# Patient Record
Sex: MALE | Race: BLACK OR AFRICAN AMERICAN | NOT HISPANIC OR LATINO | Employment: OTHER | URBAN - NONMETROPOLITAN AREA
[De-identification: names, ages, dates, MRNs, and addresses within clinical notes are randomized per-mention and may not be internally consistent; named-entity substitution may affect disease eponyms.]

---

## 2017-01-03 ENCOUNTER — OFFICE VISIT (OUTPATIENT)
Dept: NEUROLOGY | Facility: CLINIC | Age: 62
End: 2017-01-03

## 2017-01-03 VITALS
BODY MASS INDEX: 28.45 KG/M2 | SYSTOLIC BLOOD PRESSURE: 128 MMHG | HEART RATE: 72 BPM | HEIGHT: 66 IN | WEIGHT: 177 LBS | DIASTOLIC BLOOD PRESSURE: 74 MMHG

## 2017-01-03 DIAGNOSIS — G89.29 CHRONIC MIDLINE THORACIC BACK PAIN: ICD-10-CM

## 2017-01-03 DIAGNOSIS — E78.5 DYSLIPIDEMIA: ICD-10-CM

## 2017-01-03 DIAGNOSIS — M54.6 CHRONIC MIDLINE THORACIC BACK PAIN: ICD-10-CM

## 2017-01-03 DIAGNOSIS — I10 ESSENTIAL HYPERTENSION: ICD-10-CM

## 2017-01-03 DIAGNOSIS — I69.30 CHRONIC LEFT ARTERIAL ISCHEMIC STROKE, MCA (MIDDLE CEREBRAL ARTERY): Primary | ICD-10-CM

## 2017-01-03 PROBLEM — Z86.73 CHRONIC LEFT ARTERIAL ISCHEMIC STROKE, MCA (MIDDLE CEREBRAL ARTERY): Status: ACTIVE | Noted: 2017-01-03

## 2017-01-03 PROCEDURE — 99213 OFFICE O/P EST LOW 20 MIN: CPT | Performed by: CLINICAL NURSE SPECIALIST

## 2017-01-03 RX ORDER — CLOPIDOGREL BISULFATE 75 MG/1
75 TABLET ORAL DAILY
Qty: 30 TABLET | Refills: 11 | Status: SHIPPED | OUTPATIENT
Start: 2017-01-03 | End: 2018-01-07 | Stop reason: SDUPTHER

## 2017-01-03 NOTE — MR AVS SNAPSHOT
Dao Jhaveri   1/3/2017 1:40 PM   Office Visit    Dept Phone:  [phone]   Encounter #:  [id number]    Provider:  OMAIRA Rodriguez   Department:  [facility] NEUROLOGY                Your Full Care Plan              Today's Medication Changes          These changes are accurate as of: 1/3/17  2:28 PM.  If you have any questions, ask your nurse or doctor.               Stop taking medication(s)listed here:     CloNIDine 0.1 MG tablet   Commonly known as:  CATAPRES   Stopped by:  OMAIRA Rodriguez                Where to Get Your Medications      These medications were sent to [facility] [address] [place] [address] - [address] - [phone]  - [phone]   [address] [facility] [address]     Phone:  [phone]     clopidogrel 75 MG tablet                  Your Updated Medication List          This list is accurate as of: 1/3/17  2:28 PM.  Always use your most recent med list.                atorvastatin 10 MG tablet   Commonly known as:  LIPITOR       clopidogrel 75 MG tablet   Commonly known as:  PLAVIX   Take 1 tablet by mouth Daily.       lisinopril 20 MG tablet   Commonly known as:  PRINIVIL,ZESTRIL       MAXZIDE 75-50 MG per tablet   Generic drug:  triamterene-hydrochlorothiazide       metoprolol succinate XL 50 MG 24 hr tablet   Commonly known as:  TOPROL-XL               Instructions    Stroke Prevention  Some medical conditions and behaviors are associated with an increased chance of having a stroke. You may prevent a stroke by making healthy choices and managing medical conditions.  HOW CAN I REDUCE MY RISK OF HAVING A STROKE?   · Stay physically active. Get at least 30 minutes of activity on most or all days.  · Do not smoke. It may also be helpful to avoid exposure to secondhand smoke.  · Limit alcohol use. Moderate alcohol use is considered to be:  ¨ No more than 2 drinks per day for men.  ¨ No more than 1 drink per day for  nonpregnant women.  · Eat healthy foods. This involves:  ¨ Eating 5 or more servings of fruits and vegetables a day.  ¨ Making dietary changes that address high blood pressure (hypertension), high cholesterol, diabetes, or obesity.  · Manage your cholesterol levels.  ¨ Making food choices that are high in fiber and low in saturated fat, trans fat, and cholesterol may control cholesterol levels.  ¨ Take any prescribed medicines to control cholesterol as directed by your health care provider.  · Manage your diabetes.  ¨ Controlling your carbohydrate and sugar intake is recommended to manage diabetes.  ¨ Take any prescribed medicines to control diabetes as directed by your health care provider.  · Control your hypertension.  ¨ Making food choices that are low in salt (sodium), saturated fat, trans fat, and cholesterol is recommended to manage hypertension.  ¨ Ask your health care provider if you need treatment to lower your blood pressure. Take any prescribed medicines to control hypertension as directed by your health care provider.  ¨ If you are 18-39 years of age, have your blood pressure checked every 3-5 years. If you are 40 years of age or older, have your blood pressure checked every year.  · Maintain a healthy weight.  ¨ Reducing calorie intake and making food choices that are low in sodium, saturated fat, trans fat, and cholesterol are recommended to manage weight.  · Stop drug abuse.  · Avoid taking birth control pills.  ¨ Talk to your health care provider about the risks of taking birth control pills if you are over 35 years old, smoke, get migraines, or have ever had a blood clot.  · Get evaluated for sleep disorders (sleep apnea).  ¨ Talk to your health care provider about getting a sleep evaluation if you snore a lot or have excessive sleepiness.  · Take medicines only as directed by your health care provider.  ¨ For some people, aspirin or blood thinners (anticoagulants) are helpful in reducing the risk  of forming abnormal blood clots that can lead to stroke. If you have the irregular heart rhythm of atrial fibrillation, you should be on a blood thinner unless there is a good reason you cannot take them.  ¨ Understand all your medicine instructions.  · Make sure that other conditions (such as anemia or atherosclerosis) are addressed.  SEEK IMMEDIATE MEDICAL CARE IF:   · You have sudden weakness or numbness of the face, arm, or leg, especially on one side of the body.  · Your face or eyelid droops to one side.  · You have sudden confusion.  · You have trouble speaking (aphasia) or understanding.  · You have sudden trouble seeing in one or both eyes.  · You have sudden trouble walking.  · You have dizziness.  · You have a loss of balance or coordination.  · You have a sudden, severe headache with no known cause.  · You have new chest pain or an irregular heartbeat.  Any of these symptoms may represent a serious problem that is an emergency. Do not wait to see if the symptoms will go away. Get medical help at once. Call your local emergency services (911 in [place] Do not drive yourself to the hospital.     This information is not intended to replace advice given to you by your health care provider. Make sure you discuss any questions you have with your health care provider.     Document Released: 01/25/2006 Document Revised: 01/08/2016 Document Reviewed: 06/20/2014  [email or web address] Interactive Patient Education ©2016 [employer]  BMI for Adults  Body mass index (BMI) is a number that is calculated from a person's weight and height. In most adults, the number is used to find how much of an adult's weight is made up of fat. BMI is not as accurate as a direct measure of body fat.  HOW IS BMI CALCULATED?  BMI is calculated by dividing weight in kilograms by height in meters squared. It can also be calculated by dividing weight in pounds by height in inches squared, then multiplying the resulting number by 703. Charts are  available to help you find your BMI quickly and easily without doing this calculation.   HOW IS BMI INTERPRETED?  Health care professionals use BMI charts to identify whether an adult is underweight, at a normal weight, or overweight based on the following guidelines:  · Underweight: BMI less than 18.5.  · Normal weight: BMI between 18.5 and 24.9.  · Overweight: BMI between 25 and 29.9.  · Obese: BMI of 30 and above.  BMI is usually interpreted the same for males and females.  Weight includes both fat and muscle, so someone with a muscular build, such as an athlete, may have a BMI that is higher than 24.9. In cases like these, BMI may not accurately depict body fat. To determine if excess body fat is the cause of a BMI of 25 or higher, further assessments may need to be done by a health care provider.  WHY IS BMI A USEFUL TOOL?  BMI is used to identify a possible weight problem that may be related to a medical problem or may increase the risk for medical problems. BMI can also be used to promote changes to reach a healthy weight.     This information is not intended to replace advice given to you by your health care provider. Make sure you discuss any questions you have with your health care provider.     Document Released: 08/29/2005 Document Revised: 01/08/2016 Document Reviewed: 05/15/2015  [employer] Interactive Patient Education ©2016 [employer]       Patient Instructions History      Upcoming Appointments     Visit Type Date Time Department    FOLLOW UP 1/3/2017  1:40 PM [facility] SPE PAD    FOLLOW UP 12/29/2017  1:00 PM [facility] SPE PAD      [employer] Signup     [facility] [employer] allows you to send messages to your doctor, view your test results, renew your prescriptions, schedule appointments, and more. To sign up, go to [employer] and click on the Sign Up Now link in the New User? box. Enter your [employer] Activation Code exactly as it appears below along with the last four digits  "of your Social Security Number and your Date of Birth () to complete the sign-up process. If you do not sign up before the expiration date, you must request a new code.    [employer] Activation Code: [email or web address]  Expires: 2017  2:25 PM    If you have questions, you can email [email or web address] or call [phone] to talk to our [employer] staff. Remember, [employer] is NOT to be used for urgent needs. For medical emergencies, dial 911.               Other Info from Your Visit           Your Appointments     Dec 29, 2017  1:00 PM CST   Follow Up with OMAIRA Rodriguez   [facility] (--)    [facility] [address] [address] [phone]           Arrive 15 minutes prior to appointment.              Allergies     Codeine        Reason for Visit     Stroke No new stroke symptoms. Complaints of his right arm/hand pain or tense, and sometimes late in the evening his speech will become slurred.       Vital Signs     Blood Pressure Pulse Height Weight Body Mass Index Smoking Status    128/74 72 66\" (167.6 cm) 177 lb (80.3 kg) 28.57 kg/m2 Former Smoker        "

## 2017-01-03 NOTE — PATIENT INSTRUCTIONS
Stroke Prevention  Some medical conditions and behaviors are associated with an increased chance of having a stroke. You may prevent a stroke by making healthy choices and managing medical conditions.  HOW CAN I REDUCE MY RISK OF HAVING A STROKE?   · Stay physically active. Get at least 30 minutes of activity on most or all days.  · Do not smoke. It may also be helpful to avoid exposure to secondhand smoke.  · Limit alcohol use. Moderate alcohol use is considered to be:  ¨ No more than 2 drinks per day for men.  ¨ No more than 1 drink per day for nonpregnant women.  · Eat healthy foods. This involves:  ¨ Eating 5 or more servings of fruits and vegetables a day.  ¨ Making dietary changes that address high blood pressure (hypertension), high cholesterol, diabetes, or obesity.  · Manage your cholesterol levels.  ¨ Making food choices that are high in fiber and low in saturated fat, trans fat, and cholesterol may control cholesterol levels.  ¨ Take any prescribed medicines to control cholesterol as directed by your health care provider.  · Manage your diabetes.  ¨ Controlling your carbohydrate and sugar intake is recommended to manage diabetes.  ¨ Take any prescribed medicines to control diabetes as directed by your health care provider.  · Control your hypertension.  ¨ Making food choices that are low in salt (sodium), saturated fat, trans fat, and cholesterol is recommended to manage hypertension.  ¨ Ask your health care provider if you need treatment to lower your blood pressure. Take any prescribed medicines to control hypertension as directed by your health care provider.  ¨ If you are 18-39 years of age, have your blood pressure checked every 3-5 years. If you are 40 years of age or older, have your blood pressure checked every year.  · Maintain a healthy weight.  ¨ Reducing calorie intake and making food choices that are low in sodium, saturated fat, trans fat, and cholesterol are recommended to manage  weight.  · Stop drug abuse.  · Avoid taking birth control pills.  ¨ Talk to your health care provider about the risks of taking birth control pills if you are over 35 years old, smoke, get migraines, or have ever had a blood clot.  · Get evaluated for sleep disorders (sleep apnea).  ¨ Talk to your health care provider about getting a sleep evaluation if you snore a lot or have excessive sleepiness.  · Take medicines only as directed by your health care provider.  ¨ For some people, aspirin or blood thinners (anticoagulants) are helpful in reducing the risk of forming abnormal blood clots that can lead to stroke. If you have the irregular heart rhythm of atrial fibrillation, you should be on a blood thinner unless there is a good reason you cannot take them.  ¨ Understand all your medicine instructions.  · Make sure that other conditions (such as anemia or atherosclerosis) are addressed.  SEEK IMMEDIATE MEDICAL CARE IF:   · You have sudden weakness or numbness of the face, arm, or leg, especially on one side of the body.  · Your face or eyelid droops to one side.  · You have sudden confusion.  · You have trouble speaking (aphasia) or understanding.  · You have sudden trouble seeing in one or both eyes.  · You have sudden trouble walking.  · You have dizziness.  · You have a loss of balance or coordination.  · You have a sudden, severe headache with no known cause.  · You have new chest pain or an irregular heartbeat.  Any of these symptoms may represent a serious problem that is an emergency. Do not wait to see if the symptoms will go away. Get medical help at once. Call your local emergency services (911 in [place] Do not drive yourself to the hospital.     This information is not intended to replace advice given to you by your health care provider. Make sure you discuss any questions you have with your health care provider.     Document Released: 01/25/2006 Document Revised: 01/08/2016 Document Reviewed:  06/20/2014  [employer] Interactive Patient Education ©2016 [employer]  BMI for Adults  Body mass index (BMI) is a number that is calculated from a person's weight and height. In most adults, the number is used to find how much of an adult's weight is made up of fat. BMI is not as accurate as a direct measure of body fat.  HOW IS BMI CALCULATED?  BMI is calculated by dividing weight in kilograms by height in meters squared. It can also be calculated by dividing weight in pounds by height in inches squared, then multiplying the resulting number by 703. Charts are available to help you find your BMI quickly and easily without doing this calculation.   HOW IS BMI INTERPRETED?  Health care professionals use BMI charts to identify whether an adult is underweight, at a normal weight, or overweight based on the following guidelines:  · Underweight: BMI less than 18.5.  · Normal weight: BMI between 18.5 and 24.9.  · Overweight: BMI between 25 and 29.9.  · Obese: BMI of 30 and above.  BMI is usually interpreted the same for males and females.  Weight includes both fat and muscle, so someone with a muscular build, such as an athlete, may have a BMI that is higher than 24.9. In cases like these, BMI may not accurately depict body fat. To determine if excess body fat is the cause of a BMI of 25 or higher, further assessments may need to be done by a health care provider.  WHY IS BMI A USEFUL TOOL?  BMI is used to identify a possible weight problem that may be related to a medical problem or may increase the risk for medical problems. BMI can also be used to promote changes to reach a healthy weight.     This information is not intended to replace advice given to you by your health care provider. Make sure you discuss any questions you have with your health care provider.     Document Released: 08/29/2005 Document Revised: 01/08/2016 Document Reviewed: 05/15/2015  [employer] Patient Education ©2016 [employer]
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
## 2017-03-02 ENCOUNTER — LAB (OUTPATIENT)
Dept: LAB | Facility: HOSPITAL | Age: 62
End: 2017-03-02

## 2017-03-02 ENCOUNTER — TRANSCRIBE ORDERS (OUTPATIENT)
Dept: LAB | Facility: HOSPITAL | Age: 62
End: 2017-03-02

## 2017-03-02 DIAGNOSIS — E55.9 UNSPECIFIED VITAMIN D DEFICIENCY: ICD-10-CM

## 2017-03-02 DIAGNOSIS — E78.2 MIXED HYPERLIPIDEMIA: ICD-10-CM

## 2017-03-02 DIAGNOSIS — R73.09 OTHER ABNORMAL GLUCOSE: ICD-10-CM

## 2017-03-02 DIAGNOSIS — I10 ESSENTIAL HYPERTENSION, MALIGNANT: ICD-10-CM

## 2017-03-02 DIAGNOSIS — I10 ESSENTIAL HYPERTENSION, MALIGNANT: Primary | ICD-10-CM

## 2017-03-02 LAB
25(OH)D3 SERPL-MCNC: 38.5 NG/ML (ref 30–100)
ALBUMIN SERPL-MCNC: 4.8 G/DL (ref 3.5–5)
ALBUMIN/GLOB SERPL: 1.2 G/DL (ref 1.1–2.5)
ALP SERPL-CCNC: 63 U/L (ref 24–120)
ALT SERPL W P-5'-P-CCNC: 36 U/L (ref 0–54)
ANION GAP SERPL CALCULATED.3IONS-SCNC: 13 MMOL/L (ref 4–13)
AST SERPL-CCNC: 30 U/L (ref 7–45)
AUTO MIXED CELLS #: 0.6 10*3/UL (ref 0.1–2.6)
AUTO MIXED CELLS %: 7.3 % (ref 0.1–24)
BILIRUB SERPL-MCNC: 0.9 MG/DL (ref 0.1–1)
BILIRUB UR QL STRIP: NEGATIVE
BUN BLD-MCNC: 15 MG/DL (ref 5–21)
BUN/CREAT SERPL: 12.8
CALCIUM SPEC-SCNC: 10.1 MG/DL (ref 8.4–10.4)
CHLORIDE SERPL-SCNC: 101 MMOL/L (ref 98–110)
CHOLEST SERPL-MCNC: 141 MG/DL (ref 130–200)
CLARITY UR: CLEAR
CO2 SERPL-SCNC: 31 MMOL/L (ref 24–31)
COLOR UR: YELLOW
CREAT BLD-MCNC: 1.17 MG/DL (ref 0.5–1.4)
ERYTHROCYTE [DISTWIDTH] IN BLOOD BY AUTOMATED COUNT: 15.6 % (ref 12–15)
GFR SERPL CREATININE-BSD FRML MDRD: 77 ML/MIN/1.73
GLOBULIN UR ELPH-MCNC: 4.1 GM/DL
GLUCOSE BLD-MCNC: 90 MG/DL (ref 70–100)
GLUCOSE UR STRIP-MCNC: NEGATIVE MG/DL
HBA1C MFR BLD: 5.8 %
HCT VFR BLD AUTO: 45 % (ref 40–52)
HDLC SERPL-MCNC: 44 MG/DL
HGB BLD-MCNC: 15 G/DL (ref 14–18)
HGB UR QL STRIP.AUTO: NEGATIVE
KETONES UR QL STRIP: NEGATIVE
LDLC SERPL CALC-MCNC: 80 MG/DL (ref 0–99)
LDLC/HDLC SERPL: 1.82 {RATIO}
LEUKOCYTE ESTERASE UR QL STRIP.AUTO: NEGATIVE
LYMPHOCYTES # BLD AUTO: 1.9 10*3/MM3 (ref 0.8–7)
LYMPHOCYTES NFR BLD AUTO: 23.4 % (ref 15–45)
MCH RBC QN AUTO: 26.6 PG (ref 28–32)
MCHC RBC AUTO-ENTMCNC: 33.3 G/DL (ref 33–36)
MCV RBC AUTO: 79.9 FL (ref 82–95)
NEUTROPHILS # BLD AUTO: 5.8 10*3/MM3 (ref 1.5–8.3)
NEUTROPHILS NFR BLD AUTO: 69.3 % (ref 39–78)
NITRITE UR QL STRIP: NEGATIVE
PH UR STRIP.AUTO: 6.5 [PH] (ref 5–8)
PLATELET # BLD AUTO: 226 10*3/MM3 (ref 130–400)
PMV BLD AUTO: 10 FL (ref 6–12)
POTASSIUM BLD-SCNC: 4.4 MMOL/L (ref 3.5–5.3)
PROT SERPL-MCNC: 8.9 G/DL (ref 6.3–8.7)
PROT UR QL STRIP: NEGATIVE
RBC # BLD AUTO: 5.63 10*6/MM3 (ref 4.2–5.4)
SODIUM BLD-SCNC: 145 MMOL/L (ref 135–145)
SP GR UR STRIP: 1.02 (ref 1–1.03)
TRIGL SERPL-MCNC: 85 MG/DL (ref 0–149)
TSH SERPL DL<=0.05 MIU/L-ACNC: 2.41 MIU/ML (ref 0.47–4.68)
UROBILINOGEN UR QL STRIP: NORMAL
VLDLC SERPL-MCNC: 17 MG/DL
WBC NRBC COR # BLD: 8.3 10*3/MM3 (ref 4.8–10.8)

## 2017-03-02 PROCEDURE — 36415 COLL VENOUS BLD VENIPUNCTURE: CPT

## 2017-03-02 PROCEDURE — 83036 HEMOGLOBIN GLYCOSYLATED A1C: CPT

## 2017-03-02 PROCEDURE — 80053 COMPREHEN METABOLIC PANEL: CPT

## 2017-03-02 PROCEDURE — 84443 ASSAY THYROID STIM HORMONE: CPT | Performed by: NURSE PRACTITIONER

## 2017-03-02 PROCEDURE — 85025 COMPLETE CBC W/AUTO DIFF WBC: CPT

## 2017-03-02 PROCEDURE — 82306 VITAMIN D 25 HYDROXY: CPT | Performed by: NURSE PRACTITIONER

## 2017-03-02 PROCEDURE — 80061 LIPID PANEL: CPT

## 2017-03-02 PROCEDURE — 81003 URINALYSIS AUTO W/O SCOPE: CPT

## 2017-03-28 ENCOUNTER — TRANSCRIBE ORDERS (OUTPATIENT)
Dept: LAB | Facility: HOSPITAL | Age: 62
End: 2017-03-28

## 2017-03-28 ENCOUNTER — HOSPITAL ENCOUNTER (OUTPATIENT)
Dept: GENERAL RADIOLOGY | Facility: HOSPITAL | Age: 62
Discharge: HOME | End: 2017-03-28
Admitting: NURSE PRACTITIONER

## 2017-03-28 DIAGNOSIS — M54.2 CERVICALGIA: ICD-10-CM

## 2017-03-28 DIAGNOSIS — M54.2 CERVICALGIA: Primary | ICD-10-CM

## 2017-03-28 PROCEDURE — 72050 X-RAY EXAM NECK SPINE 4/5VWS: CPT

## 2017-04-07 RX ORDER — ATORVASTATIN CALCIUM 10 MG/1
TABLET, FILM COATED ORAL
Qty: 90 TABLET | Refills: 2 | Status: SHIPPED | OUTPATIENT
Start: 2017-04-07 | End: 2018-01-07 | Stop reason: SDUPTHER

## 2018-01-08 RX ORDER — ATORVASTATIN CALCIUM 10 MG/1
TABLET, FILM COATED ORAL
Qty: 90 TABLET | Refills: 2 | Status: SHIPPED | OUTPATIENT
Start: 2018-01-08 | End: 2018-09-19 | Stop reason: SDUPTHER

## 2018-01-08 RX ORDER — CLOPIDOGREL BISULFATE 75 MG/1
TABLET ORAL
Qty: 30 TABLET | Refills: 11 | Status: SHIPPED | OUTPATIENT
Start: 2018-01-08 | End: 2019-10-30 | Stop reason: SDUPTHER

## 2018-01-26 ENCOUNTER — OFFICE VISIT (OUTPATIENT)
Dept: NEUROLOGY | Facility: CLINIC | Age: 63
End: 2018-01-26

## 2018-01-26 VITALS
HEIGHT: 66 IN | WEIGHT: 179 LBS | OXYGEN SATURATION: 98 % | HEART RATE: 77 BPM | BODY MASS INDEX: 28.77 KG/M2 | SYSTOLIC BLOOD PRESSURE: 160 MMHG | RESPIRATION RATE: 16 BRPM | DIASTOLIC BLOOD PRESSURE: 110 MMHG

## 2018-01-26 DIAGNOSIS — I69.30 CHRONIC LEFT ARTERIAL ISCHEMIC STROKE, MCA (MIDDLE CEREBRAL ARTERY): Primary | ICD-10-CM

## 2018-01-26 DIAGNOSIS — G89.29 CHRONIC MIDLINE THORACIC BACK PAIN: ICD-10-CM

## 2018-01-26 DIAGNOSIS — I10 ESSENTIAL HYPERTENSION: ICD-10-CM

## 2018-01-26 DIAGNOSIS — E78.5 DYSLIPIDEMIA: ICD-10-CM

## 2018-01-26 DIAGNOSIS — G89.0 THALAMIC PAIN SYNDROME: ICD-10-CM

## 2018-01-26 DIAGNOSIS — M54.6 CHRONIC MIDLINE THORACIC BACK PAIN: ICD-10-CM

## 2018-01-26 DIAGNOSIS — R25.1 TREMOR OF RIGHT HAND: ICD-10-CM

## 2018-01-26 PROCEDURE — 99214 OFFICE O/P EST MOD 30 MIN: CPT | Performed by: CLINICAL NURSE SPECIALIST

## 2018-01-26 RX ORDER — AMITRIPTYLINE HYDROCHLORIDE 25 MG/1
25 TABLET, FILM COATED ORAL NIGHTLY
Qty: 30 TABLET | Refills: 2 | Status: SHIPPED | OUTPATIENT
Start: 2018-01-26 | End: 2018-03-22 | Stop reason: SDUPTHER

## 2018-01-26 NOTE — PATIENT INSTRUCTIONS
Stroke Prevention  Some medical conditions and behaviors are associated with an increased chance of having a stroke. You may prevent a stroke by making healthy choices and managing medical conditions.  How can I reduce my risk of having a stroke?  · Stay physically active. Get at least 30 minutes of activity on most or all days.  · Do not smoke. It may also be helpful to avoid exposure to secondhand smoke.  · Limit alcohol use. Moderate alcohol use is considered to be:  ¨ No more than 2 drinks per day for men.  ¨ No more than 1 drink per day for nonpregnant women.  · Eat healthy foods. This involves:  ¨ Eating 5 or more servings of fruits and vegetables a day.  ¨ Making dietary changes that address high blood pressure (hypertension), high cholesterol, diabetes, or obesity.  · Manage your cholesterol levels.  ¨ Making food choices that are high in fiber and low in saturated fat, trans fat, and cholesterol may control cholesterol levels.  ¨ Take any prescribed medicines to control cholesterol as directed by your health care provider.  · Manage your diabetes.  ¨ Controlling your carbohydrate and sugar intake is recommended to manage diabetes.  ¨ Take any prescribed medicines to control diabetes as directed by your health care provider.  · Control your hypertension.  ¨ Making food choices that are low in salt (sodium), saturated fat, trans fat, and cholesterol is recommended to manage hypertension.  ¨ Ask your health care provider if you need treatment to lower your blood pressure. Take any prescribed medicines to control hypertension as directed by your health care provider.  ¨ If you are 18-39 years of age, have your blood pressure checked every 3-5 years. If you are 40 years of age or older, have your blood pressure checked every year.  · Maintain a healthy weight.  ¨ Reducing calorie intake and making food choices that are low in sodium, saturated fat, trans fat, and cholesterol are recommended to manage  weight.  · Stop drug abuse.  · Avoid taking birth control pills.  ¨ Talk to your health care provider about the risks of taking birth control pills if you are over 35 years old, smoke, get migraines, or have ever had a blood clot.  · Get evaluated for sleep disorders (sleep apnea).  ¨ Talk to your health care provider about getting a sleep evaluation if you snore a lot or have excessive sleepiness.  · Take medicines only as directed by your health care provider.  ¨ For some people, aspirin or blood thinners (anticoagulants) are helpful in reducing the risk of forming abnormal blood clots that can lead to stroke. If you have the irregular heart rhythm of atrial fibrillation, you should be on a blood thinner unless there is a good reason you cannot take them.  ¨ Understand all your medicine instructions.  · Make sure that other conditions (such as anemia or atherosclerosis) are addressed.  Get help right away if:  · You have sudden weakness or numbness of the face, arm, or leg, especially on one side of the body.  · Your face or eyelid droops to one side.  · You have sudden confusion.  · You have trouble speaking (aphasia) or understanding.  · You have sudden trouble seeing in one or both eyes.  · You have sudden trouble walking.  · You have dizziness.  · You have a loss of balance or coordination.  · You have a sudden, severe headache with no known cause.  · You have new chest pain or an irregular heartbeat.  Any of these symptoms may represent a serious problem that is an emergency. Do not wait to see if the symptoms will go away. Get medical help at once. Call your local emergency services (911 in [place] Do not drive yourself to the hospital.   This information is not intended to replace advice given to you by your health care provider. Make sure you discuss any questions you have with your health care provider.  Document Released: 01/25/2006 Document Revised: 05/25/2017 Document Reviewed: 06/20/2014  [employer]  Interactive Patient Education © 2017 [employer]

## 2018-02-01 ENCOUNTER — LAB (OUTPATIENT)
Dept: LAB | Facility: HOSPITAL | Age: 63
End: 2018-02-01

## 2018-02-01 DIAGNOSIS — R25.1 TREMOR OF RIGHT HAND: ICD-10-CM

## 2018-02-01 LAB
ALBUMIN SERPL-MCNC: 4.2 G/DL (ref 3.5–5)
ALBUMIN/GLOB SERPL: 1.4 G/DL (ref 1.1–2.5)
ALP SERPL-CCNC: 54 U/L (ref 24–120)
ALT SERPL W P-5'-P-CCNC: 26 U/L (ref 0–54)
ANION GAP SERPL CALCULATED.3IONS-SCNC: 12 MMOL/L (ref 4–13)
AST SERPL-CCNC: 23 U/L (ref 7–45)
BASOPHILS # BLD AUTO: 0.04 10*3/MM3 (ref 0–0.2)
BASOPHILS NFR BLD AUTO: 0.6 % (ref 0–2)
BILIRUB SERPL-MCNC: 0.4 MG/DL (ref 0.1–1)
BUN BLD-MCNC: 16 MG/DL (ref 5–21)
BUN/CREAT SERPL: 13.9 (ref 7–25)
CALCIUM SPEC-SCNC: 9.5 MG/DL (ref 8.4–10.4)
CHLORIDE SERPL-SCNC: 104 MMOL/L (ref 98–110)
CO2 SERPL-SCNC: 29 MMOL/L (ref 24–31)
CREAT BLD-MCNC: 1.15 MG/DL (ref 0.5–1.4)
DEPRECATED RDW RBC AUTO: 44.9 FL (ref 40–54)
EOSINOPHIL # BLD AUTO: 0.1 10*3/MM3 (ref 0–0.7)
EOSINOPHIL NFR BLD AUTO: 1.4 % (ref 0–4)
ERYTHROCYTE [DISTWIDTH] IN BLOOD BY AUTOMATED COUNT: 15.8 % (ref 12–15)
FOLATE SERPL-MCNC: 6.31 NG/ML
GFR SERPL CREATININE-BSD FRML MDRD: 78 ML/MIN/1.73
GLOBULIN UR ELPH-MCNC: 2.9 GM/DL
GLUCOSE BLD-MCNC: 93 MG/DL (ref 70–100)
HCT VFR BLD AUTO: 41.4 % (ref 40–52)
HGB BLD-MCNC: 13.7 G/DL (ref 14–18)
IMM GRANULOCYTES # BLD: 0.02 10*3/MM3 (ref 0–0.03)
IMM GRANULOCYTES NFR BLD: 0.3 % (ref 0–5)
LYMPHOCYTES # BLD AUTO: 2.11 10*3/MM3 (ref 0.72–4.86)
LYMPHOCYTES NFR BLD AUTO: 30.3 % (ref 15–45)
MCH RBC QN AUTO: 26.1 PG (ref 28–32)
MCHC RBC AUTO-ENTMCNC: 33.1 G/DL (ref 33–36)
MCV RBC AUTO: 79 FL (ref 82–95)
MONOCYTES # BLD AUTO: 0.44 10*3/MM3 (ref 0.19–1.3)
MONOCYTES NFR BLD AUTO: 6.3 % (ref 4–12)
NEUTROPHILS # BLD AUTO: 4.25 10*3/MM3 (ref 1.87–8.4)
NEUTROPHILS NFR BLD AUTO: 61.1 % (ref 39–78)
NRBC BLD MANUAL-RTO: 0 /100 WBC (ref 0–0)
PLATELET # BLD AUTO: 209 10*3/MM3 (ref 130–400)
PMV BLD AUTO: 11.2 FL (ref 6–12)
POTASSIUM BLD-SCNC: 4.2 MMOL/L (ref 3.5–5.3)
PROT SERPL-MCNC: 7.1 G/DL (ref 6.3–8.7)
RBC # BLD AUTO: 5.24 10*6/MM3 (ref 4.8–5.9)
SODIUM BLD-SCNC: 145 MMOL/L (ref 135–145)
VIT B12 BLD-MCNC: 381 PG/ML (ref 239–931)
WBC NRBC COR # BLD: 6.96 10*3/MM3 (ref 4.8–10.8)

## 2018-02-01 PROCEDURE — 82607 VITAMIN B-12: CPT | Performed by: CLINICAL NURSE SPECIALIST

## 2018-02-01 PROCEDURE — 80053 COMPREHEN METABOLIC PANEL: CPT | Performed by: CLINICAL NURSE SPECIALIST

## 2018-02-01 PROCEDURE — 85025 COMPLETE CBC W/AUTO DIFF WBC: CPT | Performed by: CLINICAL NURSE SPECIALIST

## 2018-02-01 PROCEDURE — 82746 ASSAY OF FOLIC ACID SERUM: CPT | Performed by: CLINICAL NURSE SPECIALIST

## 2018-02-01 PROCEDURE — 36415 COLL VENOUS BLD VENIPUNCTURE: CPT

## 2018-02-05 ENCOUNTER — APPOINTMENT (OUTPATIENT)
Dept: NEUROLOGY | Facility: HOSPITAL | Age: 63
End: 2018-02-05

## 2018-03-22 ENCOUNTER — OFFICE VISIT (OUTPATIENT)
Dept: NEUROLOGY | Facility: CLINIC | Age: 63
End: 2018-03-22

## 2018-03-22 VITALS
HEART RATE: 72 BPM | DIASTOLIC BLOOD PRESSURE: 84 MMHG | HEIGHT: 66 IN | WEIGHT: 181 LBS | BODY MASS INDEX: 29.09 KG/M2 | SYSTOLIC BLOOD PRESSURE: 124 MMHG

## 2018-03-22 DIAGNOSIS — R25.1 TREMOR OF RIGHT HAND: ICD-10-CM

## 2018-03-22 DIAGNOSIS — G89.0 THALAMIC PAIN SYNDROME: ICD-10-CM

## 2018-03-22 DIAGNOSIS — E78.5 DYSLIPIDEMIA: ICD-10-CM

## 2018-03-22 DIAGNOSIS — I69.30 CHRONIC LEFT ARTERIAL ISCHEMIC STROKE, MCA (MIDDLE CEREBRAL ARTERY): Primary | ICD-10-CM

## 2018-03-22 DIAGNOSIS — I10 ESSENTIAL HYPERTENSION: ICD-10-CM

## 2018-03-22 PROCEDURE — 99213 OFFICE O/P EST LOW 20 MIN: CPT | Performed by: CLINICAL NURSE SPECIALIST

## 2018-03-22 RX ORDER — AMITRIPTYLINE HYDROCHLORIDE 25 MG/1
25 TABLET, FILM COATED ORAL NIGHTLY
Qty: 30 TABLET | Refills: 5 | Status: SHIPPED | OUTPATIENT
Start: 2018-03-22 | End: 2018-09-19 | Stop reason: SDUPTHER

## 2018-03-22 RX ORDER — AMLODIPINE BESYLATE 5 MG/1
TABLET ORAL
COMMUNITY
End: 2018-10-08 | Stop reason: SDUPTHER

## 2018-03-22 NOTE — PATIENT INSTRUCTIONS
Stroke Prevention  Some medical conditions and behaviors are associated with an increased chance of having a stroke. You may prevent a stroke by making healthy choices and managing medical conditions.  How can I reduce my risk of having a stroke?  · Stay physically active. Get at least 30 minutes of activity on most or all days.  · Do not smoke. It may also be helpful to avoid exposure to secondhand smoke.  · Limit alcohol use. Moderate alcohol use is considered to be:  ¨ No more than 2 drinks per day for men.  ¨ No more than 1 drink per day for nonpregnant women.  · Eat healthy foods. This involves:  ¨ Eating 5 or more servings of fruits and vegetables a day.  ¨ Making dietary changes that address high blood pressure (hypertension), high cholesterol, diabetes, or obesity.  · Manage your cholesterol levels.  ¨ Making food choices that are high in fiber and low in saturated fat, trans fat, and cholesterol may control cholesterol levels.  ¨ Take any prescribed medicines to control cholesterol as directed by your health care provider.  · Manage your diabetes.  ¨ Controlling your carbohydrate and sugar intake is recommended to manage diabetes.  ¨ Take any prescribed medicines to control diabetes as directed by your health care provider.  · Control your hypertension.  ¨ Making food choices that are low in salt (sodium), saturated fat, trans fat, and cholesterol is recommended to manage hypertension.  ¨ Ask your health care provider if you need treatment to lower your blood pressure. Take any prescribed medicines to control hypertension as directed by your health care provider.  ¨ If you are 18-39 years of age, have your blood pressure checked every 3-5 years. If you are 40 years of age or older, have your blood pressure checked every year.  · Maintain a healthy weight.  ¨ Reducing calorie intake and making food choices that are low in sodium, saturated fat, trans fat, and cholesterol are recommended to manage  weight.  · Stop drug abuse.  · Avoid taking birth control pills.  ¨ Talk to your health care provider about the risks of taking birth control pills if you are over 35 years old, smoke, get migraines, or have ever had a blood clot.  · Get evaluated for sleep disorders (sleep apnea).  ¨ Talk to your health care provider about getting a sleep evaluation if you snore a lot or have excessive sleepiness.  · Take medicines only as directed by your health care provider.  ¨ For some people, aspirin or blood thinners (anticoagulants) are helpful in reducing the risk of forming abnormal blood clots that can lead to stroke. If you have the irregular heart rhythm of atrial fibrillation, you should be on a blood thinner unless there is a good reason you cannot take them.  ¨ Understand all your medicine instructions.  · Make sure that other conditions (such as anemia or atherosclerosis) are addressed.  Get help right away if:  · You have sudden weakness or numbness of the face, arm, or leg, especially on one side of the body.  · Your face or eyelid droops to one side.  · You have sudden confusion.  · You have trouble speaking (aphasia) or understanding.  · You have sudden trouble seeing in one or both eyes.  · You have sudden trouble walking.  · You have dizziness.  · You have a loss of balance or coordination.  · You have a sudden, severe headache with no known cause.  · You have new chest pain or an irregular heartbeat.  Any of these symptoms may represent a serious problem that is an emergency. Do not wait to see if the symptoms will go away. Get medical help at once. Call your local emergency services (911 in [place] Do not drive yourself to the hospital.  This information is not intended to replace advice given to you by your health care provider. Make sure you discuss any questions you have with your health care provider.  Document Released: 01/25/2006 Document Revised: 05/25/2017 Document Reviewed: 06/20/2014  [employer]  Interactive Patient Education © 2017 [employer]  Calorie Counting for Weight Loss  Calories are units of energy. Your body needs a certain amount of calories from food to keep you going throughout the day. When you eat more calories than your body needs, your body stores the extra calories as fat. When you eat fewer calories than your body needs, your body burns fat to get the energy it needs.  Calorie counting means keeping track of how many calories you eat and drink each day. Calorie counting can be helpful if you need to lose weight. If you make sure to eat fewer calories than your body needs, you should lose weight. Ask your health care provider what a healthy weight is for you.  For calorie counting to work, you will need to eat the right number of calories in a day in order to lose a healthy amount of weight per week. A dietitian can help you determine how many calories you need in a day and will give you suggestions on how to reach your calorie goal.  · A healthy amount of weight to lose per week is usually 1-2 lb (0.5-0.9 kg). This usually means that your daily calorie intake should be reduced by 500-750 calories.  · Eating 1,200 - 1,500 calories per day can help most women lose weight.  · Eating 1,500 - 1,800 calories per day can help most men lose weight.  What is my plan?  My goal is to have __________ calories per day.  If I have this many calories per day, I should lose around __________ pounds per week.  What do I need to know about calorie counting?  In order to meet your daily calorie goal, you will need to:  · Find out how many calories are in each food you would like to eat. Try to do this before you eat.  · Decide how much of the food you plan to eat.  · Write down what you ate and how many calories it had. Doing this is called keeping a food log.  To successfully lose weight, it is important to balance calorie counting with a healthy lifestyle that includes regular activity. Aim for 150  minutes of moderate exercise (such as walking) or 75 minutes of vigorous exercise (such as running) each week.  Where do I find calorie information?     The number of calories in a food can be found on a Nutrition Facts label. If a food does not have a Nutrition Facts label, try to look up the calories online or ask your dietitian for help.  Remember that calories are listed per serving. If you choose to have more than one serving of a food, you will have to multiply the calories per serving by the amount of servings you plan to eat. For example, the label on a package of bread might say that a serving size is 1 slice and that there are 90 calories in a serving. If you eat 1 slice, you will have eaten 90 calories. If you eat 2 slices, you will have eaten 180 calories.  How do I keep a food log?  Immediately after each meal, record the following information in your food log:  · What you ate. Don't forget to include toppings, sauces, and other extras on the food.  · How much you ate. This can be measured in cups, ounces, or number of items.  · How many calories each food and drink had.  · The total number of calories in the meal.  Keep your food log near you, such as in a small notebook in your pocket, or use a mobile ludwig or website. Some programs will calculate calories for you and show you how many calories you have left for the day to meet your goal.  What are some calorie counting tips?  · Use your calories on foods and drinks that will fill you up and not leave you hungry:  ¨ Some examples of foods that fill you up are nuts and nut butters, vegetables, lean proteins, and high-fiber foods like whole grains. High-fiber foods are foods with more than 5 g fiber per serving.  ¨ Drinks such as sodas, specialty coffee drinks, alcohol, and juices have a lot of calories, yet do not fill you up.  · Eat nutritious foods and avoid empty calories. Empty calories are calories you get from foods or beverages that do not have  "many vitamins or protein, such as candy, sweets, and soda. It is better to have a nutritious high-calorie food (such as an avocado) than a food with few nutrients (such as a bag of chips).  · Know how many calories are in the foods you eat most often. This will help you calculate calorie counts faster.  · Pay attention to calories in drinks. Low-calorie drinks include water and unsweetened drinks.  · Pay attention to nutrition labels for \"low fat\" or \"fat free\" foods. These foods sometimes have the same amount of calories or more calories than the full fat versions. They also often have added sugar, starch, or salt, to make up for flavor that was removed with the fat.  · Find a way of tracking calories that works for you. Get creative. Try different apps or programs if writing down calories does not work for you.  What are some portion control tips?  · Know how many calories are in a serving. This will help you know how many servings of a certain food you can have.  · Use a measuring cup to measure serving sizes. You could also try weighing out portions on a kitchen scale. With time, you will be able to estimate serving sizes for some foods.  · Take some time to put servings of different foods on your favorite plates, bowls, and cups so you know what a serving looks like.  · Try not to eat straight from a bag or box. Doing this can lead to overeating. Put the amount you would like to eat in a cup or on a plate to make sure you are eating the right portion.  · Use smaller plates, glasses, and bowls to prevent overeating.  · Try not to multitask (for example, watch TV or use your computer) while eating. If it is time to eat, sit down at a table and enjoy your food. This will help you to know when you are full. It will also help you to be aware of what you are eating and how much you are eating.  What are tips for following this plan?  Reading food labels   · Check the calorie count compared to the serving size. The " serving size may be smaller than what you are used to eating.  · Check the source of the calories. Make sure the food you are eating is high in vitamins and protein and low in saturated and trans fats.  Shopping   · Read nutrition labels while you shop. This will help you make healthy decisions before you decide to purchase your food.  · Make a grocery list and stick to it.  Cooking   · Try to cook your favorite foods in a healthier way. For example, try baking instead of frying.  · Use low-fat dairy products.  Meal planning   · Use more fruits and vegetables. Half of your plate should be fruits and vegetables.  · Include lean proteins like poultry and fish.  How do I count calories when eating out?  · Ask for smaller portion sizes.  · Consider sharing an entree and sides instead of getting your own entree.  · If you get your own entree, eat only half. Ask for a box at the beginning of your meal and put the rest of your entree in it so you are not tempted to eat it.  · If calories are listed on the menu, choose the lower calorie options.  · Choose dishes that include vegetables, fruits, whole grains, low-fat dairy products, and lean protein.  · Choose items that are boiled, broiled, grilled, or steamed. Stay away from items that are buttered, battered, fried, or served with cream sauce. Items labeled “crispy” are usually fried, unless stated otherwise.  · Choose water, low-fat milk, unsweetened iced tea, or other drinks without added sugar. If you want an alcoholic beverage, choose a lower calorie option such as a glass of wine or light beer.  · Ask for dressings, sauces, and syrups on the side. These are usually high in calories, so you should limit the amount you eat.  · If you want a salad, choose a garden salad and ask for grilled meats. Avoid extra toppings like pepe, cheese, or fried items. Ask for the dressing on the side, or ask for olive oil and vinegar or lemon to use as dressing.  · Estimate how many  servings of a food you are given. For example, a serving of cooked rice is ½ cup or about the size of half a baseball. Knowing serving sizes will help you be aware of how much food you are eating at restaurants. The list below tells you how big or small some common portion sizes are based on everyday objects:  ¨ 1 oz--4 stacked dice.  ¨ 3 oz--1 deck of cards.  ¨ 1 tsp--1 die.  ¨ 1 Tbsp--½ a ping-pong ball.  ¨ 2 Tbsp--1 ping-pong ball.  ¨ ½ cup--½ baseball.  ¨ 1 cup--1 baseball.  Summary  · Calorie counting means keeping track of how many calories you eat and drink each day. If you eat fewer calories than your body needs, you should lose weight.  · A healthy amount of weight to lose per week is usually 1-2 lb (0.5-0.9 kg). This usually means reducing your daily calorie intake by 500-750 calories.  · The number of calories in a food can be found on a Nutrition Facts label. If a food does not have a Nutrition Facts label, try to look up the calories online or ask your dietitian for help.  · Use your calories on foods and drinks that will fill you up, and not on foods and drinks that will leave you hungry.  · Use smaller plates, glasses, and bowls to prevent overeating.  This information is not intended to replace advice given to you by your health care provider. Make sure you discuss any questions you have with your health care provider.  Document Released: 12/18/2006 Document Revised: 11/17/2017 Document Reviewed: 11/17/2017  [employer] Interactive Patient Education © 2017 [employer]
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
## 2018-09-19 ENCOUNTER — OFFICE VISIT (OUTPATIENT)
Dept: NEUROLOGY | Facility: CLINIC | Age: 63
End: 2018-09-19

## 2018-09-19 VITALS
SYSTOLIC BLOOD PRESSURE: 152 MMHG | DIASTOLIC BLOOD PRESSURE: 96 MMHG | HEART RATE: 94 BPM | WEIGHT: 171 LBS | BODY MASS INDEX: 27.48 KG/M2 | HEIGHT: 66 IN

## 2018-09-19 DIAGNOSIS — I69.30 CHRONIC LEFT ARTERIAL ISCHEMIC STROKE, MCA (MIDDLE CEREBRAL ARTERY): ICD-10-CM

## 2018-09-19 DIAGNOSIS — Z51.81 THERAPEUTIC DRUG MONITORING: Primary | ICD-10-CM

## 2018-09-19 DIAGNOSIS — G89.0 THALAMIC PAIN SYNDROME: ICD-10-CM

## 2018-09-19 DIAGNOSIS — E78.5 DYSLIPIDEMIA: ICD-10-CM

## 2018-09-19 DIAGNOSIS — M25.511 CHRONIC RIGHT SHOULDER PAIN: ICD-10-CM

## 2018-09-19 DIAGNOSIS — G89.29 CHRONIC RIGHT SHOULDER PAIN: ICD-10-CM

## 2018-09-19 DIAGNOSIS — R25.1 TREMOR OF RIGHT HAND: ICD-10-CM

## 2018-09-19 DIAGNOSIS — I63.312 CEREBRAL INFARCTION DUE TO THROMBOSIS OF LEFT MIDDLE CEREBRAL ARTERY (HCC): ICD-10-CM

## 2018-09-19 DIAGNOSIS — I10 ESSENTIAL HYPERTENSION: ICD-10-CM

## 2018-09-19 PROCEDURE — 99214 OFFICE O/P EST MOD 30 MIN: CPT | Performed by: CLINICAL NURSE SPECIALIST

## 2018-09-19 RX ORDER — AMITRIPTYLINE HYDROCHLORIDE 25 MG/1
TABLET, FILM COATED ORAL
Qty: 15 TABLET | Refills: 5 | Status: SHIPPED | OUTPATIENT
Start: 2018-09-19 | End: 2019-07-23

## 2018-09-19 RX ORDER — ATORVASTATIN CALCIUM 10 MG/1
10 TABLET, FILM COATED ORAL DAILY
Qty: 90 TABLET | Refills: 3 | Status: SHIPPED | OUTPATIENT
Start: 2018-09-19 | End: 2019-10-30 | Stop reason: SDUPTHER
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
## 2018-09-19 NOTE — PATIENT INSTRUCTIONS
"DASH Eating Plan  DASH stands for \"Dietary Approaches to Stop Hypertension.\" The DASH eating plan is a healthy eating plan that has been shown to reduce high blood pressure (hypertension). It may also reduce your risk for type 2 diabetes, heart disease, and stroke. The DASH eating plan may also help with weight loss.  What are tips for following this plan?  General guidelines  · Avoid eating more than 2,300 mg (milligrams) of salt (sodium) a day. If you have hypertension, you may need to reduce your sodium intake to 1,500 mg a day.  · Limit alcohol intake to no more than 1 drink a day for nonpregnant women and 2 drinks a day for men. One drink equals 12 oz of beer, 5 oz of wine, or 1½ oz of hard liquor.  · Work with your health care provider to maintain a healthy body weight or to lose weight. Ask what an ideal weight is for you.  · Get at least 30 minutes of exercise that causes your heart to beat faster (aerobic exercise) most days of the week. Activities may include walking, swimming, or biking.  · Work with your health care provider or diet and nutrition specialist (dietitian) to adjust your eating plan to your individual calorie needs.  Reading food labels  · Check food labels for the amount of sodium per serving. Choose foods with less than 5 percent of the Daily Value of sodium. Generally, foods with less than 300 mg of sodium per serving fit into this eating plan.  · To find whole grains, look for the word \"whole\" as the first word in the ingredient list.  Shopping  · Buy products labeled as \"low-sodium\" or \"no salt added.\"  · Buy fresh foods. Avoid canned foods and premade or frozen meals.  Cooking  · Avoid adding salt when cooking. Use salt-free seasonings or herbs instead of table salt or sea salt. Check with your health care provider or pharmacist before using salt substitutes.  · Do not joe foods. Cook foods using healthy methods such as baking, boiling, grilling, and broiling instead.  · Cook with " heart-healthy oils, such as olive, canola, soybean, or sunflower oil.  Meal planning    · Eat a balanced diet that includes:  ? 5 or more servings of fruits and vegetables each day. At each meal, try to fill half of your plate with fruits and vegetables.  ? Up to 6-8 servings of whole grains each day.  ? Less than 6 oz of lean meat, poultry, or fish each day. A 3-oz serving of meat is about the same size as a deck of cards. One egg equals 1 oz.  ? 2 servings of low-fat dairy each day.  ? A serving of nuts, seeds, or beans 5 times each week.  ? Heart-healthy fats. Healthy fats called Omega-3 fatty acids are found in foods such as flaxseeds and coldwater fish, like sardines, salmon, and mackerel.  · Limit how much you eat of the following:  ? Canned or prepackaged foods.  ? Food that is high in trans fat, such as fried foods.  ? Food that is high in saturated fat, such as fatty meat.  ? Sweets, desserts, sugary drinks, and other foods with added sugar.  ? Full-fat dairy products.  · Do not salt foods before eating.  · Try to eat at least 2 vegetarian meals each week.  · Eat more home-cooked food and less restaurant, buffet, and fast food.  · When eating at a restaurant, ask that your food be prepared with less salt or no salt, if possible.  What foods are recommended?  The items listed may not be a complete list. Talk with your dietitian about what dietary choices are best for you.  Grains  Whole-grain or whole-wheat bread. Whole-grain or whole-wheat pasta. Brown rice. Oatmeal. Quinoa. Bulgur. Whole-grain and low-sodium cereals. [place] bread. Low-fat, low-sodium crackers. Whole-wheat flour tortillas.  Vegetables  Fresh or frozen vegetables (raw, steamed, roasted, or grilled). Low-sodium or reduced-sodium tomato and vegetable juice. Low-sodium or reduced-sodium tomato sauce and tomato paste. Low-sodium or reduced-sodium canned vegetables.  Fruits  All fresh, dried, or frozen fruit. Canned fruit in natural juice (without  added sugar).  Meat and other protein foods  Skinless chicken or turkey. Ground chicken or turkey. Pork with fat trimmed off. Fish and seafood. Egg whites. Dried beans, peas, or lentils. Unsalted nuts, nut butters, and seeds. Unsalted canned beans. Lean cuts of beef with fat trimmed off. Low-sodium, lean deli meat.  Dairy  Low-fat (1%) or fat-free (skim) milk. Fat-free, low-fat, or reduced-fat cheeses. Nonfat, low-sodium ricotta or cottage cheese. Low-fat or nonfat yogurt. Low-fat, low-sodium cheese.  Fats and oils  Soft margarine without trans fats. Vegetable oil. Low-fat, reduced-fat, or light mayonnaise and salad dressings (reduced-sodium). Canola, safflower, olive, soybean, and sunflower oils. Avocado.  Seasoning and other foods  Herbs. Spices. Seasoning mixes without salt. Unsalted popcorn and pretzels. Fat-free sweets.  What foods are not recommended?  The items listed may not be a complete list. Talk with your dietitian about what dietary choices are best for you.  Grains  Baked goods made with fat, such as croissants, muffins, or some breads. Dry pasta or rice meal packs.  Vegetables  Creamed or fried vegetables. Vegetables in a cheese sauce. Regular canned vegetables (not low-sodium or reduced-sodium). Regular canned tomato sauce and paste (not low-sodium or reduced-sodium). Regular tomato and vegetable juice (not low-sodium or reduced-sodium). Pickles. Olives.  Fruits  Canned fruit in a light or heavy syrup. Fried fruit. Fruit in cream or butter sauce.  Meat and other protein foods  Fatty cuts of meat. Ribs. Fried meat. [place]. Sausage. Bologna and other processed lunch meats. Salami. Fatback. Hotdogs. Bratwurst. Salted nuts and seeds. Canned beans with added salt. Canned or smoked fish. Whole eggs or egg yolks. Chicken or turkey with skin.  Dairy  Whole or 2% milk, cream, and half-and-half. Whole or full-fat cream cheese. Whole-fat or sweetened yogurt. Full-fat cheese. Nondairy creamers. Whipped toppings.  Processed cheese and cheese spreads.  Fats and oils  Butter. Stick margarine. Lard. Shortening. Ghee. Huerta fat. Tropical oils, such as coconut, palm kernel, or palm oil.  Seasoning and other foods  Salted popcorn and pretzels. Onion salt, garlic salt, seasoned salt, table salt, and sea salt. Worcestershire sauce. Tartar sauce. Barbecue sauce. Teriyaki sauce. Soy sauce, including reduced-sodium. Steak sauce. Canned and packaged gravies. Fish sauce. Oyster sauce. Cocktail sauce. Horseradish that you find on the shelf. Ketchup. Mustard. Meat flavorings and tenderizers. Bouillon cubes. Hot sauce and Tabasco sauce. Premade or packaged marinades. Premade or packaged taco seasonings. Relishes. Regular salad dressings.  Where to find more information:  · National Heart, Lung, and [employer] [place]: www.nhlbi.nih.gov  · American Heart Association: www.heart.org  Summary  · The DASH eating plan is a healthy eating plan that has been shown to reduce high blood pressure (hypertension). It may also reduce your risk for type 2 diabetes, heart disease, and stroke.  · With the DASH eating plan, you should limit salt (sodium) intake to 2,300 mg a day. If you have hypertension, you may need to reduce your sodium intake to 1,500 mg a day.  · When on the DASH eating plan, aim to eat more fresh fruits and vegetables, whole grains, lean proteins, low-fat dairy, and heart-healthy fats.  · Work with your health care provider or diet and nutrition specialist (dietitian) to adjust your eating plan to your individual calorie needs.  This information is not intended to replace advice given to you by your health care provider. Make sure you discuss any questions you have with your health care provider.  Document Released: 12/06/2012 Document Revised: 12/11/2017 Document Reviewed: 12/11/2017  [employer] Patient Education © 2018 [employer]

## 2018-10-08 RX ORDER — AMLODIPINE BESYLATE 5 MG/1
5 TABLET ORAL
Qty: 30 TABLET | Refills: 0 | Status: SHIPPED | OUTPATIENT
Start: 2018-10-08 | End: 2018-11-09 | Stop reason: SDUPTHER

## 2018-10-09 ENCOUNTER — APPOINTMENT (OUTPATIENT)
Dept: MRI IMAGING | Facility: HOSPITAL | Age: 63
End: 2018-10-09

## 2018-11-09 RX ORDER — AMLODIPINE BESYLATE 5 MG/1
5 TABLET ORAL
Qty: 30 TABLET | Refills: 0 | Status: SHIPPED | OUTPATIENT
Start: 2018-11-09 | End: 2019-10-30 | Stop reason: SDUPTHER

## 2018-12-19 ENCOUNTER — APPOINTMENT (OUTPATIENT)
Dept: MRI IMAGING | Facility: HOSPITAL | Age: 63
End: 2018-12-19

## 2019-02-19 ENCOUNTER — LAB (OUTPATIENT)
Dept: LAB | Facility: HOSPITAL | Age: 64
End: 2019-02-19

## 2019-02-19 DIAGNOSIS — I63.312 CEREBRAL INFARCTION DUE TO THROMBOSIS OF LEFT MIDDLE CEREBRAL ARTERY (HCC): ICD-10-CM

## 2019-02-19 DIAGNOSIS — Z51.81 THERAPEUTIC DRUG MONITORING: ICD-10-CM

## 2019-02-19 LAB
ALBUMIN SERPL-MCNC: 4.8 G/DL (ref 3.5–5)
ALBUMIN/GLOB SERPL: 1.4 G/DL (ref 1.1–2.5)
ALP SERPL-CCNC: 71 U/L (ref 24–120)
ALT SERPL W P-5'-P-CCNC: 21 U/L (ref 0–54)
ANION GAP SERPL CALCULATED.3IONS-SCNC: 7 MMOL/L (ref 4–13)
ARTICHOKE IGE QN: 72 MG/DL (ref 0–99)
AST SERPL-CCNC: 31 U/L (ref 7–45)
BASOPHILS # BLD AUTO: 0.04 10*3/MM3 (ref 0–0.2)
BASOPHILS NFR BLD AUTO: 0.5 % (ref 0–2)
BILIRUB SERPL-MCNC: 0.4 MG/DL (ref 0.1–1)
BUN BLD-MCNC: 20 MG/DL (ref 5–21)
BUN/CREAT SERPL: 15.9 (ref 7–25)
CALCIUM SPEC-SCNC: 9.9 MG/DL (ref 8.4–10.4)
CHLORIDE SERPL-SCNC: 101 MMOL/L (ref 98–110)
CHOLEST SERPL-MCNC: 127 MG/DL (ref 130–200)
CO2 SERPL-SCNC: 31 MMOL/L (ref 24–31)
CREAT BLD-MCNC: 1.26 MG/DL (ref 0.5–1.4)
DEPRECATED RDW RBC AUTO: 45.8 FL (ref 40–54)
EOSINOPHIL # BLD AUTO: 0.16 10*3/MM3 (ref 0–0.7)
EOSINOPHIL NFR BLD AUTO: 1.9 % (ref 0–4)
ERYTHROCYTE [DISTWIDTH] IN BLOOD BY AUTOMATED COUNT: 15.5 % (ref 12–15)
FOLATE SERPL-MCNC: 5.69 NG/ML
GFR SERPL CREATININE-BSD FRML MDRD: 70 ML/MIN/1.73
GLOBULIN UR ELPH-MCNC: 3.4 GM/DL
GLUCOSE BLD-MCNC: 95 MG/DL (ref 70–100)
HCT VFR BLD AUTO: 47.1 % (ref 40–52)
HDLC SERPL-MCNC: 41 MG/DL
HGB BLD-MCNC: 15.2 G/DL (ref 14–18)
IMM GRANULOCYTES # BLD AUTO: 0.02 10*3/MM3 (ref 0–0.05)
IMM GRANULOCYTES NFR BLD AUTO: 0.2 % (ref 0–5)
LDLC/HDLC SERPL: 1.56 {RATIO}
LYMPHOCYTES # BLD AUTO: 2.47 10*3/MM3 (ref 0.72–4.86)
LYMPHOCYTES NFR BLD AUTO: 29.8 % (ref 15–45)
MCH RBC QN AUTO: 26.3 PG (ref 28–32)
MCHC RBC AUTO-ENTMCNC: 32.3 G/DL (ref 33–36)
MCV RBC AUTO: 81.5 FL (ref 82–95)
MONOCYTES # BLD AUTO: 0.61 10*3/MM3 (ref 0.19–1.3)
MONOCYTES NFR BLD AUTO: 7.4 % (ref 4–12)
NEUTROPHILS # BLD AUTO: 4.98 10*3/MM3 (ref 1.87–8.4)
NEUTROPHILS NFR BLD AUTO: 60.2 % (ref 39–78)
NRBC BLD AUTO-RTO: 0 /100 WBC (ref 0–0)
PLATELET # BLD AUTO: 243 10*3/MM3 (ref 130–400)
PMV BLD AUTO: 11.7 FL (ref 6–12)
POTASSIUM BLD-SCNC: 3.7 MMOL/L (ref 3.5–5.3)
PROT SERPL-MCNC: 8.2 G/DL (ref 6.3–8.7)
RBC # BLD AUTO: 5.78 10*6/MM3 (ref 4.8–5.9)
SODIUM BLD-SCNC: 139 MMOL/L (ref 135–145)
TRIGL SERPL-MCNC: 110 MG/DL (ref 0–149)
VIT B12 BLD-MCNC: 358 PG/ML (ref 239–931)
WBC NRBC COR # BLD: 8.28 10*3/MM3 (ref 4.8–10.8)

## 2019-02-19 PROCEDURE — 85025 COMPLETE CBC W/AUTO DIFF WBC: CPT | Performed by: CLINICAL NURSE SPECIALIST

## 2019-02-19 PROCEDURE — 82746 ASSAY OF FOLIC ACID SERUM: CPT | Performed by: CLINICAL NURSE SPECIALIST

## 2019-02-19 PROCEDURE — 36415 COLL VENOUS BLD VENIPUNCTURE: CPT

## 2019-02-19 PROCEDURE — 82607 VITAMIN B-12: CPT | Performed by: CLINICAL NURSE SPECIALIST

## 2019-02-19 PROCEDURE — 80053 COMPREHEN METABOLIC PANEL: CPT | Performed by: CLINICAL NURSE SPECIALIST

## 2019-02-19 PROCEDURE — 80061 LIPID PANEL: CPT | Performed by: CLINICAL NURSE SPECIALIST

## 2019-02-19 RX ORDER — CHOLECALCIFEROL (VITAMIN D3) 125 MCG
500 CAPSULE ORAL DAILY
Qty: 30 TABLET | Refills: 5 | Status: ON HOLD | OUTPATIENT
Start: 2019-02-19 | End: 2021-02-02

## 2019-02-20 ENCOUNTER — OFFICE VISIT (OUTPATIENT)
Dept: NEUROLOGY | Facility: CLINIC | Age: 64
End: 2019-02-20

## 2019-02-20 ENCOUNTER — HOSPITAL ENCOUNTER (OUTPATIENT)
Dept: GENERAL RADIOLOGY | Facility: HOSPITAL | Age: 64
Discharge: HOME | End: 2019-02-20
Admitting: CLINICAL NURSE SPECIALIST

## 2019-02-20 VITALS
WEIGHT: 172 LBS | HEIGHT: 66 IN | BODY MASS INDEX: 27.64 KG/M2 | SYSTOLIC BLOOD PRESSURE: 122 MMHG | HEART RATE: 80 BPM | DIASTOLIC BLOOD PRESSURE: 80 MMHG

## 2019-02-20 DIAGNOSIS — G89.0 THALAMIC PAIN SYNDROME: ICD-10-CM

## 2019-02-20 DIAGNOSIS — M25.511 CHRONIC RIGHT SHOULDER PAIN: ICD-10-CM

## 2019-02-20 DIAGNOSIS — G89.29 CHRONIC RIGHT SHOULDER PAIN: ICD-10-CM

## 2019-02-20 DIAGNOSIS — E53.8 B12 DEFICIENCY: ICD-10-CM

## 2019-02-20 DIAGNOSIS — E78.5 DYSLIPIDEMIA: ICD-10-CM

## 2019-02-20 DIAGNOSIS — I63.032 CEREBRAL INFARCTION DUE TO THROMBOSIS OF LEFT CAROTID ARTERY (HCC): ICD-10-CM

## 2019-02-20 DIAGNOSIS — I10 ESSENTIAL HYPERTENSION: ICD-10-CM

## 2019-02-20 DIAGNOSIS — I69.30 CHRONIC LEFT ARTERIAL ISCHEMIC STROKE, MCA (MIDDLE CEREBRAL ARTERY): Primary | ICD-10-CM

## 2019-02-20 PROBLEM — R25.1 TREMOR OF RIGHT HAND: Status: RESOLVED | Noted: 2018-01-26 | Resolved: 2019-02-20

## 2019-02-20 PROCEDURE — 73030 X-RAY EXAM OF SHOULDER: CPT

## 2019-02-20 PROCEDURE — 99214 OFFICE O/P EST MOD 30 MIN: CPT | Performed by: CLINICAL NURSE SPECIALIST
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
## 2019-03-11 ENCOUNTER — HOSPITAL ENCOUNTER (OUTPATIENT)
Dept: MRI IMAGING | Facility: HOSPITAL | Age: 64
Discharge: HOME | End: 2019-03-11

## 2019-03-11 ENCOUNTER — HOSPITAL ENCOUNTER (OUTPATIENT)
Dept: ULTRASOUND IMAGING | Facility: HOSPITAL | Age: 64
Discharge: HOME | End: 2019-03-11
Admitting: CLINICAL NURSE SPECIALIST

## 2019-03-11 DIAGNOSIS — I69.30 CHRONIC LEFT ARTERIAL ISCHEMIC STROKE, MCA (MIDDLE CEREBRAL ARTERY): ICD-10-CM

## 2019-03-11 DIAGNOSIS — M25.511 CHRONIC RIGHT SHOULDER PAIN: ICD-10-CM

## 2019-03-11 DIAGNOSIS — G89.29 CHRONIC SHOULDER PAIN, UNSPECIFIED LATERALITY: Primary | ICD-10-CM

## 2019-03-11 DIAGNOSIS — I63.032 CEREBRAL INFARCTION DUE TO THROMBOSIS OF LEFT CAROTID ARTERY (HCC): ICD-10-CM

## 2019-03-11 DIAGNOSIS — M25.519 CHRONIC SHOULDER PAIN, UNSPECIFIED LATERALITY: Primary | ICD-10-CM

## 2019-03-11 DIAGNOSIS — G89.29 CHRONIC RIGHT SHOULDER PAIN: ICD-10-CM

## 2019-03-11 PROCEDURE — 93880 EXTRACRANIAL BILAT STUDY: CPT | Performed by: SURGERY

## 2019-03-11 PROCEDURE — 73221 MRI JOINT UPR EXTREM W/O DYE: CPT

## 2019-03-11 PROCEDURE — 93880 EXTRACRANIAL BILAT STUDY: CPT

## 2019-07-23 ENCOUNTER — OFFICE VISIT (OUTPATIENT)
Dept: NEUROLOGY | Facility: CLINIC | Age: 64
End: 2019-07-23

## 2019-07-23 VITALS
WEIGHT: 170 LBS | DIASTOLIC BLOOD PRESSURE: 96 MMHG | SYSTOLIC BLOOD PRESSURE: 152 MMHG | OXYGEN SATURATION: 97 % | BODY MASS INDEX: 27.32 KG/M2 | HEIGHT: 66 IN | HEART RATE: 67 BPM

## 2019-07-23 DIAGNOSIS — I10 ESSENTIAL HYPERTENSION: ICD-10-CM

## 2019-07-23 DIAGNOSIS — R20.2 BILATERAL ARM NUMBNESS AND TINGLING WHILE SLEEPING: ICD-10-CM

## 2019-07-23 DIAGNOSIS — M79.601 BILATERAL ARM PAIN: ICD-10-CM

## 2019-07-23 DIAGNOSIS — E78.5 DYSLIPIDEMIA: ICD-10-CM

## 2019-07-23 DIAGNOSIS — R20.0 BILATERAL ARM NUMBNESS AND TINGLING WHILE SLEEPING: ICD-10-CM

## 2019-07-23 DIAGNOSIS — G89.0 THALAMIC PAIN SYNDROME: ICD-10-CM

## 2019-07-23 DIAGNOSIS — I69.30 CHRONIC LEFT ARTERIAL ISCHEMIC STROKE, MCA (MIDDLE CEREBRAL ARTERY): Primary | ICD-10-CM

## 2019-07-23 DIAGNOSIS — M54.2 CERVICALGIA: ICD-10-CM

## 2019-07-23 DIAGNOSIS — M79.602 BILATERAL ARM PAIN: ICD-10-CM

## 2019-07-23 PROCEDURE — 99214 OFFICE O/P EST MOD 30 MIN: CPT | Performed by: CLINICAL NURSE SPECIALIST
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
## 2019-08-02 ENCOUNTER — HOSPITAL ENCOUNTER (OUTPATIENT)
Dept: MRI IMAGING | Facility: HOSPITAL | Age: 64
Discharge: HOME | End: 2019-08-02
Admitting: CLINICAL NURSE SPECIALIST

## 2019-08-02 DIAGNOSIS — R20.0 BILATERAL ARM NUMBNESS AND TINGLING WHILE SLEEPING: ICD-10-CM

## 2019-08-02 DIAGNOSIS — M79.601 BILATERAL ARM PAIN: ICD-10-CM

## 2019-08-02 DIAGNOSIS — R20.2 BILATERAL ARM NUMBNESS AND TINGLING WHILE SLEEPING: ICD-10-CM

## 2019-08-02 DIAGNOSIS — M79.602 BILATERAL ARM PAIN: ICD-10-CM

## 2019-08-02 PROCEDURE — 72141 MRI NECK SPINE W/O DYE: CPT

## 2019-08-05 RX ORDER — TIZANIDINE 4 MG/1
4 TABLET ORAL 2 TIMES DAILY PRN
Qty: 60 TABLET | Refills: 0 | Status: SHIPPED | OUTPATIENT
Start: 2019-08-05 | End: 2020-08-04

## 2019-08-20 ENCOUNTER — HOSPITAL ENCOUNTER (OUTPATIENT)
Dept: PREADMISSION TESTING | Age: 64
Discharge: HOME | End: 2019-08-24
Payer: MEDICARE

## 2019-08-20 VITALS — HEIGHT: 66 IN | WEIGHT: 179 LBS | BODY MASS INDEX: 28.77 KG/M2

## 2019-08-20 LAB
ANION GAP SERPL CALCULATED.3IONS-SCNC: 12 MMOL/L (ref 7–19)
BASOPHILS ABSOLUTE: 0 K/UL (ref 0–0.2)
BASOPHILS RELATIVE PERCENT: 0.5 % (ref 0–1)
BUN BLDV-MCNC: 15 MG/DL (ref 8–23)
CALCIUM SERPL-MCNC: 9.6 MG/DL (ref 8.8–10.2)
CHLORIDE BLD-SCNC: 105 MMOL/L (ref 98–111)
CO2: 25 MMOL/L (ref 22–29)
CREAT SERPL-MCNC: 1 MG/DL (ref 0.5–1.2)
EKG P AXIS: 50 DEGREES
EKG P-R INTERVAL: 166 MS
EKG Q-T INTERVAL: 412 MS
EKG QRS DURATION: 84 MS
EKG QTC CALCULATION (BAZETT): 424 MS
EKG T AXIS: 0 DEGREES
EOSINOPHILS ABSOLUTE: 0.1 K/UL (ref 0–0.6)
EOSINOPHILS RELATIVE PERCENT: 0.8 % (ref 0–5)
GFR NON-AFRICAN AMERICAN: >60
GLUCOSE BLD-MCNC: 88 MG/DL (ref 74–109)
HCT VFR BLD CALC: 40.4 % (ref 42–52)
HEMOGLOBIN: 13.1 G/DL (ref 14–18)
IMMATURE GRANULOCYTES #: 0 K/UL
LYMPHOCYTES ABSOLUTE: 1.6 K/UL (ref 1.1–4.5)
LYMPHOCYTES RELATIVE PERCENT: 26.2 % (ref 20–40)
MCH RBC QN AUTO: 26.6 PG (ref 27–31)
MCHC RBC AUTO-ENTMCNC: 32.4 G/DL (ref 33–37)
MCV RBC AUTO: 81.9 FL (ref 80–94)
MONOCYTES ABSOLUTE: 0.4 K/UL (ref 0–0.9)
MONOCYTES RELATIVE PERCENT: 6.5 % (ref 0–10)
NEUTROPHILS ABSOLUTE: 4.1 K/UL (ref 1.5–7.5)
NEUTROPHILS RELATIVE PERCENT: 65.7 % (ref 50–65)
PDW BLD-RTO: 15.5 % (ref 11.5–14.5)
PLATELET # BLD: 223 K/UL (ref 130–400)
PMV BLD AUTO: 11.4 FL (ref 9.4–12.4)
POTASSIUM SERPL-SCNC: 4 MMOL/L (ref 3.5–5)
RBC # BLD: 4.93 M/UL (ref 4.7–6.1)
SODIUM BLD-SCNC: 142 MMOL/L (ref 136–145)
WBC # BLD: 6.2 K/UL (ref 4.8–10.8)

## 2019-08-20 PROCEDURE — 85025 COMPLETE CBC W/AUTO DIFF WBC: CPT

## 2019-08-20 PROCEDURE — 93005 ELECTROCARDIOGRAM TRACING: CPT

## 2019-08-20 PROCEDURE — 80048 BASIC METABOLIC PNL TOTAL CA: CPT

## 2019-08-20 RX ORDER — CYCLOBENZAPRINE HCL 10 MG
10 TABLET ORAL 3 TIMES DAILY PRN
Status: ON HOLD | COMMUNITY
End: 2021-02-08

## 2019-08-20 RX ORDER — AMLODIPINE BESYLATE 5 MG/1
10 TABLET ORAL NIGHTLY
Status: ON HOLD | COMMUNITY
End: 2021-02-23 | Stop reason: SDUPTHER

## 2019-08-20 RX ORDER — HYDROCHLOROTHIAZIDE 25 MG/1
25 TABLET ORAL DAILY
Status: ON HOLD | COMMUNITY
End: 2021-02-08

## 2019-08-20 RX ORDER — METOPROLOL TARTRATE 75 MG/1
75 TABLET, FILM COATED ORAL DAILY
Status: ON HOLD | COMMUNITY
End: 2021-02-08

## 2019-08-20 RX ORDER — LANOLIN ALCOHOL/MO/W.PET/CERES
1000 CREAM (GRAM) TOPICAL DAILY
Status: ON HOLD | COMMUNITY
End: 2021-02-08

## 2019-08-20 RX ORDER — ACETAMINOPHEN 160 MG
1 TABLET,DISINTEGRATING ORAL DAILY
Status: ON HOLD | COMMUNITY
End: 2021-02-08

## 2019-08-20 RX ORDER — ATORVASTATIN CALCIUM 20 MG/1
10 TABLET, FILM COATED ORAL DAILY
Status: ON HOLD | COMMUNITY
End: 2021-02-23 | Stop reason: SDUPTHER

## 2019-08-20 SDOH — HEALTH STABILITY: MENTAL HEALTH: HOW OFTEN DO YOU HAVE A DRINK CONTAINING ALCOHOL?: NEVER

## 2019-08-28 ENCOUNTER — ANESTHESIA (OUTPATIENT)
Dept: OPERATING ROOM | Age: 64
End: 2019-08-28
Payer: MEDICARE

## 2019-08-28 ENCOUNTER — ANESTHESIA EVENT (OUTPATIENT)
Dept: OPERATING ROOM | Age: 64
End: 2019-08-28
Payer: MEDICARE

## 2019-08-28 ENCOUNTER — HOSPITAL ENCOUNTER (OUTPATIENT)
Age: 64
Setting detail: SURGERY ADMIT
Discharge: HOME | End: 2019-08-28
Attending: ORTHOPAEDIC SURGERY | Admitting: ORTHOPAEDIC SURGERY
Payer: MEDICARE

## 2019-08-28 VITALS
RESPIRATION RATE: 18 BRPM | SYSTOLIC BLOOD PRESSURE: 168 MMHG | HEIGHT: 66 IN | DIASTOLIC BLOOD PRESSURE: 98 MMHG | OXYGEN SATURATION: 95 % | TEMPERATURE: 97.6 F | HEART RATE: 58 BPM | BODY MASS INDEX: 28.61 KG/M2 | WEIGHT: 178 LBS

## 2019-08-28 VITALS
DIASTOLIC BLOOD PRESSURE: 73 MMHG | SYSTOLIC BLOOD PRESSURE: 129 MMHG | RESPIRATION RATE: 5 BRPM | OXYGEN SATURATION: 86 %

## 2019-08-28 DIAGNOSIS — S43.431A TEAR OF RIGHT GLENOID LABRUM, INITIAL ENCOUNTER: Primary | ICD-10-CM

## 2019-08-28 PROCEDURE — 6360000002 HC RX W HCPCS: Performed by: NURSE ANESTHETIST, CERTIFIED REGISTERED

## 2019-08-28 PROCEDURE — 7100000001 HC PACU RECOVERY - ADDTL 15 MIN: Performed by: ORTHOPAEDIC SURGERY

## 2019-08-28 PROCEDURE — C1713 ANCHOR/SCREW BN/BN,TIS/BN: HCPCS | Performed by: ORTHOPAEDIC SURGERY

## 2019-08-28 PROCEDURE — 2580000003 HC RX 258: Performed by: ORTHOPAEDIC SURGERY

## 2019-08-28 PROCEDURE — 2500000003 HC RX 250 WO HCPCS: Performed by: NURSE ANESTHETIST, CERTIFIED REGISTERED

## 2019-08-28 PROCEDURE — 2500000003 HC RX 250 WO HCPCS: Performed by: ANESTHESIOLOGY

## 2019-08-28 PROCEDURE — 6360000002 HC RX W HCPCS: Performed by: ORTHOPAEDIC SURGERY

## 2019-08-28 PROCEDURE — 2580000003 HC RX 258: Performed by: ANESTHESIOLOGY

## 2019-08-28 PROCEDURE — 6360000002 HC RX W HCPCS: Performed by: ANESTHESIOLOGY

## 2019-08-28 PROCEDURE — 3700000001 HC ADD 15 MINUTES (ANESTHESIA): Performed by: ORTHOPAEDIC SURGERY

## 2019-08-28 PROCEDURE — 64415 NJX AA&/STRD BRCH PLXS IMG: CPT | Performed by: NURSE ANESTHETIST, CERTIFIED REGISTERED

## 2019-08-28 PROCEDURE — 7100000010 HC PHASE II RECOVERY - FIRST 15 MIN: Performed by: ORTHOPAEDIC SURGERY

## 2019-08-28 PROCEDURE — L3650 SO 8 ABD RESTRAINT PRE OTS: HCPCS | Performed by: ORTHOPAEDIC SURGERY

## 2019-08-28 PROCEDURE — 3600000004 HC SURGERY LEVEL 4 BASE: Performed by: ORTHOPAEDIC SURGERY

## 2019-08-28 PROCEDURE — 3700000000 HC ANESTHESIA ATTENDED CARE: Performed by: ORTHOPAEDIC SURGERY

## 2019-08-28 PROCEDURE — 7100000000 HC PACU RECOVERY - FIRST 15 MIN: Performed by: ORTHOPAEDIC SURGERY

## 2019-08-28 PROCEDURE — 3600000014 HC SURGERY LEVEL 4 ADDTL 15MIN: Performed by: ORTHOPAEDIC SURGERY

## 2019-08-28 PROCEDURE — 6370000000 HC RX 637 (ALT 250 FOR IP): Performed by: ORTHOPAEDIC SURGERY

## 2019-08-28 PROCEDURE — 2709999900 HC NON-CHARGEABLE SUPPLY: Performed by: ORTHOPAEDIC SURGERY

## 2019-08-28 DEVICE — ANCHOR SUT DIA2.1MM W/ S STL INSRT FOR LABRAL REP: Type: IMPLANTABLE DEVICE | Site: SHOULDER | Status: FUNCTIONAL

## 2019-08-28 RX ORDER — HYDROCODONE BITARTRATE AND ACETAMINOPHEN 5; 325 MG/1; MG/1
1 TABLET ORAL PRN
Status: DISCONTINUED | OUTPATIENT
Start: 2019-08-28 | End: 2019-08-28 | Stop reason: HOSPADM

## 2019-08-28 RX ORDER — SCOLOPAMINE TRANSDERMAL SYSTEM 1 MG/1
1 PATCH, EXTENDED RELEASE TRANSDERMAL ONCE
Status: DISCONTINUED | OUTPATIENT
Start: 2019-08-28 | End: 2019-08-28 | Stop reason: HOSPADM

## 2019-08-28 RX ORDER — LABETALOL 20 MG/4 ML (5 MG/ML) INTRAVENOUS SYRINGE
5 EVERY 10 MIN PRN
Status: DISCONTINUED | OUTPATIENT
Start: 2019-08-28 | End: 2019-08-28 | Stop reason: HOSPADM

## 2019-08-28 RX ORDER — FENTANYL CITRATE 50 UG/ML
INJECTION, SOLUTION INTRAMUSCULAR; INTRAVENOUS PRN
Status: DISCONTINUED | OUTPATIENT
Start: 2019-08-28 | End: 2019-08-28 | Stop reason: SDUPTHER

## 2019-08-28 RX ORDER — MIDAZOLAM HYDROCHLORIDE 1 MG/ML
2 INJECTION INTRAMUSCULAR; INTRAVENOUS ONCE
Status: COMPLETED | OUTPATIENT
Start: 2019-08-28 | End: 2019-08-28

## 2019-08-28 RX ORDER — FENTANYL CITRATE 50 UG/ML
50 INJECTION, SOLUTION INTRAMUSCULAR; INTRAVENOUS
Status: DISCONTINUED | OUTPATIENT
Start: 2019-08-28 | End: 2019-08-28 | Stop reason: HOSPADM

## 2019-08-28 RX ORDER — METOCLOPRAMIDE HYDROCHLORIDE 5 MG/ML
10 INJECTION INTRAMUSCULAR; INTRAVENOUS
Status: DISCONTINUED | OUTPATIENT
Start: 2019-08-28 | End: 2019-08-28 | Stop reason: HOSPADM

## 2019-08-28 RX ORDER — ONDANSETRON 2 MG/ML
INJECTION INTRAMUSCULAR; INTRAVENOUS PRN
Status: DISCONTINUED | OUTPATIENT
Start: 2019-08-28 | End: 2019-08-28 | Stop reason: SDUPTHER

## 2019-08-28 RX ORDER — LIDOCAINE HYDROCHLORIDE 10 MG/ML
INJECTION, SOLUTION EPIDURAL; INFILTRATION; INTRACAUDAL; PERINEURAL PRN
Status: DISCONTINUED | OUTPATIENT
Start: 2019-08-28 | End: 2019-08-28 | Stop reason: SDUPTHER

## 2019-08-28 RX ORDER — PROMETHAZINE HYDROCHLORIDE 25 MG/ML
6.25 INJECTION, SOLUTION INTRAMUSCULAR; INTRAVENOUS
Status: DISCONTINUED | OUTPATIENT
Start: 2019-08-28 | End: 2019-08-28 | Stop reason: HOSPADM

## 2019-08-28 RX ORDER — ROPIVACAINE HYDROCHLORIDE 5 MG/ML
INJECTION, SOLUTION EPIDURAL; INFILTRATION; PERINEURAL
Status: COMPLETED | OUTPATIENT
Start: 2019-08-28 | End: 2019-08-28

## 2019-08-28 RX ORDER — MORPHINE SULFATE 15 MG/1
15 TABLET, FILM COATED, EXTENDED RELEASE ORAL 2 TIMES DAILY
Qty: 30 TABLET | Refills: 0 | Status: SHIPPED | OUTPATIENT
Start: 2019-08-28 | End: 2019-09-27

## 2019-08-28 RX ORDER — MIDAZOLAM HYDROCHLORIDE 1 MG/ML
2 INJECTION INTRAMUSCULAR; INTRAVENOUS
Status: DISCONTINUED | OUTPATIENT
Start: 2019-08-28 | End: 2019-08-28 | Stop reason: HOSPADM

## 2019-08-28 RX ORDER — DIPHENHYDRAMINE HYDROCHLORIDE 50 MG/ML
12.5 INJECTION INTRAMUSCULAR; INTRAVENOUS
Status: DISCONTINUED | OUTPATIENT
Start: 2019-08-28 | End: 2019-08-28 | Stop reason: HOSPADM

## 2019-08-28 RX ORDER — SODIUM CHLORIDE 0.9 % (FLUSH) 0.9 %
10 SYRINGE (ML) INJECTION PRN
Status: DISCONTINUED | OUTPATIENT
Start: 2019-08-28 | End: 2019-08-28 | Stop reason: HOSPADM

## 2019-08-28 RX ORDER — SODIUM CHLORIDE 0.9 % (FLUSH) 0.9 %
10 SYRINGE (ML) INJECTION EVERY 12 HOURS SCHEDULED
Status: DISCONTINUED | OUTPATIENT
Start: 2019-08-28 | End: 2019-08-28 | Stop reason: HOSPADM

## 2019-08-28 RX ORDER — MORPHINE SULFATE 2 MG/ML
2 INJECTION, SOLUTION INTRAMUSCULAR; INTRAVENOUS EVERY 5 MIN PRN
Status: DISCONTINUED | OUTPATIENT
Start: 2019-08-28 | End: 2019-08-28 | Stop reason: HOSPADM

## 2019-08-28 RX ORDER — ROCURONIUM BROMIDE 10 MG/ML
INJECTION, SOLUTION INTRAVENOUS PRN
Status: DISCONTINUED | OUTPATIENT
Start: 2019-08-28 | End: 2019-08-28 | Stop reason: SDUPTHER

## 2019-08-28 RX ORDER — MORPHINE SULFATE 4 MG/ML
4 INJECTION, SOLUTION INTRAMUSCULAR; INTRAVENOUS
Status: DISCONTINUED | OUTPATIENT
Start: 2019-08-28 | End: 2019-08-28 | Stop reason: HOSPADM

## 2019-08-28 RX ORDER — HYDRALAZINE HYDROCHLORIDE 20 MG/ML
5 INJECTION INTRAMUSCULAR; INTRAVENOUS EVERY 10 MIN PRN
Status: DISCONTINUED | OUTPATIENT
Start: 2019-08-28 | End: 2019-08-28 | Stop reason: HOSPADM

## 2019-08-28 RX ORDER — SODIUM CHLORIDE, SODIUM LACTATE, POTASSIUM CHLORIDE, CALCIUM CHLORIDE 600; 310; 30; 20 MG/100ML; MG/100ML; MG/100ML; MG/100ML
INJECTION, SOLUTION INTRAVENOUS CONTINUOUS
Status: DISCONTINUED | OUTPATIENT
Start: 2019-08-28 | End: 2019-08-28 | Stop reason: HOSPADM

## 2019-08-28 RX ORDER — MORPHINE SULFATE 2 MG/ML
4 INJECTION, SOLUTION INTRAMUSCULAR; INTRAVENOUS EVERY 5 MIN PRN
Status: DISCONTINUED | OUTPATIENT
Start: 2019-08-28 | End: 2019-08-28 | Stop reason: HOSPADM

## 2019-08-28 RX ORDER — HYDROCODONE BITARTRATE AND ACETAMINOPHEN 5; 325 MG/1; MG/1
2 TABLET ORAL PRN
Status: DISCONTINUED | OUTPATIENT
Start: 2019-08-28 | End: 2019-08-28 | Stop reason: HOSPADM

## 2019-08-28 RX ORDER — LIDOCAINE HYDROCHLORIDE 10 MG/ML
1 INJECTION, SOLUTION EPIDURAL; INFILTRATION; INTRACAUDAL; PERINEURAL
Status: DISCONTINUED | OUTPATIENT
Start: 2019-08-28 | End: 2019-08-28 | Stop reason: HOSPADM

## 2019-08-28 RX ORDER — HYDROCODONE BITARTRATE AND ACETAMINOPHEN 10; 325 MG/1; MG/1
1 TABLET ORAL EVERY 4 HOURS PRN
Qty: 90 TABLET | Refills: 0 | Status: SHIPPED | OUTPATIENT
Start: 2019-08-28 | End: 2019-09-04

## 2019-08-28 RX ORDER — DEXAMETHASONE SODIUM PHOSPHATE 10 MG/ML
INJECTION INTRAMUSCULAR; INTRAVENOUS PRN
Status: DISCONTINUED | OUTPATIENT
Start: 2019-08-28 | End: 2019-08-28 | Stop reason: SDUPTHER

## 2019-08-28 RX ORDER — MEPERIDINE HYDROCHLORIDE 50 MG/ML
12.5 INJECTION INTRAMUSCULAR; INTRAVENOUS; SUBCUTANEOUS EVERY 5 MIN PRN
Status: DISCONTINUED | OUTPATIENT
Start: 2019-08-28 | End: 2019-08-28 | Stop reason: HOSPADM

## 2019-08-28 RX ORDER — EPHEDRINE SULFATE 50 MG/ML
INJECTION, SOLUTION INTRAVENOUS PRN
Status: DISCONTINUED | OUTPATIENT
Start: 2019-08-28 | End: 2019-08-28 | Stop reason: SDUPTHER

## 2019-08-28 RX ORDER — PROPOFOL 10 MG/ML
INJECTION, EMULSION INTRAVENOUS PRN
Status: DISCONTINUED | OUTPATIENT
Start: 2019-08-28 | End: 2019-08-28 | Stop reason: SDUPTHER

## 2019-08-28 RX ADMIN — SODIUM CHLORIDE, SODIUM LACTATE, POTASSIUM CHLORIDE, AND CALCIUM CHLORIDE: 600; 310; 30; 20 INJECTION, SOLUTION INTRAVENOUS at 06:03

## 2019-08-28 RX ADMIN — LABETALOL 20 MG/4 ML (5 MG/ML) INTRAVENOUS SYRINGE 5 MG: at 09:14

## 2019-08-28 RX ADMIN — EPHEDRINE SULFATE 10 MG: 50 INJECTION INTRAMUSCULAR; INTRAVENOUS; SUBCUTANEOUS at 07:57

## 2019-08-28 RX ADMIN — HYDRALAZINE HYDROCHLORIDE 5 MG: 20 INJECTION INTRAMUSCULAR; INTRAVENOUS at 09:56

## 2019-08-28 RX ADMIN — ROCURONIUM BROMIDE 20 MG: 10 INJECTION INTRAVENOUS at 07:39

## 2019-08-28 RX ADMIN — ROCURONIUM BROMIDE 30 MG: 10 INJECTION INTRAVENOUS at 07:16

## 2019-08-28 RX ADMIN — DEXAMETHASONE SODIUM PHOSPHATE 10 MG: 10 INJECTION INTRAMUSCULAR; INTRAVENOUS at 07:31

## 2019-08-28 RX ADMIN — FENTANYL CITRATE 50 MCG: 50 INJECTION INTRAMUSCULAR; INTRAVENOUS at 07:16

## 2019-08-28 RX ADMIN — ROPIVACAINE HYDROCHLORIDE 20 ML: 5 INJECTION, SOLUTION EPIDURAL; INFILTRATION; PERINEURAL at 07:02

## 2019-08-28 RX ADMIN — MIDAZOLAM 2 MG: 1 INJECTION INTRAMUSCULAR; INTRAVENOUS at 06:58

## 2019-08-28 RX ADMIN — MEPERIDINE HYDROCHLORIDE 12.5 MG: 50 INJECTION INTRAMUSCULAR; INTRAVENOUS; SUBCUTANEOUS at 09:06

## 2019-08-28 RX ADMIN — LIDOCAINE HYDROCHLORIDE 50 MG: 10 INJECTION, SOLUTION EPIDURAL; INFILTRATION; INTRACAUDAL; PERINEURAL at 07:16

## 2019-08-28 RX ADMIN — PROPOFOL 200 MG: 10 INJECTION, EMULSION INTRAVENOUS at 07:16

## 2019-08-28 RX ADMIN — ONDANSETRON HYDROCHLORIDE 4 MG: 2 INJECTION, SOLUTION INTRAMUSCULAR; INTRAVENOUS at 08:39

## 2019-08-28 RX ADMIN — EPHEDRINE SULFATE 20 MG: 50 INJECTION INTRAMUSCULAR; INTRAVENOUS; SUBCUTANEOUS at 07:27

## 2019-08-28 RX ADMIN — Medication 2 G: at 07:11

## 2019-08-28 RX ADMIN — SUGAMMADEX 250 MG: 100 INJECTION, SOLUTION INTRAVENOUS at 08:40

## 2019-08-28 RX ADMIN — SODIUM CHLORIDE, SODIUM LACTATE, POTASSIUM CHLORIDE, AND CALCIUM CHLORIDE: 600; 310; 30; 20 INJECTION, SOLUTION INTRAVENOUS at 08:10

## 2019-08-28 RX ADMIN — EPHEDRINE SULFATE 10 MG: 50 INJECTION INTRAMUSCULAR; INTRAVENOUS; SUBCUTANEOUS at 08:19

## 2019-08-28 ASSESSMENT — LIFESTYLE VARIABLES: SMOKING_STATUS: 0

## 2019-08-28 ASSESSMENT — PAIN SCALES - GENERAL
PAINLEVEL_OUTOF10: 0

## 2019-08-28 ASSESSMENT — ENCOUNTER SYMPTOMS: SHORTNESS OF BREATH: 0

## 2019-08-28 NOTE — H&P
for: PROTIME, INR  U/A: No results found for: NITRITE, WBCUA, RBCUA, BACTERIA  HgBA1c:  No components found for: HGBA1C    Radiology: MRI scan of the right shoulder reveals rotator cuff arthropathy acromioclavicular joint osteoarthritis and glenoid labral tear of the right shoulder    IMPRESSION: Rotator cuff arthropathy acromioclavicular joint osteoarthritis and glenoid labral tear of the right shoulder    PLAN: A lengthy discussion was carried out with the patient and the patient has agreed to proceed with the purposed operative procedure of arthroscopic subacromial decompression distal clavicle resection clinical labral reconstruction right shoulder    Permit and pre-operative orders where completed in the office. Patient will be re-evaluated in the pre-operative holding area.         Provider: Taty Nunez  Date: 8/28/2019

## 2019-08-28 NOTE — ANESTHESIA PRE PROCEDURE
Date    ABDOMEN SURGERY      noncancerous tumor in abdomen    APPENDECTOMY      BACK SURGERY      lumbar surgery x 4    CERVICAL SPINE SURGERY      fusion       Social History:    Social History     Tobacco Use    Smoking status: Former Smoker     Packs/day: 1.00     Years: 40.00     Pack years: 40.00     Types: Cigarettes     Last attempt to quit: 2013     Years since quittin.0    Smokeless tobacco: Never Used   Substance Use Topics    Alcohol use: Not Currently     Frequency: Never                                Counseling given: Not Answered      Vital Signs (Current):   Vitals:    19 0553   BP: (!) 154/102   Pulse: 66   Resp: 18   Temp: 98.8 °F (37.1 °C)   TempSrc: Temporal   SpO2: 94%   Weight: 178 lb (80.7 kg)   Height: 5' 6\" (1.676 m)                                              BP Readings from Last 3 Encounters:   19 (!) 154/102       NPO Status: Time of last liquid consumption: 0000                        Time of last solid consumption: 0000                        Date of last liquid consumption: 19                        Date of last solid food consumption: 19    BMI:   Wt Readings from Last 3 Encounters:   19 178 lb (80.7 kg)   19 179 lb (81.2 kg)     Body mass index is 28.73 kg/m². CBC:   Lab Results   Component Value Date    WBC 6.2 2019    RBC 4.93 2019    HGB 13.1 2019    HCT 40.4 2019    MCV 81.9 2019    RDW 15.5 2019     2019       CMP:   Lab Results   Component Value Date     2019    K 4.0 2019     2019    CO2 25 2019    BUN 15 2019    CREATININE 1.0 2019    LABGLOM >60 2019    GLUCOSE 88 2019    CALCIUM 9.6 2019       POC Tests: No results for input(s): POCGLU, POCNA, POCK, POCCL, POCBUN, POCHEMO, POCHCT in the last 72 hours.     Coags: No results found for: PROTIME, INR, APTT    HCG (If Applicable): No results found for:

## 2019-08-28 NOTE — OP NOTE
Operative Report  Shoulder Arthroscopy, SAD, DCR and  Labral Reconstruction      Patient Name: Katty Perkins    : 1955    MRN: [id number]    DATE of SURGERY: 2019    SURGEON: Yu Harris DO    ASSISTANT: NONE      PREOPERATIVE DIAGNOSIS: Rotator cuff arthropathy, acromioclavicular osteoarthritis, full thickness labral tear right shoulder    POSTOPERATIVE DIAGNOSIS: Same    PROCEDURE PERFORMED: Arthroscopic subacromial decompression, distal clavicle resection and arthroscopic labral reconstruction right shoulder    IMPLANTS: * No implants in log *    ANESTHESIA USED: General endotrachial anesthesia, interscalene block    ESTIMATED BLOOD LOSS: minimal    DRAINS: none      OPERATIVE INDICATIONS: This patient is a 59 y.o. male presented to my clinic with complaints of progressive shoulder pain. An MRI was obtained which showed the above named diagnoses. Pain was not relieved with conservative treatment consisting of anti-inflammatories, corticosteroid injections, physical therapy. Due to progressive pain and loss of function, surgical evaluation was discussed and the patient wished to proceed understanding risks, benefits, and alternatives. The surgical indication were to relieve pain, improve function, and prevent future disability in regards to the shoulder pathology dictated in the aboved diagnoses. Risks included, but were not limited to, that of anesthesia, bleeding, infection, pain, damage to local structures, need for further surgery, failure of repair, stiffness, failure of implants, and loss of function.       OPERATIVE FINDINGS:  1) Exam under anesthesia:  Full passive ROM, joint stable without laxity, skin intact  2) Glenohumeral Joint:       A) Chondral surfaces: Intact   Grade 3 change       B) Labrum: Intact without tear       C) Biceps:  Tendon Tear with Hypertrophy and synovitis       D) Rotator Cuff:  Full thickness rotator cuff tear    3) Subacromial space:         A) Large amount of dense

## 2019-10-30 ENCOUNTER — OFFICE VISIT (OUTPATIENT)
Dept: INTERNAL MEDICINE | Facility: CLINIC | Age: 64
End: 2019-10-30

## 2019-10-30 VITALS
WEIGHT: 164.5 LBS | SYSTOLIC BLOOD PRESSURE: 160 MMHG | BODY MASS INDEX: 26.44 KG/M2 | RESPIRATION RATE: 16 BRPM | OXYGEN SATURATION: 97 % | DIASTOLIC BLOOD PRESSURE: 112 MMHG | HEIGHT: 66 IN | HEART RATE: 68 BPM

## 2019-10-30 DIAGNOSIS — G89.29 CHRONIC NECK PAIN: ICD-10-CM

## 2019-10-30 DIAGNOSIS — R73.02 IMPAIRED GLUCOSE TOLERANCE: ICD-10-CM

## 2019-10-30 DIAGNOSIS — Z00.00 ENCOUNTER FOR PREVENTIVE HEALTH EXAMINATION: ICD-10-CM

## 2019-10-30 DIAGNOSIS — Z86.73 HISTORY OF STROKE: ICD-10-CM

## 2019-10-30 DIAGNOSIS — I10 ESSENTIAL HYPERTENSION: Primary | ICD-10-CM

## 2019-10-30 DIAGNOSIS — E66.3 OVERWEIGHT (BMI 25.0-29.9): ICD-10-CM

## 2019-10-30 DIAGNOSIS — M54.2 CHRONIC NECK PAIN: ICD-10-CM

## 2019-10-30 DIAGNOSIS — Z23 FLU VACCINE NEED: ICD-10-CM

## 2019-10-30 DIAGNOSIS — E78.2 MIXED HYPERLIPIDEMIA: ICD-10-CM

## 2019-10-30 PROBLEM — S43.431A TEAR OF RIGHT GLENOID LABRUM: Status: ACTIVE | Noted: 2019-08-28

## 2019-10-30 PROBLEM — M12.811 ROTATOR CUFF TEAR ARTHROPATHY OF RIGHT SHOULDER: Status: ACTIVE | Noted: 2019-10-30

## 2019-10-30 PROBLEM — M12.819 ROTATOR CUFF TEAR ARTHROPATHY: Status: ACTIVE | Noted: 2019-10-30

## 2019-10-30 PROBLEM — M75.101 ROTATOR CUFF TEAR ARTHROPATHY OF RIGHT SHOULDER: Status: RESOLVED | Noted: 2019-10-30 | Resolved: 2019-10-30

## 2019-10-30 PROBLEM — M75.100 ROTATOR CUFF TEAR ARTHROPATHY: Status: ACTIVE | Noted: 2019-10-30

## 2019-10-30 PROBLEM — M12.811 ROTATOR CUFF TEAR ARTHROPATHY OF RIGHT SHOULDER: Status: RESOLVED | Noted: 2019-10-30 | Resolved: 2019-10-30

## 2019-10-30 PROBLEM — M75.101 ROTATOR CUFF TEAR ARTHROPATHY OF RIGHT SHOULDER: Status: ACTIVE | Noted: 2019-10-30

## 2019-10-30 PROCEDURE — 90674 CCIIV4 VAC NO PRSV 0.5 ML IM: CPT | Performed by: INTERNAL MEDICINE

## 2019-10-30 PROCEDURE — 99204 OFFICE O/P NEW MOD 45 MIN: CPT | Performed by: INTERNAL MEDICINE

## 2019-10-30 PROCEDURE — G0008 ADMIN INFLUENZA VIRUS VAC: HCPCS | Performed by: INTERNAL MEDICINE

## 2019-10-30 RX ORDER — TRIAMTERENE AND HYDROCHLOROTHIAZIDE 75; 50 MG/1; MG/1
1 TABLET ORAL DAILY
Qty: 90 TABLET | Refills: 1 | Status: SHIPPED | OUTPATIENT
Start: 2019-10-30 | End: 2020-07-10 | Stop reason: SDUPTHER

## 2019-10-30 RX ORDER — CLOPIDOGREL BISULFATE 75 MG/1
75 TABLET ORAL DAILY
Qty: 90 TABLET | Refills: 1 | Status: SHIPPED | OUTPATIENT
Start: 2019-10-30 | End: 2020-07-10 | Stop reason: SDUPTHER

## 2019-10-30 RX ORDER — LISINOPRIL 40 MG/1
40 TABLET ORAL DAILY
Qty: 90 TABLET | Refills: 1 | Status: SHIPPED | OUTPATIENT
Start: 2019-10-30 | End: 2020-07-10 | Stop reason: SDUPTHER

## 2019-10-30 RX ORDER — METOPROLOL SUCCINATE 50 MG/1
50 TABLET, EXTENDED RELEASE ORAL DAILY
Qty: 90 TABLET | Refills: 1 | Status: SHIPPED | OUTPATIENT
Start: 2019-10-30 | End: 2020-07-10 | Stop reason: SDUPTHER

## 2019-10-30 RX ORDER — ATORVASTATIN CALCIUM 10 MG/1
10 TABLET, FILM COATED ORAL DAILY
Qty: 90 TABLET | Refills: 3 | Status: SHIPPED | OUTPATIENT
Start: 2019-10-30 | End: 2021-02-08 | Stop reason: HOSPADM

## 2019-10-30 RX ORDER — AMLODIPINE BESYLATE 10 MG/1
10 TABLET ORAL
Qty: 90 TABLET | Refills: 1 | Status: SHIPPED | OUTPATIENT
Start: 2019-10-30 | End: 2020-07-10 | Stop reason: SDUPTHER
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
## 2019-11-20 ENCOUNTER — CLINICAL SUPPORT (OUTPATIENT)
Dept: INTERNAL MEDICINE | Facility: CLINIC | Age: 64
End: 2019-11-20

## 2019-11-20 VITALS
SYSTOLIC BLOOD PRESSURE: 134 MMHG | RESPIRATION RATE: 16 BRPM | WEIGHT: 169 LBS | BODY MASS INDEX: 27.16 KG/M2 | HEIGHT: 66 IN | HEART RATE: 70 BPM | DIASTOLIC BLOOD PRESSURE: 84 MMHG | OXYGEN SATURATION: 98 %

## 2019-11-20 DIAGNOSIS — I10 ESSENTIAL HYPERTENSION: Primary | ICD-10-CM

## 2019-11-20 PROCEDURE — 99211 OFF/OP EST MAY X REQ PHY/QHP: CPT | Performed by: INTERNAL MEDICINE

## 2019-11-20 NOTE — PROGRESS NOTES
"Chief complaint: F/u hypertension with BP Check    History:  Dao Jhaveri is a 64 y.o. male who presents today for BP check for hypertension.    OBJECTIVE:  /84 (BP Location: Left arm, Patient Position: Sitting, Cuff Size: Adult)   Pulse 70   Resp 16   Ht 167.6 cm (66\")   Wt 76.7 kg (169 lb)   SpO2 98%   BMI 27.28 kg/m²     Assessment/Plan    Diagnoses and all orders for this visit:    Essential hypertension      Patient was advised to remain on the same dosage of medication and to keep his f/u appointment.       Dallin Miller D.O. 11/20/2019   "

## 2020-01-21 ENCOUNTER — CLINICAL SUPPORT (OUTPATIENT)
Dept: INTERNAL MEDICINE | Facility: CLINIC | Age: 65
End: 2020-01-21

## 2020-01-21 VITALS
BODY MASS INDEX: 27.16 KG/M2 | HEIGHT: 66 IN | DIASTOLIC BLOOD PRESSURE: 100 MMHG | OXYGEN SATURATION: 97 % | SYSTOLIC BLOOD PRESSURE: 138 MMHG | HEART RATE: 61 BPM | WEIGHT: 169 LBS | RESPIRATION RATE: 16 BRPM

## 2020-01-21 DIAGNOSIS — I10 ESSENTIAL HYPERTENSION: Primary | ICD-10-CM

## 2020-01-21 PROCEDURE — 99211 OFF/OP EST MAY X REQ PHY/QHP: CPT | Performed by: NURSE PRACTITIONER

## 2020-01-21 NOTE — PROGRESS NOTES
"Chief complaint: F/u hypertension with BP Check    History:  Dao Jhaveri is a 64 y.o. male who presents today for BP check for hypertension.    OBJECTIVE:  /100 (BP Location: Left arm, Patient Position: Sitting, Cuff Size: Adult)   Pulse 61   Resp 16   Ht 167.6 cm (66\")   Wt 76.7 kg (169 lb)   SpO2 97%   BMI 27.28 kg/m²     Assessment/Plan    Diagnoses and all orders for this visit:    Essential hypertension    Patient was advised to take Lisinopril 40 mg & Triamterene-HCTZ 75-50 in the morning and to take all rest BP medication at bedtime. Recheck BP in 2 weeks.       Marsha Layton APRN 1/21/2020  "

## 2020-02-25 ENCOUNTER — TELEPHONE (OUTPATIENT)
Dept: NEUROLOGY | Facility: CLINIC | Age: 65
End: 2020-02-25

## 2020-02-25 NOTE — TELEPHONE ENCOUNTER
Called in and canceled appointment for today with dr villanueva. Would like to reschedule when possible .     Best call back number is [phone]  Which is his cell phone

## 2020-03-06 ENCOUNTER — OFFICE VISIT (OUTPATIENT)
Dept: INTERNAL MEDICINE | Facility: CLINIC | Age: 65
End: 2020-03-06

## 2020-03-06 ENCOUNTER — LAB (OUTPATIENT)
Dept: LAB | Facility: HOSPITAL | Age: 65
End: 2020-03-06

## 2020-03-06 VITALS
BODY MASS INDEX: 26.2 KG/M2 | WEIGHT: 163 LBS | TEMPERATURE: 98 F | HEART RATE: 59 BPM | OXYGEN SATURATION: 98 % | DIASTOLIC BLOOD PRESSURE: 110 MMHG | SYSTOLIC BLOOD PRESSURE: 190 MMHG | HEIGHT: 66 IN | RESPIRATION RATE: 16 BRPM

## 2020-03-06 DIAGNOSIS — I10 ESSENTIAL HYPERTENSION: ICD-10-CM

## 2020-03-06 DIAGNOSIS — I10 UNCONTROLLED HYPERTENSION: ICD-10-CM

## 2020-03-06 DIAGNOSIS — R06.83 SNORING: ICD-10-CM

## 2020-03-06 DIAGNOSIS — E66.3 OVERWEIGHT (BMI 25.0-29.9): ICD-10-CM

## 2020-03-06 DIAGNOSIS — Z00.00 MEDICARE ANNUAL WELLNESS VISIT, SUBSEQUENT: Primary | ICD-10-CM

## 2020-03-06 DIAGNOSIS — R73.02 IMPAIRED GLUCOSE TOLERANCE: ICD-10-CM

## 2020-03-06 DIAGNOSIS — Z00.00 ENCOUNTER FOR PREVENTIVE HEALTH EXAMINATION: ICD-10-CM

## 2020-03-06 DIAGNOSIS — E78.2 MIXED HYPERLIPIDEMIA: ICD-10-CM

## 2020-03-06 LAB
ALBUMIN SERPL-MCNC: 4.5 G/DL (ref 3.5–5.2)
ALBUMIN/GLOB SERPL: 1.6 G/DL
ALP SERPL-CCNC: 57 U/L (ref 39–117)
ALT SERPL W P-5'-P-CCNC: 11 U/L (ref 1–41)
ANION GAP SERPL CALCULATED.3IONS-SCNC: 15.6 MMOL/L (ref 5–15)
AST SERPL-CCNC: 19 U/L (ref 1–40)
BILIRUB SERPL-MCNC: 0.3 MG/DL (ref 0.2–1.2)
BUN BLD-MCNC: 13 MG/DL (ref 8–23)
BUN/CREAT SERPL: 12.4 (ref 7–25)
CALCIUM SPEC-SCNC: 9.8 MG/DL (ref 8.6–10.5)
CHLORIDE SERPL-SCNC: 100 MMOL/L (ref 98–107)
CHOLEST SERPL-MCNC: 111 MG/DL (ref 0–200)
CO2 SERPL-SCNC: 25.4 MMOL/L (ref 22–29)
CREAT BLD-MCNC: 1.05 MG/DL (ref 0.76–1.27)
DEPRECATED RDW RBC AUTO: 45.2 FL (ref 37–54)
ERYTHROCYTE [DISTWIDTH] IN BLOOD BY AUTOMATED COUNT: 15.8 % (ref 12.3–15.4)
GFR SERPL CREATININE-BSD FRML MDRD: 86 ML/MIN/1.73
GLOBULIN UR ELPH-MCNC: 2.9 GM/DL
GLUCOSE BLD-MCNC: 89 MG/DL (ref 65–99)
HBA1C MFR BLD: 5.7 % (ref 4.8–5.6)
HCT VFR BLD AUTO: 43.7 % (ref 37.5–51)
HCV AB SER DONR QL: NORMAL
HDLC SERPL-MCNC: 45 MG/DL (ref 40–60)
HGB BLD-MCNC: 14.1 G/DL (ref 13–17.7)
LDLC SERPL CALC-MCNC: 52 MG/DL (ref 0–100)
LDLC/HDLC SERPL: 1.16 {RATIO}
MCH RBC QN AUTO: 25.9 PG (ref 26.6–33)
MCHC RBC AUTO-ENTMCNC: 32.3 G/DL (ref 31.5–35.7)
MCV RBC AUTO: 80.2 FL (ref 79–97)
PLATELET # BLD AUTO: 232 10*3/MM3 (ref 140–450)
PMV BLD AUTO: 12 FL (ref 6–12)
POTASSIUM BLD-SCNC: 4.1 MMOL/L (ref 3.5–5.2)
PROT SERPL-MCNC: 7.4 G/DL (ref 6–8.5)
RBC # BLD AUTO: 5.45 10*6/MM3 (ref 4.14–5.8)
SODIUM BLD-SCNC: 141 MMOL/L (ref 136–145)
TRIGL SERPL-MCNC: 68 MG/DL (ref 0–150)
VLDLC SERPL-MCNC: 13.6 MG/DL (ref 5–40)
WBC NRBC COR # BLD: 6.95 10*3/MM3 (ref 3.4–10.8)

## 2020-03-06 PROCEDURE — 36415 COLL VENOUS BLD VENIPUNCTURE: CPT

## 2020-03-06 PROCEDURE — 80061 LIPID PANEL: CPT | Performed by: INTERNAL MEDICINE

## 2020-03-06 PROCEDURE — 83036 HEMOGLOBIN GLYCOSYLATED A1C: CPT | Performed by: INTERNAL MEDICINE

## 2020-03-06 PROCEDURE — 86803 HEPATITIS C AB TEST: CPT | Performed by: INTERNAL MEDICINE

## 2020-03-06 PROCEDURE — 99214 OFFICE O/P EST MOD 30 MIN: CPT | Performed by: NURSE PRACTITIONER

## 2020-03-06 PROCEDURE — 85027 COMPLETE CBC AUTOMATED: CPT | Performed by: INTERNAL MEDICINE

## 2020-03-06 PROCEDURE — G0439 PPPS, SUBSEQ VISIT: HCPCS | Performed by: NURSE PRACTITIONER

## 2020-03-06 PROCEDURE — 96160 PT-FOCUSED HLTH RISK ASSMT: CPT | Performed by: NURSE PRACTITIONER

## 2020-03-06 PROCEDURE — 80053 COMPREHEN METABOLIC PANEL: CPT | Performed by: INTERNAL MEDICINE

## 2020-03-06 RX ORDER — CLONIDINE HYDROCHLORIDE 0.1 MG/1
0.1 TABLET ORAL AS NEEDED
COMMUNITY
End: 2021-02-08 | Stop reason: HOSPADM

## 2020-03-06 NOTE — PROGRESS NOTES
CC: f/u hypertension. Medicare wellness    History:  Dao Jhaveri is a 64 y.o. male who presents today for follow-up for evaluation of the above:  Patient presents today for f/u of hypertension. He admits that he did not take his medication this morning due to over sleeping. He does monitor his b/p at home and seeing readings of 131/89-90 at home. He does admit to snoring but denies any apnea reports. He has never had a sleep study.  BMI today is 26.3  He denies being in pain at this time. Has zanaflex to use for chronic pain when needed.      ROS:  Review of Systems   Constitutional: Negative for activity change, appetite change, fatigue, fever and unexpected weight change.   HENT: Negative.    Respiratory: Negative for cough, chest tightness, shortness of breath and wheezing.    Cardiovascular: Negative for chest pain, palpitations and leg swelling.   Gastrointestinal: Negative.    Endocrine: Negative.    Genitourinary: Negative.    Musculoskeletal: Negative.    Skin: Negative.    Neurological: Negative for dizziness and headaches.   Psychiatric/Behavioral: Negative.        Mr. Jhaveri  reports that he quit smoking about 6 years ago. His smoking use included cigarettes. He has never used smokeless tobacco. He reports that he does not drink alcohol or use drugs.      Current Outpatient Medications:   •  amLODIPine (NORVASC) 10 MG tablet, Take 1 tablet by mouth every night at bedtime., Disp: 90 tablet, Rfl: 1  •  atorvastatin (LIPITOR) 10 MG tablet, Take 1 tablet by mouth Daily., Disp: 90 tablet, Rfl: 3  •  cloNIDine (CATAPRES) 0.1 MG tablet, Take 0.1 mg by mouth As Needed., Disp: , Rfl:   •  clopidogrel (PLAVIX) 75 MG tablet, Take 1 tablet by mouth Daily., Disp: 90 tablet, Rfl: 1  •  lisinopril (PRINIVIL,ZESTRIL) 40 MG tablet, Take 1 tablet by mouth Daily., Disp: 90 tablet, Rfl: 1  •  metoprolol succinate XL (TOPROL-XL) 50 MG 24 hr tablet, Take 1 tablet by mouth Daily., Disp: 90 tablet, Rfl: 1  •  Omega-3 Fatty  "Acids (FISH OIL) 1000 MG capsule capsule, Take  by mouth Daily With Breakfast., Disp: , Rfl:   •  tiZANidine (ZANAFLEX) 4 MG tablet, Take 1 tablet by mouth 2 (Two) Times a Day As Needed for Muscle Spasms., Disp: 60 tablet, Rfl: 0  •  triamterene-hydrochlorothiazide (MAXZIDE) 75-50 MG per tablet, Take 1 tablet by mouth Daily., Disp: 90 tablet, Rfl: 1  •  vitamin B-12 (CYANOCOBALAMIN) 500 MCG tablet, Take 1 tablet by mouth Daily., Disp: 30 tablet, Rfl: 5  •  Vitamin D, Cholecalciferol, (CHOLECALCIFEROL) 400 units tablet, Take 400 Units by mouth Daily., Disp: , Rfl:       OBJECTIVE:  BP (!) 190/110 (BP Location: Left arm, Patient Position: Sitting, Cuff Size: Adult)   Pulse 59   Temp 98 °F (36.7 °C) (Oral)   Resp 16   Ht 167.6 cm (66\")   Wt 73.9 kg (163 lb)   SpO2 98%   BMI 26.31 kg/m²    Physical Exam   Constitutional: He is oriented to person, place, and time. He appears well-developed and well-nourished.   HENT:   Head: Normocephalic and atraumatic.   Eyes: Pupils are equal, round, and reactive to light. Conjunctivae and EOM are normal.   Neck: Normal range of motion. Neck supple.   Cardiovascular: Normal rate, regular rhythm and normal heart sounds.   Pulmonary/Chest: Effort normal and breath sounds normal.   Abdominal: Soft. Bowel sounds are normal.   Neurological: He is alert and oriented to person, place, and time. He has normal reflexes.   Skin: Skin is warm and dry.   Psychiatric: He has a normal mood and affect.   Vitals reviewed.    Worden Sleepiness Scale (ESS)  Rate situations associated with sleepiness:      Sitting and reading      No chance of dozing (0 points)   X  Slight chance of dozing (1 point)     Moderate chance of dozing (2 points)     High chance of dozing (3 points)   Watching television    X  No chance of dozing (0 points)     Slight chance of dozing (1 point)     Moderate chance of dozing (2 points)     High chance of dozing (3 points)   Sitting inactive in a public place      No " chance of dozing (0 points)   X  Slight chance of dozing (1 point)     Moderate chance of dozing (2 points)     High chance of dozing (3 points)   Sitting for an hour as a passenger in a car    X  No chance of dozing (0 points)     Slight chance of dozing (1 point)     Moderate chance of dozing (2 points)     High chance of dozing (3 points)   Lying down in the afternoon to rest      No chance of dozing (0 points)     Slight chance of dozing (1 point)   X  Moderate chance of dozing (2 points)     High chance of dozing (3 points)   Sitting and talking to another person      No chance of dozing (0 points)   X  Slight chance of dozing (1 point)     Moderate chance of dozing (2 points)     High chance of dozing (3 points)   Sitting quietly after a lunch (no alcohol at lunch)      No chance of dozing (0 points)   X  Slight chance of dozing (1 point)     Moderate chance of dozing (2 points)     High chance of dozing (3 points)   Sitting in a car, stopped for a few minutes due to traffic    X  No chance of dozing (0 points)     Slight chance of dozing (1 point)     Moderate chance of dozing (2 points)     High chance of dozing (3 points)         Total Criteria Point Count: 6     Assessment/Plan    Dao was seen today for medicare wellness-subsequent.    Diagnoses and all orders for this visit:    Medicare annual wellness visit, subsequent  See associated note    Essential hypertension  Not Well controlled, BP goal for age is <140/90 per JNC 8 guidelines and continue current medications. He did not take his medication today and states he monitors his b/p at home and is seeing  131/89-90.    Overweight (BMI 25.0-29.9)  Recommended attention to portion control and being careful about the types and timing of meals for the purpose of weight management.    Snoring  Uncontrolled hypertension  Will proceed with sleep study.       An After Visit Summary was printed and given to the patient at discharge.  Return in about 3 months  (around 6/6/2020). Sooner if problems arise.         Marsha Layton APRN. 3/6/2020

## 2020-03-06 NOTE — PROGRESS NOTES
The ABCs of the Annual Wellness Visit  Subsequent Medicare Wellness Visit    Chief Complaint   Patient presents with   • Medicare Wellness-subsequent       Subjective   History of Present Illness:  Dao Jhaveri is a 64 y.o. male who presents for a Subsequent Medicare Wellness Visit.    HEALTH RISK ASSESSMENT    Recent Hospitalizations:  No hospitalization(s) within the last year.    Current Medical Providers:  Patient Care Team:  Dallin Miller DO as PCP - General (Internal Medicine)    Smoking Status:  Social History     Tobacco Use   Smoking Status Former Smoker   • Types: Cigarettes   • Last attempt to quit: 7/3/2013   • Years since quittin.6   Smokeless Tobacco Never Used       Alcohol Consumption:  Social History     Substance and Sexual Activity   Alcohol Use No       Depression Screen:   PHQ-2/PHQ-9 Depression Screening 3/6/2020   Little interest or pleasure in doing things 0   Feeling down, depressed, or hopeless 0   Total Score 0       Fall Risk Screen:  STEADI Fall Risk Assessment has not been completed.    Health Habits and Functional and Cognitive Screening:  Functional & Cognitive Status 3/6/2020   Do you have difficulty preparing food and eating? No   Do you have difficulty bathing yourself, getting dressed or grooming yourself? No   Do you have difficulty using the toilet? No   Do you have difficulty moving around from place to place? No   Do you have trouble with steps or getting out of a bed or a chair? No   Current Diet Well Balanced Diet   Dental Exam Not up to date   Eye Exam Not up to date   Exercise (times per week) 3 times per week   Current Exercise Activities Include Walking   Do you need help using the phone?  No   Are you deaf or do you have serious difficulty hearing?  Yes   Do you need help with transportation? No   Do you need help shopping? No   Do you need help preparing meals?  No   Do you need help with housework?  No   Do you need help with laundry? No   Do you need  help taking your medications? No   Do you need help managing money? No   Do you ever drive or ride in a car without wearing a seat belt? No   Have you felt unusual stress, anger or loneliness in the last month? No   Who do you live with? Spouse   If you need help, do you have trouble finding someone available to you? No   Have you been bothered in the last four weeks by sexual problems? No   Do you have difficulty concentrating, remembering or making decisions? No         Does the patient have evidence of cognitive impairment? No    Asprin use counseling:Does not need ASA (and currently is not on it)    Age-appropriate Screening Schedule:  Refer to the list below for future screening recommendations based on patient's age, sex and/or medical conditions. Orders for these recommended tests are listed in the plan section. The patient has been provided with a written plan.    Health Maintenance   Topic Date Due   • TDAP/TD VACCINES (1 - Tdap) 07/16/1966   • ZOSTER VACCINE (1 of 2) 07/16/2005   • LIPID PANEL  02/19/2020   • COLONOSCOPY  09/04/2024   • INFLUENZA VACCINE  Completed          The following portions of the patient's history were reviewed and updated as appropriate: allergies, current medications, past family history, past medical history, past social history, past surgical history and problem list.    Outpatient Medications Prior to Visit   Medication Sig Dispense Refill   • amLODIPine (NORVASC) 10 MG tablet Take 1 tablet by mouth every night at bedtime. 90 tablet 1   • atorvastatin (LIPITOR) 10 MG tablet Take 1 tablet by mouth Daily. 90 tablet 3   • cloNIDine (CATAPRES) 0.1 MG tablet Take 0.1 mg by mouth As Needed.     • clopidogrel (PLAVIX) 75 MG tablet Take 1 tablet by mouth Daily. 90 tablet 1   • lisinopril (PRINIVIL,ZESTRIL) 40 MG tablet Take 1 tablet by mouth Daily. 90 tablet 1   • metoprolol succinate XL (TOPROL-XL) 50 MG 24 hr tablet Take 1 tablet by mouth Daily. 90 tablet 1   • Omega-3 Fatty Acids  (FISH OIL) 1000 MG capsule capsule Take  by mouth Daily With Breakfast.     • tiZANidine (ZANAFLEX) 4 MG tablet Take 1 tablet by mouth 2 (Two) Times a Day As Needed for Muscle Spasms. 60 tablet 0   • triamterene-hydrochlorothiazide (MAXZIDE) 75-50 MG per tablet Take 1 tablet by mouth Daily. 90 tablet 1   • vitamin B-12 (CYANOCOBALAMIN) 500 MCG tablet Take 1 tablet by mouth Daily. 30 tablet 5   • Vitamin D, Cholecalciferol, (CHOLECALCIFEROL) 400 units tablet Take 400 Units by mouth Daily.       No facility-administered medications prior to visit.        Patient Active Problem List   Diagnosis   • Essential hypertension   • Mixed hyperlipidemia   • Chronic midline thoracic back pain   • Thalamic pain syndrome   • Chronic neck pain   • History of stroke   • Impaired glucose tolerance   • Overweight (BMI 25.0-29.9)       Advanced Care Planning:  Patient does not have an advance directive, declines further assistance.    Review of Systems   Constitutional: Negative for activity change, appetite change, fatigue, fever and unexpected weight change.   HENT: Negative.    Respiratory: Negative for cough, chest tightness, shortness of breath and wheezing.    Cardiovascular: Negative for chest pain, palpitations and leg swelling.   Gastrointestinal: Negative.    Endocrine: Negative.    Genitourinary: Negative.    Musculoskeletal: Negative.    Skin: Negative.    Neurological: Negative for dizziness and headaches.   Psychiatric/Behavioral: Negative.        Compared to one year ago, the patient feels his physical health is the same.  Compared to one year ago, the patient feels his mental health is the same.    Reviewed chart for potential of high risk medication in the elderly: yes  Reviewed chart for potential of harmful drug interactions in the elderly:yes    Objective         Vitals:    03/06/20 1008   BP: (!) 190/110   BP Location: Left arm   Patient Position: Sitting   Cuff Size: Adult   Pulse: 59   Resp: 16   Temp: 98 °F  "(36.7 °C)   TempSrc: Oral   SpO2: 98%   Weight: 73.9 kg (163 lb)   Height: 167.6 cm (66\")       Body mass index is 26.31 kg/m².  Discussed the patient's BMI with him. The BMI is above average; BMI management plan is completed.    Physical Exam   Constitutional: He is oriented to person, place, and time. He appears well-developed and well-nourished.   HENT:   Head: Normocephalic and atraumatic.   Eyes: Pupils are equal, round, and reactive to light. Conjunctivae and EOM are normal.   Neck: Normal range of motion. Neck supple.   Cardiovascular: Normal rate, regular rhythm and normal heart sounds.   Pulmonary/Chest: Effort normal and breath sounds normal.   Abdominal: Soft. Bowel sounds are normal.   Neurological: He is alert and oriented to person, place, and time. He has normal reflexes.   Skin: Skin is warm and dry.   Psychiatric: He has a normal mood and affect.   Vitals reviewed.            Assessment/Plan   Medicare Risks and Personalized Health Plan  CMS Preventative Services Quick Reference  Cardiovascular risk    The above risks/problems have been discussed with the patient.  Pertinent information has been shared with the patient in the After Visit Summary.  Follow up plans and orders are seen below in the Assessment/Plan Section.    Diagnoses and all orders for this visit:    1. Medicare annual wellness visit, subsequent (Primary)    2. Essential hypertension    3. Overweight (BMI 25.0-29.9)    4. Snoring    5. Uncontrolled hypertension    Other orders  -     Cancel: Polysomnography 4 or More Parameters; Future      Follow Up:  Return in about 3 months (around 6/6/2020).     An After Visit Summary and PPPS were given to the patient.             "

## 2020-03-06 NOTE — PATIENT INSTRUCTIONS
Medicare Wellness  Personal Prevention Plan of Service     Date of Office Visit:  2020  Encounter Provider:  OMAIRA Quintero  Place of Service:  [facility]  Patient Name: Dao Jhaveri  :  1955    As part of the Medicare Wellness portion of your visit today, we are providing you with this personalized preventive plan of services (PPPS). This plan is based upon recommendations of the [place] Preventive Services Task Force (USPSTF) and the Advisory Committee on Immunization Practices (ACIP).    This lists the preventive care services that should be considered, and provides dates of when you are due. Items listed as completed are up-to-date and do not require any further intervention.    Health Maintenance   Topic Date Due   • TDAP/TD VACCINES (1 - Tdap) 1966   • ZOSTER VACCINE (1 of 2) 2005   • HEPATITIS C SCREENING  2017   • MEDICARE ANNUAL WELLNESS  2017   • LIPID PANEL  2020   • COLONOSCOPY  2024   • INFLUENZA VACCINE  Completed       No orders of the defined types were placed in this encounter.      Return in about 3 months (around 2020).

## 2020-03-13 ENCOUNTER — TELEPHONE (OUTPATIENT)
Dept: INTERNAL MEDICINE | Facility: CLINIC | Age: 65
End: 2020-03-13

## 2020-03-13 NOTE — TELEPHONE ENCOUNTER
----- Message from OMAIRA Quintero sent at 3/11/2020 10:58 AM CDT -----  Please let the patient know that his labs are normal at this time. No changes to medications needed.

## 2020-07-10 ENCOUNTER — OFFICE VISIT (OUTPATIENT)
Dept: INTERNAL MEDICINE | Facility: CLINIC | Age: 65
End: 2020-07-10

## 2020-07-10 VITALS
OXYGEN SATURATION: 98 % | BODY MASS INDEX: 27.38 KG/M2 | DIASTOLIC BLOOD PRESSURE: 110 MMHG | RESPIRATION RATE: 16 BRPM | WEIGHT: 170.4 LBS | SYSTOLIC BLOOD PRESSURE: 170 MMHG | HEART RATE: 65 BPM | HEIGHT: 66 IN | TEMPERATURE: 98.2 F

## 2020-07-10 DIAGNOSIS — M54.6 CHRONIC MIDLINE THORACIC BACK PAIN: ICD-10-CM

## 2020-07-10 DIAGNOSIS — I10 ESSENTIAL HYPERTENSION: Primary | ICD-10-CM

## 2020-07-10 DIAGNOSIS — H54.62 DECREASED VISION OF LEFT EYE: ICD-10-CM

## 2020-07-10 DIAGNOSIS — Z86.73 HISTORY OF STROKE: ICD-10-CM

## 2020-07-10 DIAGNOSIS — G89.29 CHRONIC MIDLINE THORACIC BACK PAIN: ICD-10-CM

## 2020-07-10 DIAGNOSIS — E66.3 OVERWEIGHT: ICD-10-CM

## 2020-07-10 DIAGNOSIS — E78.2 MIXED HYPERLIPIDEMIA: ICD-10-CM

## 2020-07-10 PROCEDURE — 99214 OFFICE O/P EST MOD 30 MIN: CPT | Performed by: INTERNAL MEDICINE

## 2020-07-10 RX ORDER — LISINOPRIL 40 MG/1
40 TABLET ORAL
Qty: 90 TABLET | Refills: 1 | Status: SHIPPED | OUTPATIENT
Start: 2020-07-10 | End: 2021-02-08 | Stop reason: HOSPADM

## 2020-07-10 RX ORDER — AMLODIPINE BESYLATE 10 MG/1
10 TABLET ORAL
Qty: 90 TABLET | Refills: 1 | Status: SHIPPED | OUTPATIENT
Start: 2020-07-10 | End: 2021-04-22 | Stop reason: SDUPTHER

## 2020-07-10 RX ORDER — METOPROLOL SUCCINATE 50 MG/1
50 TABLET, EXTENDED RELEASE ORAL DAILY
Qty: 90 TABLET | Refills: 1 | Status: SHIPPED | OUTPATIENT
Start: 2020-07-10 | End: 2021-06-03 | Stop reason: SDUPTHER

## 2020-07-10 RX ORDER — TRIAMTERENE AND HYDROCHLOROTHIAZIDE 75; 50 MG/1; MG/1
1 TABLET ORAL DAILY
Qty: 90 TABLET | Refills: 1 | Status: SHIPPED | OUTPATIENT
Start: 2020-07-10 | End: 2021-02-08 | Stop reason: HOSPADM

## 2020-07-10 RX ORDER — CLOPIDOGREL BISULFATE 75 MG/1
75 TABLET ORAL DAILY
Qty: 90 TABLET | Refills: 1 | Status: SHIPPED | OUTPATIENT
Start: 2020-07-10 | End: 2021-06-03 | Stop reason: SDUPTHER

## 2020-07-10 RX ORDER — SPIRONOLACTONE 25 MG/1
25 TABLET ORAL DAILY
Qty: 90 TABLET | Refills: 1 | Status: SHIPPED | OUTPATIENT
Start: 2020-07-10 | End: 2021-02-08 | Stop reason: HOSPADM

## 2020-07-10 NOTE — PROGRESS NOTES
CC: f/u hypertension    History:  Dao Jhaveri is a 64 y.o. male   He notes he has been doing reasonably well and has seen control of his blood pressures at home, though it runs high today due to not taking his medication.  A common value he sees at home is 130/86.  He feels his back pain is markedly improved after previous interventional injection.  He has had a stroke, but continues on Plavix and statin for secondary prevention and his last LDL was less than 70, which is at goal.       ROS:  Review of Systems   Constitutional: Negative for chills and fever.   Respiratory: Negative for cough and shortness of breath.    Cardiovascular: Negative for chest pain and palpitations.   Neurological: Negative for dizziness and headaches.        reports that he quit smoking about 7 years ago. His smoking use included cigarettes. He has never used smokeless tobacco. He reports that he does not drink alcohol or use drugs.      Current Outpatient Medications:   •  amLODIPine (NORVASC) 10 MG tablet, Take 1 tablet by mouth every night at bedtime., Disp: 90 tablet, Rfl: 1  •  atorvastatin (LIPITOR) 10 MG tablet, Take 1 tablet by mouth Daily., Disp: 90 tablet, Rfl: 3  •  cloNIDine (CATAPRES) 0.1 MG tablet, Take 0.1 mg by mouth As Needed., Disp: , Rfl:   •  clopidogrel (PLAVIX) 75 MG tablet, Take 1 tablet by mouth Daily., Disp: 90 tablet, Rfl: 1  •  lisinopril (PRINIVIL,ZESTRIL) 40 MG tablet, Take 1 tablet by mouth Daily., Disp: 90 tablet, Rfl: 1  •  metoprolol succinate XL (TOPROL-XL) 50 MG 24 hr tablet, Take 1 tablet by mouth Daily., Disp: 90 tablet, Rfl: 1  •  tiZANidine (ZANAFLEX) 4 MG tablet, Take 1 tablet by mouth 2 (Two) Times a Day As Needed for Muscle Spasms., Disp: 60 tablet, Rfl: 0  •  triamterene-hydrochlorothiazide (MAXZIDE) 75-50 MG per tablet, Take 1 tablet by mouth Daily., Disp: 90 tablet, Rfl: 1  •  vitamin B-12 (CYANOCOBALAMIN) 500 MCG tablet, Take 1 tablet by mouth Daily., Disp: 30 tablet, Rfl: 5  •  Vitamin D,  "Cholecalciferol, (CHOLECALCIFEROL) 400 units tablet, Take 400 Units by mouth Daily., Disp: , Rfl:     OBJECTIVE:  BP (!) 170/110 (BP Location: Left arm, Patient Position: Sitting, Cuff Size: Adult)   Pulse 65   Temp 98.2 °F (36.8 °C)   Resp 16   Ht 167.6 cm (66\")   Wt 77.3 kg (170 lb 6.4 oz)   SpO2 98%   BMI 27.50 kg/m²    Physical Exam   Constitutional: He is oriented to person, place, and time. He appears well-nourished. No distress.   Cardiovascular: Normal rate, regular rhythm and normal heart sounds.   No murmur heard.  Pulmonary/Chest: Effort normal and breath sounds normal. He has no wheezes.   Neurological: He is alert and oriented to person, place, and time.   Psychiatric: He has a normal mood and affect.       Assessment/Plan     Diagnoses and all orders for this visit:    Essential hypertension  -     triamterene-hydrochlorothiazide (Maxzide) 75-50 MG per tablet; Take 1 tablet by mouth Daily.  -     metoprolol succinate XL (TOPROL-XL) 50 MG 24 hr tablet; Take 1 tablet by mouth Daily.  -     lisinopril (PRINIVIL,ZESTRIL) 40 MG tablet; Take 1 tablet by mouth every night at bedtime.  -     amLODIPine (NORVASC) 10 MG tablet; Take 1 tablet by mouth every night at bedtime.  -     spironolactone (Aldactone) 25 MG tablet; Take 1 tablet by mouth Daily.  Poorly controlled, BP goal for age is <140/90 per JNC 8 guidelines and add aldactone and encourage adherence. Dosing timing changes per orders.     History of stroke  -     clopidogrel (PLAVIX) 75 MG tablet; Take 1 tablet by mouth Daily.  Continue Plavix and statin for secondary prevention.    Mixed hyperlipidemia  Stable on moderate intensity statin therapy per ACC/AHA guidelines.  Last LDL was less than 70, which is at goal.    Decreased vision of left eye  -     Ambulatory Referral to Ophthalmology  Refer to ophthalmology for further evaluation.    Chronic midline thoracic back pain  Markedly improved.    Overweight  Recommended attention to portion " control and being careful about the types and timing of meals for the purpose of weight management.        An After Visit Summary was printed and given to the patient at discharge.  Return in about 4 months (around 11/10/2020) for Medicare Wellness.          Dallin Miller D.O. 7/10/2020   Electronically signed.

## 2020-11-12 ENCOUNTER — OFFICE VISIT (OUTPATIENT)
Dept: INTERNAL MEDICINE | Facility: CLINIC | Age: 65
End: 2020-11-12

## 2020-11-12 VITALS
BODY MASS INDEX: 26.44 KG/M2 | OXYGEN SATURATION: 97 % | TEMPERATURE: 97.8 F | HEIGHT: 66 IN | WEIGHT: 164.5 LBS | HEART RATE: 55 BPM | DIASTOLIC BLOOD PRESSURE: 90 MMHG | SYSTOLIC BLOOD PRESSURE: 148 MMHG

## 2020-11-12 DIAGNOSIS — E66.3 OVERWEIGHT: ICD-10-CM

## 2020-11-12 DIAGNOSIS — Z23 NEED FOR INFLUENZA VACCINATION: ICD-10-CM

## 2020-11-12 DIAGNOSIS — I10 ESSENTIAL HYPERTENSION: Primary | ICD-10-CM

## 2020-11-12 DIAGNOSIS — Z23 NEED FOR PNEUMOCOCCAL VACCINATION: ICD-10-CM

## 2020-11-12 DIAGNOSIS — E78.2 MIXED HYPERLIPIDEMIA: ICD-10-CM

## 2020-11-12 PROCEDURE — 90694 VACC AIIV4 NO PRSRV 0.5ML IM: CPT | Performed by: NURSE PRACTITIONER

## 2020-11-12 PROCEDURE — 99214 OFFICE O/P EST MOD 30 MIN: CPT | Performed by: NURSE PRACTITIONER

## 2020-11-12 PROCEDURE — G0008 ADMIN INFLUENZA VIRUS VAC: HCPCS | Performed by: NURSE PRACTITIONER

## 2020-11-12 RX ORDER — LATANOPROST 50 UG/ML
1 SOLUTION/ DROPS OPHTHALMIC NIGHTLY
COMMUNITY
Start: 2020-09-09 | End: 2021-11-18

## 2020-11-12 NOTE — PROGRESS NOTES
CC: f/u hypertension.     History:  Dao Jhaveri is a 65 y.o. male who presents today for follow-up for evaluation of the above:  Patient presents today for f/u hypertension.   Patient reports compliance with blood pressure medications without adverse side effects.   126/90 at home. Monitoring at home regularly. Gets anxious when coming to the office.   BMI is 26  Tolerating statin well at this time.         ROS:  Review of Systems   Constitutional: Negative for activity change, appetite change, fatigue, fever and unexpected weight change.   HENT: Negative.    Respiratory: Negative for cough, chest tightness, shortness of breath and wheezing.    Cardiovascular: Negative for chest pain, palpitations and leg swelling.   Gastrointestinal: Negative.    Endocrine: Negative.    Genitourinary: Negative.    Musculoskeletal: Negative.    Skin: Negative.    Neurological: Negative for dizziness and headaches.   Psychiatric/Behavioral: Negative.        Mr. Jhaveri  reports that he quit smoking about 7 years ago. His smoking use included cigarettes. He smoked 1.50 packs per day. He has never used smokeless tobacco. He reports that he does not drink alcohol or use drugs.      Current Outpatient Medications:   •  amLODIPine (NORVASC) 10 MG tablet, Take 1 tablet by mouth every night at bedtime., Disp: 90 tablet, Rfl: 1  •  atorvastatin (LIPITOR) 10 MG tablet, Take 1 tablet by mouth Daily., Disp: 90 tablet, Rfl: 3  •  cloNIDine (CATAPRES) 0.1 MG tablet, Take 0.1 mg by mouth As Needed., Disp: , Rfl:   •  clopidogrel (PLAVIX) 75 MG tablet, Take 1 tablet by mouth Daily., Disp: 90 tablet, Rfl: 1  •  latanoprost (XALATAN) 0.005 % ophthalmic solution, 1 drop Every Night., Disp: , Rfl:   •  lisinopril (PRINIVIL,ZESTRIL) 40 MG tablet, Take 1 tablet by mouth every night at bedtime., Disp: 90 tablet, Rfl: 1  •  metoprolol succinate XL (TOPROL-XL) 50 MG 24 hr tablet, Take 1 tablet by mouth Daily., Disp: 90 tablet, Rfl: 1  •  spironolactone  "(Aldactone) 25 MG tablet, Take 1 tablet by mouth Daily., Disp: 90 tablet, Rfl: 1  •  triamterene-hydrochlorothiazide (Maxzide) 75-50 MG per tablet, Take 1 tablet by mouth Daily., Disp: 90 tablet, Rfl: 1  •  vitamin B-12 (CYANOCOBALAMIN) 500 MCG tablet, Take 1 tablet by mouth Daily., Disp: 30 tablet, Rfl: 5  •  Vitamin D, Cholecalciferol, (CHOLECALCIFEROL) 400 units tablet, Take 400 Units by mouth Daily., Disp: , Rfl:     Current Facility-Administered Medications:   •  pneumococcal conj. 13-valent (PREVNAR-13) vaccine 0.5 mL, 0.5 mL, Intramuscular, Once, Marsha Layton APRN      OBJECTIVE:  /90 (BP Location: Left arm, Patient Position: Sitting, Cuff Size: Adult)   Pulse 55   Temp 97.8 °F (36.6 °C) (Temporal)   Ht 167.6 cm (66\")   Wt 74.6 kg (164 lb 8 oz)   SpO2 97%   BMI 26.55 kg/m²    Physical Exam  Vitals signs reviewed.   Constitutional:       Appearance: He is well-developed.   HENT:      Head: Normocephalic and atraumatic.   Eyes:      Conjunctiva/sclera: Conjunctivae normal.      Pupils: Pupils are equal, round, and reactive to light.   Neck:      Musculoskeletal: Normal range of motion and neck supple.   Cardiovascular:      Rate and Rhythm: Normal rate and regular rhythm.      Heart sounds: Normal heart sounds.   Pulmonary:      Effort: Pulmonary effort is normal.      Breath sounds: Normal breath sounds.   Abdominal:      General: Bowel sounds are normal.      Palpations: Abdomen is soft.   Skin:     General: Skin is warm and dry.   Neurological:      Mental Status: He is alert and oriented to person, place, and time.      Deep Tendon Reflexes: Reflexes are normal and symmetric.         Assessment/Plan    Diagnoses and all orders for this visit:    1. Essential hypertension (Primary)  Fairly controlled, BP goal for age is <140/90 per JNC 8 guidelines and continue current medications. Continue to monitor at home in the setting of white coat syndrome.     2. Mixed hyperlipidemia  Tolerating statin " well at this time.     3. Need for pneumococcal vaccination  -     pneumococcal conj. 13-valent (PREVNAR-13) vaccine 0.5 mL    4. Need for influenza vaccination  -     Fluad Quad 65+ yrs ([id number])    5. Overweight  Recommended attention to portion control and being careful about the types and timing of meals for the purpose of weight management.         An After Visit Summary was printed and given to the patient at discharge.  Return in about 4 months (around 3/12/2021) for Medicare Wellness. Sooner if problems arise.          Marsha CASTANO. 11/12/2020   Electronically Signed

## 2021-02-02 ENCOUNTER — APPOINTMENT (OUTPATIENT)
Dept: CT IMAGING | Facility: HOSPITAL | Age: 66
End: 2021-02-02

## 2021-02-02 ENCOUNTER — HOSPITAL ENCOUNTER (INPATIENT)
Facility: HOSPITAL | Age: 66
LOS: 6 days | Discharge: NURSING, REHAB OR LONG-TERM CARE | End: 2021-02-08
Attending: FAMILY MEDICINE | Admitting: INTERNAL MEDICINE

## 2021-02-02 ENCOUNTER — APPOINTMENT (OUTPATIENT)
Dept: MRI IMAGING | Facility: HOSPITAL | Age: 66
End: 2021-02-02

## 2021-02-02 ENCOUNTER — APPOINTMENT (OUTPATIENT)
Dept: CARDIOLOGY | Facility: HOSPITAL | Age: 66
End: 2021-02-02

## 2021-02-02 DIAGNOSIS — R29.90 STROKE-LIKE SYMPTOMS: ICD-10-CM

## 2021-02-02 DIAGNOSIS — I16.0 HYPERTENSIVE URGENCY: Primary | ICD-10-CM

## 2021-02-02 DIAGNOSIS — Z74.09 IMPAIRED MOBILITY AND ADLS: ICD-10-CM

## 2021-02-02 DIAGNOSIS — Z74.09 IMPAIRED MOBILITY: ICD-10-CM

## 2021-02-02 DIAGNOSIS — R47.89 MOTOR SPEECH DISORDER: ICD-10-CM

## 2021-02-02 DIAGNOSIS — R13.10 DYSPHAGIA, UNSPECIFIED TYPE: ICD-10-CM

## 2021-02-02 DIAGNOSIS — Z78.9 IMPAIRED MOBILITY AND ADLS: ICD-10-CM

## 2021-02-02 PROBLEM — I63.50 CEREBROVASCULAR ACCIDENT (CVA) OF RIGHT PONTINE STRUCTURE (HCC): Status: ACTIVE | Noted: 2021-02-02

## 2021-02-02 PROBLEM — I63.50 RIGHT PONTINE CVA: Status: ACTIVE | Noted: 2021-02-02

## 2021-02-02 LAB
ALBUMIN SERPL-MCNC: 4.5 G/DL (ref 3.5–5.2)
ALBUMIN/GLOB SERPL: 1.4 G/DL
ALP SERPL-CCNC: 72 U/L (ref 39–117)
ALT SERPL W P-5'-P-CCNC: 12 U/L (ref 1–41)
ANION GAP SERPL CALCULATED.3IONS-SCNC: 8 MMOL/L (ref 5–15)
APTT PPP: 37.2 SECONDS (ref 24.1–35)
AST SERPL-CCNC: 17 U/L (ref 1–40)
BASOPHILS # BLD AUTO: 0.04 10*3/MM3 (ref 0–0.2)
BASOPHILS NFR BLD AUTO: 0.5 % (ref 0–1.5)
BILIRUB SERPL-MCNC: 0.3 MG/DL (ref 0–1.2)
BILIRUB UR QL STRIP: NEGATIVE
BUN SERPL-MCNC: 17 MG/DL (ref 8–23)
BUN/CREAT SERPL: 14.5 (ref 7–25)
CALCIUM SPEC-SCNC: 9.7 MG/DL (ref 8.6–10.5)
CHLORIDE SERPL-SCNC: 103 MMOL/L (ref 98–107)
CHOLEST SERPL-MCNC: 137 MG/DL (ref 0–200)
CLARITY UR: CLEAR
CO2 SERPL-SCNC: 29 MMOL/L (ref 22–29)
COLOR UR: ABNORMAL
CREAT SERPL-MCNC: 1.17 MG/DL (ref 0.76–1.27)
DEPRECATED RDW RBC AUTO: 45.2 FL (ref 37–54)
EOSINOPHIL # BLD AUTO: 0.08 10*3/MM3 (ref 0–0.4)
EOSINOPHIL NFR BLD AUTO: 1.1 % (ref 0.3–6.2)
ERYTHROCYTE [DISTWIDTH] IN BLOOD BY AUTOMATED COUNT: 15.5 % (ref 12.3–15.4)
GFR SERPL CREATININE-BSD FRML MDRD: 76 ML/MIN/1.73
GLOBULIN UR ELPH-MCNC: 3.3 GM/DL
GLUCOSE BLDC GLUCOMTR-MCNC: 126 MG/DL (ref 70–130)
GLUCOSE BLDC GLUCOMTR-MCNC: 127 MG/DL (ref 70–130)
GLUCOSE BLDC GLUCOMTR-MCNC: 133 MG/DL (ref 70–130)
GLUCOSE SERPL-MCNC: 130 MG/DL (ref 65–99)
GLUCOSE UR STRIP-MCNC: NEGATIVE MG/DL
HBA1C MFR BLD: 5.6 % (ref 4.8–5.6)
HCT VFR BLD AUTO: 44.5 % (ref 37.5–51)
HCT VFR BLD AUTO: 47.3 % (ref 37.5–51)
HDLC SERPL-MCNC: 49 MG/DL (ref 40–60)
HGB BLD-MCNC: 14.5 G/DL (ref 13–17.7)
HGB UR QL STRIP.AUTO: NEGATIVE
IMM GRANULOCYTES # BLD AUTO: 0.03 10*3/MM3 (ref 0–0.05)
IMM GRANULOCYTES NFR BLD AUTO: 0.4 % (ref 0–0.5)
INR PPP: 0.96 (ref 0.91–1.09)
KETONES UR QL STRIP: NEGATIVE
LDLC SERPL CALC-MCNC: 75 MG/DL (ref 0–100)
LDLC/HDLC SERPL: 1.54 {RATIO}
LEUKOCYTE ESTERASE UR QL STRIP.AUTO: NEGATIVE
LYMPHOCYTES # BLD AUTO: 1.77 10*3/MM3 (ref 0.7–3.1)
LYMPHOCYTES NFR BLD AUTO: 23.6 % (ref 19.6–45.3)
MCH RBC QN AUTO: 26.1 PG (ref 26.6–33)
MCHC RBC AUTO-ENTMCNC: 32.6 G/DL (ref 31.5–35.7)
MCV RBC AUTO: 80.2 FL (ref 79–97)
MONOCYTES # BLD AUTO: 0.49 10*3/MM3 (ref 0.1–0.9)
MONOCYTES NFR BLD AUTO: 6.5 % (ref 5–12)
NEUTROPHILS NFR BLD AUTO: 5.08 10*3/MM3 (ref 1.7–7)
NEUTROPHILS NFR BLD AUTO: 67.9 % (ref 42.7–76)
NITRITE UR QL STRIP: NEGATIVE
NRBC BLD AUTO-RTO: 0 /100 WBC (ref 0–0.2)
PA ADP PRP-ACNC: 198 PRU (ref 194–418)
PH UR STRIP.AUTO: 7 [PH] (ref 5–8)
PLATELET # BLD AUTO: 221 10*3/MM3 (ref 140–450)
PLATELET # BLD AUTO: 241 10*3/MM3 (ref 140–450)
PMV BLD AUTO: 11 FL (ref 6–12)
POTASSIUM SERPL-SCNC: 4 MMOL/L (ref 3.5–5.2)
PROT SERPL-MCNC: 7.8 G/DL (ref 6–8.5)
PROT UR QL STRIP: NEGATIVE
PROTHROMBIN TIME: 12.4 SECONDS (ref 11.9–14.6)
RBC # BLD AUTO: 5.55 10*6/MM3 (ref 4.14–5.8)
SARS-COV-2 RDRP RESP QL NAA+PROBE: NORMAL
SODIUM SERPL-SCNC: 140 MMOL/L (ref 136–145)
SP GR UR STRIP: <=1.005 (ref 1–1.03)
TRIGL SERPL-MCNC: 63 MG/DL (ref 0–150)
TROPONIN T SERPL-MCNC: <0.01 NG/ML (ref 0–0.03)
UROBILINOGEN UR QL STRIP: ABNORMAL
VLDLC SERPL-MCNC: 13 MG/DL (ref 5–40)
WBC # BLD AUTO: 7.49 10*3/MM3 (ref 3.4–10.8)

## 2021-02-02 PROCEDURE — 93356 MYOCRD STRAIN IMG SPCKL TRCK: CPT

## 2021-02-02 PROCEDURE — 0 IOPAMIDOL PER 1 ML: Performed by: FAMILY MEDICINE

## 2021-02-02 PROCEDURE — 93306 TTE W/DOPPLER COMPLETE: CPT

## 2021-02-02 PROCEDURE — 25010000002 HYDRALAZINE PER 20 MG: Performed by: PHYSICIAN ASSISTANT

## 2021-02-02 PROCEDURE — 70496 CT ANGIOGRAPHY HEAD: CPT

## 2021-02-02 PROCEDURE — 87635 SARS-COV-2 COVID-19 AMP PRB: CPT | Performed by: PHYSICIAN ASSISTANT

## 2021-02-02 PROCEDURE — 85610 PROTHROMBIN TIME: CPT | Performed by: PHYSICIAN ASSISTANT

## 2021-02-02 PROCEDURE — 93356 MYOCRD STRAIN IMG SPCKL TRCK: CPT | Performed by: INTERNAL MEDICINE

## 2021-02-02 PROCEDURE — 25010000002 ONDANSETRON PER 1 MG: Performed by: PHYSICIAN ASSISTANT

## 2021-02-02 PROCEDURE — 93010 ELECTROCARDIOGRAM REPORT: CPT | Performed by: INTERNAL MEDICINE

## 2021-02-02 PROCEDURE — 70498 CT ANGIOGRAPHY NECK: CPT

## 2021-02-02 PROCEDURE — 36415 COLL VENOUS BLD VENIPUNCTURE: CPT | Performed by: PSYCHIATRY & NEUROLOGY

## 2021-02-02 PROCEDURE — 80061 LIPID PANEL: CPT | Performed by: FAMILY MEDICINE

## 2021-02-02 PROCEDURE — 93005 ELECTROCARDIOGRAM TRACING: CPT | Performed by: PHYSICIAN ASSISTANT

## 2021-02-02 PROCEDURE — 82962 GLUCOSE BLOOD TEST: CPT

## 2021-02-02 PROCEDURE — 99284 EMERGENCY DEPT VISIT MOD MDM: CPT

## 2021-02-02 PROCEDURE — 84484 ASSAY OF TROPONIN QUANT: CPT | Performed by: PHYSICIAN ASSISTANT

## 2021-02-02 PROCEDURE — 83036 HEMOGLOBIN GLYCOSYLATED A1C: CPT | Performed by: FAMILY MEDICINE

## 2021-02-02 PROCEDURE — 80053 COMPREHEN METABOLIC PANEL: CPT | Performed by: PHYSICIAN ASSISTANT

## 2021-02-02 PROCEDURE — 93306 TTE W/DOPPLER COMPLETE: CPT | Performed by: INTERNAL MEDICINE

## 2021-02-02 PROCEDURE — 85730 THROMBOPLASTIN TIME PARTIAL: CPT | Performed by: PHYSICIAN ASSISTANT

## 2021-02-02 PROCEDURE — 25010000002 ONDANSETRON PER 1 MG: Performed by: FAMILY MEDICINE

## 2021-02-02 PROCEDURE — 99223 1ST HOSP IP/OBS HIGH 75: CPT | Performed by: PSYCHIATRY & NEUROLOGY

## 2021-02-02 PROCEDURE — 97165 OT EVAL LOW COMPLEX 30 MIN: CPT | Performed by: OCCUPATIONAL THERAPIST

## 2021-02-02 PROCEDURE — 25010000002 PERFLUTREN 6.52 MG/ML SUSPENSION: Performed by: FAMILY MEDICINE

## 2021-02-02 PROCEDURE — 70450 CT HEAD/BRAIN W/O DYE: CPT

## 2021-02-02 PROCEDURE — 85576 BLOOD PLATELET AGGREGATION: CPT | Performed by: PSYCHIATRY & NEUROLOGY

## 2021-02-02 PROCEDURE — 70551 MRI BRAIN STEM W/O DYE: CPT

## 2021-02-02 PROCEDURE — 85025 COMPLETE CBC W/AUTO DIFF WBC: CPT | Performed by: PHYSICIAN ASSISTANT

## 2021-02-02 PROCEDURE — 81003 URINALYSIS AUTO W/O SCOPE: CPT | Performed by: PHYSICIAN ASSISTANT

## 2021-02-02 RX ORDER — CLOPIDOGREL BISULFATE 75 MG/1
75 TABLET ORAL DAILY
Status: DISCONTINUED | OUTPATIENT
Start: 2021-02-02 | End: 2021-02-02 | Stop reason: SDUPTHER

## 2021-02-02 RX ORDER — SODIUM CHLORIDE 0.9 % (FLUSH) 0.9 %
10 SYRINGE (ML) INJECTION EVERY 12 HOURS SCHEDULED
Status: DISCONTINUED | OUTPATIENT
Start: 2021-02-02 | End: 2021-02-08 | Stop reason: HOSPADM

## 2021-02-02 RX ORDER — ASPIRIN 81 MG/1
81 TABLET, CHEWABLE ORAL DAILY
Status: DISCONTINUED | OUTPATIENT
Start: 2021-02-03 | End: 2021-02-08 | Stop reason: HOSPADM

## 2021-02-02 RX ORDER — ASPIRIN 300 MG/1
300 SUPPOSITORY RECTAL DAILY
Status: DISCONTINUED | OUTPATIENT
Start: 2021-02-03 | End: 2021-02-08 | Stop reason: HOSPADM

## 2021-02-02 RX ORDER — SODIUM CHLORIDE 0.9 % (FLUSH) 0.9 %
10 SYRINGE (ML) INJECTION AS NEEDED
Status: DISCONTINUED | OUTPATIENT
Start: 2021-02-02 | End: 2021-02-08 | Stop reason: HOSPADM

## 2021-02-02 RX ORDER — LABETALOL HYDROCHLORIDE 5 MG/ML
20 INJECTION, SOLUTION INTRAVENOUS EVERY 6 HOURS PRN
Status: DISCONTINUED | OUTPATIENT
Start: 2021-02-02 | End: 2021-02-08 | Stop reason: HOSPADM

## 2021-02-02 RX ORDER — LISINOPRIL 10 MG/1
40 TABLET ORAL
Status: DISCONTINUED | OUTPATIENT
Start: 2021-02-02 | End: 2021-02-05

## 2021-02-02 RX ORDER — LABETALOL HYDROCHLORIDE 5 MG/ML
20 INJECTION, SOLUTION INTRAVENOUS ONCE
Status: COMPLETED | OUTPATIENT
Start: 2021-02-02 | End: 2021-02-02

## 2021-02-02 RX ORDER — AMLODIPINE BESYLATE 10 MG/1
10 TABLET ORAL
Status: DISCONTINUED | OUTPATIENT
Start: 2021-02-03 | End: 2021-02-05

## 2021-02-02 RX ORDER — TRIAMTERENE AND HYDROCHLOROTHIAZIDE 75; 50 MG/1; MG/1
1 TABLET ORAL DAILY
Status: DISCONTINUED | OUTPATIENT
Start: 2021-02-03 | End: 2021-02-05

## 2021-02-02 RX ORDER — ACETAMINOPHEN 325 MG/1
650 TABLET ORAL EVERY 4 HOURS PRN
Status: DISCONTINUED | OUTPATIENT
Start: 2021-02-02 | End: 2021-02-08 | Stop reason: HOSPADM

## 2021-02-02 RX ORDER — OMEGA-3S/DHA/EPA/FISH OIL/D3 300MG-1000
400 CAPSULE ORAL DAILY
Status: DISCONTINUED | OUTPATIENT
Start: 2021-02-03 | End: 2021-02-08 | Stop reason: HOSPADM

## 2021-02-02 RX ORDER — CHOLECALCIFEROL (VITAMIN D3) 125 MCG
500 CAPSULE ORAL DAILY
Status: DISCONTINUED | OUTPATIENT
Start: 2021-02-03 | End: 2021-02-08 | Stop reason: HOSPADM

## 2021-02-02 RX ORDER — METOPROLOL SUCCINATE 50 MG/1
50 TABLET, EXTENDED RELEASE ORAL DAILY
Status: DISCONTINUED | OUTPATIENT
Start: 2021-02-02 | End: 2021-02-08 | Stop reason: HOSPADM

## 2021-02-02 RX ORDER — SPIRONOLACTONE 25 MG/1
25 TABLET ORAL DAILY
Status: DISCONTINUED | OUTPATIENT
Start: 2021-02-02 | End: 2021-02-05

## 2021-02-02 RX ORDER — CLOPIDOGREL BISULFATE 75 MG/1
75 TABLET ORAL DAILY
Status: DISCONTINUED | OUTPATIENT
Start: 2021-02-02 | End: 2021-02-08 | Stop reason: HOSPADM

## 2021-02-02 RX ORDER — DOCUSATE SODIUM 100 MG/1
100 CAPSULE, LIQUID FILLED ORAL 2 TIMES DAILY PRN
Status: DISCONTINUED | OUTPATIENT
Start: 2021-02-02 | End: 2021-02-08 | Stop reason: HOSPADM

## 2021-02-02 RX ORDER — ONDANSETRON 2 MG/ML
4 INJECTION INTRAMUSCULAR; INTRAVENOUS ONCE
Status: COMPLETED | OUTPATIENT
Start: 2021-02-02 | End: 2021-02-02

## 2021-02-02 RX ORDER — HYDRALAZINE HYDROCHLORIDE 20 MG/ML
20 INJECTION INTRAMUSCULAR; INTRAVENOUS ONCE
Status: COMPLETED | OUTPATIENT
Start: 2021-02-02 | End: 2021-02-02

## 2021-02-02 RX ORDER — ATORVASTATIN CALCIUM 40 MG/1
80 TABLET, FILM COATED ORAL NIGHTLY
Status: DISCONTINUED | OUTPATIENT
Start: 2021-02-02 | End: 2021-02-08 | Stop reason: HOSPADM

## 2021-02-02 RX ORDER — ONDANSETRON 2 MG/ML
4 INJECTION INTRAMUSCULAR; INTRAVENOUS EVERY 6 HOURS PRN
Status: DISCONTINUED | OUTPATIENT
Start: 2021-02-02 | End: 2021-02-08 | Stop reason: HOSPADM

## 2021-02-02 RX ORDER — BISACODYL 10 MG
10 SUPPOSITORY, RECTAL RECTAL DAILY PRN
Status: DISCONTINUED | OUTPATIENT
Start: 2021-02-02 | End: 2021-02-08 | Stop reason: HOSPADM

## 2021-02-02 RX ORDER — ATORVASTATIN CALCIUM 10 MG/1
10 TABLET, FILM COATED ORAL NIGHTLY
Status: DISCONTINUED | OUTPATIENT
Start: 2021-02-02 | End: 2021-02-02 | Stop reason: SDUPTHER

## 2021-02-02 RX ORDER — ALUMINA, MAGNESIA, AND SIMETHICONE 2400; 2400; 240 MG/30ML; MG/30ML; MG/30ML
7.5 SUSPENSION ORAL EVERY 4 HOURS PRN
Status: DISCONTINUED | OUTPATIENT
Start: 2021-02-02 | End: 2021-02-08 | Stop reason: HOSPADM

## 2021-02-02 RX ORDER — HYDROCODONE BITARTRATE AND ACETAMINOPHEN 7.5; 325 MG/1; MG/1
1 TABLET ORAL EVERY 6 HOURS PRN
Status: DISCONTINUED | OUTPATIENT
Start: 2021-02-02 | End: 2021-02-08 | Stop reason: HOSPADM

## 2021-02-02 RX ORDER — LATANOPROST 50 UG/ML
1 SOLUTION/ DROPS OPHTHALMIC NIGHTLY
Status: DISCONTINUED | OUTPATIENT
Start: 2021-02-02 | End: 2021-02-08 | Stop reason: HOSPADM

## 2021-02-02 RX ADMIN — LISINOPRIL 40 MG: 10 TABLET ORAL at 16:42

## 2021-02-02 RX ADMIN — SODIUM CHLORIDE, PRESERVATIVE FREE 10 ML: 5 INJECTION INTRAVENOUS at 16:43

## 2021-02-02 RX ADMIN — ACETAMINOPHEN 650 MG: 325 TABLET, FILM COATED ORAL at 19:29

## 2021-02-02 RX ADMIN — ONDANSETRON HYDROCHLORIDE 4 MG: 2 SOLUTION INTRAMUSCULAR; INTRAVENOUS at 11:46

## 2021-02-02 RX ADMIN — HYDRALAZINE HYDROCHLORIDE 20 MG: 20 INJECTION INTRAMUSCULAR; INTRAVENOUS at 09:56

## 2021-02-02 RX ADMIN — LATANOPROST 1 DROP: 50 SOLUTION OPHTHALMIC at 21:13

## 2021-02-02 RX ADMIN — SPIRONOLACTONE 25 MG: 25 TABLET ORAL at 16:42

## 2021-02-02 RX ADMIN — SODIUM CHLORIDE, PRESERVATIVE FREE 10 ML: 5 INJECTION INTRAVENOUS at 21:13

## 2021-02-02 RX ADMIN — CLOPIDOGREL 75 MG: 75 TABLET, FILM COATED ORAL at 16:41

## 2021-02-02 RX ADMIN — PERFLUTREN 8.48 MG: 6.52 INJECTION, SUSPENSION INTRAVENOUS at 16:06

## 2021-02-02 RX ADMIN — HYDROCODONE BITARTRATE AND ACETAMINOPHEN 1 TABLET: 7.5; 325 TABLET ORAL at 12:55

## 2021-02-02 RX ADMIN — IOPAMIDOL 100 ML: 755 INJECTION, SOLUTION INTRAVENOUS at 11:39

## 2021-02-02 RX ADMIN — ATORVASTATIN CALCIUM 80 MG: 10 TABLET, FILM COATED ORAL at 21:13

## 2021-02-02 RX ADMIN — LABETALOL HYDROCHLORIDE 20 MG: 5 INJECTION, SOLUTION INTRAVENOUS at 08:39

## 2021-02-02 RX ADMIN — METOPROLOL SUCCINATE 50 MG: 50 TABLET, FILM COATED, EXTENDED RELEASE ORAL at 16:41

## 2021-02-02 RX ADMIN — ONDANSETRON HYDROCHLORIDE 4 MG: 2 SOLUTION INTRAMUSCULAR; INTRAVENOUS at 15:26

## 2021-02-02 RX ADMIN — LABETALOL HYDROCHLORIDE 20 MG: 5 INJECTION, SOLUTION INTRAVENOUS at 11:33
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
## 2021-02-02 NOTE — PAYOR COMM NOTE
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       Appearance: He is well-developed.   HENT:      Head: Normocephalic and atraumatic.      Right Ear: External ear normal.      Left Ear: External ear normal.      Nose: Nose normal.   Eyes:      Conjunctiva/sclera: Conjunctivae normal.      Pupils: Pupils are equal, round, and reactive to light.   Neck:      Musculoskeletal: Normal range of motion and neck supple.      Trachea: No tracheal deviation.   Cardiovascular:      Rate and Rhythm: Normal rate and regular rhythm.      Heart sounds: Normal heart sounds. No murmur. No friction rub. No gallop.    Pulmonary:      Effort: Pulmonary effort is normal. No respiratory distress.      Breath sounds: Normal breath sounds. No wheezing or rales.   Chest:      Chest wall: No tenderness.   Abdominal:      General: Bowel sounds are normal. There is no distension.      Palpations: Abdomen is soft. There is no mass.      Tenderness: There is no abdominal tenderness. There is no guarding or rebound.   Musculoskeletal: Normal range of motion.         General: No tenderness or deformity.   Skin:      "General: Skin is warm and dry.      Coloration: Skin is not pale.      Findings: No erythema or rash.   Neurological:      Mental Status: He is alert and oriented to person, place, and time.      Sensory: No sensory deficit.      Motor: Weakness present.      Coordination: Coordination normal. Finger-Nose-Finger Test and Heel to Shin Test normal.      Gait: Gait abnormal.      Deep Tendon Reflexes: Reflexes are normal and symmetric.      Comments: Cranial nerve evaluation:  Mild aphasia.  PERRLA. Normal visual fields. Normal smooth eye movement.   No facial assymetry.  Tongue is midline with normal gag reflex.   Symmetrical/normal sternocleidomastoid movement.   No pronator drift.  No sensory deficits.  No motor deficits.   No ataxia.    Psychiatric:         Behavior: Behavior normal.         Thought Content: Thought content normal.         Judgment: Judgment normal.         Procedures        CT Head Without Contrast   Final Result   1. Mild cerebral volume loss with chronic small vessel ischemia. 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 No acute intracranial   abnormality identified.   This report was finalized on 02/02/2021 09:46 by Dr. Mae Jarquin MD.      MRI Brain Without Contrast    (Results Pending)       ED Course  ED Course as of Feb 02 1017   Tue Feb 02, 2021   1010 Patient has been educated test results up to this point.  CT head without contrast reads as \"mild cerebral volume loss with chronic small vessel ischemia.  Old lacunar infarct adjacent to the left caudate nucleus.  No acute intracranial abnormality identified.\"  Read by Dr. Jarquin.Patient blood pressure initially was 200/120 upon arrival.  Labetalol 20 mg IV had been ordered followed by hydralazine 20 mg IV.  His blood pressure bounces up and down.  It went down to 180 up to 200 back down to 170/100.  Cardene has been ordered and held at this time.  I do have a call out to hospitalist for further admission.    [TK]   1016 I " did speak with Dr. Siu, hospitalist on-call.  He is gracious to admit the patient under his services.    [TK]      ED Course User Index  [TK] Terence Payton PA MDM    Final diagnoses:   Hypertensive urgency   Stroke-like symptoms            Terence Payton PA  02/02/21 1017      Electronically signed by Mechelle Borden MD at 02/02/21 1207       Vital Signs (last day)     Date/Time   Temp   Temp src   Pulse   Resp   BP   Patient Position   SpO2    02/02/21 1300   98.3 (36.8)   Oral   75   20   (!) 161/104   Sitting   99    02/02/21 1226   --   --   71   --   --   --   93    02/02/21 1200   --   --   76   --   160/98   --   94    02/02/21 1153   --   --   --   --   178/96   --   96    02/02/21 1130   --   --   62   --   (!) 189/107   --   97    02/02/21 1129   --   --   64   --   --   --   97    02/02/21 1118   --   --   --   --   (!) 175/109   --   98    02/02/21 1014   --   --   --   --   166/90   --   97    02/02/21 1013   --   --   --   --   --   --   96    02/02/21 1012   --   --   --   --   --   --   97    02/02/21 1011   --   --   --   --   --   --   97    02/02/21 1010   --   --   --   --   --   --   95    02/02/21 1009   --   --   --   --   150/83   --   99    02/02/21 1007   --   --   --   --   --   --   99    02/02/21 1001   --   --   61   --   --   --   95    02/02/21 1000   --   --   64   --   (!) 174/102   --   98    02/02/21 0958   --   --   54   --   (!) 191/101   --   95    02/02/21 0957   --   --   57   --   --   --   95    02/02/21 0956   --   --   57   --   (!) 185/100   --   94    02/02/21 0945   --   --   61   --   (!) 204/104   --   97    02/02/21 0940   --   --   57   --   --   --   98    02/02/21 0935   --   --   82   --   (!) 203/116   --   97    02/02/21 0839   --   --   60   --   (!) 187/123   --   --    02/02/21 0815   97.7 (36.5)   Oral   61   16   --   --   98                Facility-Administered Medications as of  2/2/2021   Medication Dose Route Frequency Provider Last Rate Last Admin   • acetaminophen (TYLENOL) tablet 650 mg  650 mg Oral Q4H PRN Virgil Siu DO       • aluminum-magnesium hydroxide-simethicone (MAALOX MAX) 400-400-40 MG/5ML suspension 7.5 mL  7.5 mL Oral Q4H PRN Virgil Siu DO       • [START ON 2/3/2021] amLODIPine (NORVASC) tablet 10 mg  10 mg Oral Q24H Virgil Siu DO       • [START ON 2/3/2021] aspirin chewable tablet 81 mg  81 mg Oral Daily Virgil Siu DO        Or   • [START ON 2/3/2021] aspirin suppository 300 mg  300 mg Rectal Daily Virgil Siu DO       • atorvastatin (LIPITOR) tablet 80 mg  80 mg Oral Nightly Virgil Siu DO       • bisacodyl (DULCOLAX) suppository 10 mg  10 mg Rectal Daily PRN Virgil Siu DO       • [START ON 2/3/2021] cholecalciferol (VITAMIN D3) tablet 400 Units  400 Units Oral Daily Virgil Siu DO       • clopidogrel (PLAVIX) tablet 75 mg  75 mg Oral Daily Virgil Siu DO       • docusate sodium (COLACE) capsule 100 mg  100 mg Oral BID PRN Virgil Siu DO       • [COMPLETED] hydrALAZINE (APRESOLINE) injection 20 mg  20 mg Intravenous Once Chelsey PaytonHONEY Tatum   20 mg at 02/02/21 0956   • HYDROcodone-acetaminophen (NORCO) 7.5-325 MG per tablet 1 tablet  1 tablet Oral Q6H PRN Virgil Siu DO   1 tablet at 02/02/21 1255   • [COMPLETED] iopamidol (ISOVUE-370) 76 % injection 100 mL  100 mL Intravenous Once in imaging Virgil Siu DO   100 mL at 02/02/21 1139   • [COMPLETED] labetalol (NORMODYNE,TRANDATE) injection 20 mg  20 mg Intravenous Once King biancaValarie Ace, PA   20 mg at 02/02/21 0839   • labetalol (NORMODYNE,TRANDATE) injection 20 mg  20 mg Intravenous Q6H PRN Virgil Siu DO   20 mg at 02/02/21 1133   • latanoprost (XALATAN) 0.005 % ophthalmic solution 1 drop  1 drop Both Eyes Nightly Virgil Siu DO       • lisinopril (PRINIVIL,ZESTRIL) tablet 40  mg  40 mg Oral Q24H Virgil Siu DO       • metoprolol succinate XL (TOPROL-XL) 24 hr tablet 50 mg  50 mg Oral Daily Virgil Siu DO       • ondansetron (ZOFRAN) injection 4 mg  4 mg Intravenous Q6H PRN Virgil Siu DO       • [COMPLETED] ondansetron (ZOFRAN) injection 4 mg  4 mg Intravenous Once Terence Payton PA   4 mg at 02/02/21 1146   • sodium chloride 0.9 % flush 10 mL  10 mL Intravenous PRN Virgil Siu DO       • sodium chloride 0.9 % flush 10 mL  10 mL Intravenous Q12H Virgil Siu DO       • sodium chloride 0.9 % flush 10 mL  10 mL Intravenous PRN Virgil Siu DO       • spironolactone (ALDACTONE) tablet 25 mg  25 mg Oral Daily Virgil Siu DO       • [START ON 2/3/2021] triamterene-hydrochlorothiazide (MAXZIDE) 75-50 MG per tablet 1 tablet  1 tablet Oral Daily Virgil Siu DO       • [START ON 2/3/2021] vitamin B-12 (CYANOCOBALAMIN) tablet 500 mcg  500 mcg Oral Daily Virgil Siu DO           Lab Results (last 24 hours)     Procedure Component Value Units Date/Time    P2Y12 Platelet Inhibition [[id number]]  (Normal) Collected: 02/02/21 1338    Specimen: Blood Updated: 02/02/21 1358     Hematocrit 47.3 %      Platelets 241 10*3/mm3      P2Y12 Reactivity Unit 198 PRU     Narrative:      PRU reference range 194-418 is for patients not receiving P2Y12 drug. Post Drug results: Lower PRU levels are associated with expected antiplatelet effect. Values may be below the stated reference range above. The post-drug PRU values reported are .          Lipid Panel [[id number]] Collected: 02/02/21 0859    Specimen: Blood from Arm, Right Updated: 02/02/21 1330     Total Cholesterol 137 mg/dL      Triglycerides 63 mg/dL      HDL Cholesterol 49 mg/dL      LDL Cholesterol  75 mg/dL      VLDL Cholesterol 13 mg/dL      LDL/HDL Ratio 1.54    Narrative:      Cholesterol Reference Ranges  (U.S. Department of Health and Human Services ATP III  Classifications)    Desirable          <200 mg/dL  Borderline High    200-239 mg/dL  High Risk          >240 mg/dL      Triglyceride Reference Ranges  (U.S. Department of Health and Human Services ATP III Classifications)    Normal           <150 mg/dL  Borderline High  150-199 mg/dL  High             200-499 mg/dL  Very High        >500 mg/dL    HDL Reference Ranges  (U.S. Department of Health and Human Services ATP III Classifcations)    Low     <40 mg/dl (major risk factor for CHD)  High    >60 mg/dl ('negative' risk factor for CHD)        LDL Reference Ranges  (U.S. Department of Health and Human Services ATP III Classifcations)    Optimal          <100 mg/dL  Near Optimal     100-129 mg/dL  Borderline High  130-159 mg/dL  High             160-189 mg/dL  Very High        >189 mg/dL    Hemoglobin A1c [[id number]]  (Normal) Collected: 02/02/21 0859    Specimen: Blood from Arm, Right Updated: 02/02/21 1330     Hemoglobin A1C 5.60 %     Narrative:      Hemoglobin A1C Ranges:    Increased Risk for Diabetes  5.7% to 6.4%  Diabetes                     >= 6.5%  Diabetic Goal                < 7.0%    Urinalysis With Culture If Indicated - Urine, Clean Catch [[id number]]  (Abnormal) Collected: 02/02/21 0911    Specimen: Urine, Clean Catch Updated: 02/02/21 1021     Color, UA [place]     Appearance, UA Clear     pH, UA 7.0     Specific Gravity, UA <=1.005     Glucose, UA Negative     Ketones, UA Negative     Bilirubin, UA Negative     Blood, UA Negative     Protein, UA Negative     Leuk Esterase, UA Negative     Nitrite, UA Negative     Urobilinogen, UA 0.2 E.U./dL    Narrative:      Dipstick results may be inaccurate due to color interference.    Protime-INR [[id number]]  (Normal) Collected: 02/02/21 0859    Specimen: Blood from Arm, Right Updated: 02/02/21 0941     Protime 12.4 Seconds      INR 0.96    aPTT [[id number]]  (Abnormal) Collected: 02/02/21 0859    Specimen: Blood from Arm, Right Updated: 02/02/21 0938     PTT 37.2  seconds     COVID-19, [facility] IN-HOUSE,NASAL Swab (NO TRANSPORT MEDIA) 2 HR TAT - Swab, Nasopharynx [[id number]]  (Normal) Collected: 02/02/21 0852    Specimen: Swab from Nasopharynx Updated: 02/02/21 0938     COVID19 Presumptive Negative    Narrative:      Fact sheet for providers: https://www.fda.gov/media/634226/download     Fact sheet for patients: https://www.fda.gov/media/234593/download    Test performed by PCR.  If inconsistent with clinical signs and symptoms patient should be tested with different authorized molecular test.    Comprehensive Metabolic Panel [[id number]]  (Abnormal) Collected: 02/02/21 0859    Specimen: Blood from Arm, Right Updated: 02/02/21 0934     Glucose 130 mg/dL      BUN 17 mg/dL      Creatinine 1.17 mg/dL      Sodium 140 mmol/L      Potassium 4.0 mmol/L      Chloride 103 mmol/L      CO2 29.0 mmol/L      Calcium 9.7 mg/dL      Total Protein 7.8 g/dL      Albumin 4.50 g/dL      ALT (SGPT) 12 U/L      AST (SGOT) 17 U/L      Alkaline Phosphatase 72 U/L      Total Bilirubin 0.3 mg/dL      eGFR  African Amer 76 mL/min/1.73      Globulin 3.3 gm/dL      A/G Ratio 1.4 g/dL      BUN/Creatinine Ratio 14.5     Anion Gap 8.0 mmol/L     Narrative:      GFR Normal >60  Chronic Kidney Disease <60  Kidney Failure <15      Troponin [[id number]]  (Normal) Collected: 02/02/21 0859    Specimen: Blood from Arm, Right Updated: 02/02/21 0930     Troponin T <0.010 ng/mL     Narrative:      Troponin T Reference Range:  <= 0.03 ng/mL-   Negative for AMI  >0.03 ng/mL-     Abnormal for myocardial necrosis.  Clinicians would have to utilize clinical acumen, EKG, Troponin and serial changes to determine if it is an Acute Myocardial Infarction or myocardial injury due to an underlying chronic condition.       Results may be falsely decreased if patient taking Biotin.      CBC & Differential [[id number]]  (Abnormal) Collected: 02/02/21 0859    Specimen: Blood from Arm, Right Updated: 02/02/21 0911    Narrative:      The  following orders were created for panel order CBC & Differential.  Procedure                               Abnormality         Status                     ---------                               -----------         ------                     CBC Auto Differential[[id number]]        Abnormal            Final result                 Please view results for these tests on the individual orders.    CBC Auto Differential [[id number]]  (Abnormal) Collected: 02/02/21 0859    Specimen: Blood from Arm, Right Updated: 02/02/21 0911     WBC 7.49 10*3/mm3      RBC 5.55 10*6/mm3      Hemoglobin 14.5 g/dL      Hematocrit 44.5 %      MCV 80.2 fL      MCH 26.1 pg      MCHC 32.6 g/dL      RDW 15.5 %      RDW-SD 45.2 fl      MPV 11.0 fL      Platelets 221 10*3/mm3      Neutrophil % 67.9 %      Lymphocyte % 23.6 %      Monocyte % 6.5 %      Eosinophil % 1.1 %      Basophil % 0.5 %      Immature Grans % 0.4 %      Neutrophils, Absolute 5.08 10*3/mm3      Lymphocytes, Absolute 1.77 10*3/mm3      Monocytes, Absolute 0.49 10*3/mm3      Eosinophils, Absolute 0.08 10*3/mm3      Basophils, Absolute 0.04 10*3/mm3      Immature Grans, Absolute 0.03 10*3/mm3      nRBC 0.0 /100 WBC     POC Glucose Once [[id number]]  (Abnormal) Collected: 02/02/21 0821    Specimen: Blood Updated: 02/02/21 0842     Glucose 133 mg/dL      Comment: : [id number] fotobabble) TracyMeter ID: [id number]       POC Glucose Once [[id number]]  (Normal) Collected: 02/02/21 0820    Specimen: Blood Updated: 02/02/21 0841     Glucose 126 mg/dL      Comment: : [id number] fotobabble) TracyMeter ID: [id number]           Imaging Results (Last 24 Hours)     Procedure Component Value Units Date/Time    CT Angiogram Head [[id number]] Collected: 02/02/21 1205     Updated: 02/02/21 1212    Narrative:      CT ANGIOGRAM HEAD- 2/2/2021 11:37 AM CST     HISTORY: Stroke, follow up; I16.0-Hypertensive urgency;  R29.90-Unspecified symptoms and signs involving the nervous system,  right  pontine infarct     COMPARISON: None     DOSE LENGTH PRODUCT: 202 mGy cm. Automated exposure control was also  utilized to decrease patient radiation dose.     TECHNIQUE: Axial images of the head are obtained following IV contrast.  2-D and maximal intensity projection images reconstructed and reviewed.     FINDINGS:  Mild dominance of the right vertebral artery. Distal  vertebral arteries are patent. Basilar artery is patent with no focal  high-grade stenosis or occlusion. Atherosclerotic changes are noted  within the slightly smaller caliber right posterior cerebral artery.  Superior and inferior cerebellar arteries appear normal in caliber.  The  distal internal carotid arteries are patent. Mild hypoplasia A1 segment  of the right anterior cerebral artery. Left anterior bilateral middle  cerebral arteries appear patent. No aneurysm or dissection. [place] appears intact.          Impression:      1. No large vessel occlusion, aneurysm, or dissection. Atherosclerotic  changes suspected within the right posterior cerebral artery.  2. No abnormal intracranial enhancement identified.  This report was finalized on 02/02/2021 12:09 by Dr. Mae Jarquin MD.    CT Angiogram Neck [[id number]] Collected: 02/02/21 1201     Updated: 02/02/21 1208    Narrative:      CT ANGIOGRAM NECK- 2/2/2021 11:37 AM CST     HISTORY: Stroke, follow up; I16.0-Hypertensive urgency;  R29.90-Unspecified symptoms and signs involving the nervous system,  right pontine infarct     COMPARISON: None     DOSE LENGTH PRODUCT: 202 mGy cm. Automated exposure control was also  utilized to decrease patient radiation dose.     TECHNIQUE: Axial images the neck are obtained following IV contrast. 2-D  and maximal intensity projection images reconstructed and reviewed.     FINDINGS:  Mild calcified plaque within the arch of the aorta. [place]  variant to the arch of aorta with a common origin right brachiocephalic  and left common carotid arteries  from the arch. No subclavian artery  stenosis identified. Right brachiocephalic artery appears patent. Mild  tortuosity of right common carotid artery. Bilateral common, internal,  and external carotid arteries appear widely patent. The vertebral  arteries originate from the subclavian arteries as expected. Mild  dominance of the right vertebral artery. Proximal basilar artery is  patent.     No aneurysm or dissection.     No pathologic lymphadenopathy or soft tissue mass seen within the neck.  Laryngeal structures are symmetrical. No thyroid nodule.     Nonunion of posterior arch of C1 is a normal variant. Moderate to  advanced degenerative change of the cervical spine with bony fusion  across the C4/C5 disc space.     Emphysematous changes within the visible lung apices.       Impression:      1. Exam interpreted with NASCET criteria   2. No extracranial carotid or vertebral artery stenosis. No aneurysm or  dissection.  3. Moderate to advanced degenerative change of the cervical spine as  described above.  4. Emphysema.  This report was finalized on 02/02/2021 12:05 by Dr. Mae Jarquin MD.    MRI Brain Without Contrast [[id number]] Collected: 02/02/21 1127     Updated: 02/02/21 1137    Narrative:      EXAMINATION:  MRI BRAIN WO CONTRAST-  2/2/2021 10:45 AM CST     HISTORY: gait instability; speech changes; I16.0-Hypertensive urgency;  R29.90-Unspecified symptoms and signs involving the nervous system      COMPARISON: 2/2/2021 head CT     TECHNIQUE: Multiplanar MR imaging the brain was performed.     FINDINGS:      There is restricted diffusion signal abnormality identified in the david,  to the right of midline. The defect measures approximately 14 mm in  greatest dimension, compatible with area of acute/subacute ischemic  change/infarction. Correlate with clinical findings.     There is corresponding FLAIR signal hyperintensities.     There are no additional areas of acute ischemic change/infarction.     There  are punctate and confluent periventricular and subcortical FLAIR  signal hyperintensities compatible with moderate chronic ischemic  changes.     There is old lacunar type left basal ganglia/thalamic infarct.     There is diffuse central and cortical atrophy.     There is no mass effect or midline shift.     There is no hydrocephalus.     There is no abnormal intra-axial or extra-axial blood products.     No definite flow void abnormalities observed.     The pituitary gland is not enlarged.     The globes and intraorbital structures are imaged symmetrically.             Impression:      1. Acute/subacute right sided pontine infarct.  2. Atrophy and moderate chronic ischemic changes.  This report was finalized on 02/02/2021 11:34 by Dr. Tyson Cabrera MD.    CT Head Without Contrast [[id number]] Collected: 02/02/21 0945     Updated: 02/02/21 0949    Narrative:      CT HEAD WO CONTRAST- 2/2/2021 9:25 AM CST     HISTORY: dizziness      COMPARISON: None     DOSE LENGTH PRODUCT: 786 mGy cm. Automated exposure control was also  utilized to decrease patient radiation dose.     TECHNIQUE: Axial images the brain obtained without contrast     FINDINGS:  There is mild cerebral and cerebellar volume loss. Old  lacunar infarct adjacent the left caudate nucleus. Periventricular  hypodensities favoring chronic small vessel ischemia. No intracranial  hemorrhage or mass effect. No convincing acute signs of ischemia. No  extra-axial hematoma or subarachnoid hemorrhage.     Visible paranasal sinuses and mastoid air cells are well-aerated. The  bony calvarium appears intact.       Impression:      1. Mild cerebral volume loss with chronic small vessel ischemia. Old  lacunar infarct adjacent the left caudate nucleus. No acute intracranial  abnormality identified.  This report was finalized on 02/02/2021 09:46 by Dr. Mae Jarquin MD.        ECG/EMG Results (last 24 hours)     Procedure Component Value Units Date/Time    ECG 12 Lead  [[id number]] Collected: 21 0842     Updated: 21 0844          Orders (last 24 hrs)      Start     Ordered    21 0900  amLODIPine (NORVASC) tablet 10 mg  Every 24 Hours Scheduled      21 13121 0900  triamterene-hydrochlorothiazide (MAXZIDE) 75-50 MG per tablet 1 tablet  Daily      21 1311    21 0900  vitamin B-12 (CYANOCOBALAMIN) tablet 500 mcg  Daily      21 1311    21 0900  cholecalciferol (VITAMIN D3) tablet 400 Units  Daily      21 13121 0900  aspirin chewable tablet 81 mg  Daily      21 1310    21 0900  aspirin suppository 300 mg  Daily      21 13121  latanoprost (XALATAN) 0.005 % ophthalmic solution 1 drop  Nightly      21 13121 2100  atorvastatin (LIPITOR) tablet 80 mg  Nightly      21 13121 2100  atorvastatin (LIPITOR) tablet 10 mg  Nightly,   Status:  Discontinued      21 1123    21 1800  POC Glucose Q6H  Every 6 Hours     Comments: May Discontinue After 2 Consecutive Readings Less Than 140Notify Provider if 2 Readings Greater Than 140      21 1311    21 1600  Vital Signs  Every 4 Hours      21 1310    21 1600  Intake and Output  Every 4 Hours      21 1311    21 1509  Inpatient Admission  Once      21 1508    21 1455  OT Plan of Care Cert / Re-Cert  Once     Comments: Occupational Therapy Plan of Care  Initial Certification  Certification Period: 2021 - 5/3/2021    Patient Name: Dao Jhaveri  : 1955    (I16.0) Hypertensive urgency  (primary encounter diagnosis)  (R29.90) Stroke-like symptoms  (Z74.09,  Z78.9) Impaired mobility and ADLs                Assessment           Rehab Goal Summary     Row Name 21 1351             Transfer Goal 1 (OT)    Activity/Assistive Device (Transfer Goal 1, OT)  toilet;tub  -JJ (r) EM   (t) LYN (c)      [place] Level/Cues Needed (Transfer Goal 1, OT)   modified   independence  -JJ (r) EM (t) JJ (c)      Time Frame (Transfer Goal 1, OT)  long term goal (LTG);by discharge  -JJ   (r) EM (t) JJ (c)      Progress/Outcome (Transfer Goal 1, OT)  goal ongoing  -JJ (r) EM (t) JJ   (c)         Bathing Goal 1 (OT)    Activity/Device (Bathing Goal 1, OT)  bathing skills, all;grab bar,   tub/shower  -JJ (r) EM (t) JJ (c)      [place] Level/Cues Needed (Bathing Goal 1, OT)  modified   independence  -JJ (r) EM (t) JJ (c)      Time Frame (Bathing Goal 1, OT)  long term goal (LTG);by discharge  -JJ   (r) EM (t) JJ (c)      Strategies/Barriers (Bathing Goal 1, OT)  While standing  -JJ (r) EM (t)   JJ (c)      Progress/Outcomes (Bathing Goal 1, OT)  goal ongoing  -JJ (r) EM (t) JJ   (c)         Grooming Goal 1 (OT)    Activity/Device (Grooming Goal 1, OT)  hair care;oral care;wash face,   hands;shave face  -JJ (r) EM (t) JJ (c)      [place] (Grooming Goal 1, OT)  modified independence  -JJ (r) EM (t)   JJ (c)      Time Frame (Grooming Goal 1, OT)  long term goal (LTG);by discharge  -JJ   (r) EM (t) JJ (c)      Strategies/Barriers (Grooming Goal 1, OT)  While standing sink side  -JJ   (r) EM (t) JJ (c)      Progress/Outcome (Grooming Goal 1, OT)  goal ongoing  -JJ (r) EM (t) JJ   (c)        User Key  (r) = Recorded By, (t) = Taken By, (c) = Cosigned By    Initials Name Provider Type Discipline    Mae Smith, OTR/L Occupational Therapist OT    Aaliyah Mcguire, OT Student OT Student THERAPIES        OT Goals     Row Name 02/02/21 1354          Time Calculation    OT Goal Re-Cert Due Date  02/12/21  -JJ (r) EM (t) JJ (c)       User Key  (r) = Recorded By, (t) = Taken By, (c) = Cosigned By    Initials Name Provider Type    Mae Smith, OTR/L Occupational Therapist    Aaliyah Mcguire, OT Student OT Student          Plan           BIJAL Day  2/2/2021        By cosigning this order, either electronically or physically, I certify that the  therapy services are furnished while this patient is under my care, the services outline above are required by this patient, and will be reviewed every 90 days.

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
## 2021-02-03 PROBLEM — Z91.199 HISTORY OF NONCOMPLIANCE WITH MEDICAL TREATMENT: Status: ACTIVE | Noted: 2021-02-03

## 2021-02-03 PROBLEM — Z86.73 HISTORY OF STROKE: Status: ACTIVE | Noted: 2021-02-03

## 2021-02-03 LAB
BH CV ECHO MEAS - AO MAX PG (FULL): 1.9 MMHG
BH CV ECHO MEAS - AO MAX PG: 8 MMHG
BH CV ECHO MEAS - AO MEAN PG (FULL): 1 MMHG
BH CV ECHO MEAS - AO MEAN PG: 5 MMHG
BH CV ECHO MEAS - AO ROOT AREA (BSA CORRECTED): 1.9
BH CV ECHO MEAS - AO ROOT AREA: 10.2 CM^2
BH CV ECHO MEAS - AO ROOT DIAM: 3.6 CM
BH CV ECHO MEAS - AO V2 MAX: 141 CM/SEC
BH CV ECHO MEAS - AO V2 MEAN: 103 CM/SEC
BH CV ECHO MEAS - AO V2 VTI: 30 CM
BH CV ECHO MEAS - AVA(I,A): 3 CM^2
BH CV ECHO MEAS - AVA(I,D): 3 CM^2
BH CV ECHO MEAS - AVA(V,A): 3 CM^2
BH CV ECHO MEAS - AVA(V,D): 3 CM^2
BH CV ECHO MEAS - BSA(HAYCOCK): 1.9 M^2
BH CV ECHO MEAS - BSA: 1.9 M^2
BH CV ECHO MEAS - BZI_BMI: 29 KILOGRAMS/M^2
BH CV ECHO MEAS - BZI_METRIC_HEIGHT: 165.1 CM
BH CV ECHO MEAS - BZI_METRIC_WEIGHT: 78.9 KG
BH CV ECHO MEAS - EDV(CUBED): 54.9 ML
BH CV ECHO MEAS - EDV(MOD-SP4): 129 ML
BH CV ECHO MEAS - EDV(TEICH): 62 ML
BH CV ECHO MEAS - EF(CUBED): 76.3 %
BH CV ECHO MEAS - EF(MOD-SP4): 72.9 %
BH CV ECHO MEAS - EF(TEICH): 69.1 %
BH CV ECHO MEAS - ESV(CUBED): 13 ML
BH CV ECHO MEAS - ESV(MOD-SP4): 34.9 ML
BH CV ECHO MEAS - ESV(TEICH): 19.1 ML
BH CV ECHO MEAS - FS: 38.2 %
BH CV ECHO MEAS - IVS/LVPW: 0.98
BH CV ECHO MEAS - IVSD: 1.9 CM
BH CV ECHO MEAS - LA DIMENSION: 3.5 CM
BH CV ECHO MEAS - LA/AO: 0.97
BH CV ECHO MEAS - LAT PEAK E' VEL: 6.9 CM/SEC
BH CV ECHO MEAS - LV DIASTOLIC VOL/BSA (35-75): 69.2 ML/M^2
BH CV ECHO MEAS - LV MASS(C)D: 337.7 GRAMS
BH CV ECHO MEAS - LV MASS(C)DI: 181.2 GRAMS/M^2
BH CV ECHO MEAS - LV MAX PG: 6.1 MMHG
BH CV ECHO MEAS - LV MEAN PG: 4 MMHG
BH CV ECHO MEAS - LV SYSTOLIC VOL/BSA (12-30): 18.7 ML/M^2
BH CV ECHO MEAS - LV V1 MAX: 123 CM/SEC
BH CV ECHO MEAS - LV V1 MEAN: 94.5 CM/SEC
BH CV ECHO MEAS - LV V1 VTI: 25.9 CM
BH CV ECHO MEAS - LVIDD: 3.8 CM
BH CV ECHO MEAS - LVIDS: 2.4 CM
BH CV ECHO MEAS - LVLD AP4: 8.4 CM
BH CV ECHO MEAS - LVLS AP4: 6.3 CM
BH CV ECHO MEAS - LVOT AREA (M): 3.5 CM^2
BH CV ECHO MEAS - LVOT AREA: 3.5 CM^2
BH CV ECHO MEAS - LVOT DIAM: 2.1 CM
BH CV ECHO MEAS - LVPWD: 2 CM
BH CV ECHO MEAS - MED PEAK E' VEL: 3.6 CM/SEC
BH CV ECHO MEAS - MV A MAX VEL: 83.2 CM/SEC
BH CV ECHO MEAS - MV DEC SLOPE: 96.5 CM/SEC^2
BH CV ECHO MEAS - MV DEC TIME: 0.43 SEC
BH CV ECHO MEAS - MV E MAX VEL: 41.4 CM/SEC
BH CV ECHO MEAS - MV E/A: 0.5
BH CV ECHO MEAS - RAP SYSTOLE: 10 MMHG
BH CV ECHO MEAS - RVSP: 35.4 MMHG
BH CV ECHO MEAS - SI(AO): 163.8 ML/M^2
BH CV ECHO MEAS - SI(CUBED): 22.5 ML/M^2
BH CV ECHO MEAS - SI(LVOT): 48.1 ML/M^2
BH CV ECHO MEAS - SI(MOD-SP4): 50.5 ML/M^2
BH CV ECHO MEAS - SI(TEICH): 23 ML/M^2
BH CV ECHO MEAS - SV(AO): 305.4 ML
BH CV ECHO MEAS - SV(CUBED): 41.9 ML
BH CV ECHO MEAS - SV(LVOT): 89.7 ML
BH CV ECHO MEAS - SV(MOD-SP4): 94.1 ML
BH CV ECHO MEAS - SV(TEICH): 42.8 ML
BH CV ECHO MEAS - TR MAX VEL: 252 CM/SEC
BH CV ECHO MEASUREMENTS AVERAGE E/E' RATIO: 7.89
GLUCOSE BLDC GLUCOMTR-MCNC: 101 MG/DL (ref 70–130)
GLUCOSE BLDC GLUCOMTR-MCNC: 110 MG/DL (ref 70–130)
GLUCOSE BLDC GLUCOMTR-MCNC: 121 MG/DL (ref 70–130)
LEFT ATRIUM VOLUME INDEX: 23 ML/M2
LEFT ATRIUM VOLUME: 42.7 CM3
MAXIMAL PREDICTED HEART RATE: 155 BPM
QT INTERVAL: 422 MS
QTC INTERVAL: 422 MS
STRESS TARGET HR: 132 BPM
VIT B12 BLD-MCNC: 452 PG/ML (ref 211–946)

## 2021-02-03 PROCEDURE — 25010000002 ONDANSETRON PER 1 MG: Performed by: FAMILY MEDICINE

## 2021-02-03 PROCEDURE — 97530 THERAPEUTIC ACTIVITIES: CPT

## 2021-02-03 PROCEDURE — 82962 GLUCOSE BLOOD TEST: CPT

## 2021-02-03 PROCEDURE — 97162 PT EVAL MOD COMPLEX 30 MIN: CPT | Performed by: PHYSICAL THERAPIST

## 2021-02-03 PROCEDURE — 92523 SPEECH SOUND LANG COMPREHEN: CPT

## 2021-02-03 PROCEDURE — 99232 SBSQ HOSP IP/OBS MODERATE 35: CPT | Performed by: CLINICAL NURSE SPECIALIST

## 2021-02-03 PROCEDURE — 82607 VITAMIN B-12: CPT | Performed by: CLINICAL NURSE SPECIALIST

## 2021-02-03 RX ORDER — HYDRALAZINE HYDROCHLORIDE 50 MG/1
50 TABLET, FILM COATED ORAL EVERY 8 HOURS SCHEDULED
Status: DISCONTINUED | OUTPATIENT
Start: 2021-02-03 | End: 2021-02-03

## 2021-02-03 RX ORDER — TERAZOSIN 5 MG/1
5 CAPSULE ORAL NIGHTLY
Status: DISCONTINUED | OUTPATIENT
Start: 2021-02-03 | End: 2021-02-04

## 2021-02-03 RX ADMIN — SODIUM CHLORIDE, PRESERVATIVE FREE 10 ML: 5 INJECTION INTRAVENOUS at 08:17

## 2021-02-03 RX ADMIN — ASPIRIN 81 MG: 81 TABLET, CHEWABLE ORAL at 08:11

## 2021-02-03 RX ADMIN — ACETAMINOPHEN 650 MG: 325 TABLET, FILM COATED ORAL at 08:16

## 2021-02-03 RX ADMIN — ACETAMINOPHEN 650 MG: 325 TABLET, FILM COATED ORAL at 00:50

## 2021-02-03 RX ADMIN — LATANOPROST 1 DROP: 50 SOLUTION OPHTHALMIC at 20:39

## 2021-02-03 RX ADMIN — TERAZOSIN HYDROCHLORIDE 5 MG: 5 CAPSULE ORAL at 20:39

## 2021-02-03 RX ADMIN — AMLODIPINE BESYLATE 10 MG: 10 TABLET ORAL at 08:11

## 2021-02-03 RX ADMIN — LISINOPRIL 40 MG: 10 TABLET ORAL at 08:10

## 2021-02-03 RX ADMIN — CLOPIDOGREL 75 MG: 75 TABLET, FILM COATED ORAL at 08:11

## 2021-02-03 RX ADMIN — SODIUM CHLORIDE, PRESERVATIVE FREE 10 ML: 5 INJECTION INTRAVENOUS at 20:41

## 2021-02-03 RX ADMIN — ONDANSETRON HYDROCHLORIDE 4 MG: 2 SOLUTION INTRAMUSCULAR; INTRAVENOUS at 20:48

## 2021-02-03 RX ADMIN — SPIRONOLACTONE 25 MG: 25 TABLET ORAL at 08:11

## 2021-02-03 RX ADMIN — TRIAMTERENE AND HYDROCHLOROTHIAZIDE 1 TABLET: 75; 50 TABLET ORAL at 08:11

## 2021-02-03 RX ADMIN — Medication 500 MCG: at 08:11

## 2021-02-03 RX ADMIN — CHOLECALCIFEROL TAB 10 MCG (400 UNIT) 400 UNITS: 10 TAB at 08:12

## 2021-02-03 RX ADMIN — HYDRALAZINE HYDROCHLORIDE 50 MG: 50 TABLET ORAL at 15:31

## 2021-02-03 RX ADMIN — ATORVASTATIN CALCIUM 80 MG: 10 TABLET, FILM COATED ORAL at 20:39

## 2021-02-03 RX ADMIN — METOPROLOL SUCCINATE 50 MG: 50 TABLET, FILM COATED, EXTENDED RELEASE ORAL at 08:11

## 2021-02-03 NOTE — PROGRESS NOTES
Discharge Planning Assessment  [facility]     Patient Name: Dao Jhaveri  MRN: [id number]  Today's Date: 2/3/2021    Admit Date: 2/2/2021    Discharge Needs Assessment     Row Name 02/03/21 1450       Living Environment    Lives With  spouse    Name(s) of Who Lives With Patient  Fern    Current Living Arrangements  home/apartment/condo    Primary Care Provided by  self    Provides Primary Care For  no one    Family Caregiver if Needed  spouse    Quality of Family Relationships  helpful;involved    Able to Return to Prior Arrangements  other (see comments)    Living Arrangement Comments  Making referral to [facility] rehab per pt request       Resource/Environmental Concerns    Resource/Environmental Concerns  none    Transportation Concerns  car, none       Transition Planning    Patient/Family Anticipates Transition to  inpatient rehabilitation facility    Patient/Family Anticipated Services at Transition  rehabilitation services    Transportation Anticipated  family or friend will provide       Discharge Needs Assessment    Readmission Within the Last 30 Days  no previous admission in last 30 days    Equipment Currently Used at Home  none    Concerns to be Addressed  denies needs/concerns at this time    Anticipated Changes Related to Illness  none    Equipment Needed After Discharge  other (see comments) Rehab will provide DME    Outpatient/Agency/Support Group Needs  inpatient rehabilitation facility    Discharge Facility/Level of Care Needs  rehabilitation facility    Provided Post Acute Provider List?  Yes    Post Acute Provider List  Inpatient Rehab    Delivered To  Patient    Method of Delivery  Telephone    Discharge Coordination/Progress  Making referral to [facility] rehab per pt request. Await answer.        Discharge Plan    No documentation.       Continued Care and Services - Admitted Since 2/2/2021     Destination     Service Provider Request Status Selected Services Address Phone Fax Patient  Preferred    [facility]  Pending - No Request Sent N/A [address], [place] [address] [phone] --                Demographic Summary    No documentation.       Functional Status    No documentation.       Psychosocial    No documentation.       Abuse/Neglect    No documentation.       Legal    No documentation.       Substance Abuse    No documentation.       Patient Forms    No documentation.           Suzy Mancera, MSW

## 2021-02-03 NOTE — PLAN OF CARE
Goal Outcome Evaluation:  Plan of Care Reviewed With: patient  Progress: no change  Outcome Summary: PT eval is complete. Pt O&Ax4 and cooperative with PT. BUE and BLE MMT WNL. Functional strength decreased in LLE as patient demonstrated right side weight shift in upright stance. Dynamic standing balance deficit noted as patient was unable to accept light perturbations in all directions without moderate sway. More significant sway and LOB with perturbations in L to R direction. Pt ambulated with FWW for 75 feet and min A from PT. LOB x 3 during ambulation but pt was able to self correct and avoid LOB with min A. Balance deficit is main concern at this time. Pt is also impulsive in his decision making and lacks awareness of his balance deficit that places him at high fall risk at this time. Pt to benefit from skilled PT services at this time to work on balance, gait, and usage of assistive device. PT recommends discharge to inpatient rehab at this time.
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
## 2021-02-03 NOTE — PLAN OF CARE
Goal Outcome Evaluation:  Plan of Care Reviewed With: patient, spouse     Outcome Summary: Patient tolerating therapies and up in chair and up to BR with assistancex1 and walker. New BP meds given- continue to monitor, Telemetry showing NSR, states HA is better and getting better even after administration of Tylenol. Bed alarm on as patient is unsteady at times. NIH completed as ordered. Able to feed self and cooperating with staff. A+Ox4, COLLINS with left lower leg weakness

## 2021-02-03 NOTE — PLAN OF CARE
Goal Outcome Evaluation:    Patient up from ER today with c/o dizziness and headache, elevated BP. Emesis x2 today, c/o headache pain. Has been able to keep some food down tonight. Left sided weakness noted. A+Ox4, no c/o N/T at this time. Telem NSR- will continue to monitor and notify MD of any changs

## 2021-02-03 NOTE — PROGRESS NOTES
Neurology Progress Note      Chief Complaint:  F/u stroke    Subjective     Subjective:  Patient is sitting up in chair. He continues to have mild left lower facial weakness, dysarthria and weakness and ataxia of left upper and lower extremites. He was evaluated by therapies and they are recommending acute rehab. Patient is not enthusiastic about this but is agreeable. BP has improved but this AM systolic . He tells me he had run out of his BP medications had only been back to taking 3 days prior to onset of dizziness. He states his dizziness/lightheadedness is nearly resolved.       P2Y12- 198  LDL-75  A1C- 5.6    Transthoracic echo pending.    CTA head and neck no significant stenosis, specifically no stenosis bilateral posterior circulation. There is dominance of right vertebral artery and basilar artery is patent.     MRI brain showed right pontine stroke.       Medications:  Current Facility-Administered Medications   Medication Dose Route Frequency Provider Last Rate Last Admin   • acetaminophen (TYLENOL) tablet 650 mg  650 mg Oral Q4H PRN Virgil Siu DO   650 mg at 02/03/21 0816   • aluminum-magnesium hydroxide-simethicone (MAALOX MAX) 400-400-40 MG/5ML suspension 7.5 mL  7.5 mL Oral Q4H PRN Virgil Siu DO       • amLODIPine (NORVASC) tablet 10 mg  10 mg Oral Q24H Virgil Siu DO   10 mg at 02/03/21 0811   • aspirin chewable tablet 81 mg  81 mg Oral Daily Virgil Siu DO   81 mg at 02/03/21 0811    Or   • aspirin suppository 300 mg  300 mg Rectal Daily Virgil Siu DO       • atorvastatin (LIPITOR) tablet 80 mg  80 mg Oral Nightly Virgil Siu DO   80 mg at 02/02/21 2113   • bisacodyl (DULCOLAX) suppository 10 mg  10 mg Rectal Daily PRN Virgil Siu DO       • cholecalciferol (VITAMIN D3) tablet 400 Units  400 Units Oral Daily Virgil Siu DO   400 Units at 02/03/21 0812   • clopidogrel (PLAVIX) tablet 75 mg  75 mg Oral Daily Salbador  Virgil REDMOND DO   75 mg at 02/03/21 0811   • docusate sodium (COLACE) capsule 100 mg  100 mg Oral BID PRN Virgil Siu DO       • HYDROcodone-acetaminophen (NORCO) 7.5-325 MG per tablet 1 tablet  1 tablet Oral Q6H PRN Virgil Siu DO   1 tablet at 02/02/21 1255   • labetalol (NORMODYNE,TRANDATE) injection 20 mg  20 mg Intravenous Q6H PRN Virgil Siu DO   20 mg at 02/02/21 1133   • latanoprost (XALATAN) 0.005 % ophthalmic solution 1 drop  1 drop Both Eyes Nightly Virgil Siu DO   1 drop at 02/02/21 2113   • lisinopril (PRINIVIL,ZESTRIL) tablet 40 mg  40 mg Oral Q24H Virgil Siu DO   40 mg at 02/03/21 0810   • metoprolol succinate XL (TOPROL-XL) 24 hr tablet 50 mg  50 mg Oral Daily Virgil Siu DO   50 mg at 02/03/21 0811   • ondansetron (ZOFRAN) injection 4 mg  4 mg Intravenous Q6H PRN Virgil Siu DO   4 mg at 02/02/21 1526   • sodium chloride 0.9 % flush 10 mL  10 mL Intravenous PRN Virgil Siu DO       • sodium chloride 0.9 % flush 10 mL  10 mL Intravenous Q12H Virgil Siu DO   10 mL at 02/03/21 0817   • sodium chloride 0.9 % flush 10 mL  10 mL Intravenous PRN Virgil Siu DO       • spironolactone (ALDACTONE) tablet 25 mg  25 mg Oral Daily Virgil Siu DO   25 mg at 02/03/21 0811   • triamterene-hydrochlorothiazide (MAXZIDE) 75-50 MG per tablet 1 tablet  1 tablet Oral Daily Virgil Siu DO   1 tablet at 02/03/21 0811   • vitamin B-12 (CYANOCOBALAMIN) tablet 500 mcg  500 mcg Oral Daily Virgil Siu DO   500 mcg at 02/03/21 0811       Review of Systems:   -A 14 point review of systems is completed and is negative except for dysarthria and left sided weakness.       Objective      Vital Signs  Temp:  [97.6 °F (36.4 °C)-98.9 °F (37.2 °C)] 98.7 °F (37.1 °C)  Heart Rate:  [56-76] 56  Resp:  [18-20] 20  BP: (153-189)/() 183/96    Telemetry: SB -S 55-74      Physical Exam:  General Exam:  Head:  Normal  cephalic, atraumatic  HEENT:  Neck supple  Fundoscopic Exam:  No signs of disc edema  CVS:  Regular rate and rhythm.  No murmurs  Carotid Examination:  No bruits  Lungs:  Clear to auscultation  Abdomen:  Non-tender, Non-distended  Extremities:  No signs of peripheral edema  Skin:  No rashes     Neurologic Exam:     Mental Status:    -Awake, Alert, Oriented X 3  -No word finding difficulties  -No aphasia  -mild dysarthria  -Follows simple and complex commands     CN II:  Visual fields full.  Pupils equally reactive to light  CN III, IV, VI:  Extraocular Muscles full with no signs of nystagmus  CN V:  Facial sensory is symmetric with no asymmetries.  CN VII:  Facial motor asymmetric with left lower facial weakness  CN VIII:  Gross hearing intact bilaterally  CN IX:  Palate elevates symmetrically  CN X:  Palate elevates symmetrically  CN XI:  Shoulder shrug asymmetric and slightly decreased on left compared to right.  CN XII:  Tongue is midline on protrusion     Motor: (strength out of 5:  1= minimal movement, 2 = movement in plane of gravity, 3 = movement against gravity, 4 = movement against some resistance, 5 = full strength)     -Right Upper Ext: Proximal: 5 Distal: 5  -Left Upper Ext: Proximal: 5-   Distal: 5- with drift     -Right Lower Ext: Proximal: 5 Distal: 5  -Left Lower Ext: Proximal: 5-   Distal: 5-     DTR:  -Right              Bicep: 2+         Tricep: 2+        Brachoradialis: 2+              Patella: 2+       Ankle: 2+         Neg Babinski  -Left              Bicep: 2+         Tricep: 2+        Brachoradialis: 2+              Patella: 2+       Ankle: 2+         Neg Babinski     Sensory:  -Intact to light touch, pinprick, temperature, pain, and proprioception     Coordination:  -Finger to nose intact on right with ataxia on left  -Heel to shin intact on right and ataxia on left       Gait  -No signs of ataxia  -ambulates with assistance.          Results Review:    I reviewed the patient's new clinical  results.    Results from last 7 days   Lab Units 02/02/21  1338 02/02/21  0859   WBC 10*3/mm3  --  7.49   HEMOGLOBIN g/dL  --  14.5   HEMATOCRIT % 47.3 44.5   PLATELETS 10*3/mm3 241 221        Results from last 7 days   Lab Units 02/02/21  0859   SODIUM mmol/L 140   POTASSIUM mmol/L 4.0   CHLORIDE mmol/L 103   CO2 mmol/L 29.0   BUN mg/dL 17   CREATININE mg/dL 1.17   CALCIUM mg/dL 9.7   BILIRUBIN mg/dL 0.3   ALK PHOS U/L 72   ALT (SGPT) U/L 12   AST (SGOT) U/L 17   GLUCOSE mg/dL 130*        Lab Results   Component Value Date    PROTIME 12.4 02/02/2021    INR 0.96 02/02/2021     No components found for: POCGLUC  No components found for: A1C  Lab Results   Component Value Date    HDL 49 02/02/2021    LDL 75 02/02/2021     No components found for: B12  Lab Results   Component Value Date    TSH 2.410 03/02/2017     Ct Head Without Contrast    Result Date: 2/2/2021  1. Mild cerebral volume loss with chronic small vessel ischemia. Old lacunar infarct adjacent the left caudate nucleus. No acute intracranial abnormality identified. This report was finalized on 02/02/2021 09:46 by Dr. aMe Jarquin MD.    Ct Angiogram Neck    Result Date: 2/2/2021  1. Exam interpreted with NASCET criteria 2. No extracranial carotid or vertebral artery stenosis. No aneurysm or dissection. 3. Moderate to advanced degenerative change of the cervical spine as described above. 4. Emphysema. This report was finalized on 02/02/2021 12:05 by Dr. Mae Jarquin MD.    Mri Brain Without Contrast    Result Date: 2/2/2021  1. Acute/subacute right sided pontine infarct. 2. Atrophy and moderate chronic ischemic changes. This report was finalized on 02/02/2021 11:34 by Dr. Tyson Cabrera MD.    Ct Angiogram Head    Result Date: 2/2/2021  1. No large vessel occlusion, aneurysm, or dissection. Atherosclerotic changes suspected within the right posterior cerebral artery. 2. No abnormal intracranial enhancement identified. This report was finalized on  02/02/2021 12:09 by Dr. Mae Jarquin MD.          Assessment/Plan     Hospital Problem List      Right pontine CVA (CMS/HCC)    Essential hypertension    Mixed hyperlipidemia    Impression:  · right paramedian pontine infarct  · History of previous left MCA stroke  · Compliance with Plavix therapy  · Compliance with statin therapy  · Previous B12 deficiency  · History of hypertension  · History of hyperlipidemia. LDL 75  · Medication non adherence. Patient had been out of his BP medications and resumed 3 days prior to onset of dizziness and symptoms.     Plan:  1. Add ASA 81 mg to Plavix 75 mg daily. P2Y12 is borderline effective.  2. Lipitor 80 mg for LDL goal less than 70.  3. Systolic BP goal less than 160 today.  4. TTE pending  5. Therapies are recommending acute rehab and although patient is not enthusiastic about this he is agreeable.  6. Check B12  7. Counseled on medication adherence.   8. Will need follow up with neurology in 3-4 weeks.   9. NIHSS = 6      Wilda Arce, APRN  02/03/21  11:22 CST

## 2021-02-03 NOTE — NURSING NOTE
NIH and neuro check completed at bedside with Barbara RANDALL and Merry RANDALL. NIH remains 2, no changes noted.

## 2021-02-03 NOTE — PROGRESS NOTES
[facility] Medicine Services  INPATIENT PROGRESS NOTE    Patient Name: Dao Jhaveri  Date of Admission: 2/2/2021  Today's Date: 02/03/21  Length of Stay: 1  Primary Care Physician: Dallin Miller DO    Subjective   Chief Complaint: Right pontine infarct  HPI   Patient presented on 2/2 with complaints of lightheadedness in addition to change in his gait and mild blurred vision.  Of note, patient states he had been out of his blood pressure medications for 2 days and resumed antihypertensives 3 days prior to onset of dizziness.  He was not a TPA candidate as NIH stroke scale was 0 and patient presented 3 days after initial onset of symptomology.  CTA head and neck showed no significant stenosis, specifically no stenosis bilateral posterior circulation. There is dominance of right vertebral artery and basilar artery is patent.     Patient seen and examined at bedside.  He was sitting up in a chair resting comfortably.  Patient states his lightheadedness/dizziness has resolved.  He does have some weakness to left upper and lower extremity in addition to dysarthria.  Blood pressure has improved but is still elevated with last reading 183/96.  He denies shortness of breath, chest pain or pressure.  Therapy recommends [facility] to [facility].    Review of Systems   All pertinent negatives and positives are as above. All other systems have been reviewed and are negative unless otherwise stated.     Objective    Temp:  [97.6 °F (36.4 °C)-98.9 °F (37.2 °C)] 98.7 °F (37.1 °C)  Heart Rate:  [56-76] 56  Resp:  [18-20] 20  BP: (153-183)/() 183/96  Physical Exam  Vitals signs reviewed.   Constitutional:       General: He is not in acute distress.     Appearance: He is not toxic-appearing.      Comments: Sitting up in chair.  No acute distress.  Tolerating room air.   HENT:      Head: Normocephalic and atraumatic.      Mouth/Throat:      Mouth: Mucous membranes are moist.       Pharynx: Oropharynx is clear.   Eyes:      Extraocular Movements: Extraocular movements intact.      Conjunctiva/sclera: Conjunctivae normal.      Pupils: Pupils are equal, round, and reactive to light.   Neck:      Musculoskeletal: Normal range of motion and neck supple. No muscular tenderness.   Cardiovascular:      Rate and Rhythm: Normal rate and regular rhythm.      Pulses: Normal pulses.      Heart sounds: No murmur.      Comments: SB-S 55-74  Pulmonary:      Effort: Pulmonary effort is normal. No respiratory distress.      Breath sounds: Normal breath sounds. No wheezing.   Abdominal:      General: Bowel sounds are normal. There is no distension.      Palpations: Abdomen is soft.      Tenderness: There is no abdominal tenderness.   Musculoskeletal: Normal range of motion.         General: No swelling or tenderness.   Skin:     General: Skin is warm and dry.      Findings: No erythema or rash.   Neurological:      General: No focal deficit present.      Mental Status: He is alert and oriented to person, place, and time.      Cranial Nerves: No cranial nerve deficit.      Motor: No weakness.      Comments: Mild dysarthria.   Psychiatric:         Mood and Affect: Mood normal.         Behavior: Behavior normal.       Results Review:  I have reviewed the labs, radiology results, and diagnostic studies.    Laboratory Data:   Results from last 7 days   Lab Units 02/02/21  1338 02/02/21  0859   WBC 10*3/mm3  --  7.49   HEMOGLOBIN g/dL  --  14.5   HEMATOCRIT % 47.3 44.5   PLATELETS 10*3/mm3 241 221        Results from last 7 days   Lab Units 02/02/21  0859   SODIUM mmol/L 140   POTASSIUM mmol/L 4.0   CHLORIDE mmol/L 103   CO2 mmol/L 29.0   BUN mg/dL 17   CREATININE mg/dL 1.17   CALCIUM mg/dL 9.7   BILIRUBIN mg/dL 0.3   ALK PHOS U/L 72   ALT (SGPT) U/L 12   AST (SGOT) U/L 17   GLUCOSE mg/dL 130*       Radiology Data:   Imaging Results (Last 24 Hours)     Procedure Component Value Units Date/Time    CT Angiogram Head  [[id number]] Collected: 02/02/21 1205     Updated: 02/02/21 1212    Narrative:      CT ANGIOGRAM HEAD- 2/2/2021 11:37 AM CST     HISTORY: Stroke, follow up; I16.0-Hypertensive urgency;  R29.90-Unspecified symptoms and signs involving the nervous system,  right pontine infarct     COMPARISON: None     DOSE LENGTH PRODUCT: 202 mGy cm. Automated exposure control was also  utilized to decrease patient radiation dose.     TECHNIQUE: Axial images of the head are obtained following IV contrast.  2-D and maximal intensity projection images reconstructed and reviewed.     FINDINGS:  Mild dominance of the right vertebral artery. Distal  vertebral arteries are patent. Basilar artery is patent with no focal  high-grade stenosis or occlusion. Atherosclerotic changes are noted  within the slightly smaller caliber right posterior cerebral artery.  Superior and inferior cerebellar arteries appear normal in caliber.  The  distal internal carotid arteries are patent. Mild hypoplasia A1 segment  of the right anterior cerebral artery. Left anterior bilateral middle  cerebral arteries appear patent. No aneurysm or dissection. [place] appears intact.          Impression:      1. No large vessel occlusion, aneurysm, or dissection. Atherosclerotic  changes suspected within the right posterior cerebral artery.  2. No abnormal intracranial enhancement identified.  This report was finalized on 02/02/2021 12:09 by Dr. Mae Jarquin MD.    CT Angiogram Neck [[id number]] Collected: 02/02/21 1201     Updated: 02/02/21 1208    Narrative:      CT ANGIOGRAM NECK- 2/2/2021 11:37 AM CST     HISTORY: Stroke, follow up; I16.0-Hypertensive urgency;  R29.90-Unspecified symptoms and signs involving the nervous system,  right pontine infarct     COMPARISON: None     DOSE LENGTH PRODUCT: 202 mGy cm. Automated exposure control was also  utilized to decrease patient radiation dose.     TECHNIQUE: Axial images the neck are obtained following IV  contrast. 2-D  and maximal intensity projection images reconstructed and reviewed.     FINDINGS:  Mild calcified plaque within the arch of the aorta. [place]  variant to the arch of aorta with a common origin right brachiocephalic  and left common carotid arteries from the arch. No subclavian artery  stenosis identified. Right brachiocephalic artery appears patent. Mild  tortuosity of right common carotid artery. Bilateral common, internal,  and external carotid arteries appear widely patent. The vertebral  arteries originate from the subclavian arteries as expected. Mild  dominance of the right vertebral artery. Proximal basilar artery is  patent.     No aneurysm or dissection.     No pathologic lymphadenopathy or soft tissue mass seen within the neck.  Laryngeal structures are symmetrical. No thyroid nodule.     Nonunion of posterior arch of C1 is a normal variant. Moderate to  advanced degenerative change of the cervical spine with bony fusion  across the C4/C5 disc space.     Emphysematous changes within the visible lung apices.       Impression:      1. Exam interpreted with NASCET criteria   2. No extracranial carotid or vertebral artery stenosis. No aneurysm or  dissection.  3. Moderate to advanced degenerative change of the cervical spine as  described above.  4. Emphysema.  This report was finalized on 02/02/2021 12:05 by Dr. Mae Jarquin MD.          I have reviewed the patient's current medications.     Assessment/Plan     Active Hospital Problems    Diagnosis   • **Right pontine CVA (CMS/HCC)   • History of noncompliance with medical treatment   • History of previous left MCA stroke   • Mixed hyperlipidemia   • Essential hypertension     Plan:  1.  Neurology evaluating patient.  MRI brain showed right pontine stroke.  Neurology added aspirin 81 mg daily in addition to Plavix and statin.  2.  Transthoracic echocardiogram -results pending at this time.  3.  Systolic blood pressure goal less than 160.   Continue amlodipine, lisinopril, Toprol-XL, Maxide.  Blood pressure trend reviewed.  Add hydralazine 50 mg three times daily.  4.  Physical, occupational, and speech therapy.  Therapy recommends discharge to inpatient rehab.  5.   to assist with disposition planning as patient will need placement.    Discharge Planning: I expect the patient to be discharged to acute rehab once arrangements are made.    Electronically signed by OMAIRA Horner, 02/03/21, 11:50 CST.

## 2021-02-03 NOTE — PLAN OF CARE
Goal Outcome Evaluation:  Plan of Care Reviewed With: patient  Progress: improving  Outcome Summary: Pt alert and oriented x4. NIH remains a 2. No neuro changes noted. Continues to c/o of HA. Prn tylenol given with relief noted. BP has been controlled. Room air. Tele. . VSS. Safety maintained. Will continue to monitor.

## 2021-02-03 NOTE — PLAN OF CARE
Goal Outcome Evaluation:  Plan of Care Reviewed With: (P) patient     Outcome Summary: (P) See Note.
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
## 2021-02-04 ENCOUNTER — APPOINTMENT (OUTPATIENT)
Dept: MRI IMAGING | Facility: HOSPITAL | Age: 66
End: 2021-02-04

## 2021-02-04 PROCEDURE — 99233 SBSQ HOSP IP/OBS HIGH 50: CPT | Performed by: CLINICAL NURSE SPECIALIST

## 2021-02-04 PROCEDURE — 97535 SELF CARE MNGMENT TRAINING: CPT | Performed by: OCCUPATIONAL THERAPIST

## 2021-02-04 PROCEDURE — 25010000002 ONDANSETRON PER 1 MG: Performed by: FAMILY MEDICINE

## 2021-02-04 PROCEDURE — 97116 GAIT TRAINING THERAPY: CPT

## 2021-02-04 PROCEDURE — 92507 TX SP LANG VOICE COMM INDIV: CPT

## 2021-02-04 PROCEDURE — 97110 THERAPEUTIC EXERCISES: CPT

## 2021-02-04 PROCEDURE — 92610 EVALUATE SWALLOWING FUNCTION: CPT

## 2021-02-04 PROCEDURE — 70551 MRI BRAIN STEM W/O DYE: CPT

## 2021-02-04 RX ORDER — POLYETHYLENE GLYCOL 3350 17 G/17G
17 POWDER, FOR SOLUTION ORAL DAILY
Status: DISCONTINUED | OUTPATIENT
Start: 2021-02-04 | End: 2021-02-08 | Stop reason: HOSPADM

## 2021-02-04 RX ORDER — AMOXICILLIN 250 MG
2 CAPSULE ORAL NIGHTLY
Status: DISCONTINUED | OUTPATIENT
Start: 2021-02-04 | End: 2021-02-08 | Stop reason: HOSPADM

## 2021-02-04 RX ADMIN — LATANOPROST 1 DROP: 50 SOLUTION OPHTHALMIC at 20:52

## 2021-02-04 RX ADMIN — DOCUSATE SODIUM 50 MG AND SENNOSIDES 8.6 MG 2 TABLET: 8.6; 5 TABLET, FILM COATED ORAL at 20:51

## 2021-02-04 RX ADMIN — ATORVASTATIN CALCIUM 80 MG: 10 TABLET, FILM COATED ORAL at 20:51

## 2021-02-04 RX ADMIN — LISINOPRIL 40 MG: 10 TABLET ORAL at 11:38

## 2021-02-04 RX ADMIN — CHOLECALCIFEROL TAB 10 MCG (400 UNIT) 400 UNITS: 10 TAB at 14:53

## 2021-02-04 RX ADMIN — POLYETHYLENE GLYCOL (3350) 17 G: 17 POWDER, FOR SOLUTION ORAL at 14:57

## 2021-02-04 RX ADMIN — METOPROLOL SUCCINATE 50 MG: 50 TABLET, FILM COATED, EXTENDED RELEASE ORAL at 14:53

## 2021-02-04 RX ADMIN — ONDANSETRON HYDROCHLORIDE 4 MG: 2 SOLUTION INTRAMUSCULAR; INTRAVENOUS at 03:19

## 2021-02-04 RX ADMIN — SPIRONOLACTONE 25 MG: 25 TABLET ORAL at 11:40

## 2021-02-04 RX ADMIN — Medication 500 MCG: at 11:40

## 2021-02-04 RX ADMIN — ASPIRIN 81 MG: 81 TABLET, CHEWABLE ORAL at 11:38

## 2021-02-04 RX ADMIN — TRIAMTERENE AND HYDROCHLOROTHIAZIDE 1 TABLET: 75; 50 TABLET ORAL at 14:53

## 2021-02-04 RX ADMIN — CLOPIDOGREL 75 MG: 75 TABLET, FILM COATED ORAL at 11:38

## 2021-02-04 RX ADMIN — SODIUM CHLORIDE, PRESERVATIVE FREE 10 ML: 5 INJECTION INTRAVENOUS at 11:41
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
## 2021-02-04 NOTE — PLAN OF CARE
Goal Outcome Evaluation:  Plan of Care Reviewed With: patient     Outcome Summary: Pt A & O x 4. No neuro changes noted throughout shift. N/V controlled with zofran. VSS. Safety maintained. D/C plan to [facility]. Will cont to monitor.

## 2021-02-04 NOTE — THERAPY TREATMENT NOTE
Acute Care - Physical Therapy Treatment Note  [facility]     Patient Name: Dao Jhaveri  : 1955  MRN: [id number]  Today's Date: 2021           PT Assessment (last 12 hours)      PT Evaluation and Treatment     [facility] Name 21 1320 21 0824       Physical Therapy Time and Intention    Subjective Information  complains of;weakness  -  --  -    Document Type  therapy note (daily note)  -  --    Mode of Treatment  physical therapy  -  --    Session Not Performed  --  other (see comments)  -    Comment, Session Not Performed  --  pt requested to check back after breakfast  -    Comment  (S) new R temporal stroke  -  --    [facility] Name 21 1320          General Information    Existing Precautions/Restrictions  fall left weakness  -[facility] Name 21 1320          Pain Scale: Word Pre/Post-Treatment    Pain: Word Scale, Pretreatment  4 - moderate pain  -     Posttreatment Pain Rating  0 - no pain  -     Pain Location - Side  Right  -     Pain Location  abdomen  -     Pre/Posttreatment Pain Comment  spasms at times  -[facility] Name 21 1320          Range of Motion Comprehensive    Comment, General Range of Motion  BLE ROM LLE weaker  -[facility] Name 21 1320          Bed Mobility    Supine-Sit [place] (Bed Mobility)  contact guard;verbal cues  -     Sit-Supine [place] (Bed Mobility)  contact guard;verbal cues  -[facility] Name 21 1320          Transfers    Transfers  sit-stand transfer;stand-sit transfer  -     Sit-Stand [place] (Transfers)  contact guard;minimum assist (75% patient effort);verbal cues  -     Stand-Sit [place] (Transfers)  contact guard;minimum assist (75% patient effort);verbal cues  -[facility] Name 21 1320          Gait/Stairs (Locomotion)    [place] Level (Gait)  moderate assist (50% patient effort);minimum assist (75% patient effort);verbal cues  [facility]     Assistive Device (Gait)  walker,  front-wheeled  -     Distance in Feet (Gait)  30  -     Deviations/Abnormal Patterns (Gait)  (S) base of support, narrow;scissoring  -     Left Sided Gait Deviations  (S) knee buckling, left side;foot drop/toe drag;heel strike decreased  -     Comment (Gait/Stairs)  (S) very unsteady 3-4 LOB mod assist to correct  -     Row Name 02/04/21 1320          Plan of Care Review    Plan of Care Reviewed With  patient;spouse  -     Progress  declining  -     Outcome Summary  pt with new R temporal stroke with increased L side weakness, pt trans to EOB cga, performed BLE ROM, LLE weaker, sit-stand cga-min assist, pt amb 30 feet with rwx mod-min assist, L knee buckling, narrow VINNY, several LOB mod assist to correct, trans back to bed cga-min assist, pt is not safe to go home, would benefit from acute inpt rehab  -     Row Name 02/04/21 1320          Positioning and Restraints    Pre-Treatment Position  in bed  -     Post Treatment Position  bed  -     In Bed  fowlers;call light within reach;encouraged to call for assist;exit alarm on;with family/caregiver  -       User Key  (r) = Recorded By, (t) = Taken By, (c) = Cosigned By    Initials Name Provider Type     Mary Steele, PTA Physical Therapy Assistant        Physical Therapy Education                 Title: PT OT SLP Therapies (In Progress)     Topic: Physical Therapy (Done)     Point: Mobility training (Done)     Learning Progress Summary           Patient Acceptance, E, VU by AB at 2/3/2021 1024    Comment: PT role in their care, plan of care, d/c plan                   Point: Home exercise program (Done)     Learning Progress Summary           Patient Acceptance, E, VU by AB at 2/3/2021 1024    Comment: PT role in their care, plan of care, d/c plan                   Point: Body mechanics (Done)     Learning Progress Summary           Patient Acceptance, E, VU by AB at 2/3/2021 1024    Comment: PT role in their care, plan of care, d/c plan                    Point: Precautions (Done)     Learning Progress Summary           Patient Acceptance, E,TB, VU by  at 2/4/2021 1353    Comment: awareness of L side weakness    Acceptance, E, VU by AB at 2/3/2021 1024    Comment: PT role in their care, plan of care, d/c plan                               User Key     Initials Effective Dates Name Provider Type [facility] 08/02/16 -  Mary Steele, PTA Physical Therapy Assistant PT    AB 12/02/20 -  Almas Mazariegos, PT Student PT Student PT              PT Recommendation and Plan     Plan of Care Reviewed With: patient, spouse  Progress: declining  Outcome Summary: pt with new R temporal stroke with increased L side weakness, pt trans to EOB cga, performed BLE ROM, LLE weaker, sit-stand cga-min assist, pt amb 30 feet with rwx mod-min assist, L knee buckling, narrow VINNY, several LOB mod assist to correct, trans back to bed cga-min assist, pt is not safe to go home, would benefit from acute inpt rehab  Outcome Measures     Row Name 02/03/21 1500             How much help from another is currently needed...    Putting on and taking off regular lower body clothing?  3  -TS      Bathing (including washing, rinsing, and drying)  3  -TS      Toileting (which includes using toilet bed pan or urinal)  3  -TS      Putting on and taking off regular upper body clothing  3  -TS      Taking care of personal grooming (such as brushing teeth)  4  -TS      Eating meals  4  -TS      AM-PAC 6 Clicks Score (OT)  20  -TS        User Key  (r) = Recorded By, (t) = Taken By, (c) = Cosigned By    Initials Name Provider Type     Fabiana Larios, BRYANT/L Occupational Therapy Assistant           Time Calculation:   PT Charges     Row Name 02/04/21 1353             Time Calculation    Start Time  1320  -      Stop Time  1343  -      Time Calculation (min)  23 min  -      PT Received On  02/04/21  -         Time Calculation- PT    Total Timed Code Minutes- PT  23 minute(s)  -          Timed Charges    95548 - PT Therapeutic Exercise Minutes  10  -      84447 - Gait Training Minutes   13  -        User Key  (r) = Recorded By, (t) = Taken By, (c) = Cosigned By    Initials Name Provider Type    Mary Zuluaga PTA Physical Therapy Assistant        Therapy Charges for Today     Code Description Service Date Service Provider Modifiers Qty    09447173106 HC PT THER PROC EA 15 MIN 2/4/2021 Mary Steele, JAVON GP 1    99543942810 HC GAIT TRAINING EA 15 MIN 2/4/2021 Mary Steele PTA GP 1          PT G-Codes  Outcome Measure Options: AM-PAC 6 Clicks Basic Mobility (PT), Modified [place]  AM-PAC 6 Clicks Score (PT): 17  AM-PAC 6 Clicks Score (OT): 20  Modified [place] Scale: 4 - Moderately severe disability.  Unable to walk without assistance, and unable to attend to own bodily needs without assistance.    Mary Steele PTA  2/4/2021
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
## 2021-02-04 NOTE — PLAN OF CARE
Goal Outcome Evaluation:  Plan of Care Reviewed With: patient     Outcome Summary: Patient tolerating therapies and up in chair and up to BR with assistancex1 and walker. New BP meds given- continue to monitor, Telemetry showing NSR, states HA is better and getting better even after administration of Tylenol. Bed alarm on as patient is unsteady at times. NIH completed as ordered. Able to feed self and cooperating with staff.    MRI shows new stroke today. Patient having noticeable left sided weakness and coordination issues. He continues to have good/positive outlook. Blood pressure low this morning and meds adjusted. Continued to monitor BP and NIH as ordered.

## 2021-02-04 NOTE — PROGRESS NOTES
Continued Stay Note   [place]     Patient Name: Dao Jhaveri  MRN: [id number]  Today's Date: 2/4/2021    Admit Date: 2/2/2021    Discharge Plan     Row Name 02/04/21 1253       Plan    Plan Comments  Per Rasheeda with [facility] pt is doing too well to qualify for acute rehab. Informed pt and he prefers home with home health. Provided pt with HH options for [facility]. Pt chose [facility]. Will need  orders to arrange. Pt also states he will need a rolling walker at [place] and prefers [facility].    Final Discharge Disposition Code  06 - home with home health care        Discharge Codes    No documentation.             CYNTHIA Salazar

## 2021-02-04 NOTE — PROGRESS NOTES
"    [facility] Medicine Services  INPATIENT PROGRESS NOTE    Patient Name: Dao Jhaveri  Date of Admission: 2/2/2021  Today's Date: 02/04/21  Length of Stay: 2  Primary Care Physician: Dallin Miller DO    Subjective   Chief Complaint: Dizziness  HPI   Patient seen and examined at bedside.  Patient did have return of dizziness with nausea and vomiting last night.  Noted that his blood pressure dropped to systolic 100s after dose of Hytrin given last night with repeat blood pressure of 106/73 this morning.  RN has held his blood pressure medicines today.      Patient states he feels weaker this morning on his left side as compared to yesterday.  STAT MRI of the brain obtained as per neurology.  Also reports he is not hungry this morning.  States last bowel movement was 3 to 4 days ago.  Will ensure he has a bowel regimen in place.    Of note, wife claims patient uses [facility] pharmacy and pharmacy was unable to verify feeling of any prescriptions for the last 12 months.  Patient states he has \" a bunch of medicine at home\" and that is why he has not filled prescriptions lately.  Although, also stated he was out of medication recently.  Concern with medication noncompliance.    Review of Systems   All pertinent negatives and positives are as above. All other systems have been reviewed and are negative unless otherwise stated.     Objective    Temp:  [98.1 °F (36.7 °C)-99.3 °F (37.4 °C)] 98.2 °F (36.8 °C)  Heart Rate:  [62-92] 92  Resp:  [16-20] 18  BP: (105-169)/(52-99) 122/70  Physical Exam  Vitals signs reviewed.   Constitutional:       General: He is not in acute distress.     Appearance: He is not toxic-appearing.      Comments: Sitting up in chair.  No acute distress.  Tolerating room air.   HENT:      Head: Normocephalic and atraumatic.      Mouth/Throat:      Mouth: Mucous membranes are moist.      Pharynx: Oropharynx is clear.   Eyes:      Extraocular Movements: " Extraocular movements intact.      Conjunctiva/sclera: Conjunctivae normal.      Pupils: Pupils are equal, round, and reactive to light.   Neck:      Musculoskeletal: Normal range of motion and neck supple. No muscular tenderness.   Cardiovascular:      Rate and Rhythm: Normal rate and regular rhythm.      Pulses: Normal pulses.      Heart sounds: No murmur.      Comments: S 63-96  Pulmonary:      Effort: Pulmonary effort is normal. No respiratory distress.      Breath sounds: Normal breath sounds. No wheezing.   Abdominal:      General: Bowel sounds are normal. There is no distension.      Palpations: Abdomen is soft.      Tenderness: There is no abdominal tenderness.   Musculoskeletal: Normal range of motion.         General: No swelling or tenderness.   Skin:     General: Skin is warm and dry.      Findings: No erythema or rash.   Neurological:      General: No focal deficit present.      Mental Status: He is alert and oriented to person, place, and time.      Cranial Nerves: No cranial nerve deficit.      Motor: No weakness.      Comments: Mild dysarthria.  More weak on left side as compared to yesterday.  Psychiatric:         Mood and Affect: Mood normal.         Behavior: Behavior normal.     Results Review:  I have reviewed the labs, radiology results, and diagnostic studies.    Laboratory Data:   Results from last 7 days   Lab Units 02/02/21  1338 02/02/21  0859   WBC 10*3/mm3  --  7.49   HEMOGLOBIN g/dL  --  14.5   HEMATOCRIT % 47.3 44.5   PLATELETS 10*3/mm3 241 221        Results from last 7 days   Lab Units 02/02/21  0859   SODIUM mmol/L 140   POTASSIUM mmol/L 4.0   CHLORIDE mmol/L 103   CO2 mmol/L 29.0   BUN mg/dL 17   CREATININE mg/dL 1.17   CALCIUM mg/dL 9.7   BILIRUBIN mg/dL 0.3   ALK PHOS U/L 72   ALT (SGPT) U/L 12   AST (SGOT) U/L 17   GLUCOSE mg/dL 130*     Radiology Data:   Imaging Results (Last 24 Hours)     Procedure Component Value Units Date/Time    MRI Brain Without Contrast [[id number]]  Collected: 02/04/21 1020     Updated: 02/04/21 1028    Narrative:      HISTORY: Worsening left-sided symptoms, pontine infarct     MRI BRAIN: Multiplanar imaging the brain performed without contrast.     COMPARISON: MRI brain 2/2/2021     FINDINGS: The diffusion restriction within the right colon is similar a  new punctate focus of diffusion restriction suspected within the right  temporal lobe adjacent the posterior horn right lateral ventricle. No  intracranial blood products identified. Stable cerebral volume loss with  diffuse chronic small vessel ischemic changes. Old lacunar infarcts of  the left basal ganglia. No abnormal extra-axial fluid collection.     Limited assessment of the orbits and base of skull is unremarkable.  There are normal flow-voids within the distal internal carotid and  basilar arteries.       Impression:      1. Stable appearing subacute right pontine infarct with a new 7 mm focus  of diffusion restriction suggesting acute ischemia of the right temporal  lobe in the right periventricular region adjacent the posterior horn of  the right lateral ventricle. No intracranial hemorrhage.  2. Stable atrophy with chronic small vessel ischemia. Old lacunar  infarcts left basal ganglia.  This report was finalized on 02/04/2021 10:25 by Dr. Mae Jarquin MD.          I have reviewed the patient's current medications.     Assessment/Plan     Active Hospital Problems    Diagnosis   • **Right pontine CVA (CMS/HCC)   • History of noncompliance with medical treatment   • History of previous left MCA stroke   • Mixed hyperlipidemia   • Essential hypertension     Plan:  1.  Neurology evaluating patient.  MRI brain showed right pontine stroke.  Continue aspirin 81 mg daily in addition to Plavix and statin.  Repeat MRI today showed stable right pontine stroke and new acute stroke right temporal lobe.  2.  Transthoracic echocardiogram  showed preserved ejection fraction with left ventricular diastolic  dysfunction grade 1.  Saline test results negative.  Thickening on anterior leaflet of mitral valve.  3.  Patient's blood pressure has been systolic 100s with addition of Hytrin yesterday.  Hytrin discontinued.  Blood pressure medications have been held today.  4.  Physical, occupational, and speech therapy.  May benefit from continued outpatient speech therapy.  Patient has been doing well with therapy.  [facility] stated that he did not qualify for acute rehab.  However, today session with therapy he did not do as well as yesterday.  He ambulated 30 feet with rolling walker moderate-min assist.  Therapy states he is not safe to return home and would benefit from acute inpatient rehab.  [facility] is going to reevaluate him.  5.  Counseled on medication compliance.     Discharge Planning: I expect the patient to be discharged to home with home health versus skilled nursing facility to be determined.    Electronically signed by OMAIRA Horner, 02/04/21, 14:17 CST.
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
## 2021-02-04 NOTE — PLAN OF CARE
Goal Outcome Evaluation:  Plan of Care Reviewed With: patient, spouse  Progress: declining  Outcome Summary: OT progress note complete post-R temporal stroke. Pt. found to be A&Ox4 and cooperative with therapy.  Pt.'s ROM, strength, vision, coordination, and sensation were re-assessed d/t new neurological injury (see flowsheet). Pt.'s increased ataxic movement, L-sided weakness/neglect, and coordination all presented increased ADL safety deficits. Pt. went EOB<>BR with mod A and r/w. Pt. toileted with CGA for skills, and min A for transfers. Pt. stood sink side with CGA for support and mod A for set-up of grooming task and occasional [place] on L-hand to hold toothbrush. During all functional ambulation, pt. required frequent vc to coordinate movement, slow speed, utilize r/w correctly, and widen stance. Pt.'s decrease in ADL independence, functional ambulation, and overall safety continue to qualify him for skilled services. OT recommends d/c to inpatient rehab d/t temporal stroke.

## 2021-02-04 NOTE — PLAN OF CARE
Goal Outcome Evaluation:  Plan of Care Reviewed With: patient, spouse  Progress: declining  Outcome Summary: pt with new R temporal stroke with increased L side weakness, pt trans to EOB cga, performed BLE ROM, LLE weaker, sit-stand cga-min assist, pt amb 30 feet with rwx mod-min assist, L knee buckling, narrow VINNY, several LOB mod assist to correct, trans back to bed cga-min assist, pt is not safe to go home, would benefit from acute inpt rehab

## 2021-02-04 NOTE — PROGRESS NOTES
Continued Stay Note  [facility]     Patient Name: Dao Jhaveri  MRN: [id number]  Today's Date: 2/4/2021    Admit Date: 2/2/2021    Discharge Plan     Row Name 02/04/21 1604       Plan    Plan Comments  Pt did have another stroke today, MRI has been completed. APRN and therapy both recommending acute rehab and pt agreeable. Notified Rasheeda with [facility] and she will re-eval. Rasheeda states she may not have a bed until Monday. Rasheeda will re-eval and notify .        Discharge Codes    No documentation.             CYNTHIA Salazar

## 2021-02-04 NOTE — PROGRESS NOTES
Neurology Progress Note      Chief Complaint:      Subjective     Subjective:  Patient sitting up in chair. Merry RANDALL reported that patient had worsening dizziness with n/v last PM. Hytrin 5 mg was started last PM and has significant drop in BP. He c/o some dizziness this AM and feels as though left arm and leg are more weak compared to yesterday. He has loss of appetite this AM.     B12- 452    Results for orders placed during the hospital encounter of 02/02/21   Adult Transthoracic Echo Complete W/ Cont if Necessary Per Protocol (With Agitated Saline)    Narrative · Left ventricular wall thickness is consistent with severe concentric   hypertrophy.  · Left ventricular diastolic function is consistent with (grade I)   impaired relaxation.  · Saline test results are negative.  · Left ventricular ejection fraction appears to be greater than 70%. Left   ventricular systolic function is normal.  · There is anterior mitral leaflet thickening present.     HYPERTENSIVE VS HYPERTROPHIC HEART DISEASE       Medications:  Current Facility-Administered Medications   Medication Dose Route Frequency Provider Last Rate Last Admin   • acetaminophen (TYLENOL) tablet 650 mg  650 mg Oral Q4H PRN Virgil Siu DO   650 mg at 02/03/21 0816   • aluminum-magnesium hydroxide-simethicone (MAALOX MAX) 400-400-40 MG/5ML suspension 7.5 mL  7.5 mL Oral Q4H PRN Virgil Siu, DO       • amLODIPine (NORVASC) tablet 10 mg  10 mg Oral Q24H Virgil Siu DO   10 mg at 02/03/21 0811   • aspirin chewable tablet 81 mg  81 mg Oral Daily Virgil Siu DO   81 mg at 02/03/21 0811    Or   • aspirin suppository 300 mg  300 mg Rectal Daily Virgil Siu, DO       • atorvastatin (LIPITOR) tablet 80 mg  80 mg Oral Nightly Virgil Siu DO   80 mg at 02/03/21 2039   • bisacodyl (DULCOLAX) suppository 10 mg  10 mg Rectal Daily PRN Virgil Siu DO       • cholecalciferol (VITAMIN D3) tablet 400 Units  400 Units  Oral Daily Virgil Siu DO   400 Units at 02/03/21 0812   • clopidogrel (PLAVIX) tablet 75 mg  75 mg Oral Daily Virgil Siu DO   75 mg at 02/03/21 0811   • docusate sodium (COLACE) capsule 100 mg  100 mg Oral BID PRN Virigl Siu DO       • HYDROcodone-acetaminophen (NORCO) 7.5-325 MG per tablet 1 tablet  1 tablet Oral Q6H PRN Virgil Siu DO   1 tablet at 02/02/21 1255   • labetalol (NORMODYNE,TRANDATE) injection 20 mg  20 mg Intravenous Q6H PRN Virgil Siu DO   20 mg at 02/02/21 1133   • latanoprost (XALATAN) 0.005 % ophthalmic solution 1 drop  1 drop Both Eyes Nightly Virgil Siu DO   1 drop at 02/03/21 2039   • lisinopril (PRINIVIL,ZESTRIL) tablet 40 mg  40 mg Oral Q24H Virgil Siu DO   40 mg at 02/03/21 0810   • metoprolol succinate XL (TOPROL-XL) 24 hr tablet 50 mg  50 mg Oral Daily Virgil Siu DO   50 mg at 02/03/21 0811   • ondansetron (ZOFRAN) injection 4 mg  4 mg Intravenous Q6H PRN Virgil Siu DO   4 mg at 02/04/21 0319   • sodium chloride 0.9 % flush 10 mL  10 mL Intravenous PRN Virgil Siu DO       • sodium chloride 0.9 % flush 10 mL  10 mL Intravenous Q12H Virgil Siu DO   10 mL at 02/03/21 2041   • sodium chloride 0.9 % flush 10 mL  10 mL Intravenous PRN Virgil Siu DO       • spironolactone (ALDACTONE) tablet 25 mg  25 mg Oral Daily Virgil Siu DO   25 mg at 02/03/21 0811   • triamterene-hydrochlorothiazide (MAXZIDE) 75-50 MG per tablet 1 tablet  1 tablet Oral Daily Virgil Siu DO   1 tablet at 02/03/21 0811   • vitamin B-12 (CYANOCOBALAMIN) tablet 500 mcg  500 mcg Oral Daily Virgil Siu DO   500 mcg at 02/03/21 0811       Review of Systems:   -A 14 point review of systems is completed and is negative except for dizziness and left sided weakness.       Objective      Vital Signs  Temp:  [98.1 °F (36.7 °C)-99.3 °F (37.4 °C)] 98.9 °F (37.2 °C)  Heart Rate:  [56-88]  79  Resp:  [16-20] 18  BP: (106-183)/(60-99) 106/73    Telemetry: S 63-95      Physical Exam:  General Exam:  Head:  Normal cephalic, atraumatic  HEENT:  Neck supple  Fundoscopic Exam:  No signs of disc edema  CVS:  Regular rate and rhythm.  No murmurs  Carotid Examination:  No bruits  Lungs:  Clear to auscultation  Abdomen:  Non-tender, Non-distended  Extremities:  No signs of peripheral edema  Skin:  No rashes     Neurologic Exam:     Mental Status:    -Awake, Alert, Oriented X 3  -No word finding difficulties  -No aphasia  -mild dysarthria  -Follows simple and complex commands     CN II:  Visual fields full.  Pupils equally reactive to light  CN III, IV, VI:  Extraocular Muscles full with no signs of nystagmus  CN V:  Facial sensory is symmetric with no asymmetries.  CN VII:  Facial motor asymmetric with left lower facial weakness  CN VIII:  Gross hearing intact bilaterally  CN IX:  Palate elevates symmetrically  CN X:  Palate elevates symmetrically  CN XI:  Shoulder shrug asymmetric and slightly decreased on left compared to right.  CN XII:  Tongue is midline on protrusion     Motor: (strength out of 5:  1= minimal movement, 2 = movement in plane of gravity, 3 = movement against gravity, 4 = movement against some resistance, 5 = full strength)     -Right Upper Ext: Proximal: 5 Distal: 5  -Left Upper Ext: Proximal: 4  Distal: 4     -Right Lower Ext: Proximal: 5 Distal: 5  -Left Lower Ext: Proximal: 2  Distal: 2     DTR:  -Right              Bicep: 2+         Tricep: 2+        Brachoradialis: 2+              Patella: 2+       Ankle: 2+         Neg Babinski  -Left              Bicep: 2+         Tricep: 2+        Brachoradialis: 2+              Patella: 2+       Ankle: 2+         Neg Babinski     Sensory:  -Intact to light touch, pinprick, temperature, pain, and proprioception     Coordination:  -Finger to nose intact on right with ataxia on left  -Heel to shin intact on right and not able to perform on left  secondary to weakness      Gait  -No signs of ataxia  -ambulates with assistance.       Results Review:    I reviewed the patient's new clinical results.    Results from last 7 days   Lab Units 02/02/21  1338 02/02/21  0859   WBC 10*3/mm3  --  7.49   HEMOGLOBIN g/dL  --  14.5   HEMATOCRIT % 47.3 44.5   PLATELETS 10*3/mm3 241 221        Results from last 7 days   Lab Units 02/02/21  0859   SODIUM mmol/L 140   POTASSIUM mmol/L 4.0   CHLORIDE mmol/L 103   CO2 mmol/L 29.0   BUN mg/dL 17   CREATININE mg/dL 1.17   CALCIUM mg/dL 9.7   BILIRUBIN mg/dL 0.3   ALK PHOS U/L 72   ALT (SGPT) U/L 12   AST (SGOT) U/L 17   GLUCOSE mg/dL 130*        Lab Results   Component Value Date    PROTIME 12.4 02/02/2021    INR 0.96 02/02/2021     No components found for: POCGLUC  No components found for: A1C  Lab Results   Component Value Date    HDL 49 02/02/2021    LDL 75 02/02/2021     No components found for: B12  Lab Results   Component Value Date    TSH 2.410 03/02/2017       Assessment/Plan     Hospital Problem List      Right pontine CVA (CMS/[facility])    Essential hypertension    Mixed hyperlipidemia    History of noncompliance with medical treatment    History of previous left MCA stroke    Impression:  · right paramedian pontine infarct  · History of previous left MCA stroke  · Compliance with Plavix therapy  · Compliance with statin therapy  · Previous B12 deficiency  · History of hypertension  · History of hyperlipidemia. LDL 75  · Medication non adherence. Patient had been out of his BP medications and resumed 3 days prior to onset of dizziness and symptoms.    Plan:  1. Patient with return of dizziness with n/v and worsening left sided weakness. BP was lowered and this may be exacerbating his previous stroke symptoms.  2. Stat MRI brain.  3. D/c Hytrin  4. Systolic BP goal 160  5. ASA 81 mg and Plavix 75 mg daily.   6. Lipitor 80 mg daily fir LDL goal less than 70.  7. Referral to [facility] acute rehab pending.   8. Counseled on  medication compliance.  9. If MRI brain stable hopefully to acute rehab when bed offered and will need f/u with neurology in 3-4 weeks.       ADDENDUM:    MRI brain today personally reviewed by me shows stable right pontine stroke and new acute stroke right temporal lobe    TTE shows thickening on anterior leaflet of mitral valve. Consider JOEL.       Wilda Arce, APRN  02/04/21  09:22 CST

## 2021-02-05 ENCOUNTER — APPOINTMENT (OUTPATIENT)
Dept: GENERAL RADIOLOGY | Facility: HOSPITAL | Age: 66
End: 2021-02-05

## 2021-02-05 ENCOUNTER — APPOINTMENT (OUTPATIENT)
Dept: CARDIOLOGY | Facility: HOSPITAL | Age: 66
End: 2021-02-05

## 2021-02-05 ENCOUNTER — ANESTHESIA (OUTPATIENT)
Dept: CARDIOLOGY | Facility: HOSPITAL | Age: 66
End: 2021-02-05

## 2021-02-05 ENCOUNTER — APPOINTMENT (OUTPATIENT)
Dept: ULTRASOUND IMAGING | Facility: HOSPITAL | Age: 66
End: 2021-02-05

## 2021-02-05 ENCOUNTER — ANESTHESIA EVENT (OUTPATIENT)
Dept: CARDIOLOGY | Facility: HOSPITAL | Age: 66
End: 2021-02-05

## 2021-02-05 PROBLEM — R07.9 CHEST PAIN: Status: ACTIVE | Noted: 2021-02-05

## 2021-02-05 PROBLEM — N17.9 ACUTE KIDNEY INJURY (HCC): Status: ACTIVE | Noted: 2021-02-05

## 2021-02-05 PROBLEM — I63.511 ACUTE ISCHEMIC RIGHT MCA STROKE: Status: ACTIVE | Noted: 2021-02-02

## 2021-02-05 LAB
ALBUMIN SERPL-MCNC: 4.8 G/DL (ref 3.5–5.2)
ALBUMIN/GLOB SERPL: 1.2 G/DL
ALP SERPL-CCNC: 74 U/L (ref 39–117)
ALT SERPL W P-5'-P-CCNC: 17 U/L (ref 1–41)
ANION GAP SERPL CALCULATED.3IONS-SCNC: 14 MMOL/L (ref 5–15)
ANION GAP SERPL CALCULATED.3IONS-SCNC: 15 MMOL/L (ref 5–15)
ANION GAP SERPL CALCULATED.3IONS-SCNC: 17 MMOL/L (ref 5–15)
AST SERPL-CCNC: 35 U/L (ref 1–40)
BASOPHILS # BLD AUTO: 0.05 10*3/MM3 (ref 0–0.2)
BASOPHILS NFR BLD AUTO: 0.4 % (ref 0–1.5)
BILIRUB SERPL-MCNC: 0.8 MG/DL (ref 0–1.2)
BUN SERPL-MCNC: 56 MG/DL (ref 8–23)
BUN SERPL-MCNC: 62 MG/DL (ref 8–23)
BUN SERPL-MCNC: 65 MG/DL (ref 8–23)
BUN/CREAT SERPL: 11.5 (ref 7–25)
BUN/CREAT SERPL: 12.1 (ref 7–25)
BUN/CREAT SERPL: 13.6 (ref 7–25)
CALCIUM SPEC-SCNC: 10.1 MG/DL (ref 8.6–10.5)
CALCIUM SPEC-SCNC: 9.3 MG/DL (ref 8.6–10.5)
CALCIUM SPEC-SCNC: 9.8 MG/DL (ref 8.6–10.5)
CHLORIDE SERPL-SCNC: 91 MMOL/L (ref 98–107)
CHLORIDE SERPL-SCNC: 92 MMOL/L (ref 98–107)
CHLORIDE SERPL-SCNC: 95 MMOL/L (ref 98–107)
CO2 SERPL-SCNC: 26 MMOL/L (ref 22–29)
CO2 SERPL-SCNC: 27 MMOL/L (ref 22–29)
CO2 SERPL-SCNC: 30 MMOL/L (ref 22–29)
CREAT SERPL-MCNC: 4.77 MG/DL (ref 0.76–1.27)
CREAT SERPL-MCNC: 4.87 MG/DL (ref 0.76–1.27)
CREAT SERPL-MCNC: 5.12 MG/DL (ref 0.76–1.27)
DEPRECATED RDW RBC AUTO: 42.4 FL (ref 37–54)
EOSINOPHIL # BLD AUTO: 0.01 10*3/MM3 (ref 0–0.4)
EOSINOPHIL NFR BLD AUTO: 0.1 % (ref 0.3–6.2)
ERYTHROCYTE [DISTWIDTH] IN BLOOD BY AUTOMATED COUNT: 15.3 % (ref 12.3–15.4)
GFR SERPL CREATININE-BSD FRML MDRD: 14 ML/MIN/1.73
GFR SERPL CREATININE-BSD FRML MDRD: 15 ML/MIN/1.73
GFR SERPL CREATININE-BSD FRML MDRD: 15 ML/MIN/1.73
GFR SERPL CREATININE-BSD FRML MDRD: ABNORMAL ML/MIN/{1.73_M2}
GLOBULIN UR ELPH-MCNC: 3.9 GM/DL
GLUCOSE BLDC GLUCOMTR-MCNC: 137 MG/DL (ref 70–130)
GLUCOSE SERPL-MCNC: 118 MG/DL (ref 65–99)
GLUCOSE SERPL-MCNC: 133 MG/DL (ref 65–99)
GLUCOSE SERPL-MCNC: 146 MG/DL (ref 65–99)
HCT VFR BLD AUTO: 50.7 % (ref 37.5–51)
HGB BLD-MCNC: 16.8 G/DL (ref 13–17.7)
IMM GRANULOCYTES # BLD AUTO: 0.05 10*3/MM3 (ref 0–0.05)
IMM GRANULOCYTES NFR BLD AUTO: 0.4 % (ref 0–0.5)
LYMPHOCYTES # BLD AUTO: 2.32 10*3/MM3 (ref 0.7–3.1)
LYMPHOCYTES NFR BLD AUTO: 17.2 % (ref 19.6–45.3)
MCH RBC QN AUTO: 26 PG (ref 26.6–33)
MCHC RBC AUTO-ENTMCNC: 33.1 G/DL (ref 31.5–35.7)
MCV RBC AUTO: 78.4 FL (ref 79–97)
MONOCYTES # BLD AUTO: 1.04 10*3/MM3 (ref 0.1–0.9)
MONOCYTES NFR BLD AUTO: 7.7 % (ref 5–12)
NEUTROPHILS NFR BLD AUTO: 10.05 10*3/MM3 (ref 1.7–7)
NEUTROPHILS NFR BLD AUTO: 74.2 % (ref 42.7–76)
NRBC BLD AUTO-RTO: 0 /100 WBC (ref 0–0.2)
NT-PROBNP SERPL-MCNC: 51.8 PG/ML (ref 0–900)
PLATELET # BLD AUTO: 300 10*3/MM3 (ref 140–450)
PMV BLD AUTO: 11 FL (ref 6–12)
POTASSIUM SERPL-SCNC: 4.1 MMOL/L (ref 3.5–5.2)
POTASSIUM SERPL-SCNC: 4.2 MMOL/L (ref 3.5–5.2)
POTASSIUM SERPL-SCNC: 4.9 MMOL/L (ref 3.5–5.2)
PROT SERPL-MCNC: 8.7 G/DL (ref 6–8.5)
PROT UR-MCNC: 28.9 MG/DL
RBC # BLD AUTO: 6.47 10*6/MM3 (ref 4.14–5.8)
SODIUM SERPL-SCNC: 134 MMOL/L (ref 136–145)
SODIUM SERPL-SCNC: 136 MMOL/L (ref 136–145)
SODIUM SERPL-SCNC: 137 MMOL/L (ref 136–145)
SODIUM UR-SCNC: 111 MMOL/L
TROPONIN T SERPL-MCNC: 0.01 NG/ML (ref 0–0.03)
TROPONIN T SERPL-MCNC: <0.01 NG/ML (ref 0–0.03)
TROPONIN T SERPL-MCNC: <0.01 NG/ML (ref 0–0.03)
WBC # BLD AUTO: 13.52 10*3/MM3 (ref 3.4–10.8)

## 2021-02-05 PROCEDURE — 93010 ELECTROCARDIOGRAM REPORT: CPT | Performed by: EMERGENCY MEDICINE

## 2021-02-05 PROCEDURE — 25010000002 PROPOFOL 10 MG/ML EMULSION: Performed by: NURSE ANESTHETIST, CERTIFIED REGISTERED

## 2021-02-05 PROCEDURE — 82962 GLUCOSE BLOOD TEST: CPT

## 2021-02-05 PROCEDURE — 99233 SBSQ HOSP IP/OBS HIGH 50: CPT | Performed by: PSYCHIATRY & NEUROLOGY

## 2021-02-05 PROCEDURE — 83880 ASSAY OF NATRIURETIC PEPTIDE: CPT | Performed by: INTERNAL MEDICINE

## 2021-02-05 PROCEDURE — 76775 US EXAM ABDO BACK WALL LIM: CPT

## 2021-02-05 PROCEDURE — 85025 COMPLETE CBC W/AUTO DIFF WBC: CPT | Performed by: INTERNAL MEDICINE

## 2021-02-05 PROCEDURE — 93325 DOPPLER ECHO COLOR FLOW MAPG: CPT

## 2021-02-05 PROCEDURE — 99222 1ST HOSP IP/OBS MODERATE 55: CPT | Performed by: EMERGENCY MEDICINE

## 2021-02-05 PROCEDURE — 93325 DOPPLER ECHO COLOR FLOW MAPG: CPT | Performed by: EMERGENCY MEDICINE

## 2021-02-05 PROCEDURE — 84540 ASSAY OF URINE/UREA-N: CPT | Performed by: NURSE PRACTITIONER

## 2021-02-05 PROCEDURE — 93312 ECHO TRANSESOPHAGEAL: CPT

## 2021-02-05 PROCEDURE — B24BZZ4 ULTRASONOGRAPHY OF HEART WITH AORTA, TRANSESOPHAGEAL: ICD-10-PCS | Performed by: EMERGENCY MEDICINE

## 2021-02-05 PROCEDURE — 93312 ECHO TRANSESOPHAGEAL: CPT | Performed by: EMERGENCY MEDICINE

## 2021-02-05 PROCEDURE — 93005 ELECTROCARDIOGRAM TRACING: CPT | Performed by: FAMILY MEDICINE

## 2021-02-05 PROCEDURE — 93320 DOPPLER ECHO COMPLETE: CPT

## 2021-02-05 PROCEDURE — 84300 ASSAY OF URINE SODIUM: CPT | Performed by: NURSE PRACTITIONER

## 2021-02-05 PROCEDURE — 93320 DOPPLER ECHO COMPLETE: CPT | Performed by: EMERGENCY MEDICINE

## 2021-02-05 PROCEDURE — 71045 X-RAY EXAM CHEST 1 VIEW: CPT

## 2021-02-05 PROCEDURE — 84484 ASSAY OF TROPONIN QUANT: CPT | Performed by: INTERNAL MEDICINE

## 2021-02-05 PROCEDURE — 93005 ELECTROCARDIOGRAM TRACING: CPT | Performed by: INTERNAL MEDICINE

## 2021-02-05 PROCEDURE — 84484 ASSAY OF TROPONIN QUANT: CPT | Performed by: NURSE PRACTITIONER

## 2021-02-05 PROCEDURE — 80053 COMPREHEN METABOLIC PANEL: CPT | Performed by: INTERNAL MEDICINE

## 2021-02-05 PROCEDURE — 82570 ASSAY OF URINE CREATININE: CPT | Performed by: NURSE PRACTITIONER

## 2021-02-05 PROCEDURE — 92507 TX SP LANG VOICE COMM INDIV: CPT | Performed by: SPEECH-LANGUAGE PATHOLOGIST

## 2021-02-05 PROCEDURE — 84156 ASSAY OF PROTEIN URINE: CPT | Performed by: NURSE PRACTITIONER

## 2021-02-05 RX ORDER — OXYCODONE AND ACETAMINOPHEN 10; 325 MG/1; MG/1
1 TABLET ORAL ONCE AS NEEDED
Status: CANCELLED | OUTPATIENT
Start: 2021-02-05

## 2021-02-05 RX ORDER — NITROGLYCERIN 0.4 MG/1
TABLET SUBLINGUAL
Status: COMPLETED | OUTPATIENT
Start: 2021-02-05 | End: 2021-02-05

## 2021-02-05 RX ORDER — LABETALOL HYDROCHLORIDE 5 MG/ML
5 INJECTION, SOLUTION INTRAVENOUS
Status: CANCELLED | OUTPATIENT
Start: 2021-02-05

## 2021-02-05 RX ORDER — ASPIRIN 325 MG
325 TABLET ORAL ONCE
Status: COMPLETED | OUTPATIENT
Start: 2021-02-05 | End: 2021-02-05

## 2021-02-05 RX ORDER — FLUMAZENIL 0.1 MG/ML
0.2 INJECTION INTRAVENOUS AS NEEDED
Status: CANCELLED | OUTPATIENT
Start: 2021-02-05

## 2021-02-05 RX ORDER — SODIUM CHLORIDE 9 MG/ML
INJECTION, SOLUTION INTRAVENOUS CONTINUOUS PRN
Status: DISCONTINUED | OUTPATIENT
Start: 2021-02-05 | End: 2021-02-05 | Stop reason: SURG

## 2021-02-05 RX ORDER — SODIUM CHLORIDE 9 MG/ML
100 INJECTION, SOLUTION INTRAVENOUS CONTINUOUS
Status: DISCONTINUED | OUTPATIENT
Start: 2021-02-05 | End: 2021-02-07

## 2021-02-05 RX ORDER — ONDANSETRON 2 MG/ML
4 INJECTION INTRAMUSCULAR; INTRAVENOUS AS NEEDED
Status: CANCELLED | OUTPATIENT
Start: 2021-02-05 | End: 2021-02-05

## 2021-02-05 RX ORDER — PROPOFOL 10 MG/ML
VIAL (ML) INTRAVENOUS AS NEEDED
Status: DISCONTINUED | OUTPATIENT
Start: 2021-02-05 | End: 2021-02-05 | Stop reason: SURG

## 2021-02-05 RX ORDER — NALOXONE HCL 0.4 MG/ML
0.04 VIAL (ML) INJECTION AS NEEDED
Status: CANCELLED | OUTPATIENT
Start: 2021-02-05

## 2021-02-05 RX ORDER — ATROPINE SULFATE 1 MG/ML
0.5 INJECTION, SOLUTION INTRAMUSCULAR; INTRAVENOUS; SUBCUTANEOUS ONCE AS NEEDED
Status: CANCELLED | OUTPATIENT
Start: 2021-02-05

## 2021-02-05 RX ORDER — FENTANYL CITRATE 50 UG/ML
25 INJECTION, SOLUTION INTRAMUSCULAR; INTRAVENOUS
Status: CANCELLED | OUTPATIENT
Start: 2021-02-05

## 2021-02-05 RX ORDER — HYDROMORPHONE HYDROCHLORIDE 1 MG/ML
0.5 INJECTION, SOLUTION INTRAMUSCULAR; INTRAVENOUS; SUBCUTANEOUS
Status: CANCELLED | OUTPATIENT
Start: 2021-02-05

## 2021-02-05 RX ORDER — LIDOCAINE HYDROCHLORIDE 20 MG/ML
INJECTION, SOLUTION EPIDURAL; INFILTRATION; INTRACAUDAL; PERINEURAL AS NEEDED
Status: DISCONTINUED | OUTPATIENT
Start: 2021-02-05 | End: 2021-02-05 | Stop reason: SURG

## 2021-02-05 RX ADMIN — SODIUM CHLORIDE: 9 INJECTION, SOLUTION INTRAVENOUS at 12:20

## 2021-02-05 RX ADMIN — ASPIRIN 81 MG: 81 TABLET, CHEWABLE ORAL at 09:18

## 2021-02-05 RX ADMIN — NITROGLYCERIN 0.4 MG: 0.4 TABLET SUBLINGUAL at 04:47

## 2021-02-05 RX ADMIN — PROPOFOL 100 MG: 10 INJECTION, EMULSION INTRAVENOUS at 12:49

## 2021-02-05 RX ADMIN — SODIUM CHLORIDE 1000 ML: 9 INJECTION, SOLUTION INTRAVENOUS at 09:14

## 2021-02-05 RX ADMIN — PROPOFOL 100 MG: 10 INJECTION, EMULSION INTRAVENOUS at 12:53

## 2021-02-05 RX ADMIN — SODIUM CHLORIDE, PRESERVATIVE FREE 10 ML: 5 INJECTION INTRAVENOUS at 09:15

## 2021-02-05 RX ADMIN — DOCUSATE SODIUM 50 MG AND SENNOSIDES 8.6 MG 2 TABLET: 8.6; 5 TABLET, FILM COATED ORAL at 20:25

## 2021-02-05 RX ADMIN — ATORVASTATIN CALCIUM 80 MG: 10 TABLET, FILM COATED ORAL at 20:25

## 2021-02-05 RX ADMIN — PROPOFOL 40 MG: 10 INJECTION, EMULSION INTRAVENOUS at 13:20

## 2021-02-05 RX ADMIN — CLOPIDOGREL 75 MG: 75 TABLET, FILM COATED ORAL at 09:19

## 2021-02-05 RX ADMIN — SODIUM CHLORIDE, PRESERVATIVE FREE 10 ML: 5 INJECTION INTRAVENOUS at 20:25

## 2021-02-05 RX ADMIN — SODIUM CHLORIDE 100 ML/HR: 9 INJECTION, SOLUTION INTRAVENOUS at 15:29

## 2021-02-05 RX ADMIN — LATANOPROST 1 DROP: 50 SOLUTION OPHTHALMIC at 20:26

## 2021-02-05 RX ADMIN — ASPIRIN 325 MG: 325 TABLET, COATED ORAL at 05:04

## 2021-02-05 RX ADMIN — METOPROLOL SUCCINATE 50 MG: 50 TABLET, FILM COATED, EXTENDED RELEASE ORAL at 09:18

## 2021-02-05 RX ADMIN — PROPOFOL 60 MG: 10 INJECTION, EMULSION INTRAVENOUS at 13:12

## 2021-02-05 RX ADMIN — LIDOCAINE HYDROCHLORIDE 100 MG: 20 INJECTION, SOLUTION EPIDURAL; INFILTRATION; INTRACAUDAL; PERINEURAL at 13:02

## 2021-02-05 RX ADMIN — LIDOCAINE HYDROCHLORIDE 100 MG: 20 INJECTION, SOLUTION EPIDURAL; INFILTRATION; INTRACAUDAL; PERINEURAL at 12:49

## 2021-02-05 RX ADMIN — PROPOFOL 100 MG: 10 INJECTION, EMULSION INTRAVENOUS at 13:02

## 2021-02-05 NOTE — PROGRESS NOTES
Neurology Progress Note      Date of admission: 2/2/2021  8:14 AM  Date of visit: 2/5/2021    Chief Complaint:  F/u stroke    Subjective     Subjective:    Patient with chest pain this morning and labs now showing acute renal failure.  Chest pain resolved with nitro. He states he did not sleep last night at all.   No new neuro complaints.  He is NPO since midnight waiting for JOEL.   Medications:  Current Facility-Administered Medications   Medication Dose Route Frequency Provider Last Rate Last Admin   • acetaminophen (TYLENOL) tablet 650 mg  650 mg Oral Q4H PRN Virgil Siu DO   650 mg at 02/03/21 0816   • aluminum-magnesium hydroxide-simethicone (MAALOX MAX) 400-400-40 MG/5ML suspension 7.5 mL  7.5 mL Oral Q4H PRN Virgil Siu DO       • aspirin chewable tablet 81 mg  81 mg Oral Daily Virgil Siu DO   81 mg at 02/05/21 0918    Or   • aspirin suppository 300 mg  300 mg Rectal Daily Virgil Siu DO       • atorvastatin (LIPITOR) tablet 80 mg  80 mg Oral Nightly Virgil Siu DO   80 mg at 02/04/21 2051   • bisacodyl (DULCOLAX) suppository 10 mg  10 mg Rectal Daily PRN Virgil Siu DO       • cholecalciferol (VITAMIN D3) tablet 400 Units  400 Units Oral Daily Virgil Siu DO   400 Units at 02/04/21 1453   • clopidogrel (PLAVIX) tablet 75 mg  75 mg Oral Daily Virgil Siu DO   75 mg at 02/05/21 0919   • docusate sodium (COLACE) capsule 100 mg  100 mg Oral BID PRN Virgil Siu DO       • HYDROcodone-acetaminophen (NORCO) 7.5-325 MG per tablet 1 tablet  1 tablet Oral Q6H PRN Virgil Siu DO   1 tablet at 02/02/21 1255   • labetalol (NORMODYNE,TRANDATE) injection 20 mg  20 mg Intravenous Q6H PRN Virgil Siu DO   20 mg at 02/02/21 1133   • latanoprost (XALATAN) 0.005 % ophthalmic solution 1 drop  1 drop Both Eyes Nightly Virgil Siu DO   1 drop at 02/04/21 2052   • metoprolol succinate XL (TOPROL-XL) 24 hr tablet 50 mg  50  mg Oral Daily Virgil Siu DO   50 mg at 02/05/21 0918   • ondansetron (ZOFRAN) injection 4 mg  4 mg Intravenous Q6H PRN Virgil Siu DO   4 mg at 02/04/21 0319   • polyethylene glycol (MIRALAX) packet 17 g  17 g Oral Daily Swathi Jauregui APRN   17 g at 02/04/21 1457   • sennosides-docusate (PERICOLACE) 8.6-50 MG per tablet 2 tablet  2 tablet Oral Nightly Swathi Jauregui APRN   2 tablet at 02/04/21 2051   • sodium chloride 0.9 % flush 10 mL  10 mL Intravenous PRN Virgil Siu DO       • sodium chloride 0.9 % flush 10 mL  10 mL Intravenous Q12H Virgil Siu DO   10 mL at 02/05/21 0915   • sodium chloride 0.9 % flush 10 mL  10 mL Intravenous PRN Virgil Siu DO       • sodium chloride 0.9 % infusion  100 mL/hr Intravenous Continuous Swathi Jauregui APRN       • vitamin B-12 (CYANOCOBALAMIN) tablet 500 mcg  500 mcg Oral Daily Virgil Siu DO   500 mcg at 02/04/21 1140       Review of Systems:   -A 14 point review of systems is completed and is negative except for continued left sided weakness    Objective     Objective      Vital Signs  Temp:  [97.5 °F (36.4 °C)-98.4 °F (36.9 °C)] 98.4 °F (36.9 °C)  Heart Rate:  [61-92] 61  Resp:  [16-18] 18  BP: ()/() 142/89    Physical Exam:    HEENT:  Neck supple  CVS:  Regular rate and rhythm.  No murmurs  Carotid Examination:  No bruits  Lungs:  Clear to auscultation  Abdomen:  Non-tender, Non-distended  Extremities:  No signs of peripheral edema    Neurologic Exam:    -Awake, Alert, Oriented X 3  -No word finding difficulties  -No aphasia  -Mild dysarthria  -Follows simple and complex commands    Cranial nerves II through XII intact except for left face and left shoulder shrug  Pupils react  EOMI  No ptosis or nystagmus  No facial sensory asymmetry  Smile lags on the left   Hearing intact to voice  Does not shrug left shoulder but does on right  Tongue protrudes midline.  Motor: (strength out of 5:  1= minimal movement, 2 =  movement in plane of gravity, 3 = movement against gravity, 4 = movement against some resistance, 5 = full strength)    -Right Upper Ext: Proximal: 5 Distal: 5  -Left Upper Ext: Proximal: 3 to  4 Distal: 4    -Right Lower Ext: Proximal: 5 Distal: 5  -Left Lower Ext: Proximal: 4 Distal: 0    DTR:  2+ throughout in all four extremities    Sensory:  -Intact to light touch, pinprick    Coordination/Gait:  -No ataxia noted     Results Review:    I reviewed the patient's new clinical results.    Lab Results (last 24 hours)     Procedure Component Value Units Date/Time    Basic Metabolic Panel [[id number]] Collected: 02/05/21 0959    Specimen: Blood Updated: 02/05/21 1042    Troponin [[id number]] Collected: 02/05/21 0959    Specimen: Blood Updated: 02/05/21 1042    Basic Metabolic Panel [[id number]]  (Abnormal) Collected: 02/05/21 0706    Specimen: Blood Updated: 02/05/21 0755     Glucose 133 mg/dL      BUN 62 mg/dL      Creatinine 5.12 mg/dL      Sodium 137 mmol/L      Potassium 4.1 mmol/L      Chloride 92 mmol/L      CO2 30.0 mmol/L      Calcium 9.8 mg/dL      eGFR  African Amer 14 mL/min/1.73      Comment: <15 Indicative of kidney failure.        eGFR Non  Amer --     Comment: <15 Indicative of kidney failure.        BUN/Creatinine Ratio 12.1     Anion Gap 15.0 mmol/L     Narrative:      GFR Normal >60  Chronic Kidney Disease <60  Kidney Failure <15      Troponin [[id number]]  (Normal) Collected: 02/05/21 0706    Specimen: Blood Updated: 02/05/21 0752     Troponin T <0.010 ng/mL     Narrative:      Troponin T Reference Range:  <= 0.03 ng/mL-   Negative for AMI  >0.03 ng/mL-     Abnormal for myocardial necrosis.  Clinicians would have to utilize clinical acumen, EKG, Troponin and serial changes to determine if it is an Acute Myocardial Infarction or myocardial injury due to an underlying chronic condition.       Results may be falsely decreased if patient taking Biotin.      BNP [[id number]]  (Normal) Collected:  02/05/21 0706    Specimen: Blood Updated: 02/05/21 0751     proBNP 51.8 pg/mL     Narrative:      Among patients with dyspnea, NT-proBNP is highly sensitive for the detection of acute congestive heart failure. In addition NT-proBNP of <300 pg/ml effectively rules out acute congestive heart failure with 99% negative predictive value.    Results may be falsely decreased if patient taking Biotin.      Comprehensive Metabolic Panel [[id number]]  (Abnormal) Collected: 02/05/21 0455    Specimen: Blood Updated: 02/05/21 0531     Glucose 146 mg/dL      BUN 56 mg/dL      Creatinine 4.87 mg/dL      Sodium 134 mmol/L      Potassium 4.2 mmol/L      Chloride 91 mmol/L      CO2 26.0 mmol/L      Calcium 10.1 mg/dL      Total Protein 8.7 g/dL      Albumin 4.80 g/dL      ALT (SGPT) 17 U/L      AST (SGOT) 35 U/L      Alkaline Phosphatase 74 U/L      Total Bilirubin 0.8 mg/dL      eGFR  African Amer 15 mL/min/1.73      Globulin 3.9 gm/dL      A/G Ratio 1.2 g/dL      BUN/Creatinine Ratio 11.5     Anion Gap 17.0 mmol/L     Narrative:      GFR Normal >60  Chronic Kidney Disease <60  Kidney Failure <15      Troponin [[id number]]  (Normal) Collected: 02/05/21 0455    Specimen: Blood Updated: 02/05/21 0515     Troponin T 0.011 ng/mL     Narrative:      Troponin T Reference Range:  <= 0.03 ng/mL-   Negative for AMI  >0.03 ng/mL-     Abnormal for myocardial necrosis.  Clinicians would have to utilize clinical acumen, EKG, Troponin and serial changes to determine if it is an Acute Myocardial Infarction or myocardial injury due to an underlying chronic condition.       Results may be falsely decreased if patient taking Biotin.      CBC & Differential [[id number]]  (Abnormal) Collected: 02/05/21 0455    Specimen: Blood Updated: 02/05/21 0501    Narrative:      The following orders were created for panel order CBC & Differential.  Procedure                               Abnormality         Status                     ---------                                -----------         ------                     CBC Auto Differential[[id number]]        Abnormal            Final result                 Please view results for these tests on the individual orders.    CBC Auto Differential [[id number]]  (Abnormal) Collected: 02/05/21 0455    Specimen: Blood Updated: 02/05/21 0501     WBC 13.52 10*3/mm3      RBC 6.47 10*6/mm3      Hemoglobin 16.8 g/dL      Hematocrit 50.7 %      MCV 78.4 fL      MCH 26.0 pg      MCHC 33.1 g/dL      RDW 15.3 %      RDW-SD 42.4 fl      MPV 11.0 fL      Platelets 300 10*3/mm3      Neutrophil % 74.2 %      Lymphocyte % 17.2 %      Monocyte % 7.7 %      Eosinophil % 0.1 %      Basophil % 0.4 %      Immature Grans % 0.4 %      Neutrophils, Absolute 10.05 10*3/mm3      Lymphocytes, Absolute 2.32 10*3/mm3      Monocytes, Absolute 1.04 10*3/mm3      Eosinophils, Absolute 0.01 10*3/mm3      Basophils, Absolute 0.05 10*3/mm3      Immature Grans, Absolute 0.05 10*3/mm3      nRBC 0.0 /100 WBC     POC Glucose Once [[id number]]  (Abnormal) Collected: 02/05/21 0444    Specimen: Blood Updated: 02/05/21 0458     Glucose 137 mg/dL      Comment: : [id number] Angus LisaMeter ID: [id number]           Imaging Results (Last 24 Hours)     Procedure Component Value Units Date/Time    XR Chest 1 View [[id number]] Collected: 02/05/21 0738     Updated: 02/05/21 0742    Narrative:      Exam:   XR CHEST 1 VW-       Date:  2/5/2021      History:  Male, age  65 years;Chest Pain; I16.0-Hypertensive urgency;  R29.90-Unspecified symptoms and signs involving the nervous system;  Z74.09-Other reduced mobility; Z78.9-Other specified health status;  R47.89-Other speech disturbances; Z74.09-Other reduced mobility;  R13.10-Dysphagia, unspecified     COMPARISON:  None.     Findings :     The heart and mediastinum are normal in size. Left base opacity, may  reflect atelectasis versus infection. No measurable pneumothorax.  The  bones show no acute pathology.         Impression:       Impression:     Left base opacity, may reflect atelectasis versus infection.     This report was finalized on 02/05/2021 07:38 by Dr. Theodora Carlton MD.          Assessment/Plan     Hospital Problem List      Right pontine CVA (CMS/HCC)    Essential hypertension    Mixed hyperlipidemia    History of noncompliance with medical treatment    History of previous left MCA stroke    Acute kidney injury (CMS/HCC)    Chest pain    Impression:  1. Acute right pontine stroke and later during this hospitalization developed another acute stroke of right temporal lobe  2. Left sided weakness and dysarthria due to stroke   3. NAREN  4. Hypertension--will need slow correction  5. Chest pain   6. Fall risk    Plan:  JOEL scheduled for today  Since he is NPO since midnight will need fluids--renal seeing patient  He will need inpatient rehab  Continue aspirin Plavix for 21 days then monotherapy with Plavix  Continue PT/OT/speech therapy        Sita Theodore MD  02/05/21  10:59 CST

## 2021-02-05 NOTE — ANESTHESIA POSTPROCEDURE EVALUATION
Patient: Dao Jhaveri    Procedure Summary     Date: 02/05/21 Room / Location: [facility] CATH LAB; [facility] CARDIOLOGY    Anesthesia Start: 1220 Anesthesia Stop: 1336    Procedure: ADULT TRANSESOPHAGEAL ECHO (JOEL) W/ CONT IF NECESSARY PER PROTOCOL Diagnosis: (Cardiac Source of Emboli)    Scheduled Providers:  Provider: Micheal Shelton CRNA    Anesthesia Type: MAC ASA Status: 3          Anesthesia Type: MAC    Vitals  No vitals data found for the desired time range.          Post Anesthesia Care and Evaluation    Patient location during evaluation: PHASE II  Level of consciousness: awake and alert  Pain management: adequate  Airway patency: patent  Anesthetic complications: No anesthetic complications    Cardiovascular status: acceptable  Respiratory status: acceptable  Hydration status: acceptable

## 2021-02-05 NOTE — PROGRESS NOTES
[facility] Medicine Services  INPATIENT PROGRESS NOTE    Patient Name: Dao Jhaveri  Date of Admission: 2/2/2021  Today's Date: 02/05/21  Length of Stay: 3  Primary Care Physician: Dallin Miller DO    Subjective   Chief Complaint: chest pain  HPI   Patient seen and examined at bedside this morning.      Patient stated he had two episodes of chest pain overnight.  The last time being this morning around 7.  Repeat EKG concerning.  Cardiology consulted.  Patient presently denies shortness of breath, chest pain or pressure.  Oxygen was applied during cardiac alert.  Last documented SPO2 of 96% on 3 L nasal cannula.  He does complain of back pain in between his shoulder blades.  Troponins negative x2.  Vital signs are stable.  Noted that his creatinine has significantly increased to 5.12. Will give 1 L normal saline bolus and consult nephrology.  Urine output nonoliguric.    Review of Systems   All pertinent negatives and positives are as above. All other systems have been reviewed and are negative unless otherwise stated.     Objective    Temp:  [97.5 °F (36.4 °C)-98.4 °F (36.9 °C)] 98.4 °F (36.9 °C)  Heart Rate:  [61-92] 61  Resp:  [16-18] 18  BP: ()/() 142/89  Physical Exam  Vitals signs reviewed.   Constitutional:       General: He is not in acute distress.     Appearance: He is not toxic-appearing.      Comments: Sitting up in bed.  No acute distress.  Tolerating room air. Discussed with his nurse, Cathie.  HENT:      Head: Normocephalic and atraumatic.      Mouth/Throat:      Mouth: Mucous membranes are moist.      Pharynx: Oropharynx is clear.   Eyes:      Extraocular Movements: Extraocular movements intact.      Conjunctiva/sclera: Conjunctivae normal.      Pupils: Pupils are equal, round, and reactive to light.   Neck:      Musculoskeletal: Normal range of motion and neck supple. No muscular tenderness.   Cardiovascular:      Rate and Rhythm: Normal  rate and regular rhythm.      Pulses: Normal pulses.      Heart sounds: No murmur.      Comments: S 60-83  Pulmonary:      Effort: Pulmonary effort is normal. No respiratory distress.      Breath sounds: Normal breath sounds. No wheezing.   Abdominal:      General: Bowel sounds are normal. There is no distension.      Palpations: Abdomen is soft.      Tenderness: There is no abdominal tenderness.   Musculoskeletal: Normal range of motion.         General: No swelling or tenderness.   Skin:     General: Skin is warm and dry.      Findings: No erythema or rash.   Neurological:      General: No focal deficit present.      Mental Status: He is alert and oriented to person, place, and time.      Cranial Nerves: No cranial nerve deficit.      Motor: No weakness.      Comments: Mild dysarthria.  Left-sided weakness.  Psychiatric:         Mood and Affect: Mood normal.         Behavior: Behavior normal.     Results Review:  I have reviewed the labs, radiology results, and diagnostic studies.    Laboratory Data:   Results from last 7 days   Lab Units 02/05/21  0455 02/02/21  1338 02/02/21  0859   WBC 10*3/mm3 13.52*  --  7.49   HEMOGLOBIN g/dL 16.8  --  14.5   HEMATOCRIT % 50.7 47.3 44.5   PLATELETS 10*3/mm3 300 241 221        Results from last 7 days   Lab Units 02/05/21  0706 02/05/21  0455 02/02/21  0859   SODIUM mmol/L 137 134* 140   POTASSIUM mmol/L 4.1 4.2 4.0   CHLORIDE mmol/L 92* 91* 103   CO2 mmol/L 30.0* 26.0 29.0   BUN mg/dL 62* 56* 17   CREATININE mg/dL 5.12* 4.87* 1.17   CALCIUM mg/dL 9.8 10.1 9.7   BILIRUBIN mg/dL  --  0.8 0.3   ALK PHOS U/L  --  74 72   ALT (SGPT) U/L  --  17 12   AST (SGOT) U/L  --  35 17   GLUCOSE mg/dL 133* 146* 130*       Radiology Data:   Imaging Results (Last 24 Hours)     Procedure Component Value Units Date/Time    XR Chest 1 View [[id number]] Collected: 02/05/21 0738     Updated: 02/05/21 0742    Narrative:      Exam:   XR CHEST 1 VW-       Date:  2/5/2021      History:  Male, age  65  years;Chest Pain; I16.0-Hypertensive urgency;  R29.90-Unspecified symptoms and signs involving the nervous system;  Z74.09-Other reduced mobility; Z78.9-Other specified health status;  R47.89-Other speech disturbances; Z74.09-Other reduced mobility;  R13.10-Dysphagia, unspecified     COMPARISON:  None.     Findings :     The heart and mediastinum are normal in size. Left base opacity, may  reflect atelectasis versus infection. No measurable pneumothorax.  The  bones show no acute pathology.         Impression:      Impression:     Left base opacity, may reflect atelectasis versus infection.     This report was finalized on 02/05/2021 07:38 by Dr. Theodora Carlton MD.    MRI Brain Without Contrast [[id number]] Collected: 02/04/21 1020     Updated: 02/04/21 1028    Narrative:      HISTORY: Worsening left-sided symptoms, pontine infarct     MRI BRAIN: Multiplanar imaging the brain performed without contrast.     COMPARISON: MRI brain 2/2/2021     FINDINGS: The diffusion restriction within the right colon is similar a  new punctate focus of diffusion restriction suspected within the right  temporal lobe adjacent the posterior horn right lateral ventricle. No  intracranial blood products identified. Stable cerebral volume loss with  diffuse chronic small vessel ischemic changes. Old lacunar infarcts of  the left basal ganglia. No abnormal extra-axial fluid collection.     Limited assessment of the orbits and base of skull is unremarkable.  There are normal flow-voids within the distal internal carotid and  basilar arteries.       Impression:      1. Stable appearing subacute right pontine infarct with a new 7 mm focus  of diffusion restriction suggesting acute ischemia of the right temporal  lobe in the right periventricular region adjacent the posterior horn of  the right lateral ventricle. No intracranial hemorrhage.  2. Stable atrophy with chronic small vessel ischemia. Old lacunar  infarcts left basal ganglia.  This  report was finalized on 02/04/2021 10:25 by Dr. Mae Jarquin MD.          I have reviewed the patient's current medications.     Assessment/Plan     Active Hospital Problems    Diagnosis   • **Right pontine CVA (CMS/HCC)   • Acute kidney injury (CMS/HCC)   • Chest pain   • History of noncompliance with medical treatment   • History of previous left MCA stroke   • Mixed hyperlipidemia   • Essential hypertension     Plan:  1.  Neurology evaluating patient.  MRI brain showed right pontine stroke.  Continue aspirin 81 mg daily in addition to Plavix and statin.  Repeat MRI 2/4 showed stable right pontine stroke and new acute stroke right temporal lobe.  Per neurology, unclear why he had a second stroke this admission.  Obtain JOEL.  2.  Patient was a cardiac alert this morning secondary to chest pain.  He was noted to be very diaphoretic.  He did have immediate relief with sublingual nitroglycerin.  Aspirin 325 mg given.  EKG noted T wave inversions on inferior leads.  Stat chest x-ray showed no acute cardiopulmonary process.  He again complained of chest pain at 0650.  Repeat EKG concerning.  Cardiology consulted.  Troponin negative x2.  3.  Stat labs obtained this morning showed a creatinine of 4.87, BUN 56.  BMP repeated at 0700 showed serum creatinine 5.12 and BUN 62.  Give 1 L normal saline bolus and start continuous IV fluid.  Consult nephrology.  Renal ultrasound.  Hold nephrotoxins.  Obtain baseline urine studies.  4.  Transthoracic echocardiogram showed preserved ejection fraction with left ventricular diastolic dysfunction grade 1.  Saline test results negative.  Thickening on anterior leaflet of mitral valve.  Transesophageal echocardiogram today.   5.  Patient has had a wide fluctuations in his blood pressure.  Systolic blood pressure goal is less than 140.  Continue to monitor closely.  4.  Physical, occupational, and speech therapy.  5.  Work-up ongoing.     Discharge Planning: I expect the patient to be  discharged to [facility] rehab to be determined pending patients progress.    Electronically signed by OMAIRA Horner, 02/05/21, 09:14 CST.
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
## 2021-02-05 NOTE — OP NOTE
See full note in procedure tab    JOEL results  No thrombus noted in the left atrial appendage  Negative bubble study, no evidence of PFO or ASD  Severely thickened left ventricle but no evidence of LV noncompaction

## 2021-02-05 NOTE — PLAN OF CARE
Goal Outcome Evaluation:  Plan of Care Reviewed With: (P) patient, spouse  Progress: (P) improving  Outcome Summary: (P) See note.    Motor speech tx performed. Patient was alert and cooperative, and his wife was present. Patient had left sided weakness throughout the body. Per patient and his wife, patient had two cardiac alerts over the night. Patient NPO until JOEL test and renal ultrasound performed. Patient should be able to continue regular diet with thin liquids after test is completed unless RN has any concerns. Patient was given sentence level and open-ended questions. Patient required no prompting to slow down speech to be more easily understood when repeating sentences and answering open-ended questions. Patient showed difficulty with more complex sentences and phrase, but was able to self-correct and repeat the sentence correctly. Patient should continue tx for motor speech impairment. ST will continue to follow.     Poonam Foreman, Speech Therapy Student

## 2021-02-05 NOTE — PROGRESS NOTES
Continued Stay Note   Nhan     Patient Name: Dao Jhaveri  MRN: [id number]  Today's Date: 2/5/2021    Admit Date: 2/2/2021    Discharge Plan     Row Name 02/05/21 0833       Plan    Plan Comments  Rasheeda states that precert has been started on this pt and is hoping to get precert approved today for possible discharge.  will update if precert has been completed by this afternoon.    0902: Pt will not be ready until Monday. Rasheeda falcon [facility] aware         Discharge Codes    No documentation.             Adrienne David

## 2021-02-05 NOTE — NURSING NOTE
"PCT called stating patient is c/o of sudden onset chest pain.  Stating it feels like \"all three of us sitting on his chest.  VSS. Cardiac alert called due to patient chest pain worsening, pt became very diphoretic, and pain radiating to back.   "
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
## 2021-02-05 NOTE — PLAN OF CARE
Goal Outcome Evaluation:  Plan of Care Reviewed With: patient     Outcome Summary: Pt alert and oriented throughout shift. No neuro changes noted. Patient starting c/o of chest pain, cardiac alert called at 430am. Pt received nitro, 325mg of aspirin and chest pain subsided. Pt started having chest pain again around 645.  notified, repeat troponin and EKG completed. VSS. Safety maintained will cont to monitor.

## 2021-02-05 NOTE — PROGRESS NOTES
"Patient had a cardiac alert around 445 this morning.  When I arrived to the patient's room, he was noted to be very diaphoretic.  He described the chest pain as \"3 people sitting on his chest\" with radiation down his left arm.  Stat EKG was obtained, and inferior leads, it is noted that the patient has T wave inversions, that were not evident on previous EKG.  Clinically, patient appears very worrisome, as he is very diaph oretic, his blood pressure also went up, as he indicated he was \"scared he was going to die\".  Patient was given a sublingual nitroglycerin with immediate relief of his pain, as well as blood pressure response.  He was a little bit hypotensive.  Stat chest x-ray is also obtained and showed no acute pulmonary process per my review at the bedside.  The stat troponin came back at 0.011, we will obtain another one in 3 hours.  At  the end of the cardiac alert, the patient is chest pain-free.    Cardiac alert 445, nitroglycerin given at 447.  Blood pressure was noted to be elevated at 446 at 156/111.  Blood pressure after the nitroglycerin dropped to 121/90.  At 458, the patient was given 325 mg aspirin, as he had not been started on aspirin previously for his stroke.  Patient was noted to be hypotensive with a pressure of 85/61 at 501.  Patient remained chest  pain-free, cardiac alert was terminated.      Blood pressure started to respond to come up on its own.  Patient remained chest pain-free.     Patient made NPO.    Notified at 0650 that patient is continuing to have recurrent chest pain.  Will get another ECG and troponin. Consult cardiology.   "

## 2021-02-05 NOTE — ANESTHESIA PREPROCEDURE EVALUATION
Anesthesia Evaluation     NPO Solid Status: > 8 hours  NPO Liquid Status: > 8 hours           Airway   No difficulty expected  Dental    (+) partials    Pulmonary    (+) a smoker Former,   (-) asthma  Cardiovascular     PT is on anticoagulation therapy  Patient on routine beta blocker and Beta blocker not taken-may be given intraoperatively    (+) hypertension, hyperlipidemia,   (-) past MI, CAD      Neuro/Psych  (+) CVA residual symptoms,     (-) seizures  GI/Hepatic/Renal/Endo    (+)   renal disease,   (-) liver disease, diabetes    Musculoskeletal (-) negative ROS    Abdominal    Substance History      OB/GYN negative ob/gyn ROS         Other                      Anesthesia Plan    ASA 3     MAC     intravenous induction     Anesthetic plan, all risks, benefits, and alternatives have been provided, discussed and informed consent has been obtained with: patient.
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
## 2021-02-06 PROBLEM — N14.11 CONTRAST DYE INDUCED NEPHROPATHY: Status: ACTIVE | Noted: 2021-02-06

## 2021-02-06 PROBLEM — T50.8X5A CONTRAST DYE INDUCED NEPHROPATHY: Status: ACTIVE | Noted: 2021-02-06

## 2021-02-06 LAB
ANION GAP SERPL CALCULATED.3IONS-SCNC: 10 MMOL/L (ref 5–15)
BUN SERPL-MCNC: 63 MG/DL (ref 8–23)
BUN/CREAT SERPL: 23.1 (ref 7–25)
CALCIUM SPEC-SCNC: 9.1 MG/DL (ref 8.6–10.5)
CHLORIDE SERPL-SCNC: 100 MMOL/L (ref 98–107)
CO2 SERPL-SCNC: 25 MMOL/L (ref 22–29)
CREAT SERPL-MCNC: 2.73 MG/DL (ref 0.76–1.27)
CREAT UR-MCNC: 243.1 MG/DL
GFR SERPL CREATININE-BSD FRML MDRD: 29 ML/MIN/1.73
GLUCOSE SERPL-MCNC: 108 MG/DL (ref 65–99)
POTASSIUM SERPL-SCNC: 4.2 MMOL/L (ref 3.5–5.2)
QT INTERVAL: 408 MS
QT INTERVAL: 410 MS
QT INTERVAL: 428 MS
QT INTERVAL: 430 MS
QTC INTERVAL: 433 MS
QTC INTERVAL: 464 MS
QTC INTERVAL: 476 MS
QTC INTERVAL: 490 MS
SODIUM SERPL-SCNC: 135 MMOL/L (ref 136–145)
URATE SERPL-MCNC: 10.2 MG/DL (ref 3.4–7)
UUN 24H UR-MCNC: 647 MG/DL
WHOLE BLOOD HOLD SPECIMEN: NORMAL

## 2021-02-06 PROCEDURE — 99232 SBSQ HOSP IP/OBS MODERATE 35: CPT | Performed by: PSYCHIATRY & NEUROLOGY

## 2021-02-06 PROCEDURE — 80048 BASIC METABOLIC PNL TOTAL CA: CPT | Performed by: INTERNAL MEDICINE

## 2021-02-06 PROCEDURE — 97535 SELF CARE MNGMENT TRAINING: CPT | Performed by: OCCUPATIONAL THERAPIST

## 2021-02-06 PROCEDURE — 84550 ASSAY OF BLOOD/URIC ACID: CPT | Performed by: INTERNAL MEDICINE

## 2021-02-06 RX ORDER — ALLOPURINOL 100 MG/1
200 TABLET ORAL DAILY
Status: DISCONTINUED | OUTPATIENT
Start: 2021-02-06 | End: 2021-02-08 | Stop reason: HOSPADM

## 2021-02-06 RX ADMIN — SODIUM CHLORIDE 100 ML/HR: 9 INJECTION, SOLUTION INTRAVENOUS at 01:03

## 2021-02-06 RX ADMIN — ALLOPURINOL 200 MG: 100 TABLET ORAL at 14:41

## 2021-02-06 RX ADMIN — HYDROCODONE BITARTRATE AND ACETAMINOPHEN 1 TABLET: 7.5; 325 TABLET ORAL at 01:00

## 2021-02-06 RX ADMIN — CLOPIDOGREL 75 MG: 75 TABLET, FILM COATED ORAL at 09:24

## 2021-02-06 RX ADMIN — METOPROLOL SUCCINATE 50 MG: 50 TABLET, FILM COATED, EXTENDED RELEASE ORAL at 09:24

## 2021-02-06 RX ADMIN — SODIUM CHLORIDE 100 ML/HR: 9 INJECTION, SOLUTION INTRAVENOUS at 14:31

## 2021-02-06 RX ADMIN — POLYETHYLENE GLYCOL (3350) 17 G: 17 POWDER, FOR SOLUTION ORAL at 09:24

## 2021-02-06 RX ADMIN — Medication 500 MCG: at 09:24

## 2021-02-06 RX ADMIN — LATANOPROST 1 DROP: 50 SOLUTION OPHTHALMIC at 22:04

## 2021-02-06 RX ADMIN — SODIUM CHLORIDE, PRESERVATIVE FREE 10 ML: 5 INJECTION INTRAVENOUS at 22:03

## 2021-02-06 RX ADMIN — ASPIRIN 81 MG: 81 TABLET, CHEWABLE ORAL at 09:24

## 2021-02-06 RX ADMIN — CHOLECALCIFEROL TAB 10 MCG (400 UNIT) 400 UNITS: 10 TAB at 09:24

## 2021-02-06 RX ADMIN — HYDROCODONE BITARTRATE AND ACETAMINOPHEN 1 TABLET: 7.5; 325 TABLET ORAL at 22:06

## 2021-02-06 RX ADMIN — ATORVASTATIN CALCIUM 80 MG: 10 TABLET, FILM COATED ORAL at 22:01

## 2021-02-06 RX ADMIN — SODIUM CHLORIDE, PRESERVATIVE FREE 10 ML: 5 INJECTION INTRAVENOUS at 09:24

## 2021-02-06 NOTE — PLAN OF CARE
Goal Outcome Evaluation:  Plan of Care Reviewed With: patient, spouse  Progress: improving  Outcome Summary: JOEL today, resullts pending.  Renal US done.  1 L NS bolus given as ordered, IVF as ordered, strict intake and output.  VSS.  No neuro change, NIH 6.
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
## 2021-02-06 NOTE — PROGRESS NOTES
[facility] Medicine Services  INPATIENT PROGRESS NOTE    Length of Stay: 4  Date of Admission: 2/2/2021  Primary Care Physician: Dallin Miller DO    Subjective     Chief Complaint:     Stroke, acute kidney injury    HPI     The patient is alert and talkative today.  He continues with gentle IV hydration.  No dialysis is expected to be needed.  Uric acid level is elevated greater than 10.  Allopurinol has been added to his regimen by nephrology.  JOEL shows no evidence of intracavitary thrombus.  Tentative plan is for diagnostic left heart cath once renal function has improved.  Precertification for transition to acute rehab at [facility] has been initiated.  Creatinine is improved to 2.73.  Cardiology would like to do an ischemic work-up.  I discussed that with the patient and he would like to postpone the work-up until after acute rehab has been completed and revisit that as an outpatient.  The patient is particularly concerned that he might encounter another episode of contrast-induced nephropathy.    Review of Systems     All pertinent negatives and positives are as above. All other systems have been reviewed and are negative unless otherwise stated.     Objective    Temp:  [97.8 °F (36.6 °C)-98.3 °F (36.8 °C)] 98 °F (36.7 °C)  Heart Rate:  [60-86] 64  Resp:  [18-23] 18  BP: (115-154)/(74-91) 136/82    Lab Results (last 24 hours)     Procedure Component Value Units Date/Time    Extra Tubes [[id number]] Collected: 02/06/21 0431    Specimen: Blood, Venous Line Updated: 02/06/21 0615    Narrative:      The following orders were created for panel order Extra Tubes.  Procedure                               Abnormality         Status                     ---------                               -----------         ------                     Lavender Top[[id number]]                                     Final result                 Please view results for these tests on  the individual orders.    Yancy Top [[id number]] Collected: 02/06/21 0431    Specimen: Blood Updated: 02/06/21 0615     Extra Tube hold for add-on     Comment: Auto resulted       Basic Metabolic Panel [[id number]]  (Abnormal) Collected: 02/06/21 0430    Specimen: Blood Updated: 02/06/21 0530     Glucose 108 mg/dL      BUN 63 mg/dL      Creatinine 2.73 mg/dL      Sodium 135 mmol/L      Potassium 4.2 mmol/L      Chloride 100 mmol/L      CO2 25.0 mmol/L      Calcium 9.1 mg/dL      eGFR  African Amer 29 mL/min/1.73      BUN/Creatinine Ratio 23.1     Anion Gap 10.0 mmol/L     Narrative:      GFR Normal >60  Chronic Kidney Disease <60  Kidney Failure <15      Uric Acid [[id number]]  (Abnormal) Collected: 02/06/21 0430    Specimen: Blood Updated: 02/06/21 0530     Uric Acid 10.2 mg/dL     Urea Nitrogen, Urine - Urine, Clean Catch [[id number]] Collected: 02/05/21 1538    Specimen: Urine, Clean Catch Updated: 02/06/21 0142     Urea Nitrogen, Urine 647 mg/dL     Narrative:      Reference intervals for random urine have not been established.  Clinical usage is dependent upon physician's interpretation in combination with other laboratory tests.       Creatinine, Urine, Random - Urine, Clean Catch [[id number]] Collected: 02/05/21 1538    Specimen: Urine, Clean Catch Updated: 02/06/21 0142     Creatinine, Urine 243.1 mg/dL     Narrative:      Reference intervals for random urine have not been established.  Clinical usage is dependent upon physician's interpretation in combination with other laboratory tests.       Sodium, Urine, Random - Urine, Clean Catch [[id number]] Collected: 02/05/21 1538    Specimen: Urine, Clean Catch Updated: 02/05/21 1618     Sodium, Urine 111 mmol/L     Narrative:      Reference intervals for random urine have not been established.  Clinical usage is dependent upon physician's interpretation in combination with other laboratory tests.       Protein, Urine, Random - Urine, Clean Catch [[id number]]  Collected: 02/05/21 1538    Specimen: Urine, Clean Catch Updated: 02/05/21 1617     Total Protein, Urine 28.9 mg/dL     Narrative:      Reference intervals for random urine have not been established.  Clinical usage is dependent upon physician's interpretation in combination with other laboratory tests.             Imaging Results (Last 24 Hours)     Procedure Component Value Units Date/Time    US Renal Bilateral [[id number]] Collected: 02/05/21 1502     Updated: 02/05/21 1507    Narrative:      HISTORY: Acute kidney injury     Renal ultrasound: Sonographic imaging of the kidneys and bladder  performed     Kidneys are isoechoic compared to liver. The right kidney measures 10.4  x 5.2 x 5.4 cm. The left kidney measures 10.5 x 5.6 x 4.7 cm. No solid  or cystic renal mass. No hydronephrosis. No abnormal perinephric fluid  collection.     Nodular appearance to the prostate measuring approximately 4.5 x 3.6 x  3.9 cm. No focal bladder mucosal defect.     Limited visualization of the IVC and aorta due to shadowing bowel gas.       Impression:      1. Normal sonographic appearance of the kidneys.   2. Slightly nodular appearance to the prostate creating mild mass effect  on the floor the bladder.  This report was finalized on 02/05/2021 15:04 by Dr. Mae Jarquin MD.             Intake/Output Summary (Last 24 hours) at 2/6/2021 1254  Last data filed at 2/5/2021 1700  Gross per 24 hour   Intake 400 ml   Output 150 ml   Net 250 ml       Physical Exam  Constitutional:       General: He is not in acute distress.     Appearance: He is not toxic-appearing.      Comments: Sitting up in bed eating lunch.  No acute distress.  Tolerating room air. Discussed with his nurse.  HENT:      Head: Normocephalic and atraumatic.      Mouth/Throat:      Mouth: Mucous membranes are moist.      Pharynx: Oropharynx is clear.   Eyes:       Conjunctiva/sclera: Conjunctivae normal.   Neck:      Musculoskeletal: Normal range of motion and neck  supple. No muscular tenderness.   Cardiovascular:      Rate and Rhythm: Normal rate and regular rhythm.      Pulses: Normal pulses.      Heart sounds: No murmur.   Pulmonary:      Effort: Pulmonary effort is normal. No respiratory distress.      Breath sounds: Normal breath sounds. No wheezing.   Abdominal:      General: Bowel sounds are normal. There is no distension.      Palpations: Abdomen is soft.      Tenderness: There is no abdominal tenderness.   Musculoskeletal: Normal range of motion.         General: No swelling or tenderness.   Skin:     General: Skin is warm and dry.      Findings: No erythema or rash.   Neurological:      General: No focal deficit present.      Mental Status: He is alert and oriented to person, place, and time.      Cranial Nerves: No cranial nerve deficit.      Motor: No weakness.      Comments: Mild dysarthria and left-sided weakness.  Psychiatric:         Mood and Affect: Mood normal.         Behavior: Behavior normal.     Results Review:  I have reviewed the labs, radiology results, and diagnostic studies since my last progress note and made treatment changes reflective of the results.   I have reviewed the current medications.    Assessment/Plan     Active Hospital Problems    Diagnosis   • **Acute ischemic right MCA stroke (CMS/HCC)   • Contrast dye induced nephropathy   • Acute kidney injury (CMS/HCC)   • Chest pain   • History of noncompliance with medical treatment   • History of previous left MCA stroke   • Mixed hyperlipidemia   • Essential hypertension       PLAN:  Continue physical therapy  Continue to pursue acute rehab placement  Patient would like to postpone cardiac work-up until after acute rehab has been completed    Electronically signed by Virgil Siu DO, 02/06/21, 12:54 CST.

## 2021-02-06 NOTE — PLAN OF CARE
Goal Outcome Evaluation:        Outcome Summary: pt complained of pain once this shift. medication given with good relief. vss. no neuro changes. NIH done at bedside. 1L NC. o2 sat in 90s. alert and oriented. up to bathroom once this shift with walker and x1. no falls. safety maintained.

## 2021-02-06 NOTE — PLAN OF CARE
Goal Outcome Evaluation:  Plan of Care Reviewed With: patient, spouse  Progress: improving  Outcome Summary: OT treatment completed. Pt alert and orientedx4. Performed toileting skills with SBA, CGA to sit to stand from toilet level. CGA while standing in order to adjust clothing. Performed grooming tasks at sink side to complete oral care and shave face. Verbal cues and hand over hand to use L UE during oral care. Demonstrated mild motor planning impairment w/ L UE. Shaved face w/ R UE. Improvement noted in overall functional endurance. Demonstrated impulsivity and decreased safety awareness throughout treatment session. Continue OT POC.

## 2021-02-06 NOTE — PROGRESS NOTES
Neurology Progress Note      Date of admission: 2/2/2021  8:14 AM  Date of visit: 2/6/2021    Chief Complaint:  F/u stroke    Subjective     Subjective:    Still with left sided weakness  Improving in renal function but still impaired  No further chest pain discomfort  Medications:  Current Facility-Administered Medications   Medication Dose Route Frequency Provider Last Rate Last Admin   • acetaminophen (TYLENOL) tablet 650 mg  650 mg Oral Q4H PRN Virgil Siu DO   650 mg at 02/03/21 0816   • aluminum-magnesium hydroxide-simethicone (MAALOX MAX) 400-400-40 MG/5ML suspension 7.5 mL  7.5 mL Oral Q4H PRN Virgil Siu DO       • aspirin chewable tablet 81 mg  81 mg Oral Daily Virgil Siu DO   81 mg at 02/06/21 0924    Or   • aspirin suppository 300 mg  300 mg Rectal Daily Virgil Siu DO       • atorvastatin (LIPITOR) tablet 80 mg  80 mg Oral Nightly Virgil Siu DO   80 mg at 02/05/21 2025   • bisacodyl (DULCOLAX) suppository 10 mg  10 mg Rectal Daily PRN Virgil Siu DO       • cholecalciferol (VITAMIN D3) tablet 400 Units  400 Units Oral Daily Virgil Siu DO   400 Units at 02/06/21 0924   • clopidogrel (PLAVIX) tablet 75 mg  75 mg Oral Daily Virgil Siu DO   75 mg at 02/06/21 0924   • docusate sodium (COLACE) capsule 100 mg  100 mg Oral BID PRN Virgil Siu DO       • HYDROcodone-acetaminophen (NORCO) 7.5-325 MG per tablet 1 tablet  1 tablet Oral Q6H PRN Virgil Siu DO   1 tablet at 02/06/21 0100   • labetalol (NORMODYNE,TRANDATE) injection 20 mg  20 mg Intravenous Q6H PRN Virgil Siu DO   20 mg at 02/02/21 1133   • latanoprost (XALATAN) 0.005 % ophthalmic solution 1 drop  1 drop Both Eyes Nightly Virgil Siu DO   1 drop at 02/05/21 2026   • metoprolol succinate XL (TOPROL-XL) 24 hr tablet 50 mg  50 mg Oral Daily Virgil Siu DO   50 mg at 02/06/21 0924   • ondansetron (ZOFRAN) injection 4 mg  4 mg  Intravenous Q6H PRN Virgil Siu DO   4 mg at 02/04/21 0319   • polyethylene glycol (MIRALAX) packet 17 g  17 g Oral Daily JaureguiSwathi ortiz, APRN   17 g at 02/06/21 0924   • sennosides-docusate (PERICOLACE) 8.6-50 MG per tablet 2 tablet  2 tablet Oral Nightly JaureguiSwathi, APRN   2 tablet at 02/05/21 2025   • sodium chloride 0.9 % flush 10 mL  10 mL Intravenous PRN Virgil Siu, DO       • sodium chloride 0.9 % flush 10 mL  10 mL Intravenous Q12H Virgil Siu, DO   10 mL at 02/06/21 0924   • sodium chloride 0.9 % flush 10 mL  10 mL Intravenous PRN Virgil Siu DO       • sodium chloride 0.9 % infusion  100 mL/hr Intravenous Continuous Jauregui, Swathi, APRN 100 mL/hr at 02/06/21 0103 100 mL/hr at 02/06/21 0103   • vitamin B-12 (CYANOCOBALAMIN) tablet 500 mcg  500 mcg Oral Daily Virgil Siu DO   500 mcg at 02/06/21 0924       Review of Systems:   -A 14 point review of systems is completed and is negative     Objective     Objective      Vital Signs  Temp:  [97.8 °F (36.6 °C)-98.3 °F (36.8 °C)] 98 °F (36.7 °C)  Heart Rate:  [60-86] 64  Resp:  [15-23] 18  BP: (115-157)/(74-96) 136/82    Physical Exam:    HEENT:  Neck supple  CVS:  Regular rate and rhythm.  No murmurs  Carotid Examination:  No bruits  Lungs:  Clear to auscultation  Abdomen:  Non-tender, Non-distended  Extremities:  No signs of peripheral edema    Neurologic Exam:    -Awake, Alert, Oriented X 3  -No word finding difficulties  -No aphasia  -No dysarthria  -Follows simple and complex commands    Cranial nerves II through XII intact.  Pupils react  EOMI  No ptosis or nystagmus  No facial sensory asymmetry  Smile lags on the left and there is a decreased left nasolabial fold  Hearing intact to voice  Does not shrug left shoulder but does on right  Tongue protrudes midline.  Motor: (strength out of 5:  1= minimal movement, 2 = movement in plane of gravity, 3 = movement against gravity, 4 = movement against some resistance, 5 = full  strength)    -Right Upper Ext: Proximal: 5 Distal: 5  -Left Upper Ext: Some drift but can  arm from shoulder now and had distal weakness of left hand    -Right Lower Ext: Proximal: 5 Distal: 5  -Left Lower Ext: Proximal:4 Distal: 2 to 3    DTR:  2+ throughout in all four extremities    Sensory:  -Intact to light touch, pinprick    Coordination/Gait:  -Weakness compromising finger to nose and heel to shin on left but is normal on right     Results Review:    I reviewed the patient's new clinical results.    Lab Results (last 24 hours)     Procedure Component Value Units Date/Time    Extra Tubes [[id number]] Collected: 02/06/21 0431    Specimen: Blood, Venous Line Updated: 02/06/21 0615    Narrative:      The following orders were created for panel order Extra Tubes.  Procedure                               Abnormality         Status                     ---------                               -----------         ------                     Lavender Top[[id number]]                                     Final result                 Please view results for these tests on the individual orders.    Lavender Top [[id number]] Collected: 02/06/21 0431    Specimen: Blood Updated: 02/06/21 0615     Extra Tube hold for add-on     Comment: Auto resulted       Basic Metabolic Panel [[id number]]  (Abnormal) Collected: 02/06/21 0430    Specimen: Blood Updated: 02/06/21 0530     Glucose 108 mg/dL      BUN 63 mg/dL      Creatinine 2.73 mg/dL      Sodium 135 mmol/L      Potassium 4.2 mmol/L      Chloride 100 mmol/L      CO2 25.0 mmol/L      Calcium 9.1 mg/dL      eGFR  African Amer 29 mL/min/1.73      BUN/Creatinine Ratio 23.1     Anion Gap 10.0 mmol/L     Narrative:      GFR Normal >60  Chronic Kidney Disease <60  Kidney Failure <15      Uric Acid [[id number]]  (Abnormal) Collected: 02/06/21 0430    Specimen: Blood Updated: 02/06/21 0530     Uric Acid 10.2 mg/dL     Urea Nitrogen, Urine - Urine, Clean Catch [[id number]] Collected:  02/05/21 1538    Specimen: Urine, Clean Catch Updated: 02/06/21 0142     Urea Nitrogen, Urine 647 mg/dL     Narrative:      Reference intervals for random urine have not been established.  Clinical usage is dependent upon physician's interpretation in combination with other laboratory tests.       Creatinine, Urine, Random - Urine, Clean Catch [[id number]] Collected: 02/05/21 1538    Specimen: Urine, Clean Catch Updated: 02/06/21 0142     Creatinine, Urine 243.1 mg/dL     Narrative:      Reference intervals for random urine have not been established.  Clinical usage is dependent upon physician's interpretation in combination with other laboratory tests.       Sodium, Urine, Random - Urine, Clean Catch [[id number]] Collected: 02/05/21 1538    Specimen: Urine, Clean Catch Updated: 02/05/21 1618     Sodium, Urine 111 mmol/L     Narrative:      Reference intervals for random urine have not been established.  Clinical usage is dependent upon physician's interpretation in combination with other laboratory tests.       Protein, Urine, Random - Urine, Clean Catch [[id number]] Collected: 02/05/21 1538    Specimen: Urine, Clean Catch Updated: 02/05/21 1617     Total Protein, Urine 28.9 mg/dL     Narrative:      Reference intervals for random urine have not been established.  Clinical usage is dependent upon physician's interpretation in combination with other laboratory tests.       Basic Metabolic Panel [[id number]]  (Abnormal) Collected: 02/05/21 0959    Specimen: Blood Updated: 02/05/21 1110     Glucose 118 mg/dL      BUN 65 mg/dL      Creatinine 4.77 mg/dL      Sodium 136 mmol/L      Potassium 4.9 mmol/L      Chloride 95 mmol/L      CO2 27.0 mmol/L      Calcium 9.3 mg/dL      eGFR  African Amer 15 mL/min/1.73      BUN/Creatinine Ratio 13.6     Anion Gap 14.0 mmol/L     Narrative:      GFR Normal >60  Chronic Kidney Disease <60  Kidney Failure <15      Troponin [[id number]]  (Normal) Collected: 02/05/21 0959    Specimen: Blood  Updated: 02/05/21 1110     Troponin T <0.010 ng/mL     Narrative:      Troponin T Reference Range:  <= 0.03 ng/mL-   Negative for AMI  >0.03 ng/mL-     Abnormal for myocardial necrosis.  Clinicians would have to utilize clinical acumen, EKG, Troponin and serial changes to determine if it is an Acute Myocardial Infarction or myocardial injury due to an underlying chronic condition.       Results may be falsely decreased if patient taking Biotin.          Imaging Results (Last 24 Hours)     Procedure Component Value Units Date/Time    US Renal Bilateral [[id number]] Collected: 02/05/21 1502     Updated: 02/05/21 1507    Narrative:      HISTORY: Acute kidney injury     Renal ultrasound: Sonographic imaging of the kidneys and bladder  performed     Kidneys are isoechoic compared to liver. The right kidney measures 10.4  x 5.2 x 5.4 cm. The left kidney measures 10.5 x 5.6 x 4.7 cm. No solid  or cystic renal mass. No hydronephrosis. No abnormal perinephric fluid  collection.     Nodular appearance to the prostate measuring approximately 4.5 x 3.6 x  3.9 cm. No focal bladder mucosal defect.     Limited visualization of the IVC and aorta due to shadowing bowel gas.       Impression:      1. Normal sonographic appearance of the kidneys.   2. Slightly nodular appearance to the prostate creating mild mass effect  on the floor the bladder.  This report was finalized on 02/05/2021 15:04 by Dr. Mae Jarquin MD.        JOEL  · Left ventricular systolic function is normal. The left ventricular cavity is small in size. Left ventricular wall thickness is consistent with severe concentric hypertrophy.  · No evidence of left ventricular thrombus or mass present.  · No evidence of a left atrial appendage thrombus was present  · No evidence of a patent foramen ovale. No evidence of an atrial septal defect present.  · No evidence of left ventricular noncompaction    Telemetry: sinus 59 to 74 56 to 71  .  Assessment/Plan     Hospital  Problem List      Acute ischemic right MCA stroke (CMS/HCC)    Essential hypertension    Mixed hyperlipidemia    History of noncompliance with medical treatment    History of previous left MCA stroke    Acute kidney injury (CMS/HCC)    Chest pain    Impression:  1. Acute right pontine stroke and later during this hospitalization developed another acute stroke of right temporal lobe  2. Left sided weakness and dysarthria due to stroke   3. NAREN, improving  4. Hypertension--will need slow correction  5. Chest pain   6. Fall risk    Plan:  He will need inpatient rehab  Continue aspirin Plavix for 21 days then monotherapy with Plavix  Continue PT/OT/speech therapy        Sita Theodore MD  02/06/21  09:51 CST
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
## 2021-02-07 LAB
ANION GAP SERPL CALCULATED.3IONS-SCNC: 9 MMOL/L (ref 5–15)
BUN SERPL-MCNC: 48 MG/DL (ref 8–23)
BUN/CREAT SERPL: 32 (ref 7–25)
CALCIUM SPEC-SCNC: 8 MG/DL (ref 8.6–10.5)
CHLORIDE SERPL-SCNC: 108 MMOL/L (ref 98–107)
CO2 SERPL-SCNC: 20 MMOL/L (ref 22–29)
CREAT SERPL-MCNC: 1.5 MG/DL (ref 0.76–1.27)
GFR SERPL CREATININE-BSD FRML MDRD: 57 ML/MIN/1.73
GLUCOSE SERPL-MCNC: 85 MG/DL (ref 65–99)
POTASSIUM SERPL-SCNC: 3.8 MMOL/L (ref 3.5–5.2)
SODIUM SERPL-SCNC: 137 MMOL/L (ref 136–145)
WHOLE BLOOD HOLD SPECIMEN: NORMAL

## 2021-02-07 PROCEDURE — 80048 BASIC METABOLIC PNL TOTAL CA: CPT | Performed by: INTERNAL MEDICINE

## 2021-02-07 PROCEDURE — 97116 GAIT TRAINING THERAPY: CPT

## 2021-02-07 RX ADMIN — LATANOPROST 1 DROP: 50 SOLUTION OPHTHALMIC at 21:39

## 2021-02-07 RX ADMIN — CHOLECALCIFEROL TAB 10 MCG (400 UNIT) 400 UNITS: 10 TAB at 09:09

## 2021-02-07 RX ADMIN — Medication 500 MCG: at 09:09

## 2021-02-07 RX ADMIN — HYDROCODONE BITARTRATE AND ACETAMINOPHEN 1 TABLET: 7.5; 325 TABLET ORAL at 21:39

## 2021-02-07 RX ADMIN — SODIUM CHLORIDE, PRESERVATIVE FREE 10 ML: 5 INJECTION INTRAVENOUS at 09:09

## 2021-02-07 RX ADMIN — ATORVASTATIN CALCIUM 80 MG: 10 TABLET, FILM COATED ORAL at 21:39

## 2021-02-07 RX ADMIN — METOPROLOL SUCCINATE 50 MG: 50 TABLET, FILM COATED, EXTENDED RELEASE ORAL at 09:09

## 2021-02-07 RX ADMIN — SODIUM CHLORIDE, PRESERVATIVE FREE 10 ML: 5 INJECTION INTRAVENOUS at 21:39

## 2021-02-07 RX ADMIN — ALLOPURINOL 200 MG: 100 TABLET ORAL at 09:09

## 2021-02-07 RX ADMIN — SODIUM CHLORIDE 100 ML/HR: 9 INJECTION, SOLUTION INTRAVENOUS at 07:24

## 2021-02-07 RX ADMIN — CLOPIDOGREL 75 MG: 75 TABLET, FILM COATED ORAL at 09:09

## 2021-02-07 RX ADMIN — ASPIRIN 81 MG: 81 TABLET, CHEWABLE ORAL at 09:09
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
## 2021-02-07 NOTE — PROGRESS NOTES
[facility] Medicine Services  INPATIENT PROGRESS NOTE    Length of Stay: 5  Date of Admission: 2/2/2021  Primary Care Physician: Dallin Miller DO    Subjective     Chief Complaint:     Stroke, acute kidney injury    HPI     Renal function continues to improve.  The patient speech is improved with less slurring today.  We are awaiting precertification for transition to acute rehab at [facility].  Creatinine is rapidly trending toward baseline at 1.50 today.  BUN 48.  Estimated GFR continues to rapidly improved.    Review of Systems     All pertinent negatives and positives are as above. All other systems have been reviewed and are negative unless otherwise stated.     Objective    Temp:  [97.7 °F (36.5 °C)-98.4 °F (36.9 °C)] 97.7 °F (36.5 °C)  Heart Rate:  [54-68] 54  Resp:  [16] 16  BP: (156-164)/(80-97) 156/94    Lab Results (last 24 hours)     Procedure Component Value Units Date/Time    Basic Metabolic Panel [[id number]]  (Abnormal) Collected: 02/07/21 0444    Specimen: Blood Updated: 02/07/21 0559     Glucose 85 mg/dL      BUN 48 mg/dL      Creatinine 1.50 mg/dL      Sodium 137 mmol/L      Potassium 3.8 mmol/L      Chloride 108 mmol/L      CO2 20.0 mmol/L      Calcium 8.0 mg/dL      eGFR  African Amer 57 mL/min/1.73      BUN/Creatinine Ratio 32.0     Anion Gap 9.0 mmol/L     Narrative:      GFR Normal >60  Chronic Kidney Disease <60  Kidney Failure <15      Extra Tubes [[id number]] Collected: 02/07/21 0444    Specimen: Blood, Venous Line Updated: 02/07/21 0545    Narrative:      The following orders were created for panel order Extra Tubes.  Procedure                               Abnormality         Status                     ---------                               -----------         ------                     Lavender Top[[id number]]                                     Final result                 Please view results for these tests on the individual  orders.    Yancy Tee [[id number]] Collected: 02/07/21 0444    Specimen: Blood Updated: 02/07/21 0545     Extra Tube hold for add-on     Comment: Auto resulted             Imaging Results (Last 24 Hours)     ** No results found for the last 24 hours. **             Intake/Output Summary (Last 24 hours) at 2/7/2021 1523  Last data filed at 2/7/2021 1400  Gross per 24 hour   Intake 2300 ml   Output 626 ml   Net 1674 ml       Physical Exam  Constitutional:       General: He is not in acute distress.     Appearance: He is not toxic-appearing.      Comments: Sitting up in bed  talking to his wife and son who are in the room.  No acute distress.  Tolerating room air. Discussed with his nurse.  HENT:      Head: Normocephalic and atraumatic.      Mouth/Throat:      Mouth: Mucous membranes are moist.      Pharynx: Oropharynx is clear.   Eyes:       Conjunctiva/sclera: Conjunctivae normal.   Neck:      Musculoskeletal: Normal range of motion and neck supple. No muscular tenderness.   Cardiovascular:      Rate and Rhythm: Normal rate and regular rhythm.      Pulses: Normal pulses.      Heart sounds: No murmur.   Pulmonary:      Effort: Pulmonary effort is normal. No respiratory distress.      Breath sounds: Normal breath sounds. No wheezing.   Abdominal:      General: Bowel sounds are normal. There is no distension.      Palpations: Abdomen is soft.      Tenderness: There is no abdominal tenderness.   Musculoskeletal: Normal range of motion.         General: No swelling or tenderness.   Skin:     General: Skin is warm and dry.      Findings: No erythema or rash.   Neurological:      General: No focal deficit present.      Mental Status: He is alert and oriented to person, place, and time.      Cranial Nerves: No cranial nerve deficit.      Motor: No weakness.      Comments: Mild dysarthria and left-sided weakness.  Psychiatric:         Mood and Affect: Mood normal.         Behavior: Behavior normal.        Results Review:  I  have reviewed the labs, radiology results, and diagnostic studies since my last progress note and made treatment changes reflective of the results.   I have reviewed the current medications.    Assessment/Plan     Active Hospital Problems    Diagnosis   • **Acute ischemic right MCA stroke (CMS/HCC)   • Contrast dye induced nephropathy   • Acute kidney injury (CMS/HCC)   • Chest pain   • History of noncompliance with medical treatment   • History of previous left MCA stroke   • Mixed hyperlipidemia   • Essential hypertension       PLAN:  Continue physical therapy  Continue to pursue acute rehab placement  Patient would like to postpone cardiac work-up until after acute rehab has been completed    Electronically signed by Virgil Siu DO, 02/07/21, 15:23 CST.

## 2021-02-07 NOTE — PLAN OF CARE
Goal Outcome Evaluation:  Plan of Care Reviewed With: patient     Outcome Summary: Patient resting comfortably today. No c/o pain or nasuea. IID, voiding, regular diet. Neuro's unchanged. NIH - 5. Left sided weakness noted. Up with assistance- gait belt/walker. Family at bedside

## 2021-02-07 NOTE — THERAPY TREATMENT NOTE
Acute Care - Physical Therapy Treatment Note  [facility]     Patient Name: Dao Jhaveri  : 1955  MRN: [id number]  Today's Date: 2021           PT Assessment (last 12 hours)      PT Evaluation and Treatment     Row Name 21 1104          Physical Therapy Time and Intention    Subjective Information  complains of;weakness;pain  -DEMI     Document Type  therapy note (daily note)  -DEMI     Mode of Treatment  physical therapy  -     Row Name 21 1104          General Information    Existing Precautions/Restrictions  fall left sided weakness  -     Row Name 21 1104          Pain Scale: Word Pre/Post-Treatment    Pain: Word Scale, Pretreatment  4 - moderate pain  -DEMI     Posttreatment Pain Rating  4 - moderate pain  -DEMI     Pain Location - Side  Left  -DEMI     Pain Location - Orientation  lower  -DEMI     Pain Location  extremity  -     Row Name 21 1104          Bed Mobility    Supine-Sit [place] (Bed Mobility)  verbal cues;contact guard  -DEMI     Sit-Supine [place] (Bed Mobility)  standby assist  -     Row Name 21 1104          Transfers    Sit-Stand [place] (Transfers)  verbal cues;contact guard  -DEMI     Stand-Sit [place] (Transfers)  verbal cues;contact guard  -DEMI     [place] Level (Toilet Transfer)  verbal cues;contact guard  -DEMI     Assistive Device (Toilet Transfer)  walker, front-wheeled  -DEMI     Row Name 21 1104          Sit-Stand Transfer    Assistive Device (Sit-Stand Transfers)  walker, front-wheeled  -DEMI     Row Name 21 1104          Stand-Sit Transfer    Assistive Device (Stand-Sit Transfers)  walker, front-wheeled  -DEMI     Row Name 21 1104          Gait/Stairs (Locomotion)    [place] Level (Gait)  verbal cues;minimum assist (75% patient effort);moderate assist (50% patient effort)  -     Assistive Device (Gait)  walker, front-wheeled  -DEMI     Distance in Feet (Gait)  15', 40'   -DEMI     Deviations/Abnormal Patterns  (Gait)  base of support, narrow;stride length decreased;weight shifting decreased;left sided deviations  -DEMI     Bilateral Gait Deviations  knee buckling, left side;forward flexed posture  -DEMI     Comment (Gait/Stairs)  pt required cues for safety.  LUE would fall off the walker.  Pt required cues for LLE and to try and prevent knee buckling and LOB  -DEMI     Row Name 02/07/21 1104          Positioning and Restraints    Pre-Treatment Position  in bed  -DEMI     Post Treatment Position  bed  -DEMI     In Bed  fowlers;call light within reach;encouraged to call for assist;with family/caregiver;side rails up x2  -DEMI       User Key  (r) = Recorded By, (t) = Taken By, (c) = Cosigned By    Initials Name Provider Type    DEMI Salazar Cadet, PTA Physical Therapy Assistant        Physical Therapy Education                 Title: PT OT SLP Therapies (In Progress)     Topic: Physical Therapy (Done)     Point: Mobility training (Done)     Learning Progress Summary           Patient Acceptance, E, VU,NR by DEMI at 2/7/2021 1104    Comment: safety with amb, progression with amb    Acceptance, E, VU by AB at 2/3/2021 1024    Comment: PT role in their care, plan of care, d/c plan                   Point: Home exercise program (Done)     Learning Progress Summary           Patient Acceptance, E, VU by AB at 2/3/2021 1024    Comment: PT role in their care, plan of care, d/c plan                   Point: Body mechanics (Done)     Learning Progress Summary           Patient Acceptance, E, VU by AB at 2/3/2021 1024    Comment: PT role in their care, plan of care, d/c plan                   Point: Precautions (Done)     Learning Progress Summary           Patient Acceptance, E,TB, VU by AH at 2/4/2021 1353    Comment: awareness of L side weakness    Acceptance, E, VU by AB at 2/3/2021 1024    Comment: PT role in their care, plan of care, d/c plan                               User Key     Initials Effective Dates Name Provider Type Discipline      08/02/16 -  Mary Steele PTA Physical Therapy Assistant PT    DEMI 12/08/16 -  Salazar Cadet PTA Physical Therapy Assistant PT    AB 12/02/20 -  Almas Mazariegos, PT Student PT Student PT              PT Recommendation and Plan     Plan of Care Reviewed With: patient  Progress: improving  Outcome Summary: Pt transfered supine to sit with CGA.  Sit to stand with min assist.  Amb 15', 40' with RWX and min/mod assist.  Pt has narrow base of support, occassionally scissoring.  Required cues for step length and heel strike with LLE, as well as reminders to  the walker with LUE.  Outcome Measures     Row Name 02/06/21 1140             How much help from another is currently needed...    Putting on and taking off regular lower body clothing?  3  -CH (r) EM (t) CH (c)      Bathing (including washing, rinsing, and drying)  3  -CH (r) EM (t) CH (c)      Toileting (which includes using toilet bed pan or urinal)  3  -CH (r) EM (t) CH (c)      Putting on and taking off regular upper body clothing  4  -CH (r) EM (t) CH (c)      Taking care of personal grooming (such as brushing teeth)  3  -CH (r) EM (t) CH (c)      Eating meals  3  -CH (r) EM (t) CH (c)      AM-PAC 6 Clicks Score (OT)  19  -CH (r) EM (t)        User Key  (r) = Recorded By, (t) = Taken By, (c) = Cosigned By    Initials Name Provider Type    Codi Dinh, OTR/L Occupational Therapist    EM Aaliyah Zapata, OT Student OT Student           Time Calculation:   PT Charges     Row Name 02/07/21 1104             Time Calculation    Start Time  1104  -DEMI      Stop Time  1131  -DEMI      Time Calculation (min)  27 min  -DEMI      PT Received On  02/07/21  -DEMI         Time Calculation- PT    Total Timed Code Minutes- PT  27 minute(s)  -DEMI         Timed Charges    23761 - Gait Training Minutes   27  -DEMI        User Key  (r) = Recorded By, (t) = Taken By, (c) = Cosigned By    Initials Name Provider Type    Salazar Patricia, JAVON Physical Therapy Assistant         Therapy Charges for Today     Code Description Service Date Service Provider Modifiers Qty    37273098718 HC GAIT TRAINING EA 15 MIN 2/7/2021 Salazar Cadet, PTA GP 2          PT G-Codes  Outcome Measure Options: AM-PAC 6 Clicks Basic Mobility (PT), Modified [place]  AM-PAC 6 Clicks Score (PT): 17  AM-PAC 6 Clicks Score (OT): 19  Modified [place] Scale: 4 - Moderately severe disability.  Unable to walk without assistance, and unable to attend to own bodily needs without assistance.    Salazar Cadet, PTA  2/7/2021

## 2021-02-07 NOTE — PLAN OF CARE
"Goal Outcome Evaluation:  Plan of Care Reviewed With: patient  Progress: no change  Outcome Summary: VSS. No change in neuro status, NIH-6. Continue w/lt side weakness, sl slurred speech. Only c/o pain is back pain which pt states is from his \"uncomfortable bed\", prn given. Pt is not resting well at night, states just can not sleep. Up w/1-2 assist/wx. Refused SCD. Safety maintained.  "

## 2021-02-07 NOTE — PLAN OF CARE
Goal Outcome Evaluation:  Plan of Care Reviewed With: patient  Progress: improving  Outcome Summary: Pt transfered supine to sit with CGA.  Sit to stand with min assist.  Amb 15', 40' with RWX and min/mod assist.  Pt has narrow base of support, occassionally scissoring.  Required cues for step length and heel strike with LLE, as well as reminders to  the walker with LUE.

## 2021-02-08 ENCOUNTER — HOSPITAL ENCOUNTER (INPATIENT)
Age: 66
LOS: 15 days | Discharge: HOME | DRG: 056 | End: 2021-02-23
Attending: PSYCHIATRY & NEUROLOGY | Admitting: PSYCHIATRY & NEUROLOGY
Payer: MEDICARE

## 2021-02-08 VITALS
OXYGEN SATURATION: 95 % | BODY MASS INDEX: 27.97 KG/M2 | RESPIRATION RATE: 16 BRPM | DIASTOLIC BLOOD PRESSURE: 97 MMHG | HEIGHT: 66 IN | HEART RATE: 67 BPM | TEMPERATURE: 97.6 F | WEIGHT: 174 LBS | SYSTOLIC BLOOD PRESSURE: 182 MMHG

## 2021-02-08 DIAGNOSIS — I63.9 ACUTE ISCHEMIC STROKE (HCC): Primary | ICD-10-CM

## 2021-02-08 LAB
ANION GAP SERPL CALCULATED.3IONS-SCNC: 10 MMOL/L (ref 5–15)
BASOPHILS ABSOLUTE: 0.1 K/UL (ref 0–0.2)
BASOPHILS RELATIVE PERCENT: 0.6 % (ref 0–1)
BUN SERPL-MCNC: 38 MG/DL (ref 8–23)
BUN/CREAT SERPL: 27.1 (ref 7–25)
CALCIUM SPEC-SCNC: 9.2 MG/DL (ref 8.6–10.5)
CHLORIDE SERPL-SCNC: 105 MMOL/L (ref 98–107)
CO2 SERPL-SCNC: 24 MMOL/L (ref 22–29)
CREAT SERPL-MCNC: 1.4 MG/DL (ref 0.76–1.27)
EOSINOPHILS ABSOLUTE: 0.1 K/UL (ref 0–0.6)
EOSINOPHILS RELATIVE PERCENT: 0.8 % (ref 0–5)
GFR SERPL CREATININE-BSD FRML MDRD: 62 ML/MIN/1.73
GLUCOSE SERPL-MCNC: 92 MG/DL (ref 65–99)
HCT VFR BLD CALC: 44.7 % (ref 42–52)
HEMOGLOBIN: 14.8 G/DL (ref 14–18)
IMMATURE GRANULOCYTES #: 0 K/UL
LYMPHOCYTES ABSOLUTE: 1.8 K/UL (ref 1.1–4.5)
LYMPHOCYTES RELATIVE PERCENT: 19.6 % (ref 20–40)
MCH RBC QN AUTO: 26.5 PG (ref 27–31)
MCHC RBC AUTO-ENTMCNC: 33.1 G/DL (ref 33–37)
MCV RBC AUTO: 80.1 FL (ref 80–94)
MONOCYTES ABSOLUTE: 0.7 K/UL (ref 0–0.9)
MONOCYTES RELATIVE PERCENT: 7.6 % (ref 0–10)
NEUTROPHILS ABSOLUTE: 6.4 K/UL (ref 1.5–7.5)
NEUTROPHILS RELATIVE PERCENT: 71.1 % (ref 50–65)
PDW BLD-RTO: 14.9 % (ref 11.5–14.5)
PLATELET # BLD: 253 K/UL (ref 130–400)
PMV BLD AUTO: 11.3 FL (ref 9.4–12.4)
POTASSIUM SERPL-SCNC: 4 MMOL/L (ref 3.5–5.2)
PREALBUMIN: 27 MG/DL (ref 20–40)
RBC # BLD: 5.58 M/UL (ref 4.7–6.1)
SARS-COV-2 RNA PNL SPEC NAA+PROBE: NOT DETECTED
SODIUM SERPL-SCNC: 139 MMOL/L (ref 136–145)
WBC # BLD: 9 K/UL (ref 4.8–10.8)
WHOLE BLOOD HOLD SPECIMEN: NORMAL

## 2021-02-08 PROCEDURE — 36415 COLL VENOUS BLD VENIPUNCTURE: CPT

## 2021-02-08 PROCEDURE — 85025 COMPLETE CBC W/AUTO DIFF WBC: CPT

## 2021-02-08 PROCEDURE — 6370000000 HC RX 637 (ALT 250 FOR IP): Performed by: PSYCHIATRY & NEUROLOGY

## 2021-02-08 PROCEDURE — 80048 BASIC METABOLIC PNL TOTAL CA: CPT | Performed by: INTERNAL MEDICINE

## 2021-02-08 PROCEDURE — 92526 ORAL FUNCTION THERAPY: CPT | Performed by: SPEECH-LANGUAGE PATHOLOGIST

## 2021-02-08 PROCEDURE — 97535 SELF CARE MNGMENT TRAINING: CPT | Performed by: OCCUPATIONAL THERAPIST

## 2021-02-08 PROCEDURE — 97530 THERAPEUTIC ACTIVITIES: CPT | Performed by: PHYSICAL THERAPIST

## 2021-02-08 PROCEDURE — 1180000000 HC REHAB R&B

## 2021-02-08 PROCEDURE — 84134 ASSAY OF PREALBUMIN: CPT

## 2021-02-08 PROCEDURE — 97116 GAIT TRAINING THERAPY: CPT | Performed by: PHYSICAL THERAPIST

## 2021-02-08 PROCEDURE — 92507 TX SP LANG VOICE COMM INDIV: CPT | Performed by: SPEECH-LANGUAGE PATHOLOGIST

## 2021-02-08 PROCEDURE — 87635 SARS-COV-2 COVID-19 AMP PRB: CPT | Performed by: INTERNAL MEDICINE

## 2021-02-08 RX ORDER — METOPROLOL SUCCINATE 50 MG/1
50 TABLET, EXTENDED RELEASE ORAL DAILY
Status: DISCONTINUED | OUTPATIENT
Start: 2021-02-08 | End: 2021-02-13

## 2021-02-08 RX ORDER — ALLOPURINOL 100 MG/1
200 TABLET ORAL DAILY
Start: 2021-02-09 | End: 2021-03-09

## 2021-02-08 RX ORDER — ASPIRIN 81 MG/1
81 TABLET, CHEWABLE ORAL DAILY
Start: 2021-02-09 | End: 2021-02-24

## 2021-02-08 RX ORDER — BISACODYL 10 MG
10 SUPPOSITORY, RECTAL RECTAL DAILY PRN
Status: DISCONTINUED | OUTPATIENT
Start: 2021-02-08 | End: 2021-02-23 | Stop reason: HOSPADM

## 2021-02-08 RX ORDER — ATORVASTATIN CALCIUM 80 MG/1
80 TABLET, FILM COATED ORAL NIGHTLY
Start: 2021-02-08 | End: 2021-02-26

## 2021-02-08 RX ORDER — TRIAMTERENE AND HYDROCHLOROTHIAZIDE 37.5; 25 MG/1; MG/1
2 TABLET ORAL DAILY
Status: DISCONTINUED | OUTPATIENT
Start: 2021-02-08 | End: 2021-02-23 | Stop reason: HOSPADM

## 2021-02-08 RX ORDER — LATANOPROST 50 UG/ML
1 SOLUTION/ DROPS OPHTHALMIC NIGHTLY
Status: DISCONTINUED | OUTPATIENT
Start: 2021-02-08 | End: 2021-02-23 | Stop reason: HOSPADM

## 2021-02-08 RX ORDER — AMLODIPINE BESYLATE 10 MG/1
10 TABLET ORAL
Status: DISCONTINUED | OUTPATIENT
Start: 2021-02-08 | End: 2021-02-08 | Stop reason: HOSPADM

## 2021-02-08 RX ORDER — LISINOPRIL 20 MG/1
40 TABLET ORAL NIGHTLY
Status: DISCONTINUED | OUTPATIENT
Start: 2021-02-08 | End: 2021-02-13

## 2021-02-08 RX ORDER — POLYETHYLENE GLYCOL 3350 17 G/17G
17 POWDER, FOR SOLUTION ORAL DAILY
Status: DISCONTINUED | OUTPATIENT
Start: 2021-02-08 | End: 2021-02-23 | Stop reason: HOSPADM

## 2021-02-08 RX ORDER — LOSARTAN POTASSIUM 50 MG/1
25 TABLET ORAL
Status: DISCONTINUED | OUTPATIENT
Start: 2021-02-08 | End: 2021-02-08 | Stop reason: HOSPADM

## 2021-02-08 RX ORDER — TRIAMTERENE AND HYDROCHLOROTHIAZIDE 37.5; 25 MG/1; MG/1
2 TABLET ORAL DAILY
Status: ON HOLD | COMMUNITY
End: 2021-02-23 | Stop reason: HOSPADM

## 2021-02-08 RX ORDER — AMLODIPINE BESYLATE 10 MG/1
10 TABLET ORAL NIGHTLY
Status: DISCONTINUED | OUTPATIENT
Start: 2021-02-08 | End: 2021-02-23 | Stop reason: HOSPADM

## 2021-02-08 RX ORDER — CLONIDINE HYDROCHLORIDE 0.1 MG/1
0.1 TABLET ORAL PRN
Status: ON HOLD | COMMUNITY
End: 2021-02-23 | Stop reason: HOSPADM

## 2021-02-08 RX ORDER — OMEGA-3S/DHA/EPA/FISH OIL/D3 300MG-1000
400 CAPSULE ORAL DAILY
Status: DISCONTINUED | OUTPATIENT
Start: 2021-02-08 | End: 2021-02-14

## 2021-02-08 RX ORDER — CHOLECALCIFEROL (VITAMIN D3) 125 MCG
500 CAPSULE ORAL DAILY
Qty: 30 TABLET | Refills: 5
Start: 2021-02-08 | End: 2022-02-23

## 2021-02-08 RX ORDER — CLONIDINE HYDROCHLORIDE 0.1 MG/1
0.1 TABLET ORAL PRN
Status: DISCONTINUED | OUTPATIENT
Start: 2021-02-08 | End: 2021-02-23 | Stop reason: HOSPADM

## 2021-02-08 RX ORDER — SPIRONOLACTONE 25 MG/1
25 TABLET ORAL DAILY
Status: DISCONTINUED | OUTPATIENT
Start: 2021-02-08 | End: 2021-02-13

## 2021-02-08 RX ORDER — CLOPIDOGREL BISULFATE 75 MG/1
75 TABLET ORAL DAILY
Status: DISCONTINUED | OUTPATIENT
Start: 2021-02-08 | End: 2021-02-23 | Stop reason: HOSPADM

## 2021-02-08 RX ORDER — CLOPIDOGREL BISULFATE 75 MG/1
75 TABLET ORAL DAILY
Status: ON HOLD | COMMUNITY
End: 2021-02-23 | Stop reason: SDUPTHER

## 2021-02-08 RX ORDER — LATANOPROST 50 UG/ML
1 SOLUTION/ DROPS OPHTHALMIC NIGHTLY
COMMUNITY

## 2021-02-08 RX ORDER — LOSARTAN POTASSIUM 25 MG/1
25 TABLET ORAL
Start: 2021-02-08 | End: 2021-02-26

## 2021-02-08 RX ORDER — METOPROLOL SUCCINATE 50 MG/1
50 TABLET, EXTENDED RELEASE ORAL DAILY
Status: ON HOLD | COMMUNITY
End: 2021-02-23 | Stop reason: HOSPADM

## 2021-02-08 RX ORDER — SPIRONOLACTONE 25 MG/1
25 TABLET ORAL DAILY
Status: ON HOLD | COMMUNITY
End: 2021-02-23 | Stop reason: HOSPADM

## 2021-02-08 RX ORDER — LISINOPRIL 40 MG/1
40 TABLET ORAL NIGHTLY
Status: ON HOLD | COMMUNITY
End: 2021-02-23 | Stop reason: SDUPTHER

## 2021-02-08 RX ORDER — ATORVASTATIN CALCIUM 10 MG/1
10 TABLET, FILM COATED ORAL DAILY
Status: DISCONTINUED | OUTPATIENT
Start: 2021-02-08 | End: 2021-02-23 | Stop reason: HOSPADM

## 2021-02-08 RX ORDER — ACETAMINOPHEN 325 MG/1
650 TABLET ORAL EVERY 4 HOURS PRN
Status: DISCONTINUED | OUTPATIENT
Start: 2021-02-08 | End: 2021-02-23 | Stop reason: HOSPADM

## 2021-02-08 RX ADMIN — Medication 500 MCG: at 08:21

## 2021-02-08 RX ADMIN — METOPROLOL SUCCINATE 50 MG: 50 TABLET, FILM COATED, EXTENDED RELEASE ORAL at 08:22

## 2021-02-08 RX ADMIN — CLOPIDOGREL 75 MG: 75 TABLET, FILM COATED ORAL at 08:21

## 2021-02-08 RX ADMIN — CHOLECALCIFEROL TAB 10 MCG (400 UNIT) 400 UNITS: 10 TAB at 08:22

## 2021-02-08 RX ADMIN — LISINOPRIL 40 MG: 20 TABLET ORAL at 20:48

## 2021-02-08 RX ADMIN — AMLODIPINE BESYLATE 10 MG: 10 TABLET ORAL at 20:48

## 2021-02-08 RX ADMIN — POLYETHYLENE GLYCOL (3350) 17 G: 17 POWDER, FOR SOLUTION ORAL at 08:22

## 2021-02-08 RX ADMIN — SODIUM CHLORIDE, PRESERVATIVE FREE 10 ML: 5 INJECTION INTRAVENOUS at 08:22

## 2021-02-08 RX ADMIN — ALLOPURINOL 200 MG: 100 TABLET ORAL at 08:21

## 2021-02-08 RX ADMIN — ASPIRIN 81 MG: 81 TABLET, CHEWABLE ORAL at 08:21

## 2021-02-08 RX ADMIN — AMLODIPINE BESYLATE 10 MG: 10 TABLET ORAL at 08:22

## 2021-02-08 RX ADMIN — LATANOPROST 1 DROP: 50 SOLUTION OPHTHALMIC at 20:48

## 2021-02-08 ASSESSMENT — PAIN SCALES - GENERAL: PAINLEVEL_OUTOF10: 0
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
## 2021-02-08 NOTE — PLAN OF CARE
Goal Outcome Evaluation:  Plan of Care Reviewed With: patient  Progress: improving  Outcome Summary: VSS. No change in Neuro status, improvinng, NIH-3. Continue w/lt side weakness, yet much improved, speech is also improved. Only c/o pain is at hs w/back pain. refuses SCD. Safety maintained.

## 2021-02-08 NOTE — DISCHARGE SUMMARY
[facility]  DISCHARGE SUMMARY       Date of Admission: 2/2/2021  Date of Discharge:  2/8/2021  Primary Care Physician: Dallin Miller DO    Presenting Problem/History of Present Illness:  Hypertensive urgency [I16.0]  Cerebrovascular accident (CVA) of right pontine structure (CMS/HCC) [I63.50]     Final Discharge Diagnoses:  Active Hospital Problems    Diagnosis   • **Acute ischemic right MCA stroke (CMS/HCC)   • Contrast dye induced nephropathy   • Acute kidney injury (CMS/HCC)   • Chest pain   • History of noncompliance with medical treatment   • History of previous left MCA stroke   • Mixed hyperlipidemia   • Essential hypertension       Consults:   Dr. Mcghee with cardiology.  Dr. Riley with neurology.  Dr. Mederos with nephrology.    Procedures Performed:   Results for orders placed during the hospital encounter of 02/02/21   Adult Transesophageal Echo (JOEL) W/ Cont if Necessary Per Protocol    Narrative · Left ventricular systolic function is normal. The left ventricular   cavity is small in size. Left ventricular wall thickness is consistent   with severe concentric hypertrophy.  · No evidence of left ventricular thrombus or mass present.  · No evidence of a left atrial appendage thrombus was present  · No evidence of a patent foramen ovale. No evidence of an atrial septal   defect present.  · No evidence of left ventricular noncompaction        Pertinent Test Results:   Lab Results (last 72 hours)     Procedure Component Value Units Date/Time    Basic Metabolic Panel [[id number]]  (Abnormal) Collected: 02/08/21 0326    Specimen: Blood Updated: 02/08/21 0434     Glucose 92 mg/dL      BUN 38 mg/dL      Creatinine 1.40 mg/dL      Sodium 139 mmol/L      Potassium 4.0 mmol/L      Chloride 105 mmol/L      CO2 24.0 mmol/L      Calcium 9.2 mg/dL      eGFR   Amer 62 mL/min/1.73      BUN/Creatinine Ratio 27.1     Anion Gap 10.0 mmol/L      Narrative:      GFR Normal >60  Chronic Kidney Disease <60  Kidney Failure <15      Basic Metabolic Panel [[id number]]  (Abnormal) Collected: 02/07/21 0444    Specimen: Blood Updated: 02/07/21 0559     Glucose 85 mg/dL      BUN 48 mg/dL      Creatinine 1.50 mg/dL      Sodium 137 mmol/L      Potassium 3.8 mmol/L      Chloride 108 mmol/L      CO2 20.0 mmol/L      Calcium 8.0 mg/dL      eGFR  African Amer 57 mL/min/1.73      BUN/Creatinine Ratio 32.0     Anion Gap 9.0 mmol/L     Narrative:      GFR Normal >60  Chronic Kidney Disease <60  Kidney Failure <15      Basic Metabolic Panel [[id number]]  (Abnormal) Collected: 02/06/21 0430    Specimen: Blood Updated: 02/06/21 0530     Glucose 108 mg/dL      BUN 63 mg/dL      Creatinine 2.73 mg/dL      Sodium 135 mmol/L      Potassium 4.2 mmol/L      Chloride 100 mmol/L      CO2 25.0 mmol/L      Calcium 9.1 mg/dL      eGFR  African Amer 29 mL/min/1.73      BUN/Creatinine Ratio 23.1     Anion Gap 10.0 mmol/L     Narrative:      GFR Normal >60  Chronic Kidney Disease <60  Kidney Failure <15      Uric Acid [[id number]]  (Abnormal) Collected: 02/06/21 0430    Specimen: Blood Updated: 02/06/21 0530     Uric Acid 10.2 mg/dL     Urea Nitrogen, Urine - Urine, Clean Catch [[id number]] Collected: 02/05/21 1538    Specimen: Urine, Clean Catch Updated: 02/06/21 0142     Urea Nitrogen, Urine 647 mg/dL     Narrative:      Reference intervals for random urine have not been established.  Clinical usage is dependent upon physician's interpretation in combination with other laboratory tests.       Creatinine, Urine, Random - Urine, Clean Catch [[id number]] Collected: 02/05/21 1538    Specimen: Urine, Clean Catch Updated: 02/06/21 0142     Creatinine, Urine 243.1 mg/dL     Narrative:      Reference intervals for random urine have not been established.  Clinical usage is dependent upon physician's interpretation in combination with other laboratory tests.       Sodium, Urine, Random - Urine, Clean  Catch [[id number]] Collected: 02/05/21 1538    Specimen: Urine, Clean Catch Updated: 02/05/21 1618     Sodium, Urine 111 mmol/L     Narrative:      Reference intervals for random urine have not been established.  Clinical usage is dependent upon physician's interpretation in combination with other laboratory tests.       Protein, Urine, Random - Urine, Clean Catch [[id number]] Collected: 02/05/21 1538    Specimen: Urine, Clean Catch Updated: 02/05/21 1617     Total Protein, Urine 28.9 mg/dL     Narrative:      Reference intervals for random urine have not been established.  Clinical usage is dependent upon physician's interpretation in combination with other laboratory tests.       Basic Metabolic Panel [[id number]]  (Abnormal) Collected: 02/05/21 0959    Specimen: Blood Updated: 02/05/21 1110     Glucose 118 mg/dL      BUN 65 mg/dL      Creatinine 4.77 mg/dL      Sodium 136 mmol/L      Potassium 4.9 mmol/L      Chloride 95 mmol/L      CO2 27.0 mmol/L      Calcium 9.3 mg/dL      eGFR  African Amer 15 mL/min/1.73      BUN/Creatinine Ratio 13.6     Anion Gap 14.0 mmol/L     Narrative:      GFR Normal >60  Chronic Kidney Disease <60  Kidney Failure <15      Troponin [[id number]]  (Normal) Collected: 02/05/21 0959    Specimen: Blood Updated: 02/05/21 1110     Troponin T <0.010 ng/mL     Narrative:      Troponin T Reference Range:  <= 0.03 ng/mL-   Negative for AMI  >0.03 ng/mL-     Abnormal for myocardial necrosis.  Clinicians would have to utilize clinical acumen, EKG, Troponin and serial changes to determine if it is an Acute Myocardial Infarction or myocardial injury due to an underlying chronic condition.       Results may be falsely decreased if patient taking Biotin.          Imaging Results (Last 72 Hours)     Procedure Component Value Units Date/Time    US Renal Bilateral [[id number]] Collected: 02/05/21 1502     Updated: 02/05/21 1507    Narrative:      HISTORY: Acute kidney injury     Renal ultrasound: Sonographic  "imaging of the kidneys and bladder  performed     Kidneys are isoechoic compared to liver. The right kidney measures 10.4  x 5.2 x 5.4 cm. The left kidney measures 10.5 x 5.6 x 4.7 cm. No solid  or cystic renal mass. No hydronephrosis. No abnormal perinephric fluid  collection.     Nodular appearance to the prostate measuring approximately 4.5 x 3.6 x  3.9 cm. No focal bladder mucosal defect.     Limited visualization of the IVC and aorta due to shadowing bowel gas.       Impression:      1. Normal sonographic appearance of the kidneys.   2. Slightly nodular appearance to the prostate creating mild mass effect  on the floor the bladder.  This report was finalized on 02/05/2021 15:04 by Dr. Mae Jarquin MD.        Chief Complaint on Day of Discharge: Overall, patient reports feeling well.  He denies shortness of breath, chest pain or pressure.  He is tolerating a diet.  He does still have some left-sided weakness which he states is improving.  Speech is slow but seems to be improving as well.  Patient's blood pressure has been trending up.  Discussed with Dr. Maier will discharge with Toprol-XL, amlodipine and start losartan.  [facility]    Hospital Course:  The patient is a 65 y.o. male who presented to [facility] with complaints of lightheadedness in addition to a change in his gait and vision.  He has a past medical history significant for essential hypertension, hyperlipidemia, and previous infarct.  Of note, patient states he had been out of his blood pressure medications for 2 days and resumed antihypertensives 3 days prior to onset of dizziness.  His wife claims patient uses [facility] pharmacy and pharmacy was unable to verify filling of any prescriptions for the last 12 months.  Patient states he has \"a bunch of medicine at home\" and that is why he has not filled prescriptions lately.  Concern with medication noncompliance.  Upon evaluation in the " emergency department, he was not a TPA candidate as NIH stroke scale was 0 and patient presented 3 days after initial onset of symptomology.  CTA head and neck showed no significant stenosis, specifically no stenosis bilateral posterior circulation. There is dominance of right vertebral artery and basilar artery is patent. He was admitted to the hospitalist service for further evaluation and management.     He was seen in consultation by neurology.  MRI brain on 2/02 showed right pontine stroke.  He was continued on aspirin 81 mg daily in addition to Plavix and statin.  He is to continue aspirin and Plavix for 21 days then monotherapy with Plavix.  Repeat MRI 2/4 showed stable right pontine stroke and new acute stroke right temporal lobe.  Per neurology, unclear why he had a second stroke this admission.  Transthoracic echocardiogram showed preserved ejection fraction with left ventricular diastolic dysfunction grade 1.  Saline test results negative.  Thickening on anterior leaflet of mitral valve.  Transesophageal echocardiogram showed no evidence of intracavitary thrombus.  On 2/05 patient was a cardiac alert this secondary to chest pain.  He was noted to be very diaphoretic.  He did have immediate relief with sublingual nitroglycerin.  Aspirin 325 mg given.  EKG noted T wave inversions on inferior leads.  Stat chest x-ray showed no acute cardiopulmonary process.  He was seen in consultation by cardiology.  Serial troponins negative.  He was seen in consultation by cardiology, cardiology recommended diagnostic left heart catheterization, although labs showed acute kidney injury likely contrast-induced.  He is to follow-up with cardiology as outpatient for further ischemic work-up.  He was seen in consultation by nephrology.  He was found to have acute kidney injury -contrast nephropathy (s/p contrast on 2/2) with serum creatinine of 5.12.  He was given IV hydration.  Nephrotoxins held.  Serum creatinine much  "improved to 1.40.  Renal ultrasound showed normal sonographic appearance of the kidneys.  He has a history of essential hypertension - he was initially continued on home medications of amlodipine, lisinopril, Toprol-XL, Maxide.  He has had wide fluctuations in his blood pressure to include some hypotension.  Systolic blood pressure goal is less than 140.  He will be discharged with instructions to continue Toprol-XL, amlodipine, and start losartan 25 mg daily -dosage to be adjusted as needed.  Overall, patient reports feeling well.  Denies shortness of breath, chest pain or pressure.  He is tolerating a diet.  He does still have left-sided weakness, which seems to be improving. He has worked with physical, speech, and Occupational Therapy.  Therapy recommends discharge to acute rehab.  He is appropriate for discharge to [facility] acute rehab.  He is to follow-up with physician at rehab within 1 week.  He is to follow-up with neurology in 3 to 4 weeks.  He is to follow up with cardiology as per recommendations.       Condition on Discharge:  Medically stable.    Physical Exam on Discharge:  BP (!) 184/91   Pulse 54   Temp 97.6 °F (36.4 °C) (Oral)   Resp 16   Ht 167.6 cm (65.98\")   Wt 78.9 kg (174 lb)   SpO2 96%   BMI 28.10 kg/m²   Physical Exam  Vitals signs reviewed.   Constitutional:       General: He is not in acute distress.     Appearance: He is not toxic-appearing.      Comments: Sitting up in bed.  No acute distress.  Tolerating room air.  Wife at bedside.  HENT:      Head: Normocephalic and atraumatic.      Mouth/Throat:      Mouth: Mucous membranes are moist.      Pharynx: Oropharynx is clear.   Eyes:      Extraocular Movements: Extraocular movements intact.      Conjunctiva/sclera: Conjunctivae normal.      Pupils: Pupils are equal, round, and reactive to light.   Neck:      Musculoskeletal: Normal range of motion and neck supple. No muscular tenderness.   Cardiovascular:      Rate and Rhythm: Normal " rate and regular rhythm.      Pulses: Normal pulses.      Heart sounds: No murmur.      Comments: SB-S 59-73  Pulmonary:      Effort: Pulmonary effort is normal. No respiratory distress.      Breath sounds: Normal breath sounds. No wheezing.   Abdominal:      General: Bowel sounds are normal. There is no distension.      Palpations: Abdomen is soft.      Tenderness: There is no abdominal tenderness.   Musculoskeletal: Normal range of motion.         General: No swelling or tenderness.   Skin:     General: Skin is warm and dry.      Findings: No erythema or rash.   Neurological:      General: No focal deficit present.      Mental Status: He is alert and oriented to person, place, and time.      Cranial Nerves: No cranial nerve deficit.      Motor: No weakness.      Comments: Mild dysarthria.  Left-sided weakness.  Psychiatric:         Mood and Affect: Mood normal.         Behavior: Behavior normal.     Discharge Disposition:  Rehab Facility or Unit (DC - External)    Discharge Medications:     Discharge Medications      New Medications      Instructions Start Date   allopurinol 100 MG tablet  Commonly known as: ZYLOPRIM   200 mg, Oral, Daily   Start Date: February 9, 2021     aspirin 81 MG chewable tablet   81 mg, Oral, Daily   Start Date: February 9, 2021     losartan 25 MG tablet  Commonly known as: COZAAR   25 mg, Oral, Every 24 Hours Scheduled         Changes to Medications      Instructions Start Date   atorvastatin 80 MG tablet  Commonly known as: LIPITOR  What changed:   · medication strength  · how much to take  · when to take this   80 mg, Oral, Nightly         Continue These Medications      Instructions Start Date   amLODIPine 10 MG tablet  Commonly known as: NORVASC   10 mg, Oral, Every Night at Bedtime      clopidogrel 75 MG tablet  Commonly known as: PLAVIX   75 mg, Oral, Daily      latanoprost 0.005 % ophthalmic solution  Commonly known as: XALATAN   1 drop, Nightly      metoprolol succinate XL 50 MG  24 hr tablet  Commonly known as: TOPROL-XL   50 mg, Oral, Daily      vitamin B-12 500 MCG tablet  Commonly known as: CYANOCOBALAMIN   500 mcg, Oral, Daily      Vitamin D (Cholecalciferol) 10 MCG (400 UNIT) tablet  Commonly known as: CHOLECALCIFEROL   400 Units, Oral, Daily         Stop These Medications    cloNIDine 0.1 MG tablet  Commonly known as: CATAPRES     lisinopril 40 MG tablet  Commonly known as: PRINIVIL,ZESTRIL     spironolactone 25 MG tablet  Commonly known as: Aldactone     triamterene-hydrochlorothiazide 75-50 MG per tablet  Commonly known as: Maxzide          Discharge Diet:   Diet Instructions     Diet: Regular, Consistent Carbohydrate, Cardiac, Renal      Discharge Diet:  Regular  Consistent Carbohydrate  Cardiac  Renal           Activity at Discharge:   Activity Instructions     Activity as Tolerated          Discharge Care Plan/Instructions:   1.  Follow-up with physician at rehab within 1 week.    2.  He is to follow-up with neurology in 3 to 4 weeks.  Continue aspirin and Plavix for 21 days then monotherapy with Plavix.  3.  Follow up with cardiology as per recommendations.   4.  Continue Toprol-XL, amlodipine, start losartan 25 mg daily -dosage to be adjusted as needed.  5.  Return for worsening symptoms.    Follow-up Appointments:   Future Appointments   Date Time Provider Department Center   3/12/2021 10:15 AM Dallin Miller,  [facility]     Test Results Pending at Discharge: None.    Electronically signed by OMAIRA Horner, 02/08/21, 12:37 CST.    Time: 35 minutes.    I personally evaluated and examined the patient in conjunction with OMAIRA Lucio and agree with the assessment, treatment plan, and disposition of the patient as recorded by her. My history, exam, and further recommendations are:     Seen and discussed with his wife.    He is currently getting [facility]  He is excited to get out of the hospital and continue to make  progress in an attempt to get home soon.  His renal function has improved significantly since developing contrast-induced nephropathy.  His blood pressure has been fluctuating so we have made new recommendations with regards to it today.  He continues on aspirin Plavix for the next 21 days and then will be on monotherapy with Plavix.  He will continue to be followed by neurology and internal medicine at [facility].    Electronically signed by Diallo Maier DO, 2/8/2021, 14:40 CST.
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
## 2021-02-08 NOTE — PROGRESS NOTES
Lalita Ya arrived to room # 36  Presented with: CVA  Mental Status: Patient is oriented and alert. Vitals:    02/08/21 1618   BP: (!) 181/103   Pulse: 62   Resp: 16   Temp: 98.6 °F (37 °C)   SpO2: 95%     Patient safety contract and falls prevention contract reviewed with patient Yes. Oriented Patient to room. Call light within reach. Yes.   Needs, issues or concerns expressed at this time: no.      Electronically signed by Jose Luis Kimbrough LPN on 1/7/9251 at 8:33 PM

## 2021-02-08 NOTE — PAYOR COMM NOTE
"AUTH: [id number]    Dao Jhaveri (65 y.o. Male)     Date of Birth Social Security Number Address Home Phone MRN    1955  [id number] [facility] [address] [phone] [id number]    P[employer]al Marital Status          Buddhism        Admission Date Admission Type Admitting Provider Attending Provider Department, Room/Bed    2/2/21 Emergency Diallo Maier DO Hancock, John C, DO [facility] 3A, 351/1    Discharge Date Discharge Disposition Discharge Destination         Rehab Facility or Unit (DC - External)              Attending Provider: Diallo Maier DO    Allergies: Codeine    Isolation: None   Infection: None   Code Status: CPR    Ht: 167.6 cm (65.98\")   Wt: 78.9 kg (174 lb)    Admission Cmt: None   Principal Problem: Acute ischemic right MCA stroke (CMS/HCC) [I63.511]                 Active Insurance as of 2/2/2021     Primary Coverage     Payor Plan Insurance Group Employer/Plan Group    HUMANA MEDICARE REPLACEMENT HUMANA MEDICARE REPLACEMENT [id number]     Payor Plan Address Payor Plan Phone Number Payor Plan Fax Number Effective Dates    [address] [address] [phone]  7/1/2018 - None Entered    [facility] [address]       Subscriber Name Subscriber Birth Date Member ID       DAO JHAVERI 1955 [id number]                 Emergency Contacts      (Rel.) Home Phone Work Phone Mobile Phone    Fern Jhaveri (Spouse) [phone] -- [phone]               Discharge Summary      Diallo Maier DO at 02/08/21 0842              [facility]  DISCHARGE SUMMARY       Date of Admission: 2/2/2021  Date of Discharge:  2/8/2021  Primary Care Physician: Dallin Miller DO    Presenting Problem/History of Present Illness:  Hypertensive urgency [I16.0]  Cerebrovascular accident (CVA) of right pontine structure (CMS/HCC) [I63.50]     Final Discharge Diagnoses:  Active Hospital Problems    Diagnosis   • **Acute ischemic " right MCA stroke (CMS/HCC)   • Contrast dye induced nephropathy   • Acute kidney injury (CMS/HCC)   • Chest pain   • History of noncompliance with medical treatment   • History of previous left MCA stroke   • Mixed hyperlipidemia   • Essential hypertension       Consults:   Dr. Mcghee with cardiology.  Dr. Riley with neurology.  Dr. Mederos with nephrology.    Procedures Performed:   Results for orders placed during the hospital encounter of 02/02/21   Adult Transesophageal Echo (JOEL) W/ Cont if Necessary Per Protocol    Narrative · Left ventricular systolic function is normal. The left ventricular   cavity is small in size. Left ventricular wall thickness is consistent   with severe concentric hypertrophy.  · No evidence of left ventricular thrombus or mass present.  · No evidence of a left atrial appendage thrombus was present  · No evidence of a patent foramen ovale. No evidence of an atrial septal   defect present.  · No evidence of left ventricular noncompaction        Pertinent Test Results:   Lab Results (last 72 hours)     Procedure Component Value Units Date/Time    Basic Metabolic Panel [[id number]]  (Abnormal) Collected: 02/08/21 0326    Specimen: Blood Updated: 02/08/21 0434     Glucose 92 mg/dL      BUN 38 mg/dL      Creatinine 1.40 mg/dL      Sodium 139 mmol/L      Potassium 4.0 mmol/L      Chloride 105 mmol/L      CO2 24.0 mmol/L      Calcium 9.2 mg/dL      eGFR   Amer 62 mL/min/1.73      BUN/Creatinine Ratio 27.1     Anion Gap 10.0 mmol/L     Narrative:      GFR Normal >60  Chronic Kidney Disease <60  Kidney Failure <15      Basic Metabolic Panel [[id number]]  (Abnormal) Collected: 02/07/21 0444    Specimen: Blood Updated: 02/07/21 0559     Glucose 85 mg/dL      BUN 48 mg/dL      Creatinine 1.50 mg/dL      Sodium 137 mmol/L      Potassium 3.8 mmol/L      Chloride 108 mmol/L      CO2 20.0 mmol/L      Calcium 8.0 mg/dL      eGFR  African Amer 57 mL/min/1.73      BUN/Creatinine Ratio 32.0      Anion Gap 9.0 mmol/L     Narrative:      GFR Normal >60  Chronic Kidney Disease <60  Kidney Failure <15      Basic Metabolic Panel [[id number]]  (Abnormal) Collected: 02/06/21 0430    Specimen: Blood Updated: 02/06/21 0530     Glucose 108 mg/dL      BUN 63 mg/dL      Creatinine 2.73 mg/dL      Sodium 135 mmol/L      Potassium 4.2 mmol/L      Chloride 100 mmol/L      CO2 25.0 mmol/L      Calcium 9.1 mg/dL      eGFR  African Amer 29 mL/min/1.73      BUN/Creatinine Ratio 23.1     Anion Gap 10.0 mmol/L     Narrative:      GFR Normal >60  Chronic Kidney Disease <60  Kidney Failure <15      Uric Acid [[id number]]  (Abnormal) Collected: 02/06/21 0430    Specimen: Blood Updated: 02/06/21 0530     Uric Acid 10.2 mg/dL     Urea Nitrogen, Urine - Urine, Clean Catch [[id number]] Collected: 02/05/21 1538    Specimen: Urine, Clean Catch Updated: 02/06/21 0142     Urea Nitrogen, Urine 647 mg/dL     Narrative:      Reference intervals for random urine have not been established.  Clinical usage is dependent upon physician's interpretation in combination with other laboratory tests.       Creatinine, Urine, Random - Urine, Clean Catch [[id number]] Collected: 02/05/21 1538    Specimen: Urine, Clean Catch Updated: 02/06/21 0142     Creatinine, Urine 243.1 mg/dL     Narrative:      Reference intervals for random urine have not been established.  Clinical usage is dependent upon physician's interpretation in combination with other laboratory tests.       Sodium, Urine, Random - Urine, Clean Catch [[id number]] Collected: 02/05/21 1538    Specimen: Urine, Clean Catch Updated: 02/05/21 1618     Sodium, Urine 111 mmol/L     Narrative:      Reference intervals for random urine have not been established.  Clinical usage is dependent upon physician's interpretation in combination with other laboratory tests.       Protein, Urine, Random - Urine, Clean Catch [[id number]] Collected: 02/05/21 1538    Specimen: Urine, Clean Catch Updated: 02/05/21  1617     Total Protein, Urine 28.9 mg/dL     Narrative:      Reference intervals for random urine have not been established.  Clinical usage is dependent upon physician's interpretation in combination with other laboratory tests.       Basic Metabolic Panel [[id number]]  (Abnormal) Collected: 02/05/21 0959    Specimen: Blood Updated: 02/05/21 1110     Glucose 118 mg/dL      BUN 65 mg/dL      Creatinine 4.77 mg/dL      Sodium 136 mmol/L      Potassium 4.9 mmol/L      Chloride 95 mmol/L      CO2 27.0 mmol/L      Calcium 9.3 mg/dL      eGFR  African Amer 15 mL/min/1.73      BUN/Creatinine Ratio 13.6     Anion Gap 14.0 mmol/L     Narrative:      GFR Normal >60  Chronic Kidney Disease <60  Kidney Failure <15      Troponin [[id number]]  (Normal) Collected: 02/05/21 0959    Specimen: Blood Updated: 02/05/21 1110     Troponin T <0.010 ng/mL     Narrative:      Troponin T Reference Range:  <= 0.03 ng/mL-   Negative for AMI  >0.03 ng/mL-     Abnormal for myocardial necrosis.  Clinicians would have to utilize clinical acumen, EKG, Troponin and serial changes to determine if it is an Acute Myocardial Infarction or myocardial injury due to an underlying chronic condition.       Results may be falsely decreased if patient taking Biotin.          Imaging Results (Last 72 Hours)     Procedure Component Value Units Date/Time    US Renal Bilateral [[id number]] Collected: 02/05/21 1502     Updated: 02/05/21 1507    Narrative:      HISTORY: Acute kidney injury     Renal ultrasound: Sonographic imaging of the kidneys and bladder  performed     Kidneys are isoechoic compared to liver. The right kidney measures 10.4  x 5.2 x 5.4 cm. The left kidney measures 10.5 x 5.6 x 4.7 cm. No solid  or cystic renal mass. No hydronephrosis. No abnormal perinephric fluid  collection.     Nodular appearance to the prostate measuring approximately 4.5 x 3.6 x  3.9 cm. No focal bladder mucosal defect.     Limited visualization of the IVC and aorta due to  "shadowing bowel gas.       Impression:      1. Normal sonographic appearance of the kidneys.   2. Slightly nodular appearance to the prostate creating mild mass effect  on the floor the bladder.  This report was finalized on 02/05/2021 15:04 by Dr. Mae Jarquin MD.        Chief Complaint on Day of Discharge: Overall, patient reports feeling well.  He denies shortness of breath, chest pain or pressure.  He is tolerating a diet.  He does still have some left-sided weakness which he states is improving.  Speech is slow but seems to be improving as well.  Patient's blood pressure has been trending up.  Discussed with Dr. Maier will discharge with Toprol-XL, amlodipine and start losartan.  He is appropriate for discharge to [facility] rehab today.    Hospital Course:  The patient is a 65 y.o. male who presented to [facility] with complaints of lightheadedness in addition to a change in his gait and vision.  He has a past medical history significant for essential hypertension, hyperlipidemia, and previous infarct.  Of note, patient states he had been out of his blood pressure medications for 2 days and resumed antihypertensives 3 days prior to onset of dizziness.  His wife claims patient uses [facility] and pharmacy was unable to verify filling of any prescriptions for the last 12 months.  Patient states he has \"a bunch of medicine at home\" and that is why he has not filled prescriptions lately.  Concern with medication noncompliance.  Upon evaluation in the emergency department, he was not a TPA candidate as NIH stroke scale was 0 and patient presented 3 days after initial onset of symptomology.  CTA head and neck showed no significant stenosis, specifically no stenosis bilateral posterior circulation. There is dominance of right vertebral artery and basilar artery is patent. He was admitted to the hospitalist service for further evaluation and management.     He was seen in consultation by neurology. "  MRI brain on 2/02 showed right pontine stroke.  He was continued on aspirin 81 mg daily in addition to Plavix and statin.  He is to continue aspirin and Plavix for 21 days then monotherapy with Plavix.  Repeat MRI 2/4 showed stable right pontine stroke and new acute stroke right temporal lobe.  Per neurology, unclear why he had a second stroke this admission.  Transthoracic echocardiogram showed preserved ejection fraction with left ventricular diastolic dysfunction grade 1.  Saline test results negative.  Thickening on anterior leaflet of mitral valve.  Transesophageal echocardiogram showed no evidence of intracavitary thrombus.  On 2/05 patient was a cardiac alert this secondary to chest pain.  He was noted to be very diaphoretic.  He did have immediate relief with sublingual nitroglycerin.  Aspirin 325 mg given.  EKG noted T wave inversions on inferior leads.  Stat chest x-ray showed no acute cardiopulmonary process.  He was seen in consultation by cardiology.  Serial troponins negative.  He was seen in consultation by cardiology, cardiology recommended diagnostic left heart catheterization, although labs showed acute kidney injury likely contrast-induced.  He is to follow-up with cardiology as outpatient for further ischemic work-up.  He was seen in consultation by nephrology.  He was found to have acute kidney injury -contrast nephropathy (s/p contrast on 2/2) with serum creatinine of 5.12.  He was given IV hydration.  Nephrotoxins held.  Serum creatinine much improved to 1.40.  Renal ultrasound showed normal sonographic appearance of the kidneys.  He has a history of essential hypertension - he was initially continued on home medications of amlodipine, lisinopril, Toprol-XL, Maxide.  He has had wide fluctuations in his blood pressure to include some hypotension.  Systolic blood pressure goal is less than 140.  He will be discharged with instructions to continue Toprol-XL, amlodipine, and start losartan 25 mg  "daily -dosage to be adjusted as needed.  Overall, patient reports feeling well.  Denies shortness of breath, chest pain or pressure.  He is tolerating a diet.  He does still have left-sided weakness, which seems to be improving. He has worked with physical, speech, and Occupational Therapy.  Therapy recommends discharge to acute rehab.  He is appropriate for discharge to [facility] acute rehab.  He is to follow-up with physician at rehab within 1 week.  He is to follow-up with neurology in 3 to 4 weeks.  He is to follow up with cardiology as per recommendations.       Condition on Discharge:  Medically stable.    Physical Exam on Discharge:  BP (!) 184/91   Pulse 54   Temp 97.6 °F (36.4 °C) (Oral)   Resp 16   Ht 167.6 cm (65.98\")   Wt 78.9 kg (174 lb)   SpO2 96%   BMI 28.10 kg/m²   Physical Exam  Vitals signs reviewed.   Constitutional:       General: He is not in acute distress.     Appearance: He is not toxic-appearing.      Comments: Sitting up in bed.  No acute distress.  Tolerating room air.  Wife at bedside.  HENT:      Head: Normocephalic and atraumatic.      Mouth/Throat:      Mouth: Mucous membranes are moist.      Pharynx: Oropharynx is clear.   Eyes:      Extraocular Movements: Extraocular movements intact.      Conjunctiva/sclera: Conjunctivae normal.      Pupils: Pupils are equal, round, and reactive to light.   Neck:      Musculoskeletal: Normal range of motion and neck supple. No muscular tenderness.   Cardiovascular:      Rate and Rhythm: Normal rate and regular rhythm.      Pulses: Normal pulses.      Heart sounds: No murmur.      Comments: SB-S 59-73  Pulmonary:      Effort: Pulmonary effort is normal. No respiratory distress.      Breath sounds: Normal breath sounds. No wheezing.   Abdominal:      General: Bowel sounds are normal. There is no distension.      Palpations: Abdomen is soft.      Tenderness: There is no abdominal tenderness.   Musculoskeletal: Normal range of motion. "         General: No swelling or tenderness.   Skin:     General: Skin is warm and dry.      Findings: No erythema or rash.   Neurological:      General: No focal deficit present.      Mental Status: He is alert and oriented to person, place, and time.      Cranial Nerves: No cranial nerve deficit.      Motor: No weakness.      Comments: Mild dysarthria.  Left-sided weakness.  Psychiatric:         Mood and Affect: Mood normal.         Behavior: Behavior normal.     Discharge Disposition:  Rehab Facility or Unit (DC - External)    Discharge Medications:     Discharge Medications      New Medications      Instructions Start Date   allopurinol 100 MG tablet  Commonly known as: ZYLOPRIM   200 mg, Oral, Daily   Start Date: February 9, 2021     aspirin 81 MG chewable tablet   81 mg, Oral, Daily   Start Date: February 9, 2021     losartan 25 MG tablet  Commonly known as: COZAAR   25 mg, Oral, Every 24 Hours Scheduled         Changes to Medications      Instructions Start Date   atorvastatin 80 MG tablet  Commonly known as: LIPITOR  What changed:   · medication strength  · how much to take  · when to take this   80 mg, Oral, Nightly         Continue These Medications      Instructions Start Date   amLODIPine 10 MG tablet  Commonly known as: NORVASC   10 mg, Oral, Every Night at Bedtime      clopidogrel 75 MG tablet  Commonly known as: PLAVIX   75 mg, Oral, Daily      latanoprost 0.005 % ophthalmic solution  Commonly known as: XALATAN   1 drop, Nightly      metoprolol succinate XL 50 MG 24 hr tablet  Commonly known as: TOPROL-XL   50 mg, Oral, Daily      vitamin B-12 500 MCG tablet  Commonly known as: CYANOCOBALAMIN   500 mcg, Oral, Daily      Vitamin D (Cholecalciferol) 10 MCG (400 UNIT) tablet  Commonly known as: CHOLECALCIFEROL   400 Units, Oral, Daily         Stop These Medications    cloNIDine 0.1 MG tablet  Commonly known as: CATAPRES     lisinopril 40 MG tablet  Commonly known as: PRINIVIL,ZESTRIL     spironolactone 25  MG tablet  Commonly known as: Aldactone     triamterene-hydrochlorothiazide 75-50 MG per tablet  Commonly known as: Maxzide          Discharge Diet:   Diet Instructions     Diet: Regular, Consistent Carbohydrate, Cardiac, Renal      Discharge Diet:  Regular  Consistent Carbohydrate  Cardiac  Renal           Activity at Discharge:   Activity Instructions     Activity as Tolerated          Discharge Care Plan/Instructions:   1.  Follow-up with physician at rehab within 1 week.    2.  He is to follow-up with neurology in 3 to 4 weeks.  Continue aspirin and Plavix for 21 days then monotherapy with Plavix.  3.  Follow up with cardiology as per recommendations.   4.  Continue Toprol-XL, amlodipine, start losartan 25 mg daily -dosage to be adjusted as needed.  5.  Return for worsening symptoms.    Follow-up Appointments:   Future Appointments   Date Time Provider Department Center   3/12/2021 10:15 AM Dallin Miller,  [facility]     Test Results Pending at Discharge: None.    Electronically signed by OMAIRA Horner, 02/08/21, 12:37 CST.    Time: 35 minutes.    I personally evaluated and examined the patient in conjunction with OMAIRA Lucio and agree with the assessment, treatment plan, and disposition of the patient as recorded by her. My history, exam, and further recommendations are:     Seen and discussed with his wife.    He is currently getting dressed so that he can go to [facility] today.  He is excited to get out of the hospital and continue to make progress in an attempt to get home soon.  His renal function has improved significantly since developing contrast-induced nephropathy.  His blood pressure has been fluctuating so we have made new recommendations with regards to it today.  He continues on aspirin Plavix for the next 21 days and then will be on monotherapy with Plavix.  He will continue to be followed by neurology and internal medicine at [facility].    Electronically signed by  Diallo DESOUZA. DO [place], 2/8/2021, 14:40 CST.            Electronically signed by Diallo Maier DO at 02/08/21 3194
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
## 2021-02-08 NOTE — PROGRESS NOTES
Continued Stay Note  [facility]     Patient Name: Dao Jhaveri  MRN: [id number]  Today's Date: 2/8/2021    Admit Date: 2/2/2021    Discharge Plan     Row Name 02/08/21 0911       Plan    Plan  Spoke with [facility]. Precert pending , she hopes to hear back today. Will need negative covid when precert is complete.        Discharge Codes    No documentation.             Sydney Resendiz RN

## 2021-02-08 NOTE — PLAN OF CARE
Goal Outcome Evaluation:Pt discharged in stable condition with family/private transport to [facility].  Report called to PRAKASH Bah.
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
## 2021-02-08 NOTE — PLAN OF CARE
Goal Outcome Evaluation:  Plan of Care Reviewed With: patient  Progress: improving  Outcome Summary: PT treatment is complete. Pt O&A x 4 and cooperative with PT. Bed mobility tasks performed with supervision assist. Sit-stand transfer and gait with CGA for safety. Pt able to perform 3 sets of 3 reps of sit-stand strengthening exercise with focus of slow eccentric control to bed. Pt required frequent v/c to not pull from walker upon standing. Pt gait has improved with use of FWW but pt still presents with L knee buckling and decreased heel strike due to LLE weakness. Pt educated to use UE support through FWW during L midstance to improve gait cycle. Pt to continue to benefit from skilled PT.

## 2021-02-08 NOTE — PLAN OF CARE
Goal Outcome Evaluation:  Plan of Care Reviewed With: patient, spouse  Progress: improving  Outcome Summary: OT tx complete. Pt. was found to be A&Ox4 and cooperative with therapy. Pt. completed shld. flex and elbow flex exercises with a 3lb. arm bar (10 reps, 2 sets each). Pt. completed bed mobility under supervision and ambulated with r/w from EOB<>BR with CGA and vc to correct device placement and step speed/awareness d/t ataxic gate. Sit<>stand transfers required CGA, vc, and r/w, as well as grab bars when performed in shower. Pt. bathed self while sitting supported on shower chair with supervision for UE and min A for distal LE. Pt. don/doffed socks under supervision and gown with min A d/t lead wires. Pt.'s difficulty with coordination, balance, and ADL completion still qualify him for skilled therapy services. OT continue POC.

## 2021-02-08 NOTE — PROGRESS NOTES
Continued Stay Note   [facility]     Patient Name: Dao Jhaveir  MRN: [id number]  Today's Date: 2/8/2021    Admit Date: 2/2/2021    Discharge Plan     Row Name 02/08/21 1232       Plan    Final Discharge Disposition Code  62 - inpatient rehab facility    Final Note  Precert is complete and pt can dc to [facility] today. Pt will need a new Covid test. Call report number is [phone]        Discharge Codes    No documentation.       Expected Discharge Date and Time     Expected Discharge Date Expected Discharge Time    Feb 8, 2021             CYNTHIA Salazar

## 2021-02-08 NOTE — PLAN OF CARE
Goal Outcome Evaluation:  Plan of Care Reviewed With: (P) patient, spouse  Progress: (P) improving  Outcome Summary: (P) See note.  Swallow and communication tx performed. Patient was alert and cooperative. Patient's wife present. Patient given regular consistency and thin liquid. Patient showed no overt s/s of aspiration. Patient OK to continue regular diet with thin liquids. SLP to follow up to ensure diet tolerance.     Patient asked to repeat sentences and phrases, as well as answer open-ended, more complex questions. Patient was able to repeat sentences and phrases appropriately, however, patient had difficulty with articulating 3- or more syllable words. Patient would self-correct and clinician would have him over-articulate the word. During connected speech, patient was 65% intelligible. Patient would slow down his speech as he became less intelligible. Patient and his wife were educated on techniques to increase his overall intelligibility. SLP to continue to follow.       Completed by Poonam Foreman, SLP Student   Poonam Foreman, Speech Therapy Student

## 2021-02-08 NOTE — PROGRESS NOTES
4 Eyes Skin Assessment    Ernestine Acosta is being assessed upon: Admission    I agree that I, Mehul Quiñonez, along with Jed Alvarado LPN (either 2 RN's or 1 LPN and 1 RN) have performed a thorough Head to Toe Skin Assessment on the patient. ALL assessment sites listed below have been assessed. Areas assessed by both nurses:     [x]   Head, Face, and Ears   [x]   Shoulders, Back, and Chest  [x]   Arms, Elbows, and Hands   [x]   Coccyx, Sacrum, and Ischium  [x]   Legs, Feet, and Heels    Does the Patient have Skin Breakdown?  No    Lobo Prevention initiated: No  Wound Care Orders initiated: No    WOC nurse consulted for Pressure Injury (Stage 3,4, Unstageable, DTI, NWPT, and Complex wounds) and New or Established Ostomies: No        Primary Nurse eSignature: Mehul Quiñonez RN on 2/8/2021 at 5:53 PM      Co-Signer eSignature: Electronically signed by Parag Piper LPN on 6/8/5739 at 6:42 PM

## 2021-02-09 PROCEDURE — 97110 THERAPEUTIC EXERCISES: CPT

## 2021-02-09 PROCEDURE — 97535 SELF CARE MNGMENT TRAINING: CPT

## 2021-02-09 PROCEDURE — 99223 1ST HOSP IP/OBS HIGH 75: CPT | Performed by: PSYCHIATRY & NEUROLOGY

## 2021-02-09 PROCEDURE — 92522 EVALUATE SPEECH PRODUCTION: CPT

## 2021-02-09 PROCEDURE — 92610 EVALUATE SWALLOWING FUNCTION: CPT

## 2021-02-09 PROCEDURE — 6370000000 HC RX 637 (ALT 250 FOR IP): Performed by: PSYCHIATRY & NEUROLOGY

## 2021-02-09 PROCEDURE — 96125 COGNITIVE TEST BY HC PRO: CPT

## 2021-02-09 PROCEDURE — 97165 OT EVAL LOW COMPLEX 30 MIN: CPT

## 2021-02-09 PROCEDURE — 6360000002 HC RX W HCPCS: Performed by: PSYCHIATRY & NEUROLOGY

## 2021-02-09 PROCEDURE — 1180000000 HC REHAB R&B

## 2021-02-09 PROCEDURE — 97161 PT EVAL LOW COMPLEX 20 MIN: CPT

## 2021-02-09 PROCEDURE — 97116 GAIT TRAINING THERAPY: CPT

## 2021-02-09 RX ORDER — ASPIRIN 81 MG/1
81 TABLET, CHEWABLE ORAL DAILY
Status: DISCONTINUED | OUTPATIENT
Start: 2021-02-09 | End: 2021-02-23 | Stop reason: HOSPADM

## 2021-02-09 RX ADMIN — TRIAMTERENE AND HYDROCHLOROTHIAZIDE 2 TABLET: 37.5; 25 TABLET ORAL at 08:17

## 2021-02-09 RX ADMIN — SPIRONOLACTONE 25 MG: 25 TABLET ORAL at 08:17

## 2021-02-09 RX ADMIN — AMLODIPINE BESYLATE 10 MG: 10 TABLET ORAL at 20:27

## 2021-02-09 RX ADMIN — ENOXAPARIN SODIUM 30 MG: 40 INJECTION SUBCUTANEOUS at 08:20

## 2021-02-09 RX ADMIN — CHOLECALCIFEROL TAB 10 MCG (400 UNIT) 400 UNITS: 10 TAB at 08:17

## 2021-02-09 RX ADMIN — ATORVASTATIN CALCIUM 10 MG: 10 TABLET, FILM COATED ORAL at 08:18

## 2021-02-09 RX ADMIN — CLOPIDOGREL BISULFATE 75 MG: 75 TABLET ORAL at 08:18

## 2021-02-09 RX ADMIN — LATANOPROST 1 DROP: 50 SOLUTION OPHTHALMIC at 20:27

## 2021-02-09 RX ADMIN — POLYETHYLENE GLYCOL 3350 17 G: 17 POWDER, FOR SOLUTION ORAL at 08:17

## 2021-02-09 RX ADMIN — LISINOPRIL 40 MG: 20 TABLET ORAL at 20:27

## 2021-02-09 RX ADMIN — ASPIRIN 81 MG: 81 TABLET, CHEWABLE ORAL at 08:18

## 2021-02-09 RX ADMIN — CLONIDINE HYDROCHLORIDE 0.1 MG: 0.1 TABLET ORAL at 06:35

## 2021-02-09 RX ADMIN — METOPROLOL SUCCINATE 50 MG: 50 TABLET, EXTENDED RELEASE ORAL at 08:18

## 2021-02-09 ASSESSMENT — PAIN SCALES - GENERAL
PAINLEVEL_OUTOF10: 0
PAINLEVEL_OUTOF10: 0

## 2021-02-09 NOTE — PROGRESS NOTES
Speech Language Pathology  Facility/Department: [facility] 8 REHAB UNIT   CLINICAL BEDSIDE SWALLOW EVALUATION     NAME: Reynold Felipe  : 1955  MRN: [id number]     ADMISSION DATE: 2021  ADMITTING DIAGNOSIS:  Pontine and right temporal strokes     Secondary diagnoses: Recent chest pain resolved with nitroglycerin, hyperlipidemia, hypertension     CHIEF COMPLAINT: Left-sided weakness and dysarthria     HISTORY OF PRESENT ILLNESS:   Per Neurology: This 72 y.o. male   with history of HTN,hyperlipidemia,chronic back pain and CVA. He presented to [facility] on 21 with c/o lightheadedness, mild blurred vision and change in his gait that started about 3 days prior. He was hypertensive on presentation. MRI done revealed a right pontine infarct. CTA head/neck showed non signifigant steno-occlusive disease. He was admitted the the hospitalist service with consult for neurology. He was seen by Dr. Sergo Crouch. He was doing quite well. When on 21 he had a return of dizziness with N&V and worsening left sided weakness, mild dysarthria, B/P was low. Stat MRI was ordered that revealed showed a new right temporal lobe infarct. TTE showed thickening on anterior leaflet of mirtal valve. On the morning of 21 he had an accoute onset of chest pain/pressure with hypotension. Repeat EKG was concerning. Troponin was negative x 2. Chest pain resolved with nitro. Cardiology was consult for NIKI.  He was seen by

## 2021-02-09 NOTE — PROGRESS NOTES
[facility]  SPEECH THERAPY  [facility] 8 REHAB UNIT  Speech  Language  Cognitive Evaluation    TIME   Time In: 1000  Time Out: 1100  Minutes: 61     Name: Robyn Pierce  YOB: 1955  Gender: male  Referring Physician: Faviola Vuong MD  Precautions: Fall    ADMISSION DATE: 02/09/2021  ADMITTING DIAGNOSIS:  Pontine and right temporal strokes     Secondary diagnoses: Recent chest pain resolved with nitroglycerin, hyperlipidemia, hypertension     CHIEF COMPLAINT: Left-sided weakness and dysarthria     HISTORY OF PRESENT ILLNESS:   Per Neurology: This 76 y. o. male   with history of HTN,hyperlipidemia,chronic back pain and CVA. He presented to [facility] on 2/2/21 with c/o lightheadedness, mild blurred vision and change in his gait that started about 3 days prior. He was hypertensive on presentation. MRI done revealed a right pontine infarct. CTA head/neck showed non signifigant steno-occlusive disease. He was admitted the the hospitalist service with consult for neurology. He was seen by Dr. Marissa Durán. He was doing quite well. When on 2/4/21 he had a return of dizziness with N&V and worsening left sided weakness, mild dysarthria, B/P was low. Stat MRI was ordered that revealed showed a new right temporal lobe infarct. TTE showed thickening on anterior leaflet of mirtal valve. On the morning of 2/5/21 he had an accoute onset of chest pain/pressure with hypotension. Repeat EKG was concerning. Troponin was negative x 2. Chest pain resolved with nitro. Cardiology was consult for NIKI.  He was seen by  The Cognitive Linguistic Quick Test (CLQT) assists you in quickly determining severity ratings (normal, mild, moderate, severe) for five primary domains of cognition (Attention, Memory, Executive Functions, Language, and Visuo-spatial Skills) and a composite severity rating for adults with known or suspected neurological impairment (e.g., as a result of stroke, traumatic brain injury, or dementia). · A Clock Drawing Severity Rating can serve as a quick monitor of progress or decline. · CLQT results can be used to target areas for direct treatment or everyday management of impaired skills to identify the need for more in-depth testing, or to help determine a different diagnosis.     Patient scores  Attention: 167 Mild Impairment   Memory: 137 Moderate Impairment  Executive functions: 20 Mild Impairment  Language: 29 WNL  Visouspatial skills: 70 Mild Impairment   Composite Severity Rating: 3 Mild Impairment     Receptive: Auditory Comprehension: Mild    During story retell subtest within the CLQT, patient was asked to retell a short story read to him and then answer simple yes/no questions related to the story. Patient had difficulty remembering some details about the short story when he was prompted to retell it. Patient was able to answer 5/6 questions related to the story accurately. Difficulty with the story recall portion of the assessment is believed to be due to attention deficits rather than the pt not understanding what was read to him. He is able to follow 1-2 step basic commands without difficulty. Visual/Reading Comprehension:   Patient was given a symbol cancellation task to assess attention and visuospatial skills. He did not demonstrate any evidence of visual neglect during this task.      Expressive Skills/ Verbal Expression:   Primary Mode of Expression: Verbal When given 3 words and asked to repeat them, pt did so with minimal difficulty. However, he had some difficulty retelling a short story that was read to him. Patient was given 10 common items to identify on the confrontation naming portion of the CLQT. Patient was able to identify all 10 items without difficulty. He was given 2 divergent naming tasks on the CLQT (1 concrete, 1 abstract) and completed with a total score of 5/9. Patient perseverated 2 of his responses during these tasks. Motor Speech/Dysarthria:   Motor Speech: Exceptions to [facility]  Dysarthria : Mild  Intelligibility: Mild; %    Patient presents with mild dysarthria which affects his ability to effectively communicate with those around him. Oral Motor:     Labial Symmetry: (WFL)  Lingual Symmetry: Abnormal symmetry left       Patient presents with left lingual deviation. Lingual ROM is WFL. No labial asymmetry noted. Patient reports good facial and oral sensation. To assess breath support sustained \"ah\" was completed. Patient was able to complete 23 sec. The average score for a male is 25-35 secs, indictating pt falls just below average.      Attention/ Orientation/ Memory:     Overall Orientation Status: WFL  Attention/concentration: Attention: Exceptions to [facility] Patient was oriented x3. Patient was given 3 words to remember, and he was able to immediately recall them without difficulty. After a short time delay, pt was asked to recall the 3 words again. He was able to recall 2/3 without difficulty and the 3rd one after being allotted extra time. On the design memory portion of the CLQT, patient was asked to study symbols and pick them out among similar symbols. Patient scored 4/6 on this subtest. On the symbol cancellation portion of the CLQT, patient was given 1 symbol to remember and asked to identify every copy of it on the next page among similar symbols. Patient scored 12/12 on this subtest. On the story retell subtest portion of the CLQT, patient was read a short story and asked to retell it and then answer yes/no questions about it. Patient scored 6/10 on this subtest. It is believed patient's memory was classified as moderate by the CLQT due to attention deficits. Cognitive/ Linguistic:   Thoughts in conversation: [facility]  Convergent Naming: Mild  Divergent Naming: Mild    Patient did not appear to have difficulty organizing his thoughts in conversation. He was given 2 divergent naming tasks on the CLQT (1 concrete, 1 abstract) and completed with a total score of 5/9. Patient perseverated 2 of his responses during these tasks. Problem Solving:     Problem Solving: [facility]    Although patient's executive functioning was classified as \"mild\" on the CLQT, it is believe executive functioning score was impacted by patient's attention deficits. Safety Awareness: Fall        Pragmatics:  [facility]       Writing:    Patient is left-handed and has left sided weakness as a result of his stroke. Despite weakness, he is still attempting to write using his left hand. VISION/HEARING:    Vision: Impaired; Patient wears glasses for reading. He has glaucoma and cataracts.    Hearing: Within functional limits      REHAB POTENTIAL: [] Excellent [x] Good [] Fair  [] Poor

## 2021-02-09 NOTE — PROGRESS NOTES
Occupational Therapy   Occupational Therapy Initial Assessment  Date: 2021   Patient Name: Navarro Braga  MRN: [id number]     : 1955    Date of Service: 2021    Assessment   Performance deficits / Impairments: Decreased functional mobility ; Decreased fine motor control;Decreased high-level IADLs;Decreased strength;Decreased balance;Decreased ROM; Decreased ADL status; Decreased coordination;Decreased endurance  Assessment: Pt benefits from further skilled therapy to address ADLs, functional mobility, strength, endurance, home management, and UE function for increased independence at home  Treatment Diagnosis: CVA with left hemiparesis  Prognosis: Good  Decision Making: Low Complexity  OT Education: Plan of Care  Activity Tolerance  Activity Tolerance: Patient Tolerated treatment well  Safety Devices  Safety Devices in place: Yes  Type of devices: Bed alarm in place;Call light within reach; Left in bed           Patient Diagnosis(es): There were no encounter diagnoses. has a past medical history of Cerebral artery occlusion with cerebral infarction ([facility] [address] Hyperlipidemia, and Hypertension. has a past surgical history that includes back surgery; Cervical spine surgery; Abdomen surgery; Appendectomy; and Shoulder arthroscopy (Right, 2019). 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        Objective   Vision: Impaired  Vision Exceptions: Cataracts; Wears glasses for reading  Hearing: Within functional limits    Orientation Overall Orientation Status: Within Normal Limits     Balance  Sitting Balance: Supervision  Standing Balance: Minimal assistance  Functional Mobility  Functional - Mobility Device: Rolling Walker  Activity: To/from bathroom  Assist Level: Moderate assistance(mod/min A, vc for tech)     Coordination  Movements Are Fluid And Coordinated: No  Coordination and Movement description: Fine motor impairments; Ataxia; Decreased speed;Decreased accuracy; Left UE        Transfers  Stand Step Transfers: Minimal assistance(w/c to bed)  Sit to stand: Minimal assistance  Stand to sit: Minimal assistance     Cognition  Cognition Comment: awake, alert, follows simple commands, dysarthria                 LUE PROM (degrees)  LUE PROM: WFL  LUE AROM (degrees)  LUE General AROM: (0-120) proximally, WFLs distally  RUE PROM (degrees)  RUE PROM: WNL  RUE AROM (degrees)  RUE AROM : WNL  LUE Strength  LUE Strength Comment: 3-/5 proximally, 4-/5 distally  RUE Strength  Gross RUE Strength: WFL     Eating  Assistance Needed: Setup or clean-up assistance  CARE Score: 5  Discharge Goal: Independent    Oral Hygiene  Assistance Needed: Setup or clean-up assistance  CARE Score: 5  Discharge Goal: Independent     Toileting Hygiene  Assistance Needed: Partial/moderate assistance  CARE Score: 3  Discharge Goal: Independent     Shower/Bathe Self  Assistance Needed: Partial/moderate assistance  CARE Score: 3  Discharge Goal: Independent     Upper Body Dressing  Assistance Needed: Supervision or touching assistance  CARE Score: 4  Discharge Goal: Independent     Lower Body Dressing  Assistance Needed: Partial/moderate assistance  CARE Score: 3  Discharge Goal: Independent     Putting On/Taking Off Footwear  Assistance Needed: Partial/moderate assistance  CARE Score: 3  Discharge Goal: Independent     Toilet Transfer  Assistance Needed: Partial/moderate assistance  CARE Score: 3  Discharge Goal: Independent     Picking Up Object Reason if not Attempted: Not attempted due to medical condition or safety concerns  CARE Score: 88  Discharge Goal: Set-up or clean-up assistance          Plan   Plan  Specific instructions for Next Treatment: 9 hole peg test,  strength  Current Treatment Recommendations: Self-Care / ADL, Safety Education & Training, Endurance Training, Strengthening, Patient/Caregiver Education & Training, Home Management Training, Positioning, Functional Mobility Training, Neuromuscular Re-education, Balance Training, Equipment Evaluation, Education, & procurement, ROM    Goals  Short term goals  Time Frame for Short term goals: 1 week  Short term goal 1: complete LB dressing with CGA  Short term goal 2: complete overall toileting with CGA  Short term goal 3: complete overall bathing with CGA  Short term goal 4: complete 1-2 handed standing level task for 3 mins with CGA  Short term goal 5: complete L [facility] acts x 5 occasions to increase coordination and finger dexterity for ADLs  Short term goal 6: complete simple ambulatory home making task with CGA  Long term goals  Time Frame for Long term goals : 3 weeks  Long term goal 1: complete HEP with independence  Long term goal 2: complete overall dressing with modified independence  Long term goal 3: complete overall bathing with modified independence  Long term goal 4: complete overall toileting with modified independence  Long term goal 5: complete ambulatory home making task with modified independence  Long term goals 6: pt/family verbalize DME  Patient Goals   Patient goals : return home       Therapy Time   Individual Concurrent Group Co-treatment   Time In 1330         Time Out 1430         Minutes 60         Timed Code Treatment Minutes: 45 Minutes       Electronically signed by Shadi Lora OT on 2/9/2021 at 4:35 PM

## 2021-02-09 NOTE — PROGRESS NOTES
Comprehensive Nutrition Assessment    Type and Reason for Visit:  Initial    Nutrition Recommendations/Plan: Continue current POC. Nutrition Assessment:  Following for new admission to inpatient rehab. Pt adequately nourished AEB adequae po intake and stable wt per pt reported UBW. Continue current POC. Malnutrition Assessment:  Malnutrition Status:  No malnutrition    Context:  Acute Illness     Findings of the 6 clinical characteristics of malnutrition:  Energy Intake:  No significant decrease in energy intake  Weight Loss:  No significant weight loss     Body Fat Loss:  Unable to assess     Muscle Mass Loss:  Unable to assess    Fluid Accumulation:  No significant fluid accumulation     Strength:  Not Performed    Wounds:  None       Current Nutrition Therapies:    DIET CARDIAC; Anthropometric Measures:  · Height: 5' 6\" (167.6 cm)  · Current Body Weight: 162 lb (73.5 kg)   · Admission Body Weight: 162 lb (73.5 kg)    · Usual Body Weight: 159 lb (72.1 kg)(pt reported)     · Ideal Body Weight: 142 lbs  · BMI: 26.2  · BMI Categories: Overweight (BMI 25.0-29. 9)       Nutrition Diagnosis:   No nutrition diagnosis at this time    Nutrition Interventions:   Food and/or Nutrient Delivery:  Continue Current Diet  Nutrition Education/Counseling:  No recommendation at this time   Coordination of Nutrition Care:  Continue to monitor while inpatient    Goals:  Pt will continue to consume 75% or greater of meals.        Nutrition Monitoring and Evaluation:   Food/Nutrient Intake Outcomes:  Food and Nutrient Intake  Physical Signs/Symptoms Outcomes:  Biochemical Data, Nutrition Focused Physical Findings, Weight     Electronically signed by Brittany Nicholson MS, RD, LD on 2/9/21 at 1:10 PM CST    Contact: [phone]

## 2021-02-09 NOTE — PLAN OF CARE
[address] TREATMENT PLAN      Robyn Pierce    : 1955  Acct #: [de-identified]  MRN: [id number]   PHYSICIAN:  Faviola Vuong MD  Primary Problem    Patient Active Problem List   Diagnosis    Tear of right glenoid labrum    Acute ischemic stroke [facility])       Rehabilitation Diagnosis:     Acute ischemic stroke ([facility] [address] [I63.9]       ADMIT DATE:2021   CARE PLAN     NURSING:  Robyn Pierce while on this unit will:     [] Be continent of bowel and bladder      [x] Have an adequate number of bowel movements   [] Urinate with no urinary retention >300ml in bladder   [] Complete bladder protocol with arroyo removal   [] Maintain O2 SATs at ___%   [] Have pain managed while on ARU        [] Be pain free by discharge    [x] Have no skin breakdown while on ARU   [] Have improved skin integrity via wound measurements   [] Have no signs/symptoms of infection at the wound site  [x] Be free from injury during hospitalization   [x] Complete education with patient/family with understanding demonstrated for:  [] Adjustment   [x] Other:   Nursing interventions may include bowel/bladder training, education for medical assistive devices, medication education, O2 saturation management, energy conservation, stress management techniques, fall prevention, alarms protocol, seating and positioning, skin/wound care, pressure relief instruction,dressing changes,  infection protection, DVT prophylaxis, and/or assistance with in room safety with transfers to bed, toilet, wheelchair, shower as well as bathroom activities and hygiene.      Patient/caregiver education for:   [x] Disease/sustained injury/management      [x] Medication Use   [] Surgical intervention   [x] Safety   [x] Body mechanics and or joint protection   [x] Health maintenance       IRF-SMITA  Bladder and Bowel Continence  Bladder Continence: Always continent  Bowel Continence: Always continent       PHYSICAL THERAPY:  Goals:                  Short term goals Assistance Needed: Supervision or touching assistance  CARE Score: 4  Discharge Goal: Independent  Walk 50 Feet with Two Turns  Reason if not Attempted: Not attempted due to medical condition or safety concerns  CARE Score: 88  Discharge Goal: Independent  Walk 150 Feet  Reason if not Attempted: Not attempted due to medical condition or safety concerns  CARE Score: 88  Discharge Goal: Independent  Walking 10 Feet on Uneven Surfaces  Reason if not Attempted: Not attempted due to medical condition or safety concerns  CARE Score: 88  Discharge Goal: Supervision or touching assistance  1 Step (Curb)  Assistance Needed: Partial/moderate assistance  CARE Score: 3  Discharge Goal: Independent  4 Steps  Assistance Needed: Partial/moderate assistance  CARE Score: 3  Discharge Goal: Independent  12 Steps  Reason if not Attempted: Not attempted due to medical condition or safety concerns  CARE Score: 88  Discharge Goal: Supervision or touching assistance  Wheel 50 Feet with Two Turns  Assistance Needed: Supervision or touching assistance  CARE Score: 4  Discharge Goal: Independent  Wheel 150 Feet  Reason if not Attempted: Not attempted due to medical condition or safety concerns  CARE Score: 88  Discharge Goal: Independent    OCCUPATIONAL THERAPY:  Goals:             Short term goals  Time Frame for Short term goals: 1 week  Short term goal 1: complete LB dressing with CGA  Short term goal 2: complete overall toileting with CGA  Short term goal 3: complete overall bathing with CGA  Short term goal 4: complete 1-2 handed standing level task for 3 mins with CGA  Short term goal 5: complete L UE FMC acts x 5 occasions to increase coordination and finger dexterity for ADLs  Short term goal 6: complete simple ambulatory home making task with CGA :  Long term goals  Time Frame for Long term goals : 3 weeks  Long term goal 1: complete HEP with independence  Long term goal 2: complete overall dressing with modified independence Long term goal 3: complete overall bathing with modified independence  Long term goal 4: complete overall toileting with modified independence  Long term goal 5: complete ambulatory home making task with modified independence  Long term goals 6: pt/family verbalize DME :    These goals were reviewed with this patient at the time of assessment and Elviroge Ireland is in agreement    Plan of Care: Frequency:   [] 5 days per week, 90 minutes per day     [x] 5 days per week, 60-90 minutes per day                Plan  Specific instructions for Next Treatment: 9 hole peg test,  strength  Current Treatment Recommendations: Self-Care / ADL, Safety Education & Training, Endurance Training, Strengthening, Patient/Caregiver Education & Training, Home Management Training, Positioning, Functional Mobility Training, Neuromuscular Re-education, Balance Training, Equipment Evaluation, Education, & procurement, ROM            IRF-SMITA  Eating  Assistance Needed: Setup or clean-up assistance  CARE Score: 5  Discharge Goal: Independent  Oral Hygiene  Assistance Needed: Setup or clean-up assistance  CARE Score: 5  Discharge Goal: Independent  Toileting Hygiene  Assistance Needed: Partial/moderate assistance  CARE Score: 3  Discharge Goal: Independent  Shower/Bathe Self  Assistance Needed: Partial/moderate assistance  CARE Score: 3  Discharge Goal: Independent  Upper Body Dressing  Assistance Needed: Supervision or touching assistance  CARE Score: 4  Discharge Goal: Independent  Lower Body Dressing  Assistance Needed: Partial/moderate assistance  CARE Score: 3  Discharge Goal: Independent  Putting On/Taking Off Footwear  Assistance Needed: Partial/moderate assistance  CARE Score: 3  Discharge Goal: Independent  Toilet Transfer  Assistance Needed: Partial/moderate assistance  CARE Score: 3  Discharge Goal: Independent  Picking Up Object  Reason if not Attempted: Not attempted due to medical condition or safety concerns  CARE Score: 88 Able to recall \"sock: Yes, no cue required  Able to recall \"blue\": Yes, no cue required  Able to recall \"bed\": Yes, no cue required  BIMS Summary Score: 15          Plan of Care:  Frequency:   [x] 5 days per week, 30 minutes per day                            [] Not appropriate for Speech Therapy  Treatments may include speech/language/communication therapy, cognitive training, group therapy, education, and/or dysphagia therapy based on the above goals. These goals were reviewed with this patient at the time of assessment and Preston Fuentes is in agreement. CASE MANAGEMENT:  Goals:   Assist patient/family with discharge planning, patient/family counseling,  and coordination with insurance during ARU stay. Patient Goals: back to usual- able to get around, take care of self before they start working on something else               Activities Prior to Admit:                         Preston Fuentes will be seen a minimum of 3 hours of therapy per day/a minimum of 5 out of 7 days per week. [] In this rare instance due to the nature of this patient's medical involvement, this patient will be seen 15 hours per week (900 minutes within a 7 day period). Treatments may include therapeutic exercises, gait training, neuromuscular re-ed, transfer training, community reintegration, bed mobility, w/c mobility and training, self care, home mgmt, cognitive training, energy conservation,dysphagia tx, speech/language/communication therapy, group therapy, and patient/family education. In addition, dietician/nutritionist may monitor calorie count as well as intake and collaboratively work with SLP on dietary upgrades. Neuropsychology/Psychology may evaluate and provide necessary support.     Medical issues being managed closely and that require 24 hour availability of a physician:   [] Swallowing Precautions  [] Bowel/Bladder Fx  [] Weight bearing precautions   [] Wound Care    [] Pain Mgmt   [] Infection Protection [] DVT Prophylaxis   [] Fall Precautions  [] Fluid/Electrolyte/Nutrition Balance   [] Voice Protection   [] Respiratory  [] Other:    Medical Prognosis: [] Good  [] Fair    [] Guarded   Total expected IRF days:  17  Anticipated discharge destination:    [] Home Independently   [] Home with supervision    []SNF     [] Other                                           Physician anticipated functional outcomes:  Ambulate household distances independently with assistive device. IPOC brief synthesis: Acute inpatient rehabilitation with occupational   physical therapy and speech therapy, 180 minutes, 5 every 7   days will address basic and advancing mobility with self-care   instruction and adaptive equipment training. Caregiver education will   be offered. Expected length of stay prior to the supervised level of   functional discharge to home with home care is 17 days. Assessment and Plan:     1. Right pontine and temporal strokes with left-sided ataxia/hemiparesis currently on Plavix/aspirin PT/OT/SLP  2. Recent chest pain resolved with nitroglycerin. Patient deferring cardiac work-up until the near future  3. Essential hypertensioncontinue current medications and monitoring  4. Hyperlipidemiacontinue current medications and monitoring  5. GIbowel regimen  6. DVT prophylaxissubcu Lovenox  7.   Recent acute renal failurestable/improving            Case Mgmt: Electronically signed by DEBO Laguerre on 2/10/2021 at 2:56 PM      OT: Electronically signed by Carlos Neumann OT on 2/9/2021 at 4:38 PM    PT:Electronically signed by Arie Varma PT on 2/9/2021 at 4:24 PM      RN: Electronically signed by Yolette Jimenez RN on 2/8/21 at 7:10 PM CST        ST: Electronically signed by TRACE Webb on 2/9/2021 at 1:32 PM

## 2021-02-09 NOTE — H&P
[facility]   History and Physical        Patient:   Brandon Langston  MR#:    [id number]  Account Number:                   [id number]      Room:    [address] [place] [address]   YOB: 1955  Date of Progress Note: 2/9/2021  Time of Note                           7:03 AM  Attending Physician:  Jennifer Louise MD        Admitting diagnosis: Pontine and right temporal strokes    Secondary diagnoses: Recent chest pain resolved with nitroglycerin, hyperlipidemia, hypertension    CHIEF COMPLAINT: Left-sided weakness and dysarthria      HISTORY OF PRESENT ILLNESS:   This 72 y.o. male   with history of HTN,hyperlipidemia,chronic back pain and CVA. He presented to [facility] with c/o lightheadedness, mild blurred vision and change in his gait that started about 3 days prior. He was hypertensive on presentation. MRI done revealed a right pontine infarct. CTA head/neck showed non signifigant steno-occlusive disease. He was admitted the the hospitalist service with consult for neurology. He was seen by Dr. Lainey Dawson. He was doing quite well. When on 2/4/21 he had a return of dizziness with N&V and worsening left sided weakness, mild dysarthria, B/P was low. Stat MRI was ordered that revealed showed a new right temporal lobe infarct. TTE showed thickening on anterior leaflet of mirtal valve. On the morning of 2/5/21 he had an accoute onset of chest pain/pressure with hypotension. Repeat EKG was concerning. Troponin was negative x 2. Chest pain resolved with nitro. Cardiology was consult for NIKI.  He was seen by  Christy. NIKI done on 2/5/21 showed no thrombus noted in the left atrial appendage,negative bubble study, no evidence of PFO or ASD,severely thickened left ventricle but no evidence of LV noncompaction. Dr. Chely Leon recommended a diagnostic left heart catheterization to assess his coronary anatomy. Patient wishes to posst-pone his cardiac work-up until after he completes the Rehab program.  He was also noted to have an increase in his creatinine of 5.12. Nephrology consulted. Patients creatine is rapidly improving now at 1.4 with BUN of 38. He is alert & oriented x 3, follows commands but does have mild dysarthia but demonstrates impulsivity and decreased safety awareness. Left sided weakness remains. Patient was evaluated by SPT for swallow and was cleared for a regular diet with thin liquids. He is participating in PT/OT/SPT. He is felt to need a stay on Rehab to work towards his goal of returning home with his wife. He is now felt ready to start the Rehab program.    REVIEW OF SYSTEMS:  Constitutional  No fever or chills. No diaphoresis or significant fatigue. HENT   No tinnitus or significant hearing loss. Eyes  no sudden vision change or eye pain  Respiratory  no significant shortness of breath or cough  Cardiovascular  no chest pain No palpitations or significant leg swelling  Gastrointestinal  no abdominal swelling or pain. Genitourinary  No difficulty urinating, dysuria  Musculoskeletal  no back pain or myalgia. Skin  no color change or rash  Neurologic  No seizures. No lateralizing weakness. Hematologic  no easy bruising or excessive bleeding. Psychiatric  no severe anxiety or nervousness. All other review of systems are negative.       Past Medical History:      Diagnosis Date    Cerebral artery occlusion with cerebral infarction [facility]) 2013    right sided weakness    Hyperlipidemia     Hypertension        Past Surgical History:      Procedure Laterality Date  ABDOMEN SURGERY      noncancerous tumor in abdomen    APPENDECTOMY      BACK SURGERY      lumbar surgery x 4    CERVICAL SPINE SURGERY      fusion    SHOULDER ARTHROSCOPY Right 8/28/2019    ARTHROSCOPIC SAD/DCR LABRAL REPAIR RIGHT SHOULDER performed by Leon Ramirez DO at [address] [place] [address]       Medications in Hospital:      Current Facility-Administered Medications:     atorvastatin (LIPITOR) tablet 10 mg, 10 mg, Oral, Daily, Tommy Goldberg MD    amLODIPine (NORVASC) tablet 10 mg, 10 mg, Oral, Nightly, Tommy Goldberg MD, 10 mg at 02/08/21 2048    metoprolol succinate (TOPROL XL) extended release tablet 50 mg, 50 mg, Oral, Daily, Tommy Goldberg MD    cloNIDine (CATAPRES) tablet 0.1 mg, 0.1 mg, Oral, PRN, Tommy Goldberg MD, 0.1 mg at 02/09/21 0626    clopidogrel (PLAVIX) tablet 75 mg, 75 mg, Oral, Daily, Tommy Goldberg MD    latanoprost (XALATAN) 0.005 % ophthalmic solution 1 drop, 1 drop, Ophthalmic, Nightly, Tommy Goldberg MD, 1 drop at 02/08/21 2048    lisinopril (PRINIVIL;ZESTRIL) tablet 40 mg, 40 mg, Oral, Nightly, Tommy Goldberg MD, 40 mg at 02/08/21 2048    spironolactone (ALDACTONE) tablet 25 mg, 25 mg, Oral, Daily, Tommy Goldberg MD    triamterene-hydroCHLOROthiazide (MAXZIDE-25) 37.5-25 MG per tablet 2 tablet, 2 tablet, Oral, Daily, Tommy Goldberg MD    vitamin D3 (CHOLECALCIFEROL) tablet 400 Units, 400 Units, Oral, Daily, Tommy Goldberg MD    acetaminophen (TYLENOL) tablet 650 mg, 650 mg, Oral, Q4H PRN, Tommy Goldberg MD    bisacodyl (DULCOLAX) suppository 10 mg, 10 mg, Rectal, Daily PRN, Tommy Goldberg MD    polyethylene glycol [facility]) packet 17 g, 17 g, Oral, Daily, Tommy Goldberg MD    magnesium hydroxide (MILK OF MAGNESIA) 400 MG/5ML suspension 30 mL, 30 mL, Oral, Daily PRN, Tommy Goldberg MD    Allergies:  Codeine    Social History: No clonus ankles  No Mendoza's sign bilateral hands    Tremors- no tremors in hands or head noted    Gait  Not tested    Coordination  Ataxic on the left        CBC:   Recent Labs     02/08/21  1734   WBC 9.0   HGB 14.8          BMP:  No results for input(s): NA, K, CL, CO2, BUN, CREATININE, GLUCOSE in the last 72 hours. Hepatic: No results for input(s): AST, ALT, ALB, BILITOT, ALKPHOS in the last 72 hours. Lipids: No results for input(s): CHOL, HDL in the last 72 hours. Invalid input(s): LDLCALCU    INR: No results for input(s): INR in the last 72 hours. Assessment and Plan     1. Right pontine and temporal strokes with left-sided ataxia/hemiparesis currently on Plavix/aspirin PT/OT/SLP  2. Recent chest pain resolved with nitroglycerin. Patient deferring cardiac work-up until the near future  3. Essential hypertensioncontinue current medications and monitoring  4. Hyperlipidemiacontinue current medications and monitoring  5. GIbowel regimen  6. DVT prophylaxissubcu Lovenox  7. Recent acute renal failurestable/improving    Patient's functional assessment  Prior to hospitalization the patient was continent of bowel and bladder    Functional assessment  All notes from reehab data were reviewed regarding the patient's functional status.     Current therapy  Requires PT, OT and/or speech therapy    Anticipated discharge approximately 17 days    Anticipated discharge setting  Home with home care    No clear barriers to discharge    The patient appears to be an appropriate candidate for inpatient rehabilitation    Sufficiently stable: Patient's condition is sufficiently stable at the time of admission to allow the patient to actively participate in an intensive rehabilitation program Close medical supervision: A rehabilitation physician, or other licensed treating physician with specialized training and experience in inpatient rehabilitation, will conduct face-to-face visits with the patient a minimum of at least 3 days per week throughout the patient's stay    This patient requires close medical supervision for the active management of the ongoing conditions and potential complications stated in the admission note    Intensive rehabilitation nursing: The patient demonstrates the need for 24-hour rehabilitation nursing care for active management of the multiple medical issues documented in the admission note    Appropriate therapy needed: The patient requires the active and ongoing therapeutic intervention of at least 2 therapeutic disciplines, one of which must be physical or occupational therapy and/or speech therapy    Intensive therapy: The patient requires and is reasonably expected to actively participate in at least 3 hours of therapy per day at least 5 days per week, and expected to make measurable improvements that will be of practical value to improve the patient's functional capacity or adaptation to impairments.  In addition, therapy treatments will begin within 36 hours from midnight of the day of the patient's admission to the inpatient rehabilitation facility    Expected duration and frequency therapy: 180 minutes per day, 5 days per week    Interdisciplinary team: The patient demonstrates the need for an interdisciplinary team for active management of the following medical issues including ataxia, motor planning, balance, disease management, elimination, endurance, family training, education, independent ADLs, pain management, precautions, range of motion, safety, strength, and transfers I have reviewed the preadmission screening documents and concur with the findings. I believe the patient meets criteria and is sufficiently stable to allow participation in the program. This requires an intensive level of therapy, close medical supervision, and an interdisciplinary team approach provided through an individualized plan of care. I approve admitting this patient for an intensive inpatient rehabilitation program.      Leticia Dowling.  Leann Howell MD

## 2021-02-09 NOTE — THERAPY DISCHARGE NOTE
Acute Care - Physical Therapy Discharge Summary  [facility]       Patient Name: Dao Jhaveri  : 1955  MRN: [id number]    Today's Date: 2021                 Admit Date: 2021      PT Recommendation and Plan    Visit Dx:    ICD-10-CM ICD-9-CM   1. Hypertensive urgency  I16.0 401.9   2. Stroke-like symptoms  R29.90 781.99   3. Impaired mobility and ADLs  Z74.09 V49.89    Z78.9    4. Motor speech disorder  R47.89 784.59   5. Impaired mobility  Z74.09 799.89   6. Dysphagia, unspecified type  R13.10 787.20       Outcome Measures     Row Name 21 1140             How much help from another is currently needed...    Putting on and taking off regular lower body clothing?  3  -CH (r) EM (t) CH (c)      Bathing (including washing, rinsing, and drying)  3  -CH (r) EM (t) CH (c)      Toileting (which includes using toilet bed pan or urinal)  3  -CH (r) EM (t) CH (c)      Putting on and taking off regular upper body clothing  4  -CH (r) EM (t) CH (c)      Taking care of personal grooming (such as brushing teeth)  3  -CH (r) EM (t) CH (c)      Eating meals  3  -CH (r) EM (t) CH (c)      AM-PAC 6 Clicks Score (OT)  19  -CH (r) EM (t)        User Key  (r) = Recorded By, (t) = Taken By, (c) = Cosigned By    Initials Name Provider Type    CH Codi Dover, OTR/L Occupational Therapist    EM Aaliyah Zapata, OT Student OT Student              Rehab Goal Summary     Row Name 21 0701             Transfer Goal 1 (PT)    Activity/Assistive Device (Transfer Goal 1, PT)  transfers, all;walker, rolling  -AB      [place] Level/Cues Needed (Transfer Goal 1, PT)  contact guard assist;1 person assist  -AB      Time Frame (Transfer Goal 1, PT)  long term goal (LTG);10 days  -AB      Progress/Outcome (Transfer Goal 1, PT)  goal met  -AB         Gait Training Goal 1 (PT)    Activity/Assistive Device (Gait Training Goal 1, PT)  gait (walking locomotion);walker, rolling  -AB      [place] Level (Gait Training Goal  1, PT)  contact guard assist  -AB      Distance (Gait Training Goal 1, PT)  75 feet with no LOB  -AB      Time Frame (Gait Training Goal 1, PT)  long term goal (LTG);10 days  -AB      Progress/Outcome (Gait Training Goal 1, PT)  goal not met  -AB         Patient Education Goal (PT)    Activity (Patient Education Goal, PT)  Patient to demonstrate dynamic standing balance with only mild impairment to improve ability to reach outside of VINNY with no LOB.  -AB      [place]/Cues/Accuracy (Memory Goal 2, PT)  independent  -AB      Time Frame (Patient Education Goal, PT)  long term goal (LTG);10 days  -AB      Progress/Outcome (Patient Education Goal, PT)  goal partially met  -AB        User Key  (r) = Recorded By, (t) = Taken By, (c) = Cosigned By    Initials Name Provider Type Discipline    Barbara Corral, PTA Physical Therapy Assistant PT              PT Discharge Summary  Anticipated Discharge Disposition (PT): inpatient rehabilitation facility  Reason for Discharge: Discharge from facility  Outcomes Achieved: Refer to plan of care for updates on goals achieved, Discharge from facility occurred on same date as evluation  Discharge Destination: Inpatient rehabilitation facility      Barbara Matt PTA   2/9/2021

## 2021-02-09 NOTE — PROGRESS NOTES
02/09/21 1430   Pain Screening   Patient Currently in Pain No   Pain Assessment   Pain Assessment 0-10   Pain Level 0   Bed Mobility   Rolling Stand by assistance   Supine to Sit Stand by assistance   Sit to Supine Stand by assistance   Transfers   Sit to Stand Stand by assistance   Stand to sit Stand by assistance   Bed to Chair Contact guard assistance;Stand by assistance   Ambulation 1   Device Rolling Walker   Assistance Contact guard assistance   Quality of Gait STOOD FROM BED, TWO TURNS TO AMBULATE AROUND FOOT OF BED TOWARD DOOR. PATIENT MADE TURN IN BROAD, SWEEPING MANUEVER WITH RW RATHER THAN INCREMENTALLY USING 3 STEP TECHNIQUE   Distance 15 FT   Other exercises   Other exercises 1 SITTING LEFT LE HIP FLEX/EXT/ABD/ADD; KNEE EXT; PF  (X10 EA)   Conditions Requiring Skilled Therapeutic Intervention   Body structures, Functions, Activity limitations Decreased functional mobility ; Decreased strength;Decreased endurance;Decreased sensation;Decreased balance   Assessment IMPROVED LEFT QUAD CONTRACTION DURING SITTING EX WITH FOCUS AND SUCCESSIVE REPS UNTIL PATIENT BECAME FATIGUED AT [facility] REP.

## 2021-02-09 NOTE — PLAN OF CARE
Problem: Falls - Risk of:  Goal: Will remain free from falls  Description: Will remain free from falls  Outcome: Ongoing   Up with 1 assist with walker

## 2021-02-09 NOTE — PATIENT CARE CONFERENCE
[facility] ACUTE INPATIENT REHABILITATION  TEAM CONFERENCE NOTE    Date: 2021  Patient Name: Hayden Darnell        MRN: [id number]    : 1955  (72 y.o.)  Gender: male      Diagnosis: RIGHT PONTINE/TEMPORAL LOBE INFARCTS; LEFT SIDE INVOLVED; CARDIAC      PHYSICAL THERAPY  STRENGTH  Strength RLE  Strength RLE: WFL  Strength LLE  Strength LLE: Exception  Comment: HIP 4-/5; KNEE 3-/5; DF 2-/5; PF 2+/5  ROM  AROM RLE (degrees)  RLE AROM: WFL  AROM LLE (degrees)  LLE AROM : WFL  BED MOBILITY  Bed Mobility  Rolling: Stand by assistance  Supine to Sit: Stand by assistance  Sit to Supine: Stand by assistance  TRANSFERS  Transfers  Sit to Stand: Stand by assistance  Stand to sit: Stand by assistance  Bed to Chair: Contact guard assistance, Stand by assistance  Car Transfer: Contact guard assistance, Stand by assistance(WITH RW)  WHEELCHAIR PROPULSION  Propulsion 1  Propulsion: Manual  Method: COURTNEY TURNER  Level of Assistance: Stand by assistance  Distance: 50 FT  AMBULATION  Ambulation 1  Surface: level tile  Device: Rolling Walker  Assistance: Contact guard assistance  Quality of Gait: STOOD FROM BED, TWO TURNS TO AMBULATE AROUND FOOT OF BED TOWARD DOOR. PATIENT MADE TURN IN BROAD, SWEEPING MANUEVER WITH RW RATHER THAN INCREMENTALLY USING 3 STEP TECHNIQUE  Distance: 15 FT  STAIRS  Stairs  # Steps : 4  Rails: Bilateral  Assistance:  Moderate assistance, Minimal assistance  Comment: DIFFICULTY WITH LEFT HIP FLEXION CAUSING TOE CATCH ON STEP  GOALS:  Short term goals  Time Frame for Short term goals: 1 WEEK  Short term goal 1: TF SURFACE TO SURFACE WITH RW EXHIBITING PROPER 3 POINT PATTERN  Short term goal 2: AMBULATE 50 FT WITH RW CGA DEMONSTRATING PROPER 3 POINT GAIT  Short term goal 3: UP/DOWN 4 STEPS BILAT RAILS SBA  Short term goal 4: PROPEL  FT SBA  Short term goal 5: HEP SBA    Long term goals  Time Frame for Long term goals : 2-3 WEEKS  Long term goal 1: INDEP BED MOB Long term goal 2: INDEP TF SURFACE TO SURFACE  Long term goal 3: AMBULATE 150 FT WITH RW INDEP  Long term goal 4: UP/DOWN 4 STEPS WITH 1 RAIL INDEP  Long term goal 5: HEP INDEP    ASSESSMENT:  Assessment: IMPROVED LEFT QUAD CONTRACTION DURING SITTING EX WITH FOCUS AND SUCCESSIVE REPS UNTIL PATIENT BECAME FATIGUED AT [place]. SPEECH THERAPY    Patient presenting with mild dysarthria characterized by distortions and imprecise articulations. Speech intelligibility ranks at 90% for unknown context and unfamiliar listeners. Patient presenting with mild/mod deficits in attention, and mild deficits for executive functions. Short-term Goals  Timeframe for Short-term Goals: 2-3 weeks  Goal 1: The patient will complete oral motor exercises to improve speech intelligibility with min verbal cues at 100% accuracy. Goal 2: The patient will recall/utilize dysarthric strategies during conversational discourse with min verbal cues at 100% accuracy in order to improve speech intelligibility. Goal 3: The patient will sustain attention for 15+ minutes with min verbal cues or redirection during structured/unstructured activities. Goal 4: The patient will complete executive function tasks (self-monitoring, organizing, etc) with min verbal cues at 100% accuracy. Long-term Goals  Goal 1: The patient will utilize dysarthric strategies independently and exhibit 100% speech intelligibility for conversational discourse  Goal 2: The patient will develop functional, cognitive linguistic based skills and utilize compensatory strategies to communicate wants and needs effectively to different conversational partners, maintain safety, and participate socially in functional living enviroment.       GOALS MET: 0 STG 0 LTG    OCCUPATIONAL THERAPY    CURRENT IRF-SMITA SCORES  Eating: CARE Score: 5       Oral Hygiene: CARE Score: 5        Toileting: CARE Score: 3         Shower/Bathe: CARE Score: 3 Upper Body Dressing: CARE Score: 4         Lower Body Dressing: CARE Score: 3          Footwear: CARE Score: 3          Toilet Transfers: CARE Score: 3          Picking Up Object:  CARE Score: 88        UE Functionin/09/21 1330   LUE PROM (degrees)   LUE PROM WFL   LUE AROM (degrees)   LUE General AROM (0-120) proximally, WFLs distally   RUE PROM (degrees)   RUE PROM WNL   RUE AROM (degrees)   RUE AROM  WNL         Pain Assessment:  Pain Level: 0       STGs:  Short term goals  Time Frame for Short term goals: 1 week  Short term goal 1: complete LB dressing with CGA  Short term goal 2: complete overall toileting with CGA  Short term goal 3: complete overall bathing with CGA  Short term goal 4: complete 1-2 handed standing level task for 3 mins with CGA  Short term goal 5: complete L UE FMC acts x 5 occasions to increase coordination and finger dexterity for ADLs  Short term goal 6: complete simple ambulatory home making task with CGA    LTGs:  Long term goals  Time Frame for Long term goals : 3 weeks  Long term goal 1: complete HEP with independence  Long term goal 2: complete overall dressing with modified independence  Long term goal 3: complete overall bathing with modified independence  Long term goal 4: complete overall toileting with modified independence  Long term goal 5: complete ambulatory home making task with modified independence  Long term goals 6: pt/family verbalize DME    Assessment:  Performance deficits / Impairments: Decreased functional mobility , Decreased fine motor control, Decreased high-level IADLs, Decreased strength, Decreased balance, Decreased ROM, Decreased ADL status, Decreased coordination, Decreased endurance                  NUTRITION  Current Wt: Weight: 162 lb 8 oz (73.7 kg) / Body mass index is 26.23 kg/m².   Admission Wt: Admission Body Weight: 162 lb (73.5 kg)  Oral Diet Orders: Cardiac Pt adequately nourished AEB adequae po intake and stable wt per pt reported UBW. Continue current POC. Please see nutrition note for details. NURSING    Wounds/Incisions/Ulcers: No skin issues identified     Lobo Scale Score: 20    Pain: Tylenol 650 mg q 4 hrs PRN    Consultations/Labs/X-rays:     Family Education: Need to make contact with family to initiate education    Fall Risk:  Dickinson Score: 36    Fall in the last week? Other Nursing Issues: New admission, alert and oriented x4, speech slurred, cont of bladder and bowel, bm 8, regular cardiac diet, left side drift in leg, Plavix and ASA. SOCIAL WORK/CASE MANAGEMENT  Assessment: Has family support - wife and adult children    Discharge Plan   Estimated Length of Stay: to be determined  Destination: home with family and home health expected    Pass: No    Services at Discharge: Home Health  Per evaluations  Equipment at Discharge: to be determined    Progress made in the prior week:  1. EVAL 2/9  2.  3.  4.  5.      Goals for following week:  1. Utilize and recall dysarthric strategies  2. Complete LB dressing with min A  3.   4.   5.     Factors facilitating achievement of predicted outcomes: Motivated, Cooperative and Pleasant    Barriers to the achievement of predicted outcomes: Decreased endurance, Decreased proprioception, Upper extremity weakness and Lower extremity weakness    Team Members Present at Conference:  : Hermon Bernheim, [facility]   Occupational Therapist: Meryle Gibson, OTR/L  Physical Therapist: Junior Levi PT,DPT  Speech Therapist: Andrey Quijano [address], [id number] [facility]  Nurse: Ashish Lopez RN,BSN   Nurse Manager:  Ashish Lopez RN, BSN  Dietitian:  Zaheer Gonzales, MS, RD, LD  Rehab Director:  Shantel Lacy approve the established interdisciplinary plan of care as documented within the medical record of Alan Ireland.

## 2021-02-09 NOTE — PLAN OF CARE
Problem: Falls - Risk of:  Goal: Will remain free from falls  Description: Will remain free from falls  Outcome: Ongoing  Goal: Absence of physical injury  Description: Absence of physical injury  Outcome: Ongoing     Problem: Mobility - Impaired:  Goal: Mobility will improve  Description: Mobility will improve  Outcome: Ongoing     Problem: Safety:  Goal: Free from accidental physical injury  Description: Free from accidental physical injury  Outcome: Ongoing  Goal: Free from intentional harm  Description: Free from intentional harm  Outcome: Ongoing     Problem: Daily Care:  Goal: Daily care needs are met  Description: Daily care needs are met  Outcome: Ongoing     Problem: Discharge Planning:  Goal: Patients continuum of care needs are met  Description: Patients continuum of care needs are met  Outcome: Ongoing     Problem: HEMODYNAMIC STATUS  Goal: Patient has stable vital signs and fluid balance  Outcome: Ongoing     Problem: ACTIVITY INTOLERANCE/IMPAIRED MOBILITY  Goal: Mobility/activity is maintained at optimum level for patient  Outcome: Ongoing     Problem: Bleeding:  Goal: Will show no signs and symptoms of excessive bleeding  Description: Will show no signs and symptoms of excessive bleeding  Outcome: Ongoing

## 2021-02-09 NOTE — PROGRESS NOTES
Physical Therapy EVALUATION Note  DATE:  2021  NAME:  Lorene Vora  :  1955  (72 y. o.,male)  MRN:  [id number]    HEIGHT:  Height: 5' 6\" (167.6 cm)  WEIGHT:  Weight: 162 lb 8 oz (73.7 kg)    PATIENT DIAGNOSIS(ES):    Diagnosis: RIGHT PONTINE/TEMPORAL LOBE INFARCTS; LEFT SIDE INVOLVED; CARDIAC    Additional Pertinent Hx: COLON TUMOR, GLAUCOMA; COLON RESECTION, BACK AND NECK SURGERY  RESTRICTIONS/PRECAUTIONS:    Restrictions/Precautions  Restrictions/Precautions: Fall Risk(regular diet, thin liquids)     OVERALL  ORIENTATION STATUS:     PAIN:  Pain Level: 0                    NEUROLOGICAL                          STRENGTH  Strength RLE  Strength RLE: WFL  Strength LLE  Strength LLE: Exception  Comment: HIP 4-/5; KNEE 3-/5; DF 2-/5; PF 2+/5  ROM  AROM RLE (degrees)  RLE AROM: WFL  AROM LLE (degrees)  LLE AROM : WFL  POSTURE/BALANCE          ACTIVITY TOLERANCE  Activity Tolerance  Activity Tolerance: Patient limited by fatigue, Patient limited by endurance      BED MOBILITY  Bed Mobility  Rolling: Stand by assistance  Supine to Sit: Stand by assistance  Sit to Supine: Stand by assistance  TRANSFERS  Sit to Stand: Contact guard assistance, Stand by assistance      Bed to Chair: Contact guard assistance, Stand by assistance(WITH RW)  Car Transfer: Contact guard assistance, Stand by assistance(WITH RW)     WHEELCHAIR PROPULSION 1  Propulsion 1  Propulsion: Manual  Method: COURTNEY TURNER  Level of Assistance: Stand by assistance  Distance: 50 FT  WHEELCHAIR PROPULSION 2     AMBULATION 1  Ambulation 1  Surface: level tile  Device: Rolling Walker  Assistance: Contact guard assistance  Quality of Gait: RECIPROCAL TECHNIQUE DECREASD LEFT TOE CLEARANCE WITH OCCSIONAL FOREFOOT DRAG. EXCESSIVE LEFT STEP LENGTH, DECREASED LEFT STANCE TIME WITH DECREASED RIGHT STEP LENGTH.   Distance: 25 FT  AMBULATION 2  Ambulation 2  Device 2: Rolling Walker  Assistance 2: Moderate assistance, Minimal assistance Quality of Gait 2: PATIENT EXHIBITS DECREASED PROPRIOCEPTIVE AWARENESS OF LEFT LE IN GENERAL, LEFT KNEE IN PARTICULAR. REQUIRED TACTILE CUES TO FULLY EXTEND LEFT KNEE PRIOR TO ADVANCING RIHGT LE.  INCONSISTENT LEFT STEP LENGTH. GENU RECURVATUM DURING STANCE. Distance: 20 FT  Comments: 3 POINT GAIT LEADING WITH LEFT  STAIRS  Stairs  # Steps : 4  Rails: Bilateral  Assistance: Moderate assistance, Minimal assistance  Comment: DIFFICULTY WITH LEFT HIP FLEXION CAUSING TOE CATCH ON STEP    GOALS:  Short term goals  Time Frame for Short term goals: 1 WEEK  Short term goal 1: TF SURFACE TO SURFACE WITH RW EXHIBITING PROPER 3 POINT PATTERN  Short term goal 2: AMBULATE 50 FT WITH RW CGA DEMONSTRATING PROPER 3 POINT GAIT  Short term goal 3: UP/DOWN 4 STEPS BILAT RAILS SBA  Short term goal 4: PROPEL  FT SBA  Short term goal 5: HEP SBA    Long term goals  Time Frame for Long term goals : 2-3 WEEKS  Long term goal 1: INDEP BED MOB  Long term goal 2: INDEP TF SURFACE TO SURFACE  Long term goal 3: AMBULATE 150 FT WITH RW INDEP  Long term goal 4: UP/DOWN 4 STEPS WITH 1 RAIL INDEP  Long term goal 5: HEP INDEP  HOME LIVING:     Type of Home: House              ASSESSMENT (IMPAIRMENTS/BARRIERS): Body structures, Functions, Activity limitations: Decreased functional mobility , Decreased strength, Decreased endurance, Decreased sensation, Decreased balance  Assessment: GAIT LIMITED DUE TO LEFT QUAD WEAKNESS, IMPAIRED PROPRIOCEPTION.     Activity Tolerance: Patient limited by fatigue, Patient limited by endurance     PLAN:  Plan  Times per week: 5-6  Times per day: (1-2)  Plan weeks: 2-3  Current Treatment Recommendations: Strengthening, Balance Training, Functional Mobility Training, Transfer Training, Endurance Training, Wheelchair Mobility Training, Gait Training, Stair training, Home Exercise Program, Safety Education & Training, Patient/Caregiver Education & Training Discharge Recommendations: Continue to assess pending progress  PATIENT REQUIRES AND IS REASONABLY EXPECTED TO ACTIVELY PARTICIPATE IN AT LEAST 3 HOURS OF INTENSIVE THERAPY PER DAY AT LEAST 5 DAYS PER WEEK, AND BE EXPECTED TO MAKE MEASURABLE IMPROVEMENT THAT WILL BE OF PRACTICAL VALUE TO IMPROVE THE PATIENT'S FUNCTIONAL CAPACITY OR ADAPTATION TO IMPAIRMENTS.    PATIENT GOAL FOR REHAB:  RETURN TO PRIOR LEVEL OF FUNCTION       IRF/SMITA  Roll Left and Right  Assistance Needed: Supervision or touching assistance  CARE Score: 4  Discharge Goal: Independent    Sit to Lying  Assistance Needed: Supervision or touching assistance  CARE Score: 4  Discharge Goal: Independent    Lying to Sitting on Side of Bed  Assistance Needed: Supervision or touching assistance  CARE Score: 4  Discharge Goal: Independent    Sit to Stand  Assistance Needed: Supervision or touching assistance  CARE Score: 4  Discharge Goal: Independent    Chair/Bed-to-Chair Transfer  Assistance Needed: Supervision or touching assistance  CARE Score: 4  Discharge Goal: Independent    Car Transfer  Assistance Needed: Supervision or touching assistance  CARE Score: 4  Discharge Goal: Independent    Walk 10 Feet  Assistance Needed: Supervision or touching assistance  CARE Score: 4  Discharge Goal: Independent    Walk 50 Feet with Two Turns  Reason if not Attempted: Not attempted due to medical condition or safety concerns  CARE Score: 88  Discharge Goal: Independent    Walk 150 Feet  Reason if not Attempted: Not attempted due to medical condition or safety concerns  CARE Score: 88  Discharge Goal: Independent    Walking 10 Feet on Uneven Surfaces  Reason if not Attempted: Not attempted due to medical condition or safety concerns  CARE Score: 88  Discharge Goal: Supervision or touching assistance    1 Step (Curb)  Assistance Needed: Partial/moderate assistance  CARE Score: 3  Discharge Goal: Independent    4 Steps  Assistance Needed: Partial/moderate assistance CARE Score: 3  Discharge Goal: Independent    12 Steps  Reason if not Attempted: Not attempted due to medical condition or safety concerns  CARE Score: 88  Discharge Goal: Supervision or touching assistance    Wheel 50 Feet with Two Turns  Assistance Needed: Supervision or touching assistance  CARE Score: 4  Discharge Goal: Independent    Wheel 150 Feet  Reason if not Attempted: Not attempted due to medical condition or safety concerns  CARE Score: 88  Discharge Goal: Independent      LAST TREATMENT TIME  PT Individual Minutes  Time In: 1100  Time Out: 1200  Minutes: 60

## 2021-02-10 LAB
ANION GAP SERPL CALCULATED.3IONS-SCNC: 11 MMOL/L (ref 7–19)
BUN BLDV-MCNC: 31 MG/DL (ref 8–23)
CALCIUM SERPL-MCNC: 9.7 MG/DL (ref 8.8–10.2)
CHLORIDE BLD-SCNC: 103 MMOL/L (ref 98–111)
CO2: 24 MMOL/L (ref 22–29)
CREAT SERPL-MCNC: 1.3 MG/DL (ref 0.5–1.2)
GFR AFRICAN AMERICAN: >59
GFR NON-AFRICAN AMERICAN: 55
GLUCOSE BLD-MCNC: 89 MG/DL (ref 74–109)
POTASSIUM SERPL-SCNC: 4.2 MMOL/L (ref 3.5–5)
SODIUM BLD-SCNC: 138 MMOL/L (ref 136–145)

## 2021-02-10 PROCEDURE — 97530 THERAPEUTIC ACTIVITIES: CPT

## 2021-02-10 PROCEDURE — 36415 COLL VENOUS BLD VENIPUNCTURE: CPT

## 2021-02-10 PROCEDURE — 99233 SBSQ HOSP IP/OBS HIGH 50: CPT | Performed by: PSYCHIATRY & NEUROLOGY

## 2021-02-10 PROCEDURE — 97116 GAIT TRAINING THERAPY: CPT

## 2021-02-10 PROCEDURE — 92507 TX SP LANG VOICE COMM INDIV: CPT

## 2021-02-10 PROCEDURE — 97535 SELF CARE MNGMENT TRAINING: CPT

## 2021-02-10 PROCEDURE — 97110 THERAPEUTIC EXERCISES: CPT

## 2021-02-10 PROCEDURE — 80048 BASIC METABOLIC PNL TOTAL CA: CPT

## 2021-02-10 PROCEDURE — 1180000000 HC REHAB R&B

## 2021-02-10 PROCEDURE — 6370000000 HC RX 637 (ALT 250 FOR IP): Performed by: PSYCHIATRY & NEUROLOGY

## 2021-02-10 PROCEDURE — 6360000002 HC RX W HCPCS: Performed by: PSYCHIATRY & NEUROLOGY

## 2021-02-10 RX ORDER — MECOBALAMIN 5000 MCG
10 TABLET,DISINTEGRATING ORAL NIGHTLY
Status: DISCONTINUED | OUTPATIENT
Start: 2021-02-10 | End: 2021-02-21 | Stop reason: SDUPTHER

## 2021-02-10 RX ADMIN — LISINOPRIL 40 MG: 20 TABLET ORAL at 21:09

## 2021-02-10 RX ADMIN — ASPIRIN 81 MG: 81 TABLET, CHEWABLE ORAL at 07:38

## 2021-02-10 RX ADMIN — ATORVASTATIN CALCIUM 10 MG: 10 TABLET, FILM COATED ORAL at 07:38

## 2021-02-10 RX ADMIN — LATANOPROST 1 DROP: 50 SOLUTION OPHTHALMIC at 21:10

## 2021-02-10 RX ADMIN — METOPROLOL SUCCINATE 50 MG: 50 TABLET, EXTENDED RELEASE ORAL at 07:38

## 2021-02-10 RX ADMIN — CLOPIDOGREL BISULFATE 75 MG: 75 TABLET ORAL at 07:38

## 2021-02-10 RX ADMIN — CHOLECALCIFEROL TAB 10 MCG (400 UNIT) 400 UNITS: 10 TAB at 07:38

## 2021-02-10 RX ADMIN — ENOXAPARIN SODIUM 30 MG: 40 INJECTION SUBCUTANEOUS at 07:39

## 2021-02-10 RX ADMIN — AMLODIPINE BESYLATE 10 MG: 10 TABLET ORAL at 21:09

## 2021-02-10 RX ADMIN — Medication 10 MG: at 21:09

## 2021-02-10 RX ADMIN — SPIRONOLACTONE 25 MG: 25 TABLET ORAL at 07:38

## 2021-02-10 RX ADMIN — TRIAMTERENE AND HYDROCHLOROTHIAZIDE 2 TABLET: 37.5; 25 TABLET ORAL at 07:38

## 2021-02-10 ASSESSMENT — PAIN SCALES - GENERAL: PAINLEVEL_OUTOF10: 0

## 2021-02-10 ASSESSMENT — 9 HOLE PEG TEST: TESTTIME_SECONDS: 76

## 2021-02-10 NOTE — THERAPY DISCHARGE NOTE
Acute Care - Occupational Therapy Discharge Summary  [facility]     Patient Name: Dao Jhaveri  : 1955  MRN: [id number]    Today's Date: 2/10/2021                 Admit Date: 2021        OT Recommendation and Plan    Visit Dx:    ICD-10-CM ICD-9-CM   1. Hypertensive urgency  I16.0 401.9   2. Stroke-like symptoms  R29.90 781.99   3. Impaired mobility and ADLs  Z74.09 V49.89    Z78.9    4. Motor speech disorder  R47.89 784.59   5. Impaired mobility  Z74.09 799.89   6. Dysphagia, unspecified type  R13.10 787.20               Rehab Goal Summary     Row Name 02/10/21 0700             Transfer Goal 1 (OT)    Activity/Assistive Device (Transfer Goal 1, OT)  toilet;tub  -TS      Portal Level/Cues Needed (Transfer Goal 1, OT)  modified independence  -TS      Time Frame (Transfer Goal 1, OT)  long term goal (LTG);by discharge  -TS      Progress/Outcome (Transfer Goal 1, OT)  goal not met  -TS         Bathing Goal 1 (OT)    Activity/Device (Bathing Goal 1, OT)  bathing skills, all;grab bar, tub/shower  -TS      Portal Level/Cues Needed (Bathing Goal 1, OT)  modified independence  -TS      Time Frame (Bathing Goal 1, OT)  long term goal (LTG);by discharge  -TS      Strategies/Barriers (Bathing Goal 1, OT)  While standing  -TS      Progress/Outcomes (Bathing Goal 1, OT)  goal not met  -TS         Grooming Goal 1 (OT)    Activity/Device (Grooming Goal 1, OT)  hair care;oral care;wash face, hands;shave face  -TS      Portal (Grooming Goal 1, OT)  modified independence  -TS      Time Frame (Grooming Goal 1, OT)  long term goal (LTG);by discharge  -TS      Strategies/Barriers (Grooming Goal 1, OT)  While standing sink side  -TS      Progress/Outcome (Grooming Goal 1, OT)  goal not met  -TS        User Key  (r) = Recorded By, (t) = Taken By, (c) = Cosigned By    Initials Name Provider Type Discipline    TS Fabiana Larios, BRYANT/L Occupational Therapy Assistant OT              Timed Therapy Charges   Total Units: 3    Charges  Total Units: 3    Procedure Name Documented Minutes Units Code    HC OT SELF CARE/MGMT/TRAIN EA 15 MIN 32  2    04262 (CPT®)      HC OT THER PROC EA 15 MIN 10  1    81707 (CPT®)               Documented Minutes  Total Minutes: 42    Therapy Provided Minutes    71952 - OT Self Care/Mgmt Minutes 32    12184 - OT Therapeutic Exercise Minutes 10                    OT Discharge Summary  Anticipated Discharge Disposition (OT): inpatient rehabilitation facility  Reason for Discharge: Discharge from facility  Outcomes Achieved: Refer to plan of care for updates on goals achieved  Discharge Destination: Inpatient rehabilitation facility      SOFIA Colon  2/10/2021

## 2021-02-10 NOTE — PROGRESS NOTES
Occupational Therapy  Facility/Department: [facility] 8 REHAB UNIT  Daily Treatment Note  NAME: Nola Mcelroy  : 1955  MRN: [id number]    Date of Service: 2/10/2021    Discharge Recommendations:  Home with Home health OT       Assessment   Performance deficits / Impairments: Decreased functional mobility ; Decreased fine motor control;Decreased high-level IADLs;Decreased strength;Decreased balance;Decreased ROM; Decreased ADL status; Decreased coordination;Decreased endurance  OT Education: Home Exercise Program  Activity Tolerance  Activity Tolerance: Patient Tolerated treatment well  Safety Devices  Safety Devices in place: Yes  Type of devices: Left in chair;Call light within reach(refused chair alarm, but understood the importance of using call light before getting up)         Patient Diagnosis(es): There were no encounter diagnoses. has a past medical history of Cerebral artery occlusion with cerebral infarction ([facility] [address] Hyperlipidemia, and Hypertension. has a past surgical history that includes back surgery; Cervical spine surgery; Abdomen surgery; Appendectomy; and Shoulder arthroscopy (Right, 2019).     Restrictions  Restrictions/Precautions  Restrictions/Precautions: Fall Risk  Subjective   General  Chart Reviewed: Yes, Orders  Patient assessed for rehabilitation services?: Yes  Additional Pertinent Hx: CVA  Diagnosis: CVA with left hemiparesis  Pain Assessment  Pain Assessment: 0-10  Pain Level: 0  Vital Signs  Patient Currently in Pain: No   Objective       Balance  Standing Balance: Minimal assistance  Functional Mobility  Functional - Mobility Device: Rolling Walker  Activity: Other  Assist Level: Minimal assistance  Functional Mobility Comments: vc for tech     Transfers  Stand Step Transfers: Minimal assistance  Sit to stand: Minimal assistance  Stand to sit: Minimal assistance        Coordination  Fine Motor: L FM act with resistive pins with mod difficulty     Type of ROM/Therapeutic Exercise Minutes 55         Timed Code Treatment Minutes: 55 Minutes       Electronically signed by Raina Stallings OT on 2/10/2021 at 3:25 PM

## 2021-02-10 NOTE — PROGRESS NOTES
[facility]  INPATIENT SPEECH THERAPY  [facility] UNIT  TIME     1130   1200  30 minutes    [x]Daily Note  []Progress Note    Date: 2/10/2021  Patient Name: Preston Fuentes        MRN: [id number]    Account #: [de-identified]  : 1955  (72 y.o.)  Gender: male   Primary Provider: Luciana Galvan MD  Precautions: Fall risk  Swallowing Status/Diet: Cardiac, thin liquids      Subjective:   Pt in recliner with call light on for assistance. CNA and SLP assisted pt to bathroom. Objective:  Attention task completed with verbal discourse. Turn-taking with recall of names was utilized. Pt maintained attention to speaker, turn-taking events, and topic maintenance with 100% accuracy. Executive functioning tasks completed for organizing and self-monitoring in ADL. Pt required frequent prompts for safety awareness, self-monitoring walking balance, and organizing steps with walker device. Pt with moderate deficits and requiring moderate prompting. Identification of required oral motor exercises and dysarthric strategies was completed. Pt began using slow rate of speech and verbal repetition in turn-taking conversation. SLP prompted pt 25% of trials to use strategies. Pt demonstrated some independent use within conversation with CNA. SHORT TERM GOAL #1:  Goal 1: The patient will complete oral motor exercises to improve speech intelligibility with min verbal cues at 100% accuracy. SHORT TERM GOAL #2:  Goal 2: The patient will recall/utilize dysarthric strategies during conversational discourse with min verbal cues at 100% accuracy in order to improve speech intelligibility. SHORT TERM GOAL #3:  Goal 3: The patient will sustain attention for 15+ minutes with min verbal cues or redirection during structured/unstructured activities. SHORT TERM GOAL #4:  Goal 4: The patient will complete executive function tasks (self-monitoring, organizing, etc) with min verbal cues at 100% accuracy.     ASSESSMENT: Assessment: [x]Progressing towards goals          []Not Progressing towards goals    Patient Tolerance of Treatment:   [x]Tolerated well []Tolerated fair []Required rest breaks []Fatigued    Education:  Learner:  [x]Patient          []Significant Other          []Other  Education provided regarding:  [x]Goals and POC   []Diet and swallowing precautions    []Home Exercise Program  []Progress and/or discharge information  Method of Education:  [x]Discussion          []Demonstration          []Handout          []Other  Evaluation of Education:   [x]Verbalized understanding   []Demonstrates without assistance  []Demonstrates with assistance  []Needs further instruction     []No evidence of learning                  []No family present      Plan: [x]Continue with current plan of care    []Modify current plan of care as follows:    []Discharge patient    Discharge Location:    Services/Supervision Recommended:      [x]Patient continues to require treatment by a licensed therapist to address functional deficits as outlined in the established plan of care.     Electronically signed by Kira Toledo on 2/10/21 at 12:04 PM CST

## 2021-02-10 NOTE — PROGRESS NOTES
02/10/21 0900   Restrictions/Precautions   Restrictions/Precautions Fall Risk   General   Diagnosis RIGHT PONTINE/TEMPORAL LOBE INFARCTS; LEFT SIDE INVOLVED; CARDIAC   Vital Signs   Level of Consciousness Alert (0)   Bed Mobility   Rolling Stand by assistance   Supine to Sit Stand by assistance   Sit to Supine Stand by assistance   Transfers   Sit to Stand Stand by assistance   Stand to sit Stand by assistance   Bed to Chair Contact guard assistance;Stand by assistance   Ambulation 1   Surface level tile   Device Rolling Walker   Other Apparatus Wheelchair follow   Assistance Contact guard assistance   Quality of Gait PATIENT EXHIBITS DECREASED PROPRIOCEPTIVE AWARENESS OF LEFT LE IN GENERAL, LEFT KNEE IN PARTICULAR. REQUIRED TACTILE CUES TO FULLY EXTEND LEFT KNEE PRIOR TO ADVANCING RIHGT LE.  INCONSISTENT LEFT STEP LENGTH. Gait Deviations Slow Karina;Decreased step length;Decreased step height   Distance 15' x 3   Propulsion 1   Propulsion Manual   Method RUE;RLE   Level of Assistance Stand by assistance   Distance 25'   Other exercises   Other exercises 1 SITTING LEFT LE HIP FLEX/EXT/ABD/ADD; KNEE EXT; PF   Other exercises 2 Ankle plantar/dorsiflexion w/ red tband   Conditions Requiring Skilled Therapeutic Intervention   Body structures, Functions, Activity limitations Decreased functional mobility ; Decreased strength;Decreased endurance;Decreased sensation;Decreased balance   Assessment Pt. had good tolerance for therapy. Pt. still has problems mahogany left quad on command during ambulation. Gait limited to 15' due to quad weakness and gross fatigue. Good tolerance for seated exercises and WC propulsion. SBA/CG for bed mobility and TF.    Activity Tolerance   Activity Tolerance Patient limited by fatigue;Patient Tolerated treatment well;Patient limited by endurance   Safety Devices   Type of devices Bed alarm in place;Call light within reach;Gait belt;Left in bed

## 2021-02-10 NOTE — PROGRESS NOTES
Patient:   Diamante Wilson  MR#:    [id number]   Room:    0820/820-01   YOB: 1955  Date of Progress Note: 2/10/2021  Time of Note                           8:10 AM  Consulting Physician:   Chinmay Weiner M.D. Attending Physician:  Chinmay Weiner MD     CHIEF COMPLAINT: Left-sided weakness and dysarthria        subjective  This 72 y.o. male   with history of HTN,hyperlipidemia,chronic back pain and CVA. He presented to [facility] on 2/2/21 with c/o lightheadedness, mild blurred vision and change in his gait that started about 3 days prior. He was hypertensive on presentation. MRI done revealed a right pontine infarct. CTA head/neck showed non signifigant steno-occlusive disease. He was admitted the the hospitalist service with consult for neurology. He was seen by Dr. Sue Molina. He was doing quite well. When on 2/4/21 he had a return of dizziness with N&V and worsening left sided weakness, mild dysarthria, B/P was low. Stat MRI was ordered that revealed showed a new right temporal lobe infarct. TTE showed thickening on anterior leaflet of mirtal valve. On the morning of 2/5/21 he had an accoute onset of chest pain/pressure with hypotension. Repeat EKG was concerning. Troponin was negative x 2. Chest pain resolved with nitro. Cardiology was consult for NIKI.  He was seen by  Psychiatric  mood, affect, and behavior appear normal.      Neurology  NEUROLOGICAL EXAM:  Awake, alert, fluent oriented x 3 appropriate affect  Attention and concentration appear appropriate  Recent and remote memory appears unremarkable  Speech  with dysarthria  No clear issues with language of fund of knowledge     Cranial Nerve Exam   CN II- Visual fields grossly unremarkable  CN III, IV,VI-EOMI, No nystagmus, conjugate eye movements, no ptosis  CN VII-no facial assymetry  CN VIII-Hearing intact        Motor Exam  4/5 left arm and leg     Tremors- no tremors in hands or head noted     Gait  Not tested     Coordination  Ataxic on the left            Nursing/pcp notes, imaging,labs and vitals reviewed.      PT,OT and/or speech notes reviewed    Lab Results   Component Value Date    WBC 9.0 02/08/2021    HGB 14.8 02/08/2021    HCT 44.7 02/08/2021    MCV 80.1 02/08/2021     02/08/2021     Lab Results   Component Value Date     02/10/2021    K 4.2 02/10/2021     02/10/2021    CO2 24 02/10/2021    BUN 31 (H) 02/10/2021    CREATININE 1.3 (H) 02/10/2021    GLUCOSE 89 02/10/2021    CALCIUM 9.7 02/10/2021    LABGLOM 55 (A) 02/10/2021    GFRAA >59 02/10/2021   No results found for: INRNo results found for: PHENYTOIN, ESR, CRP    PHYSICAL THERAPY  STRENGTH  Strength RLE  Strength RLE: WFL  Strength LLE  Strength LLE: Exception  Comment: HIP 4-/5; KNEE 3-/5; DF 2-/5; PF 2+/5  ROM  AROM RLE (degrees)  RLE AROM: WFL  AROM LLE (degrees)  LLE AROM : WFL  BED MOBILITY  Bed Mobility  Rolling: Stand by assistance  Supine to Sit: Stand by assistance  Sit to Supine: Stand by assistance  TRANSFERS  Transfers  Sit to Stand: Stand by assistance  Stand to sit: Stand by assistance  Bed to Chair: Contact guard assistance, Stand by assistance  Car Transfer: Contact guard assistance, Stand by assistance(WITH RW)  WHEELCHAIR PROPULSION  Propulsion 1  Propulsion: Manual  Method: RUE, RLE Level of Assistance: Stand by assistance  Distance: 50 FT  AMBULATION  Ambulation 1  Surface: level tile  Device: Rolling Walker  Assistance: Contact guard assistance  Quality of Gait: STOOD FROM BED, TWO TURNS TO AMBULATE AROUND FOOT OF BED TOWARD DOOR. PATIENT MADE TURN IN BROAD, SWEEPING MANUEVER WITH RW RATHER THAN INCREMENTALLY USING 3 STEP TECHNIQUE  Distance: 15 FT  STAIRS  Stairs  # Steps : 4  Rails: Bilateral  Assistance: Moderate assistance, Minimal assistance  Comment: DIFFICULTY WITH LEFT HIP FLEXION CAUSING TOE CATCH ON STEP  GOALS:  Short term goals  Time Frame for Short term goals: 1 WEEK  Short term goal 1: TF SURFACE TO SURFACE WITH RW EXHIBITING PROPER 3 POINT PATTERN  Short term goal 2: AMBULATE 50 FT WITH RW CGA DEMONSTRATING PROPER 3 POINT GAIT  Short term goal 3: UP/DOWN 4 STEPS BILAT RAILS SBA  Short term goal 4: PROPEL  FT SBA  Short term goal 5: HEP SBA     Long term goals  Time Frame for Long term goals : 2-3 WEEKS  Long term goal 1: INDEP BED MOB  Long term goal 2: INDEP TF SURFACE TO SURFACE  Long term goal 3: AMBULATE 150 FT WITH RW INDEP  Long term goal 4: UP/DOWN 4 STEPS WITH 1 RAIL INDEP  Long term goal 5: HEP INDEP     ASSESSMENT:  Assessment: IMPROVED LEFT QUAD CONTRACTION DURING SITTING EX WITH FOCUS AND SUCCESSIVE REPS UNTIL PATIENT BECAME FATIGUED AT [employer].       SPEECH THERAPY     Patient presenting with mild dysarthria characterized by distortions and imprecise articulations. Speech intelligibility ranks at 90% for unknown context and unfamiliar listeners. Patient presenting with mild/mod deficits in attention, and mild deficits for executive functions.      Short-term Goals  Timeframe for Short-term Goals: 2-3 weeks  Goal 1: The patient will complete oral motor exercises to improve speech intelligibility with min verbal cues at 100% accuracy. Goal 2: The patient will recall/utilize dysarthric strategies during conversational discourse with min verbal cues at 100% accuracy in order to improve speech intelligibility. Goal 3: The patient will sustain attention for 15+ minutes with min verbal cues or redirection during structured/unstructured activities.   Goal 4: The patient will complete executive function tasks (self-monitoring, organizing, etc) with min verbal cues at 100% accuracy.     Long-term Goals  Goal 1: The patient will utilize dysarthric strategies independently and exhibit 100% speech intelligibility for conversational discourse  Goal 2: The patient will develop functional, cognitive linguistic based skills and utilize compensatory strategies to communicate wants and needs effectively to different conversational partners, maintain safety, and participate socially in functional living enviroment.        GOALS MET: 0 STG 0 LTG     OCCUPATIONAL THERAPY     CURRENT IRF-SMITA SCORES  Eating: CARE Score: 5     Oral Hygiene: CARE Score: 5         Toileting: CARE Score: 3         Shower/Bathe: CARE Score: 3           Upper Body Dressing: CARE Score: 4          Lower Body Dressing: CARE Score: 3           Footwear: CARE Score: 3           Toilet Transfers: CARE Score: 3           Picking Up Object:  CARE Score: 88        UE Functionin/09/21 1330   LUE PROM (degrees)   LUE PROM WFL   LUE AROM (degrees)   LUE General AROM (0-120) proximally, WFLs distally   RUE PROM (degrees)   RUE PROM WNL   RUE AROM (degrees)   RUE AROM  WNL            Pain Assessment:  Pain Level: 0     STGs:  Short term goals  Time Frame for Short term goals: 1 week  Short term goal 1: complete LB dressing with CGA  Short term goal 2: complete overall toileting with CGA  Short term goal 3: complete overall bathing with CGA  Short term goal 4: complete 1-2 handed standing level task for 3 mins with CGA

## 2021-02-10 NOTE — PROGRESS NOTES
Occupational Therapy  Facility/Department: [facility] 8 REHAB UNIT  Daily Treatment Note  NAME: Alan Ireland  : 1955  MRN: [id number]    Date of Service: 2/10/2021         Assessment   Performance deficits / Impairments: Decreased functional mobility ; Decreased fine motor control;Decreased high-level IADLs;Decreased strength;Decreased balance;Decreased ROM; Decreased ADL status; Decreased coordination;Decreased endurance  OT Education: ADL Adaptive Strategies  Activity Tolerance  Activity Tolerance: Patient Tolerated treatment well  Safety Devices  Safety Devices in place: Yes  Type of devices: Left in chair(left with PT)         Patient Diagnosis(es): There were no encounter diagnoses. has a past medical history of Cerebral artery occlusion with cerebral infarction ([facility] [address] Hyperlipidemia, and Hypertension. has a past surgical history that includes back surgery; Cervical spine surgery; Abdomen surgery; Appendectomy; and Shoulder arthroscopy (Right, 2019).     Restrictions  Restrictions/Precautions  Restrictions/Precautions: Fall Risk  Subjective   General  Chart Reviewed: Yes, Orders  Patient assessed for rehabilitation services?: Yes  Additional Pertinent Hx: CVA  Diagnosis: CVA with left hemiparesis  Pain Assessment  Pain Assessment: 0-10  Pain Level: 0  Vital Signs  Patient Currently in Pain: No   Objective          Transfers  Stand Step Transfers: Minimal assistance  Sit to stand: Minimal assistance  Stand to sit: Minimal assistance        Coordination  Fine Motor: L FM act with min difficulty        02/10/21 0820   Oral Hygiene   Assistance Needed Setup or clean-up assistance   CARE Score 5   Putting On/Taking Off Footwear   Assistance Needed Partial/moderate assistance   Comment min A for left shoe, able to don socks and right shoe   CARE Score 3      Plan   Plan  Specific instructions for Next Treatment: 9 hole peg test,  strength Current Treatment Recommendations: Self-Care / ADL, Safety Education & Training, Endurance Training, Strengthening, Patient/Caregiver Education & Training, Home Management Training, Positioning, Functional Mobility Training, Neuromuscular Re-education, Balance Training, Equipment Evaluation, Education, & procurement, ROM    Goals  Short term goals  Time Frame for Short term goals: 1 week  Short term goal 1: complete LB dressing with CGA  Short term goal 2: complete overall toileting with CGA  Short term goal 3: complete overall bathing with CGA  Short term goal 4: complete 1-2 handed standing level task for 3 mins with CGA  Short term goal 5: complete L UE [address] acts x 5 occasions to increase coordination and finger dexterity for ADLs  Short term goal 6: complete simple ambulatory home making task with CGA  Long term goals  Time Frame for Long term goals : 3 weeks  Long term goal 1: complete HEP with independence  Long term goal 2: complete overall dressing with modified independence  Long term goal 3: complete overall bathing with modified independence  Long term goal 4: complete overall toileting with modified independence  Long term goal 5: complete ambulatory home making task with modified independence  Long term goals 6: pt/family verbalize DME  Patient Goals   Patient goals : return home       Therapy Time   Individual Concurrent Group Co-treatment   Time In 0820         Time Out 0900         Minutes 40         Timed Code Treatment Minutes: 40 Minutes       Electronically signed by Kaleb Billy OT on 2/10/2021 at 10:53 AM

## 2021-02-10 NOTE — PLAN OF CARE
Problem: Falls - Risk of:  Goal: Will remain free from falls  Description: Will remain free from falls  2/9/2021 2320 by Piotr Bailon RN  Outcome: Ongoing  2/9/2021 1128 by Maile Mendoza LPN  Outcome: Ongoing  Goal: Absence of physical injury  Description: Absence of physical injury  2/9/2021 2320 by Piotr Bailon RN  Outcome: Ongoing  2/9/2021 1128 by Maile Mendoza LPN  Outcome: Ongoing     Problem: Mobility - Impaired:  Goal: Mobility will improve  Description: Mobility will improve  2/9/2021 2320 by Piotr Bailon RN  Outcome: Ongoing  2/9/2021 1128 by Maile Mendoza LPN  Outcome: Ongoing     Problem: Safety:  Goal: Free from accidental physical injury  Description: Free from accidental physical injury  2/9/2021 2320 by Piotr Bailon RN  Outcome: Ongoing  2/9/2021 1128 by Maile Mendoza LPN  Outcome: Ongoing  Goal: Free from intentional harm  Description: Free from intentional harm  2/9/2021 2320 by Piotr Bailon RN  Outcome: Ongoing  2/9/2021 1128 by Maile Mendoza LPN  Outcome: Ongoing     Problem: Daily Care:  Goal: Daily care needs are met  Description: Daily care needs are met  2/9/2021 2320 by Piotr Bailon RN  Outcome: Ongoing  2/9/2021 1128 by Maile Mendoza LPN  Outcome: Ongoing     Problem: Discharge Planning:  Goal: Patients continuum of care needs are met  Description: Patients continuum of care needs are met  2/9/2021 2320 by Piotr Bailon RN  Outcome: Ongoing  2/9/2021 1128 by Maile Mendoza LPN  Outcome: Ongoing     Problem: HEMODYNAMIC STATUS  Goal: Patient has stable vital signs and fluid balance  2/9/2021 2320 by Piotr Bailon RN  Outcome: Ongoing  2/9/2021 1128 by Maile Mendoza LPN  Outcome: Ongoing     Problem: ACTIVITY INTOLERANCE/IMPAIRED MOBILITY  Goal: Mobility/activity is maintained at optimum level for patient  2/9/2021 2320 by Piotr Bailon RN  Outcome: Ongoing 2/9/2021 1128 by Jose Brown LPN  Outcome: Ongoing     Problem: Bleeding:  Goal: Will show no signs and symptoms of excessive bleeding  Description: Will show no signs and symptoms of excessive bleeding  2/9/2021 2320 by Marc Rios RN  Outcome: Ongoing  2/9/2021 1128 by Jose Brown LPN  Outcome: Ongoing

## 2021-02-11 LAB
ANION GAP SERPL CALCULATED.3IONS-SCNC: 16 MMOL/L (ref 7–19)
BASOPHILS ABSOLUTE: 0.1 K/UL (ref 0–0.2)
BASOPHILS RELATIVE PERCENT: 0.6 % (ref 0–1)
BUN BLDV-MCNC: 44 MG/DL (ref 8–23)
CALCIUM SERPL-MCNC: 9.9 MG/DL (ref 8.8–10.2)
CHLORIDE BLD-SCNC: 102 MMOL/L (ref 98–111)
CO2: 21 MMOL/L (ref 22–29)
CREAT SERPL-MCNC: 1.7 MG/DL (ref 0.5–1.2)
EOSINOPHILS ABSOLUTE: 0.1 K/UL (ref 0–0.6)
EOSINOPHILS RELATIVE PERCENT: 0.7 % (ref 0–5)
GFR AFRICAN AMERICAN: 49
GFR NON-AFRICAN AMERICAN: 41
GLUCOSE BLD-MCNC: 88 MG/DL (ref 74–109)
HCT VFR BLD CALC: 46.5 % (ref 42–52)
HEMOGLOBIN: 15.6 G/DL (ref 14–18)
IMMATURE GRANULOCYTES #: 0 K/UL
LYMPHOCYTES ABSOLUTE: 2 K/UL (ref 1.1–4.5)
LYMPHOCYTES RELATIVE PERCENT: 24.9 % (ref 20–40)
MCH RBC QN AUTO: 26.7 PG (ref 27–31)
MCHC RBC AUTO-ENTMCNC: 33.5 G/DL (ref 33–37)
MCV RBC AUTO: 79.6 FL (ref 80–94)
MONOCYTES ABSOLUTE: 0.7 K/UL (ref 0–0.9)
MONOCYTES RELATIVE PERCENT: 7.9 % (ref 0–10)
NEUTROPHILS ABSOLUTE: 5.3 K/UL (ref 1.5–7.5)
NEUTROPHILS RELATIVE PERCENT: 65.4 % (ref 50–65)
PDW BLD-RTO: 14.6 % (ref 11.5–14.5)
PLATELET # BLD: 269 K/UL (ref 130–400)
PMV BLD AUTO: 11.5 FL (ref 9.4–12.4)
POTASSIUM REFLEX MAGNESIUM: 4.6 MMOL/L (ref 3.5–5)
RBC # BLD: 5.84 M/UL (ref 4.7–6.1)
SODIUM BLD-SCNC: 139 MMOL/L (ref 136–145)
WBC # BLD: 8.2 K/UL (ref 4.8–10.8)

## 2021-02-11 PROCEDURE — 36415 COLL VENOUS BLD VENIPUNCTURE: CPT

## 2021-02-11 PROCEDURE — 80048 BASIC METABOLIC PNL TOTAL CA: CPT

## 2021-02-11 PROCEDURE — 85025 COMPLETE CBC W/AUTO DIFF WBC: CPT

## 2021-02-11 PROCEDURE — 97129 THER IVNTJ 1ST 15 MIN: CPT

## 2021-02-11 PROCEDURE — 97116 GAIT TRAINING THERAPY: CPT

## 2021-02-11 PROCEDURE — 6370000000 HC RX 637 (ALT 250 FOR IP): Performed by: PSYCHIATRY & NEUROLOGY

## 2021-02-11 PROCEDURE — 97110 THERAPEUTIC EXERCISES: CPT

## 2021-02-11 PROCEDURE — 6360000002 HC RX W HCPCS: Performed by: PSYCHIATRY & NEUROLOGY

## 2021-02-11 PROCEDURE — 97530 THERAPEUTIC ACTIVITIES: CPT

## 2021-02-11 PROCEDURE — 1180000000 HC REHAB R&B

## 2021-02-11 PROCEDURE — 92507 TX SP LANG VOICE COMM INDIV: CPT

## 2021-02-11 PROCEDURE — 97535 SELF CARE MNGMENT TRAINING: CPT

## 2021-02-11 RX ADMIN — LATANOPROST 1 DROP: 50 SOLUTION OPHTHALMIC at 20:44

## 2021-02-11 RX ADMIN — ENOXAPARIN SODIUM 30 MG: 40 INJECTION SUBCUTANEOUS at 07:44

## 2021-02-11 RX ADMIN — CHOLECALCIFEROL TAB 10 MCG (400 UNIT) 400 UNITS: 10 TAB at 07:43

## 2021-02-11 RX ADMIN — LISINOPRIL 40 MG: 20 TABLET ORAL at 20:44

## 2021-02-11 RX ADMIN — ASPIRIN 81 MG: 81 TABLET, CHEWABLE ORAL at 07:43

## 2021-02-11 RX ADMIN — AMLODIPINE BESYLATE 10 MG: 10 TABLET ORAL at 20:44

## 2021-02-11 RX ADMIN — Medication 10 MG: at 20:44

## 2021-02-11 RX ADMIN — METOPROLOL SUCCINATE 50 MG: 50 TABLET, EXTENDED RELEASE ORAL at 07:43

## 2021-02-11 RX ADMIN — CLOPIDOGREL BISULFATE 75 MG: 75 TABLET ORAL at 07:44

## 2021-02-11 RX ADMIN — ATORVASTATIN CALCIUM 10 MG: 10 TABLET, FILM COATED ORAL at 07:43

## 2021-02-11 RX ADMIN — TRIAMTERENE AND HYDROCHLOROTHIAZIDE 2 TABLET: 37.5; 25 TABLET ORAL at 07:43

## 2021-02-11 RX ADMIN — POLYETHYLENE GLYCOL 3350 17 G: 17 POWDER, FOR SOLUTION ORAL at 07:44

## 2021-02-11 RX ADMIN — SPIRONOLACTONE 25 MG: 25 TABLET ORAL at 07:43

## 2021-02-11 ASSESSMENT — PAIN SCALES - GENERAL
PAINLEVEL_OUTOF10: 0
PAINLEVEL_OUTOF10: 3

## 2021-02-11 ASSESSMENT — PAIN DESCRIPTION - ONSET: ONSET: SUDDEN

## 2021-02-11 ASSESSMENT — PAIN DESCRIPTION - ORIENTATION: ORIENTATION: LEFT

## 2021-02-11 ASSESSMENT — PAIN DESCRIPTION - PAIN TYPE: TYPE: ACUTE PAIN

## 2021-02-11 ASSESSMENT — PAIN DESCRIPTION - DESCRIPTORS: DESCRIPTORS: ACHING

## 2021-02-11 NOTE — PLAN OF CARE
Problem: Falls - Risk of:  Goal: Will remain free from falls  Description: Will remain free from falls  Outcome: Ongoing  Goal: Absence of physical injury  Description: Absence of physical injury  Outcome: Ongoing     Problem: Mobility - Impaired:  Goal: Mobility will improve  Description: Mobility will improve  Outcome: Ongoing     Problem: Safety:  Goal: Free from accidental physical injury  Description: Free from accidental physical injury  Outcome: Ongoing  Goal: Free from intentional harm  Description: Free from intentional harm  Outcome: Ongoing     Problem: Daily Care:  Goal: Daily care needs are met  Description: Daily care needs are met  Outcome: Ongoing     Problem: Discharge Planning:  Goal: Patients continuum of care needs are met  Description: Patients continuum of care needs are met  Outcome: Ongoing     Problem: HEMODYNAMIC STATUS  Goal: Patient has stable vital signs and fluid balance  Outcome: Ongoing     Problem: ACTIVITY INTOLERANCE/IMPAIRED MOBILITY  Goal: Mobility/activity is maintained at optimum level for patient  Outcome: Ongoing     Problem: Bleeding:  Goal: Will show no signs and symptoms of excessive bleeding  Description: Will show no signs and symptoms of excessive bleeding  Outcome: Ongoing     Problem: Pain:  Goal: Pain level will decrease  Description: Pain level will decrease  Outcome: Ongoing  Goal: Control of acute pain  Description: Control of acute pain  Outcome: Ongoing  Goal: Control of chronic pain  Description: Control of chronic pain  Outcome: Ongoing

## 2021-02-11 NOTE — PROGRESS NOTES
[facility]  [facility]PAT[facility]CH [facility]AP[facility]  [facility] 8 REHAB UNIT  TIME   Time In: 1500  Time Out: 5789  Minutes: 30       [x]Daily Note  []Progress Note    Date: 2021  Patient Name: Berta Grande        MRN: [id number]    Account #: [de-identified]  : 1955  (72 y.o.)  Gender: male   Primary Provider: Priyanka Bender MD  Precautions: Fall  Swallowing Status/Diet: Regular diet with thin liquids      Subjective: Patient alert and cooperative with all therapy tasks. Patient seen in his room. He was upright in wheel chair. Patient did state he was tired/fatigued. Objective:   Patient was able to sustain attention for 15+ minutes in both structured/unstructured settings today, independently. Patient completed oral motor exercises with minimal verbal cues. Patient does not present with any prominent facial weakness. Patient did report mild weakness in jaw area on the left side. Patient completed repetition of single words with emphasis on /ch/, /sh/, /s/ and /l/. All words were 100% intelligible, however did note mild distortions for /ch/ and /sh/. Patient was able to utilize and recall dysarthric strategies with 100% accuracy. Patient does really well overarticulating words when speaking in sentences. Overall speech intelligibility ranks at 100% for unknown context and unfamiliar listeners. 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 Patient also able to state the good evening/good morning message he speaks at [employer] both with 100% speech intelligibility. Mild distortions noted, but did not impact speech intelligibility. Patient also spoke on the phone during the session to one of his friends. The friend was able to to understand him with no difficulty. SHORT TERM GOAL #1:  Goal 1: The patient will complete oral motor exercises to improve speech intelligibility with min verbal cues at 100% accuracy.     SHORT TERM GOAL #2: Goal 2: The patient will recall/utilize dysarthric strategies during conversational discourse with min verbal cues at 100% accuracy in order to improve speech intelligibility. SHORT TERM GOAL #3:  Goal 3: The patient will sustain attention for 15+ minutes with min verbal cues or redirection during structured/unstructured activities. SHORT TERM GOAL #4:  Goal 4: The patient will complete executive function tasks (self-monitoring, organizing, etc) with min verbal cues at 100% accuracy. ASSESSMENT:  Assessment: [x]Progressing towards goals          []Not Progressing towards goals    Patient Tolerance of Treatment:   [x]Tolerated well []Tolerated fair []Required rest breaks []Fatigued    Education:  Learner:  [x]Patient          []Significant Other          []Other  Education provided regarding:  [x]Goals and POC   []Diet and swallowing precautions    []Home Exercise Program  []Progress and/or discharge information  Method of Education:  [x]Discussion          [x]Demonstration          []Handout          []Other  Evaluation of Education:   [x]Verbalized understanding   []Demonstrates without assistance  []Demonstrates with assistance  []Needs further instruction     []No evidence of learning                  []No family present      Plan: [x]Continue with current plan of care    []Modify current plan of care as follows:    []Discharge patient    Discharge Location:    Services/Supervision Recommended:      [x]Patient continues to require treatment by a licensed therapist to address functional deficits as outlined in the established plan of care.

## 2021-02-11 NOTE — PLAN OF CARE
Problem: Falls - Risk of:  Goal: Will remain free from falls  Description: Will remain free from falls  2/10/2021 2348 by Ashlyn Lo RN  Outcome: Ongoing  2/10/2021 1508 by Marycruz Strauss RN  Outcome: Ongoing  Goal: Absence of physical injury  Description: Absence of physical injury  2/10/2021 2348 by Ashlyn Lo RN  Outcome: Ongoing  2/10/2021 1508 by Marycruz Strauss RN  Outcome: Ongoing     Problem: Safety:  Goal: Free from accidental physical injury  Description: Free from accidental physical injury  2/10/2021 2348 by Ashlyn Lo RN  Outcome: Ongoing  2/10/2021 1508 by Marycruz Strauss RN  Outcome: Ongoing  Goal: Free from intentional harm  Description: Free from intentional harm  2/10/2021 2348 by Ashlyn oL RN  Outcome: Ongoing  2/10/2021 1508 by Marycruz Strauss RN  Outcome: Ongoing     Problem: HEMODYNAMIC STATUS  Goal: Patient has stable vital signs and fluid balance  2/10/2021 2348 by Ashlyn Lo RN  Outcome: Ongoing  2/10/2021 1508 by Marycruz Strauss RN  Outcome: Ongoing     Problem: Bleeding:  Goal: Will show no signs and symptoms of excessive bleeding  Description: Will show no signs and symptoms of excessive bleeding  2/10/2021 2348 by Ashlyn Lo RN  Outcome: Ongoing  2/10/2021 1508 by Mraycruz Strauss RN  Outcome: Ongoing

## 2021-02-11 NOTE — PROGRESS NOTES
02/11/21 1403 02/11/21 1404 02/11/21 1411   Bed Mobility   Rolling Modified independent  --   --    Supine to Sit Stand by assistance  --   --    Transfers   Sit to Stand Stand by assistance;Contact guard assistance  --   --    Stand to sit Stand by assistance  --   --    Bed to Chair Contact guard assistance;Stand by assistance  --   --    Ambulation   Ambulation?  --  Yes  --    WB Status  --  WBAT  --    Ambulation 1   Surface  --  level tile  --    Device  --  Rolling Walker  --    Assistance  --  Contact guard assistance  --    Quality of Gait  --  Working on step-through pattern and hel-toe steps with RLE for more normal amb. Verbal cues for posture and to keep LLE extended during stance phase.  --    Gait Deviations  --  Slow Karina;Decreased step length;Decreased step height  --    Distance  --  20' x2  --    Comments  --  Amb chair to chair; incorporated turns. Verbal cues for technique during turns and backing to chair. --    Exercises   Comments  --   --   --    Conditions Requiring Skilled Therapeutic Intervention   Assessment  --   --  Worked on amb chair to chair today incorporating turns and backing to sit. Pt. more fatigued this afternoon. Activity Tolerance   Activity Tolerance  --  Patient Tolerated treatment well;Patient limited by fatigue  --    Safety Devices   Type of devices  --   --   --       02/11/21 1418 02/11/21 1430   Bed Mobility   Rolling  --   --    Supine to Sit  --   --    Transfers   Sit to Stand  --   --    Stand to sit  --   --    Bed to Chair  --   --    Ambulation   Ambulation?  --   --    WB Status  --   --    Ambulation 1   Surface  --   --    Device  --   --    Assistance  --   --    Quality of Gait  --   --    Gait Deviations  --   --    Distance  --   --    Comments  --   --    Exercises   Comments Sitting BLE exercises-  15 reps with 1-1/2 lb weight RLE.   10 reps LLE.  --    Conditions Requiring Skilled Therapeutic Intervention   Assessment  --   -- Activity Tolerance   Activity Tolerance  --   --    Safety Devices   Type of devices  --  Call light within reach; Chair alarm in place   Electronically signed by Florencio Ayala PTA on 2/11/2021 at 2:33 PM

## 2021-02-11 NOTE — PROGRESS NOTES
02/11/21 4833 02/11/21 0919 02/11/21 0942   Pain Screening   Patient Currently in Pain  --   --   --    Transfers   Sit to Stand Stand by assistance;Contact guard assistance  --   --    Stand to sit Stand by assistance  --   --    Ambulation   Ambulation?  --  Yes  --    WB Status  --  WBAT  --    Ambulation 1   Surface  --  level tile  --    Device  --  Rolling Walker  --    Assistance  --  Contact guard assistance  --    Quality of Gait  --  Working on step-through pattern and hel-toe steps with RLE for more normal amb. Verbal cues for posture and to keep LLE extended during stance phase.  --    Gait Deviations  --  Slow Karina;Decreased step length;Decreased step height  --    Distance  --  20' x4  --    Comments  --  Amb chair to chair; incorporated turns. Verbal cues for technique during turns and backing to chair. --    Exercises   Comments  --   --  Sitting BLE exercises-  15 reps with 1-1/2 lb weight RLE. 10 reps LLE. Conditions Requiring Skilled Therapeutic Intervention   Assessment  --   --   --    Activity Tolerance   Activity Tolerance  --   --   --       02/11/21 0943 02/11/21 0944   Pain Screening   Patient Currently in Pain No  --    Transfers   Sit to Stand  --   --    Stand to sit  --   --    Ambulation   Ambulation?  --   --    WB Status  --   --    Ambulation 1   Surface  --   --    Device  --   --    Assistance  --   --    Quality of Gait  --   --    Gait Deviations  --   --    Distance  --   --    Comments  --   --    Exercises   Comments  --   --    Conditions Requiring Skilled Therapeutic Intervention   Assessment  --  Worked on amb chair to chair today incorporating turns and backing to sit.    Activity Tolerance   Activity Tolerance  --  Patient Tolerated treatment well;Patient limited by endurance   Electronically signed by Rodrigue Montiel PTA on 2/11/2021 at 9:49 AM

## 2021-02-11 NOTE — PROGRESS NOTES
Occupational Therapy  Facility/Department: [facility] 8 REHAB UNIT  Daily Treatment Note  NAME: Ernestine Acosta  : 1955  MRN: [id number]    Date of Service: 2021    Discharge Recommendations:  Home with Home health OT       Assessment   Performance deficits / Impairments: Decreased functional mobility ; Decreased fine motor control;Decreased high-level IADLs;Decreased strength;Decreased balance;Decreased ROM; Decreased ADL status; Decreased coordination;Decreased endurance  OT Education: Home Exercise Program  Activity Tolerance  Activity Tolerance: Patient Tolerated treatment well  Safety Devices  Safety Devices in place: Yes  Type of devices: Bed alarm in place;Call light within reach; Left in bed         Patient Diagnosis(es): There were no encounter diagnoses. has a past medical history of Cerebral artery occlusion with cerebral infarction ([facility] [address] Hyperlipidemia, and Hypertension. has a past surgical history that includes back surgery; Cervical spine surgery; Abdomen surgery; Appendectomy; and Shoulder arthroscopy (Right, 2019).     Restrictions  Restrictions/Precautions  Restrictions/Precautions: Fall Risk  Subjective   General  Chart Reviewed: Yes, Orders  Patient assessed for rehabilitation services?: Yes  Additional Pertinent Hx: CVA  Diagnosis: CVA with left hemiparesis    Pain Assessment  Pain Assessment: 0-10  Pain Level: 3  Pain Type: Acute pain  Pain Location: Shoulder  Pain Orientation: Left  Pain Descriptors: Aching  Pain Frequency: Intermittent  Pain Onset: Sudden  Functional Pain Assessment: Prevents or interferes some active activities and ADLs(UE HEP modified)  Non-Pharmaceutical Pain Intervention(s): Repositioned  Response to Pain Intervention: Patient Satisfied  Multiple Pain Sites: No  Pre Treatment Pain Screening  Pain at present: 0  Scale Used: Numeric Score  Intervention List: Patient able to continue with treatment  Vital Signs  Patient Currently in Pain: Yes   Objective    Balance Sitting Balance: Supervision  Standing Balance: Minimal assistance  Functional Mobility  Functional - Mobility Device: Rolling Walker  Activity: Other  Assist Level: Minimal assistance  Functional Mobility Comments: multiple short ambulatory acts, vc for tech, weight applied to left ankle for increased proprioception  Bed mobility  Sit to Supine: Stand by assistance  Transfers  Stand Step Transfers: Minimal assistance  Sit to stand: Minimal assistance  Stand to sit: Minimal assistance     Type of ROM/Therapeutic Exercise  Type of ROM/Therapeutic Exercise: Pulley;Free weights(2# free weights, L shoulder exercises modified due to discomfort and weakness)         02/11/21 1000   Upper Body Dressing   Assistance Needed Setup or clean-up assistance   Comment pullover shirts   CARE Score 5     Plan   Plan  Specific instructions for Next Treatment: L UE strengthening, L FM acts  Current Treatment Recommendations: Self-Care / ADL, Safety Education & Training, Endurance Training, Strengthening, Patient/Caregiver Education & Training, Home Management Training, Positioning, Functional Mobility Training, Neuromuscular Re-education, Balance Training, Equipment Evaluation, Education, & procurement, ROM    Goals  Short term goals  Time Frame for Short term goals: 1 week  Short term goal 1: complete LB dressing with CGA  Short term goal 2: complete overall toileting with CGA  Short term goal 3: complete overall bathing with CGA  Short term goal 4: complete 1-2 handed standing level task for 3 mins with CGA  Short term goal 5: complete L UE FMC acts x 5 occasions to increase coordination and finger dexterity for ADLs  Short term goal 6: complete simple ambulatory home making task with CGA  Long term goals  Time Frame for Long term goals : 3 weeks  Long term goal 1: complete HEP with independence  Long term goal 2: complete overall dressing with modified independence

## 2021-02-11 NOTE — PROGRESS NOTES
Occupational Therapy  Facility/Department: [facility] 8 REHAB UNIT  Daily Treatment Note  NAME: Cielo Reed  : 1955  MRN: [id number]    Date of Service: 2021    Discharge Recommendations:  Home with Home health OT       Assessment   Performance deficits / Impairments: Decreased functional mobility ; Decreased fine motor control;Decreased high-level IADLs;Decreased strength;Decreased balance;Decreased ROM; Decreased ADL status; Decreased coordination;Decreased endurance  OT Education: ADL Adaptive Strategies  Activity Tolerance  Activity Tolerance: Patient Tolerated treatment well  Safety Devices  Safety Devices in place: Yes  Type of devices: Left in chair(left with PT)         Patient Diagnosis(es): There were no encounter diagnoses. has a past medical history of Cerebral artery occlusion with cerebral infarction ([facility] [address] Hyperlipidemia, and Hypertension. has a past surgical history that includes back surgery; Cervical spine surgery; Abdomen surgery; Appendectomy; and Shoulder arthroscopy (Right, 2019).     Restrictions  Restrictions/Precautions  Restrictions/Precautions: Fall Risk  Subjective   General  Chart Reviewed: Yes, Orders  Patient assessed for rehabilitation services?: Yes  Additional Pertinent Hx: CVA  Diagnosis: CVA with left hemiparesis  General Comment  Comments: in the process of bathing at start of session  Pain Assessment  Pain Assessment: 0-10  Pain Level: 0  Vital Signs  Patient Currently in Pain: No   Objective    Transfers  Stand Step Transfers: Minimal assistance  Sit to stand: Minimal assistance  Stand to sit: Minimal assistance       21 0830   Eating   Assistance Needed Setup or clean-up assistance   CARE Score 5   Oral Hygiene   Assistance Needed Setup or clean-up assistance   CARE Score 5   [address] [place] assistance   Comment min A for balance   CARE Score 3   Shower/Bathe Self   Assistance Needed Partial/moderate assistance Comment min A for balance   CARE Score 3   Upper Body Dressing   Assistance Needed Setup or clean-up assistance   CARE Score 5   Lower Body Dressing   Assistance Needed Partial/moderate assistance   Comment min A for balance, vc for tech   CARE Score 3   Putting On/Taking Off Footwear   Assistance Needed Partial/moderate assistance   Comment min A for left shoe   CARE Score 3      02/11/21 0830   Toilet Transfer   Assistance Needed Partial/moderate assistance   Comment min A, w/c to toilet with GB, vc to lock brakes   CARE Score 3        Plan   Plan  Specific instructions for Next Treatment: L UE strengthening  Current Treatment Recommendations: Self-Care / ADL, Safety Education & Training, Endurance Training, Strengthening, Patient/Caregiver Education & Training, Home Management Training, Positioning, Functional Mobility Training, Neuromuscular Re-education, Balance Training, Equipment Evaluation, Education, & procurement, ROM    Goals  Short term goals  Time Frame for Short term goals: 1 week  Short term goal 1: complete LB dressing with CGA  Short term goal 2: complete overall toileting with CGA  Short term goal 3: complete overall bathing with CGA  Short term goal 4: complete 1-2 handed standing level task for 3 mins with CGA  Short term goal 5: complete L UE FMC acts x 5 occasions to increase coordination and finger dexterity for ADLs  Short term goal 6: complete simple ambulatory home making task with CGA  Long term goals  Time Frame for Long term goals : 3 weeks  Long term goal 1: complete HEP with independence  Long term goal 2: complete overall dressing with modified independence  Long term goal 3: complete overall bathing with modified independence  Long term goal 4: complete overall toileting with modified independence  Long term goal 5: complete ambulatory home making task with modified independence  Long term goals 6: pt/family verbalize DME  Patient Goals   Patient goals : return home Therapy Time   Individual Concurrent Group Co-treatment   Time In 0830         Time Out 0900         Minutes 30         Timed Code Treatment Minutes: 30 Minutes  Electronically signed by Phoebe Steinberg OT on 2/11/2021 at 10:40 AM

## 2021-02-12 ENCOUNTER — TELEPHONE (OUTPATIENT)
Dept: CARDIOLOGY CLINIC | Age: 66
End: 2021-02-12

## 2021-02-12 LAB
ANION GAP SERPL CALCULATED.3IONS-SCNC: 14 MMOL/L (ref 7–19)
BASOPHILS ABSOLUTE: 0 K/UL (ref 0–0.2)
BASOPHILS RELATIVE PERCENT: 0.4 % (ref 0–1)
BUN BLDV-MCNC: 58 MG/DL (ref 8–23)
CALCIUM SERPL-MCNC: 10.1 MG/DL (ref 8.8–10.2)
CHLORIDE BLD-SCNC: 101 MMOL/L (ref 98–111)
CO2: 20 MMOL/L (ref 22–29)
CREAT SERPL-MCNC: 2.2 MG/DL (ref 0.5–1.2)
D DIMER: 0.31 UG/ML FEU (ref 0–0.48)
EOSINOPHILS ABSOLUTE: 0 K/UL (ref 0–0.6)
EOSINOPHILS RELATIVE PERCENT: 0.3 % (ref 0–5)
GFR AFRICAN AMERICAN: 36
GFR NON-AFRICAN AMERICAN: 30
GLUCOSE BLD-MCNC: 102 MG/DL (ref 74–109)
HCT VFR BLD CALC: 48 % (ref 42–52)
HEMOGLOBIN: 15.8 G/DL (ref 14–18)
IMMATURE GRANULOCYTES #: 0.1 K/UL
LV EF: 58 %
LVEF MODALITY: NORMAL
LYMPHOCYTES ABSOLUTE: 2.1 K/UL (ref 1.1–4.5)
LYMPHOCYTES RELATIVE PERCENT: 23.3 % (ref 20–40)
MCH RBC QN AUTO: 26.4 PG (ref 27–31)
MCHC RBC AUTO-ENTMCNC: 32.9 G/DL (ref 33–37)
MCV RBC AUTO: 80.1 FL (ref 80–94)
MONOCYTES ABSOLUTE: 0.7 K/UL (ref 0–0.9)
MONOCYTES RELATIVE PERCENT: 7.8 % (ref 0–10)
NEUTROPHILS ABSOLUTE: 6.1 K/UL (ref 1.5–7.5)
NEUTROPHILS RELATIVE PERCENT: 67.5 % (ref 50–65)
PDW BLD-RTO: 14.8 % (ref 11.5–14.5)
PLATELET # BLD: 301 K/UL (ref 130–400)
PMV BLD AUTO: 10.8 FL (ref 9.4–12.4)
POTASSIUM REFLEX MAGNESIUM: 5.1 MMOL/L (ref 3.5–5)
PRO-BNP: 10 PG/ML (ref 0–900)
RBC # BLD: 5.99 M/UL (ref 4.7–6.1)
SODIUM BLD-SCNC: 135 MMOL/L (ref 136–145)
TROPONIN: <0.01 NG/ML (ref 0–0.03)
WBC # BLD: 9 K/UL (ref 4.8–10.8)

## 2021-02-12 PROCEDURE — 85379 FIBRIN DEGRADATION QUANT: CPT

## 2021-02-12 PROCEDURE — 80048 BASIC METABOLIC PNL TOTAL CA: CPT

## 2021-02-12 PROCEDURE — 99221 1ST HOSP IP/OBS SF/LOW 40: CPT | Performed by: INTERNAL MEDICINE

## 2021-02-12 PROCEDURE — 93005 ELECTROCARDIOGRAM TRACING: CPT

## 2021-02-12 PROCEDURE — 6370000000 HC RX 637 (ALT 250 FOR IP)

## 2021-02-12 PROCEDURE — 36415 COLL VENOUS BLD VENIPUNCTURE: CPT

## 2021-02-12 PROCEDURE — 85025 COMPLETE CBC W/AUTO DIFF WBC: CPT

## 2021-02-12 PROCEDURE — 93306 TTE W/DOPPLER COMPLETE: CPT

## 2021-02-12 PROCEDURE — 6370000000 HC RX 637 (ALT 250 FOR IP): Performed by: PSYCHIATRY & NEUROLOGY

## 2021-02-12 PROCEDURE — 83880 ASSAY OF NATRIURETIC PEPTIDE: CPT

## 2021-02-12 PROCEDURE — 99232 SBSQ HOSP IP/OBS MODERATE 35: CPT | Performed by: PSYCHIATRY & NEUROLOGY

## 2021-02-12 PROCEDURE — 6360000002 HC RX W HCPCS: Performed by: PSYCHIATRY & NEUROLOGY

## 2021-02-12 PROCEDURE — 1180000000 HC REHAB R&B

## 2021-02-12 PROCEDURE — 84484 ASSAY OF TROPONIN QUANT: CPT

## 2021-02-12 RX ORDER — NITROGLYCERIN 0.4 MG/1
TABLET SUBLINGUAL
Status: COMPLETED
Start: 2021-02-12 | End: 2021-02-12

## 2021-02-12 RX ORDER — NITROGLYCERIN 0.4 MG/1
0.4 TABLET SUBLINGUAL EVERY 5 MIN PRN
Status: DISCONTINUED | OUTPATIENT
Start: 2021-02-12 | End: 2021-02-23 | Stop reason: HOSPADM

## 2021-02-12 RX ADMIN — Medication 10 MG: at 20:34

## 2021-02-12 RX ADMIN — CHOLECALCIFEROL TAB 10 MCG (400 UNIT) 400 UNITS: 10 TAB at 08:06

## 2021-02-12 RX ADMIN — AMLODIPINE BESYLATE 10 MG: 10 TABLET ORAL at 20:34

## 2021-02-12 RX ADMIN — LATANOPROST 1 DROP: 50 SOLUTION OPHTHALMIC at 20:34

## 2021-02-12 RX ADMIN — POLYETHYLENE GLYCOL 3350 17 G: 17 POWDER, FOR SOLUTION ORAL at 08:06

## 2021-02-12 RX ADMIN — ASPIRIN 81 MG: 81 TABLET, CHEWABLE ORAL at 08:06

## 2021-02-12 RX ADMIN — SPIRONOLACTONE 25 MG: 25 TABLET ORAL at 08:06

## 2021-02-12 RX ADMIN — ENOXAPARIN SODIUM 30 MG: 40 INJECTION SUBCUTANEOUS at 08:11

## 2021-02-12 RX ADMIN — TRIAMTERENE AND HYDROCHLOROTHIAZIDE 2 TABLET: 37.5; 25 TABLET ORAL at 08:06

## 2021-02-12 RX ADMIN — ATORVASTATIN CALCIUM 10 MG: 10 TABLET, FILM COATED ORAL at 08:06

## 2021-02-12 RX ADMIN — NITROGLYCERIN: 0.4 TABLET, ORALLY DISINTEGRATING SUBLINGUAL at 10:05

## 2021-02-12 RX ADMIN — CLOPIDOGREL BISULFATE 75 MG: 75 TABLET ORAL at 08:06

## 2021-02-12 RX ADMIN — METOPROLOL SUCCINATE 50 MG: 50 TABLET, EXTENDED RELEASE ORAL at 08:06

## 2021-02-12 RX ADMIN — LISINOPRIL 40 MG: 20 TABLET ORAL at 20:34

## 2021-02-12 NOTE — PLAN OF CARE
Problem: Falls - Risk of:  Goal: Will remain free from falls  Description: Will remain free from falls  2/12/2021 0020 by Daniel Richmond LPN  Outcome: Ongoing  2/11/2021 1637 by Edenilson Chandra RN  Outcome: Ongoing  Goal: Absence of physical injury  Description: Absence of physical injury  2/12/2021 0020 by Daniel Richmond LPN  Outcome: Ongoing  2/11/2021 1637 by Edenilson Chandra RN  Outcome: Ongoing     Problem: Mobility - Impaired:  Goal: Mobility will improve  Description: Mobility will improve  2/12/2021 0020 by Daniel Rcihmond LPN  Outcome: Ongoing  2/11/2021 1637 by Edenilson Chandra RN  Outcome: Ongoing     Problem: Safety:  Goal: Free from accidental physical injury  Description: Free from accidental physical injury  2/12/2021 0020 by Daniel Richmond LPN  Outcome: Ongoing  2/11/2021 1637 by Edenilson Chandra RN  Outcome: Ongoing  Goal: Free from intentional harm  Description: Free from intentional harm  2/12/2021 0020 by Daniel Richmond LPN  Outcome: Ongoing  2/11/2021 1637 by Edenilson Chandra RN  Outcome: Ongoing     Problem: Daily Care:  Goal: Daily care needs are met  Description: Daily care needs are met  2/12/2021 0020 by Daniel Richmond LPN  Outcome: Ongoing  2/11/2021 1637 by Edenilson Chandra RN  Outcome: Ongoing     Problem: Discharge Planning:  Goal: Patients continuum of care needs are met  Description: Patients continuum of care needs are met  2/12/2021 0020 by Daniel Richmond LPN  Outcome: Ongoing  2/11/2021 1637 by Edenilson Chandra RN  Outcome: Ongoing     Problem: HEMODYNAMIC STATUS  Goal: Patient has stable vital signs and fluid balance  2/12/2021 0020 by Daniel Richmond LPN  Outcome: Ongoing  2/11/2021 1637 by Edenilson Chandra RN  Outcome: Ongoing     Problem: ACTIVITY INTOLERANCE/IMPAIRED MOBILITY  Goal: Mobility/activity is maintained at optimum level for patient  2/12/2021 0020 by Daniel Richmond LPN  Outcome: Ongoing 2/11/2021 1637 by Abhijeet Ram RN  Outcome: Ongoing     Problem: Bleeding:  Goal: Will show no signs and symptoms of excessive bleeding  Description: Will show no signs and symptoms of excessive bleeding  2/12/2021 0020 by Alanda Mohs, LPN  Outcome: Ongoing  2/11/2021 1637 by Abhijeet Ram RN  Outcome: Ongoing     Problem: Pain:  Goal: Pain level will decrease  Description: Pain level will decrease  2/12/2021 0020 by Alanda Mohs, LPN  Outcome: Ongoing  2/11/2021 1637 by Abhijeet Ram RN  Outcome: Ongoing  Goal: Control of acute pain  Description: Control of acute pain  2/12/2021 0020 by Alanda Mohs, LPN  Outcome: Ongoing  2/11/2021 1637 by Abhijeet Ram RN  Outcome: Ongoing  Goal: Control of chronic pain  Description: Control of chronic pain  2/12/2021 0020 by Alanda Mohs, LPN  Outcome: Ongoing  2/11/2021 1637 by Abhijeet Ram RN  Outcome: Ongoing

## 2021-02-12 NOTE — PROGRESS NOTES
Christy. NIKI done on 2/5/21 showed no thrombus noted in the left atrial appendage,negative bubble study, no evidence of PFO or ASD,severely thickened left ventricle but no evidence of LV noncompaction. Dr. Yolanda Kanner recommended a diagnostic left heart catheterization to assess his coronary anatomy. Patient wishes to posst-pone his cardiac work-up until after he completes the Rehab program.  He was also noted to have an increase in his creatinine of 5.12. Nephrology consulted. Patients creatine is rapidly improving now at 1.4 with BUN of 38. He is alert & oriented x 3, follows commands but does have mild dysarthia but demonstrates impulsivity and decreased safety awareness. Left sided weakness remains. Patient was evaluated by SPT for swallow and was cleared for a regular diet with thin liquids. No c/o  today. Doing better. REVIEW OF SYSTEMS:  Constitutional: No fevers No chills  Neck:No stiffness  Respiratory: No shortness of breath  Cardiovascular: No chest pain No palpitations  Gastrointestinal: No abdominal pain    Genitourinary: No Dysuria  Neurological: No headache, no confusion      PHYSICAL EXAM:  BP (!) 96/54   Pulse 84   Temp 97.6 °F (36.4 °C) (Temporal)   Resp 18   Ht 5' 6\" (1.676 m)   Wt 162 lb 8 oz (73.7 kg)   SpO2 93%   BMI 26.23 kg/m²     Constitutional: he appears well-developed and well-nourished. Eyes  conjunctiva normal.  Pupils react to light  Ear, nose, throat -hearing intact to voice. No scars, masses, or lesions over external nose or ears, no atrophy of tongue  Neck-symmetric, no masses noted, no jugular vein distension  Respiration- chest wall appears symmetric, good expansion,   normal effort without use of accessory muscles  Cardiovascular- RRR  Musculoskeletal  no significant wasting of muscles noted, no bony deformities, gait no gross ataxia  Extremities-no clubbing, cyanosis or edema  Skin  warm, dry, and intact. No rash, erythema, or pallor. Psychiatric  mood, affect, and behavior appear normal.      Neurology  NEUROLOGICAL EXAM:  Awake, alert, fluent oriented x 3 appropriate affect  Attention and concentration appear appropriate  Recent and remote memory appears unremarkable  Speech  with dysarthria  No clear issues with language of fund of knowledge     Cranial Nerve Exam   CN II- Visual fields grossly unremarkable  CN III, IV,VI-EOMI, No nystagmus, conjugate eye movements, no ptosis  CN VII-no facial assymetry  CN VIII-Hearing intact        Motor Exam  4/5 left arm and leg     Tremors- no tremors in hands or head noted     Gait  Not tested     Coordination  Ataxic on the left            Nursing/pcp notes, imaging,labs and vitals reviewed. PT,OT and/or speech notes reviewed    Lab Results   Component Value Date    WBC 8.2 02/11/2021    HGB 15.6 02/11/2021    HCT 46.5 02/11/2021    MCV 79.6 (L) 02/11/2021     02/11/2021     Lab Results   Component Value Date     02/11/2021    K 4.6 02/11/2021     02/11/2021    CO2 21 (L) 02/11/2021    BUN 44 (H) 02/11/2021    CREATININE 1.7 (H) 02/11/2021    GLUCOSE 88 02/11/2021    CALCIUM 9.9 02/11/2021    LABGLOM 41 (A) 02/11/2021    GFRAA 49 (L) 02/11/2021   No results found for: INRNo results found for: PHENYTOIN, ESR, CRP  Objective:   Patient was able to sustain attention for 15+ minutes in both structured/unstructured settings today, independently.      Patient completed oral motor exercises with minimal verbal cues. Patient does not present with any prominent facial weakness. Patient did report mild weakness in jaw area on the left side. Patient completed repetition of single words with emphasis on /ch/, /sh/, /s/ and /l/.  All words were 100% intelligible, however did note mild distortions for /ch/ and /sh/.     Activity Tolerance  --  Patient Tolerated treatment well;Patient limited by fatigue  --    Safety Devices   Type of devices  --   --   --         02/11/21 1418 02/11/21 1430   Bed Mobility   Rolling  --   --    Supine to Sit  --   --    Transfers   Sit to Stand  --   --    Stand to sit  --   --    Bed to Chair  --   --    Ambulation   Ambulation?  --   --    WB Status  --   --    Ambulation 1   Surface  --   --    Device  --   --    Assistance  --   --    Quality of Gait  --   --    Gait Deviations  --   --    Distance  --   --    Comments  --   --    Exercises   Comments Sitting BLE exercises-  15 reps with 1-1/2 lb weight RLE. 10 reps LLE.  --    Conditions Requiring Skilled Therapeutic Intervention   Assessment  --   --    Activity Tolerance   Activity Tolerance  --   --    Safety Devices   Type of devices  --  Call light within reach; Chair alarm in place     Objective    Balance  Sitting Balance: Supervision  Standing Balance: Minimal assistance  Functional Mobility  Functional - Mobility Device: Rolling Walker  Activity: Other  Assist Level: Minimal assistance  Functional Mobility Comments: multiple short ambulatory acts, vc for tech, weight applied to left ankle for increased proprioception  Bed mobility  Sit to Supine: Stand by assistance  Transfers  Stand Step Transfers: Minimal assistance  Sit to stand: Minimal assistance  Stand to sit: Minimal assistance  Type of ROM/Therapeutic Exercise  Type of ROM/Therapeutic Exercise: Pulley;Free weights(2# free weights, L shoulder exercises modified due to discomfort and weakness)       02/11/21 1000   Upper Body Dressing   Assistance Needed Setup or clean-up assistance   Comment pullover shirts   CARE Score 5          RECORD REVIEW: Previous medical records, medications were reviewed at today's visit    IMPRESSION:   1.   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  Recent chest pain resolved with nitroglycerin. Patient deferring cardiac work-up until the near future  3. Essential hypertensioncontinue current medications and monitoring  4. Hyperlipidemiacontinue current medications and monitoring  5. GIbowel regimen  6. DVT prophylaxissubcu Lovenox  7. Recent acute renal failurelooking a little worse on 2/11. Diuretic held. Repeat BMP in a.m.     Staffing this date , mutlidisciplinary with entire team with complex decision making and planning for discharge  RONALD goodrich

## 2021-02-12 NOTE — PROGRESS NOTES
Patient complaining of chest pain-radiating down left arm. Rates pain a 9 on scale of 1 to 10 and describes it as sharp pain. Patients nurse, Aura Benton, notified and rapid response chest pain called. Dr. Lizeth Tariq in room to assess patient. Patient now rating pain a 6. Orders for EKG, labs and nitroglycerin tab received. Nitro x1 given per Aura Benton. Clinical house, EKG tech and  also in the room. 1008- No c/o pain after nitro tab. Patient stable. Will continue to monitor.  Electronically signed by Beto Aguilar RN on 2/12/21 at 09:55 PM CST

## 2021-02-12 NOTE — PROGRESS NOTES
Chest pain again. Stasrted at 9 now down to 3 on scale of 1-10. Dr. Jovany Taylor notified. Order received for Nitro prn. Dr. Jovany Taylor wants cardiology notified. Will continue to monitor.

## 2021-02-12 NOTE — PROGRESS NOTES
Chest pain rated at 9. Vital signs within normal range. Pain has now decreased to 3 (on scale of 1-10). Patient in bed and more comfortable. Wife at bedside.

## 2021-02-12 NOTE — CONSULTS
Procedure Laterality Date    ABDOMEN SURGERY      noncancerous tumor in abdomen    APPENDECTOMY      BACK SURGERY      lumbar surgery x 4    CERVICAL SPINE SURGERY      fusion    SHOULDER ARTHROSCOPY Right 2019    ARTHROSCOPIC SAD/DCR LABRAL REPAIR RIGHT SHOULDER performed by Ellis Lawrence DO at [facility] OR       Past Family History:  Family History   Problem Relation Age of Onset    High Blood Pressure Mother     Heart Failure Mother     High Blood Pressure Father     Stroke Father        Past Social History:  Social History     Socioeconomic History    Marital status:      Spouse name: Not on file    Number of children: Not on file    Years of education: Not on file    Highest education level: Not on file   Occupational History    Not on file   Social Needs    Financial resource strain: Not on file    Food insecurity     Worry: Not on file     Inability: Not on file    Transportation needs     Medical: Not on file     Non-medical: Not on file   Tobacco Use    Smoking status: Former Smoker     Packs/day: 1.00     Years: 40.00     Pack years: 40.00     Types: Cigarettes     Quit date: 2013     Years since quittin.4    Smokeless tobacco: Never Used   Substance and Sexual Activity    Alcohol use: Not Currently     Frequency: Never    Drug use: Not on file    Sexual activity: Not on file   Lifestyle    Physical activity     Days per week: Not on file     Minutes per session: Not on file    Stress: Not on file   Relationships    Social connections     Talks on phone: Not on file     Gets together: Not on file     Attends [employer] service: Not on file     Active member of club or organization: Not on file     Attends meetings of clubs or organizations: Not on file     Relationship status: Not on file    Intimate partner violence     Fear of current or ex partner: Not on file     Emotionally abused: Not on file     Physically abused: Not on file Forced sexual activity: Not on file   Other Topics Concern    Not on file   Social History Narrative    Not on file       Allergies: Allergies   Allergen Reactions    Codeine Anxiety       Home Meds:  Prior to Admission medications    Medication Sig Start Date End Date Taking?  Authorizing Provider   metoprolol succinate (TOPROL XL) 50 MG extended release tablet Take 50 mg by mouth daily   Yes Historical Provider, MD   cloNIDine (CATAPRES) 0.1 MG tablet Take 0.1 mg by mouth as needed for High Blood Pressure   Yes Historical Provider, MD   clopidogrel (PLAVIX) 75 MG tablet Take 75 mg by mouth daily   Yes Historical Provider, MD   latanoprost (XALATAN) 0.005 % ophthalmic solution 1 drop nightly   Yes Historical Provider, MD   lisinopril (PRINIVIL;ZESTRIL) 40 MG tablet Take 40 mg by mouth nightly   Yes Historical Provider, MD   spironolactone (ALDACTONE) 25 MG tablet Take 25 mg by mouth daily   Yes Historical Provider, MD   triamterene-hydroCHLOROthiazide (MAXZIDE-25) 37.5-25 MG per tablet Take 2 tablets by mouth daily   Yes Historical Provider, MD   Cholecalciferol (VITAMIN D) 10 MCG (400 UNIT) CAPS Capsule Take 400 Units by mouth daily   Yes Historical Provider, MD   atorvastatin (LIPITOR) 20 MG tablet Take 10 mg by mouth daily     Historical Provider, MD   amLODIPine (NORVASC) 5 MG tablet Take 10 mg by mouth nightly     Historical Provider, MD       Current Meds:   nitroGLYCERIN        melatonin  10 mg Oral Nightly    enoxaparin  30 mg Subcutaneous Daily    aspirin  81 mg Oral Daily    atorvastatin  10 mg Oral Daily    amLODIPine  10 mg Oral Nightly    metoprolol succinate  50 mg Oral Daily    clopidogrel  75 mg Oral Daily    latanoprost  1 drop Ophthalmic Nightly    lisinopril  40 mg Oral Nightly    spironolactone  25 mg Oral Daily    [Held by provider] triamterene-hydroCHLOROthiazide  2 tablet Oral Daily    vitamin D3  400 Units Oral Daily    polyethylene glycol  17 g Oral Daily Current Infused Meds:      Physical Exam:  Vitals:    02/12/21 1006   BP: (!) 96/54   Pulse: 84   Resp: 18   Temp: 97.6 °F (36.4 °C)   SpO2: 93%       Intake/Output Summary (Last 24 hours) at 2/12/2021 1131  Last data filed at 2/12/2021 0913  Gross per 24 hour   Intake 600 ml   Output 225 ml   Net 375 ml     Estimated body mass index is 26.23 kg/m² as calculated from the following:    Height as of this encounter: 5' 6\" (1.676 m). Weight as of this encounter: 162 lb 8 oz (73.7 kg). Physical Exam   ENT: Normal  Pulmonary: Decreased air entry with no bronchospasm  Cardiovascular: S1 and S2 normal no S3 no S4 no murmur. Normal neck pain  Abdominal exam: Liver and spleen not palpable nontender abdomen  Musculoskeletal: Normal  CNS exam: As per neurologist  Peripheral vascular: Peripheral pulses equal and normal with no bruits. Decreased pedal pulses. No swelling of the ankles    Labs:  Recent Labs     02/11/21  0604 02/12/21  1015   WBC 8.2 9.0   HGB 15.6 15.8    301       Recent Labs     02/10/21  0415 02/11/21  0604 02/12/21  1015    139 135*   K 4.2 4.6 5.1*    102 101   CO2 24 21* 20*   BUN 31* 44* 58*   CREATININE 1.3* 1.7* 2.2*   LABGLOM 55* 41* 30*   CALCIUM 9.7 9.9 10.1       CK, CKMB, Troponin:   Recent Labs     02/12/21  1015   TROPONINI <0.01       Last 3 BNP:  Recent Labs     02/12/21  1015   PROBNP 10         IMAGING:    EKG -  ECHO-    CTA-  Nuclear stress test-  Previous Cath-      No results found.      Assessment and Plan: 1. Recurrence of chest pain, this is the third episode since February 2, according to the wife nuclear stress test and NIKI were normal from [facility].  EKG today showed normal sinus rhythm with nonspecific ST-T wave changes. Patient at the moment had stage IV renal insufficiency. Troponin was normal.  Echocardiogram result is pending. Hemodynamically stable. Because of the stage IV renal insufficiency and the fact that the EKG was not that impressive, I would recommend medical therapy for now. His vital signs stable. He has already been on aspirin, Plavix, beta-blocker and ACE inhibitor as well as statin.   He will be followed         Jeny Pack MD   2/12/2021, 11:31 AM

## 2021-02-12 NOTE — PROGRESS NOTES
02/12/21 1300   OT Individual Minutes   Time In 1300   Time Out 1300   Minutes 0   Minute Variance   Variance 60   Reason Med Hold             Electronically signed by Stephon Cevallos OT on 2/12/2021 at 5:01 PM

## 2021-02-13 ENCOUNTER — APPOINTMENT (OUTPATIENT)
Dept: GENERAL RADIOLOGY | Age: 66
DRG: 056 | End: 2021-02-13
Attending: PSYCHIATRY & NEUROLOGY
Payer: MEDICARE

## 2021-02-13 LAB
ALBUMIN SERPL-MCNC: 4.1 G/DL (ref 3.5–5.2)
ALP BLD-CCNC: 72 U/L (ref 40–130)
ALT SERPL-CCNC: 13 U/L (ref 5–41)
ANION GAP SERPL CALCULATED.3IONS-SCNC: 10 MMOL/L (ref 7–19)
AST SERPL-CCNC: 16 U/L (ref 5–40)
BACTERIA: ABNORMAL /HPF
BILIRUB SERPL-MCNC: 0.3 MG/DL (ref 0.2–1.2)
BILIRUBIN DIRECT: 0.1 MG/DL (ref 0–0.3)
BILIRUBIN URINE: NEGATIVE
BILIRUBIN, INDIRECT: 0.2 MG/DL (ref 0.1–1)
BLOOD, URINE: NEGATIVE
BUN BLDV-MCNC: 66 MG/DL (ref 8–23)
CALCIUM SERPL-MCNC: 9.4 MG/DL (ref 8.8–10.2)
CHLORIDE BLD-SCNC: 99 MMOL/L (ref 98–111)
CLARITY: CLEAR
CO2: 24 MMOL/L (ref 22–29)
COLOR: YELLOW
CREAT SERPL-MCNC: 2.2 MG/DL (ref 0.5–1.2)
CREATININE URINE: 185.6 MG/DL (ref 4.2–622)
EOSINOPHIL,URINE: NORMAL
EPITHELIAL CELLS, UA: ABNORMAL /HPF
GFR AFRICAN AMERICAN: 36
GFR NON-AFRICAN AMERICAN: 30
GLUCOSE BLD-MCNC: 103 MG/DL (ref 74–109)
GLUCOSE URINE: NEGATIVE MG/DL
KETONES, URINE: NEGATIVE MG/DL
LEUKOCYTE ESTERASE, URINE: ABNORMAL
LIPASE: 64 U/L (ref 13–60)
NITRITE, URINE: NEGATIVE
PH UA: 5 (ref 5–8)
POTASSIUM SERPL-SCNC: 4.5 MMOL/L (ref 3.5–5)
PROTEIN UA: ABNORMAL MG/DL
RBC UA: ABNORMAL /HPF (ref 0–2)
SODIUM BLD-SCNC: 133 MMOL/L (ref 136–145)
SODIUM URINE: 59 MMOL/L
SPECIFIC GRAVITY UA: 1.02 (ref 1–1.03)
TOTAL PROTEIN: 7.5 G/DL (ref 6.6–8.7)
UREA NITROGEN, UR: 993 MG/DL
UROBILINOGEN, URINE: 0.2 E.U./DL
WBC UA: ABNORMAL /HPF (ref 0–5)

## 2021-02-13 PROCEDURE — 99232 SBSQ HOSP IP/OBS MODERATE 35: CPT | Performed by: PSYCHIATRY & NEUROLOGY

## 2021-02-13 PROCEDURE — 84540 ASSAY OF URINE/UREA-N: CPT

## 2021-02-13 PROCEDURE — 83690 ASSAY OF LIPASE: CPT

## 2021-02-13 PROCEDURE — 81001 URINALYSIS AUTO W/SCOPE: CPT

## 2021-02-13 PROCEDURE — 80076 HEPATIC FUNCTION PANEL: CPT

## 2021-02-13 PROCEDURE — 1180000000 HC REHAB R&B

## 2021-02-13 PROCEDURE — 82570 ASSAY OF URINE CREATININE: CPT

## 2021-02-13 PROCEDURE — 6370000000 HC RX 637 (ALT 250 FOR IP): Performed by: INTERNAL MEDICINE

## 2021-02-13 PROCEDURE — 2580000003 HC RX 258: Performed by: PSYCHIATRY & NEUROLOGY

## 2021-02-13 PROCEDURE — 6370000000 HC RX 637 (ALT 250 FOR IP): Performed by: PSYCHIATRY & NEUROLOGY

## 2021-02-13 PROCEDURE — 92507 TX SP LANG VOICE COMM INDIV: CPT

## 2021-02-13 PROCEDURE — 71046 X-RAY EXAM CHEST 2 VIEWS: CPT

## 2021-02-13 PROCEDURE — 36415 COLL VENOUS BLD VENIPUNCTURE: CPT

## 2021-02-13 PROCEDURE — 80048 BASIC METABOLIC PNL TOTAL CA: CPT

## 2021-02-13 PROCEDURE — 87205 SMEAR GRAM STAIN: CPT

## 2021-02-13 PROCEDURE — 6360000002 HC RX W HCPCS: Performed by: PSYCHIATRY & NEUROLOGY

## 2021-02-13 PROCEDURE — 84300 ASSAY OF URINE SODIUM: CPT

## 2021-02-13 RX ORDER — ONDANSETRON 2 MG/ML
4 INJECTION INTRAMUSCULAR; INTRAVENOUS ONCE
Status: COMPLETED | OUTPATIENT
Start: 2021-02-13 | End: 2021-02-13

## 2021-02-13 RX ORDER — CYCLOBENZAPRINE HCL 10 MG
10 TABLET ORAL NIGHTLY
Status: DISCONTINUED | OUTPATIENT
Start: 2021-02-13 | End: 2021-02-23 | Stop reason: HOSPADM

## 2021-02-13 RX ORDER — SODIUM CHLORIDE 9 MG/ML
INJECTION, SOLUTION INTRAVENOUS CONTINUOUS
Status: DISCONTINUED | OUTPATIENT
Start: 2021-02-13 | End: 2021-02-17

## 2021-02-13 RX ORDER — ISOSORBIDE MONONITRATE 60 MG/1
60 TABLET, EXTENDED RELEASE ORAL DAILY
Status: DISCONTINUED | OUTPATIENT
Start: 2021-02-13 | End: 2021-02-16

## 2021-02-13 RX ORDER — METOPROLOL SUCCINATE 50 MG/1
100 TABLET, EXTENDED RELEASE ORAL DAILY
Status: DISCONTINUED | OUTPATIENT
Start: 2021-02-14 | End: 2021-02-23 | Stop reason: HOSPADM

## 2021-02-13 RX ADMIN — CYCLOBENZAPRINE 10 MG: 10 TABLET, FILM COATED ORAL at 21:07

## 2021-02-13 RX ADMIN — ATORVASTATIN CALCIUM 10 MG: 10 TABLET, FILM COATED ORAL at 08:54

## 2021-02-13 RX ADMIN — SODIUM CHLORIDE: 9 INJECTION, SOLUTION INTRAVENOUS at 12:55

## 2021-02-13 RX ADMIN — ENOXAPARIN SODIUM 30 MG: 40 INJECTION SUBCUTANEOUS at 08:54

## 2021-02-13 RX ADMIN — NITROGLYCERIN 0.4 MG: 0.4 TABLET, ORALLY DISINTEGRATING SUBLINGUAL at 05:12

## 2021-02-13 RX ADMIN — NITROGLYCERIN 0.4 MG: 0.4 TABLET, ORALLY DISINTEGRATING SUBLINGUAL at 07:26

## 2021-02-13 RX ADMIN — CHOLECALCIFEROL TAB 10 MCG (400 UNIT) 400 UNITS: 10 TAB at 08:54

## 2021-02-13 RX ADMIN — ISOSORBIDE MONONITRATE 60 MG: 60 TABLET, EXTENDED RELEASE ORAL at 15:18

## 2021-02-13 RX ADMIN — LATANOPROST 1 DROP: 50 SOLUTION OPHTHALMIC at 21:07

## 2021-02-13 RX ADMIN — POLYETHYLENE GLYCOL 3350 17 G: 17 POWDER, FOR SOLUTION ORAL at 08:54

## 2021-02-13 RX ADMIN — CLOPIDOGREL BISULFATE 75 MG: 75 TABLET ORAL at 08:54

## 2021-02-13 RX ADMIN — AMLODIPINE BESYLATE 10 MG: 10 TABLET ORAL at 21:07

## 2021-02-13 RX ADMIN — Medication 10 MG: at 21:07

## 2021-02-13 RX ADMIN — ONDANSETRON 4 MG: 2 INJECTION INTRAMUSCULAR; INTRAVENOUS at 16:04

## 2021-02-13 RX ADMIN — ASPIRIN 81 MG: 81 TABLET, CHEWABLE ORAL at 08:54

## 2021-02-13 RX ADMIN — NITROGLYCERIN 0.4 MG: 0.4 TABLET, ORALLY DISINTEGRATING SUBLINGUAL at 03:06

## 2021-02-13 RX ADMIN — METOPROLOL SUCCINATE 50 MG: 50 TABLET, EXTENDED RELEASE ORAL at 08:54

## 2021-02-13 RX ADMIN — SPIRONOLACTONE 25 MG: 25 TABLET ORAL at 08:54

## 2021-02-13 ASSESSMENT — PAIN DESCRIPTION - LOCATION
LOCATION: CHEST
LOCATION: CHEST

## 2021-02-13 ASSESSMENT — PAIN DESCRIPTION - DESCRIPTORS: DESCRIPTORS: CONSTANT;DISCOMFORT;STABBING

## 2021-02-13 ASSESSMENT — PAIN SCALES - GENERAL
PAINLEVEL_OUTOF10: 9
PAINLEVEL_OUTOF10: 0
PAINLEVEL_OUTOF10: 3
PAINLEVEL_OUTOF10: 9

## 2021-02-13 ASSESSMENT — PAIN DESCRIPTION - ONSET: ONSET: SUDDEN

## 2021-02-13 ASSESSMENT — PAIN DESCRIPTION - PAIN TYPE: TYPE: ACUTE PAIN

## 2021-02-13 ASSESSMENT — PAIN DESCRIPTION - FREQUENCY
FREQUENCY: CONTINUOUS
FREQUENCY: CONTINUOUS

## 2021-02-13 ASSESSMENT — PAIN DESCRIPTION - DIRECTION: RADIATING_TOWARDS: BACK

## 2021-02-13 NOTE — PROGRESS NOTES
Patient:   Naheed Francois  MR#:    [id number]   Room:    [facility][note 3]/820-   YOB: 1955  Date of Progress Note: 2/13/2021  Time of Note                           11:42 AM  Consulting Physician:   Sona Rios M.D. Attending Physician:  Sona Rios MD     CHIEF COMPLAINT: Left-sided weakness and dysarthria        subjective  This 72 y.o. male   with history of HTN,hyperlipidemia,chronic back pain and CVA. He presented to [facility] on 2/2/21 with c/o lightheadedness, mild blurred vision and change in his gait that started about 3 days prior. He was hypertensive on presentation. MRI done revealed a right pontine infarct. CTA head/neck showed non signifigant steno-occlusive disease. He was admitted the the hospitalist service with consult for neurology. He was seen by Dr. Sandy Morris. He was doing quite well. When on 2/4/21 he had a return of dizziness with N&V and worsening left sided weakness, mild dysarthria, B/P was low. Stat MRI was ordered that revealed showed a new right temporal lobe infarct. TTE showed thickening on anterior leaflet of mirtal valve. On the morning of 2/5/21 he had an accoute onset of chest pain/pressure with hypotension. Repeat EKG was concerning. Troponin was negative x 2. Chest pain resolved with nitro. Cardiology was consult for NIKI.  He was seen by  Christy. NIKI done on 2/5/21 showed no thrombus noted in the left atrial appendage,negative bubble study, no evidence of PFO or ASD,severely thickened left ventricle but no evidence of LV noncompaction. Dr. Anselmo Sweeney recommended a diagnostic left heart catheterization to assess his coronary anatomy. Patient wishes to posst-pone his cardiac work-up until after he completes the Rehab program.  He was also noted to have an increase in his creatinine of 5.12. Nephrology consulted. Patients creatine is rapidly improving now at 1.4 with BUN of 38. He is alert & oriented x 3, follows commands but does have mild dysarthia but demonstrates impulsivity and decreased safety awareness. Left sided weakness remains. Patient was evaluated by SPT for swallow and was cleared for a regular diet with thin liquids. Has had a few episodes of chest pain since yesterday relieved by nitroglycerin. Cardiology consulted. Has some chronic low back issues which are worse with lying in bed. Trouble sleeping at times. Flexeril added. REVIEW OF SYSTEMS:  Constitutional: No fevers No chills  Neck:No stiffness  Respiratory: No shortness of breath  Cardiovascular: No chest pain No palpitations  Gastrointestinal: No abdominal pain    Genitourinary: No Dysuria  Neurological: No headache, no confusion      PHYSICAL EXAM:  /86   Pulse 70   Temp 96.9 °F (36.1 °C) (Temporal)   Resp 16   Ht 5' 6\" (1.676 m)   Wt 157 lb 0.2 oz (71.2 kg)   SpO2 93%   BMI 25.34 kg/m²     Constitutional: he appears well-developed and well-nourished. Eyes  conjunctiva normal.  Pupils react to light  Ear, nose, throat -hearing intact to voice.  No scars, masses, or lesions over external nose or ears, no atrophy of tongue  Neck-symmetric, no masses noted, no jugular vein distension  Respiration- chest wall appears symmetric, good expansion,   normal effort without use of accessory muscles  Cardiovascular- RRR Musculoskeletal  no significant wasting of muscles noted, no bony deformities, gait no gross ataxia  Extremities-no clubbing, cyanosis or edema  Skin  warm, dry, and intact. No rash, erythema, or pallor. Psychiatric  mood, affect, and behavior appear normal.      Neurology  NEUROLOGICAL EXAM:  Awake, alert, fluent oriented x 3 appropriate affect  Attention and concentration appear appropriate  Recent and remote memory appears unremarkable  Speech  with dysarthria  No clear issues with language of fund of knowledge     Cranial Nerve Exam   CN II- Visual fields grossly unremarkable  CN III, IV,VI-EOMI, No nystagmus, conjugate eye movements, no ptosis  CN VII-no facial assymetry  CN VIII-Hearing intact        Motor Exam  4/5 left arm and leg     Tremors- no tremors in hands or head noted     Gait  Not tested     Coordination  Ataxic on the left            Nursing/pcp notes, imaging,labs and vitals reviewed. PT,OT and/or speech notes reviewed    Lab Results   Component Value Date    WBC 9.0 02/12/2021    HGB 15.8 02/12/2021    HCT 48.0 02/12/2021    MCV 80.1 02/12/2021     02/12/2021     Lab Results   Component Value Date     (L) 02/13/2021    K 4.5 02/13/2021    CL 99 02/13/2021    CO2 24 02/13/2021    BUN 66 (H) 02/13/2021    CREATININE 2.2 (H) 02/13/2021    GLUCOSE 103 02/13/2021    CALCIUM 9.4 02/13/2021    LABGLOM 30 (A) 02/13/2021    GFRAA 36 (L) 02/13/2021   No results found for: INRNo results found for: PHENYTOIN, ESR, CRP  Objective:   Patient was able to sustain attention for 15+ minutes in both structured/unstructured settings today, independently.      Patient completed oral motor exercises with minimal verbal cues. Patient does not present with any prominent facial weakness. Patient did report mild weakness in jaw area on the left side. Patient completed repetition of single words with emphasis on /ch/, /sh/, /s/ and /l/. All words were 100% intelligible, however did note mild distortions for /ch/ and /sh/.      Patient was able to utilize and recall dysarthric strategies with 100% accuracy. Patient does really well overarticulating words when speaking in sentences. Overall speech intelligibility ranks at 100% for unknown context and unfamiliar listeners.      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 Patient also able to state the good evening/good morning message he speaks at [employer] both with 100% speech intelligibility. Mild distortions noted, but did not impact speech intelligibility. Patient also spoke on the phone during the session to one of his friends. The friend was able to to understand him with no difficulty.        02/11/21 1403 02/11/21 1404 02/11/21 1411   Bed Mobility   Rolling Modified independent  --   --    Supine to Sit Stand by assistance  --   --    Transfers   Sit to Stand Stand by assistance;Contact guard assistance  --   --    Stand to sit Stand by assistance  --   --    Bed to Chair Contact guard assistance;Stand by assistance  --   --    Ambulation   Ambulation?  --  Yes  --    WB Status  --  WBAT  --    Ambulation 1   Surface  --  level tile  --    Device  --  Rolling Walker  --    Assistance  --  Contact guard assistance  --    Quality of Gait  --  Working on step-through pattern and hel-toe steps with RLE for more normal amb. Verbal cues for posture and to keep LLE extended during stance phase.  --    Gait Deviations  --  Slow Karina;Decreased step length;Decreased step height  --    Distance  --  20' x2  --    Comments  --  Amb chair to chair; incorporated turns. Verbal cues for technique during turns and backing to chair.   --    Exercises   Comments  --   --   --    Conditions Requiring Skilled Therapeutic Intervention Assessment  --   --  Worked on amb chair to chair today incorporating turns and backing to sit. Pt. more fatigued this afternoon. Activity Tolerance   Activity Tolerance  --  Patient Tolerated treatment well;Patient limited by fatigue  --    Safety Devices   Type of devices  --   --   --         02/11/21 1418 02/11/21 1430   Bed Mobility   Rolling  --   --    Supine to Sit  --   --    Transfers   Sit to Stand  --   --    Stand to sit  --   --    Bed to Chair  --   --    Ambulation   Ambulation?  --   --    WB Status  --   --    Ambulation 1   Surface  --   --    Device  --   --    Assistance  --   --    Quality of Gait  --   --    Gait Deviations  --   --    Distance  --   --    Comments  --   --    Exercises   Comments Sitting BLE exercises-  15 reps with 1-1/2 lb weight RLE. 10 reps LLE.  --    Conditions Requiring Skilled Therapeutic Intervention   Assessment  --   --    Activity Tolerance   Activity Tolerance  --   --    Safety Devices   Type of devices  --  Call light within reach; Chair alarm in place      02/11/21 0830   Eating   Assistance Needed Setup or clean-up assistance   CARE Score 5   Oral Hygiene   Assistance Needed Setup or clean-up assistance   CARE Score 5   [address] Needed Partial/moderate assistance   Comment min A for balance   CARE Score 3   Shower/Bathe Self   Assistance Needed Partial/moderate assistance   Comment min A for balance   CARE Score 3   Upper Body Dressing   Assistance Needed Setup or clean-up assistance   CARE Score 5   Lower Body Dressing   Assistance Needed Partial/moderate assistance   Comment min A for balance, vc for tech   CARE Score 3   Putting On/Taking Off Footwear   Assistance Needed Partial/moderate assistance   Comment min A for left shoe   CARE Score 3        RECORD REVIEW: Previous medical records, medications were reviewed at today's visit    IMPRESSION: 1.  Right pontine and temporal strokes with left-sided ataxia/hemiparesis currently on Plavix/aspirin PT/OT/SLP  2. Recent chest pain resolved with nitroglycerin. Patient deferring cardiac work-up until the near future. see #8  3. Essential hypertensioncontinue current medications and monitoring  4. Hyperlipidemiacontinue current medications and monitoring  5. GIbowel regimen  6. DVT prophylaxissubcu Lovenox  7. Recent acute renal failuretrending worse. IV fluids added 2/13. 8.  Recurring chest pain relieved with nitroglycerin. Cardiology actively following and on board. Defer treatment to them. Hold on therapy today due to recurrent chest pain.   ELOS:  restaff

## 2021-02-13 NOTE — PROGRESS NOTES
PCA reported patient c/o chest pain. Upon entering room, patient lying in bed reporting stabbing pain in chest hurting into back and down left arm. Rates pain 7 on pain scale of 1-10. Prn Nittroglycerin 0.4 SL given. Vital signs obtained. P 73 R 18 /80 pulse ox 94% on room air. Patient reports pain subsided and resting in bed with no signs of distress noted.

## 2021-02-13 NOTE — PROGRESS NOTES
Patient called nursing station c/o chest pain. Upon entering room, found patient lying on left side vomiting into trash can after returning from bathroom. Patient reports feeling better now and no chest pain since vomited.

## 2021-02-13 NOTE — PROGRESS NOTES
Pt used call light stating chest pain. Into room, pt sitting on side of bed, stated \"chest had started hurting and pain went to back and down arm\". Assisted back into bed, vital signs obtained. Nitroglycerin 0.4 mg tablet given SL per orders. Pt stated \"pain eased up returning to normal\". Second set of vital signs obtained and recorded. Vital signs stable. No s/s distress noted.

## 2021-02-13 NOTE — PROGRESS NOTES
[facility]  INPATIENT SPEECH THERAPY  [facility] 8 REHAB UNIT  TIME    1430   1500  30 minutes    [x]Daily Note  []Progress Note    Date: 2021  Patient Name: Robyn Pierce        MRN: [id number]    Account #: [de-identified]  : 1955  (72 y.o.)  Gender: male   Primary Provider: Faviola Vuong MD  Precautions: Fall  Swallowing Status/Diet: Regular diet with thin liquids    Subjective:   Pt in room with wife at bedside. Pt reported he is using over-articulation strategies frequently as well as slow rate when needed. Wife ranked his intelligibility at 50% within an unstructured environment with background noise present and no topic awareness. In quiet environments with turn-taking conversation, intelligibility increases to % per wife. SLP noted immediately pt's progress with speech intelligibility since last seen with this therapist three days ago. It appears pt is having spontaneous recovery as well as completing recommended independent exercises/activities 1-2x a day. Objective:   List of Lingual exercises were targeted. SLP assessed pt with all exercises. Six exercises were identified to target lingual tip coordination and lingual weakness. Lingual tip-over articulates and misses target in exercise; poor proprioception with lingual tip observed. Lingual coordination-uses jaw for support and does not disassociate mandible and lingual root to increase coordination and ROM. Required max cues and tactile support to sustain jaw posture and transition lingual movements without recruiting jaw. Pt demonstrated approximately 50-60% accuracy with 6/6 OME; specifically targeting lingual tip and coordination. Pt utilized dysarthric strategies; slow rate and over-articulation, during communication exchanges in turn-taking conversation 100% of opportunities. Pt sustained attention to OME exercises for 15 minutes with 2x environmental interruptions; pt regained focus and attention independently. During OME exercises, pt demonstrated self-monitoring for executive function process. Pt also demonstrates self-correcting with speech articulation errors with minimal verbal cues 100% of opportunities. SHORT TERM GOAL #1:  Goal 1: The patient will complete oral motor exercises to improve speech intelligibility with min verbal cues at 100% accuracy. SHORT TERM GOAL #2:  Goal 2: The patient will recall/utilize dysarthric strategies during conversational discourse with min verbal cues at 100% accuracy in order to improve speech intelligibility. SHORT TERM GOAL #3:  Goal 3: The patient will sustain attention for 15+ minutes with min verbal cues or redirection during structured/unstructured activities. SHORT TERM GOAL #4:  Goal 4: The patient will complete executive function tasks (self-monitoring, organizing, etc) with min verbal cues at 100% accuracy.     ASSESSMENT:  Assessment: [x]Progressing towards goals          []Not Progressing towards goals    Patient Tolerance of Treatment:   [x]Tolerated well []Tolerated fair []Required rest breaks []Fatigued    Education:  Learner:  [x]Patient          [x]Significant Other          []Other  Education provided regarding:  []Goals and POC   []Diet and swallowing precautions    [x]Home Exercise Program  []Progress and/or discharge information  Method of Education:  [x]Discussion          [x]Demonstration          [x]Handout          []Other  Evaluation of Education:   [x]Verbalized understanding   []Demonstrates without assistance  []Demonstrates with assistance  []Needs further instruction     []No evidence of learning                  []No family present      Plan: [x]Continue with current plan of care    []Modify current plan of care as follows:    []Discharge patient    Discharge Location:    Services/Supervision Recommended: [x]Patient continues to require treatment by a licensed therapist to address functional deficits as outlined in the established plan of care.     Electronically signed by Carlos Redmond on 2/13/21 at 3:13 PM CST

## 2021-02-13 NOTE — PROGRESS NOTES
02/13/21 1100   OT Individual Minutes   Time In 1100   Time Out 1100   Minutes 0   Minute Variance   Variance 60   Reason Med Hold             Electronically signed by Merlinda Net, OT on 2/13/2021 at 4:54 PM

## 2021-02-13 NOTE — PLAN OF CARE
Problem: Falls - Risk of:  Goal: Will remain free from falls  Description: Will remain free from falls  2/13/2021 1226 by Bianca Elizabeth LPN  Outcome: Ongoing  2/13/2021 0049 by Nancy Frey LPN  Outcome: Ongoing  Goal: Absence of physical injury  Description: Absence of physical injury  2/13/2021 1226 by Bianca Elizabeth LPN  Outcome: Ongoing  2/13/2021 0049 by Nancy Frey LPN  Outcome: Ongoing     Problem: Mobility - Impaired:  Goal: Mobility will improve  Description: Mobility will improve  2/13/2021 1226 by Bianca Elizabeth LPN  Outcome: Ongoing  2/13/2021 0049 by Nancy Frey LPN  Outcome: Ongoing     Problem: Safety:  Goal: Free from accidental physical injury  Description: Free from accidental physical injury  2/13/2021 1226 by Bianca Elizabeth LPN  Outcome: Ongoing  2/13/2021 0049 by Nancy Frey LPN  Outcome: Ongoing  Goal: Free from intentional harm  Description: Free from intentional harm  2/13/2021 1226 by Bianca Elizabeth LPN  Outcome: Ongoing  2/13/2021 0049 by Nancy Frey LPN  Outcome: Ongoing     Problem: Daily Care:  Goal: Daily care needs are met  Description: Daily care needs are met  2/13/2021 1226 by Bianca Elizabeth LPN  Outcome: Ongoing  2/13/2021 0049 by Nancy Frey LPN  Outcome: Ongoing     Problem: Discharge Planning:  Goal: Patients continuum of care needs are met  Description: Patients continuum of care needs are met  2/13/2021 1226 by Bianca Elizabeth LPN  Outcome: Ongoing  2/13/2021 0049 by Nancy Frey LPN  Outcome: Ongoing     Problem: HEMODYNAMIC STATUS  Goal: Patient has stable vital signs and fluid balance  2/13/2021 1226 by Bianca Elizabeth LPN  Outcome: Ongoing  2/13/2021 0049 by Nancy Frey LPN  Outcome: Ongoing     Problem: ACTIVITY INTOLERANCE/IMPAIRED MOBILITY  Goal: Mobility/activity is maintained at optimum level for patient  2/13/2021 1226 by Bianca Elizabeth LPN  Outcome: Ongoing 2/13/2021 0049 by Donal Jimenez LPN  Outcome: Ongoing     Problem: Bleeding:  Goal: Will show no signs and symptoms of excessive bleeding  Description: Will show no signs and symptoms of excessive bleeding  2/13/2021 1226 by Jesse Guevara LPN  Outcome: Ongoing  2/13/2021 0049 by Donal Jimenez LPN  Outcome: Ongoing     Problem: Pain:  Goal: Pain level will decrease  Description: Pain level will decrease  2/13/2021 1226 by Jesse Guevara LPN  Outcome: Ongoing  2/13/2021 0049 by Donal Jimenez LPN  Outcome: Ongoing  Goal: Control of acute pain  Description: Control of acute pain  2/13/2021 1226 by Jesse Guevara LPN  Outcome: Ongoing  2/13/2021 0049 by Donal Jimenez LPN  Outcome: Ongoing  Goal: Control of chronic pain  Description: Control of chronic pain  2/13/2021 1226 by Jesse Guevara LPN  Outcome: Ongoing  2/13/2021 0049 by Donal Jimenez LPN  Outcome: Ongoing

## 2021-02-13 NOTE — PROGRESS NOTES
Cardiology Progress Note   Natasha Montana MD      Patient:    Zita Leventhal  [id number]  1955    Patient Active Problem List    Diagnosis Date Noted    Acute ischemic stroke ([address] 02/08/2021     Priority: Low    Tear of right glenoid labrum 08/28/2019     Priority: Low       Admit Date:  2/8/2021    Admission Problem List: Present on Admission:   Acute ischemic stroke [facility])       Cardiac Specific Data:  Specialty Problems        Cardiology Problems    Acute ischemic stroke [facility])              Subjective:  Patient 77-year-old -American male referred for assessment of chest pain on minimal exertion. He had nuclear stress test done at [facility] and was told to be normal according to the wife. Echocardiogram was done and showed preserved left ventricular systolic wall motion with grade 1 diastolic dysfunction and mild concentric hypertrophy. Enzymes were negative. Patient has had stage IIIb renal insufficiency. He has been reluctant to proceed with cardiac catheterization    Objective:   /86   Pulse 70   Temp 96.9 °F (36.1 °C) (Temporal)   Resp 16   Ht 5' 6\" (1.676 m)   Wt 157 lb 0.2 oz (71.2 kg)   SpO2 93%   BMI 25.34 kg/m²       Intake/Output Summary (Last 24 hours) at 2/13/2021 1159  Last data filed at 2/13/2021 1103  Gross per 24 hour   Intake 480 ml   Output 1000 ml   Net -520 ml       Prior to Admission medications    Medication Sig Start Date End Date Taking?  Authorizing Provider   metoprolol succinate (TOPROL XL) 50 MG extended release tablet Take 50 mg by mouth daily   Yes Historical Provider, MD   cloNIDine (CATAPRES) 0.1 MG tablet Take 0.1 mg by mouth as needed for High Blood Pressure   Yes Historical Provider, MD   clopidogrel (PLAVIX) 75 MG tablet Take 75 mg by mouth daily   Yes Historical Provider, MD   latanoprost (XALATAN) 0.005 % ophthalmic solution 1 drop nightly   Yes Historical Provider, MD lisinopril (PRINIVIL;ZESTRIL) 40 MG tablet Take 40 mg by mouth nightly   Yes Historical Provider, MD   spironolactone (ALDACTONE) 25 MG tablet Take 25 mg by mouth daily   Yes Historical Provider, MD   triamterene-hydroCHLOROthiazide (MAXZIDE-25) 37.5-25 MG per tablet Take 2 tablets by mouth daily   Yes Historical Provider, MD   Cholecalciferol (VITAMIN D) 10 MCG (400 UNIT) CAPS Capsule Take 400 Units by mouth daily   Yes Historical Provider, MD   atorvastatin (LIPITOR) 20 MG tablet Take 10 mg by mouth daily     Historical Provider, MD   amLODIPine (NORVASC) 5 MG tablet Take 10 mg by mouth nightly     Historical Provider, MD        cyclobenzaprine  10 mg Oral Nightly    melatonin  10 mg Oral Nightly    enoxaparin  30 mg Subcutaneous Daily    aspirin  81 mg Oral Daily    atorvastatin  10 mg Oral Daily    amLODIPine  10 mg Oral Nightly    metoprolol succinate  50 mg Oral Daily    clopidogrel  75 mg Oral Daily    latanoprost  1 drop Ophthalmic Nightly    lisinopril  40 mg Oral Nightly    spironolactone  25 mg Oral Daily    [Held by provider] triamterene-hydroCHLOROthiazide  2 tablet Oral Daily    vitamin D3  400 Units Oral Daily    polyethylene glycol  17 g Oral Daily       TELEMETRY: Sinus    Physical Exam:  General: NAD, alert  HEENT: normal  CHEST: clear to ascultation  CVS: S1 and S2 normal, no murmur, no JVD  Abdominal exam: Normal   MSK: normal  CNS: oriented X3, normal affect, normal CN  PVD:  all peripheral pulses normal, no swelling of the ankles      RECENT LABS:    Lab Data:  CBC:   Recent Labs     02/11/21  0604 02/12/21  1015   WBC 8.2 9.0   HGB 15.6 15.8   HCT 46.5 48.0   MCV 79.6* 80.1    301     BMP:   Recent Labs     02/11/21  0604 02/12/21  1015 02/13/21  0344    135* 133*   K 4.6 5.1* 4.5    101 99   CO2 21* 20* 24   BUN 44* 58* 66*   CREATININE 1.7* 2.2* 2.2*     LIVER PROFILE: No results for input(s): AST, ALT, LIPASE, BILIDIR, BILITOT, ALKPHOS in the last 72 hours. Invalid input(s): AMYLASE,  ALB  PT/INR: No results for input(s): PROTIME, INR in the last 72 hours. APTT: No results for input(s): APTT in the last 72 hours. CK, CKMB, Troponin:   Recent Labs     02/12/21  1015   TROPONINI <0.01       Last 3 BNP:  Recent Labs     02/12/21  1015   PROBNP 10       IMAGING:  No results found. Assessment and Plan:    Chest pain unknown etiology, frequent, incapacitating, because of that patient has not been able to do any physical therapy. I would recommend proceeding with cardiac catheterization. However, patient has been reluctant to proceed because of the small possibility of renal dialysis after the procedure.  We'll try medical therapy        Armin Garcia MD  2/13/2021 11:59 AM

## 2021-02-13 NOTE — PROGRESS NOTES
Pt called to nurses station stated \"chest pain\". Into room, pt laying in bed stated \"same as before but sweating more\". Vital signs obtained and recorded. Pain 9 on pain scale 1-10. Chest hurting down arm and into back. Nitroglycerin tablet 0.4 mg SL given per order. Vital signs rechecked and recorded. Pain level down to 3 and pt stated it is subsiding. No s/s distress noted.

## 2021-02-14 ENCOUNTER — APPOINTMENT (OUTPATIENT)
Dept: ULTRASOUND IMAGING | Age: 66
DRG: 056 | End: 2021-02-14
Attending: PSYCHIATRY & NEUROLOGY
Payer: MEDICARE

## 2021-02-14 LAB
ALBUMIN SERPL-MCNC: 3.7 G/DL (ref 3.5–5.2)
ALP BLD-CCNC: 61 U/L (ref 40–130)
ALT SERPL-CCNC: 10 U/L (ref 5–41)
ANION GAP SERPL CALCULATED.3IONS-SCNC: 9 MMOL/L (ref 7–19)
AST SERPL-CCNC: 12 U/L (ref 5–40)
BASOPHILS ABSOLUTE: 0 K/UL (ref 0–0.2)
BASOPHILS RELATIVE PERCENT: 0.5 % (ref 0–1)
BILIRUB SERPL-MCNC: 0.3 MG/DL (ref 0.2–1.2)
BUN BLDV-MCNC: 68 MG/DL (ref 8–23)
CALCIUM SERPL-MCNC: 8.8 MG/DL (ref 8.8–10.2)
CHLORIDE BLD-SCNC: 104 MMOL/L (ref 98–111)
CO2: 21 MMOL/L (ref 22–29)
CREAT SERPL-MCNC: 2.2 MG/DL (ref 0.5–1.2)
EOSINOPHILS ABSOLUTE: 0.1 K/UL (ref 0–0.6)
EOSINOPHILS RELATIVE PERCENT: 0.6 % (ref 0–5)
GFR AFRICAN AMERICAN: 36
GFR NON-AFRICAN AMERICAN: 30
GLUCOSE BLD-MCNC: 89 MG/DL (ref 74–109)
HCT VFR BLD CALC: 41.3 % (ref 42–52)
HEMOGLOBIN: 13.5 G/DL (ref 14–18)
IMMATURE GRANULOCYTES #: 0.1 K/UL
LYMPHOCYTES ABSOLUTE: 2.2 K/UL (ref 1.1–4.5)
LYMPHOCYTES RELATIVE PERCENT: 25.9 % (ref 20–40)
MAGNESIUM: 2.5 MG/DL (ref 1.6–2.4)
MCH RBC QN AUTO: 26.6 PG (ref 27–31)
MCHC RBC AUTO-ENTMCNC: 32.7 G/DL (ref 33–37)
MCV RBC AUTO: 81.5 FL (ref 80–94)
MONOCYTES ABSOLUTE: 0.7 K/UL (ref 0–0.9)
MONOCYTES RELATIVE PERCENT: 7.9 % (ref 0–10)
NEUTROPHILS ABSOLUTE: 5.6 K/UL (ref 1.5–7.5)
NEUTROPHILS RELATIVE PERCENT: 64.4 % (ref 50–65)
PARATHYROID HORMONE INTACT: 90.4 PG/ML (ref 15–65)
PDW BLD-RTO: 14.7 % (ref 11.5–14.5)
PHOSPHORUS: 4 MG/DL (ref 2.5–4.5)
PLATELET # BLD: 271 K/UL (ref 130–400)
PMV BLD AUTO: 10.6 FL (ref 9.4–12.4)
POTASSIUM REFLEX MAGNESIUM: 4.9 MMOL/L (ref 3.5–5)
POTASSIUM SERPL-SCNC: 4.9 MMOL/L (ref 3.5–5)
RBC # BLD: 5.07 M/UL (ref 4.7–6.1)
SODIUM BLD-SCNC: 134 MMOL/L (ref 136–145)
TOTAL PROTEIN: 6.7 G/DL (ref 6.6–8.7)
URIC ACID, SERUM: 7.7 MG/DL (ref 3.4–7)
VITAMIN D 25-HYDROXY: 15.7 NG/ML
WBC # BLD: 8.6 K/UL (ref 4.8–10.8)

## 2021-02-14 PROCEDURE — 1180000000 HC REHAB R&B

## 2021-02-14 PROCEDURE — 84100 ASSAY OF PHOSPHORUS: CPT

## 2021-02-14 PROCEDURE — 99232 SBSQ HOSP IP/OBS MODERATE 35: CPT | Performed by: PSYCHIATRY & NEUROLOGY

## 2021-02-14 PROCEDURE — 83735 ASSAY OF MAGNESIUM: CPT

## 2021-02-14 PROCEDURE — 6370000000 HC RX 637 (ALT 250 FOR IP): Performed by: PSYCHIATRY & NEUROLOGY

## 2021-02-14 PROCEDURE — 80053 COMPREHEN METABOLIC PANEL: CPT

## 2021-02-14 PROCEDURE — 84550 ASSAY OF BLOOD/URIC ACID: CPT

## 2021-02-14 PROCEDURE — 82306 VITAMIN D 25 HYDROXY: CPT

## 2021-02-14 PROCEDURE — 6360000002 HC RX W HCPCS: Performed by: PSYCHIATRY & NEUROLOGY

## 2021-02-14 PROCEDURE — 6370000000 HC RX 637 (ALT 250 FOR IP): Performed by: INTERNAL MEDICINE

## 2021-02-14 PROCEDURE — 2580000003 HC RX 258: Performed by: PSYCHIATRY & NEUROLOGY

## 2021-02-14 PROCEDURE — 85025 COMPLETE CBC W/AUTO DIFF WBC: CPT

## 2021-02-14 PROCEDURE — 99232 SBSQ HOSP IP/OBS MODERATE 35: CPT | Performed by: INTERNAL MEDICINE

## 2021-02-14 PROCEDURE — 76770 US EXAM ABDO BACK WALL COMP: CPT

## 2021-02-14 PROCEDURE — 36415 COLL VENOUS BLD VENIPUNCTURE: CPT

## 2021-02-14 PROCEDURE — 83970 ASSAY OF PARATHORMONE: CPT

## 2021-02-14 RX ORDER — ERGOCALCIFEROL 1.25 MG/1
50000 CAPSULE ORAL WEEKLY
Status: DISCONTINUED | OUTPATIENT
Start: 2021-02-14 | End: 2021-02-23 | Stop reason: HOSPADM

## 2021-02-14 RX ORDER — SODIUM BICARBONATE 325 MG/1
325 TABLET ORAL 2 TIMES DAILY
Status: DISCONTINUED | OUTPATIENT
Start: 2021-02-14 | End: 2021-02-23 | Stop reason: HOSPADM

## 2021-02-14 RX ADMIN — METOPROLOL SUCCINATE 100 MG: 50 TABLET, EXTENDED RELEASE ORAL at 08:08

## 2021-02-14 RX ADMIN — LATANOPROST 1 DROP: 50 SOLUTION OPHTHALMIC at 20:46

## 2021-02-14 RX ADMIN — AMLODIPINE BESYLATE 10 MG: 10 TABLET ORAL at 20:46

## 2021-02-14 RX ADMIN — SODIUM CHLORIDE: 9 INJECTION, SOLUTION INTRAVENOUS at 10:50

## 2021-02-14 RX ADMIN — ERGOCALCIFEROL 50000 UNITS: 1.25 CAPSULE ORAL at 12:15

## 2021-02-14 RX ADMIN — SODIUM BICARBONATE 325 MG: 325 TABLET ORAL at 12:15

## 2021-02-14 RX ADMIN — CYCLOBENZAPRINE 10 MG: 10 TABLET, FILM COATED ORAL at 20:45

## 2021-02-14 RX ADMIN — ACETAMINOPHEN 650 MG: 325 TABLET ORAL at 21:43

## 2021-02-14 RX ADMIN — SODIUM BICARBONATE 325 MG: 325 TABLET ORAL at 20:46

## 2021-02-14 RX ADMIN — POLYETHYLENE GLYCOL 3350 17 G: 17 POWDER, FOR SOLUTION ORAL at 07:42

## 2021-02-14 RX ADMIN — ACETAMINOPHEN 650 MG: 325 TABLET ORAL at 15:55

## 2021-02-14 RX ADMIN — SODIUM CHLORIDE: 9 INJECTION, SOLUTION INTRAVENOUS at 21:37

## 2021-02-14 RX ADMIN — CHOLECALCIFEROL TAB 10 MCG (400 UNIT) 400 UNITS: 10 TAB at 07:42

## 2021-02-14 RX ADMIN — Medication 10 MG: at 20:45

## 2021-02-14 RX ADMIN — ACETAMINOPHEN 650 MG: 325 TABLET ORAL at 07:42

## 2021-02-14 RX ADMIN — CLOPIDOGREL BISULFATE 75 MG: 75 TABLET ORAL at 07:42

## 2021-02-14 RX ADMIN — ISOSORBIDE MONONITRATE 60 MG: 60 TABLET, EXTENDED RELEASE ORAL at 07:42

## 2021-02-14 RX ADMIN — ASPIRIN 81 MG: 81 TABLET, CHEWABLE ORAL at 07:42

## 2021-02-14 RX ADMIN — ATORVASTATIN CALCIUM 10 MG: 10 TABLET, FILM COATED ORAL at 07:42

## 2021-02-14 RX ADMIN — ENOXAPARIN SODIUM 30 MG: 40 INJECTION SUBCUTANEOUS at 07:43

## 2021-02-14 ASSESSMENT — PAIN DESCRIPTION - LOCATION
LOCATION: HEAD

## 2021-02-14 ASSESSMENT — PAIN SCALES - GENERAL
PAINLEVEL_OUTOF10: 4
PAINLEVEL_OUTOF10: 0

## 2021-02-14 ASSESSMENT — PAIN DESCRIPTION - DESCRIPTORS: DESCRIPTORS: HEADACHE

## 2021-02-14 NOTE — PLAN OF CARE
Problem: Falls - Risk of:  Goal: Will remain free from falls  Description: Will remain free from falls  2/14/2021 0941 by Jose Bennett LPN  Outcome: Ongoing  2/13/2021 2246 by Nicki Burch RN  Outcome: Ongoing  Goal: Absence of physical injury  Description: Absence of physical injury  2/14/2021 0941 by Jose Bennett LPN  Outcome: Ongoing  2/13/2021 2246 by Nicki Burch RN  Outcome: Ongoing     Problem: Mobility - Impaired:  Goal: Mobility will improve  Description: Mobility will improve  2/14/2021 0941 by Jose Bennett LPN  Outcome: Ongoing  2/13/2021 2246 by Nicki Burch RN  Outcome: Ongoing     Problem: Safety:  Goal: Free from accidental physical injury  Description: Free from accidental physical injury  2/14/2021 0941 by Jose Bennett LPN  Outcome: Ongoing  2/13/2021 2246 by Nicki Burch RN  Outcome: Ongoing  Goal: Free from intentional harm  Description: Free from intentional harm  2/14/2021 0941 by Jose Bennett LPN  Outcome: Ongoing  2/13/2021 2246 by Nicki Burch RN  Outcome: Ongoing     Problem: Daily Care:  Goal: Daily care needs are met  Description: Daily care needs are met  2/14/2021 0941 by Jose Bennett LPN  Outcome: Ongoing  2/13/2021 2246 by Nicki Burch RN  Outcome: Ongoing     Problem: Discharge Planning:  Goal: Patients continuum of care needs are met  Description: Patients continuum of care needs are met  2/14/2021 0941 by Jose Bennett LPN  Outcome: Ongoing  2/13/2021 2246 by Nicki Burch RN  Outcome: Ongoing     Problem: HEMODYNAMIC STATUS  Goal: Patient has stable vital signs and fluid balance  2/14/2021 0941 by Jose Bennett LPN  Outcome: Ongoing  2/13/2021 2246 by Nicki Burch RN  Outcome: Ongoing     Problem: ACTIVITY INTOLERANCE/IMPAIRED MOBILITY  Goal: Mobility/activity is maintained at optimum level for patient  2/14/2021 0941 by Jose Bennett LPN  Outcome: Ongoing 2/13/2021 2246 by So Hampton RN  Outcome: Ongoing     Problem: Bleeding:  Goal: Will show no signs and symptoms of excessive bleeding  Description: Will show no signs and symptoms of excessive bleeding  2/14/2021 0941 by Edwardo Hickey LPN  Outcome: Ongoing  2/13/2021 2246 by So Hampton RN  Outcome: Ongoing     Problem: Pain:  Goal: Pain level will decrease  Description: Pain level will decrease  2/14/2021 0941 by Edwardo Hickey LPN  Outcome: Ongoing  2/13/2021 2246 by So Hampton RN  Outcome: Ongoing  Goal: Control of acute pain  Description: Control of acute pain  2/14/2021 0941 by Edwardo Hickey LPN  Outcome: Ongoing  2/13/2021 2246 by So Hampton RN  Outcome: Ongoing  Goal: Control of chronic pain  Description: Control of chronic pain  2/14/2021 0941 by Edwardo Hickey LPN  Outcome: Ongoing  2/13/2021 2246 by So Hampton RN  Outcome: Ongoing

## 2021-02-14 NOTE — PROGRESS NOTES
Cardiology Progress Note   Armin Garcia MD      Patient:    Lorene Vora  [id number]  1955    Patient Active Problem List    Diagnosis Date Noted    Acute ischemic stroke ([address] 02/08/2021     Priority: Low    Tear of right glenoid labrum 08/28/2019     Priority: Low       Admit Date:  2/8/2021    Admission Problem List: Present on Admission:   Acute ischemic stroke [facility])       Cardiac Specific Data:  Specialty Problems        Cardiology Problems    Acute ischemic stroke [facility])              Subjective:  Patient 42-year-old male with atypical chest pain. He has been reluctant to undergo cardiac catheterization because of renal insufficiency. Since adjustment of medication he does not have any more chest pain. Objective:   /68   Pulse 62   Temp 97.6 °F (36.4 °C) (Temporal)   Resp 18   Ht 5' 6\" (1.676 m)   Wt 159 lb 4.8 oz (72.3 kg)   SpO2 92%   BMI 25.71 kg/m²       Intake/Output Summary (Last 24 hours) at 2/14/2021 1242  Last data filed at 2/14/2021 8917  Gross per 24 hour   Intake 2268.52 ml   Output 1200 ml   Net 1068.52 ml       Prior to Admission medications    Medication Sig Start Date End Date Taking?  Authorizing Provider   metoprolol succinate (TOPROL XL) 50 MG extended release tablet Take 50 mg by mouth daily   Yes Historical Provider, MD   cloNIDine (CATAPRES) 0.1 MG tablet Take 0.1 mg by mouth as needed for High Blood Pressure   Yes Historical Provider, MD   clopidogrel (PLAVIX) 75 MG tablet Take 75 mg by mouth daily   Yes Historical Provider, MD   latanoprost (XALATAN) 0.005 % ophthalmic solution 1 drop nightly   Yes Historical Provider, MD   lisinopril (PRINIVIL;ZESTRIL) 40 MG tablet Take 40 mg by mouth nightly   Yes Historical Provider, MD   spironolactone (ALDACTONE) 25 MG tablet Take 25 mg by mouth daily   Yes Historical Provider, MD   triamterene-hydroCHLOROthiazide (MAXZIDE-25) 37.5-25 MG per tablet Take 2 tablets by mouth daily   Yes Historical Provider, MD Cholecalciferol (VITAMIN D) 10 MCG (400 UNIT) CAPS Capsule Take 400 Units by mouth daily   Yes Historical Provider, MD   atorvastatin (LIPITOR) 20 MG tablet Take 10 mg by mouth daily     Historical Provider, MD   amLODIPine (NORVASC) 5 MG tablet Take 10 mg by mouth nightly     Historical Provider, MD        vitamin D  50,000 Units Oral Weekly    sodium bicarbonate  325 mg Oral BID    cyclobenzaprine  10 mg Oral Nightly    metoprolol succinate  100 mg Oral Daily    isosorbide mononitrate  60 mg Oral Daily    melatonin  10 mg Oral Nightly    enoxaparin  30 mg Subcutaneous Daily    aspirin  81 mg Oral Daily    atorvastatin  10 mg Oral Daily    amLODIPine  10 mg Oral Nightly    clopidogrel  75 mg Oral Daily    latanoprost  1 drop Ophthalmic Nightly    [Held by provider] triamterene-hydroCHLOROthiazide  2 tablet Oral Daily    polyethylene glycol  17 g Oral Daily       TELEMETRY: Sinus     Physical Exam:  General: NAD, alert  HEENT: normal  CHEST: clear to ascultation  CVS: S1 and S2 normal, no murmur, no JVD  MSK: normal  CNS: oriented X3, normal affect, normal CN  PVD:  all peripheral pulses normal, no swelling of the ankles      RECENT LABS:    Lab Data:  CBC:   Recent Labs     02/12/21  1015 02/14/21  0507   WBC 9.0 8.6   HGB 15.8 13.5*   HCT 48.0 41.3*   MCV 80.1 81.5    271     BMP:   Recent Labs     02/12/21  1015 02/13/21  0344 02/14/21  0507   * 133* 134*   K 5.1* 4.5 4.9  4.9    99 104   CO2 20* 24 21*   PHOS  --   --  4.0   BUN 58* 66* 68*   CREATININE 2.2* 2.2* 2.2*     LIVER PROFILE:   Recent Labs     02/13/21  0344 02/14/21  0507   AST 16 12   ALT 13 10   LIPASE 64*  --    BILIDIR 0.1  --    BILITOT 0.3 0.3   ALKPHOS 72 61     PT/INR: No results for input(s): PROTIME, INR in the last 72 hours. APTT: No results for input(s): APTT in the last 72 hours.     CK, CKMB, Troponin:   Recent Labs     02/12/21  1015   TROPONINI <0.01       Last 3 BNP:  Recent Labs     02/12/21  1015

## 2021-02-14 NOTE — PROGRESS NOTES
Patient:   Geovany Mcneil  MR#:    [id number]   Room:    [facility]/[address]-   YOB: 1955  Date of Progress Note: 2/14/2021  Time of Note                           8:29 AM  Consulting Physician:   Yung Ansari M.D. Attending Physician:  Yung Ansari MD     CHIEF COMPLAINT: Left-sided weakness and dysarthria        subjective  This 72 y.o. male   with history of HTN,hyperlipidemia,chronic back pain and CVA. He presented to [facility] on 2/2/21 with c/o lightheadedness, mild blurred vision and change in his gait that started about 3 days prior. He was hypertensive on presentation. MRI done revealed a right pontine infarct. CTA head/neck showed non signifigant steno-occlusive disease. He was admitted the the hospitalist service with consult for neurology. He was seen by Dr. Nadir Holguin. He was doing quite well. When on 2/4/21 he had a return of dizziness with N&V and worsening left sided weakness, mild dysarthria, B/P was low. Stat MRI was ordered that revealed showed a new right temporal lobe infarct. TTE showed thickening on anterior leaflet of mirtal valve. On the morning of 2/5/21 he had an accoute onset of chest pain/pressure with hypotension. Repeat EKG was concerning. Troponin was negative x 2. Chest pain resolved with nitro. Cardiology was consult for NIKI.  He was seen by  Christy. NIKI done on 2/5/21 showed no thrombus noted in the left atrial appendage,negative bubble study, no evidence of PFO or ASD,severely thickened left ventricle but no evidence of LV noncompaction. Dr. Suly Yeager recommended a diagnostic left heart catheterization to assess his coronary anatomy. Patient wishes to posst-pone his cardiac work-up until after he completes the Rehab program.  He was also noted to have an increase in his creatinine of 5.12. Nephrology consulted. Patients creatine is rapidly improving now at 1.4 with BUN of 38. He is alert & oriented x 3, follows commands but does have mild dysarthia but demonstrates impulsivity and decreased safety awareness. Left sided weakness remains. Patient was evaluated by SPT for swallow and was cleared for a regular diet with thin liquids. Feels better since getting on Imdur. Had vomiting yesterday but doing better this morning. REVIEW OF SYSTEMS:  Constitutional: No fevers No chills  Neck:No stiffness  Respiratory: No shortness of breath  Cardiovascular: No chest pain No palpitations  Gastrointestinal: No abdominal pain    Genitourinary: No Dysuria  Neurological: No headache, no confusion      PHYSICAL EXAM:  /68   Pulse 62   Temp 97.6 °F (36.4 °C) (Temporal)   Resp 18   Ht 5' 6\" (1.676 m)   Wt 159 lb 4.8 oz (72.3 kg)   SpO2 92%   BMI 25.71 kg/m²     Constitutional: he appears well-developed and well-nourished. Eyes  conjunctiva normal.  Pupils react to light  Ear, nose, throat -hearing intact to voice.  No scars, masses, or lesions over external nose or ears, no atrophy of tongue  Neck-symmetric, no masses noted, no jugular vein distension  Respiration- chest wall appears symmetric, good expansion,   normal effort without use of accessory muscles  Cardiovascular- RRR  Musculoskeletal  no significant wasting of muscles noted, no bony deformities, gait no gross ataxia  Extremities-no clubbing, cyanosis or edema Skin  warm, dry, and intact. No rash, erythema, or pallor. Psychiatric  mood, affect, and behavior appear normal.      Neurology  NEUROLOGICAL EXAM:  Awake, alert, fluent oriented x 3 appropriate affect  Attention and concentration appear appropriate  Recent and remote memory appears unremarkable  Speech  with dysarthria  No clear issues with language of fund of knowledge     Cranial Nerve Exam   CN II- Visual fields grossly unremarkable  CN III, IV,VI-EOMI, No nystagmus, conjugate eye movements, no ptosis  CN VII-no facial assymetry  CN VIII-Hearing intact        Motor Exam  4/5 left arm and leg     Tremors- no tremors in hands or head noted     Gait  Not tested     Coordination  Ataxic on the left            Nursing/pcp notes, imaging,labs and vitals reviewed. PT,OT and/or speech notes reviewed    Lab Results   Component Value Date    WBC 8.6 02/14/2021    HGB 13.5 (L) 02/14/2021    HCT 41.3 (L) 02/14/2021    MCV 81.5 02/14/2021     02/14/2021     Lab Results   Component Value Date     (L) 02/14/2021    K 4.9 02/14/2021    K 4.9 02/14/2021     02/14/2021    CO2 21 (L) 02/14/2021    BUN 68 (H) 02/14/2021    CREATININE 2.2 (H) 02/14/2021    GLUCOSE 89 02/14/2021    CALCIUM 8.8 02/14/2021    PROT 6.7 02/14/2021    LABALBU 3.7 02/14/2021    BILITOT 0.3 02/14/2021    ALKPHOS 61 02/14/2021    AST 12 02/14/2021    ALT 10 02/14/2021    LABGLOM 30 (A) 02/14/2021    GFRAA 36 (L) 02/14/2021   No results found for: INRNo results found for: PHENYTOIN, ESR, CRP  Objective:   Patient was able to sustain attention for 15+ minutes in both structured/unstructured settings today, independently.      Patient completed oral motor exercises with minimal verbal cues. Patient does not present with any prominent facial weakness. Patient did report mild weakness in jaw area on the left side. Patient completed repetition of single words with emphasis on /ch/, /sh/, /s/ and /l/. All words were 100% intelligible, however did note mild distortions for /ch/ and /sh/.      Patient was able to utilize and recall dysarthric strategies with 100% accuracy. Patient does really well overarticulating words when speaking in sentences. Overall speech intelligibility ranks at 100% for unknown context and unfamiliar listeners.      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 Patient also able to state the good evening/good morning message he speaks at [employer] both with 100% speech intelligibility. Mild distortions noted, but did not impact speech intelligibility. Patient also spoke on the phone during the session to one of his friends. The friend was able to to understand him with no difficulty.        02/11/21 1403 02/11/21 1404 02/11/21 1411   Bed Mobility   Rolling Modified independent  --   --    Supine to Sit Stand by assistance  --   --    Transfers   Sit to Stand Stand by assistance;Contact guard assistance  --   --    Stand to sit Stand by assistance  --   --    Bed to Chair Contact guard assistance;Stand by assistance  --   --    Ambulation   Ambulation?  --  Yes  --    WB Status  --  WBAT  --    Ambulation 1   Surface  --  level tile  --    Device  --  Rolling Walker  --    Assistance  --  Contact guard assistance  --    Quality of Gait  --  Working on step-through pattern and hel-toe steps with RLE for more normal amb. Verbal cues for posture and to keep LLE extended during stance phase.  --    Gait Deviations  --  Slow Karina;Decreased step length;Decreased step height  --    Distance  --  20' x2  --    Comments  --  Amb chair to chair; incorporated turns. Verbal cues for technique during turns and backing to chair.   --    Exercises   Comments  --   --   --    Conditions Requiring Skilled Therapeutic Intervention Assessment  --   --  Worked on amb chair to chair today incorporating turns and backing to sit. Pt. more fatigued this afternoon. Activity Tolerance   Activity Tolerance  --  Patient Tolerated treatment well;Patient limited by fatigue  --    Safety Devices   Type of devices  --   --   --         02/11/21 1418 02/11/21 1430   Bed Mobility   Rolling  --   --    Supine to Sit  --   --    Transfers   Sit to Stand  --   --    Stand to sit  --   --    Bed to Chair  --   --    Ambulation   Ambulation?  --   --    WB Status  --   --    Ambulation 1   Surface  --   --    Device  --   --    Assistance  --   --    Quality of Gait  --   --    Gait Deviations  --   --    Distance  --   --    Comments  --   --    Exercises   Comments Sitting BLE exercises-  15 reps with 1-1/2 lb weight RLE. 10 reps LLE.  --    Conditions Requiring Skilled Therapeutic Intervention   Assessment  --   --    Activity Tolerance   Activity Tolerance  --   --    Safety Devices   Type of devices  --  Call light within reach; Chair alarm in place      02/11/21 0830   Eating   Assistance Needed Setup or clean-up assistance   CARE Score 5   Oral Hygiene   Assistance Needed Setup or clean-up assistance   CARE Score 5   [address] Needed Partial/moderate assistance   Comment min A for balance   CARE Score 3   Shower/Bathe Self   Assistance Needed Partial/moderate assistance   Comment min A for balance   CARE Score 3   Upper Body Dressing   Assistance Needed Setup or clean-up assistance   CARE Score 5   Lower Body Dressing   Assistance Needed Partial/moderate assistance   Comment min A for balance, vc for tech   CARE Score 3   Putting On/Taking Off Footwear   Assistance Needed Partial/moderate assistance   Comment min A for left shoe   CARE Score 3        RECORD REVIEW: Previous medical records, medications were reviewed at today's visit    IMPRESSION: 1.  Right pontine and temporal strokes with left-sided ataxia/hemiparesis currently on Plavix/aspirin PT/OT/SLP  2. Recent chest pain resolved with nitroglycerin. Patient deferring cardiac work-up until the near future. see #8  3. Essential hypertensioncontinue current medications and monitoring  4. Hyperlipidemiacontinue current medications and monitoring  5. GIbowel regimen  6. DVT prophylaxissubcu Lovenox  7. Recent acute renal failureIV fluids. Nephrology following. 8.  Recurring chest pain relieved with nitroglycerin. Cardiology actively following and on board. Seems better with Imdur. Appreciate hospitalist, nephrology and cardiology consultants.   CARTER:  joleen

## 2021-02-14 NOTE — PROGRESS NOTES
Hospitalist Progress Note  2/14/2021 12:30 PM  Subjective:   Admit Date: 2/8/2021  PCP: Samantha Bruno    Subjective: Has hot had any recurrence of chest pain since yesterday AM.  Thinks that Imdur really seem to help. Denies any other acute complaints including respiratory, GI, .    ROS: Six point review of systems is negative except as specifically addressed above. DIET CARDIAC;     Intake/Output Summary (Last 24 hours) at 2/14/2021 1230  Last data filed at 2/14/2021 0934  Gross per 24 hour   Intake 2268.52 ml   Output 1200 ml   Net 1068.52 ml     Medications:   sodium chloride 100 mL/hr at 02/14/21 1050     Current Facility-Administered Medications   Medication Dose Route Frequency Provider Last Rate Last Admin    vitamin D (ERGOCALCIFEROL) capsule 50,000 Units  50,000 Units Oral Weekly Carlie Agarwal MD   50,000 Units at 02/14/21 1215    sodium bicarbonate tablet 325 mg  325 mg Oral BID Carlie Agarwal MD   325 mg at 02/14/21 1215    cyclobenzaprine (FLEXERIL) tablet 10 mg  10 mg Oral Nightly Jay Cardenas MD   10 mg at 02/13/21 2107    0.9 % sodium chloride infusion   Intravenous Continuous Jay Cardenas  mL/hr at 02/14/21 1050 New Bag at 02/14/21 1050    metoprolol succinate (TOPROL XL) extended release tablet 100 mg  100 mg Oral Daily Anton Fields MD   100 mg at 02/14/21 9411    isosorbide mononitrate (IMDUR) extended release tablet 60 mg  60 mg Oral Daily Anton Fields MD   60 mg at 02/14/21 0742    nitroGLYCERIN (NITROSTAT) SL tablet 0.4 mg  0.4 mg Sublingual Q5 Min PRN Arlene Castillo MD   0.4 mg at 02/13/21 0726    melatonin disintegrating tablet 10 mg  10 mg Oral Nightly Jay Cardenas MD   10 mg at 02/13/21 2107    enoxaparin (LOVENOX) injection 30 mg  30 mg Subcutaneous Daily Jay Cardenas MD   30 mg at 02/14/21 0743    aspirin chewable tablet 81 mg  81 mg Oral Daily Jay Cardenas MD   81 mg at 02/14/21 4141  atorvastatin (LIPITOR) tablet 10 mg  10 mg Oral Daily Mike Barker MD   10 mg at 02/14/21 0742    amLODIPine (NORVASC) tablet 10 mg  10 mg Oral Nightly Mike Barker MD   10 mg at 02/13/21 2107    cloNIDine (CATAPRES) tablet 0.1 mg  0.1 mg Oral PRN Mike Barker MD   0.1 mg at 02/09/21 5346    clopidogrel (PLAVIX) tablet 75 mg  75 mg Oral Daily Mike Barker MD   75 mg at 02/14/21 0742    latanoprost (XALATAN) 0.005 % ophthalmic solution 1 drop  1 drop Ophthalmic Nightly Mike Barker MD   1 drop at 02/13/21 2107    [Held by provider] triamterene-hydroCHLOROthiazide (MAXZIDE-25) 37.5-25 MG per tablet 2 tablet  2 tablet Oral Daily Mike Barker MD   2 tablet at 02/12/21 8623    acetaminophen (TYLENOL) tablet 650 mg  650 mg Oral Q4H PRN Mike Barker MD   650 mg at 02/14/21 3219    bisacodyl (DULCOLAX) suppository 10 mg  10 mg Rectal Daily PRN Mike Barker MD        polyethylene glycol [facility]) packet 17 g  17 g Oral Daily Mike Barker MD   17 g at 02/14/21 0742    magnesium hydroxide (MILK OF MAGNESIA) 400 MG/5ML suspension 30 mL  30 mL Oral Daily PRN Mike Barker MD            Labs:     Recent Labs     02/12/21  1015 02/14/21  0507   WBC 9.0 8.6   RBC 5.99 5.07   HGB 15.8 13.5*   HCT 48.0 41.3*   MCV 80.1 81.5   MCH 26.4* 26.6*   MCHC 32.9* 32.7*    271     Recent Labs     02/12/21  1015 02/13/21  0344 02/14/21  0507   * 133* 134*   K 5.1* 4.5 4.9  4.9   ANIONGAP 14 10 9    99 104   CO2 20* 24 21*   BUN 58* 66* 68*   CREATININE 2.2* 2.2* 2.2*   GLUCOSE 102 103 89   CALCIUM 10.1 9.4 8.8     Recent Labs     02/14/21  0507   MG 2.5*   PHOS 4.0     Recent Labs     02/13/21  0344 02/14/21  0507   AST 16 12   ALT 13 10   BILITOT 0.3 0.3   ALKPHOS 72 61         Objective:   Vitals: /68   Pulse 62   Temp 97.6 °F (36.4 °C) (Temporal)   Resp 18   Ht 5' 6\" (1.676 m)   Wt 159 lb 4.8 oz (72.3 kg)   SpO2 92%   BMI 25.71 kg/m²   24HR INTAKE/OUTPUT: Intake/Output Summary (Last 24 hours) at 2/14/2021 1230  Last data filed at 2/14/2021 5884  Gross per 24 hour   Intake 2268.52 ml   Output 1200 ml   Net 1068.52 ml     General appearance: Pleasant male sitting up in wheelchair n no acute distress  Head - NC/AT  Eyesanicteric sclera  Mouthmoist mucous membranes  Lungs: Clear to auscultation bilaterally without rales, rhonchi or wheezes, no use of accessory muscles  Heart: Regular rate and rhythm, S1, S2 normal, no murmur, click, no gallop or rubs. No reproducible chest pain on palpation of the chest wall. Abdomen: Soft, non-tender; no peritoneal signs, bowel sounds normal; no masses,  no organomegaly. Extremities: Extremities normal without clubbing, atraumatic, no cyanosis or edema. Neurologic: No focal neurologic deficits, normal sensation, , alert and oriented, affect and mood appropriate. Speech is fluent. Intact remote and recent memory. Psychiatric: appropriate  Skin: No overt rashes, nodules. Hematologic: No overt bruises, petechiae. Assessment and Plan: Active Problems:    Acute ischemic stroke [facility])  Resolved Problems:    * No resolved hospital problems. *    HUI on ? CKD  Limited values to know baseline CKD. Was recently hospitalized at [facility] with contrast induced nephropathy. Last creatinine in our system 1.0 on 8/20. Up to 5.12 at [facility] on 2/5, then 1.40 on discharge. Rising in last few days, 1.7 on 2/11, up to 2.2.  - hold ACEi, diuretics  - nephrology consulted for this and optimization for cardiac cath;  seems reasonable to avoid with prior contrast induced kidney injursy   - IVFs per nephrology     Chest pain   ? Unstable angina- trops, EKG, TTE, dimer, CXR reassuring. Reviewed OSH records. DO NOT see any evidence that patient had stress test done at [facility]. rec outpatient cath once renal function improved. - actively being managed by cardiology. imdur seems to have helped. Continue this and PRN nitro. .. Defer further treatment and workup to Cardiology. - Cardiology rec proceeding with cardiac cath, but per nephrology seems reasonable to avoid with prior contrast induced kidney injury   - unfortunately unable to do tele on rehab floor     Nausea and vomiting- RESOLVED. suspect from severe chest pain. Resolved and w/u reassuring.      CVA with left sided ataxia/ hemiparesis- per Neurology. On aspirin, statin, plavix.      HTN - holding meds for HUI as above. On amlodipine, Imdur, BB. Add agents as needed. Vit D def - has been started on replacement      HLD - statin.      Defer VT prophylaxis, dispo, activity to primary team    Signed:   Terri Norwood MD 2/14/2021 12:30 PM  Hospitalist

## 2021-02-14 NOTE — PROGRESS NOTES
Nephrology ([address] [facility]) Consult Note      Patient:  Roxanne Greene  YOB: 1955  Date of Service: 2/14/2021  MRN: [id number]   Acct: [de-identified]   Primary Care Physician: Roxanne Hurtado  Advance Directive: Full Code  Admit Date: 2/8/2021       Hospital Day: 6  Referring Provider: Joe Shook MD    Patient independently seen and examined, Chart, Consults, Notes, Operative notes, Labs, Cardiology, and Radiology studies reviewed as available. Subjective:  Roxanne Greene is a 72 y.o. male  whom we were consulted for acute renal failure. Patient recalls no prior history of renal failure evaluations although he was just seen at [facility] last week for the same. He recalls no NSAID usage although is unsure. Chart review does show history of contrast-induced HUI. The cardiology consult mentions chronic kidney disease however the patient recalls no prior diagnosis and his creatinine mission was 1.3 and rapidly increasing after having improved from his [place] values. He denies any dysuria hematuria, hemoptysis or rash, chest pain or shortness of air. Today, no overnight events. Patient remains in good spirits and denies any chest pain, shortness of air, nausea vomiting.         Allergies:  Codeine    Medicines:  Current Facility-Administered Medications   Medication Dose Route Frequency Provider Last Rate Last Admin    cyclobenzaprine (FLEXERIL) tablet 10 mg  10 mg Oral Nightly Joe Shook MD   10 mg at 02/13/21 2107    0.9 % sodium chloride infusion   Intravenous Continuous Joe Shook  mL/hr at 02/14/21 1050 New Bag at 02/14/21 1050    metoprolol succinate (TOPROL XL) extended release tablet 100 mg  100 mg Oral Daily Casandra Escudero MD   100 mg at 02/14/21 0908    isosorbide mononitrate (IMDUR) extended release tablet 60 mg  60 mg Oral Daily Casandra Escudero MD   60 mg at 02/14/21 4015  nitroGLYCERIN (NITROSTAT) SL tablet 0.4 mg  0.4 mg Sublingual Q5 Min PRN González Long MD   0.4 mg at 02/13/21 3328    melatonin disintegrating tablet 10 mg  10 mg Oral Nightly Art Brandon MD   10 mg at 02/13/21 2107    enoxaparin (LOVENOX) injection 30 mg  30 mg Subcutaneous Daily Art Brandon MD   30 mg at 02/14/21 0743    aspirin chewable tablet 81 mg  81 mg Oral Daily Art Brandon MD   81 mg at 02/14/21 2954    atorvastatin (LIPITOR) tablet 10 mg  10 mg Oral Daily Atr Brandon MD   10 mg at 02/14/21 0742    amLODIPine (NORVASC) tablet 10 mg  10 mg Oral Nightly Art Brandon MD   10 mg at 02/13/21 2107    cloNIDine (CATAPRES) tablet 0.1 mg  0.1 mg Oral PRN Art Brandon MD   0.1 mg at 02/09/21 9996    clopidogrel (PLAVIX) tablet 75 mg  75 mg Oral Daily Art Brandon MD   75 mg at 02/14/21 0742    latanoprost (XALATAN) 0.005 % ophthalmic solution 1 drop  1 drop Ophthalmic Nightly Art Brandon MD   1 drop at 02/13/21 2107    [Held by provider] triamterene-hydroCHLOROthiazide (MAXZIDE-25) 37.5-25 MG per tablet 2 tablet  2 tablet Oral Daily Art Brandon MD   2 tablet at 02/12/21 5360    vitamin D3 (CHOLECALCIFEROL) tablet 400 Units  400 Units Oral Daily Art Brandon MD   400 Units at 02/14/21 0893    acetaminophen (TYLENOL) tablet 650 mg  650 mg Oral Q4H PRN Art Brandon MD   650 mg at 02/14/21 9648    bisacodyl (DULCOLAX) suppository 10 mg  10 mg Rectal Daily PRN Art Brandon MD        polyethylene glycol [facility]) packet 17 g  17 g Oral Daily Art Brandon MD   17 g at 02/14/21 0508    magnesium hydroxide (MILK OF MAGNESIA) 400 MG/5ML suspension 30 mL  30 mL Oral Daily PRN Art Brandon MD           Past Medical History:  Past Medical History:   Diagnosis Date    Cerebral artery occlusion with cerebral infarction [facility]) 2013    right sided weakness    Hyperlipidemia     Hypertension        Past Surgical History:  Past Surgical History:   Procedure Laterality Date  ABDOMEN SURGERY      noncancerous tumor in abdomen    APPENDECTOMY      BACK SURGERY      lumbar surgery x 4    CERVICAL SPINE SURGERY      fusion    SHOULDER ARTHROSCOPY Right 2019    ARTHROSCOPIC SAD/DCR LABRAL REPAIR RIGHT SHOULDER performed by Ellis Lawrence DO at [facility] OR       Family History  Family History   Problem Relation Age of Onset    High Blood Pressure Mother     Heart Failure Mother     High Blood Pressure Father     Stroke Father        Social History  Social History     Socioeconomic History    Marital status:      Spouse name: Not on file    Number of children: Not on file    Years of education: Not on file    Highest education level: Not on file   Occupational History    Not on file   Social Needs    Financial resource strain: Not on file    Food insecurity     Worry: Not on file     Inability: Not on file    Transportation needs     Medical: Not on file     Non-medical: Not on file   Tobacco Use    Smoking status: Former Smoker     Packs/day: 1.00     Years: 40.00     Pack years: 40.00     Types: Cigarettes     Quit date: 2013     Years since quittin.4    Smokeless tobacco: Never Used   Substance and Sexual Activity    Alcohol use: Not Currently     Frequency: Never    Drug use: Not on file    Sexual activity: Not on file   Lifestyle    Physical activity     Days per week: Not on file     Minutes per session: Not on file    Stress: Not on file   Relationships    Social connections     Talks on phone: Not on file     Gets together: Not on file     Attends [employer] service: Not on file     Active member of club or organization: Not on file     Attends meetings of clubs or organizations: Not on file     Relationship status: Not on file    Intimate partner violence     Fear of current or ex partner: Not on file     Emotionally abused: Not on file     Physically abused: Not on file     Forced sexual activity: Not on file   Other Topics Concern BNP:  No results for input(s): BNP in the last 72 hours. Ionized Calcium:No results for input(s): IONCA in the last 72 hours. Magnesium:  Recent Labs     02/14/21  0507   MG 2.5*     Phosphorus:  Recent Labs     02/14/21  0507   PHOS 4.0     HgbA1C: No results for input(s): LABA1C in the last 72 hours. Hepatic:   Recent Labs     02/13/21  0344 02/14/21  0507   ALKPHOS 72 61   ALT 13 10   AST 16 12   PROT 7.5 6.7   BILITOT 0.3 0.3   BILIDIR 0.1  --    LABALBU 4.1 3.7     Lactic Acid: No results for input(s): LACTA in the last 72 hours. Troponin: No results for input(s): CKTOTAL, CKMB, TROPONINT in the last 72 hours. ABGs: No results for input(s): PH, PCO2, PO2, HCO3, O2SAT in the last 72 hours. CRP:  No results for input(s): CRP in the last 72 hours. Sed Rate:  No results for input(s): SEDRATE in the last 72 hours. Cultures:   No results for input(s): CULTURE in the last 72 hours. No results for input(s): BC, Alfredo Mouse in the last 72 hours. No results for input(s): CXSURG in the last 72 hours. Radiology reports as per the Radiologist  Radiology: No results found. Assessment   Acute renal failure/prior contrast-induced nephropathy  Hypertension  Right pontine stroke  Occasional chest pain  Hyponatremia  Metabolic acidosis  Vitamin D deficiency  Secondary hyperparathyroidism    Plan:  Work-up ordered ongoing  Control blood pressures  Avoiding cardiac catheterization seems reasonable to avoid further contrast load patient with prior contrast injury  Renal function stabilized, continue IV fluids for today  Add oral bicarbonate and vitamin D  May need urology follow-up for prostate findings on ultrasound, this can be scheduled as outpatient    Thank you for the consult, we appreciate the opportunity to provide care to your patients. Feel free to contact me if I can be of any further assistance.       Sona Arboleda MD  02/14/21  12:06 PM

## 2021-02-14 NOTE — CONSULTS
Nephrology ([address] [facility]) Consult Note      Patient:  Robyn Pierce  YOB: 1955  Date of Service: 2/13/2021  MRN: [id number]   Acct: [de-identified]   Primary Care Physician: El Lundy  Advance Directive: Full Code  Admit Date: 2/8/2021       Hospital Day: 5  Referring Provider: Faviola Vuong MD    Patient independently seen and examined, Chart, Consults, Notes, Operative notes, Labs, Cardiology, and Radiology studies reviewed as available. Subjective:  Robyn Pierce is a 72 y.o. male  whom we were consulted for acute renal failure. Patient recalls no prior history of renal failure evaluations although he was just seen at [facility] last week for the same. He recalls no NSAID usage although is unsure. Chart review does show history of contrast-induced HUI. The cardiology consult mentions chronic kidney disease however the patient recalls no prior diagnosis and his creatinine mission was 1.3 and rapidly increasing after having improved from his [place] values. He denies any dysuria hematuria, hemoptysis or rash, chest pain or shortness of air.     Allergies:  Codeine    Medicines:  Current Facility-Administered Medications   Medication Dose Route Frequency Provider Last Rate Last Admin    cyclobenzaprine (FLEXERIL) tablet 10 mg  10 mg Oral Nightly Faviola Vuong MD   10 mg at 02/13/21 2107    0.9 % sodium chloride infusion   Intravenous Continuous Faviola Vuong  mL/hr at 02/13/21 1255 New Bag at 02/13/21 1255    [START ON 2/14/2021] metoprolol succinate (TOPROL XL) extended release tablet 100 mg  100 mg Oral Daily Alisson Alfaro MD        isosorbide mononitrate (IMDUR) extended release tablet 60 mg  60 mg Oral Daily Alisson Alfaro MD   60 mg at 02/13/21 1518    nitroGLYCERIN (NITROSTAT) SL tablet 0.4 mg  0.4 mg Sublingual Q5 Min PRN Leonidas Jacob MD   0.4 mg at 02/13/21 0726    melatonin disintegrating tablet 10 mg  10 mg Oral Nightly Faviola Vuong MD 10 mg at 02/13/21 2107    enoxaparin (LOVENOX) injection 30 mg  30 mg Subcutaneous Daily Tommy Goldberg MD   30 mg at 02/13/21 1531    aspirin chewable tablet 81 mg  81 mg Oral Daily Tommy Goldberg MD   81 mg at 02/13/21 1110    atorvastatin (LIPITOR) tablet 10 mg  10 mg Oral Daily Tommy Goldberg MD   10 mg at 02/13/21 0854    amLODIPine (NORVASC) tablet 10 mg  10 mg Oral Nightly Tommy Goldberg MD   10 mg at 02/13/21 2107    cloNIDine (CATAPRES) tablet 0.1 mg  0.1 mg Oral PRN Tommy Goldberg MD   0.1 mg at 02/09/21 8383    clopidogrel (PLAVIX) tablet 75 mg  75 mg Oral Daily Tommy Goldberg MD   75 mg at 02/13/21 0854    latanoprost (XALATAN) 0.005 % ophthalmic solution 1 drop  1 drop Ophthalmic Nightly Tommy Goldberg MD   1 drop at 02/13/21 2107    [Held by provider] triamterene-hydroCHLOROthiazide (MAXZIDE-25) 37.5-25 MG per tablet 2 tablet  2 tablet Oral Daily Tommy Goldberg MD   2 tablet at 02/12/21 2832    vitamin D3 (CHOLECALCIFEROL) tablet 400 Units  400 Units Oral Daily Tommy Goldberg MD   400 Units at 02/13/21 0854    acetaminophen (TYLENOL) tablet 650 mg  650 mg Oral Q4H PRN Tommy Goldberg MD        bisacodyl (DULCOLAX) suppository 10 mg  10 mg Rectal Daily PRN Tommy Goldberg MD        polyethylene glycol [facility]) packet 17 g  17 g Oral Daily Tommy Goldberg MD   17 g at 02/13/21 0854    magnesium hydroxide (MILK OF MAGNESIA) 400 MG/5ML suspension 30 mL  30 mL Oral Daily PRN Tommy Goldberg MD           Past Medical History:  Past Medical History:   Diagnosis Date    Cerebral artery occlusion with cerebral infarction [facility]) 2013    right sided weakness    Hyperlipidemia     Hypertension        Past Surgical History:  Past Surgical History:   Procedure Laterality Date    ABDOMEN SURGERY      noncancerous tumor in abdomen    APPENDECTOMY      BACK SURGERY      lumbar surgery x 4    CERVICAL SPINE SURGERY      fusion    SHOULDER ARTHROSCOPY Right 8/28/2019    ARTHROSCOPIC SAD/DCR LABRAL REPAIR RIGHT SHOULDER performed by Inés Milton DO at [facility] OR       Family History  Family History   Problem Relation Age of Onset    High Blood Pressure Mother     Heart Failure Mother     High Blood Pressure Father     Stroke Father        Social History  Social History     Socioeconomic History    Marital status:      Spouse name: Not on file    Number of children: Not on file    Years of education: Not on file    Highest education level: Not on file   Occupational History    Not on file   Social Needs    Financial resource strain: Not on file    Food insecurity     Worry: Not on file     Inability: Not on file    Transportation needs     Medical: Not on file     Non-medical: Not on file   Tobacco Use    Smoking status: Former Smoker     Packs/day: 1.00     Years: 40.00     Pack years: 40.00     Types: Cigarettes     Quit date: 2013     Years since quittin.4    Smokeless tobacco: Never Used   Substance and Sexual Activity    Alcohol use: Not Currently     Frequency: Never    Drug use: Not on file    Sexual activity: Not on file   Lifestyle    Physical activity     Days per week: Not on file     Minutes per session: Not on file    Stress: Not on file   Relationships    Social connections     Talks on phone: Not on file     Gets together: Not on file     Attends Taoism service: Not on file     Active member of club or organization: Not on file     Attends meetings of clubs or organizations: Not on file     Relationship status: Not on file    Intimate partner violence     Fear of current or ex partner: Not on file     Emotionally abused: Not on file     Physically abused: Not on file     Forced sexual activity: Not on file   Other Topics Concern    Not on file   Social History Narrative    Not on file         Review of Systems:  History obtained from chart review and the patient  General ROS: No fever or chills  Respiratory ROS: No cough, shortness of breath, wheezing  Cardiovascular ROS: No chest pain or palpitations  Gastrointestinal ROS: No abdominal pain or melena  Genito-Urinary ROS: No dysuria or hematuria  Musculoskeletal ROS: No joint pain or swelling   14 point ROS reviewed with the patient and negative except as noted above and in the HPI unless unable to obtain. Objective:  Patient Vitals for the past 24 hrs:   BP Temp Temp src Pulse Resp SpO2 Weight   02/13/21 1818 136/80 96.7 °F (35.9 °C) Temporal 105 18 94 %    02/13/21 1523 130/87 97.2 °F (36.2 °C) Temporal 65 18 93 %    02/13/21 0929 139/86 96.9 °F (36.1 °C) Temporal 70 16 93 %    02/13/21 0846       157 lb 0.2 oz (71.2 kg)   02/13/21 0726 110/72   73 18 95 %    02/13/21 0725 119/80   73 18 94 %    02/13/21 0516 127/82 97 °F (36.1 °C) Temporal 80 16 94 %    02/13/21 0513 (!) 165/97 97 °F (36.1 °C) Temporal 64 16     02/13/21 0310 109/60   75      02/13/21 0306 (!) 155/79 98.3 °F (36.8 °C) Temporal 63 16         Intake/Output Summary (Last 24 hours) at 2/13/2021 2235  Last data filed at 2/13/2021 1905  Gross per 24 hour   Intake 839.02 ml   Output 600 ml   Net 239.02 ml     General: awake/alert   Chest:  clear to auscultation bilaterally  CVS: regular rate and rhythm  Abdominal: soft, nontender, normal bowel sounds  Extremities: no cyanosis or edema  Skin: warm and dry without rash      Labs:  BMP:   Recent Labs     02/11/21  0604 02/12/21  1015 02/13/21  0344    135* 133*   K 4.6 5.1* 4.5    101 99   CO2 21* 20* 24   BUN 44* 58* 66*   CREATININE 1.7* 2.2* 2.2*   CALCIUM 9.9 10.1 9.4     CBC:   Recent Labs     02/11/21  0604 02/12/21  1015   WBC 8.2 9.0   HGB 15.6 15.8   HCT 46.5 48.0   MCV 79.6* 80.1    301     LIVER PROFILE:   Recent Labs     02/13/21  0344   AST 16   ALT 13   LIPASE 64*   BILIDIR 0.1   BILITOT 0.3   ALKPHOS 72     PT/INR: No results for input(s): PROTIME, INR in the last 72 hours. APTT: No results for input(s): APTT in the last 72 hours.   BNP:  No results for input(s): BNP in the last 72 hours. Ionized Calcium:No results for input(s): IONCA in the last 72 hours. Magnesium:No results for input(s): MG in the last 72 hours. Phosphorus:No results for input(s): PHOS in the last 72 hours. HgbA1C: No results for input(s): LABA1C in the last 72 hours. Hepatic:   Recent Labs     02/13/21  0344   ALKPHOS 72   ALT 13   AST 16   PROT 7.5   BILITOT 0.3   BILIDIR 0.1   LABALBU 4.1     Lactic Acid: No results for input(s): LACTA in the last 72 hours. Troponin: No results for input(s): CKTOTAL, CKMB, TROPONINT in the last 72 hours. ABGs: No results for input(s): PH, PCO2, PO2, HCO3, O2SAT in the last 72 hours. CRP:  No results for input(s): CRP in the last 72 hours. Sed Rate:  No results for input(s): SEDRATE in the last 72 hours. Cultures:   No results for input(s): CULTURE in the last 72 hours. No results for input(s): BC, Alvia Baylee in the last 72 hours. No results for input(s): CXSURG in the last 72 hours. Radiology reports as per the Radiologist  Radiology: No results found. Assessment   Acute renal failure/prior contrast-induced nephropathy  Hypertension  Right pontine stroke  Occasional chest pain  Hyponatremia  Metabolic acidosis      Plan:  Work-up ordered ongoing  Control blood pressures  Avoiding catheterization seems reasonable to avoid further contrast load patient with prior contrast injury      Thank you for the consult, we appreciate the opportunity to provide care to your patients. Feel free to contact me if I can be of any further assistance.       John Truong MD  02/13/21  10:35 PM

## 2021-02-15 LAB
ANION GAP SERPL CALCULATED.3IONS-SCNC: 8 MMOL/L (ref 7–19)
BASOPHILS ABSOLUTE: 0 K/UL (ref 0–0.2)
BASOPHILS RELATIVE PERCENT: 0.5 % (ref 0–1)
BUN BLDV-MCNC: 48 MG/DL (ref 8–23)
CALCIUM SERPL-MCNC: 8.7 MG/DL (ref 8.8–10.2)
CHLORIDE BLD-SCNC: 110 MMOL/L (ref 98–111)
CO2: 22 MMOL/L (ref 22–29)
CREAT SERPL-MCNC: 1.6 MG/DL (ref 0.5–1.2)
EOSINOPHILS ABSOLUTE: 0.1 K/UL (ref 0–0.6)
EOSINOPHILS RELATIVE PERCENT: 1.3 % (ref 0–5)
GFR AFRICAN AMERICAN: 53
GFR NON-AFRICAN AMERICAN: 44
GLUCOSE BLD-MCNC: 112 MG/DL (ref 74–109)
HCT VFR BLD CALC: 36.5 % (ref 42–52)
HEMOGLOBIN: 12.1 G/DL (ref 14–18)
IMMATURE GRANULOCYTES #: 0.1 K/UL
LYMPHOCYTES ABSOLUTE: 2.4 K/UL (ref 1.1–4.5)
LYMPHOCYTES RELATIVE PERCENT: 27.5 % (ref 20–40)
MCH RBC QN AUTO: 26.6 PG (ref 27–31)
MCHC RBC AUTO-ENTMCNC: 33.2 G/DL (ref 33–37)
MCV RBC AUTO: 80.2 FL (ref 80–94)
MONOCYTES ABSOLUTE: 0.7 K/UL (ref 0–0.9)
MONOCYTES RELATIVE PERCENT: 8.4 % (ref 0–10)
NEUTROPHILS ABSOLUTE: 5.4 K/UL (ref 1.5–7.5)
NEUTROPHILS RELATIVE PERCENT: 61.7 % (ref 50–65)
PDW BLD-RTO: 14.6 % (ref 11.5–14.5)
PLATELET # BLD: 258 K/UL (ref 130–400)
PMV BLD AUTO: 11.1 FL (ref 9.4–12.4)
POTASSIUM REFLEX MAGNESIUM: 4.6 MMOL/L (ref 3.5–5)
RBC # BLD: 4.55 M/UL (ref 4.7–6.1)
SODIUM BLD-SCNC: 140 MMOL/L (ref 136–145)
WBC # BLD: 8.7 K/UL (ref 4.8–10.8)

## 2021-02-15 PROCEDURE — 36415 COLL VENOUS BLD VENIPUNCTURE: CPT

## 2021-02-15 PROCEDURE — 80048 BASIC METABOLIC PNL TOTAL CA: CPT

## 2021-02-15 PROCEDURE — 6370000000 HC RX 637 (ALT 250 FOR IP): Performed by: INTERNAL MEDICINE

## 2021-02-15 PROCEDURE — 97110 THERAPEUTIC EXERCISES: CPT

## 2021-02-15 PROCEDURE — 97116 GAIT TRAINING THERAPY: CPT

## 2021-02-15 PROCEDURE — 1180000000 HC REHAB R&B

## 2021-02-15 PROCEDURE — 99232 SBSQ HOSP IP/OBS MODERATE 35: CPT | Performed by: PSYCHIATRY & NEUROLOGY

## 2021-02-15 PROCEDURE — 6370000000 HC RX 637 (ALT 250 FOR IP): Performed by: PSYCHIATRY & NEUROLOGY

## 2021-02-15 PROCEDURE — 2580000003 HC RX 258: Performed by: PSYCHIATRY & NEUROLOGY

## 2021-02-15 PROCEDURE — 6360000002 HC RX W HCPCS: Performed by: PSYCHIATRY & NEUROLOGY

## 2021-02-15 PROCEDURE — 85025 COMPLETE CBC W/AUTO DIFF WBC: CPT

## 2021-02-15 PROCEDURE — 97535 SELF CARE MNGMENT TRAINING: CPT

## 2021-02-15 PROCEDURE — 92507 TX SP LANG VOICE COMM INDIV: CPT

## 2021-02-15 RX ADMIN — POLYETHYLENE GLYCOL 3350 17 G: 17 POWDER, FOR SOLUTION ORAL at 07:52

## 2021-02-15 RX ADMIN — METOPROLOL SUCCINATE 100 MG: 50 TABLET, EXTENDED RELEASE ORAL at 07:52

## 2021-02-15 RX ADMIN — CLOPIDOGREL BISULFATE 75 MG: 75 TABLET ORAL at 07:52

## 2021-02-15 RX ADMIN — SODIUM CHLORIDE: 9 INJECTION, SOLUTION INTRAVENOUS at 17:35

## 2021-02-15 RX ADMIN — LATANOPROST 1 DROP: 50 SOLUTION OPHTHALMIC at 20:44

## 2021-02-15 RX ADMIN — ISOSORBIDE MONONITRATE 60 MG: 60 TABLET, EXTENDED RELEASE ORAL at 07:52

## 2021-02-15 RX ADMIN — ACETAMINOPHEN 650 MG: 325 TABLET ORAL at 14:08

## 2021-02-15 RX ADMIN — SODIUM BICARBONATE 325 MG: 325 TABLET ORAL at 07:52

## 2021-02-15 RX ADMIN — SODIUM BICARBONATE 325 MG: 325 TABLET ORAL at 20:44

## 2021-02-15 RX ADMIN — ACETAMINOPHEN 650 MG: 325 TABLET ORAL at 20:44

## 2021-02-15 RX ADMIN — ASPIRIN 81 MG: 81 TABLET, CHEWABLE ORAL at 07:52

## 2021-02-15 RX ADMIN — ATORVASTATIN CALCIUM 10 MG: 10 TABLET, FILM COATED ORAL at 07:52

## 2021-02-15 RX ADMIN — SODIUM CHLORIDE: 9 INJECTION, SOLUTION INTRAVENOUS at 07:50

## 2021-02-15 RX ADMIN — CYCLOBENZAPRINE 10 MG: 10 TABLET, FILM COATED ORAL at 20:44

## 2021-02-15 RX ADMIN — AMLODIPINE BESYLATE 10 MG: 10 TABLET ORAL at 20:44

## 2021-02-15 RX ADMIN — Medication 10 MG: at 20:44

## 2021-02-15 RX ADMIN — ENOXAPARIN SODIUM 30 MG: 40 INJECTION SUBCUTANEOUS at 07:52

## 2021-02-15 ASSESSMENT — PAIN SCALES - GENERAL
PAINLEVEL_OUTOF10: 0
PAINLEVEL_OUTOF10: 6
PAINLEVEL_OUTOF10: 0

## 2021-02-15 ASSESSMENT — PAIN - FUNCTIONAL ASSESSMENT: PAIN_FUNCTIONAL_ASSESSMENT: ACTIVITIES ARE NOT PREVENTED

## 2021-02-15 ASSESSMENT — PAIN DESCRIPTION - ONSET
ONSET: GRADUAL
ONSET: GRADUAL

## 2021-02-15 ASSESSMENT — PAIN DESCRIPTION - PAIN TYPE: TYPE: ACUTE PAIN

## 2021-02-15 ASSESSMENT — PAIN DESCRIPTION - ORIENTATION: ORIENTATION: MID

## 2021-02-15 ASSESSMENT — PAIN DESCRIPTION - LOCATION: LOCATION: HEAD

## 2021-02-15 ASSESSMENT — PAIN DESCRIPTION - DESCRIPTORS: DESCRIPTORS: HEADACHE

## 2021-02-15 NOTE — PROGRESS NOTES
Hospitalist Progress Note  2/15/2021 1:19 PM  Subjective:   Admit Date: 2/8/2021  PCP: Sanjeev Carrero    Subjective: \"I'm doing good today\". No CP or other complaints. Worked with therapy earlier. ROS: Six point review of systems is negative except as specifically addressed above. DIET CARDIAC;     Intake/Output Summary (Last 24 hours) at 2/15/2021 1319  Last data filed at 2/15/2021 0843  Gross per 24 hour   Intake 1716.63 ml   Output 800 ml   Net 916.63 ml     Medications:   sodium chloride 100 mL/hr at 02/15/21 0750     Current Facility-Administered Medications   Medication Dose Route Frequency Provider Last Rate Last Admin    vitamin D (ERGOCALCIFEROL) capsule 50,000 Units  50,000 Units Oral Weekly Christiane Allen MD   50,000 Units at 02/14/21 1215    sodium bicarbonate tablet 325 mg  325 mg Oral BID Christiane Allen MD   325 mg at 02/15/21 4272    cyclobenzaprine (FLEXERIL) tablet 10 mg  10 mg Oral Nightly Elizabeth Koenig MD   10 mg at 02/14/21 2045    0.9 % sodium chloride infusion   Intravenous Continuous Elizabeth Koenig  mL/hr at 02/15/21 0750 New Bag at 02/15/21 0750    metoprolol succinate (TOPROL XL) extended release tablet 100 mg  100 mg Oral Daily Armin Garcia MD   100 mg at 02/15/21 6574    isosorbide mononitrate (IMDUR) extended release tablet 60 mg  60 mg Oral Daily Armin Garcia MD   60 mg at 02/15/21 0752    nitroGLYCERIN (NITROSTAT) SL tablet 0.4 mg  0.4 mg Sublingual Q5 Min PRN Michael Gaytan MD   0.4 mg at 02/13/21 0726    melatonin disintegrating tablet 10 mg  10 mg Oral Nightly Elizabeth Koenig MD   10 mg at 02/14/21 2045    enoxaparin (LOVENOX) injection 30 mg  30 mg Subcutaneous Daily Elizabeth Koenig MD   30 mg at 02/15/21 9173    aspirin chewable tablet 81 mg  81 mg Oral Daily Elizabeth Koenig MD   81 mg at 02/15/21 0752    atorvastatin (LIPITOR) tablet 10 mg  10 mg Oral Daily Elizabeth Koenig MD   10 mg at 02/15/21 9000  amLODIPine (NORVASC) tablet 10 mg  10 mg Oral Nightly Shayla Rodriguez MD   10 mg at 02/14/21 2046    cloNIDine (CATAPRES) tablet 0.1 mg  0.1 mg Oral PRN Shayla Rodriguez MD   0.1 mg at 02/09/21 1063    clopidogrel (PLAVIX) tablet 75 mg  75 mg Oral Daily Shayla Rodriguez MD   75 mg at 02/15/21 0752    latanoprost (XALATAN) 0.005 % ophthalmic solution 1 drop  1 drop Ophthalmic Nightly Shayla Rodriguez MD   1 drop at 02/14/21 2046    [Held by provider] triamterene-hydroCHLOROthiazide (MAXZIDE-25) 37.5-25 MG per tablet 2 tablet  2 tablet Oral Daily Shayla Rodriguez MD   2 tablet at 02/12/21 3764    acetaminophen (TYLENOL) tablet 650 mg  650 mg Oral Q4H PRN Shayla Rodriguez MD   650 mg at 02/14/21 2143    bisacodyl (DULCOLAX) suppository 10 mg  10 mg Rectal Daily PRN Shayla Rodriguez MD        polyethylene glycol [facility]) packet 17 g  17 g Oral Daily Shayla Rodriguez MD   17 g at 02/15/21 0752    magnesium hydroxide (MILK OF MAGNESIA) 400 MG/5ML suspension 30 mL  30 mL Oral Daily PRN Shayla Rodriguez MD            Labs:     Recent Labs     02/14/21  0507 02/15/21  0400   WBC 8.6 8.7   RBC 5.07 4.55*   HGB 13.5* 12.1*   HCT 41.3* 36.5*   MCV 81.5 80.2   MCH 26.6* 26.6*   MCHC 32.7* 33.2    258     Recent Labs     02/13/21  0344 02/14/21  0507 02/15/21  0400   * 134* 140   K 4.5 4.9  4.9 4.6   ANIONGAP 10 9 8   CL 99 104 110   CO2 24 21* 22   BUN 66* 68* 48*   CREATININE 2.2* 2.2* 1.6*   GLUCOSE 103 89 112*   CALCIUM 9.4 8.8 8.7*     Recent Labs     02/14/21  0507   MG 2.5*   PHOS 4.0     Recent Labs     02/13/21  0344 02/14/21  0507   AST 16 12   ALT 13 10   BILITOT 0.3 0.3   ALKPHOS 72 61         Objective:   Vitals: /60   Pulse 53   Temp 97.9 °F (36.6 °C) (Temporal)   Resp 18   Ht 5' 6\" (1.676 m)   Wt 161 lb 4.8 oz (73.2 kg)   SpO2 95%   BMI 26.03 kg/m²   24HR INTAKE/OUTPUT:      Intake/Output Summary (Last 24 hours) at 2/15/2021 1319  Last data filed at 2/15/2021 0843  Gross per 24 hour Intake 1716.63 ml   Output 800 ml   Net 916.63 ml     General appearance: Pleasant male sitting up in wheelchair n no acute distress  Head - NC/AT  Eyesanicteric sclera  Mouthmoist mucous membranes  Lungs: Clear to auscultation bilaterally without rales, rhonchi or wheezes, no use of accessory muscles  Heart: Regular rate and rhythm, S1, S2 normal, no murmur, click, no gallop or rubs. No reproducible chest pain on palpation of the chest wall. Abdomen: Soft, non-tender; no peritoneal signs, bowel sounds normal; no masses,  no organomegaly. Extremities: Extremities normal without clubbing, atraumatic, no cyanosis or edema. Neurologic: No focal neurologic deficits, normal sensation, , alert and oriented, affect and mood appropriate. Speech is fluent. Intact remote and recent memory. Psychiatric: appropriate  Skin: No overt rashes, nodules. Hematologic: No overt bruises, petechiae. Assessment and Plan: Active Problems:    Acute ischemic stroke [facility])  Resolved Problems:    * No resolved hospital problems. *    HUI  - hold ACEi, diuretics  - nephrology consulted for this and optimization for cardiac cath; defer tx to them. seems reasonable to avoid with prior contrast induced kidney injursy      Chest pain   ? Unstable angina- trops, EKG, TTE, dimer, CXR reassuring. Reviewed OSH records. DO NOT see any evidence that patient had stress test done at [facility]. rec outpatient cath once renal function improved. - actively being managed by cardiology. imdur seems to have helped. Continue this and PRN nitro. .. Defer further treatment and workup to Cardiology. - Cardiology rec proceeding with cardiac cath, but per nephrology seems reasonable to avoid with prior contrast induced kidney injury   - unfortunately unable to do tele on rehab floor     CVA with left sided ataxia/ hemiparesis- per Neurology.  On aspirin, statin, plavix.     HTN - holding meds for HUI as above. On amlodipine, Imdur, BB. Add agents as needed. Vit D def - has been started on replacement      HLD - statin. Prostate enlargement - could benefit from Urology follow-up on discharge.     Defer VT prophylaxis, dispo, activity to primary team    Signed:   Bari Melissa MD 2/15/2021 1:19 PM  Hospitalist

## 2021-02-15 NOTE — PROGRESS NOTES
Nephrology ([facility]) Progress Note    Patient:  Sania Lee  YOB: 1955  Date of Service: 2/15/2021  MRN: [id number]   Acct: [de-identified]   Primary Care Physician: Buddy Valdez  Advance Directive: Full Code  Admit Date: 2/8/2021       Hospital Day: 7  Referring Provider: Danny Vo MD    Patient independently seen and examined, Chart, Consults, Notes, Operative notes, Labs, Cardiology, and Radiology studies reviewed as available. Chief complaint: Abnormal labs. Subjective:  Sania Lee is a 72 y.o. male  whom we were consulted for acute kidney injury. Patient denies any history of chronic kidney disease. He was initially admitted to [facility] with CVA. He is now transferred to [facility] for physical therapy. While at [facility] he has received intravenous contrast dye led to a decline in renal function which is slowly improving. Patient has history of benign essential hypertension which is fairly good control.     Allergies:  Codeine    Medicines:  Current Facility-Administered Medications   Medication Dose Route Frequency Provider Last Rate Last Admin    vitamin D (ERGOCALCIFEROL) capsule 50,000 Units  50,000 Units Oral Weekly Chivo Valdez MD   50,000 Units at 02/14/21 1215    sodium bicarbonate tablet 325 mg  325 mg Oral BID Chivo Valdez MD   325 mg at 02/15/21 3875    cyclobenzaprine (FLEXERIL) tablet 10 mg  10 mg Oral Nightly Danny Vo MD   10 mg at 02/14/21 2045    0.9 % sodium chloride infusion   Intravenous Continuous Danny Vo  mL/hr at 02/15/21 0750 New Bag at 02/15/21 0750    metoprolol succinate (TOPROL XL) extended release tablet 100 mg  100 mg Oral Daily Elizabeth Weldon MD   100 mg at 02/15/21 3030    isosorbide mononitrate (IMDUR) extended release tablet 60 mg  60 mg Oral Daily Elizabeth Weldon MD   60 mg at 02/15/21 4236 History obtained from chart review and the patient  General ROS: No fever or chills  Respiratory ROS: No cough, shortness of breath, wheezing  Cardiovascular ROS: No chest pain or palpitations  Gastrointestinal ROS: No abdominal pain or melena  Genito-Urinary ROS: No dysuria or hematuria  Musculoskeletal ROS: No joint pain or swelling         Objective:  Patient Vitals for the past 24 hrs:   BP Temp Temp src Pulse Resp SpO2 Weight   02/15/21 0629 108/60 97.9 °F (36.6 °C) Temporal 53 18 95 %    02/15/21 0428       161 lb 4.8 oz (73.2 kg)   02/14/21 1959 (!) 149/77 97.1 °F (36.2 °C) Temporal 88 16 94 %        Intake/Output Summary (Last 24 hours) at 2/15/2021 1255  Last data filed at 2/15/2021 2304  Gross per 24 hour   Intake 1716.63 ml   Output 800 ml   Net 916.63 ml     General: awake/alert   HEENT: Normocephalic atraumatic head  Neck: Supple with no JVD or carotid bruits. Chest:  clear to auscultation bilaterally  CVS: regular rate and rhythm  Abdominal: soft, nontender, normal bowel sounds  Extremities: no cyanosis or edema  Skin: warm and dry without rash    Labs:  BMP:   Recent Labs     02/13/21  0344 02/14/21  0507 02/15/21  0400   * 134* 140   K 4.5 4.9  4.9 4.6   CL 99 104 110   CO2 24 21* 22   PHOS  --  4.0  --    BUN 66* 68* 48*   CREATININE 2.2* 2.2* 1.6*   CALCIUM 9.4 8.8 8.7*     CBC:   Recent Labs     02/14/21  0507 02/15/21  0400   WBC 8.6 8.7   HGB 13.5* 12.1*   HCT 41.3* 36.5*   MCV 81.5 80.2    258     LIVER PROFILE:   Recent Labs     02/13/21 0344 02/14/21  0507   AST 16 12   ALT 13 10   LIPASE 64*  --    BILIDIR 0.1  --    BILITOT 0.3 0.3   ALKPHOS 72 61     PT/INR: No results for input(s): PROTIME, INR in the last 72 hours. APTT: No results for input(s): APTT in the last 72 hours. BNP:  No results for input(s): BNP in the last 72 hours. Ionized Calcium:No results for input(s): IONCA in the last 72 hours.   Magnesium:  Recent Labs     02/14/21  0507   MG 2.5* Phosphorus:  Recent Labs     02/14/21  0507   PHOS 4.0     HgbA1C: No results for input(s): LABA1C in the last 72 hours. Hepatic:   Recent Labs     02/13/21  0344 02/14/21  0507   ALKPHOS 72 61   ALT 13 10   AST 16 12   PROT 7.5 6.7   BILITOT 0.3 0.3   BILIDIR 0.1  --    LABALBU 4.1 3.7     Lactic Acid: No results for input(s): LACTA in the last 72 hours. Troponin: No results for input(s): CKTOTAL, CKMB, TROPONINT in the last 72 hours. ABGs: No results found for: PHART, PO2ART, [id number]  CRP:  No results for input(s): CRP in the last 72 hours. Sed Rate:  No results for input(s): SEDRATE in the last 72 hours. Culture Results:   Blood Culture Recent: No results for input(s): BC in the last 720 hours. Urine Culture Recent : No results for input(s): LABURIN in the last 720 hours.     Radiology reports as per the Radiologist  Radiology: Echo Complete 2d W Doppler W Color    Result Date: 2/12/2021 Transthoracic Echocardiography Report (TTE)  Demographics   Patient Name   Eli Zee  Date of Study           02/12/2021   MRN            [id number]          Gender                  Male   Date of Birth  1955      Room Number             [id number]   Age            72 year(s)   Height:        66 inches       Referring Physician     Micheal Rosas   Weight:        162 pounds      Sonographer             Lori Izquierdo [facility]   BSA:           1.83 m^2        Interpreting Physician  Yury Joseph MD   BMI:           26.15 kg/m^2  Procedure Type of Study   TTE procedure:ECHO NO CONTRAST WITH DOP/COLR. Study Location: Echo Lab Technical Quality: Adequate visualization Patient Status: Inpatient Indications:Chest pain. Conclusions   Summary  Mild to moderate mitral regurgitation is present. Mild tricuspid regurgitation with estimated RVSP of 30-35 mm Hg. Moderate concentric left ventricular hypertrophy. Normal left ventricular size with preserved LV function and an estimated  ejection fraction of approximately 55-60%. Grade 1 diastolic dysfunction  Normal LA filling pressure   Signature   ----------------------------------------------------------------  Electronically signed by Yury Joseph MD(Interpreting physician)  on 02/12/2021 04:32 PM  ----------------------------------------------------------------   Findings   Mitral Valve  Mild to moderate mitral regurgitation is present. Aortic Valve  normal aortic valve   Tricuspid Valve  Mild tricuspid regurgitation with estimated RVSP of 30-35 mm Hg. Pulmonic Valve  No evidence of any pulmonic regurgitation. Left Atrium  Normal size left atrium. Left Ventricle  Moderate concentric left ventricular hypertrophy. Normal left ventricular size with preserved LV function and an estimated  ejection fraction of approximately 55-60%. Grade 1 diastolic dysfunction  Normal LA filling pressure   Right Atrium  Normal right atrial dimension with no evidence of thrombus or mass noted. Right Ventricle  Normal right ventricular size with preserved RV function. M-Mode Measurements (cm)   LVIDd: 3.54 cm                         LVIDs: 2.16 cm  IVSd: 1.4 cm  LVPWd: 1.4 cm                          AO Root Dimension: 3.3 cm  % Ejection Fraction: 70.4 %            LA: 3.3 cm                                         LVOT: 2 cm  Doppler Measurements:   AV Velocity:2.42 cm/s                 MV Peak E-Wave: 44.6 cm/s  AV Peak Gradient: 7.84 mmHg           MV Peak A-Wave: 73.3 cm/s  AV Mean Gradient: 4 mmHg              MV E/A Ratio: 0.61 %  AV Area (Continuity):2.42 cm^2        MV Peak Gradient: 0.8 mmHg  TR Velocity:225 cm/s                  MV P1/2t: 134 msec  TR Gradient:20.25 mmHg                MVA by PHT1.64 cm^2      Xr Chest (2 Vw)    Result Date: 2/13/2021  XR CHEST (2 VW) 2/13/2021 7:32 PM History: Chest pain. Two-view chest x-ray with no comparison. The heart size is normal. The mediastinum is within normal limits. The lungs are mildly hyperexpanded with no pneumonia or pneumothorax. Mild symmetric prominence of the pulmonary arteries. There is no significant pleural fluid. No congestive failure changes. 1. No acute disease.  Signed by Dr Kenneth Quarles on 2/13/2021 7:32 PM    Us Renal Complete    Result Date: 2/14/2021 EXAMINATION: US RENAL COMPLETE 2/14/2021 11:29 AM HISTORY: Acute on chronic renal failure. Report: Sonographic images of the kidneys were obtained bilaterally. COMPARISON: There are no correlative imaging studies for comparison. The right kidney measures 10.3 x 5.0 x 4.0 cm and has normal morphology and cortical echogenicity, cortical thickness ranges from 12.1 to 17.6 mm. No mass or hydronephrosis is identified. Color Doppler images demonstrate vascular flow within the right kidney. The left kidney measures 10.5 x 5.2 x 3.9 cm and has normal morphology and cortical echogenicity, no mass or hydronephrosis is identified. Cortical thickness on the left ranges from 16.1 to 16.9 mm. Color Doppler images demonstrate vascular flow within the left kidney. The bladder appears within normal limits. The prostate gland is enlarged, measuring approximately 3.6 x 3.7 x 5.0 cm. The prostate appears heterogeneous and has mild mass effect on the base of the bladder. Clinical correlation is recommended. 1. Normal ultrasound of the kidneys. 2. Prostate enlargement with mild mass effect on the base of the bladder. Clinical correlation is recommended, including PSA values. Signed by Dr Vernon Connor. Dixie on 2/14/2021 11:31 AM       Assessment   1. Acute kidney injury/stage II/improving. 2.  Acute tubular necrosis. 3.  Contrast.-induced nephropathy. 4.  Right pontine ischemic infarct. 5.  Hyponatremia now resolved. 6.  Deficiency vitamin D.  7.  Metabolic acidemia. Plan:  1. Encouraged him to drink more water. 2.  Vitamin D supplement. 3.  P.o. sodium bicarbonate. 4.  Continue slow hydration.     Annette Pickard MD  02/15/21  12:55 PM

## 2021-02-15 NOTE — PATIENT CARE CONFERENCE
Long term goal 2: INDEP TF SURFACE TO SURFACE  Long term goal 3: AMBULATE 150 FT WITH RW INDEP  Long term goal 4: UP/DOWN 4 STEPS WITH 1 RAIL INDEP  Long term goal 5: HEP INDEP    ASSESSMENT:  Assessment: Pt tolerated amb well w/ minimal fatigue. SPEECH THERAPY  Swallowing Status/Diet: Regular diet with thin liquids  Precautions: Fall  Swallowing Status/Diet: Regular diet with thin liquids        Subjective: Patient alert and cooperative with all therapy tasks. Patient seen upright in wheel chair in OT room.      Objective:   Patient read at paragraph level to address speech intelligibility. Patient utilizing dysarthric strategies during task, independently. Speech intelligibility at paragraph level was 100%. Did note mild distortions.      Patient is a . 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 Patient presenting with 100% speech intelligibility during this task.      Patient states he has spoke on the phone several times in the last few days and all of the people he has spoke with state his speech is sounding much better. (per patient). Patient also states he has been independently practicing using the dysarthria strategies during all conversations.      Patient continues to show improvements for dysarthria.      Patient was also able to state on task during all activities and did not require cues or redirection even with auditory distractors present.      SHORT TERM GOAL #1:  Goal 1: The patient will complete oral motor exercises to improve speech intelligibility with min verbal cues at 100% accuracy.     SHORT TERM GOAL #2:  Goal 2: The patient will recall/utilize dysarthric strategies during conversational discourse with min verbal cues at 100% accuracy in order to improve speech intelligibility.     SHORT TERM GOAL #3:  Goal 3: The patient will sustain attention for 15+ minutes with min verbal cues or redirection during structured/unstructured activities.  MET    Long term goal 1: complete HEP with independence  Long term goal 2: complete overall dressing with modified independence  Long term goal 3: complete overall bathing with modified independence  Long term goal 4: complete overall toileting with modified independence  Long term goal 5: complete ambulatory home making task with modified independence  Long term goals 6: pt/family verbalize DME    Assessment:  Performance deficits / Impairments: Decreased functional mobility , Decreased fine motor control, Decreased high-level IADLs, Decreased strength, Decreased balance, Decreased ROM, Decreased ADL status, Decreased coordination, Decreased endurance                  NUTRITION  Current Wt: Weight: 164 lb (74.4 kg) / Body mass index is 26.47 kg/m². Admission Wt: Admission Body Weight: 162 lb (73.5 kg)  Oral Diet Orders: Cardiac    Pt remains adequately nourished AEB good po intake and stable wt since admission. Update pt preferences and continue current POC. Please see nutrition notes for further details. NURSING    Wounds/Incisions/Ulcers: No skin issues identified     Lobo Scale Score: 19    Pain: chest pain reported, no other    Consultations/Labs/X-rays: Routine labs bi-weekly and labs ordered by nephrology     Cardiology- Chest Pain x 2, Echo;   Atypical chest pain now subsided with adjustment of medication more hemodynamically stable  Reluctant to undergo cardiac catheterization    Nephrology -  Work-up ordered ongoing  Control blood pressures  Avoiding cardiac catheterization seems reasonable to avoid further contrast load patient with prior contrast injury  Renal function stabilized, continue IV fluids completed 2/16/21  Add oral bicarbonate and vitamin D  May need urology follow-up for prostate findings on ultrasound, this can be scheduled as outpatient    Family Education: Need to make contact with family to initiate education    Fall Risk:  Dickinson Score: 61    Fall in the last week? no Other Nursing Issues:         SOCIAL WORK/CASE MANAGEMENT  Assessment: Has family support - wife, Hart Fothergill and she reports their children - daughter and her 15and 13year old children and their 43 yr old son at the home to assist; Challenge is safe accessibility-home is tri-level with 6 steps no handrails into front- level where there are main living activities. Discharge Plan   Estimated Length of Stay: 2/23/21- [facility] date  Destination: home health    Pass: No    Services at Discharge: [address] [facility], Occupational Therapy, Speech Therapy and Nursing Other per evaluations    Equipment at Discharge: to be determined - ramp would be appropriate but not realistic at current weather conditions or for appropriate slope    Progress made in the prior week:  1. Ambulation improved  2. Able to participate without complication  3.improved footwear  4. Improved speech intelligibility  5. Goals for following week:  1. Supervision transfers  2. Minimal assist ADLs  3. Utilize strategies for dysarthriz  4.   5.     Factors facilitating achievement of predicted outcomes:  Alert, oriented, can attend to task    Barriers to the achievement of predicted outcomes: chest pain x 2 (on day 5)  -medical complications    Team Members Present at Conference:  : Roxanne James   Occupational Therapist: Ailyn Puga, OTR/L  Physical Therapist: Diaz Ahmadi PT,DPT  Speech Therapist: Andrey Calvert [address], [id number] [facility]  Nurse: Anjum Carrero, RN,BSN   Nurse Manager:  Anjum Carrero, RN, BSN  Dietitian:  Donovan Redmond, MS, RD, LD  Rehab Director:  Kirsten Lacey approve the established interdisciplinary plan of care as documented within the medical record of Lalita Ya.

## 2021-02-15 NOTE — PROGRESS NOTES
Patient:   Lorene Vora  MR#:    [id number]   Room:    0820/820-01   YOB: 1955  Date of Progress Note: 2/15/2021  Time of Note                           9:15 AM  Consulting Physician:   Elizabeth Koenig M.D. Attending Physician:  Elizabeth Koenig MD     CHIEF COMPLAINT: Left-sided weakness and dysarthria        subjective  This 72 y.o. male   with history of HTN,hyperlipidemia,chronic back pain and CVA. He presented to [facility] on 2/2/21 with c/o lightheadedness, mild blurred vision and change in his gait that started about 3 days prior. He was hypertensive on presentation. MRI done revealed a right pontine infarct. CTA head/neck showed non signifigant steno-occlusive disease. He was admitted the the hospitalist service with consult for neurology. He was seen by Dr. Amor [place]. He was doing quite well. When on 2/4/21 he had a return of dizziness with N&V and worsening left sided weakness, mild dysarthria, B/P was low. Stat MRI was ordered that revealed showed a new right temporal lobe infarct. TTE showed thickening on anterior leaflet of mirtal valve. On the morning of 2/5/21 he had an accoute onset of chest pain/pressure with hypotension. Repeat EKG was concerning. Troponin was negative x 2. Chest pain resolved with nitro. Cardiology was consult for NIKI.  He was seen by  Christy. NIKI done on 2/5/21 showed no thrombus noted in the left atrial appendage,negative bubble study, no evidence of PFO or ASD,severely thickened left ventricle but no evidence of LV noncompaction. Dr. Kristy Downs recommended a diagnostic left heart catheterization to assess his coronary anatomy. Patient wishes to posst-pone his cardiac work-up until after he completes the Rehab program.  He was also noted to have an increase in his creatinine of 5.12. Nephrology consulted. Patients creatine is rapidly improving now at 1.4 with BUN of 38. He is alert & oriented x 3, follows commands but does have mild dysarthia but demonstrates impulsivity and decreased safety awareness. Left sided weakness remains. Patient was evaluated by SPT for swallow and was cleared for a regular diet with thin liquids. Feeling a little better today. No complaints. REVIEW OF SYSTEMS:  Constitutional: No fevers No chills  Neck:No stiffness  Respiratory: No shortness of breath  Cardiovascular: No chest pain No palpitations  Gastrointestinal: No abdominal pain    Genitourinary: No Dysuria  Neurological: No headache, no confusion      PHYSICAL EXAM:  /60   Pulse 53   Temp 97.9 °F (36.6 °C) (Temporal)   Resp 18   Ht 5' 6\" (1.676 m)   Wt 161 lb 4.8 oz (73.2 kg)   SpO2 95%   BMI 26.03 kg/m²     Constitutional: he appears well-developed and well-nourished. Eyes  conjunctiva normal.  Pupils react to light  Ear, nose, throat -hearing intact to voice. No scars, masses, or lesions over external nose or ears, no atrophy of tongue  Neck-symmetric, no masses noted, no jugular vein distension  Respiration- chest wall appears symmetric, good expansion,   normal effort without use of accessory muscles  Cardiovascular- RRR  Musculoskeletal  no significant wasting of muscles noted, no bony deformities, gait no gross ataxia  Extremities-no clubbing, cyanosis or edema  Skin  warm, dry, and intact. No rash, erythema, or pallor. Psychiatric  mood, affect, and behavior appear normal.      Neurology  NEUROLOGICAL EXAM:  Awake, alert, fluent oriented x 3 appropriate affect  Attention and concentration appear appropriate  Recent and remote memory appears unremarkable  Speech  with dysarthria  No clear issues with language of fund of knowledge     Cranial Nerve Exam   CN II- Visual fields grossly unremarkable  CN III, IV,VI-EOMI, No nystagmus, conjugate eye movements, no ptosis  CN VII-no facial assymetry  CN VIII-Hearing intact        Motor Exam  4/5 left arm and leg     Tremors- no tremors in hands or head noted     Gait  Not tested     Coordination  Ataxic on the left            Nursing/pcp notes, imaging,labs and vitals reviewed. PT,OT and/or speech notes reviewed    Lab Results   Component Value Date    WBC 8.7 02/15/2021    HGB 12.1 (L) 02/15/2021    HCT 36.5 (L) 02/15/2021    MCV 80.2 02/15/2021     02/15/2021     Lab Results   Component Value Date     02/15/2021    K 4.6 02/15/2021     02/15/2021    CO2 22 02/15/2021    BUN 48 (H) 02/15/2021    CREATININE 1.6 (H) 02/15/2021    GLUCOSE 112 (H) 02/15/2021    CALCIUM 8.7 (L) 02/15/2021    PROT 6.7 02/14/2021    LABALBU 3.7 02/14/2021    BILITOT 0.3 02/14/2021    ALKPHOS 61 02/14/2021    AST 12 02/14/2021    ALT 10 02/14/2021    LABGLOM 44 (A) 02/15/2021    GFRAA 53 (L) 02/15/2021   No results found for: INRNo results found for: PHENYTOIN, ESR, CRP  Objective:   Patient was able to sustain attention for 15+ minutes in both structured/unstructured settings today, independently.      Patient completed oral motor exercises with minimal verbal cues. Patient does not present with any prominent facial weakness. Patient did report mild weakness in jaw area on the left side. Patient completed repetition of single words with emphasis on /ch/, /sh/, /s/ and /l/.  All words were 100% intelligible, however did note mild distortions for /ch/ and /sh/.     Patient was able to utilize and recall dysarthric strategies with 100% accuracy. Patient does really well overarticulating words when speaking in sentences. Overall speech intelligibility ranks at 100% for unknown context and unfamiliar listeners.      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 Patient also able to state the good evening/good morning message he speaks at [employer] both with 100% speech intelligibility. Mild distortions noted, but did not impact speech intelligibility. Patient also spoke on the phone during the session to one of his friends. The friend was able to to understand him with no difficulty.        02/11/21 1403 02/11/21 1404 02/11/21 1411   Bed Mobility   Rolling Modified independent  --   --    Supine to Sit Stand by assistance  --   --    Transfers   Sit to Stand Stand by assistance;Contact guard assistance  --   --    Stand to sit Stand by assistance  --   --    Bed to Chair Contact guard assistance;Stand by assistance  --   --    Ambulation   Ambulation?  --  Yes  --    WB Status  --  WBAT  --    Ambulation 1   Surface  --  level tile  --    Device  --  Rolling Walker  --    Assistance  --  Contact guard assistance  --    Quality of Gait  --  Working on step-through pattern and hel-toe steps with RLE for more normal amb. Verbal cues for posture and to keep LLE extended during stance phase.  --    Gait Deviations  --  Slow Karina;Decreased step length;Decreased step height  --    Distance  --  20' x2  --    Comments  --  Amb chair to chair; incorporated turns. Verbal cues for technique during turns and backing to chair. --    Exercises   Comments  --   --   --    Conditions Requiring Skilled Therapeutic Intervention   Assessment  --   --  Worked on amb chair to chair today incorporating turns and backing to sit. Pt. more fatigued this afternoon.    Activity Tolerance Activity Tolerance  --  Patient Tolerated treatment well;Patient limited by fatigue  --    Safety Devices   Type of devices  --   --   --         02/11/21 1418 02/11/21 1430   Bed Mobility   Rolling  --   --    Supine to Sit  --   --    Transfers   Sit to Stand  --   --    Stand to sit  --   --    Bed to Chair  --   --    Ambulation   Ambulation?  --   --    WB Status  --   --    Ambulation 1   Surface  --   --    Device  --   --    Assistance  --   --    Quality of Gait  --   --    Gait Deviations  --   --    Distance  --   --    Comments  --   --    Exercises   Comments Sitting BLE exercises-  15 reps with 1-1/2 lb weight RLE. 10 reps LLE.  --    Conditions Requiring Skilled Therapeutic Intervention   Assessment  --   --    Activity Tolerance   Activity Tolerance  --   --    Safety Devices   Type of devices  --  Call light within reach; Chair alarm in place      02/11/21 0830   Eating   Assistance Needed Setup or clean-up assistance   CARE Score 5   Oral Hygiene   Assistance Needed Setup or clean-up assistance   CARE Score 5   [address] Needed Partial/moderate assistance   Comment min A for balance   CARE Score 3   Shower/Bathe Self   Assistance Needed Partial/moderate assistance   Comment min A for balance   CARE Score 3   Upper Body Dressing   Assistance Needed Setup or clean-up assistance   CARE Score 5   Lower Body Dressing   Assistance Needed Partial/moderate assistance   Comment min A for balance, vc for tech   CARE Score 3   Putting On/Taking Off Footwear   Assistance Needed Partial/moderate assistance   Comment min A for left shoe   CARE Score 3        RECORD REVIEW: Previous medical records, medications were reviewed at today's visit    IMPRESSION:   1. Right pontine and temporal strokes with left-sided ataxia/hemiparesis currently on Plavix/aspirin PT/OT/SLP  2. Recent chest pain resolved with nitroglycerin. Patient deferring cardiac work-up until the near future.   see #8 3.  Essential hypertensioncontinue current medications and monitoring  4. Hyperlipidemiacontinue current medications and monitoring  5. GIbowel regimen  6. DVT prophylaxissubcu Lovenox  7. Recent acute renal failure/CKD IV fluids. Improved. Nephrology following. 8.  Recurring chest pain relieved with nitroglycerin. Cardiology actively following and on board. Seems better with Imdur. Appreciate hospitalist, nephrology and cardiology consultants.   ELOS:  restaff on Wednesday

## 2021-02-15 NOTE — PROGRESS NOTES
Occupational Therapy  Facility/Department: [facility] 8 REHAB UNIT  Daily Treatment Note  NAME: Ciera Leos  : 1955  MRN: [id number]    Date of Service: 2/15/2021    Discharge Recommendations:  Home with Home health OT       Patient Diagnosis(es): There were no encounter diagnoses. has a past medical history of Cerebral artery occlusion with cerebral infarction ([address] Hyperlipidemia, and Hypertension. has a past surgical history that includes back surgery; Cervical spine surgery; Abdomen surgery; Appendectomy; and Shoulder arthroscopy (Right, 2019).     Restrictions  Restrictions/Precautions  Restrictions/Precautions: Fall Risk  Subjective   General  Chart Reviewed: Yes, Orders  Patient assessed for rehabilitation services?: Yes  Additional Pertinent Hx: CVA  Diagnosis: CVA with left hemiparesis    Pain Assessment  Pain Assessment: 0-10  Pain Level: 0  Pre Treatment Pain Screening  Pain at present: 0  Scale Used: Numeric Score  Vital Signs  Patient Currently in Pain: No   Objective    Transfers  Stand Step Transfers: Minimal assistance  Sit to stand: Minimal assistance  Stand to sit: Minimal assistance          02/15/21 1130   Eating   Assistance Needed Setup or clean-up assistance   CARE Score 5   Oral Hygiene   Assistance Needed Setup or clean-up assistance   CARE Score 5   [address] assistance   Comment min A for balance   CARE Score 3   Shower/Bathe Self   Assistance Needed Partial/moderate assistance   Comment min A for balance   CARE Score 3   Upper Body Dressing   Assistance Needed Setup or clean-up assistance   CARE Score 5   Lower Body Dressing   Assistance Needed Partial/moderate assistance   Comment min A for balance   CARE Score 3   Putting On/Taking Off Footwear   Assistance Needed Setup or clean-up assistance         02/15/21 1130   Toilet Transfer   Assistance Needed Partial/moderate assistance   Comment min A, RW   CARE Score 3     Plan   Plan Specific instructions for Next Treatment: L UE strengthening, L FM acts  Current Treatment Recommendations: Self-Care / ADL, Safety Education & Training, Endurance Training, Strengthening, Patient/Caregiver Education & Training, Home Management Training, Positioning, Functional Mobility Training, Neuromuscular Re-education, Balance Training, Equipment Evaluation, Education, & procurement, ROM    Goals  Short term goals  Time Frame for Short term goals: 1 week  Short term goal 1: complete LB dressing with CGA  Short term goal 2: complete overall toileting with CGA  Short term goal 3: complete overall bathing with CGA  Short term goal 4: complete 1-2 handed standing level task for 3 mins with CGA  Short term goal 5: complete L UE [address] acts x 5 occasions to increase coordination and finger dexterity for ADLs  Short term goal 6: complete simple ambulatory home making task with CGA  Long term goals  Time Frame for Long term goals : 3 weeks  Long term goal 1: complete HEP with independence  Long term goal 2: complete overall dressing with modified independence  Long term goal 3: complete overall bathing with modified independence  Long term goal 4: complete overall toileting with modified independence  Long term goal 5: complete ambulatory home making task with modified independence  Long term goals 6: pt/family verbalize DME  Patient Goals   Patient goals : return home       Therapy Time   Individual Concurrent Group Co-treatment   Time In 1200 1130       Time Out 1230 1200       Minutes 30 30       Timed Code Treatment Minutes: 60 Minutes     Electronically signed by Haroldo Olsen OT on 2/15/2021 at 12:51 PM

## 2021-02-15 NOTE — PLAN OF CARE
Problem: Falls - Risk of:  Goal: Will remain free from falls  Description: Will remain free from falls  2/15/2021 1036 by Fe Pak RN  Outcome: Ongoing  2/15/2021 0028 by Maria Guadalupe Quezada LPN  Outcome: Ongoing   Up with 1 assist

## 2021-02-15 NOTE — PROGRESS NOTES
02/15/21 1330   Restrictions/Precautions   Restrictions/Precautions Fall Risk   General   Chart Reviewed Yes   Pain Assessment   Pain Assessment 0-10   Pain Level 0   Orientation   Overall Orientation Status WFL   Vital Signs   Level of Consciousness Alert (0)   Bed Mobility   Rolling Modified independent   Supine to Sit Modified independent   Sit to Supine Stand by assistance   Transfers   Sit to Stand Stand by assistance   Stand to sit Stand by assistance   Bed to Chair Contact guard assistance   Ambulation 1   Surface level tile   Device Rolling Walker   Other Apparatus Wheelchair follow   Assistance Contact guard assistance   Quality of Gait Good step through; decreated left toe clearance and foot drag when fatigued. Gait Deviations Slow Karina;Decreased step length   Distance 75'   Comments [employer] turns   Exercises   Comments BL LE active Ex., 2 x 10 reps each   Conditions Requiring Skilled Therapeutic Intervention   Body structures, Functions, Activity limitations Decreased functional mobility ; Decreased strength;Decreased endurance;Decreased sensation;Decreased balance   Assessment Pt. showed increased amb. endurance, showing decreased quality of gait with fatigue. SEE QUALITY OF GAIT 1. Good tolerance for seated ther. ex.    Activity Tolerance   Activity Tolerance Patient Tolerated treatment well   Safety Devices   Type of devices Left in chair;Chair alarm in place;Gait belt

## 2021-02-15 NOTE — PROGRESS NOTES
02/15/21 1026   Restrictions/Precautions   Restrictions/Precautions Fall Risk   General   Chart Reviewed Yes   Subjective   Subjective Patient in bed agrees to therapy   Pain Screening   Patient Currently in Pain No   Vital Signs   Level of Consciousness Alert (0)   Oxygen Therapy   O2 Device None (Room air)   Pre Treatment Pain Screening   Pain at present 0   Scale Used Numeric Score   Intervention List Patient able to continue with treatment   Strength RLE   Strength RLE WNL   Bed Mobility   Rolling Modified independent   Supine to Sit Modified independent   Transfers   Sit to Stand Stand by assistance   Stand to sit Stand by assistance   Bed to Chair Contact guard assistance   Ambulation   Ambulation?  Yes   WB Status WBAT   Ambulation 1   Surface level tile   Device Rolling Walker   Other Apparatus   (IV)   Assistance Contact guard assistance   Quality of Gait Good step thru pattern   Gait Deviations Slow Karina;Decreased step length   Distance 50'   Ambulation 2   Surface - 2 level tile   Device 2 Rolling Walker   Other Apparatus 2   (IV)   Assistance 2 Stand by assistance;Contact guard assistance   Quality of Gait 2 Slow No LOB   Gait Deviations Slow Karina;Decreased step length   Distance 100'   Comments Patient slightly unsteady with turns   Exercises   Heelslides 25   Hip Flexion 25   Hip Abduction 25   Knee Long Arc Quad 25   Knee Active Range of Motion yes   Ankle Pumps 25   Comments BL LE EX in sitting AROM  decreased control of L LE  with  1 1/2#   Activity Tolerance   Activity Tolerance Patient Tolerated treatment well   Safety Devices   Type of devices Left in chair;Chair alarm in place  (Speech present to [address] patient in room)   Occupational Therapy    Electronically signed by Jeana Sosa PTA on 2/15/2021 at 11:05 AM

## 2021-02-15 NOTE — PROGRESS NOTES
Facility/Department: [facility] 8 REHAB UNIT  Daily Treatment Note  NAME: Charlean Fleischer  : 1955  MRN: [id number]    Date of Eval: 2/15/2021  Evaluating Therapist: Dung Rossi    Patient Diagnosis(es): has Tear of right glenoid labrum and Acute ischemic stroke [facility]) on their problem list.     Pt was seen for therapy in his room. He was seated upright in his wheelchair. Pt was pleasant and cooperative with all therapy tasks. Pt was alert and oriented X3.  Pt's speech was slightly dysarthric. Pt aware and stated, that his intelligibility has greatly improved. Pt stated that he knows he needs to slow down. Pt's intelligibility ranked at 95% during conversations with SLP. Pt completed OMEs without difficulty. Pt able to discuss and demonstrated the exercises he does daily on his own. Pt able to sustain attention during conversation for 20+ minutes. Plan:  Continued daily Speech/Language treatment with goals per patient's plan of care.                               [facility]

## 2021-02-16 LAB
ALBUMIN SERPL-MCNC: 3.6 G/DL (ref 3.5–5.2)
ALP BLD-CCNC: 54 U/L (ref 40–130)
ALT SERPL-CCNC: 9 U/L (ref 5–41)
ANION GAP SERPL CALCULATED.3IONS-SCNC: 9 MMOL/L (ref 7–19)
AST SERPL-CCNC: 12 U/L (ref 5–40)
BILIRUB SERPL-MCNC: 0.3 MG/DL (ref 0.2–1.2)
BUN BLDV-MCNC: 33 MG/DL (ref 8–23)
CALCIUM SERPL-MCNC: 8.8 MG/DL (ref 8.8–10.2)
CHLORIDE BLD-SCNC: 110 MMOL/L (ref 98–111)
CO2: 21 MMOL/L (ref 22–29)
CREAT SERPL-MCNC: 1.4 MG/DL (ref 0.5–1.2)
GFR AFRICAN AMERICAN: >59
GFR NON-AFRICAN AMERICAN: 51
GLUCOSE BLD-MCNC: 87 MG/DL (ref 74–109)
HCT VFR BLD CALC: 35.8 % (ref 42–52)
HEMOGLOBIN: 11.7 G/DL (ref 14–18)
MCH RBC QN AUTO: 26.4 PG (ref 27–31)
MCHC RBC AUTO-ENTMCNC: 32.7 G/DL (ref 33–37)
MCV RBC AUTO: 80.6 FL (ref 80–94)
PDW BLD-RTO: 14.6 % (ref 11.5–14.5)
PLATELET # BLD: 251 K/UL (ref 130–400)
PMV BLD AUTO: 10.7 FL (ref 9.4–12.4)
POTASSIUM SERPL-SCNC: 4.7 MMOL/L (ref 3.5–5)
RBC # BLD: 4.44 M/UL (ref 4.7–6.1)
SODIUM BLD-SCNC: 140 MMOL/L (ref 136–145)
TOTAL PROTEIN: 5.6 G/DL (ref 6.6–8.7)
WBC # BLD: 7.4 K/UL (ref 4.8–10.8)

## 2021-02-16 PROCEDURE — 97116 GAIT TRAINING THERAPY: CPT

## 2021-02-16 PROCEDURE — 6360000002 HC RX W HCPCS: Performed by: PSYCHIATRY & NEUROLOGY

## 2021-02-16 PROCEDURE — 1180000000 HC REHAB R&B

## 2021-02-16 PROCEDURE — 2580000003 HC RX 258: Performed by: PSYCHIATRY & NEUROLOGY

## 2021-02-16 PROCEDURE — 6370000000 HC RX 637 (ALT 250 FOR IP): Performed by: INTERNAL MEDICINE

## 2021-02-16 PROCEDURE — 36415 COLL VENOUS BLD VENIPUNCTURE: CPT

## 2021-02-16 PROCEDURE — 6370000000 HC RX 637 (ALT 250 FOR IP): Performed by: PSYCHIATRY & NEUROLOGY

## 2021-02-16 PROCEDURE — 97110 THERAPEUTIC EXERCISES: CPT

## 2021-02-16 PROCEDURE — 92507 TX SP LANG VOICE COMM INDIV: CPT

## 2021-02-16 PROCEDURE — 97530 THERAPEUTIC ACTIVITIES: CPT

## 2021-02-16 PROCEDURE — 80053 COMPREHEN METABOLIC PANEL: CPT

## 2021-02-16 PROCEDURE — 85027 COMPLETE CBC AUTOMATED: CPT

## 2021-02-16 RX ORDER — ISOSORBIDE MONONITRATE 30 MG/1
30 TABLET, EXTENDED RELEASE ORAL DAILY
Status: DISCONTINUED | OUTPATIENT
Start: 2021-02-17 | End: 2021-02-23 | Stop reason: HOSPADM

## 2021-02-16 RX ORDER — BUTALBITAL, ACETAMINOPHEN AND CAFFEINE 50; 325; 40 MG/1; MG/1; MG/1
1 TABLET ORAL EVERY 4 HOURS PRN
Status: DISCONTINUED | OUTPATIENT
Start: 2021-02-16 | End: 2021-02-23 | Stop reason: HOSPADM

## 2021-02-16 RX ADMIN — BUTALBITAL, ACETAMINOPHEN, AND CAFFEINE 1 TABLET: 50; 325; 40 TABLET ORAL at 19:40

## 2021-02-16 RX ADMIN — METOPROLOL SUCCINATE 100 MG: 50 TABLET, EXTENDED RELEASE ORAL at 08:14

## 2021-02-16 RX ADMIN — SODIUM BICARBONATE 325 MG: 325 TABLET ORAL at 08:14

## 2021-02-16 RX ADMIN — ENOXAPARIN SODIUM 30 MG: 40 INJECTION SUBCUTANEOUS at 08:15

## 2021-02-16 RX ADMIN — BUTALBITAL, ACETAMINOPHEN, AND CAFFEINE 1 TABLET: 50; 325; 40 TABLET ORAL at 15:34

## 2021-02-16 RX ADMIN — CLOPIDOGREL BISULFATE 75 MG: 75 TABLET ORAL at 08:14

## 2021-02-16 RX ADMIN — ISOSORBIDE MONONITRATE 60 MG: 60 TABLET, EXTENDED RELEASE ORAL at 08:14

## 2021-02-16 RX ADMIN — CYCLOBENZAPRINE 10 MG: 10 TABLET, FILM COATED ORAL at 19:40

## 2021-02-16 RX ADMIN — SODIUM CHLORIDE: 9 INJECTION, SOLUTION INTRAVENOUS at 16:10

## 2021-02-16 RX ADMIN — SODIUM BICARBONATE 325 MG: 325 TABLET ORAL at 19:40

## 2021-02-16 RX ADMIN — BUTALBITAL, ACETAMINOPHEN, AND CAFFEINE 1 TABLET: 50; 325; 40 TABLET ORAL at 08:22

## 2021-02-16 RX ADMIN — BUTALBITAL, ACETAMINOPHEN, AND CAFFEINE 1 TABLET: 50; 325; 40 TABLET ORAL at 00:48

## 2021-02-16 RX ADMIN — ATORVASTATIN CALCIUM 10 MG: 10 TABLET, FILM COATED ORAL at 08:14

## 2021-02-16 RX ADMIN — LATANOPROST 1 DROP: 50 SOLUTION OPHTHALMIC at 19:40

## 2021-02-16 RX ADMIN — ASPIRIN 81 MG: 81 TABLET, CHEWABLE ORAL at 08:14

## 2021-02-16 RX ADMIN — Medication 10 MG: at 19:40

## 2021-02-16 RX ADMIN — AMLODIPINE BESYLATE 10 MG: 10 TABLET ORAL at 19:40

## 2021-02-16 ASSESSMENT — PAIN SCALES - GENERAL
PAINLEVEL_OUTOF10: 7
PAINLEVEL_OUTOF10: 0
PAINLEVEL_OUTOF10: 0

## 2021-02-16 ASSESSMENT — PAIN DESCRIPTION - LOCATION
LOCATION: HEAD

## 2021-02-16 ASSESSMENT — PAIN DESCRIPTION - PROGRESSION: CLINICAL_PROGRESSION: NOT CHANGED

## 2021-02-16 ASSESSMENT — PAIN - FUNCTIONAL ASSESSMENT: PAIN_FUNCTIONAL_ASSESSMENT: ACTIVITIES ARE NOT PREVENTED

## 2021-02-16 ASSESSMENT — PAIN DESCRIPTION - PAIN TYPE: TYPE: ACUTE PAIN

## 2021-02-16 ASSESSMENT — PAIN DESCRIPTION - ORIENTATION: ORIENTATION: MID

## 2021-02-16 ASSESSMENT — PAIN DESCRIPTION - DIRECTION: RADIATING_TOWARDS: BACK

## 2021-02-16 ASSESSMENT — PAIN DESCRIPTION - DESCRIPTORS: DESCRIPTORS: HEADACHE

## 2021-02-16 ASSESSMENT — PAIN DESCRIPTION - FREQUENCY: FREQUENCY: CONTINUOUS

## 2021-02-16 NOTE — PROGRESS NOTES
Hospitalist Progress Note  2/16/2021 7:10 AM  Subjective:   Admit Date: 2/8/2021  PCP: Corina Ya    Subjective: Not had recurrence of chest pain in over 48 hours. Imdur seems to be helping, but does give him a severe headache. He just received some as needed medications for this. He is asking if dose can be adjusted to prevent headache. ROS: Six point review of systems is negative except as specifically addressed above. DIET CARDIAC;     Intake/Output Summary (Last 24 hours) at 2/16/2021 0710  Last data filed at 2/16/2021 2656  Gross per 24 hour   Intake 4165 ml   Output 675 ml   Net 3490 ml     Medications:   sodium chloride 100 mL/hr at 02/15/21 1735     Current Facility-Administered Medications   Medication Dose Route Frequency Provider Last Rate Last Admin    butalbital-acetaminophen-caffeine (FIORICET, ESGIC) per tablet 1 tablet  1 tablet Oral Q4H PRN Jennifer Louise MD   1 tablet at 02/16/21 0048    vitamin D (ERGOCALCIFEROL) capsule 50,000 Units  50,000 Units Oral Weekly Andres Huerta MD   50,000 Units at 02/14/21 1215    sodium bicarbonate tablet 325 mg  325 mg Oral BID Andres Huerta MD   325 mg at 02/15/21 2044    cyclobenzaprine (FLEXERIL) tablet 10 mg  10 mg Oral Nightly Jennifer Louise MD   10 mg at 02/15/21 2044    0.9 % sodium chloride infusion   Intravenous Continuous Jennifer Louise  mL/hr at 02/15/21 1735 New Bag at 02/15/21 1735    metoprolol succinate (TOPROL XL) extended release tablet 100 mg  100 mg Oral Daily Bhargav Mesa MD   100 mg at 02/15/21 9001    isosorbide mononitrate (IMDUR) extended release tablet 60 mg  60 mg Oral Daily Bhargav Mesa MD   60 mg at 02/15/21 0752    nitroGLYCERIN (NITROSTAT) SL tablet 0.4 mg  0.4 mg Sublingual Q5 Min PRN Yolanda Shanks MD   0.4 mg at 02/13/21 0726    melatonin disintegrating tablet 10 mg  10 mg Oral Nightly Jennifer Louise MD   10 mg at 02/15/21 2044  enoxaparin (LOVENOX) injection 30 mg  30 mg Subcutaneous Daily Charito Wasserman MD   30 mg at 02/15/21 4353    aspirin chewable tablet 81 mg  81 mg Oral Daily Charito Wasserman MD   81 mg at 02/15/21 2261    atorvastatin (LIPITOR) tablet 10 mg  10 mg Oral Daily Charito Wasserman MD   10 mg at 02/15/21 0752    amLODIPine (NORVASC) tablet 10 mg  10 mg Oral Nightly Charito Wasserman MD   10 mg at 02/15/21 2044    cloNIDine (CATAPRES) tablet 0.1 mg  0.1 mg Oral PRN Charito Wasserman MD   0.1 mg at 02/09/21 6390    clopidogrel (PLAVIX) tablet 75 mg  75 mg Oral Daily Charito Wasserman MD   75 mg at 02/15/21 0752    latanoprost (XALATAN) 0.005 % ophthalmic solution 1 drop  1 drop Ophthalmic Nightly Charito Wasserman MD   1 drop at 02/15/21 2044    [Held by provider] triamterene-hydroCHLOROthiazide (MAXZIDE-25) 37.5-25 MG per tablet 2 tablet  2 tablet Oral Daily Charito Wasserman MD   2 tablet at 02/12/21 6397    acetaminophen (TYLENOL) tablet 650 mg  650 mg Oral Q4H PRN Charito Wasserman MD   650 mg at 02/15/21 2044    bisacodyl (DULCOLAX) suppository 10 mg  10 mg Rectal Daily PRN Chartio Wasserman MD        polyethylene glycol [facility]) packet 17 g  17 g Oral Daily Charito Wasserman MD   17 g at 02/15/21 0752    magnesium hydroxide (MILK OF MAGNESIA) 400 MG/5ML suspension 30 mL  30 mL Oral Daily PRN Charito Wasserman MD            Labs:     Recent Labs     02/14/21  0507 02/15/21  0400 02/16/21  0338   WBC 8.6 8.7 7.4   RBC 5.07 4.55* 4.44*   HGB 13.5* 12.1* 11.7*   HCT 41.3* 36.5* 35.8*   MCV 81.5 80.2 80.6   MCH 26.6* 26.6* 26.4*   MCHC 32.7* 33.2 32.7*    258 251     Recent Labs     02/14/21  0507 02/15/21  0400 02/16/21  0338   * 140 140   K 4.9  4.9 4.6 4.7   ANIONGAP 9 8 9    110 110   CO2 21* 22 21*   BUN 68* 48* 33*   CREATININE 2.2* 1.6* 1.4*   GLUCOSE 89 112* 87   CALCIUM 8.8 8.7* 8.8     Recent Labs     02/14/21  0507   MG 2.5*   PHOS 4.0     Recent Labs     02/14/21  0507 02/16/21  0338   AST 12 12   ALT 10 9 BILITOT 0.3 0.3   ALKPHOS 61 54         Objective:   Vitals: BP (!) 160/80   Pulse 62   Temp 97.7 °F (36.5 °C) (Temporal)   Resp 18   Ht 5' 6\" (1.676 m)   Wt 164 lb (74.4 kg)   SpO2 96%   BMI 26.47 kg/m²   24HR INTAKE/OUTPUT:      Intake/Output Summary (Last 24 hours) at 2/16/2021 0710  Last data filed at 2/16/2021 1899  Gross per 24 hour   Intake 4165 ml   Output 675 ml   Net 3490 ml     General appearance: Pleasant male laying in bed in NAD  Head - NC/AT  Eyesanicteric sclera  Mouthmoist mucous membranes  Lungs: Clear to auscultation bilaterally without rales, rhonchi or wheezes, no use of accessory muscles  Heart: Regular rate and rhythm, S1, S2 normal, no murmur, click, no gallop or rubs. No reproducible chest pain on palpation of the chest wall. Abdomen: Soft, non-tender; no peritoneal signs, bowel sounds normal; no masses,  no organomegaly. Extremities: Extremities normal without clubbing, atraumatic, no cyanosis or edema. Neurologic: No focal neurologic deficits, normal sensation, , alert and oriented, affect and mood appropriate. Speech is fluent. Intact remote and recent memory. Psychiatric: appropriate  Skin: No overt rashes, nodules. Hematologic: No overt bruises, petechiae. Assessment and Plan: Active Problems:    Acute ischemic stroke [facility])  Resolved Problems:    * No resolved hospital problems. *    HUI  - hold ACEi, diuretics  - nephrology consulted for this and optimization for cardiac cath; defer tx to them. seems reasonable to avoid with prior contrast induced kidney injursy      Chest pain   ? Unstable angina- trops, EKG, TTE, dimer, CXR reassuring. Reviewed OSH records. DO NOT see any evidence that patient had stress test done at [facility]. rec outpatient cath once renal function improved. - actively being managed by cardiology. imdur seems to have helped but has severe headache. Will try to reduce dose to 30mg ER tomorrow AM. Defer further treatment and workup to Cardiology. - Cardiology rec proceeding with cardiac cath, but per nephrology seems reasonable to avoid with prior contrast induced kidney injury   - unfortunately unable to do tele on rehab floor     CVA with left sided ataxia/ hemiparesis- per Neurology. On aspirin, statin, plavix.      HTN - holding meds for HUI as above. On amlodipine, Imdur, BB. Add agents as needed. Vit D def - has been started on replacement      HLD - statin. Prostate enlargement - could benefit from Urology follow-up on discharge.     Defer VT prophylaxis, dispo, activity to primary team    Signed:   Bela Zabala MD 2/16/2021 7:10 AM  Hospitalist

## 2021-02-16 NOTE — PROGRESS NOTES
02/16/21 1045 02/16/21 1046 02/16/21 1100   Transfers   Sit to Stand Stand by assistance  --   --    Stand to sit Stand by assistance  --   --    Ambulation   Ambulation?  --  Yes  --    WB Status  --  WBAT  --    Ambulation 1   Surface  --  level tile  --    Device  --  Rolling Walker  --    Assistance  --  Contact guard assistance  --    Quality of Gait  --  Good step through; decreated left toe clearance and foot drag when fatigued. --    Gait Deviations  --  Slow Karina;Decreased step length  --    Distance  --  60', [de-identified]'  --    Comments  --  Incorporated turns  --    Conditions Requiring Skilled Therapeutic Intervention   Assessment  --   --  Yessy session well.    Activity Tolerance   Activity Tolerance  --   --  Patient Tolerated treatment well   Electronically signed by Yen Rick PTA on 2/16/2021 at 11:20 AM

## 2021-02-16 NOTE — PROGRESS NOTES
Nutrition Assessment     Type and Reason for Visit: Reassess    Nutrition Recommendations/Plan: Continue current POC     Nutrition Assessment:  Pt remains adequately nourished AEB good po intake and stable wt since admission. Update pt preferences and continue current POC. Malnutrition Assessment:  Malnutrition Status: No malnutrition    Current Nutrition Therapies:    DIET CARDIAC; Anthropometric Measures:  · Height: 5' 6\" (167.6 cm)  · Current Body Wt: 164 lb (74.4 kg)   · BMI: 26.5    Nutrition Diagnosis:   No nutrition diagnosis at this time     Nutrition Interventions:   Food and/or Nutrient Delivery:  Continue Current Diet  Nutrition Education/Counseling:  No recommendation at this time   Coordination of Nutrition Care:  Continue to monitor while inpatient    Goals:  Pt will continue to consume 75% or greater of meals.        Nutrition Monitoring and Evaluation:   Food/Nutrient Intake Outcomes:  Food and Nutrient Intake  Physical Signs/Symptoms Outcomes:  Biochemical Data, Nutrition Focused Physical Findings, Weight     Electronically signed by Jone Reed, MS, RD, LD on 2/16/21 at 10:56 AM CST    Contact: [phone]

## 2021-02-16 NOTE — PROGRESS NOTES
Occupational Therapy  Facility/Department: [facility] 8 REHAB UNIT  Daily Treatment Note  NAME: Keke Ruiz  : 1955  MRN: [id number]    Date of Service: 2021    Discharge Recommendations:  Home with Home health OT       Assessment   Performance deficits / Impairments: Decreased functional mobility ; Decreased fine motor control;Decreased high-level IADLs;Decreased strength;Decreased balance;Decreased ROM; Decreased ADL status; Decreased coordination;Decreased endurance  Activity Tolerance  Activity Tolerance: Patient Tolerated treatment well  Safety Devices  Safety Devices in place: Yes  Type of devices: Left in chair         Patient Diagnosis(es): There were no encounter diagnoses. has a past medical history of Cerebral artery occlusion with cerebral infarction ([facility] [address] Hyperlipidemia, and Hypertension. has a past surgical history that includes back surgery; Cervical spine surgery; Abdomen surgery; Appendectomy; and Shoulder arthroscopy (Right, 2019).     Restrictions  Restrictions/Precautions  Restrictions/Precautions: Fall Risk  Subjective   General  Chart Reviewed: Yes, Orders  Patient assessed for rehabilitation services?: Yes  Additional Pertinent Hx: CVA  Diagnosis: CVA with left hemiparesis  General Comment  Comments: in the process of bathing at start of session  Pain Assessment  Pain Assessment: 0-10  Pain Level: 0  Pre Treatment Pain Screening  Pain at present: 0  Scale Used: Numeric Score  Vital Signs  Patient Currently in Pain: No   Objective    Balance  Sitting Balance: Supervision  Standing Balance: Minimal assistance  Functional Mobility  Functional - Mobility Device: Rolling Walker  Activity: Other  Assist Level: Minimal assistance              Coordination  Fine Motor: various L FM acts with min difficulty  Type of ROM/Therapeutic Exercise  Type of ROM/Therapeutic Exercise: Cane/Wand(2# dowel, all planes)  Exercises  Grasp/Release: 1 [id number] with hand exerciser

## 2021-02-17 LAB
ALBUMIN SERPL-MCNC: 3.6 G/DL (ref 3.5–5.2)
ALP BLD-CCNC: 56 U/L (ref 40–130)
ALT SERPL-CCNC: 9 U/L (ref 5–41)
ANION GAP SERPL CALCULATED.3IONS-SCNC: 8 MMOL/L (ref 7–19)
AST SERPL-CCNC: 14 U/L (ref 5–40)
BILIRUB SERPL-MCNC: 0.3 MG/DL (ref 0.2–1.2)
BUN BLDV-MCNC: 25 MG/DL (ref 8–23)
CALCIUM SERPL-MCNC: 9 MG/DL (ref 8.8–10.2)
CHLORIDE BLD-SCNC: 108 MMOL/L (ref 98–111)
CO2: 23 MMOL/L (ref 22–29)
CREAT SERPL-MCNC: 1.2 MG/DL (ref 0.5–1.2)
EKG P AXIS: 71 DEGREES
EKG P-R INTERVAL: 164 MS
EKG Q-T INTERVAL: 400 MS
EKG QRS DURATION: 82 MS
EKG QTC CALCULATION (BAZETT): 412 MS
EKG T AXIS: 41 DEGREES
GFR AFRICAN AMERICAN: >59
GFR NON-AFRICAN AMERICAN: >60
GLUCOSE BLD-MCNC: 76 MG/DL (ref 74–109)
HCT VFR BLD CALC: 35.7 % (ref 42–52)
HEMOGLOBIN: 12 G/DL (ref 14–18)
MCH RBC QN AUTO: 26.8 PG (ref 27–31)
MCHC RBC AUTO-ENTMCNC: 33.6 G/DL (ref 33–37)
MCV RBC AUTO: 79.7 FL (ref 80–94)
PDW BLD-RTO: 14.4 % (ref 11.5–14.5)
PLATELET # BLD: 265 K/UL (ref 130–400)
PMV BLD AUTO: 10.7 FL (ref 9.4–12.4)
POTASSIUM REFLEX MAGNESIUM: 4.6 MMOL/L (ref 3.5–5)
RBC # BLD: 4.48 M/UL (ref 4.7–6.1)
SODIUM BLD-SCNC: 139 MMOL/L (ref 136–145)
TOTAL PROTEIN: 5.9 G/DL (ref 6.6–8.7)
WBC # BLD: 7.4 K/UL (ref 4.8–10.8)

## 2021-02-17 PROCEDURE — 97535 SELF CARE MNGMENT TRAINING: CPT

## 2021-02-17 PROCEDURE — 92507 TX SP LANG VOICE COMM INDIV: CPT

## 2021-02-17 PROCEDURE — 6370000000 HC RX 637 (ALT 250 FOR IP): Performed by: INTERNAL MEDICINE

## 2021-02-17 PROCEDURE — 97110 THERAPEUTIC EXERCISES: CPT

## 2021-02-17 PROCEDURE — 6360000002 HC RX W HCPCS: Performed by: PSYCHIATRY & NEUROLOGY

## 2021-02-17 PROCEDURE — 85027 COMPLETE CBC AUTOMATED: CPT

## 2021-02-17 PROCEDURE — 97116 GAIT TRAINING THERAPY: CPT

## 2021-02-17 PROCEDURE — 36415 COLL VENOUS BLD VENIPUNCTURE: CPT

## 2021-02-17 PROCEDURE — 6370000000 HC RX 637 (ALT 250 FOR IP): Performed by: PSYCHIATRY & NEUROLOGY

## 2021-02-17 PROCEDURE — 99233 SBSQ HOSP IP/OBS HIGH 50: CPT | Performed by: PSYCHIATRY & NEUROLOGY

## 2021-02-17 PROCEDURE — 80053 COMPREHEN METABOLIC PANEL: CPT

## 2021-02-17 PROCEDURE — 1180000000 HC REHAB R&B

## 2021-02-17 RX ADMIN — CYCLOBENZAPRINE 10 MG: 10 TABLET, FILM COATED ORAL at 21:13

## 2021-02-17 RX ADMIN — Medication 10 MG: at 21:13

## 2021-02-17 RX ADMIN — BUTALBITAL, ACETAMINOPHEN, AND CAFFEINE 1 TABLET: 50; 325; 40 TABLET ORAL at 12:39

## 2021-02-17 RX ADMIN — ATORVASTATIN CALCIUM 10 MG: 10 TABLET, FILM COATED ORAL at 08:01

## 2021-02-17 RX ADMIN — BUTALBITAL, ACETAMINOPHEN, AND CAFFEINE 1 TABLET: 50; 325; 40 TABLET ORAL at 21:13

## 2021-02-17 RX ADMIN — LATANOPROST 1 DROP: 50 SOLUTION OPHTHALMIC at 21:13

## 2021-02-17 RX ADMIN — ENOXAPARIN SODIUM 30 MG: 40 INJECTION SUBCUTANEOUS at 08:01

## 2021-02-17 RX ADMIN — CLOPIDOGREL BISULFATE 75 MG: 75 TABLET ORAL at 08:01

## 2021-02-17 RX ADMIN — SODIUM BICARBONATE 325 MG: 325 TABLET ORAL at 21:13

## 2021-02-17 RX ADMIN — ISOSORBIDE MONONITRATE 30 MG: 30 TABLET, EXTENDED RELEASE ORAL at 08:01

## 2021-02-17 RX ADMIN — METOPROLOL SUCCINATE 100 MG: 50 TABLET, EXTENDED RELEASE ORAL at 08:01

## 2021-02-17 RX ADMIN — POLYETHYLENE GLYCOL 3350 17 G: 17 POWDER, FOR SOLUTION ORAL at 08:01

## 2021-02-17 RX ADMIN — AMLODIPINE BESYLATE 10 MG: 10 TABLET ORAL at 21:13

## 2021-02-17 RX ADMIN — SODIUM BICARBONATE 325 MG: 325 TABLET ORAL at 08:01

## 2021-02-17 RX ADMIN — ASPIRIN 81 MG: 81 TABLET, CHEWABLE ORAL at 08:01

## 2021-02-17 ASSESSMENT — PAIN SCALES - GENERAL
PAINLEVEL_OUTOF10: 0
PAINLEVEL_OUTOF10: 7
PAINLEVEL_OUTOF10: 5

## 2021-02-17 ASSESSMENT — PAIN - FUNCTIONAL ASSESSMENT
PAIN_FUNCTIONAL_ASSESSMENT: ACTIVITIES ARE NOT PREVENTED
PAIN_FUNCTIONAL_ASSESSMENT: ACTIVITIES ARE NOT PREVENTED

## 2021-02-17 ASSESSMENT — PAIN DESCRIPTION - PAIN TYPE
TYPE: ACUTE PAIN
TYPE: ACUTE PAIN

## 2021-02-17 ASSESSMENT — PAIN DESCRIPTION - FREQUENCY: FREQUENCY: INTERMITTENT

## 2021-02-17 ASSESSMENT — PAIN DESCRIPTION - PROGRESSION
CLINICAL_PROGRESSION: GRADUALLY IMPROVING
CLINICAL_PROGRESSION: GRADUALLY IMPROVING

## 2021-02-17 ASSESSMENT — PAIN DESCRIPTION - DESCRIPTORS
DESCRIPTORS: HEADACHE
DESCRIPTORS: HEADACHE

## 2021-02-17 ASSESSMENT — PAIN DESCRIPTION - LOCATION
LOCATION: HEAD
LOCATION: HEAD

## 2021-02-17 ASSESSMENT — PAIN DESCRIPTION - ORIENTATION: ORIENTATION: MID

## 2021-02-17 NOTE — PROGRESS NOTES
Patient:   Gary Cox  MR#:    [id number]   Room:    [facility]-   YOB: 1955  Date of Progress Note: 2/17/2021  Time of Note                           8:15 AM  Consulting Physician:   Gino Nichole M.D. Attending Physician:  Gino Nichole MD     CHIEF COMPLAINT: Left-sided weakness and dysarthria        subjective  This 72 y.o. male   with history of HTN,hyperlipidemia,chronic back pain and CVA. He presented to [facility] on 2/2/21 with c/o lightheadedness, mild blurred vision and change in his gait that started about 3 days prior. He was hypertensive on presentation. MRI done revealed a right pontine infarct. CTA head/neck showed non signifigant steno-occlusive disease. He was admitted the the hospitalist service with consult for neurology. He was seen by Dr. Ramirez Fraire. He was doing quite well. When on 2/4/21 he had a return of dizziness with N&V and worsening left sided weakness, mild dysarthria, B/P was low. Stat MRI was ordered that revealed showed a new right temporal lobe infarct. TTE showed thickening on anterior leaflet of mirtal valve. On the morning of 2/5/21 he had an accoute onset of chest pain/pressure with hypotension. Repeat EKG was concerning. Troponin was negative x 2. Chest pain resolved with nitro. Cardiology was consult for NIKI.  He was seen by  Christy. NIKI done on 2/5/21 showed no thrombus noted in the left atrial appendage,negative bubble study, no evidence of PFO or ASD,severely thickened left ventricle but no evidence of LV noncompaction. Dr. Barb Flores recommended a diagnostic left heart catheterization to assess his coronary anatomy. Patient wishes to posst-pone his cardiac work-up until after he completes the Rehab program.  He was also noted to have an increase in his creatinine of 5.12. Nephrology consulted. Patients creatine is rapidly improving now at 1.4 with BUN of 38. He is alert & oriented x 3, follows commands but does have mild dysarthia but demonstrates impulsivity and decreased safety awareness. Left sided weakness remains. Patient was evaluated by SPT for swallow and was cleared for a regular diet with thin liquids. No c/o this am  REVIEW OF SYSTEMS:  Constitutional: No fevers No chills  Neck:No stiffness  Respiratory: No shortness of breath  Cardiovascular: No chest pain No palpitations  Gastrointestinal: No abdominal pain    Genitourinary: No Dysuria  Neurological: No headache, no confusion      PHYSICAL EXAM:  /72   Pulse 72   Temp 98.6 °F (37 °C) (Temporal)   Resp 18   Ht 5' 6\" (1.676 m)   Wt 167 lb 5 oz (75.9 kg)   SpO2 93%   BMI 27.00 kg/m²     Constitutional: he appears well-developed and well-nourished. Eyes  conjunctiva normal.  Pupils react to light  Ear, nose, throat -hearing intact to voice. No scars, masses, or lesions over external nose or ears, no atrophy of tongue  Neck-symmetric, no masses noted, no jugular vein distension  Respiration- chest wall appears symmetric, good expansion,   normal effort without use of accessory muscles  Cardiovascular- RRR  Musculoskeletal  no significant wasting of muscles noted, no bony deformities, gait no gross ataxia  Extremities-no clubbing, cyanosis or edema  Skin  warm, dry, and intact. No rash, erythema, or pallor. Car Transfer: Contact guard assistance, Stand by assistance(WITH RW)  WHEELCHAIR PROPULSION  Propulsion 1  Propulsion: Manual  Method: RUE, RLE  Level of Assistance: Stand by assistance  Distance: 25'  AMBULATION  Ambulation 1  Surface: level tile  Device: Rolling Walker  Other Apparatus: Wheelchair follow  Assistance: Contact guard assistance  Quality of Gait: Pt needed VC's to keep RW closer at times. Pt showed int. L toe drag at times. Gait Deviations: Slow Karina, Decreased step length  Distance: 65'x3  Comments: Incorporated turns  [employer]  # Steps : 4  Rails: Bilateral  Assistance: Moderate assistance, Minimal assistance  Comment: DIFFICULTY WITH LEFT HIP FLEXION CAUSING TOE CATCH ON STEP  GOALS:  Short term goals  Time Frame for Short term goals: 1 WEEK  Short term goal 1: TF SURFACE TO SURFACE WITH RW EXHIBITING PROPER 3 POINT PATTERN  Short term goal 2: AMBULATE 50 FT WITH RW CGA DEMONSTRATING PROPER 3 POINT GAIT  Short term goal 3: UP/DOWN 4 STEPS BILAT RAILS SBA  Short term goal 4: PROPEL  FT SBA  Short term goal 5: HEP SBA     Long term goals  Time Frame for Long term goals : 2-3 WEEKS  Long term goal 1: INDEP BED MOB  Long term goal 2: INDEP TF SURFACE TO SURFACE  Long term goal 3: AMBULATE 150 FT WITH RW INDEP  Long term goal 4: UP/DOWN 4 STEPS WITH 1 RAIL INDEP  Long term goal 5: HEP INDEP     ASSESSMENT:  Assessment: Pt tolerated amb well w/ minimal fatigue.     SPEECH THERAPY  Swallowing Status/Diet: Regular diet with thin liquids  Precautions: Fall  Swallowing Status/Diet: Regular diet with thin liquids        Subjective: Patient alert and cooperative with all therapy tasks. Patient seen upright in wheel chair in OT room.      Objective:   Patient read at paragraph level to address speech intelligibility. Patient utilizing dysarthric strategies during task, independently. Speech intelligibility at paragraph level was 100%.  Did note mild distortions.     CURRENT IRF-SMITA SCORES  Eating: CARE Score: 5     Oral Hygiene: CARE Score: 5         Toileting: CARE Score: 3  Comment: min A for balance      Shower/Bathe: CARE Score: 3  Comment: min A for balance        Upper Body Dressing: CARE Score: 5  Comment: pullover shirts       Lower Body Dressing: CARE Score: 3  Comment: min A for balance        Footwear: CARE Score: 5  Comment: min A for left shoe        Toilet Transfers: CARE Score: 3  Comment: min A, RW        Picking Up Object:  CARE Score: 88           UE Function  R UE WNLs  L UE decreased proximal ROM/strength and coordination  Pain Assessment:  Pain Level: 0  Pain Location: Shoulder     STGs:  Short term goals  Time Frame for Short term goals: 1 week  Short term goal 1: complete LB dressing with CGA  Short term goal 2: complete overall toileting with CGA  Short term goal 3: complete overall bathing with CGA  Short term goal 4: complete 1-2 handed standing level task for 3 mins with CGA  Short term goal 5: complete L UE FMC acts x 5 occasions to increase coordination and finger dexterity for ADLs  Short term goal 6: complete simple ambulatory home making task with CGA     LTGs:  Long term goals  Time Frame for Long term goals : 3 weeks  Long term goal 1: complete HEP with independence  Long term goal 2: complete overall dressing with modified independence  Long term goal 3: complete overall bathing with modified independence  Long term goal 4: complete overall toileting with modified independence  Long term goal 5: complete ambulatory home making task with modified independence  Long term goals 6: pt/family verbalize DME     Assessment:  Performance deficits / Impairments: Decreased functional mobility , Decreased fine motor control, Decreased high-level IADLs, Decreased strength, Decreased balance, Decreased ROM, Decreased ADL status, Decreased coordination, Decreased endurance                        NUTRITION Current Wt: Weight: 164 lb (74.4 kg) / Body mass index is 26.47 kg/m². Admission Wt: Admission Body Weight: 162 lb (73.5 kg)  Oral Diet Orders: Cardiac     Pt remains adequately nourished AEB good po intake and stable wt since admission. Update pt preferences and continue current POC. Please see nutrition notes for further details.     RECORD REVIEW: Previous medical records, medications were reviewed at today's visit    IMPRESSION:   1. Right pontine and temporal strokes with left-sided ataxia/hemiparesis currently on Plavix/aspirin PT/OT/SLP  2. Recent chest pain resolved with nitroglycerin. Patient deferring cardiac work-up until the near future. see #8  3. Essential hypertensioncontinue current medications and monitoring  4. Hyperlipidemiacontinue current medications and monitoring  5. GIbowel regimen  6. DVT prophylaxissubcu Lovenox  7. Recent acute renal failure/CKD IV fluids. Improving. Nephrology following. 8.  Recurring chest pain relieved with nitroglycerin. Cardiology actively following and on board. Seems better with Imdur. Appreciate hospitalist, nephrology and cardiology consultants.     Staffing this date , mutlidisciplinary with entire team with complex decision making and planning for discharge  ELOS:2/23

## 2021-02-17 NOTE — PROGRESS NOTES
02/17/21 1112 02/17/21 1126 02/17/21 1146   Pain Screening   Patient Currently in Pain Yes  --   --    Pain Assessment   Pain Assessment 0-10  --   --    Pain Level 5  --   --    Patient's Stated Pain Goal No pain  --   --    Pain Type Acute pain  --   --    Pain Location Head  (Headache)  --   --    Pain Descriptors Headache  --   --    Functional Pain Assessment Activities are not prevented  --   --    Response to Pain Intervention Patient Satisfied  --   --    Transfers   Sit to Stand  --  Stand by assistance  --    Stand to sit  --  Stand by assistance  --    Ambulation   Ambulation?  --  Yes  --    WB Status  --  WBAT  --    Ambulation 1   Surface  --  level tile  --    Device  --  Rolling Walker  --    Assistance  --  Contact guard assistance  --    Quality of Gait  --  Pt needed VC's to keep RW closer at times. Pt showed int. L toe drag at times. --    Distance  --  [de-identified]' x2  --    Comments  --  Incorporated turns  --    Ambulation 2   Surface - 2  --  level tile  --    Device 2  --  Rolling Walker  --    Quality of Gait 2  --  Slow No LOB  --    Distance  --  20'  --    Comments  --  Amb pt to Bathroom.   --    Conditions Requiring Skilled Therapeutic Intervention   Assessment  --   --   --    Activity Tolerance   Activity Tolerance  --   --  Patient Tolerated treatment well   Safety Devices   Type of devices  --   --   --       02/17/21 1147 02/17/21 1154   Pain Screening   Patient Currently in Pain  --   --    Pain Assessment   Pain Assessment  --   --    Pain Level  --   --    Patient's Stated Pain Goal  --   --    Pain Type  --   --    Pain Location  --   --    Pain Descriptors  --   --    Functional Pain Assessment  --   --    Response to Pain Intervention  --   --    Transfers   Sit to Stand  --   --    Stand to sit  --   --    Ambulation   Ambulation?  --   --    WB Status  --   --    Ambulation 1   Surface  --   --    Device  --   --    Assistance  --   --    Quality of Gait  --   -- Distance  --   --    Comments  --   --    Ambulation 2   Surface - 2  --   --    Device 2  --   --    Quality of Gait 2  --   --    Distance  --   --    Comments  --   --    Conditions Requiring Skilled Therapeutic Intervention   Assessment Pt tolerated amb well w/ minimal fatigue.   --    Activity Tolerance   Activity Tolerance  --   --    Safety Devices   Type of devices  --  Call light within reach;Nurse notified   Electronically signed by Donna Ramirez PTA on 2/17/2021 at 11:55 AM

## 2021-02-17 NOTE — PROGRESS NOTES
Nephrology ([facility]) Progress Note    Patient:  Hayden Darnell  YOB: 1955  Date of Service: 2/17/2021  MRN: [id number]   Acct: [de-identified]   Primary Care Physician: Franny Jolley  Advance Directive: Full Code  Admit Date: 2/8/2021       Hospital Day: 9  Referring Provider: Jonh Alvarado MD    Patient independently seen and examined, Chart, Consults, Notes, Operative notes, Labs, Cardiology, and Radiology studies reviewed as available. Chief complaint: Abnormal labs. Subjective:  Hayden Darnell is a 72 y.o. male  whom we were consulted for acute kidney injury. Patient denies any history of chronic kidney disease. He was initially admitted to [facility] with CVA. He is now transferred to [facility] for physical therapy. While at [facility] he has received intravenous contrast dye led to a decline in renal function which is slowly improving. Patient has history of benign essential hypertension which is fairly good control. He is drinking good amount of water. His renal function has improved significantly.     Allergies:  Codeine    Medicines:  Current Facility-Administered Medications   Medication Dose Route Frequency Provider Last Rate Last Admin    butalbital-acetaminophen-caffeine (FIORICET, ESGIC) per tablet 1 tablet  1 tablet Oral Q4H PRN Jonh Alvarado MD   1 tablet at 02/16/21 1940    isosorbide mononitrate (IMDUR) extended release tablet 30 mg  30 mg Oral Daily Ela Whitehead MD   30 mg at 02/17/21 0801    vitamin D (ERGOCALCIFEROL) capsule 50,000 Units  50,000 Units Oral Weekly Dalton Schneider MD   50,000 Units at 02/14/21 1215    sodium bicarbonate tablet 325 mg  325 mg Oral BID Dalton Schneider MD   325 mg at 02/17/21 0801    cyclobenzaprine (FLEXERIL) tablet 10 mg  10 mg Oral Nightly Jonh Alvarado MD   10 mg at 02/16/21 1940  0.9 % sodium chloride infusion   Intravenous Continuous Priyanka Bender  mL/hr at 02/16/21 1610 New Bag at 02/16/21 1610    metoprolol succinate (TOPROL XL) extended release tablet 100 mg  100 mg Oral Daily Jena Rob MD   100 mg at 02/17/21 0801    nitroGLYCERIN (NITROSTAT) SL tablet 0.4 mg  0.4 mg Sublingual Q5 Min PRN Dandre Watkins MD   0.4 mg at 02/13/21 0726    melatonin disintegrating tablet 10 mg  10 mg Oral Nightly Priyanka Bender MD   10 mg at 02/16/21 1940    enoxaparin (LOVENOX) injection 30 mg  30 mg Subcutaneous Daily Priyanka Bender MD   30 mg at 02/17/21 0801    aspirin chewable tablet 81 mg  81 mg Oral Daily Priyanka Bender MD   81 mg at 02/17/21 0801    atorvastatin (LIPITOR) tablet 10 mg  10 mg Oral Daily Priyanka Bender MD   10 mg at 02/17/21 0801    amLODIPine (NORVASC) tablet 10 mg  10 mg Oral Nightly Priyanka Bender MD   10 mg at 02/16/21 1940    cloNIDine (CATAPRES) tablet 0.1 mg  0.1 mg Oral PRN Priyanka Bender MD   0.1 mg at 02/09/21 5208    clopidogrel (PLAVIX) tablet 75 mg  75 mg Oral Daily Priyanka Bender MD   75 mg at 02/17/21 0801    latanoprost (XALATAN) 0.005 % ophthalmic solution 1 drop  1 drop Ophthalmic Nightly Priyanka Bender MD   1 drop at 02/16/21 1940    [Held by provider] triamterene-hydroCHLOROthiazide (MAXZIDE-25) 37.5-25 MG per tablet 2 tablet  2 tablet Oral Daily Priyanka Bender MD   2 tablet at 02/12/21 0806    acetaminophen (TYLENOL) tablet 650 mg  650 mg Oral Q4H PRN Priyanka Bender MD   650 mg at 02/15/21 2044    bisacodyl (DULCOLAX) suppository 10 mg  10 mg Rectal Daily PRN Priaynka Bender MD        polyethylene glycol [facility]) packet 17 g  17 g Oral Daily Priyanka Bender MD   17 g at 02/17/21 0801    magnesium hydroxide (MILK OF MAGNESIA) 400 MG/5ML suspension 30 mL  30 mL Oral Daily PRN Priyanka Bender MD           Past Medical History:  Past Medical History:   Diagnosis Date    Cerebral artery occlusion with cerebral infarction [facility]) 2013    right sided weakness  Hyperlipidemia     Hypertension        Past Surgical History:  Past Surgical History:   Procedure Laterality Date    ABDOMEN SURGERY      noncancerous tumor in abdomen    APPENDECTOMY      BACK SURGERY      lumbar surgery x 4    CERVICAL SPINE SURGERY      fusion    SHOULDER ARTHROSCOPY Right 2019    ARTHROSCOPIC SAD/DCR LABRAL REPAIR RIGHT SHOULDER performed by yrl Fraction, DO at [facility] OR       Family History  Family History   Problem Relation Age of Onset    High Blood Pressure Mother     Heart Failure Mother     High Blood Pressure Father     Stroke Father        Social History  Social History     Socioeconomic History    Marital status:      Spouse name: Not on file    Number of children: Not on file    Years of education: Not on file    Highest education level: Not on file   Occupational History    Not on file   Social Needs    Financial resource strain: Not on file    Food insecurity     Worry: Not on file     Inability: Not on file    Transportation needs     Medical: Not on file     Non-medical: Not on file   Tobacco Use    Smoking status: Former Smoker     Packs/day: 1.00     Years: 40.00     Pack years: 40.00     Types: Cigarettes     Quit date: 2013     Years since quittin.5    Smokeless tobacco: Never Used   Substance and Sexual Activity    Alcohol use: Not Currently     Frequency: Never    Drug use: Not on file    Sexual activity: Not on file   Lifestyle    Physical activity     Days per week: Not on file     Minutes per session: Not on file    Stress: Not on file   Relationships    Social connections     Talks on phone: Not on file     Gets together: Not on file     Attends [employer] service: Not on file     Active member of club or organization: Not on file     Attends meetings of clubs or organizations: Not on file     Relationship status: Not on file    Intimate partner violence     Fear of current or ex partner: Not on file Emotionally abused: Not on file     Physically abused: Not on file     Forced sexual activity: Not on file   Other Topics Concern    Not on file   Social History Narrative    Not on file         Review of Systems:  History obtained from chart review and the patient  General ROS: No fever or chills  Respiratory ROS: No cough, shortness of breath, wheezing  Cardiovascular ROS: No chest pain or palpitations  Gastrointestinal ROS: No abdominal pain or melena  Genito-Urinary ROS: No dysuria or hematuria  Musculoskeletal ROS: No joint pain or swelling         Objective:  Patient Vitals for the past 24 hrs:   BP Temp Temp src Pulse Resp SpO2 Weight   02/17/21 0518       167 lb 5 oz (75.9 kg)   02/16/21 1818 138/72 98.6 °F (37 °C) Temporal 72 18 93 %        Intake/Output Summary (Last 24 hours) at 2/17/2021 1032  Last data filed at 2/17/2021 0077  Gross per 24 hour   Intake 3127.1 ml   Output 1850 ml   Net 1277.1 ml     General: awake/alert   HEENT: Normocephalic atraumatic head  Neck: Supple with no JVD or carotid bruits. Chest:  clear to auscultation bilaterally  CVS: regular rate and rhythm  Abdominal: soft, nontender, normal bowel sounds  Extremities: no cyanosis or edema  Skin: warm and dry without rash    Labs:  BMP:   Recent Labs     02/15/21  0400 02/16/21  0338 02/17/21  0506    140 139   K 4.6 4.7 4.6    110 108   CO2 22 21* 23   BUN 48* 33* 25*   CREATININE 1.6* 1.4* 1.2   CALCIUM 8.7* 8.8 9.0     CBC:   Recent Labs     02/15/21  0400 02/16/21  0338 02/17/21  0506   WBC 8.7 7.4 7.4   HGB 12.1* 11.7* 12.0*   HCT 36.5* 35.8* 35.7*   MCV 80.2 80.6 79.7*    251 265     LIVER PROFILE:   Recent Labs     02/16/21  0338 02/17/21  0506   AST 12 14   ALT 9 9   BILITOT 0.3 0.3   ALKPHOS 54 56     PT/INR: No results for input(s): PROTIME, INR in the last 72 hours. APTT: No results for input(s): APTT in the last 72 hours. BNP:  No results for input(s): BNP in the last 72 hours. Ionized Calcium:No results for input(s): IONCA in the last 72 hours. Magnesium:  No results for input(s): MG in the last 72 hours. Phosphorus:  No results for input(s): PHOS in the last 72 hours. HgbA1C: No results for input(s): LABA1C in the last 72 hours. Hepatic:   Recent Labs     02/16/21  0338 02/17/21  0506   ALKPHOS 54 56   ALT 9 9   AST 12 14   PROT 5.6* 5.9*   BILITOT 0.3 0.3   LABALBU 3.6 3.6     Lactic Acid: No results for input(s): LACTA in the last 72 hours. Troponin: No results for input(s): CKTOTAL, CKMB, TROPONINT in the last 72 hours. ABGs: No results found for: PHART, PO2ART, IET0QWS  CRP:  No results for input(s): CRP in the last 72 hours. Sed Rate:  No results for input(s): SEDRATE in the last 72 hours. Culture Results:   Blood Culture Recent: No results for input(s): BC in the last 720 hours. Urine Culture Recent : No results for input(s): LABURIN in the last 720 hours.     Radiology reports as per the Radiologist  Radiology: Echo Complete 2d W Doppler W Color    Result Date: 2/12/2021 Transthoracic Echocardiography Report (TTE)  Demographics   Patient Name   Guille Narvaez  Date of Study           02/12/2021   MRN            [id number]          Gender                  Male   Date of Birth  1955      Room Number             -8897   Age            72 year(s)   Height:        66 inches       Referring Physician     Carlos Blel   Weight:        162 pounds      Sonographer             Lori Izquierdo [facility]   BSA:           1.83 m^2        Interpreting Physician  Lucy Morin MD   BMI:           26.15 kg/m^2  Procedure Type of Study   TTE procedure:ECHO NO CONTRAST WITH DOP/COLR. Study Location: Echo Lab Technical Quality: Adequate visualization Patient Status: Inpatient Indications:Chest pain. Conclusions   Summary  Mild to moderate mitral regurgitation is present. Mild tricuspid regurgitation with estimated RVSP of 30-35 mm Hg. Moderate concentric left ventricular hypertrophy. Normal left ventricular size with preserved LV function and an estimated  ejection fraction of approximately 55-60%. Grade 1 diastolic dysfunction  Normal LA filling pressure   Signature   ----------------------------------------------------------------  Electronically signed by Lucy Morin MD(Interpreting physician)  on 02/12/2021 04:32 PM  ----------------------------------------------------------------   Findings   Mitral Valve  Mild to moderate mitral regurgitation is present. Aortic Valve  normal aortic valve   Tricuspid Valve  Mild tricuspid regurgitation with estimated RVSP of 30-35 mm Hg. Pulmonic Valve  No evidence of any pulmonic regurgitation. Left Atrium  Normal size left atrium. Left Ventricle  Moderate concentric left ventricular hypertrophy. Normal left ventricular size with preserved LV function and an estimated  ejection fraction of approximately 55-60%. Grade 1 diastolic dysfunction  Normal LA filling pressure   Right Atrium  Normal right atrial dimension with no evidence of thrombus or mass noted. EXAMINATION: US RENAL COMPLETE 2/14/2021 11:29 AM HISTORY: Acute on chronic renal failure. Report: Sonographic images of the kidneys were obtained bilaterally. COMPARISON: There are no correlative imaging studies for comparison. The right kidney measures 10.3 x 5.0 x 4.0 cm and has normal morphology and cortical echogenicity, cortical thickness ranges from 12.1 to 17.6 mm. No mass or hydronephrosis is identified. Color Doppler images demonstrate vascular flow within the right kidney. The left kidney measures 10.5 x 5.2 x 3.9 cm and has normal morphology and cortical echogenicity, no mass or hydronephrosis is identified. Cortical thickness on the left ranges from 16.1 to 16.9 mm. Color Doppler images demonstrate vascular flow within the left kidney. The bladder appears within normal limits. The prostate gland is enlarged, measuring approximately 3.6 x 3.7 x 5.0 cm. The prostate appears heterogeneous and has mild mass effect on the base of the bladder. Clinical correlation is recommended. 1. Normal ultrasound of the kidneys. 2. Prostate enlargement with mild mass effect on the base of the bladder. Clinical correlation is recommended, including PSA values. Signed by Dr Zuri Jacobo. Dixie on 2/14/2021 11:31 AM       Assessment   1. Acute kidney injury/stage II/improving. 2.  Acute tubular necrosis. 3.  Contrast.-induced nephropathy. 4.  Right pontine ischemic infarct. 5.  Hyponatremia now resolved. 6.  Deficiency vitamin D.  7.  Metabolic acidemia. Plan:  1. Discontinue IV fluid. 2.  Vitamin D supplement. 3.  P.o. sodium bicarbonate.       Thalia Grider MD  02/17/21  10:32 AM

## 2021-02-17 NOTE — PROGRESS NOTES
[facility]  INPATIENT SPEECH THERAPY  [facility] [address] [facility] [address] [facility] UNIT  TIME    [id number]       [x]Daily Note  []Progress Note    Date: 2021  Patient Name: Alycia Dobson        MRN: [id number]    Account #: [de-identified]  : 1955  (72 y.o.)  Gender: male   Primary Provider: Dacia Kim MD  Precautions: Fall  Swallowing Status/Diet: Regular diet with thin liquids       Subjective:   Patient was alert and cooperative with therapeutic activities. Objectives:  Patient completed oral motor exercises to strengthen oral musculature. 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 He also recalled familiar Bible verses later in the session without requiring cues. His speech intelligibility during these tasks was at 100%. Patient independently implemented dysarthria strategies during conversation without requiring cues. Patient continues to show improvement for dysarthria. Patient required redirection to task at one point during the session; however, he was attentive to most activities during the session without requiring cues. SHORT TERM GOAL #1:  Goal 1: The patient will complete oral motor exercises to improve speech intelligibility with min verbal cues at 100% accuracy. SHORT TERM GOAL #2:  Goal 2: The patient will recall/utilize dysarthric strategies during conversational discourse with min verbal cues at 100% accuracy in order to improve speech intelligibility. SHORT TERM GOAL #3:  Goal 3: The patient will sustain attention for 15+ minutes with min verbal cues or redirection during structured/unstructured activities. SHORT TERM GOAL #4:  Goal 4: The patient will complete executive function tasks (self-monitoring, organizing, etc) with min verbal cues at 100% accuracy.       ASSESSMENT:  Assessment: [x]Progressing towards goals          []Not Progressing towards goals    Patient Tolerance of Treatment: [x]Tolerated well []Tolerated fair []Required rest breaks []Fatigued    Education:  Learner:  [x]Patient          []Significant Other          []Other  Education provided regarding:  [x]Goals and POC   []Diet and swallowing precautions    []Home Exercise Program  []Progress and/or discharge information  Method of Education:  [x]Discussion          []Demonstration          []Handout          []Other  Evaluation of Education:   [x]Verbalized understanding   []Demonstrates without assistance  []Demonstrates with assistance  []Needs further instruction     []No evidence of learning                  []No family present      Plan: [x]Continue with current plan of care    []Modify current plan of care as follows:    []Discharge patient    Discharge Location:    Services/Supervision     [x]Patient continues to require treatment by a licensed therapist to address functional deficits as outlined in the established plan of care.     Electronically signed by Dain Claros Clinician on 2/17/21 at 9:37 AM CST

## 2021-02-17 NOTE — PROGRESS NOTES
[facility]  INPATIENT SPEECH THERAPY  [facility] [address] [facility] UNIT  TIME   [phone]  [x]Daily Note  []Progress Note    Date: 2021  Patient Name: Abdiel Dumont        MRN: [id number]    Account #: [de-identified]  : 1955  (72 y.o.)  Gender: male   Primary Provider: Petr Craft MD  Precautions: Fall  Swallowing Status/Diet: Regular diet with thin liquids      Subjective: Patient alert and cooperative with all therapy tasks. Patient seen upright in wheel chair in OT room. Objective:   Patient read at paragraph level to address speech intelligibility. Patient utilizing dysarthric strategies during task, independently. Speech intelligibility at paragraph level was 100%. Did note mild distortions. Patient is a . 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 Patient presenting with 100% speech intelligibility during this task. Patient states he has spoke on the phone several times in the last few days and all of the people he has spoke with state his speech is sounding much better. (per patient). Patient also states he has been independently practicing using the dysarthria strategies during all conversations. Patient continues to show improvements for dysarthria. Patient was also able to state on task during all activities and did not require cues or redirection even with auditory distractors present. SHORT TERM GOAL #1:  Goal 1: The patient will complete oral motor exercises to improve speech intelligibility with min verbal cues at 100% accuracy. SHORT TERM GOAL #2:  Goal 2: The patient will recall/utilize dysarthric strategies during conversational discourse with min verbal cues at 100% accuracy in order to improve speech intelligibility. SHORT TERM GOAL #3:  Goal 3: The patient will sustain attention for 15+ minutes with min verbal cues or redirection during structured/unstructured activities.  MET    SHORT TERM GOAL #4: Goal 4: The patient will complete executive function tasks (self-monitoring, organizing, etc) with min verbal cues at 100% accuracy. ASSESSMENT:  Assessment: [x]Progressing towards goals          []Not Progressing towards goals    Patient Tolerance of Treatment:   [x]Tolerated well []Tolerated fair []Required rest breaks []Fatigued    Education:  Learner:  [x]Patient          []Significant Other          []Other  Education provided regarding:  [x]Goals and POC   []Diet and swallowing precautions    []Home Exercise Program  []Progress and/or discharge information  Method of Education:  [x]Discussion          [x]Demonstration          []Handout          []Other  Evaluation of Education:   [x]Verbalized understanding   []Demonstrates without assistance  []Demonstrates with assistance  []Needs further instruction     []No evidence of learning                  []No family present      Plan: [x]Continue with current plan of care    []Modify current plan of care as follows:    []Discharge patient    Discharge Location:    Services/Supervision Recommended:      [x]Patient continues to require treatment by a licensed therapist to address functional deficits as outlined in the established plan of care.

## 2021-02-17 NOTE — PROGRESS NOTES
Hospitalist Progress Note  2/17/2021 6:58 AM  Subjective:   Admit Date: 2/8/2021  PCP: Weston Tabor    Subjective: No recurrence of chest pain. Has had intermittent headache. ROS: Six point review of systems is negative except as specifically addressed above. DIET CARDIAC;     Intake/Output Summary (Last 24 hours) at 2/17/2021 0658  Last data filed at 2/17/2021 2228  Gross per 24 hour   Intake 4305 ml   Output 2275 ml   Net 2030 ml     Medications:   sodium chloride 100 mL/hr at 02/16/21 1610     Current Facility-Administered Medications   Medication Dose Route Frequency Provider Last Rate Last Admin    butalbital-acetaminophen-caffeine (FIORICET, ESGIC) per tablet 1 tablet  1 tablet Oral Q4H PRN Chinmay Weiner MD   1 tablet at 02/16/21 1940    isosorbide mononitrate (IMDUR) extended release tablet 30 mg  30 mg Oral Daily Gloria Jara MD        vitamin D (ERGOCALCIFEROL) capsule 50,000 Units  50,000 Units Oral Weekly Sona Arboleda MD   50,000 Units at 02/14/21 1215    sodium bicarbonate tablet 325 mg  325 mg Oral BID Sona Arboleda MD   325 mg at 02/16/21 1940    cyclobenzaprine (FLEXERIL) tablet 10 mg  10 mg Oral Nightly Chinmay Weiner MD   10 mg at 02/16/21 1940    0.9 % sodium chloride infusion   Intravenous Continuous Chinmay Weiner  mL/hr at 02/16/21 1610 New Bag at 02/16/21 1610    metoprolol succinate (TOPROL XL) extended release tablet 100 mg  100 mg Oral Daily Pito Sparrow MD   100 mg at 02/16/21 0814    nitroGLYCERIN (NITROSTAT) SL tablet 0.4 mg  0.4 mg Sublingual Q5 Min PRN Gloria Jara MD   0.4 mg at 02/13/21 0726    melatonin disintegrating tablet 10 mg  10 mg Oral Nightly Chinmay Weiner MD   10 mg at 02/16/21 1940    enoxaparin (LOVENOX) injection 30 mg  30 mg Subcutaneous Daily Chinmay Weiner MD   30 mg at 02/16/21 0815    aspirin chewable tablet 81 mg  81 mg Oral Daily Chinmay Weiner MD   81 mg at 02/16/21 7479  atorvastatin (LIPITOR) tablet 10 mg  10 mg Oral Daily Nirmal Levin MD   10 mg at 02/16/21 0814    amLODIPine (NORVASC) tablet 10 mg  10 mg Oral Nightly Nirmal Levin MD   10 mg at 02/16/21 1940    cloNIDine (CATAPRES) tablet 0.1 mg  0.1 mg Oral PRN Nirmal Levin MD   0.1 mg at 02/09/21 2558    clopidogrel (PLAVIX) tablet 75 mg  75 mg Oral Daily Nirmal Levin MD   75 mg at 02/16/21 0814    latanoprost (XALATAN) 0.005 % ophthalmic solution 1 drop  1 drop Ophthalmic Nightly Nirmal Levin MD   1 drop at 02/16/21 1940    [Held by provider] triamterene-hydroCHLOROthiazide (MAXZIDE-25) 37.5-25 MG per tablet 2 tablet  2 tablet Oral Daily Nirmal Levin MD   2 tablet at 02/12/21 4557    acetaminophen (TYLENOL) tablet 650 mg  650 mg Oral Q4H PRN Nirmal Levin MD   650 mg at 02/15/21 2044    bisacodyl (DULCOLAX) suppository 10 mg  10 mg Rectal Daily PRN Nirmal Levin MD        polyethylene glycol [facility]) packet 17 g  17 g Oral Daily Nirmal Levin MD   17 g at 02/15/21 0752    magnesium hydroxide (MILK OF MAGNESIA) 400 MG/5ML suspension 30 mL  30 mL Oral Daily PRN Nirmal Levin MD            Labs:     Recent Labs     02/15/21  0400 02/16/21  0338 02/17/21  0506   WBC 8.7 7.4 7.4   RBC 4.55* 4.44* 4.48*   HGB 12.1* 11.7* 12.0*   HCT 36.5* 35.8* 35.7*   MCV 80.2 80.6 79.7*   MCH 26.6* 26.4* 26.8*   MCHC 33.2 32.7* 33.6    251 265     Recent Labs     02/15/21  0400 02/16/21  0338 02/17/21  0506    140 139   K 4.6 4.7 4.6   ANIONGAP 8 9 8    110 108   CO2 22 21* 23   BUN 48* 33* 25*   CREATININE 1.6* 1.4* 1.2   GLUCOSE 112* 87 76   CALCIUM 8.7* 8.8 9.0     No results for input(s): MG, PHOS in the last 72 hours.   Recent Labs     02/16/21  0338 02/17/21  0506   AST 12 14   ALT 9 9   BILITOT 0.3 0.3   ALKPHOS 54 56         Objective:   Vitals: /72   Pulse 72   Temp 98.6 °F (37 °C) (Temporal)   Resp 18   Ht 5' 6\" (1.676 m)   Wt 167 lb 5 oz (75.9 kg)   SpO2 93%   BMI 27.00 kg/m² 24HR INTAKE/OUTPUT:      Intake/Output Summary (Last 24 hours) at 2/17/2021 3922  Last data filed at 2/17/2021 5341  Gross per 24 hour   Intake 4305 ml   Output 2275 ml   Net 2030 ml     General appearance: Pleasant male laying in bed in NAD  Head - NC/AT  Eyesanicteric sclera  Lungs: Clear to auscultation bilaterally without rales, rhonchi or wheezes, no use of accessory muscles  Heart: Regular rate and rhythm  Abdomen: Soft, non-tender; no peritoneal signs, bowel sounds normal;  Extremities: Extremities normal without clubbing, atraumatic, no cyanosis or edema. Neurologic: No focal neurologic deficits    Assessment and Plan: Active Problems:    Acute ischemic stroke [facility])  Resolved Problems:    * No resolved hospital problems. *      CVA with left sided ataxia/ hemiparesis  - per Neurology. On aspirin, statin, plavix. -Rehab per Primary    HUI, with contrast nephropathy - improving  - hold ACEi, diuretics  -slow hydration  -Nephrology on board     Chest pain - improved  --rec outpatient cath once renal function improved  - actively being managed by cardiology. Defer further treatment and workup to Cardiology.      HTN - holding meds for HUI as above. On amlodipine, Imdur, BB. Add agents as needed. Vit D def -  on replacement      HLD - statin.      Prostate enlargement - could benefit from Urology follow-up on discharge.     Defer VTE prophylaxis, dispo, activity to primary team    Signed:  Norman Gonzales MD 2/17/2021 6:58 AM  Hospitalist

## 2021-02-17 NOTE — PROGRESS NOTES
Occupational Therapy  Facility/Department: [facility] 8 REHAB UNIT  Daily Treatment Note  NAME: Frankie Wharton  : 1955  MRN: [id number]    Date of Service: 2021    Discharge Recommendations:  Home with Home health OT       Assessment   Performance deficits / Impairments: Decreased functional mobility ; Decreased fine motor control;Decreased high-level IADLs;Decreased strength;Decreased balance;Decreased ROM; Decreased ADL status; Decreased coordination;Decreased endurance  Assessment: Patient making excellent progress with ADLs and Left UE coordination. Pt very determined to regain as much independence as possible before returning home. OT Education: ADL Adaptive Strategies  Activity Tolerance  Activity Tolerance: Patient Tolerated treatment well  Safety Devices  Safety Devices in place: Yes  Type of devices: Left in chair;Call light within reach; Chair alarm in place         Patient Diagnosis(es): There were no encounter diagnoses. has a past medical history of Cerebral artery occlusion with cerebral infarction ([facility] [address] Hyperlipidemia, and Hypertension. has a past surgical history that includes back surgery; Cervical spine surgery; Abdomen surgery; Appendectomy; and Shoulder arthroscopy (Right, 2019).     Restrictions  Restrictions/Precautions  Restrictions/Precautions: Fall Risk  Subjective   General  Chart Reviewed: Yes, Orders  Patient assessed for rehabilitation services?: Yes  Additional Pertinent Hx: CVA  Family / Caregiver Present: No  Diagnosis: CVA with left hemiparesis  General Comment  Comments: in the process of bathing at start of session  Pain Assessment  Pain Assessment: 0-10  Pain Level: 0  Pre Treatment Pain Screening  Pain at present: 0  Scale Used: Numeric Score  Vital Signs  Patient Currently in Pain: No   Objective    Balance  Sitting Balance: Supervision  Standing Balance: Contact guard assistance     Transfers  Stand Step Transfers: Contact guard assistance Sit to stand: Contact guard assistance  Stand to sit: Contact guard assistance      02/17/21 0905   Eating   Assistance Needed Setup or clean-up assistance   CARE Score 5   Oral Hygiene   Assistance Needed Independent   CARE Score 6   [address] or touching assistance   Comment CGA for balance   CARE Score 4   Shower/Bathe Self   Assistance Needed Supervision or touching assistance   Comment CGA for balance   CARE Score 4   Upper Body Dressing   Assistance Needed Setup or clean-up assistance   Comment pullover shirt   CARE Score 5   Lower Body Dressing   Assistance Needed Supervision or touching assistance   Comment CGA for balance   CARE Score 4   Putting On/Taking Off Footwear   Assistance Needed Setup or clean-up assistance   CARE Score 5      02/17/21 0905   Toilet Transfer   Assistance Needed Supervision or touching assistance   Comment CGA for balance   CARE Score 4     Plan   Plan  Specific instructions for Next Treatment: L UE strengthening, L FM acts  Current Treatment Recommendations: Self-Care / ADL, Endurance Training, Strengthening, Patient/Caregiver Education & Training, Home Management Training, Functional Mobility Training, Neuromuscular Re-education, Balance Training, Equipment Evaluation, Education, & procurement, ROM, Safety Education & Training    Goals  Short term goals  Time Frame for Short term goals: 1 week  Short term goal 1: met  Short term goal 2: met  Short term goal 3: met  Short term goal 4: complete 1-2 handed standing level task for 3 mins with CGA  Short term goal 5: complete L UE FMC acts x 5 occasions to increase coordination and finger dexterity for ADLs  Short term goal 6: complete simple ambulatory home making task with CGA  Long term goals  Time Frame for Long term goals : 3 weeks  Long term goal 1: complete HEP with independence  Long term goal 2: complete overall dressing with modified independence Long term goal 3: complete overall bathing with modified independence  Long term goal 4: complete overall toileting with modified independence  Long term goal 5: complete ambulatory home making task with modified independence  Long term goals 6: pt/family verbalize DME  Patient Goals   Patient goals : return home       Therapy Time   Individual Concurrent Group Co-treatment   Time In 0905         Time Out 1005         Minutes 60         Timed Code Treatment Minutes: 60 Minutes       Courtney Cooney, OT

## 2021-02-17 NOTE — PROGRESS NOTES
02/17/21 1437 02/17/21 1452 02/17/21 1510   Bed Mobility   Rolling Modified independent  --   --    Supine to Sit Modified independent  --   --    Transfers   Sit to Stand Stand by assistance  --   --    Stand to sit Stand by assistance  --   --    Bed to Chair Contact guard assistance  --   --    Lateral Transfers Contact guard assistance  --   --    Ambulation   Ambulation?  --  Yes  --    WB Status  --  WBAT  --    Ambulation 1   Surface  --  level tile  --    Device  --  Rolling Walker  --    Assistance  --  Contact guard assistance  --    Distance  --  [de-identified]' x2  --    Comments  --  Incorporated turns  --    Conditions Requiring Skilled Therapeutic Intervention   Assessment  --   --  Pt tolerated amb well w/ minimal fatigue.    Activity Tolerance   Activity Tolerance  --   --  Patient Tolerated treatment well   Safety Devices   Type of devices  --   --  Call light within reach   Electronically signed by Titi Morrison PTA on 2/17/2021 at 3:45 PM

## 2021-02-18 LAB
ALBUMIN SERPL-MCNC: 3.7 G/DL (ref 3.5–5.2)
ALP BLD-CCNC: 57 U/L (ref 40–130)
ALT SERPL-CCNC: 12 U/L (ref 5–41)
ANION GAP SERPL CALCULATED.3IONS-SCNC: 11 MMOL/L (ref 7–19)
AST SERPL-CCNC: 17 U/L (ref 5–40)
BASOPHILS ABSOLUTE: 0 K/UL (ref 0–0.2)
BASOPHILS RELATIVE PERCENT: 0.6 % (ref 0–1)
BILIRUB SERPL-MCNC: 0.3 MG/DL (ref 0.2–1.2)
BUN BLDV-MCNC: 23 MG/DL (ref 8–23)
CALCIUM SERPL-MCNC: 9.4 MG/DL (ref 8.8–10.2)
CHLORIDE BLD-SCNC: 105 MMOL/L (ref 98–111)
CO2: 24 MMOL/L (ref 22–29)
CREAT SERPL-MCNC: 1.2 MG/DL (ref 0.5–1.2)
EOSINOPHILS ABSOLUTE: 0.1 K/UL (ref 0–0.6)
EOSINOPHILS RELATIVE PERCENT: 1.7 % (ref 0–5)
GFR AFRICAN AMERICAN: >59
GFR NON-AFRICAN AMERICAN: >60
GLUCOSE BLD-MCNC: 76 MG/DL (ref 74–109)
HCT VFR BLD CALC: 38.5 % (ref 42–52)
HEMOGLOBIN: 12.9 G/DL (ref 14–18)
IMMATURE GRANULOCYTES #: 0 K/UL
LYMPHOCYTES ABSOLUTE: 1.8 K/UL (ref 1.1–4.5)
LYMPHOCYTES RELATIVE PERCENT: 24.6 % (ref 20–40)
MCH RBC QN AUTO: 26.6 PG (ref 27–31)
MCHC RBC AUTO-ENTMCNC: 33.5 G/DL (ref 33–37)
MCV RBC AUTO: 79.4 FL (ref 80–94)
MONOCYTES ABSOLUTE: 0.4 K/UL (ref 0–0.9)
MONOCYTES RELATIVE PERCENT: 6.2 % (ref 0–10)
NEUTROPHILS ABSOLUTE: 4.8 K/UL (ref 1.5–7.5)
NEUTROPHILS RELATIVE PERCENT: 66.6 % (ref 50–65)
PDW BLD-RTO: 14.4 % (ref 11.5–14.5)
PLATELET # BLD: 279 K/UL (ref 130–400)
PMV BLD AUTO: 10.5 FL (ref 9.4–12.4)
POTASSIUM SERPL-SCNC: 4.5 MMOL/L (ref 3.5–5)
RBC # BLD: 4.85 M/UL (ref 4.7–6.1)
SODIUM BLD-SCNC: 140 MMOL/L (ref 136–145)
TOTAL PROTEIN: 6.6 G/DL (ref 6.6–8.7)
WBC # BLD: 7.1 K/UL (ref 4.8–10.8)

## 2021-02-18 PROCEDURE — 6360000002 HC RX W HCPCS: Performed by: PSYCHIATRY & NEUROLOGY

## 2021-02-18 PROCEDURE — 97110 THERAPEUTIC EXERCISES: CPT

## 2021-02-18 PROCEDURE — 6370000000 HC RX 637 (ALT 250 FOR IP): Performed by: INTERNAL MEDICINE

## 2021-02-18 PROCEDURE — 97530 THERAPEUTIC ACTIVITIES: CPT

## 2021-02-18 PROCEDURE — 1180000000 HC REHAB R&B

## 2021-02-18 PROCEDURE — 36415 COLL VENOUS BLD VENIPUNCTURE: CPT

## 2021-02-18 PROCEDURE — 97535 SELF CARE MNGMENT TRAINING: CPT

## 2021-02-18 PROCEDURE — 6370000000 HC RX 637 (ALT 250 FOR IP): Performed by: STUDENT IN AN ORGANIZED HEALTH CARE EDUCATION/TRAINING PROGRAM

## 2021-02-18 PROCEDURE — 85025 COMPLETE CBC W/AUTO DIFF WBC: CPT

## 2021-02-18 PROCEDURE — 99232 SBSQ HOSP IP/OBS MODERATE 35: CPT | Performed by: PSYCHIATRY & NEUROLOGY

## 2021-02-18 PROCEDURE — 80053 COMPREHEN METABOLIC PANEL: CPT

## 2021-02-18 PROCEDURE — 97116 GAIT TRAINING THERAPY: CPT

## 2021-02-18 PROCEDURE — 6370000000 HC RX 637 (ALT 250 FOR IP): Performed by: PSYCHIATRY & NEUROLOGY

## 2021-02-18 PROCEDURE — 92507 TX SP LANG VOICE COMM INDIV: CPT

## 2021-02-18 RX ORDER — LISINOPRIL 20 MG/1
40 TABLET ORAL NIGHTLY
Status: DISCONTINUED | OUTPATIENT
Start: 2021-02-18 | End: 2021-02-23 | Stop reason: HOSPADM

## 2021-02-18 RX ADMIN — LATANOPROST 1 DROP: 50 SOLUTION OPHTHALMIC at 20:12

## 2021-02-18 RX ADMIN — ASPIRIN 81 MG: 81 TABLET, CHEWABLE ORAL at 07:38

## 2021-02-18 RX ADMIN — ENOXAPARIN SODIUM 30 MG: 40 INJECTION SUBCUTANEOUS at 07:37

## 2021-02-18 RX ADMIN — AMLODIPINE BESYLATE 10 MG: 10 TABLET ORAL at 20:11

## 2021-02-18 RX ADMIN — BUTALBITAL, ACETAMINOPHEN, AND CAFFEINE 1 TABLET: 50; 325; 40 TABLET ORAL at 07:47

## 2021-02-18 RX ADMIN — CYCLOBENZAPRINE 10 MG: 10 TABLET, FILM COATED ORAL at 20:12

## 2021-02-18 RX ADMIN — ISOSORBIDE MONONITRATE 30 MG: 30 TABLET, EXTENDED RELEASE ORAL at 07:38

## 2021-02-18 RX ADMIN — METOPROLOL SUCCINATE 100 MG: 50 TABLET, EXTENDED RELEASE ORAL at 07:38

## 2021-02-18 RX ADMIN — LISINOPRIL 40 MG: 20 TABLET ORAL at 20:12

## 2021-02-18 RX ADMIN — SODIUM BICARBONATE 325 MG: 325 TABLET ORAL at 20:12

## 2021-02-18 RX ADMIN — Medication 10 MG: at 20:12

## 2021-02-18 RX ADMIN — CLOPIDOGREL BISULFATE 75 MG: 75 TABLET ORAL at 07:38

## 2021-02-18 RX ADMIN — ATORVASTATIN CALCIUM 10 MG: 10 TABLET, FILM COATED ORAL at 07:38

## 2021-02-18 RX ADMIN — SODIUM BICARBONATE 325 MG: 325 TABLET ORAL at 07:38

## 2021-02-18 ASSESSMENT — PAIN SCALES - GENERAL
PAINLEVEL_OUTOF10: 1
PAINLEVEL_OUTOF10: 0

## 2021-02-18 ASSESSMENT — PAIN DESCRIPTION - LOCATION: LOCATION: HEAD

## 2021-02-18 ASSESSMENT — 9 HOLE PEG TEST: TESTTIME_SECONDS: 49

## 2021-02-18 NOTE — PROGRESS NOTES
Hospitalist Progress Note  2/18/2021 1:05 PM  Subjective:   Admit Date: 2/8/2021  PCP: Sascha Eldridge    Subjective: No recurrence of chest pain while on imdur. No new complaints. ROS: Six point review of systems is negative except as specifically addressed above. DIET CARDIAC;     Intake/Output Summary (Last 24 hours) at 2/18/2021 1305  Last data filed at 2/18/2021 1575  Gross per 24 hour   Intake 710 ml   Output 800 ml   Net -90 ml     Medications:    Current Facility-Administered Medications   Medication Dose Route Frequency Provider Last Rate Last Admin    butalbital-acetaminophen-caffeine (FIORICET, ESGIC) per tablet 1 tablet  1 tablet Oral Q4H PRN Laney Arroyo MD   1 tablet at 02/18/21 0747    isosorbide mononitrate (IMDUR) extended release tablet 30 mg  30 mg Oral Daily Karen Jang MD   30 mg at 02/18/21 5250    vitamin D (ERGOCALCIFEROL) capsule 50,000 Units  50,000 Units Oral Weekly Mahamed Medeiros MD   50,000 Units at 02/14/21 1215    sodium bicarbonate tablet 325 mg  325 mg Oral BID Mahamed Medeiros MD   325 mg at 02/18/21 4473    cyclobenzaprine (FLEXERIL) tablet 10 mg  10 mg Oral Nightly Laney Arroyo MD   10 mg at 02/17/21 2113    metoprolol succinate (TOPROL XL) extended release tablet 100 mg  100 mg Oral Daily Annette Watkins MD   100 mg at 02/18/21 0738    nitroGLYCERIN (NITROSTAT) SL tablet 0.4 mg  0.4 mg Sublingual Q5 Min PRN Karen Jang MD   0.4 mg at 02/13/21 9367    melatonin disintegrating tablet 10 mg  10 mg Oral Nightly Laney Arroyo MD   10 mg at 02/17/21 2113    enoxaparin (LOVENOX) injection 30 mg  30 mg Subcutaneous Daily Laney Arroyo MD   30 mg at 02/18/21 0737    aspirin chewable tablet 81 mg  81 mg Oral Daily Laney Arroyo MD   81 mg at 02/18/21 0738    atorvastatin (LIPITOR) tablet 10 mg  10 mg Oral Daily Laney Arroyo MD   10 mg at 02/18/21 6056  amLODIPine (NORVASC) tablet 10 mg  10 mg Oral Nightly Adia Brooke MD   10 mg at 02/17/21 2113    cloNIDine (CATAPRES) tablet 0.1 mg  0.1 mg Oral PRN Adia Brooke MD   0.1 mg at 02/09/21 7462    clopidogrel (PLAVIX) tablet 75 mg  75 mg Oral Daily Adia Brooke MD   75 mg at 02/18/21 0738    latanoprost (XALATAN) 0.005 % ophthalmic solution 1 drop  1 drop Ophthalmic Nightly Adia Brooke MD   1 drop at 02/17/21 2113    [Held by provider] triamterene-hydroCHLOROthiazide (MAXZIDE-25) 37.5-25 MG per tablet 2 tablet  2 tablet Oral Daily Adia Brooke MD   2 tablet at 02/12/21 4533    acetaminophen (TYLENOL) tablet 650 mg  650 mg Oral Q4H PRN Adia Brooke MD   650 mg at 02/15/21 2044    bisacodyl (DULCOLAX) suppository 10 mg  10 mg Rectal Daily PRN Adia Brooke MD        polyethylene glycol [facility]) packet 17 g  17 g Oral Daily Adia Brooke MD   17 g at 02/17/21 0801    magnesium hydroxide (MILK OF MAGNESIA) 400 MG/5ML suspension 30 mL  30 mL Oral Daily PRN Adia Brooke MD            Labs:     Recent Labs     02/16/21  0338 02/17/21  0506 02/18/21  0555   WBC 7.4 7.4 7.1   RBC 4.44* 4.48* 4.85   HGB 11.7* 12.0* 12.9*   HCT 35.8* 35.7* 38.5*   MCV 80.6 79.7* 79.4*   MCH 26.4* 26.8* 26.6*   MCHC 32.7* 33.6 33.5    265 279     Recent Labs     02/16/21  0338 02/17/21  0506 02/18/21  0555    139 140   K 4.7 4.6 4.5   ANIONGAP 9 8 11    108 105   CO2 21* 23 24   BUN 33* 25* 23   CREATININE 1.4* 1.2 1.2   GLUCOSE 87 76 76   CALCIUM 8.8 9.0 9.4     No results for input(s): MG, PHOS in the last 72 hours.   Recent Labs     02/16/21  0338 02/17/21  0506 02/18/21  0555   AST 12 14 17   ALT 9 9 12   BILITOT 0.3 0.3 0.3   ALKPHOS 54 56 57         Objective:   Vitals: BP (!) 158/96   Pulse 60   Temp 97.2 °F (36.2 °C) (Temporal)   Resp 18   Ht 5' 6\" (1.676 m)   Wt 163 lb 11.2 oz (74.3 kg)   SpO2 95%   BMI 26.42 kg/m²   24HR INTAKE/OUTPUT: Intake/Output Summary (Last 24 hours) at 2/18/2021 1305  Last data filed at 2/18/2021 1480  Gross per 24 hour   Intake 710 ml   Output 800 ml   Net -90 ml     General appearance: Pleasant male laying in bed in NAD  Head - NC/AT  Eyesanicteric sclera  Lungs: Clear to auscultation bilaterally without rales, rhonchi or wheezes, no use of accessory muscles  Heart: Regular rate and rhythm  Abdomen: Soft, non-tender; no peritoneal signs, bowel sounds normal;  Extremities: Extremities normal without clubbing, atraumatic, no cyanosis or edema. Neurologic: No focal neurologic deficits    Assessment and Plan: Active Problems:    Acute ischemic stroke [facility])  Resolved Problems:    * No resolved hospital problems. *      CVA with left sided ataxia/ hemiparesis  - per Neurology. On aspirin, statin, plavix. -Rehab per Primary    HUI, with contrast nephropathy - improved  --ok to resume ACE-I and Triamterene/HCTZ  -d/c IVF  -Nephrology on board     Chest pain - improved  --rec outpatient cath once renal function improved  - Cardiology following     HTN   --resume Lisinopril and Triamterene-HCTZ, continue home regimen otherwise    Vit D def   -  on replacement      HLD   - statin.      Prostate enlargement - could benefit from Urology follow-up on discharge.     Defer VTE prophylaxis, dispo, activity to primary team    Signed:  Ana Paula Oquendo MD 2/18/2021 1:05 PM  Hospitalist

## 2021-02-18 NOTE — PROGRESS NOTES
Nephrology ([facility]) Progress Note    Patient:  Ernestine Acosta  YOB: 1955  Date of Service: 2/18/2021  MRN: [id number]   Acct: [de-identified]   Primary Care Physician: Luis Ramos  Advance Directive: Full Code  Admit Date: 2/8/2021       Hospital Day: 10  Referring Provider: May Acevedo MD    Patient independently seen and examined, Chart, Consults, Notes, Operative notes, Labs, Cardiology, and Radiology studies reviewed as available. Chief complaint: Abnormal labs. Subjective:  Ernestine Acosta is a 72 y.o. male  whom we were consulted for acute kidney injury. Patient denies any history of chronic kidney disease. He was initially admitted to [facility] with CVA. He is now transferred to [facility] for physical therapy. While at [facility] he has received intravenous contrast dye led to a decline in renal function which is slowly improving. Patient has history of benign essential hypertension which is fairly good control. This morning he was sitting up and eating breakfast.  He feels well. He is hoping to walk back to normal soon.     Allergies:  Codeine    Medicines:  Current Facility-Administered Medications   Medication Dose Route Frequency Provider Last Rate Last Admin    lisinopril (PRINIVIL;ZESTRIL) tablet 40 mg  40 mg Oral Nightly Melo Hughes MD        butalbital-acetaminophen-caffeine (FIORICET, ESGIC) per tablet 1 tablet  1 tablet Oral Q4H PRN May Acevedo MD   1 tablet at 02/18/21 0747    isosorbide mononitrate (IMDUR) extended release tablet 30 mg  30 mg Oral Daily Celeste Rocha MD   30 mg at 02/18/21 7097    vitamin D (ERGOCALCIFEROL) capsule 50,000 Units  50,000 Units Oral Weekly Tiffanie Rodriguez MD   50,000 Units at 02/14/21 1215    sodium bicarbonate tablet 325 mg  325 mg Oral BID Tiffanie Rodriguez MD   325 mg at 02/18/21 2269  cyclobenzaprine (FLEXERIL) tablet 10 mg  10 mg Oral Nightly Laney Arroyo MD   10 mg at 02/17/21 2113    metoprolol succinate (TOPROL XL) extended release tablet 100 mg  100 mg Oral Daily Annette Watkins MD   100 mg at 02/18/21 0738    nitroGLYCERIN (NITROSTAT) SL tablet 0.4 mg  0.4 mg Sublingual Q5 Min PRN Karen Jang MD   0.4 mg at 02/13/21 5766    melatonin disintegrating tablet 10 mg  10 mg Oral Nightly Laney Arroyo MD   10 mg at 02/17/21 2113    enoxaparin (LOVENOX) injection 30 mg  30 mg Subcutaneous Daily Laney Arroyo MD   30 mg at 02/18/21 0737    aspirin chewable tablet 81 mg  81 mg Oral Daily Laney Arroyo MD   81 mg at 02/18/21 1318    atorvastatin (LIPITOR) tablet 10 mg  10 mg Oral Daily Laney Arroyo MD   10 mg at 02/18/21 0738    amLODIPine (NORVASC) tablet 10 mg  10 mg Oral Nightly Laney Arroyo MD   10 mg at 02/17/21 2113    cloNIDine (CATAPRES) tablet 0.1 mg  0.1 mg Oral PRN Laney Arroyo MD   0.1 mg at 02/09/21 7884    clopidogrel (PLAVIX) tablet 75 mg  75 mg Oral Daily Laney Arroyo MD   75 mg at 02/18/21 0738    latanoprost (XALATAN) 0.005 % ophthalmic solution 1 drop  1 drop Ophthalmic Nightly Laney Arroyo MD   1 drop at 02/17/21 2113    triamterene-hydroCHLOROthiazide (MAXZIDE-25) 37.5-25 MG per tablet 2 tablet  2 tablet Oral Daily Laney Arroyo MD   2 tablet at 02/12/21 4088    acetaminophen (TYLENOL) tablet 650 mg  650 mg Oral Q4H PRN Laney Arroyo MD   650 mg at 02/15/21 2044    bisacodyl (DULCOLAX) suppository 10 mg  10 mg Rectal Daily PRN Laney Arroyo MD        polyethylene glycol [facility]) packet 17 g  17 g Oral Daily Laney Arroyo MD   17 g at 02/17/21 0801    magnesium hydroxide (MILK OF MAGNESIA) 400 MG/5ML suspension 30 mL  30 mL Oral Daily PRN Laney Arroyo MD           Past Medical History:  Past Medical History:   Diagnosis Date    Cerebral artery occlusion with cerebral infarction [facility]) 2013    right sided weakness    Hyperlipidemia     Hypertension Physically abused: Not on file     Forced sexual activity: Not on file   Other Topics Concern    Not on file   Social History Narrative    Not on file         Review of Systems:  History obtained from chart review and the patient  General ROS: No fever or chills  Respiratory ROS: No cough, shortness of breath, wheezing  Cardiovascular ROS: No chest pain or palpitations  Gastrointestinal ROS: No abdominal pain or melena  Genito-Urinary ROS: No dysuria or hematuria  Musculoskeletal ROS: No joint pain or swelling         Objective:  Patient Vitals for the past 24 hrs:   BP Temp Temp src Pulse Resp SpO2 Weight   02/18/21 0514 (!) 158/96 97.2 °F (36.2 °C) Temporal 60 18 95 % 163 lb 11.2 oz (74.3 kg)   02/17/21 1847 (!) 147/87 98.1 °F (36.7 °C) Temporal 73 16 93 %        Intake/Output Summary (Last 24 hours) at 2/18/2021 1309  Last data filed at 2/18/2021 1307  Gross per 24 hour   Intake 950 ml   Output 800 ml   Net 150 ml     General: awake/alert   HEENT: Normocephalic atraumatic head  Neck: Supple with no JVD or carotid bruits. Chest:  clear to auscultation bilaterally  CVS: regular rate and rhythm  Abdominal: soft, nontender, normal bowel sounds  Extremities: no cyanosis or edema  Skin: warm and dry without rash    Labs:  BMP:   Recent Labs     02/16/21 0338 02/17/21  0506 02/18/21  0555    139 140   K 4.7 4.6 4.5    108 105   CO2 21* 23 24   BUN 33* 25* 23   CREATININE 1.4* 1.2 1.2   CALCIUM 8.8 9.0 9.4     CBC:   Recent Labs     02/16/21 0338 02/17/21  0506 02/18/21  0555   WBC 7.4 7.4 7.1   HGB 11.7* 12.0* 12.9*   HCT 35.8* 35.7* 38.5*   MCV 80.6 79.7* 79.4*    265 279     LIVER PROFILE:   Recent Labs     02/16/21 0338 02/17/21  0506 02/18/21  0555   AST 12 14 17   ALT 9 9 12   BILITOT 0.3 0.3 0.3   ALKPHOS 54 56 57     PT/INR: No results for input(s): PROTIME, INR in the last 72 hours. APTT: No results for input(s): APTT in the last 72 hours. BNP:  No results for input(s): BNP in the last 72 hours. Ionized Calcium:No results for input(s): IONCA in the last 72 hours. Magnesium:  No results for input(s): MG in the last 72 hours. Phosphorus:  No results for input(s): PHOS in the last 72 hours. HgbA1C: No results for input(s): LABA1C in the last 72 hours. Hepatic:   Recent Labs     02/16/21  0338 02/17/21  0506 02/18/21  0555   ALKPHOS 54 56 57   ALT 9 9 12   AST 12 14 17   PROT 5.6* 5.9* 6.6   BILITOT 0.3 0.3 0.3   LABALBU 3.6 3.6 3.7     Lactic Acid: No results for input(s): LACTA in the last 72 hours. Troponin: No results for input(s): CKTOTAL, CKMB, TROPONINT in the last 72 hours. ABGs: No results found for: PHART, PO2ART, LTX4BTS  CRP:  No results for input(s): CRP in the last 72 hours. Sed Rate:  No results for input(s): SEDRATE in the last 72 hours. Culture Results:   Blood Culture Recent: No results for input(s): BC in the last 720 hours. Urine Culture Recent : No results for input(s): LABURIN in the last 720 hours.     Radiology reports as per the Radiologist  Radiology: Echo Complete 2d W Doppler W Color    Result Date: 2/12/2021 Transthoracic Echocardiography Report (TTE)  Demographics   Patient Name   Jamal Nuñez  Date of Study           02/12/2021   MRN            [id number]          Gender                  Male   Date of Birth  1955      Room Number             UTK-6004   Age            72 year(s)   Height:        66 inches       Referring Physician     Kajal Calvin   Weight:        162 pounds      Sonographer             Lori Izquierdo [facility]   BSA:           1.83 m^2        Interpreting Physician  Anju Dockery MD   BMI:           26.15 kg/m^2  Procedure Type of Study   TTE procedure:ECHO NO CONTRAST WITH DOP/COLR. Study Location: Echo Lab Technical Quality: Adequate visualization Patient Status: Inpatient Indications:Chest pain. Conclusions   Summary  Mild to moderate mitral regurgitation is present. Mild tricuspid regurgitation with estimated RVSP of 30-35 mm Hg. Moderate concentric left ventricular hypertrophy. Normal left ventricular size with preserved LV function and an estimated  ejection fraction of approximately 55-60%. Grade 1 diastolic dysfunction  Normal LA filling pressure   Signature   ----------------------------------------------------------------  Electronically signed by Anju Dockery MD(Interpreting physician)  on 02/12/2021 04:32 PM  ----------------------------------------------------------------   Findings   Mitral Valve  Mild to moderate mitral regurgitation is present. Aortic Valve  normal aortic valve   Tricuspid Valve  Mild tricuspid regurgitation with estimated RVSP of 30-35 mm Hg. Pulmonic Valve  No evidence of any pulmonic regurgitation. Left Atrium  Normal size left atrium. Left Ventricle  Moderate concentric left ventricular hypertrophy. Normal left ventricular size with preserved LV function and an estimated  ejection fraction of approximately 55-60%. Grade 1 diastolic dysfunction  Normal LA filling pressure   Right Atrium  Normal right atrial dimension with no evidence of thrombus or mass noted. Right Ventricle  Normal right ventricular size with preserved RV function. M-Mode Measurements (cm)   LVIDd: 3.54 cm                         LVIDs: 2.16 cm  IVSd: 1.4 cm  LVPWd: 1.4 cm                          AO Root Dimension: 3.3 cm  % Ejection Fraction: 70.4 %            LA: 3.3 cm                                         LVOT: 2 cm  Doppler Measurements:   AV Velocity:2.42 cm/s                 MV Peak E-Wave: 44.6 cm/s  AV Peak Gradient: 7.84 mmHg           MV Peak A-Wave: 73.3 cm/s  AV Mean Gradient: 4 mmHg              MV E/A Ratio: 0.61 %  AV Area (Continuity):2.42 cm^2        MV Peak Gradient: 0.8 mmHg  TR Velocity:225 cm/s                  MV P1/2t: 134 msec  TR Gradient:20.25 mmHg                MVA by PHT1.64 cm^2      Xr Chest (2 Vw)    Result Date: 2/13/2021  XR CHEST (2 VW) 2/13/2021 7:32 PM History: Chest pain. Two-view chest x-ray with no comparison. The heart size is normal. The mediastinum is within normal limits. The lungs are mildly hyperexpanded with no pneumonia or pneumothorax. Mild symmetric prominence of the pulmonary arteries. There is no significant pleural fluid. No congestive failure changes. 1. No acute disease.  Signed by Dr Kim England on 2/13/2021 7:32 PM    Us Renal Complete    Result Date: 2/14/2021 EXAMINATION: US RENAL COMPLETE 2/14/2021 11:29 AM HISTORY: Acute on chronic renal failure. Report: Sonographic images of the kidneys were obtained bilaterally. COMPARISON: There are no correlative imaging studies for comparison. The right kidney measures 10.3 x 5.0 x 4.0 cm and has normal morphology and cortical echogenicity, cortical thickness ranges from 12.1 to 17.6 mm. No mass or hydronephrosis is identified. Color Doppler images demonstrate vascular flow within the right kidney. The left kidney measures 10.5 x 5.2 x 3.9 cm and has normal morphology and cortical echogenicity, no mass or hydronephrosis is identified. Cortical thickness on the left ranges from 16.1 to 16.9 mm. Color Doppler images demonstrate vascular flow within the left kidney. The bladder appears within normal limits. The prostate gland is enlarged, measuring approximately 3.6 x 3.7 x 5.0 cm. The prostate appears heterogeneous and has mild mass effect on the base of the bladder. Clinical correlation is recommended. 1. Normal ultrasound of the kidneys. 2. Prostate enlargement with mild mass effect on the base of the bladder. Clinical correlation is recommended, including PSA values. Signed by Dr Laxmi Quintana. [place] on 2/14/2021 11:31 AM       Assessment   1. Acute kidney injury/stage II/resolved. 2.  Acute tubular necrosis. 3.  Contrast.-induced nephropathy. 4.  Right pontine ischemic infarct. 5.  Hyponatremia now resolved. 6.  Deficiency vitamin D.  7.  Metabolic acidemia. Plan:  1. We will sign off, thank you for consult. 2.  Vitamin D supplement. 3.  P.o. sodium bicarbonate.       Helena Celaya MD  02/18/21  1:09 PM

## 2021-02-18 NOTE — PROGRESS NOTES
02/18/21 0851 02/18/21 0852   Pain Screening   Patient Currently in Pain No  --    Transfers   Sit to Stand Stand by assistance  --    Stand to sit Stand by assistance  --    Ambulation   Ambulation? Yes  --    Ambulation 1   Surface level tile  --    Device Rolling Walker  --    Assistance Contact guard assistance  --    Distance [de-identified]'  --    Comments Incorporated turns  --    Conditions Requiring Skilled Therapeutic Intervention   Assessment  --  Pt tolerated amb well w/ minimal fatigue.    Activity Tolerance   Activity Tolerance  --  Patient Tolerated treatment well   Electronically signed by Yaakov Murrieta PTA on 2/18/2021 at 9:07 AM

## 2021-02-18 NOTE — PROGRESS NOTES
Patient:   Ciera Leos  MR#:    [id number]   Room:    0820/820-01   YOB: 1955  Date of Progress Note: 2/18/2021  Time of Note                           12:18 PM  Consulting Physician:   Joel Chawla M.D. Attending Physician:  Joel Chawla MD     CHIEF COMPLAINT: Left-sided weakness and dysarthria        subjective  This 72 y.o. male   with history of HTN,hyperlipidemia,chronic back pain and CVA. He presented to [facility] on 2/2/21 with c/o lightheadedness, mild blurred vision and change in his gait that started about 3 days prior. He was hypertensive on presentation. MRI done revealed a right pontine infarct. CTA head/neck showed non signifigant steno-occlusive disease. He was admitted the the hospitalist service with consult for neurology. He was seen by Dr. Danilo Muniz. He was doing quite well. When on 2/4/21 he had a return of dizziness with N&V and worsening left sided weakness, mild dysarthria, B/P was low. Stat MRI was ordered that revealed showed a new right temporal lobe infarct. TTE showed thickening on anterior leaflet of mirtal valve. On the morning of 2/5/21 he had an accoute onset of chest pain/pressure with hypotension. Repeat EKG was concerning. Troponin was negative x 2. Chest pain resolved with nitro. Cardiology was consult for NIKI.  He was seen by  Christy. NIKI done on 2/5/21 showed no thrombus noted in the left atrial appendage,negative bubble study, no evidence of PFO or ASD,severely thickened left ventricle but no evidence of LV noncompaction. Dr. Jarek Sharpe recommended a diagnostic left heart catheterization to assess his coronary anatomy. Patient wishes to posst-pone his cardiac work-up until after he completes the Rehab program.  He was also noted to have an increase in his creatinine of 5.12. Nephrology consulted. Patients creatine is rapidly improving now at 1.4 with BUN of 38. He is alert & oriented x 3, follows commands but does have mild dysarthia but demonstrates impulsivity and decreased safety awareness. Left sided weakness remains. Patient was evaluated by SPT for swallow and was cleared for a regular diet with thin liquids. No c/o today. Continues to improve. REVIEW OF SYSTEMS:  Constitutional: No fevers No chills  Neck:No stiffness  Respiratory: No shortness of breath  Cardiovascular: No chest pain No palpitations  Gastrointestinal: No abdominal pain    Genitourinary: No Dysuria  Neurological: No headache, no confusion      PHYSICAL EXAM:  BP (!) 158/96   Pulse 60   Temp 97.2 °F (36.2 °C) (Temporal)   Resp 18   Ht 5' 6\" (1.676 m)   Wt 163 lb 11.2 oz (74.3 kg)   SpO2 95%   BMI 26.42 kg/m²     Constitutional: he appears well-developed and well-nourished. Eyes  conjunctiva normal.  Pupils react to light  Ear, nose, throat -hearing intact to voice. No scars, masses, or lesions over external nose or ears, no atrophy of tongue  Neck-symmetric, no masses noted, no jugular vein distension  Respiration- chest wall appears symmetric, good expansion,   normal effort without use of accessory muscles  Cardiovascular- RRR  Musculoskeletal  no significant wasting of muscles noted, no bony deformities, gait no gross ataxia  Extremities-no clubbing, cyanosis or edema  Skin  warm, dry, and intact. No rash, erythema, or pallor. Psychiatric  mood, affect, and behavior appear normal.      Neurology  NEUROLOGICAL EXAM:  Awake, alert, fluent oriented x 3 appropriate affect  Attention and concentration appear appropriate  Recent and remote memory appears unremarkable  Speech  with dysarthria  No clear issues with language of fund of knowledge     Cranial Nerve Exam   CN II- Visual fields grossly unremarkable  CN III, IV,VI-EOMI, No nystagmus, conjugate eye movements, no ptosis  CN VII-no facial assymetry  CN VIII-Hearing intact        Motor Exam  4/5 left arm and leg     Tremors- no tremors in hands or head noted     Gait  Not tested     Coordination  Ataxic on the left            Nursing/pcp notes, imaging,labs and vitals reviewed.      PT,OT and/or speech notes reviewed    Lab Results   Component Value Date    WBC 7.1 02/18/2021    HGB 12.9 (L) 02/18/2021    HCT 38.5 (L) 02/18/2021    MCV 79.4 (L) 02/18/2021     02/18/2021     Lab Results   Component Value Date     02/18/2021    K 4.5 02/18/2021     02/18/2021    CO2 24 02/18/2021    BUN 23 02/18/2021    CREATININE 1.2 02/18/2021    GLUCOSE 76 02/18/2021    CALCIUM 9.4 02/18/2021    PROT 6.6 02/18/2021    LABALBU 3.7 02/18/2021    BILITOT 0.3 02/18/2021    ALKPHOS 57 02/18/2021    AST 17 02/18/2021    ALT 12 02/18/2021    LABGLOM >60 02/18/2021    GFRAA >59 02/18/2021   No results found for: INRNo results found for: PHENYTOIN, ESR, CRP    02/17/21 1437 02/17/21 1452 02/17/21 1510   Bed Mobility   Rolling Modified independent  --   --    Supine to Sit Modified independent  --   --    Transfers   Sit to Stand Stand by assistance  --   --    Stand to sit Stand by assistance  --   --    Bed to Chair Contact guard assistance  --   --    Lateral Transfers Contact guard assistance  --   --    Ambulation   Ambulation?  --  Yes  --    WB Status  --  WBAT  --    Ambulation 1   Surface  --  level tile  --    Device  --  EchoStar  -- Assistance  --  Contact guard assistance  --    Distance  --  [de-identified]' x2  --    Comments  --  Incorporated turns  --    Conditions Requiring Skilled Therapeutic Intervention   Assessment  --   --  Pt tolerated amb well w/ minimal fatigue. Activity Tolerance   Activity Tolerance  --   --  Patient Tolerated treatment well   Safety Devices   Type of devices  --   --  Call light within reach   Objectives:  Patient completed oral motor exercises to strengthen oral musculature.      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 He also recalled familiar Bible verses later in the session without requiring cues. His speech intelligibility during these tasks was at 100%.    Patient independently implemented dysarthria strategies during conversation without requiring cues.      Patient continues to show improvement for dysarthria.      Patient required redirection to task at one point during the session; however, he was attentive to most activities during the session without requiring cues.     02/17/21 0905   Eating   Assistance Needed Setup or clean-up assistance   CARE Score 5   Oral Hygiene   Assistance Needed Independent   CARE Score 6   [address] Needed Supervision or touching assistance   Comment CGA for balance   CARE Score 4   Shower/Bathe Self   Assistance Needed Supervision or touching assistance   Comment CGA for balance   CARE Score 4   Upper Body Dressing   Assistance Needed Setup or clean-up assistance   Comment pullover shirt   CARE Score 5   Lower Body Dressing   Assistance Needed Supervision or touching assistance   Comment CGA for balance   CARE Score 4   Putting On/Taking Off Footwear   Assistance Needed Setup or clean-up assistance   CARE Score 5        RECORD REVIEW: Previous medical records, medications were reviewed at today's visit    IMPRESSION: 1.  Right pontine and temporal strokes with left-sided ataxia/hemiparesis currently on Plavix/aspirin PT/OT/SLP  2. Recent chest pain resolved with nitroglycerin. Patient deferring cardiac work-up until the near future. see #8  3. Essential hypertensioncontinue current medications and monitoring  4. Hyperlipidemiacontinue current medications and monitoring  5. GIbowel regimen  6. DVT prophylaxissubcu Lovenox  7. Recent acute renal failure/CKD significantly better. Nephrology following. 8.  Recurring chest pain relieved with nitroglycerin. Cardiology actively following and on board. Seems better with Imdur. Appreciate hospitalist, nephrology and cardiology consultants.     ELOS:2/23

## 2021-02-18 NOTE — PROGRESS NOTES
Occupational Therapy  Facility/Department: [facility] 8 REHAB UNIT  Daily Treatment Note  NAME: Charlean Fleischer  : 1955  MRN: [id number]    Date of Service: 2021    Discharge Recommendations:  Home with Home health OT       Assessment   Performance deficits / Impairments: Decreased functional mobility ; Decreased fine motor control;Decreased high-level IADLs;Decreased strength;Decreased balance;Decreased ROM; Decreased ADL status; Decreased coordination;Decreased endurance  Activity Tolerance  Activity Tolerance: Patient Tolerated treatment well  Safety Devices  Safety Devices in place: Yes  Type of devices: Left in chair;Call light within reach         Patient Diagnosis(es): There were no encounter diagnoses. has a past medical history of Cerebral artery occlusion with cerebral infarction ([facility] [address] Hyperlipidemia, and Hypertension. has a past surgical history that includes back surgery; Cervical spine surgery; Abdomen surgery; Appendectomy; and Shoulder arthroscopy (Right, 2019).     Restrictions  Restrictions/Precautions  Restrictions/Precautions: Fall Risk  Subjective   General  Chart Reviewed: Yes, Orders  Patient assessed for rehabilitation services?: Yes  Additional Pertinent Hx: CVA  Family / Caregiver Present: No  Diagnosis: CVA with left hemiparesis  General Comment  Comments: in the process of bathing at start of session  Pain Assessment  Pain Assessment: 0-10  Pain Level: 0  Pre Treatment Pain Screening  Pain at present: 0  Scale Used: Numeric Score  Vital Signs  Patient Currently in Pain: No   Objective    Balance  Sitting Balance: Supervision  Standing Balance: Contact guard assistance  Functional Mobility  Functional - Mobility Device: Rolling Walker  Activity: Other  Assist Level: Contact guard assistance     Transfers  Stand Step Transfers: Contact guard assistance  Sit to stand: Contact guard assistance  Stand to sit: Contact guard assistance        Coordination Fine Motor: various L FM acts with fair quality         Type of ROM/Therapeutic Exercise  Type of ROM/Therapeutic Exercise: Cane/Wand(2# all planesx20reps)  Exercises  Grasp/Release: 2 [id number] with hand exerciser        Hand Dominance  Hand Dominance: Left  Left Hand Strength -  (lbs)  Handle Setting 2: 33.2  Fine Motor Skills  Left 9 Hole Peg Test Time (secs): 49          02/18/21 1440   Toilet Transfer   Comment supervision from w/c        02/18/21 [address] Needed Supervision or touching assistance   Comment supervision, GB for balance   CARE Score 4   Shower/Bathe Self   Assistance Needed Supervision or touching assistance   Comment CGA, TTB with vc for tech   CARE Score 4        Plan   Plan  Specific instructions for Next Treatment: L UE strengthening, L FM acts  Current Treatment Recommendations: Self-Care / ADL, Endurance Training, Strengthening, Patient/Caregiver Education & Training, Home Management Training, Functional Mobility Training, Neuromuscular Re-education, Balance Training, Equipment Evaluation, Education, & procurement, ROM, Safety Education & Training    Goals  Short term goals  Time Frame for Short term goals: 1 week  Short term goal 1: met  Short term goal 2: met  Short term goal 3: met  Short term goal 4: complete 1-2 handed standing level task for 3 mins with CGA  Short term goal 5: complete L UE [address] acts x 5 occasions to increase coordination and finger dexterity for ADLs  Short term goal 6: complete simple ambulatory home making task with CGA  Long term goals  Time Frame for Long term goals : 3 weeks  Long term goal 1: complete HEP with independence  Long term goal 2: complete overall dressing with modified independence  Long term goal 3: complete overall bathing with modified independence  Long term goal 4: complete overall toileting with modified independence  Long term goal 5: complete ambulatory home making task with modified independence

## 2021-02-18 NOTE — PROGRESS NOTES
[facility]  INPATIENT SPEECH THERAPY  [facility] 8 REHAB UNIT  TIME   Time In: 1300  Time Out: 8763  Minutes: 40       [x]Daily Note  []Progress Note    Date: 2021  Patient Name: Brandon Langston        MRN: [id number]    Account #: [de-identified]  : 1955  (72 y.o.)  Gender: male   Primary Provider: Jennifer Louise MD  Precautions: Fall  Swallowing Status/Diet: Regular diet with thin liquids      Subjective: Patient alert and cooperative with all therapy tasks. Patient seen upright in his recliner. Objective:   Patient completed oral motor exercises with min verbal cues. Patient continues to present decreased lingual strength as seen by slow lingual movements during exercises. Patient does state he feels some left facial weakness during exercises. Patient completed speech task where context was unknown to SLP. Patient presenting with speech intelligibility at %. Patient does continue to have mild distortions and imprecise articulations, however does not seem to effect speech intelligibility. Patient continues to show improvements within conversational discourse. Patient does report when he is fatigued he notices his speech becomes more slurred, which is common. Patient continues to utilize dysarthric strategies (over articulate, elongations of vowels, pauses), independently. Continue speech services to address motor speech. SHORT TERM GOAL #1:  Goal 1: The patient will complete oral motor exercises to improve speech intelligibility with min verbal cues at 100% accuracy. SHORT TERM GOAL #2:  Goal 2: The patient will recall/utilize dysarthric strategies during conversational discourse with min verbal cues at 100% accuracy in order to improve speech intelligibility. SHORT TERM GOAL #3:  Goal 3: The patient will sustain attention for 15+ minutes with min verbal cues or redirection during structured/unstructured activities.     SHORT TERM GOAL #4: Goal 4: The patient will complete executive function tasks (self-monitoring, organizing, etc) with min verbal cues at 100% accuracy. ASSESSMENT:  Assessment: [x]Progressing towards goals          []Not Progressing towards goals    Patient Tolerance of Treatment:   [x]Tolerated well []Tolerated fair []Required rest breaks []Fatigued    Education:  Learner:  [x]Patient          []Significant Other          []Other  Education provided regarding:  [x]Goals and POC   []Diet and swallowing precautions    []Home Exercise Program  []Progress and/or discharge information  Method of Education:  [x]Discussion          []Demonstration          []Handout          []Other  Evaluation of Education:   [x]Verbalized understanding   []Demonstrates without assistance  [x]Demonstrates with assistance  []Needs further instruction     []No evidence of learning                  []No family present      Plan: [x]Continue with current plan of care    []Modify current plan of care as follows:    []Discharge patient    Discharge Location:    Services/Supervision Recommended:      [x]Patient continues to require treatment by a licensed therapist to address functional deficits as outlined in the established plan of care.

## 2021-02-18 NOTE — PROGRESS NOTES
02/18/21 1424 02/18/21 1438   Transfers   Sit to Stand Stand by assistance; Independent  --    Stand to sit Stand by assistance; Independent  --    Ambulation   Ambulation? Yes  --    WB Status WBAT  --    Ambulation 1   Surface level tile  --    Device Rolling Walker  --    Assistance Contact guard assistance;Stand by assistance  --    Quality of Gait Slow pace. 3-point gait pattern. --    Gait Deviations Slow Karina;Decreased step length  --    Distance [de-identified]' x2  --    Comments Incorporated turns  --    Stairs/Curb   Stairs? Yes  --    Stairs   # Steps  8  --    Stairs Height 4\"  --    Rails Bilateral  --    Assistance Contact guard assistance  --    Comment Step-to pattern leading with R up; L down. Verbal cues for sequencing and correct technique. --    Conditions Requiring Skilled Therapeutic Intervention   Assessment  --  Practiced up/down stairs this session.    Activity Tolerance   Activity Tolerance  --  Patient Tolerated treatment well   Electronically signed by Olegario Thomason PTA on 2/18/2021 at 2:39 PM

## 2021-02-19 LAB
ANION GAP SERPL CALCULATED.3IONS-SCNC: 10 MMOL/L (ref 7–19)
BUN BLDV-MCNC: 25 MG/DL (ref 8–23)
CALCIUM SERPL-MCNC: 9.5 MG/DL (ref 8.8–10.2)
CHLORIDE BLD-SCNC: 106 MMOL/L (ref 98–111)
CO2: 25 MMOL/L (ref 22–29)
CREAT SERPL-MCNC: 1.1 MG/DL (ref 0.5–1.2)
GFR AFRICAN AMERICAN: >59
GFR NON-AFRICAN AMERICAN: >60
GLUCOSE BLD-MCNC: 85 MG/DL (ref 74–109)
POTASSIUM REFLEX MAGNESIUM: 4.2 MMOL/L (ref 3.5–5)
SODIUM BLD-SCNC: 141 MMOL/L (ref 136–145)

## 2021-02-19 PROCEDURE — 92507 TX SP LANG VOICE COMM INDIV: CPT

## 2021-02-19 PROCEDURE — 6370000000 HC RX 637 (ALT 250 FOR IP): Performed by: INTERNAL MEDICINE

## 2021-02-19 PROCEDURE — 97130 THER IVNTJ EA ADDL 15 MIN: CPT

## 2021-02-19 PROCEDURE — 97129 THER IVNTJ 1ST 15 MIN: CPT

## 2021-02-19 PROCEDURE — 97530 THERAPEUTIC ACTIVITIES: CPT

## 2021-02-19 PROCEDURE — 1180000000 HC REHAB R&B

## 2021-02-19 PROCEDURE — 80048 BASIC METABOLIC PNL TOTAL CA: CPT

## 2021-02-19 PROCEDURE — 6360000002 HC RX W HCPCS: Performed by: PSYCHIATRY & NEUROLOGY

## 2021-02-19 PROCEDURE — 6370000000 HC RX 637 (ALT 250 FOR IP): Performed by: STUDENT IN AN ORGANIZED HEALTH CARE EDUCATION/TRAINING PROGRAM

## 2021-02-19 PROCEDURE — 97116 GAIT TRAINING THERAPY: CPT

## 2021-02-19 PROCEDURE — 6370000000 HC RX 637 (ALT 250 FOR IP): Performed by: PSYCHIATRY & NEUROLOGY

## 2021-02-19 PROCEDURE — 36415 COLL VENOUS BLD VENIPUNCTURE: CPT

## 2021-02-19 PROCEDURE — 97110 THERAPEUTIC EXERCISES: CPT

## 2021-02-19 RX ADMIN — ASPIRIN 81 MG: 81 TABLET, CHEWABLE ORAL at 08:03

## 2021-02-19 RX ADMIN — SODIUM BICARBONATE 325 MG: 325 TABLET ORAL at 20:31

## 2021-02-19 RX ADMIN — ATORVASTATIN CALCIUM 10 MG: 10 TABLET, FILM COATED ORAL at 08:03

## 2021-02-19 RX ADMIN — LISINOPRIL 40 MG: 20 TABLET ORAL at 20:31

## 2021-02-19 RX ADMIN — TRIAMTERENE AND HYDROCHLOROTHIAZIDE 2 TABLET: 37.5; 25 TABLET ORAL at 08:03

## 2021-02-19 RX ADMIN — BUTALBITAL, ACETAMINOPHEN, AND CAFFEINE 1 TABLET: 50; 325; 40 TABLET ORAL at 19:00

## 2021-02-19 RX ADMIN — LATANOPROST 1 DROP: 50 SOLUTION OPHTHALMIC at 20:31

## 2021-02-19 RX ADMIN — ISOSORBIDE MONONITRATE 30 MG: 30 TABLET, EXTENDED RELEASE ORAL at 08:03

## 2021-02-19 RX ADMIN — CYCLOBENZAPRINE 10 MG: 10 TABLET, FILM COATED ORAL at 20:31

## 2021-02-19 RX ADMIN — AMLODIPINE BESYLATE 10 MG: 10 TABLET ORAL at 20:31

## 2021-02-19 RX ADMIN — SODIUM BICARBONATE 325 MG: 325 TABLET ORAL at 08:03

## 2021-02-19 RX ADMIN — Medication 10 MG: at 20:31

## 2021-02-19 RX ADMIN — ENOXAPARIN SODIUM 30 MG: 40 INJECTION SUBCUTANEOUS at 08:02

## 2021-02-19 RX ADMIN — CLOPIDOGREL BISULFATE 75 MG: 75 TABLET ORAL at 08:02

## 2021-02-19 RX ADMIN — METOPROLOL SUCCINATE 100 MG: 50 TABLET, EXTENDED RELEASE ORAL at 08:03

## 2021-02-19 ASSESSMENT — PAIN DESCRIPTION - FREQUENCY: FREQUENCY: INTERMITTENT

## 2021-02-19 ASSESSMENT — PAIN SCALES - GENERAL
PAINLEVEL_OUTOF10: 0
PAINLEVEL_OUTOF10: 3
PAINLEVEL_OUTOF10: 8

## 2021-02-19 ASSESSMENT — PAIN DESCRIPTION - ORIENTATION: ORIENTATION: MID

## 2021-02-19 ASSESSMENT — PAIN DESCRIPTION - LOCATION: LOCATION: HEAD

## 2021-02-19 ASSESSMENT — PAIN DESCRIPTION - PROGRESSION: CLINICAL_PROGRESSION: GRADUALLY IMPROVING

## 2021-02-19 ASSESSMENT — PAIN DESCRIPTION - DIRECTION: RADIATING_TOWARDS: BACK

## 2021-02-19 ASSESSMENT — PAIN - FUNCTIONAL ASSESSMENT: PAIN_FUNCTIONAL_ASSESSMENT: ACTIVITIES ARE NOT PREVENTED

## 2021-02-19 NOTE — PROGRESS NOTES
Occupational Therapy  Facility/Department: [facility] 8 REHAB UNIT  Daily Treatment Note  NAME: Cielo Reed  : 1955  MRN: [id number]    Date of Service: 2021    Discharge Recommendations:  Home with Home health OT  OT Equipment Recommendations  Equipment Needed: Yes  Mobility Devices: [address]; ADL Assistive Devices  Walker: Rolling  ADL Assistive Devices: Transfer Tub Bench  Other: possibly BSC    Assessment   Performance deficits / Impairments: Decreased functional mobility ; Decreased fine motor control;Decreased high-level IADLs;Decreased strength;Decreased balance;Decreased ROM; Decreased ADL status; Decreased coordination;Decreased endurance  OT Education: Equipment;Home Exercise Program  Activity Tolerance  Activity Tolerance: Patient Tolerated treatment well  Safety Devices  Safety Devices in place: Yes  Type of devices: Left in chair;Call light within reach         Patient Diagnosis(es): There were no encounter diagnoses. has a past medical history of Cerebral artery occlusion with cerebral infarction ([facility] [address] Hyperlipidemia, and Hypertension. has a past surgical history that includes back surgery; Cervical spine surgery; Abdomen surgery; Appendectomy; and Shoulder arthroscopy (Right, 2019).     Restrictions  Restrictions/Precautions  Restrictions/Precautions: Fall Risk  Subjective   General  Chart Reviewed: Yes, Orders  Patient assessed for rehabilitation services?: Yes  Additional Pertinent Hx: CVA  Family / Caregiver Present: No  Diagnosis: CVA with left hemiparesis  General Comment  Comments: eager to discharge home  Pain Assessment  Pain Assessment: 0-10  Pain Level: 0  Pre Treatment Pain Screening  Pain at present: 0  Scale Used: Numeric Score  Vital Signs  Patient Currently in Pain: No   Objective    Balance  Sitting Balance: Supervision  Standing Balance: Stand by assistance  Standing Balance  Time: 3 mins  Activity: 1 handed task  Functional Mobility Functional - Mobility Device: Rolling Walker  Activity: Other  Assist Level: Stand by assistance  Functional Mobility Comments: vc for safety     Transfers  Stand Step Transfers: Stand by assistance  Sit to stand: Stand by assistance  Stand to sit: Stand by assistance  Transfer Comments: SBA with RW to recliner, apt, and recliner        Coordination  Fine Motor: theraputty  L hand exercises with fair quality, difficulty with pincer grasp     Type of ROM/Therapeutic Exercise  Type of ROM/Therapeutic Exercise: Rickshaw(10#, 15#)  Exercises  Grasp/Release: 2 [id number] with hand exerciser        Plan   Plan  Specific instructions for Next Treatment: L UE strengthening, L FM acts  Current Treatment Recommendations: Endurance Training, Strengthening, Patient/Caregiver Education & Training, Home Management Training, Functional Mobility Training, Balance Training, Equipment Evaluation, Education, & procurement, ROM, Safety Education & Training    Goals  Short term goals  Time Frame for Short term goals: 1 week  Short term goal 1: met  Short term goal 2: met  Short term goal 3: met  Short term goal 4: met  Short term goal 5: met  Short term goal 6: complete simple ambulatory home making task with CGA  Long term goals  Time Frame for Long term goals : 3 weeks  Long term goal 1: complete HEP with independence  Long term goal 2: complete overall dressing with modified independence  Long term goal 3: complete overall bathing with modified independence  Long term goal 4: complete overall toileting with modified independence  Long term goal 5: complete ambulatory home making task with modified independence  Long term goals 6: pt/family verbalize DME  Patient Goals   Patient goals : return home       Therapy Time   Individual Concurrent Group Co-treatment   Time In 1010         Time Out 1110         Minutes 60         Timed Code Treatment Minutes: 60 Minutes     Electronically signed by Genesis Alva OT on 2/19/2021 at 12:54 PM

## 2021-02-19 NOTE — PROGRESS NOTES
Durable Medical Equipment   Physician Order     Patient Name Gilberto Felix  Patient Phone:  [phone] [facility]    Patient Address: [address] [place] [address]   [address]    Patient Height Height: 5' 6\" (167.6 cm)  Patient Weight 160 lb 4.8 oz (72.7 kg)   1955           DME NEEDED:      **ROLLING WALKER    **TRANSFER TUB BENCH      Coverage Information (for Hospital Account [de-identified])    F/O Payor/Plan [facility] [address] [facility] #   Freddie Dingwall [id number]   Address Phone   [address] [facility], [facility]      Medical Problems  Comment     Last edited by  on [facility] Problem List  Date Reviewed: 2019     ICD-10-CM Priority Class Noted POA   Acute ischemic stroke ([facility] [address] I63.9   2021 Yes      Non-Hospital Problem List  Date Reviewed: 2019     ICD-10-CM Priority Class Noted   Tear of right glenoid labrum S43.431A   2019         [facility]   History and Physical           Patient:                                    Gilberto Felix  MR#:                                        [id number]  Account Number:                   [id number]              Room:                                      [address] [place] [address]-      YOB: 1955  Date of Progress Note:           2021  Time of Note                           7:03 AM  Attending Physician:               Mandi Jordan MD           Admitting diagnosis: Pontine and right temporal strokes     Secondary diagnoses: Recent chest pain resolved with nitroglycerin, hyperlipidemia, hypertension     CHIEF COMPLAINT: Left-sided weakness and dysarthria        HISTORY OF PRESENT ILLNESS: This 72 y.o. male   with history of HTN,hyperlipidemia,chronic back pain and CVA. He presented to [facility] on 2/2/21 with c/o lightheadedness, mild blurred vision and change in his gait that started about 3 days prior. He was hypertensive on presentation. MRI done revealed a right pontine infarct. CTA head/neck showed non signifigant steno-occlusive disease. He was admitted the the hospitalist service with consult for neurology. He was seen by Dr. Nikki Parks. He was doing quite well. When on 2/4/21 he had a return of dizziness with N&V and worsening left sided weakness, mild dysarthria, B/P was low. Stat MRI was ordered that revealed showed a new right temporal lobe infarct. TTE showed thickening on anterior leaflet of mirtal valve. On the morning of 2/5/21 he had an accoute onset of chest pain/pressure with hypotension. Repeat EKG was concerning. Troponin was negative x 2. Chest pain resolved with nitro. Cardiology was consult for NIKI.  He was seen by  Christy. NIKI done on 2/5/21 showed no thrombus noted in the left atrial appendage,negative bubble study, no evidence of PFO or ASD,severely thickened left ventricle but no evidence of LV noncompaction. Dr. Dedrick Zacarias recommended a diagnostic left heart catheterization to assess his coronary anatomy. Patient wishes to posst-pone his cardiac work-up until after he completes the Rehab program.  He was also noted to have an increase in his creatinine of 5.12. Nephrology consulted. Patients creatine is rapidly improving now at 1.4 with BUN of 38. He is alert & oriented x 3, follows commands but does have mild dysarthia but demonstrates impulsivity and decreased safety awareness. Left sided weakness remains. Patient was evaluated by SPT for swallow and was cleared for a regular diet with thin liquids. He is participating in PT/OT/SPT. He is felt to need a stay on Rehab to work towards his goal of returning home with his wife.  He is now felt ready to start the Rehab program.  ____________________ _____________________ ________________   Physician Signature      Physician Name (print)   Physician NPI          Date

## 2021-02-19 NOTE — PROGRESS NOTES
Physical Therapy  Ciera Leos  [id number]     02/19/21 1350   Subjective   Subjective Patient up in w/c and ready for therapy. Pain Screening   Patient Currently in Pain No   Pain Assessment   Pain Assessment 0-10   Pain Level 0   Transfers   Sit to Stand Independent   Stand to sit Independent   Ambulation   Ambulation? Yes   WB Status WBAT   More Ambulation? Yes   Ambulation 1   Surface level tile   Device Rolling Walker   Other Apparatus Wheelchair follow   Assistance Stand by assistance   Quality of Gait Slow pace. 3-point gait pattern. Distance 125'   Comments patient did not require seated rest break this gait   Ambulation 2   Surface - 2 level tile   Device 2 Rolling Walker   Other Apparatus 2 Wheelchair follow   Assistance 2 Stand by assistance   Quality of Gait 2 slow pace, 3 point gait pattern   Distance 75'   Exercises   Hip Flexion 20   Hip Abduction 20   Knee Long Arc Quad 20   Ankle Pumps 20   Comments AROM B LE ex's in sitting. HS curls with red theraband and ball squeezes to increase strength and functional mobility. Other Activities   Comment Patient did well with therapy. Patient went to OT treatment after PT treatment.    Short term goals   Time Frame for Short term goals 1 WEEK   Short term goal 1 TF SURFACE TO SURFACE WITH RW EXHIBITING PROPER 3 POINT PATTERN   Short term goal 2 AMBULATE 50 FT WITH RW CGA DEMONSTRATING PROPER 3 POINT GAIT   Short term goal 3 UP/DOWN 4 STEPS BILAT RAILS SBA   Short term goal 4 PROPEL  FT SBA   Short term goal 5 HEP SBA   Long term goals   Time Frame for Long term goals  2-3 WEEKS   Long term goal 1 INDEP BED MOB   Long term goal 2 INDEP TF SURFACE TO SURFACE   Long term goal 3 AMBULATE 150 FT WITH RW INDEP   Long term goal 4 UP/DOWN 4 STEPS WITH 1 RAIL INDEP   Long term goal 5 HEP INDEP   Activity Tolerance   Activity Tolerance Patient Tolerated treatment well   Safety Devices   Type of devices Left in chair Electronically signed by Tasia Quinn PTA on 2/19/2021 at 2:30 PM

## 2021-02-19 NOTE — PROGRESS NOTES
[facility]  INPATIENT SPEECH THERAPY  [facility] 8 REHAB UNIT  TIME   Time In: 1130  Time Out: 1200  Minutes: 30       [x]Daily Note  []Progress Note    Date: 2021  Patient Name: Keke Ruiz        MRN: [id number]    Account #: [de-identified]  : 1955  (72 y.o.)  Gender: male   Primary Provider: Adia Brooke MD  Precautions: Fall  Swallowing Status/Diet: Regular Diet/Thin Liquids      Subjective:  Patient was alert and cooperative throughout therapeutic activities. Objective:    Patient completed labial, lingual, and jaw oral motor exercises in order to strengthen oral motor musculature to increase intelligible speech. Patient was prompted to share a story while utilizing speech strategies, over pronunciation and slow rate of speech, in order to exhibit increased intelligibility. Clinician rated patients speech as 90% intelligible. SHORT TERM GOAL #1:  Goal 1: The patient will complete oral motor exercises to improve speech intelligibility with min verbal cues at 100% accuracy. SHORT TERM GOAL #2:  Goal 2: The patient will recall/utilize dysarthric strategies during conversational discourse with min verbal cues at 100% accuracy in order to improve speech intelligibility. SHORT TERM GOAL #3:  Goal 3: The patient will sustain attention for 15+ minutes with min verbal cues or redirection during structured/unstructured activities. SHORT TERM GOAL #4:  Goal 4: The patient will complete executive function tasks (self-monitoring, organizing, etc) with min verbal cues at 100% accuracy.       ASSESSMENT:  Assessment: [x]Progressing towards goals          []Not Progressing towards goals    Patient Tolerance of Treatment:   [x]Tolerated well []Tolerated fair []Required rest breaks []Fatigued    Education:  Learner:  [x]Patient          []Significant Other          []Other  Education provided regarding:  [x]Goals and POC   []Diet and swallowing precautions

## 2021-02-19 NOTE — PROGRESS NOTES
Physical Therapy  Irontristin Alfredo  [id number]     02/19/21 0845   Subjective   Subjective Patient up in w/c and agrees to work with therapy. Pain Screening   Patient Currently in Pain No   Pain Assessment   Pain Assessment 0-10   Pain Level 0   Transfers   Sit to Stand Stand by assistance; Independent   Stand to sit Stand by assistance; Independent   Ambulation   Ambulation? Yes   WB Status WBAT   Ambulation 1   Surface level tile   Device Rolling Walker   Other Apparatus Wheelchair follow   Assistance Contact guard assistance;Stand by assistance   Quality of Gait Slow pace. 3-point gait pattern. Distance 75'   Exercises   Hip Flexion 20   Hip Abduction 20   Knee Long Arc Quad 20   Ankle Pumps 20   Comments AROM B LE ex's in sitting. HS curls with red theraband and ball squeezes to increase strength and functional mobility. Other Activities   Comment Patient did well with therapy. Patient requested to stay up in w/c. All needs in reach. Short term goals   Time Frame for Short term goals 1 WEEK   Short term goal 1 TF SURFACE TO SURFACE WITH RW EXHIBITING PROPER 3 POINT PATTERN   Short term goal 2 AMBULATE 50 FT WITH RW CGA DEMONSTRATING PROPER 3 POINT GAIT   Short term goal 3 UP/DOWN 4 STEPS BILAT RAILS SBA   Short term goal 4 PROPEL  FT SBA   Short term goal 5 HEP SBA   Long term goals   Time Frame for Long term goals  2-3 WEEKS   Long term goal 1 INDEP BED MOB   Long term goal 2 INDEP TF SURFACE TO SURFACE   Long term goal 3 AMBULATE 150 FT WITH RW INDEP   Long term goal 4 UP/DOWN 4 STEPS WITH 1 RAIL INDEP   Long term goal 5 HEP INDEP   Activity Tolerance   Activity Tolerance Patient Tolerated treatment well   Safety Devices   Type of devices Call light within reach; Left in chair   Electronically signed by Jamey Garcia PTA on 2/19/2021 at 9:04 AM

## 2021-02-19 NOTE — PROGRESS NOTES
 atorvastatin (LIPITOR) tablet 10 mg  10 mg Oral Daily Tommy Goldberg MD   10 mg at 02/19/21 0803    amLODIPine (NORVASC) tablet 10 mg  10 mg Oral Nightly Tommy Goldberg MD   10 mg at 02/18/21 2011    cloNIDine (CATAPRES) tablet 0.1 mg  0.1 mg Oral PRN Tommy Goldberg MD   0.1 mg at 02/09/21 4797    clopidogrel (PLAVIX) tablet 75 mg  75 mg Oral Daily Tommy Goldberg MD   75 mg at 02/19/21 0802    latanoprost (XALATAN) 0.005 % ophthalmic solution 1 drop  1 drop Ophthalmic Nightly Tommy Goldberg MD   1 drop at 02/18/21 2012    triamterene-hydroCHLOROthiazide (MAXZIDE-25) 37.5-25 MG per tablet 2 tablet  2 tablet Oral Daily Tommy Goldberg MD   2 tablet at 02/19/21 5808    acetaminophen (TYLENOL) tablet 650 mg  650 mg Oral Q4H PRN Tommy Goldberg MD   650 mg at 02/15/21 2044    bisacodyl (DULCOLAX) suppository 10 mg  10 mg Rectal Daily PRN Tommy Goldberg MD        polyethylene glycol [facility]) packet 17 g  17 g Oral Daily Tommy Goldberg MD   17 g at 02/17/21 0801    magnesium hydroxide (MILK OF MAGNESIA) 400 MG/5ML suspension 30 mL  30 mL Oral Daily PRN Tommy Goldberg MD            Labs:     Recent Labs     02/17/21  0506 02/18/21  0555   WBC 7.4 7.1   RBC 4.48* 4.85   HGB 12.0* 12.9*   HCT 35.7* 38.5*   MCV 79.7* 79.4*   MCH 26.8* 26.6*   MCHC 33.6 33.5    279     Recent Labs     02/17/21  0506 02/18/21  0555 02/19/21  0435    140 141   K 4.6 4.5 4.2   ANIONGAP 8 11 10    105 106   CO2 23 24 25   BUN 25* 23 25*   CREATININE 1.2 1.2 1.1   GLUCOSE 76 76 85   CALCIUM 9.0 9.4 9.5     No results for input(s): MG, PHOS in the last 72 hours.   Recent Labs     02/17/21  0506 02/18/21  0555   AST 14 17   ALT 9 12   BILITOT 0.3 0.3   ALKPHOS 56 57         Objective:   Vitals: BP (!) 150/87   Pulse 72   Temp 97.6 °F (36.4 °C) (Temporal)   Resp 18   Ht 5' 6\" (1.676 m)   Wt 160 lb 4.8 oz (72.7 kg)   SpO2 97%   BMI 25.87 kg/m²   24HR INTAKE/OUTPUT:      Intake/Output Summary (Last 24 hours) at 2/19/2021 1356 Last data filed at 2/19/2021 0919  Gross per 24 hour   Intake 720 ml   Output    Net 720 ml     General appearance:  NAD  Head - NC/AT  Eyesanicteric sclera  Lungs: Clear to auscultation bilaterally  Heart: Regular rate and rhythm  Abdomen: Soft, non-tender; no peritoneal signs, bowel sounds normal  Extremities: Extremities normal without clubbing or cyanosis  Neurologic: L-sided weakness     Assessment and Plan: Active Problems:    Acute ischemic stroke [facility])  Resolved Problems:    * No resolved hospital problems. *      CVA with left sided ataxia/ hemiparesis  - per Neurology. On aspirin, statin, plavix. -Rehab per Primary    HUI, with contrast nephropathy - improved  -follow renal function     Chest pain - improved  --rec outpatient cath once renal function improved  - Cardiology following     HTN   --continue home regimen    Vit D def   -  on replacement      HLD   - statin.      Prostate enlargement - could benefit from Urology follow-up on discharge.     Defer VTE prophylaxis, dispo, activity to primary team    Signed:  Elizabeth Mariano MD 2/19/2021 9:37 AM  Hospitalist

## 2021-02-19 NOTE — PLAN OF CARE
Problem: Falls - Risk of:  Goal: Will remain free from falls  Description: Will remain free from falls  2/18/2021 2137 by Gary Fuentes RN  Outcome: Ongoing  2/18/2021 1414 by Yessi Garcia LPN  Outcome: Ongoing  Goal: Absence of physical injury  Description: Absence of physical injury  2/18/2021 2137 by Gary Fuentes RN  Outcome: Ongoing  2/18/2021 1414 by Yessi Garcia LPN  Outcome: Ongoing     Problem: Mobility - Impaired:  Goal: Mobility will improve  Description: Mobility will improve  2/18/2021 2137 by Gary Fuentes RN  Outcome: Ongoing  2/18/2021 1414 by Yessi Garcia LPN  Outcome: Ongoing     Problem: Safety:  Goal: Free from accidental physical injury  Description: Free from accidental physical injury  2/18/2021 2137 by Gary Fuentes RN  Outcome: Ongoing  2/18/2021 1414 by Yessi Garcia LPN  Outcome: Ongoing  Goal: Free from intentional harm  Description: Free from intentional harm  2/18/2021 2137 by Gary Fuentes RN  Outcome: Ongoing  2/18/2021 1414 by Yessi Garcia LPN  Outcome: Ongoing

## 2021-02-19 NOTE — PROGRESS NOTES
Durable Medical Equipment   Physician Order     Patient Name Gary Cox  Patient KIIGW:[phone] (Mobile)    Patient Address: [address] [place] [address]   [address]    Patient Height Height: 5' 6\" (167.6 cm)  Patient Weight 160 lb 4.8 oz (72.7 kg)   1955             DME NEEDED:    **BEDSIDE COMMODE      Coverage Information (for Hospital Account [de-identified])          F/O Payor/Plan Precert #   [facility] [address]     Subscriber Subscriber #   Lilia Isiah [id number]   Address Phone   [address] [facility], [address] [place] [address]       Medical Problems  Comment      Last edited by  on [facility] Problem List  Date Reviewed: 2019        ICD-10-CM Priority Class Noted POA   Acute ischemic stroke ([facility] [address] I63.9     2021 Yes      Non-Hospital Problem List  Date Reviewed: 2019        ICD-10-CM Priority Class Noted   Tear of right glenoid labrum A23.562R     2019          [facility]   History and Physical           Patient:                                    Carlos Koenig  MR#:                                        [id number]  Account [de-identified]  TMSO:                                      [id number]      Date of Birth:                           1955  Date of Progress Note:           2021  Time of Note                           7:03 AM  Attending Florencia Vo MD           Admitting diagnosis: Pontine and right temporal strokes     Secondary diagnoses: Recent chest pain resolved with nitroglycerin, hyperlipidemia, hypertension     CHIEF COMPLAINT: Left-sided weakness and dysarthria        HISTORY OF PRESENT ILLNESS:  This 76 y. o. male   with history of HTN,hyperlipidemia,chronic back pain and CVA. He presented to [facility] on 2/2/21 with c/o lightheadedness, mild blurred vision and change in his gait that started about 3 days prior. He was hypertensive on presentation. MRI done revealed a right pontine infarct. CTA head/neck showed non signifigant steno-occlusive disease. He was admitted the the hospitalist service with consult for neurology. He was seen by Dr. Kay Salinas. He was doing quite well. When on 2/4/21 he had a return of dizziness with N&V and worsening left sided weakness, mild dysarthria, B/P was low. Stat MRI was ordered that revealed showed a new right temporal lobe infarct. TTE showed thickening on anterior leaflet of mirtal valve. On the morning of 2/5/21 he had an accoute onset of chest pain/pressure with hypotension. Repeat EKG was concerning. Troponin was negative x 2. Chest pain resolved with nitro. Cardiology was consult for NIKI.  He was seen by  Christy. NIKI done on 2/5/21 showed no thrombus noted in the left atrial appendage,negative bubble study, no evidence of PFO or ASD,severely thickened left ventricle but no evidence of LV noncompaction. Dr. Priscilla Shane recommended a diagnostic left heart catheterization to assess his coronary anatomy. Patient wishes to posst-pone his cardiac work-up until after he completes the Rehab program. Shadi Kirk was also noted to have an increase in his creatinine of 5.12. Nephrology consulted. Patients creatine is rapidly improving now at 1.4 with BUN of 38. He is alert & oriented x 3, follows commands but does have mild dysarthia but demonstrates impulsivity and decreased safety awareness. Left sided weakness remains. Patient was evaluated by SPT for swallow and was cleared for a regular diet with thin liquids.  He is participating in PT/OT/SPT. He is felt to need a stay on Rehab to work towards his goal of returning home with his wife.  He is now felt ready to start the Rehab program.        ________________   Physician Signature      Physician Name (print)   Physician NPI          Date

## 2021-02-19 NOTE — PROGRESS NOTES
Occupational Therapy  Facility/Department: [facility] 8 REHAB UNIT  Daily Treatment Note  NAME: Frankie Wharton  : 1955  MRN: [id number]    Date of Service: 2021    Discharge Recommendations:  Home with Home health OT       Assessment   Performance deficits / Impairments: Decreased functional mobility ; Decreased fine motor control;Decreased high-level IADLs;Decreased strength;Decreased balance;Decreased ROM; Decreased ADL status; Decreased coordination;Decreased endurance  Treatment Diagnosis: CVA with left hemiparesis  OT Education: Equipment;Home Exercise Program  Activity Tolerance  Activity Tolerance: Patient Tolerated treatment well  Safety Devices  Safety Devices in place: Yes  Type of devices: Left in chair;Call light within reach         Patient Diagnosis(es): There were no encounter diagnoses. has a past medical history of Cerebral artery occlusion with cerebral infarction ([facility] [address] Hyperlipidemia, and Hypertension. has a past surgical history that includes back surgery; Cervical spine surgery; Abdomen surgery; Appendectomy; and Shoulder arthroscopy (Right, 2019).     Restrictions  Restrictions/Precautions  Restrictions/Precautions: Fall Risk  Objective       Attendance  Activity: Music therapy  Participation: Active participation  Time Spent With Patient  Minutes: 30     Coordination  Fine Motor: Multiple FM tasks using L hand with Fair (+) quality           Plan   Plan  Specific instructions for Next Treatment: L UE strengthening, L FM acts  Current Treatment Recommendations: Endurance Training, Strengthening, Patient/Caregiver Education & Training, Home Management Training, Functional Mobility Training, Balance Training, Equipment Evaluation, Education, & procurement, ROM, Safety Education & Training       Goals  Short term goals  Time Frame for Short term goals: 1 week  Short term goal 1: met  Short term goal 2: met  Short term goal 3: met  Short term goal 4: met  Short term goal 5: met

## 2021-02-20 LAB
ANION GAP SERPL CALCULATED.3IONS-SCNC: 8 MMOL/L (ref 7–19)
BUN BLDV-MCNC: 21 MG/DL (ref 8–23)
CALCIUM SERPL-MCNC: 9.9 MG/DL (ref 8.8–10.2)
CHLORIDE BLD-SCNC: 104 MMOL/L (ref 98–111)
CO2: 28 MMOL/L (ref 22–29)
CREAT SERPL-MCNC: 1.2 MG/DL (ref 0.5–1.2)
GFR AFRICAN AMERICAN: >59
GFR NON-AFRICAN AMERICAN: >60
GLUCOSE BLD-MCNC: 80 MG/DL (ref 74–109)
POTASSIUM REFLEX MAGNESIUM: 4.6 MMOL/L (ref 3.5–5)
SODIUM BLD-SCNC: 140 MMOL/L (ref 136–145)

## 2021-02-20 PROCEDURE — 6370000000 HC RX 637 (ALT 250 FOR IP): Performed by: INTERNAL MEDICINE

## 2021-02-20 PROCEDURE — 80048 BASIC METABOLIC PNL TOTAL CA: CPT

## 2021-02-20 PROCEDURE — 1180000000 HC REHAB R&B

## 2021-02-20 PROCEDURE — 6370000000 HC RX 637 (ALT 250 FOR IP): Performed by: PSYCHIATRY & NEUROLOGY

## 2021-02-20 PROCEDURE — 6360000002 HC RX W HCPCS: Performed by: PSYCHIATRY & NEUROLOGY

## 2021-02-20 PROCEDURE — 36415 COLL VENOUS BLD VENIPUNCTURE: CPT

## 2021-02-20 PROCEDURE — 2580000003 HC RX 258

## 2021-02-20 PROCEDURE — 6370000000 HC RX 637 (ALT 250 FOR IP): Performed by: STUDENT IN AN ORGANIZED HEALTH CARE EDUCATION/TRAINING PROGRAM

## 2021-02-20 RX ORDER — SODIUM CHLORIDE 0.9 % (FLUSH) 0.9 %
10 SYRINGE (ML) INJECTION 2 TIMES DAILY
Status: DISCONTINUED | OUTPATIENT
Start: 2021-02-20 | End: 2021-02-23 | Stop reason: HOSPADM

## 2021-02-20 RX ADMIN — CLOPIDOGREL BISULFATE 75 MG: 75 TABLET ORAL at 08:37

## 2021-02-20 RX ADMIN — SODIUM CHLORIDE, PRESERVATIVE FREE 10 ML: 5 INJECTION INTRAVENOUS at 21:05

## 2021-02-20 RX ADMIN — LATANOPROST 1 DROP: 50 SOLUTION OPHTHALMIC at 21:00

## 2021-02-20 RX ADMIN — TRIAMTERENE AND HYDROCHLOROTHIAZIDE 2 TABLET: 37.5; 25 TABLET ORAL at 08:37

## 2021-02-20 RX ADMIN — ASPIRIN 81 MG: 81 TABLET, CHEWABLE ORAL at 08:36

## 2021-02-20 RX ADMIN — ATORVASTATIN CALCIUM 10 MG: 10 TABLET, FILM COATED ORAL at 08:37

## 2021-02-20 RX ADMIN — ISOSORBIDE MONONITRATE 30 MG: 30 TABLET, EXTENDED RELEASE ORAL at 08:37

## 2021-02-20 RX ADMIN — Medication 10 MG: at 20:58

## 2021-02-20 RX ADMIN — AMLODIPINE BESYLATE 10 MG: 10 TABLET ORAL at 20:58

## 2021-02-20 RX ADMIN — SODIUM BICARBONATE 325 MG: 325 TABLET ORAL at 08:37

## 2021-02-20 RX ADMIN — METOPROLOL SUCCINATE 100 MG: 50 TABLET, EXTENDED RELEASE ORAL at 08:37

## 2021-02-20 RX ADMIN — ENOXAPARIN SODIUM 30 MG: 40 INJECTION SUBCUTANEOUS at 08:36

## 2021-02-20 RX ADMIN — CYCLOBENZAPRINE 10 MG: 10 TABLET, FILM COATED ORAL at 20:58

## 2021-02-20 RX ADMIN — SODIUM BICARBONATE 325 MG: 325 TABLET ORAL at 20:57

## 2021-02-20 RX ADMIN — LISINOPRIL 40 MG: 20 TABLET ORAL at 20:58

## 2021-02-20 NOTE — PROGRESS NOTES
Hospitalist Progress Note  2021 9:15 AM  Subjective:   Admit Date: 2021  PCP: Marcel Newby      Subjective: No recurrence of chest pain while on imdur. No new complaints. Working with therapy. ROS: Six point review of systems is negative except as specifically addressed above. DIET CARDIAC;     Intake/Output Summary (Last 24 hours) at 2021 0915  Last data filed at 2021 7973  Gross per 24 hour   Intake 1080 ml   Output 1300 ml   Net -220 ml     Medications:    Current Facility-Administered Medications   Medication Dose Route Frequency Provider Last Rate Last Admin    lisinopril (PRINIVIL;ZESTRIL) tablet 40 mg  40 mg Oral Nightly Guera Carr MD   40 mg at 21    butalbital-acetaminophen-caffeine (FIORICET, ESGIC) per tablet 1 tablet  1 tablet Oral Q4H PRN Pam Valdez MD   1 tablet at 21 1900    isosorbide mononitrate (IMDUR) extended release tablet 30 mg  30 mg Oral Daily Scar Boogie MD   30 mg at 21 3443    vitamin D (ERGOCALCIFEROL) capsule 50,000 Units  50,000 Units Oral Weekly Andres Savage MD   50,000 Units at 21 1215    sodium bicarbonate tablet 325 mg  325 mg Oral BID Andres Savaeg MD   325 mg at 21 8472    cyclobenzaprine (FLEXERIL) tablet 10 mg  10 mg Oral Nightly Pam Valdez MD   10 mg at 21    metoprolol succinate (TOPROL XL) extended release tablet 100 mg  100 mg Oral Daily Pearl Castro MD   100 mg at 21 0837    nitroGLYCERIN (NITROSTAT) SL tablet 0.4 mg  0.4 mg Sublingual Q5 Min PRN Scar Boogie MD   0.4 mg at 21 9957    melatonin disintegrating tablet 10 mg  10 mg Oral Nightly Pam Valdez MD   10 mg at 21    enoxaparin (LOVENOX) injection 30 mg  30 mg Subcutaneous Daily Pam Valdez MD   30 mg at 21 0904    aspirin chewable tablet 81 mg  81 mg Oral Daily Pam Valdez MD   81 mg at 21 1741  atorvastatin (LIPITOR) tablet 10 mg  10 mg Oral Daily Joel Chawla MD   10 mg at 02/20/21 3396    amLODIPine (NORVASC) tablet 10 mg  10 mg Oral Nightly Joel Chawla MD   10 mg at 02/19/21 2031    cloNIDine (CATAPRES) tablet 0.1 mg  0.1 mg Oral PRN Joel Chawla MD   0.1 mg at 02/09/21 6795    clopidogrel (PLAVIX) tablet 75 mg  75 mg Oral Daily Joel Chawla MD   75 mg at 02/20/21 0837    latanoprost (XALATAN) 0.005 % ophthalmic solution 1 drop  1 drop Ophthalmic Nightly Joel Chawla MD   1 drop at 02/19/21 2031    triamterene-hydroCHLOROthiazide (MAXZIDE-25) 37.5-25 MG per tablet 2 tablet  2 tablet Oral Daily Joel Chawla MD   2 tablet at 02/20/21 0463    acetaminophen (TYLENOL) tablet 650 mg  650 mg Oral Q4H PRN Joel Chawla MD   650 mg at 02/15/21 2044    bisacodyl (DULCOLAX) suppository 10 mg  10 mg Rectal Daily PRN Joel Chawla MD        polyethylene glycol [facility]) packet 17 g  17 g Oral Daily Joel Chawla MD   17 g at 02/17/21 0801    magnesium hydroxide (MILK OF MAGNESIA) 400 MG/5ML suspension 30 mL  30 mL Oral Daily PRN Joel Chawla MD            Labs:     Recent Labs     02/18/21  0555   WBC 7.1   RBC 4.85   HGB 12.9*   HCT 38.5*   MCV 79.4*   MCH 26.6*   MCHC 33.5        Recent Labs     02/18/21  0555 02/19/21  0435 02/20/21  0438    141 140   K 4.5 4.2 4.6   ANIONGAP 11 10 8    106 104   CO2 24 25 28   BUN 23 25* 21   CREATININE 1.2 1.1 1.2   GLUCOSE 76 85 80   CALCIUM 9.4 9.5 9.9     No results for input(s): MG, PHOS in the last 72 hours.   Recent Labs     02/18/21  0555   AST 17   ALT 12   BILITOT 0.3   ALKPHOS 57         Objective:   Vitals: BP (!) 145/91   Pulse 71   Temp 98.5 °F (36.9 °C) (Temporal)   Resp 16   Ht 5' 6\" (1.676 m)   Wt 162 lb 6.4 oz (73.7 kg)   SpO2 95%   BMI 26.21 kg/m²   24HR INTAKE/OUTPUT:      Intake/Output Summary (Last 24 hours) at 2/20/2021 0915  Last data filed at 2/20/2021 0903  Gross per 24 hour   Intake 1080 ml Output 1300 ml   Net -220 ml     General appearance:  NAD  Head - NC/AT  Eyesanicteric sclera  Lungs: Clear to auscultation bilaterally  Heart: Regular rate and rhythm  Abdomen: Soft, non-tender; no peritoneal signs, bowel sounds normal  Extremities: Extremities normal without clubbing or cyanosis  Neurologic: L-sided weakness     Assessment and Plan: Active Problems:    Acute ischemic stroke [facility])  Resolved Problems:    * No resolved hospital problems. *      CVA with left sided ataxia/ hemiparesis  - per Neurology. On aspirin, statin, plavix.    -Rehab per Primary    HUI, with contrast nephropathy - improved   --Nephrology signed off  --monitor     Chest pain - improved  --rec outpatient cath once renal function improved  - Cardiology on board     HTN   --continue home regimen    Vit D def   -  on replacement      HLD   - statin    Prostate enlargement - could benefit from Urology follow-up on discharge.     Dispo:  Per Primary service      Signed:  David Penn MD 2/20/2021 9:15 AM  Hospitalist

## 2021-02-20 NOTE — PLAN OF CARE
Problem: Falls - Risk of:  Goal: Will remain free from falls  Description: Will remain free from falls  2/19/2021 2212 by Avila Cortes RN  Outcome: Ongoing  2/19/2021 1635 by Aaron Jauregui RN  Outcome: Ongoing  Goal: Absence of physical injury  Description: Absence of physical injury  2/19/2021 2212 by Avila Cortes RN  Outcome: Ongoing  2/19/2021 1635 by Aaron Jauregui RN  Outcome: Ongoing     Problem: Mobility - Impaired:  Goal: Mobility will improve  Description: Mobility will improve  2/19/2021 2212 by Avila Cortes RN  Outcome: Ongoing  2/19/2021 1635 by Aaron Jauregui RN  Outcome: Ongoing     Problem: Safety:  Goal: Free from accidental physical injury  Description: Free from accidental physical injury  2/19/2021 2212 by Avila Cortes RN  Outcome: Ongoing  2/19/2021 1635 by Aaron Jauregui RN  Outcome: Ongoing  Goal: Free from intentional harm  Description: Free from intentional harm  2/19/2021 2212 by Avila Cortes RN  Outcome: Ongoing  2/19/2021 1635 by Aaron Jauregui RN  Outcome: Ongoing

## 2021-02-21 LAB
ANION GAP SERPL CALCULATED.3IONS-SCNC: 10 MMOL/L (ref 7–19)
BUN BLDV-MCNC: 28 MG/DL (ref 8–23)
CALCIUM SERPL-MCNC: 9.5 MG/DL (ref 8.8–10.2)
CHLORIDE BLD-SCNC: 103 MMOL/L (ref 98–111)
CO2: 28 MMOL/L (ref 22–29)
CREAT SERPL-MCNC: 1.5 MG/DL (ref 0.5–1.2)
GFR AFRICAN AMERICAN: 57
GFR NON-AFRICAN AMERICAN: 47
GLUCOSE BLD-MCNC: 97 MG/DL (ref 74–109)
POTASSIUM REFLEX MAGNESIUM: 4.7 MMOL/L (ref 3.5–5)
SODIUM BLD-SCNC: 141 MMOL/L (ref 136–145)

## 2021-02-21 PROCEDURE — 2580000003 HC RX 258: Performed by: STUDENT IN AN ORGANIZED HEALTH CARE EDUCATION/TRAINING PROGRAM

## 2021-02-21 PROCEDURE — 6370000000 HC RX 637 (ALT 250 FOR IP): Performed by: INTERNAL MEDICINE

## 2021-02-21 PROCEDURE — 6360000002 HC RX W HCPCS: Performed by: PSYCHIATRY & NEUROLOGY

## 2021-02-21 PROCEDURE — 36415 COLL VENOUS BLD VENIPUNCTURE: CPT

## 2021-02-21 PROCEDURE — 6370000000 HC RX 637 (ALT 250 FOR IP): Performed by: PSYCHIATRY & NEUROLOGY

## 2021-02-21 PROCEDURE — 2580000003 HC RX 258

## 2021-02-21 PROCEDURE — 1180000000 HC REHAB R&B

## 2021-02-21 PROCEDURE — 80048 BASIC METABOLIC PNL TOTAL CA: CPT

## 2021-02-21 RX ORDER — MELATONIN 10 MG
10 CAPSULE ORAL NIGHTLY
Status: DISCONTINUED | OUTPATIENT
Start: 2021-02-21 | End: 2021-02-23 | Stop reason: HOSPADM

## 2021-02-21 RX ORDER — 0.9 % SODIUM CHLORIDE 0.9 %
500 INTRAVENOUS SOLUTION INTRAVENOUS ONCE
Status: COMPLETED | OUTPATIENT
Start: 2021-02-21 | End: 2021-02-21

## 2021-02-21 RX ADMIN — CYCLOBENZAPRINE 10 MG: 10 TABLET, FILM COATED ORAL at 20:39

## 2021-02-21 RX ADMIN — BUTALBITAL, ACETAMINOPHEN, AND CAFFEINE 1 TABLET: 50; 325; 40 TABLET ORAL at 15:52

## 2021-02-21 RX ADMIN — SODIUM BICARBONATE 325 MG: 325 TABLET ORAL at 20:40

## 2021-02-21 RX ADMIN — Medication 10 MG: at 20:40

## 2021-02-21 RX ADMIN — ERGOCALCIFEROL 50000 UNITS: 1.25 CAPSULE ORAL at 08:38

## 2021-02-21 RX ADMIN — CLOPIDOGREL BISULFATE 75 MG: 75 TABLET ORAL at 08:38

## 2021-02-21 RX ADMIN — TRIAMTERENE AND HYDROCHLOROTHIAZIDE 2 TABLET: 37.5; 25 TABLET ORAL at 08:38

## 2021-02-21 RX ADMIN — ISOSORBIDE MONONITRATE 30 MG: 30 TABLET, EXTENDED RELEASE ORAL at 08:38

## 2021-02-21 RX ADMIN — ENOXAPARIN SODIUM 30 MG: 40 INJECTION SUBCUTANEOUS at 08:38

## 2021-02-21 RX ADMIN — SODIUM CHLORIDE, PRESERVATIVE FREE 10 ML: 5 INJECTION INTRAVENOUS at 08:40

## 2021-02-21 RX ADMIN — LATANOPROST 1 DROP: 50 SOLUTION OPHTHALMIC at 20:40

## 2021-02-21 RX ADMIN — ATORVASTATIN CALCIUM 10 MG: 10 TABLET, FILM COATED ORAL at 08:38

## 2021-02-21 RX ADMIN — ASPIRIN 81 MG: 81 TABLET, CHEWABLE ORAL at 08:38

## 2021-02-21 RX ADMIN — POLYETHYLENE GLYCOL 3350 17 G: 17 POWDER, FOR SOLUTION ORAL at 08:38

## 2021-02-21 RX ADMIN — SODIUM CHLORIDE, PRESERVATIVE FREE 10 ML: 5 INJECTION INTRAVENOUS at 20:40

## 2021-02-21 RX ADMIN — SODIUM BICARBONATE 325 MG: 325 TABLET ORAL at 08:38

## 2021-02-21 RX ADMIN — AMLODIPINE BESYLATE 10 MG: 10 TABLET ORAL at 20:39

## 2021-02-21 RX ADMIN — METOPROLOL SUCCINATE 100 MG: 50 TABLET, EXTENDED RELEASE ORAL at 08:38

## 2021-02-21 RX ADMIN — SODIUM CHLORIDE 500 ML: 9 INJECTION, SOLUTION INTRAVENOUS at 10:00

## 2021-02-21 ASSESSMENT — PAIN DESCRIPTION - LOCATION: LOCATION: HEAD

## 2021-02-21 ASSESSMENT — PAIN DESCRIPTION - ONSET: ONSET: GRADUAL

## 2021-02-21 ASSESSMENT — PAIN - FUNCTIONAL ASSESSMENT: PAIN_FUNCTIONAL_ASSESSMENT: PREVENTS OR INTERFERES SOME ACTIVE ACTIVITIES AND ADLS

## 2021-02-21 NOTE — PLAN OF CARE
Problem: Falls - Risk of:  Goal: Will remain free from falls  Description: Will remain free from falls  Outcome: Ongoing  Goal: Absence of physical injury  Description: Absence of physical injury  Outcome: Ongoing     Problem: Safety:  Goal: Free from accidental physical injury  Description: Free from accidental physical injury  Outcome: Ongoing  Goal: Free from intentional harm  Description: Free from intentional harm  Outcome: Ongoing     Problem: HEMODYNAMIC STATUS  Goal: Patient has stable vital signs and fluid balance  Outcome: Ongoing     Problem: Bleeding:  Goal: Will show no signs and symptoms of excessive bleeding  Description: Will show no signs and symptoms of excessive bleeding  Outcome: Ongoing     Problem: Pain:  Goal: Pain level will decrease  Description: Pain level will decrease  Outcome: Ongoing  Goal: Control of acute pain  Description: Control of acute pain  Outcome: Ongoing  Goal: Control of chronic pain  Description: Control of chronic pain  Outcome: Ongoing

## 2021-02-22 LAB
ANION GAP SERPL CALCULATED.3IONS-SCNC: 13 MMOL/L (ref 7–19)
BASOPHILS ABSOLUTE: 0.1 K/UL (ref 0–0.2)
BASOPHILS RELATIVE PERCENT: 0.7 % (ref 0–1)
BUN BLDV-MCNC: 32 MG/DL (ref 8–23)
CALCIUM SERPL-MCNC: 9.6 MG/DL (ref 8.8–10.2)
CHLORIDE BLD-SCNC: 102 MMOL/L (ref 98–111)
CO2: 26 MMOL/L (ref 22–29)
CREAT SERPL-MCNC: 1.5 MG/DL (ref 0.5–1.2)
EOSINOPHILS ABSOLUTE: 0.1 K/UL (ref 0–0.6)
EOSINOPHILS RELATIVE PERCENT: 1.4 % (ref 0–5)
GFR AFRICAN AMERICAN: 57
GFR NON-AFRICAN AMERICAN: 47
GLUCOSE BLD-MCNC: 92 MG/DL (ref 74–109)
HCT VFR BLD CALC: 40 % (ref 42–52)
HEMOGLOBIN: 13.1 G/DL (ref 14–18)
IMMATURE GRANULOCYTES #: 0 K/UL
LYMPHOCYTES ABSOLUTE: 2 K/UL (ref 1.1–4.5)
LYMPHOCYTES RELATIVE PERCENT: 28.3 % (ref 20–40)
MCH RBC QN AUTO: 26.4 PG (ref 27–31)
MCHC RBC AUTO-ENTMCNC: 32.8 G/DL (ref 33–37)
MCV RBC AUTO: 80.6 FL (ref 80–94)
MONOCYTES ABSOLUTE: 0.5 K/UL (ref 0–0.9)
MONOCYTES RELATIVE PERCENT: 7 % (ref 0–10)
NEUTROPHILS ABSOLUTE: 4.4 K/UL (ref 1.5–7.5)
NEUTROPHILS RELATIVE PERCENT: 62.2 % (ref 50–65)
PDW BLD-RTO: 14.5 % (ref 11.5–14.5)
PLATELET # BLD: 268 K/UL (ref 130–400)
PMV BLD AUTO: 10.5 FL (ref 9.4–12.4)
POTASSIUM REFLEX MAGNESIUM: 4.6 MMOL/L (ref 3.5–5)
RBC # BLD: 4.96 M/UL (ref 4.7–6.1)
SODIUM BLD-SCNC: 141 MMOL/L (ref 136–145)
WBC # BLD: 7.1 K/UL (ref 4.8–10.8)

## 2021-02-22 PROCEDURE — 6370000000 HC RX 637 (ALT 250 FOR IP): Performed by: PSYCHIATRY & NEUROLOGY

## 2021-02-22 PROCEDURE — 6370000000 HC RX 637 (ALT 250 FOR IP): Performed by: INTERNAL MEDICINE

## 2021-02-22 PROCEDURE — 1180000000 HC REHAB R&B

## 2021-02-22 PROCEDURE — 2580000003 HC RX 258

## 2021-02-22 PROCEDURE — 97110 THERAPEUTIC EXERCISES: CPT

## 2021-02-22 PROCEDURE — 97116 GAIT TRAINING THERAPY: CPT

## 2021-02-22 PROCEDURE — 6360000002 HC RX W HCPCS: Performed by: PSYCHIATRY & NEUROLOGY

## 2021-02-22 PROCEDURE — 97535 SELF CARE MNGMENT TRAINING: CPT

## 2021-02-22 PROCEDURE — 99232 SBSQ HOSP IP/OBS MODERATE 35: CPT | Performed by: PSYCHIATRY & NEUROLOGY

## 2021-02-22 PROCEDURE — 97530 THERAPEUTIC ACTIVITIES: CPT

## 2021-02-22 PROCEDURE — 85025 COMPLETE CBC W/AUTO DIFF WBC: CPT

## 2021-02-22 PROCEDURE — 97129 THER IVNTJ 1ST 15 MIN: CPT

## 2021-02-22 PROCEDURE — 36415 COLL VENOUS BLD VENIPUNCTURE: CPT

## 2021-02-22 PROCEDURE — 97130 THER IVNTJ EA ADDL 15 MIN: CPT

## 2021-02-22 PROCEDURE — 6370000000 HC RX 637 (ALT 250 FOR IP): Performed by: STUDENT IN AN ORGANIZED HEALTH CARE EDUCATION/TRAINING PROGRAM

## 2021-02-22 PROCEDURE — 80048 BASIC METABOLIC PNL TOTAL CA: CPT

## 2021-02-22 RX ADMIN — ENOXAPARIN SODIUM 30 MG: 40 INJECTION SUBCUTANEOUS at 07:43

## 2021-02-22 RX ADMIN — CLOPIDOGREL BISULFATE 75 MG: 75 TABLET ORAL at 07:41

## 2021-02-22 RX ADMIN — METOPROLOL SUCCINATE 100 MG: 50 TABLET, EXTENDED RELEASE ORAL at 07:41

## 2021-02-22 RX ADMIN — Medication 10 MG: at 19:47

## 2021-02-22 RX ADMIN — ISOSORBIDE MONONITRATE 30 MG: 30 TABLET, EXTENDED RELEASE ORAL at 07:41

## 2021-02-22 RX ADMIN — SODIUM BICARBONATE 325 MG: 325 TABLET ORAL at 19:47

## 2021-02-22 RX ADMIN — ASPIRIN 81 MG: 81 TABLET, CHEWABLE ORAL at 07:41

## 2021-02-22 RX ADMIN — SODIUM CHLORIDE, PRESERVATIVE FREE 10 ML: 5 INJECTION INTRAVENOUS at 19:47

## 2021-02-22 RX ADMIN — CYCLOBENZAPRINE 10 MG: 10 TABLET, FILM COATED ORAL at 19:47

## 2021-02-22 RX ADMIN — SODIUM BICARBONATE 325 MG: 325 TABLET ORAL at 07:41

## 2021-02-22 RX ADMIN — POLYETHYLENE GLYCOL 3350 17 G: 17 POWDER, FOR SOLUTION ORAL at 07:41

## 2021-02-22 RX ADMIN — AMLODIPINE BESYLATE 10 MG: 10 TABLET ORAL at 19:47

## 2021-02-22 RX ADMIN — LATANOPROST 1 DROP: 50 SOLUTION OPHTHALMIC at 19:49

## 2021-02-22 RX ADMIN — SODIUM CHLORIDE, PRESERVATIVE FREE 10 ML: 5 INJECTION INTRAVENOUS at 07:48

## 2021-02-22 RX ADMIN — ATORVASTATIN CALCIUM 10 MG: 10 TABLET, FILM COATED ORAL at 07:41

## 2021-02-22 NOTE — PROGRESS NOTES
[facility]  INPATIENT SPEECH THERAPY  [facility] 8 REHAB UNIT  TIME   Time In: 1100  Time Out: 1130  Minutes: 30       [x]Daily Note  []Progress Note    Date: 2021  Patient Name: Lalita Ya        MRN: [id number]    Account #: [de-identified]  : 1955  (72 y.o.)  Gender: male   Primary Provider: Nirmal Levin MD  Precautions: Fall  Swallowing Status/Diet: Regular Diet/Thin Liquids      Subjective:  Patient was alert and cooperative during all therapeutic activities. Objective:  Patient independently recalled all previously taught speech strategies. Patient completed oral motor exercises in order to increase lingual and labial strength necessary for increased speech intelligibility. Patient prompted to share a story while utilizing his speech strategies in order to exhibit increased speech intelligibility. Patient was given handouts that included oral motor exercises, speech strategies, and practice sentences. He was encouraged to utilize these exercises and strategies in the home setting in order to address oral motor strength and increased speech intelligibility. SHORT TERM GOAL #1:  Goal 1: The patient will complete oral motor exercises to improve speech intelligibility with min verbal cues at 100% accuracy. SHORT TERM GOAL #2:  Goal 2: The patient will recall/utilize dysarthric strategies during conversational discourse with min verbal cues at 100% accuracy in order to improve speech intelligibility. SHORT TERM GOAL #3:  Goal 3: The patient will sustain attention for 15+ minutes with min verbal cues or redirection during structured/unstructured activities. SHORT TERM GOAL #4:  Goal 4: The patient will complete executive function tasks (self-monitoring, organizing, etc) with min verbal cues at 100% accuracy.       ASSESSMENT:  Assessment: [x]Progressing towards goals          []Not Progressing towards goals    Patient Tolerance of Treatment: [x]Tolerated well []Tolerated fair []Required rest breaks []Fatigued    Education:  Learner:  [x]Patient          []Significant Other          []Other  Education provided regarding:  [x]Goals and POC   []Diet and swallowing precautions    [x]Home Exercise Program  []Progress and/or discharge information  Method of Education:  [x]Discussion          [x]Demonstration          []Handout          []Other  Evaluation of Education:   [x]Verbalized understanding   [x]Demonstrates without assistance  []Demonstrates with assistance  []Needs further instruction     []No evidence of learning                  []No family present      Plan: [x]Continue with current plan of care    []Modify current plan of care as follows:    []Discharge patient    Discharge Location:    Services/Supervision Recommended:      [x]Patient continues to require treatment by a licensed therapist to address functional deficits as outlined in the established plan of care.       Electronically signed by Kiko Blancas, Graduate Clinician, on 2/22/2021 at 11:45 AM

## 2021-02-22 NOTE — PROGRESS NOTES
Occupational Therapy  Facility/Department: [facility] 8 REHAB UNIT  Daily Treatment Note  NAME: Cielo Reed  : 1955  MRN: [id number]    Date of Service: 2021    Discharge Recommendations:  Home with Home health OT       Assessment   Performance deficits / Impairments: Decreased functional mobility ; Decreased fine motor control;Decreased high-level IADLs;Decreased strength;Decreased balance;Decreased ROM; Decreased ADL status; Decreased coordination;Decreased endurance  OT Education: ADL Adaptive Strategies;Transfer Training;Equipment;Home Exercise Program  Activity Tolerance  Activity Tolerance: Patient Tolerated treatment well  Safety Devices  Safety Devices in place: Yes  Type of devices: Left in chair;Call light within reach         Patient Diagnosis(es): There were no encounter diagnoses. has a past medical history of Cerebral artery occlusion with cerebral infarction ([address] Hyperlipidemia, and Hypertension. has a past surgical history that includes back surgery; Cervical spine surgery; Abdomen surgery; Appendectomy; and Shoulder arthroscopy (Right, 2019). Restrictions  Restrictions/Precautions  Restrictions/Precautions: Fall Risk  Subjective   General  Chart Reviewed: Yes, Orders  Patient assessed for rehabilitation services?: Yes  Additional Pertinent Hx: CVA  Family / Caregiver Present: No  Diagnosis: CVA with left hemiparesis    Pain Assessment  Pain Assessment: 0-10  Pain Level: 0  Pre Treatment Pain Screening  Pain at present: 0  Scale Used: Numeric Score  Vital Signs  Patient Currently in Pain: No   Objective    Balance  Sitting Balance: Independent  Standing Balance: Supervision  Functional Mobility  Functional - Mobility Device: Rolling Walker  Activity: To/from bathroom; Other  Assist Level: Supervision     Transfers  Stand Step Transfers: Supervision  Sit to stand: Supervision  Stand to sit: Supervision  Transfer Comments: vc to remove footrests     Type of ROM/Therapeutic Exercise Type of ROM/Therapeutic Exercise: Free weights(2# R UE, LUE modified proximally due to shoulder weakness---discussed importance of quality versus quantity for LUE HEP)         02/22/21 [address] Needed Supervision or touching assistance   Comment supervision   CARE Score 4   Shower/Bathe Self   Assistance Needed Supervision or touching assistance   Comment SBA to TTB   CARE Score 4     Plan   Plan  Specific instructions for Next Treatment: simple ambulatory home making tasks  Current Treatment Recommendations: Endurance Training, Strengthening, Patient/Caregiver Education & Training, Home Management Training, Functional Mobility Training, Balance Training, Equipment Evaluation, Education, & procurement, ROM, Safety Education & Training    Goals  Short term goals  Time Frame for Short term goals: 1 week  Short term goal 1: met  Short term goal 2: met  Short term goal 3: met  Short term goal 4: met  Short term goal 5: met  Short term goal 6: complete simple ambulatory home making task with CGA  Long term goals  Time Frame for Long term goals : 3 weeks  Long term goal 1: complete HEP with independence  Long term goal 2: complete overall dressing with modified independence  Long term goal 3: complete overall bathing with modified independence  Long term goal 4: complete overall toileting with modified independence  Long term goal 5: complete ambulatory home making task with modified independence  Long term goals 6: pt/family verbalize DME  Patient Goals   Patient goals : return home       Therapy Time   Individual Concurrent Group Co-treatment   Time In 0915         Time Out 1000         Minutes 45         Timed Code Treatment Minutes: 45 Minutes       Electronically signed by Carrie Golden OT on 2/22/2021 at 11:41 AM

## 2021-02-22 NOTE — PROGRESS NOTES
Assistance Needed Independent   CARE Score 6   Oral Hygiene   Assistance Needed Independent   CARE Score 6   [address] Needed Setup or clean-up assistance   CARE Score 5   Shower/Bathe Self   Assistance Needed Setup or clean-up assistance   CARE Score 5   Upper Body Dressing   Assistance Needed Independent   CARE Score 6   Lower Body Dressing   Assistance Needed Setup or clean-up assistance   CARE Score 5   Putting On/Taking Off Footwear   Assistance Needed Setup or clean-up assistance   CARE Score 5      02/22/21 1300   Toilet Transfer   Assistance Needed Setup or clean-up assistance   Comment RW   CARE Score 5      02/22/21 1300   Picking Up Object   Assistance Needed Partial/moderate assistance   Comment min A   CARE Score 3      02/22/21 1300   Assessment   Performance deficits / Impairments Decreased functional mobility ; Decreased fine motor control;Decreased high-level IADLs;Decreased strength;Decreased balance;Decreased ROM; Decreased ADL status; Decreased coordination;Decreased endurance   OT Equipment Recommendations   Other ordered RW and TTB from [facility],  pt/spouse verbalized not wanting BSC this time--will use tub for UE support when standing. Michelle Resendez to deliver DME tomorrow by 10 am     Plan   Plan  Specific instructions for Next Treatment: simple ambulatory home making tasks  Current Treatment Recommendations: Endurance Training, Strengthening, Home Management Training, Functional Mobility Training, Balance Training, ROM, Safety Education & Training    Goals  Short term goals  Time Frame for Short term goals: 1 week  Short term goal 1: met  Short term goal 2: met  Short term goal 3: met  Short term goal 4: met  Short term goal 5: met  Short term goal 6: complete simple ambulatory home making task with CGA  Long term goals  Time Frame for Long term goals : 3 weeks  Long term goal 1: complete HEP with independence Long term goal 2: complete overall dressing with modified independence  Long term goal 3: complete overall bathing with modified independence  Long term goal 4: complete overall toileting with modified independence  Long term goal 5: complete ambulatory home making task with modified independence  Long term goals 6: pt/family verbalize DME  Patient Goals   Patient goals : return home       Therapy Time   Individual Concurrent Group Co-treatment   Time In 1300         Time Out 1345         Minutes 45         Timed Code Treatment Minutes: 45 Minutes       Electronically signed by Merlinda Net, OT on 2/22/2021 at 3:44 PM

## 2021-02-22 NOTE — PROGRESS NOTES
02/22/21 1401 02/22/21 1402 02/22/21 1403   Pain Screening   Patient Currently in Pain No  --   --    Transfers   Sit to Stand  --  Independent  --    Stand to sit  --  Independent  --    Car Transfer  --  Stand by assistance  (With RW/)  --    Ambulation   Ambulation?  --   --  Yes   WB Status  --   --  WBAT   Ambulation 1   Surface  --   --  level tile   Device  --   --  [address]  --   --  Stand by assistance   Quality of Gait  --   --  Slow pace. 3-point gait pattern. Gait Deviations  --   --  Slow Karina;Decreased step length   Distance  --   --  150', [de-identified]'   Comments  --   --  Incorporated turns. Stairs/Curb   Stairs?  --   --  Yes   Stairs   # Steps   --   --  8   Stairs Height  --   --  4\"   Rails  --   --  Left ascending   Assistance  --   --  Contact guard assistance;Stand by assistance   Comment  --   --  Step-to pattern leading with R up; L down. Verbal cues for sequencing and correct technique. Both hands on L rail going up. Conditions Requiring Skilled Therapeutic Intervention   Assessment FTD with spouse attending session. Practiced amb, car transfer, and up/down stairs. --   --    Activity Tolerance   Activity Tolerance  --   --   --       02/22/21 1414   Pain Screening   Patient Currently in Pain  --    Transfers   Sit to Stand  --    Stand to sit  --    Car Transfer  --    Ambulation   Ambulation?  --    WB Status  --    Ambulation 1   Surface  --    Device  --    Assistance  --    Quality of Gait  --    Gait Deviations  --    Distance  --    Comments  --    Stairs/Curb   Stairs?   --    Stairs   # Steps   --    Stairs Height  --    Rails  --    Assistance  --    Comment  --    Conditions Requiring Skilled Therapeutic Intervention   Assessment  --    Activity Tolerance   Activity Tolerance Patient Tolerated treatment well   Electronically signed by Vaibhav Locke PTA on 2/22/2021 at 2:35 PM

## 2021-02-22 NOTE — PROGRESS NOTES
Hospitalist Progress Note  2021 9:04 AM  Subjective:   Admit Date: 2021  PCP: Marcel Newby      Subjective: No recurrence of chest pain while on imdur. No new complaints. Working with therapy. Developed some mild recurrent renal insufficiency yesterday with no significant change on today's labs, but good urine output. ROS: Six point review of systems is negative except as specifically addressed above. DIET CARDIAC;     Intake/Output Summary (Last 24 hours) at 2021 0904  Last data filed at 2021 4707  Gross per 24 hour   Intake 720 ml   Output 975 ml   Net -255 ml     Medications:    Current Facility-Administered Medications   Medication Dose Route Frequency Provider Last Rate Last Admin    melatonin capsule 10 mg  10 mg Oral Nightly Pam Valdez MD   10 mg at 21    sodium chloride flush 0.9 % injection 10 mL  10 mL Intravenous BID Odessa Jeans, RN   10 mL at 21 0748    [Held by provider] lisinopril (PRINIVIL;ZESTRIL) tablet 40 mg  40 mg Oral Nightly Guera Carr MD   40 mg at 21    butalbital-acetaminophen-caffeine (FIORICET, ESGIC) per tablet 1 tablet  1 tablet Oral Q4H PRN Pam Valdez MD   1 tablet at 21 1552    isosorbide mononitrate (IMDUR) extended release tablet 30 mg  30 mg Oral Daily Scar Boogie MD   30 mg at 21 0741    vitamin D (ERGOCALCIFEROL) capsule 50,000 Units  50,000 Units Oral Weekly Andres Savage MD   50,000 Units at 21 6620    sodium bicarbonate tablet 325 mg  325 mg Oral BID Andres Savage MD   325 mg at 21 0741    cyclobenzaprine (FLEXERIL) tablet 10 mg  10 mg Oral Nightly Pam Valdez MD   10 mg at 21 2039    metoprolol succinate (TOPROL XL) extended release tablet 100 mg  100 mg Oral Daily Guera Carr MD   100 mg at 21 0741    nitroGLYCERIN (NITROSTAT) SL tablet 0.4 mg  0.4 mg Sublingual Q5 Min PRN Scar Boogie MD   0.4 mg at 21 2411  enoxaparin (LOVENOX) injection 30 mg  30 mg Subcutaneous Daily Jay Cardenas MD   30 mg at 02/22/21 0433    aspirin chewable tablet 81 mg  81 mg Oral Daily Jay Cardenas MD   81 mg at 02/22/21 0741    atorvastatin (LIPITOR) tablet 10 mg  10 mg Oral Daily Jay Cardenas MD   10 mg at 02/22/21 0741    amLODIPine (NORVASC) tablet 10 mg  10 mg Oral Nightly Jay Cardenas MD   10 mg at 02/21/21 2039    cloNIDine (CATAPRES) tablet 0.1 mg  0.1 mg Oral PRN Jay Cardenas MD   0.1 mg at 02/09/21 1456    clopidogrel (PLAVIX) tablet 75 mg  75 mg Oral Daily Jay Cardenas MD   75 mg at 02/22/21 0741    latanoprost (XALATAN) 0.005 % ophthalmic solution 1 drop  1 drop Ophthalmic Nightly Jay Cardenas MD   1 drop at 02/21/21 2040    [Held by provider] triamterene-hydroCHLOROthiazide (MAXZIDE-25) 37.5-25 MG per tablet 2 tablet  2 tablet Oral Daily Jay Cardenas MD   2 tablet at 02/21/21 4778    acetaminophen (TYLENOL) tablet 650 mg  650 mg Oral Q4H PRN Jay Cardenas MD   650 mg at 02/15/21 2044    bisacodyl (DULCOLAX) suppository 10 mg  10 mg Rectal Daily PRN Jay Cardenas MD        polyethylene glycol [facility]) packet 17 g  17 g Oral Daily Jay Cardenas MD   17 g at 02/22/21 0741    magnesium hydroxide (MILK OF MAGNESIA) 400 MG/5ML suspension 30 mL  30 mL Oral Daily PRN Jay Cardenas MD            Labs:     Recent Labs     02/22/21  0431   WBC 7.1   RBC 4.96   HGB 13.1*   HCT 40.0*   MCV 80.6   MCH 26.4*   MCHC 32.8*        Recent Labs     02/20/21  0438 02/21/21  0346 02/22/21  0431    141 141   K 4.6 4.7 4.6   ANIONGAP 8 10 13    103 102   CO2 28 28 26   BUN 21 28* 32*   CREATININE 1.2 1.5* 1.5*   GLUCOSE 80 97 92   CALCIUM 9.9 9.5 9.6     No results for input(s): MG, PHOS in the last 72 hours. No results for input(s): AST, ALT, ALB, BILITOT, ALKPHOS, ALB in the last 72 hours.       Objective: Vitals: /77   Pulse 60   Temp 97.3 °F (36.3 °C) (Temporal)   Resp 18   Ht 5' 6\" (1.676 m)   Wt 164 lb 6 oz (74.6 kg)   SpO2 96%   BMI 26.53 kg/m²   24HR INTAKE/OUTPUT:      Intake/Output Summary (Last 24 hours) at 2/22/2021 1932  Last data filed at 2/22/2021 5980  Gross per 24 hour   Intake 720 ml   Output 975 ml   Net -255 ml     General appearance:  NAD  Head - NC/AT  Eyesanicteric sclera  Lungs: Clear to auscultation bilaterally  Heart: Regular rate and rhythm  Abdomen: Soft, non-tender; no peritoneal signs, bowel sounds normal  Extremities: Extremities normal without clubbing or cyanosis  Neurologic:  Left lower extremity weakness improved    Assessment and Plan: Active Problems:    Acute ischemic stroke [facility])  Resolved Problems:    * No resolved hospital problems. *      CVA with left sided ataxia/ hemiparesis  - per Neurology. On aspirin, statin, plavix.    -Rehab per Primary    Acute renal insufficiency, with contrast nephropathyimproved over hospital course, with recurrent renal insufficiency noted 2/21  Likely poor p.o. intake after resumption of lisinopril and triamterene/HCTZ  --encourage fluid intake  Hold lisinopril and triamterene/HCTZ   --monitor     Chest pain - improved  --rec outpatient cath once renal function improved  --Cardiology recs noted     HTN   --Hold lisinopril and triamterene/HCTZ due to recurrent renal insufficiency  Continue other home antihypertensives  Add as needed antihypertensives if uncontrolled    Vit D def   -  on replacement      HLD   - statin    Prostate enlargement - could benefit from Urology follow-up on discharge.     Dispo:  Per Primary service      Signed:  Radha Weber MD 2/22/2021 9:04 AM  Hospitalist

## 2021-02-22 NOTE — PROGRESS NOTES
02/22/21 0826 02/22/21 0854 02/22/21 0855   Pain Screening   Patient Currently in Pain No  --   --    Transfers   Sit to Stand  --  Independent  --    Stand to sit  --  Independent  --    Bed to Chair  --  Modified independent  --    Ambulation   Ambulation?  --   --  Yes   WB Status  --   --  WBAT   Ambulation 1   Surface  --   --  level tile   Device  --   --  [employer]  --   --  Stand by assistance   Quality of Gait  --   --  Slow pace. 3-point gait pattern. Distance  --   --  150'   Comments  --   --  Incorporated turns. Stairs/Curb   Stairs?  --   --  Yes   Stairs   # Steps   --   --  8   Stairs Height  --   --  4\"   Rails  --   --  Bilateral   Assistance  --   --  Stand by assistance   Comment  --   --  Step-to pattern leading with R up; L down. Verbal cues for sequencing and correct technique. Conditions Requiring Skilled Therapeutic Intervention   Assessment  --   --   --    Activity Tolerance   Activity Tolerance  --   --  Patient Tolerated treatment well      02/22/21 0856   Pain Screening   Patient Currently in Pain  --    Transfers   Sit to Stand  --    Stand to sit  --    Bed to Chair  --    Ambulation   Ambulation?  --    WB Status  --    Ambulation 1   Surface  --    Device  --    Assistance  --    Quality of Gait  --    Distance  --    Comments  --    Stairs/Curb   Stairs? --    Stairs   # Steps   --    Stairs Height  --    Rails  --    Assistance  --    Comment  --    Conditions Requiring Skilled Therapeutic Intervention   Assessment Increased amb distance this session.    Activity Tolerance   Activity Tolerance  --    Electronically signed by Titi Morrison PTA on 2/22/2021 at 8:57 AM

## 2021-02-22 NOTE — PROGRESS NOTES
Patient:   Naheed Francois  MR#:    [id number]   Room:    20/820-   YOB: 1955  Date of Progress Note: 2/22/2021  Time of Note                           10:20 AM  Consulting Physician:   Sona Rios M.D. Attending Physician:  Sona Rios MD     CHIEF COMPLAINT: Left-sided weakness and dysarthria        subjective  This 72 y.o. male   with history of HTN,hyperlipidemia,chronic back pain and CVA. He presented to [facility] on 2/2/21 with c/o lightheadedness, mild blurred vision and change in his gait that started about 3 days prior. He was hypertensive on presentation. MRI done revealed a right pontine infarct. CTA head/neck showed non signifigant steno-occlusive disease. He was admitted the the hospitalist service with consult for neurology. He was seen by Dr. Sandy Morris. He was doing quite well. When on 2/4/21 he had a return of dizziness with N&V and worsening left sided weakness, mild dysarthria, B/P was low. Stat MRI was ordered that revealed showed a new right temporal lobe infarct. TTE showed thickening on anterior leaflet of mirtal valve. On the morning of 2/5/21 he had an accoute onset of chest pain/pressure with hypotension. Repeat EKG was concerning. Troponin was negative x 2. Chest pain resolved with nitro. Cardiology was consult for NIKI.  He was seen by  Christy. NIKI done on 2/5/21 showed no thrombus noted in the left atrial appendage,negative bubble study, no evidence of PFO or ASD,severely thickened left ventricle but no evidence of LV noncompaction. Dr. Jennifer Arias recommended a diagnostic left heart catheterization to assess his coronary anatomy. Patient wishes to posst-pone his cardiac work-up until after he completes the Rehab program.  He was also noted to have an increase in his creatinine of 5.12. Nephrology consulted. Patients creatine is rapidly improving now at 1.4 with BUN of 38. He is alert & oriented x 3, follows commands but does have mild dysarthia but demonstrates impulsivity and decreased safety awareness. Left sided weakness remains. Patient was evaluated by SPT for swallow and was cleared for a regular diet with thin liquids. No complaints this morning. Had a good weekend. REVIEW OF SYSTEMS:  Constitutional: No fevers No chills  Neck:No stiffness  Respiratory: No shortness of breath  Cardiovascular: No chest pain No palpitations  Gastrointestinal: No abdominal pain    Genitourinary: No Dysuria  Neurological: No headache, no confusion      PHYSICAL EXAM:  /77   Pulse 60   Temp 97.3 °F (36.3 °C) (Temporal)   Resp 18   Ht 5' 6\" (1.676 m)   Wt 164 lb 6 oz (74.6 kg)   SpO2 96%   BMI 26.53 kg/m²     Constitutional: he appears well-developed and well-nourished. Eyes  conjunctiva normal.  Pupils react to light  Ear, nose, throat -hearing intact to voice. No scars, masses, or lesions over external nose or ears, no atrophy of tongue  Neck-symmetric, no masses noted, no jugular vein distension  Respiration- chest wall appears symmetric, good expansion,   normal effort without use of accessory muscles  Cardiovascular- RRR  Musculoskeletal  no significant wasting of muscles noted, no bony deformities, gait no gross ataxia  Extremities-no clubbing, cyanosis or edema  Skin  warm, dry, and intact. No rash, erythema, or pallor. Psychiatric  mood, affect, and behavior appear normal.      Neurology  NEUROLOGICAL EXAM:  Awake, alert, fluent oriented x 3 appropriate affect  Attention and concentration appear appropriate  Recent and remote memory appears unremarkable  Speech  with dysarthria  No clear issues with language of fund of knowledge     Cranial Nerve Exam   CN II- Visual fields grossly unremarkable  CN III, IV,VI-EOMI, No nystagmus, conjugate eye movements, no ptosis  CN VII-no facial assymetry  CN VIII-Hearing intact        Motor Exam  4/5 left arm and leg     Tremors- no tremors in hands or head noted     Gait  Not tested     Coordination  Ataxic on the left            Nursing/pcp notes, imaging,labs and vitals reviewed. PT,OT and/or speech notes reviewed    Lab Results   Component Value Date    WBC 7.1 02/22/2021    HGB 13.1 (L) 02/22/2021    HCT 40.0 (L) 02/22/2021    MCV 80.6 02/22/2021     02/22/2021     Lab Results   Component Value Date     02/22/2021    K 4.6 02/22/2021     02/22/2021    CO2 26 02/22/2021    BUN 32 (H) 02/22/2021    CREATININE 1.5 (H) 02/22/2021    GLUCOSE 92 02/22/2021    CALCIUM 9.6 02/22/2021    PROT 6.6 02/18/2021    LABALBU 3.7 02/18/2021    BILITOT 0.3 02/18/2021    ALKPHOS 57 02/18/2021    AST 17 02/18/2021    ALT 12 02/18/2021    LABGLOM 47 (A) 02/22/2021    GFRAA 57 (L) 02/22/2021   No results found for: INRNo results found for: PHENYTOIN, ESR, CRP      02/22/21 0826 02/22/21 0854 02/22/21 0855   Pain Screening   Patient Currently in Pain No  --   --    Transfers   Sit to Stand  --  Independent  --    Stand to sit  --  Independent  --    Bed to Chair  --  Modified independent  --    Ambulation   Ambulation?  --   --  Yes   WB Status  --   --  WBAT   Ambulation 1   Surface  --   --  level tile   Device  --   --  [employer]  --   --  Stand by assistance   Quality of Gait  --   --  Slow pace. 3-point gait pattern.    Distance  --   --  150' Comments  --   --  Incorporated turns. Stairs/Curb   Stairs?  --   --  Yes   Stairs   # Steps   --   --  8   Stairs Height  --   --  4\"   Rails  --   --  Bilateral   Assistance  --   --  Stand by assistance   Comment  --   --  Step-to pattern leading with R up; L down. Verbal cues for sequencing and correct technique. Conditions Requiring Skilled Therapeutic Intervention   Assessment  --   --   --    Activity Tolerance   Activity Tolerance  --   --  Patient Tolerated treatment well        02/22/21 0856   Pain Screening   Patient Currently in Pain  --    Transfers   Sit to Stand  --    Stand to sit  --    Bed to Chair  --    Ambulation   Ambulation?  --    WB Status  --    Ambulation 1   Surface  --    Device  --    Assistance  --    Quality of Gait  --    Distance  --    Comments  --    Stairs/Curb   Stairs? --    Stairs   # Steps   --    Stairs Height  --    Rails  --    Assistance  --    Comment  --    Conditions Requiring Skilled Therapeutic Intervention   Assessment Increased amb distance this session. Activity Tolerance   Activity Tolerance    Objective:     Patient completed labial, lingual, and jaw oral motor exercises in order to strengthen oral motor musculature to increase intelligible speech.     Patient was prompted to share a story while utilizing speech strategies, over pronunciation and slow rate of speech, in order to exhibit increased intelligibility. Clinician rated patients speech as 90% intelligible. RECORD REVIEW: Previous medical records, medications were reviewed at today's visit    IMPRESSION:   1. Right pontine and temporal strokes with left-sided ataxia/hemiparesis currently on Plavix/aspirin PT/OT/SLP  2. Recent chest pain resolved with nitroglycerin. Patient deferring cardiac work-up until the near future. see #8  3. Essential hypertensioncontinue current medications and monitoring  4. Hyperlipidemiacontinue current medications and monitoring  5.   GIbowel regimen 6. DVT prophylaxissubcu Lovenox  7. Recent acute renal failure/CKD significantly better. Nephrology following. 8.  Recurring chest pain relieved with nitroglycerin. Cardiology actively following and on board. Seems better with Imdur. Appreciate hospitalist, nephrology and cardiology consultants. Anticipate discharge in the a.m.   ELOS:2/23

## 2021-02-22 NOTE — PLAN OF CARE
Problem: Falls - Risk of:  Goal: Will remain free from falls  Description: Will remain free from falls  Outcome: Ongoing  Goal: Absence of physical injury  Description: Absence of physical injury  Outcome: Ongoing     Problem: Safety:  Goal: Free from accidental physical injury  Description: Free from accidental physical injury  Outcome: Ongoing  Goal: Free from intentional harm  Description: Free from intentional harm  Outcome: Ongoing     Problem: HEMODYNAMIC STATUS  Goal: Patient has stable vital signs and fluid balance  Outcome: Ongoing     Problem: Pain:  Goal: Pain level will decrease  Description: Pain level will decrease  Outcome: Ongoing  Goal: Control of acute pain  Description: Control of acute pain  Outcome: Ongoing  Goal: Control of chronic pain  Description: Control of chronic pain  Outcome: Ongoing

## 2021-02-23 LAB
ANION GAP SERPL CALCULATED.3IONS-SCNC: 10 MMOL/L (ref 7–19)
BUN BLDV-MCNC: 27 MG/DL (ref 8–23)
CALCIUM SERPL-MCNC: 9.8 MG/DL (ref 8.8–10.2)
CHLORIDE BLD-SCNC: 100 MMOL/L (ref 98–111)
CO2: 29 MMOL/L (ref 22–29)
CREAT SERPL-MCNC: 1.3 MG/DL (ref 0.5–1.2)
GFR AFRICAN AMERICAN: >59
GFR NON-AFRICAN AMERICAN: 55
GLUCOSE BLD-MCNC: 86 MG/DL (ref 74–109)
POTASSIUM REFLEX MAGNESIUM: 4.4 MMOL/L (ref 3.5–5)
SODIUM BLD-SCNC: 139 MMOL/L (ref 136–145)

## 2021-02-23 PROCEDURE — 36415 COLL VENOUS BLD VENIPUNCTURE: CPT

## 2021-02-23 PROCEDURE — 80048 BASIC METABOLIC PNL TOTAL CA: CPT

## 2021-02-23 PROCEDURE — 6370000000 HC RX 637 (ALT 250 FOR IP): Performed by: INTERNAL MEDICINE

## 2021-02-23 PROCEDURE — 99239 HOSP IP/OBS DSCHRG MGMT >30: CPT | Performed by: PSYCHIATRY & NEUROLOGY

## 2021-02-23 PROCEDURE — 6360000002 HC RX W HCPCS: Performed by: PSYCHIATRY & NEUROLOGY

## 2021-02-23 PROCEDURE — 6370000000 HC RX 637 (ALT 250 FOR IP): Performed by: STUDENT IN AN ORGANIZED HEALTH CARE EDUCATION/TRAINING PROGRAM

## 2021-02-23 PROCEDURE — 6370000000 HC RX 637 (ALT 250 FOR IP): Performed by: PSYCHIATRY & NEUROLOGY

## 2021-02-23 RX ORDER — ERGOCALCIFEROL 1.25 MG/1
50000 CAPSULE ORAL WEEKLY
Qty: 5 CAPSULE | Refills: 0 | Status: SHIPPED | OUTPATIENT
Start: 2021-02-28

## 2021-02-23 RX ORDER — ISOSORBIDE MONONITRATE 30 MG/1
30 TABLET, EXTENDED RELEASE ORAL DAILY
Qty: 30 TABLET | Refills: 3 | Status: SHIPPED | OUTPATIENT
Start: 2021-02-23

## 2021-02-23 RX ORDER — AMLODIPINE BESYLATE 5 MG/1
10 TABLET ORAL NIGHTLY
Qty: 30 TABLET | Refills: 3 | Status: SHIPPED | OUTPATIENT
Start: 2021-02-23

## 2021-02-23 RX ORDER — SODIUM BICARBONATE 325 MG/1
325 TABLET ORAL 2 TIMES DAILY
Qty: 60 TABLET | Refills: 2 | Status: SHIPPED | OUTPATIENT
Start: 2021-02-23

## 2021-02-23 RX ORDER — ATORVASTATIN CALCIUM 20 MG/1
10 TABLET, FILM COATED ORAL DAILY
Qty: 30 TABLET | Refills: 3 | Status: SHIPPED | OUTPATIENT
Start: 2021-02-23

## 2021-02-23 RX ORDER — LISINOPRIL 20 MG/1
20 TABLET ORAL NIGHTLY
Qty: 30 TABLET | Refills: 1 | Status: SHIPPED | OUTPATIENT
Start: 2021-02-23

## 2021-02-23 RX ORDER — CYCLOBENZAPRINE HCL 10 MG
10 TABLET ORAL NIGHTLY
Qty: 10 TABLET | Refills: 0 | Status: SHIPPED | OUTPATIENT
Start: 2021-02-23 | End: 2021-03-05

## 2021-02-23 RX ORDER — CLOPIDOGREL BISULFATE 75 MG/1
75 TABLET ORAL DAILY
Qty: 30 TABLET | Refills: 3 | Status: SHIPPED | OUTPATIENT
Start: 2021-02-23

## 2021-02-23 RX ORDER — NITROGLYCERIN 0.4 MG/1
TABLET SUBLINGUAL
Qty: 25 TABLET | Refills: 3 | Status: SHIPPED | OUTPATIENT
Start: 2021-02-23

## 2021-02-23 RX ORDER — METOPROLOL SUCCINATE 100 MG/1
100 TABLET, EXTENDED RELEASE ORAL DAILY
Qty: 30 TABLET | Refills: 3 | Status: SHIPPED | OUTPATIENT
Start: 2021-02-23

## 2021-02-23 RX ORDER — ASPIRIN 81 MG/1
81 TABLET, CHEWABLE ORAL DAILY
Qty: 30 TABLET | Refills: 3 | Status: SHIPPED | OUTPATIENT
Start: 2021-02-23

## 2021-02-23 RX ADMIN — ATORVASTATIN CALCIUM 10 MG: 10 TABLET, FILM COATED ORAL at 08:48

## 2021-02-23 RX ADMIN — ISOSORBIDE MONONITRATE 30 MG: 30 TABLET, EXTENDED RELEASE ORAL at 08:48

## 2021-02-23 RX ADMIN — METOPROLOL SUCCINATE 100 MG: 50 TABLET, EXTENDED RELEASE ORAL at 08:48

## 2021-02-23 RX ADMIN — ASPIRIN 81 MG: 81 TABLET, CHEWABLE ORAL at 08:48

## 2021-02-23 RX ADMIN — SODIUM BICARBONATE 325 MG: 325 TABLET ORAL at 08:48

## 2021-02-23 RX ADMIN — CLOPIDOGREL BISULFATE 75 MG: 75 TABLET ORAL at 08:48

## 2021-02-23 RX ADMIN — POLYETHYLENE GLYCOL 3350 17 G: 17 POWDER, FOR SOLUTION ORAL at 08:47

## 2021-02-23 RX ADMIN — ENOXAPARIN SODIUM 30 MG: 40 INJECTION SUBCUTANEOUS at 08:49

## 2021-02-23 NOTE — DISCHARGE SUMMARY
Physical Therapy DISCHARGE Note  DATE:  2021  NAME:  Diamante Wilson  :  1955  (72 y. o.,male)  MRN:  [id number]    HEIGHT:  Height: 5' 6\" (167.6 cm)  WEIGHT:  Weight: 164 lb 6 oz (74.6 kg)    PATIENT DIAGNOSIS(ES):    Diagnosis: RIGHT PONTINE/TEMPORAL LOBE INFARCTS; LEFT SIDE INVOLVED; CARDIAC    Additional Pertinent Hx: COLON TUMOR, GLAUCOMA; COLON RESECTION, BACK AND NECK SURGERY  RESTRICTIONS/PRECAUTIONS:    Restrictions/Precautions  Restrictions/Precautions: Fall Risk     OVERALL  ORIENTATION STATUS:  Overall Orientation Status: Within Functional Limits  PAIN:  Pain Level: 0  Pain Type: Acute pain    Pain Location: Head     Pain Orientation: Mid      NEUROLOGICAL                          STRENGTH  Strength RLE  Strength RLE: WNL  Strength LLE  Strength LLE: Exception  Comment: HIP 4-/5; KNEE 3-/5; DF 2-/5; PF 2+/5  ROM  AROM RLE (degrees)  RLE AROM: WFL  AROM LLE (degrees)  LLE AROM : WFL  POSTURE/BALANCE          ACTIVITY TOLERANCE  Activity Tolerance  Activity Tolerance: Patient Tolerated treatment well      BED MOBILITY  Bed Mobility  Rolling: Modified independent  Supine to Sit: Modified independent  Sit to Supine:Independent  TRANSFERS  Sit to Stand: Independent      Bed to Chair: Modified independent  Car Transfer: Stand by assistance(With RW/)     WHEELCHAIR PROPULSION 1  Propulsion 1  Propulsion: Manual  Method: COURTNEY TURNER  Level of Assistance: Stand by assistance  Distance: 50 ft   WHEELCHAIR PROPULSION 2     AMBULATION 1  Ambulation 1  Surface: level tile  Device: Rolling Walker  Other Apparatus: Wheelchair follow  Assistance: Stand by assistance  Quality of Gait: Slow pace. 3-point gait pattern. Gait Deviations: Slow Kraina, Decreased step length  Distance: 150',  Comments: Incorporated turns.   AMBULATION 2  Ambulation 2  Surface - 2: level tile  Device 2: Rolling Walker  Other Apparatus 2: Wheelchair follow  Assistance 2: Stand by assistance

## 2021-02-23 NOTE — CARE COORDINATION
Mr. Zarina Ortiz is discharged home with family, who completed education yesterday. Orders for outpatient therapies sent to [facility] (he has been there before and familiar) and they will contact with appointment scheduling.

## 2021-02-23 NOTE — PLAN OF CARE
Problem: Falls - Risk of:  Goal: Will remain free from falls  Description: Will remain free from falls  2/22/2021 2301 by Ana Lindsey RN  Outcome: Ongoing  2/22/2021 1314 by Miriam Castro RN  Outcome: Ongoing  Goal: Absence of physical injury  Description: Absence of physical injury  2/22/2021 2301 by Ana Lindsey RN  Outcome: Ongoing  2/22/2021 1314 by Miriam Castro RN  Outcome: Ongoing     Problem: Mobility - Impaired:  Goal: Mobility will improve  Description: Mobility will improve  2/22/2021 2301 by Ana Lindsey RN  Outcome: Ongoing  2/22/2021 1314 by Miriam Castro RN  Outcome: Ongoing     Problem: Safety:  Goal: Free from accidental physical injury  Description: Free from accidental physical injury  2/22/2021 2301 by Ana Lindsey RN  Outcome: Ongoing  2/22/2021 1314 by Miriam Castro RN  Outcome: Ongoing  Goal: Free from intentional harm  Description: Free from intentional harm  2/22/2021 2301 by Ana Lindsey RN  Outcome: Ongoing  2/22/2021 1314 by Miriam Castro RN  Outcome: Ongoing     Problem: Daily Care:  Goal: Daily care needs are met  Description: Daily care needs are met  2/22/2021 2301 by Ana Lindsey RN  Outcome: Ongoing  2/22/2021 1314 by Miriam Castro RN  Outcome: Ongoing     Problem: Discharge Planning:  Goal: Patients continuum of care needs are met  Description: Patients continuum of care needs are met  2/22/2021 2301 by Ana Lindsey RN  Outcome: Ongoing  2/22/2021 1314 by Miriam Castro RN  Outcome: Ongoing     Problem: HEMODYNAMIC STATUS  Goal: Patient has stable vital signs and fluid balance  2/22/2021 2301 by Ana Lindsey RN  Outcome: Ongoing  2/22/2021 1314 by Miriam Castro RN  Outcome: Ongoing     Problem: ACTIVITY INTOLERANCE/IMPAIRED MOBILITY  Goal: Mobility/activity is maintained at optimum level for patient  2/22/2021 2301 by Ana Lindsey RN  Outcome: Ongoing  2/22/2021 1314 by Miriam Castro RN  Outcome: Ongoing Problem: Bleeding:  Goal: Will show no signs and symptoms of excessive bleeding  Description: Will show no signs and symptoms of excessive bleeding  2/22/2021 2301 by Chelsea Hall RN  Outcome: Ongoing  2/22/2021 1314 by Josie Fernando RN  Outcome: Ongoing     Problem: Pain:  Goal: Pain level will decrease  Description: Pain level will decrease  2/22/2021 2301 by Chelsea Hall RN  Outcome: Ongoing  2/22/2021 1314 by Josie Fernando RN  Outcome: Ongoing  Goal: Control of acute pain  Description: Control of acute pain  2/22/2021 2301 by Chelsea Hall RN  Outcome: Ongoing  2/22/2021 1314 by Josie Fernando RN  Outcome: Ongoing  Goal: Control of chronic pain  Description: Control of chronic pain  2/22/2021 2301 by Chelsea Hall RN  Outcome: Ongoing  2/22/2021 1314 by Josie Fernando RN  Outcome: Ongoing

## 2021-02-23 NOTE — DISCHARGE SUMMARY
Neurology Discharge Summary     Patient Identification:  Charlean Fleischer is a 72 y.o. male. :  1955  Admit Date:  2021  Discharge date : 21   Attending Provider: Yane Rudd MD     Account Number: [de-identified]                                   Admission Diagnoses:   Acute ischemic stroke [facility]) [I63.9]    Discharge Diagnoses: Active Problems:    Acute ischemic stroke [facility])  Resolved Problems:    * No resolved hospital problems. *      Discharge Medications:    Current Discharge Medication List           Details   aspirin 81 MG chewable tablet Take 1 tablet by mouth daily  Qty: 30 tablet, Refills: 3      sodium bicarbonate 325 MG tablet Take 1 tablet by mouth 2 times daily  Qty: 60 tablet, Refills: 2      isosorbide mononitrate (IMDUR) 30 MG extended release tablet Take 1 tablet by mouth daily  Qty: 30 tablet, Refills: 3      nitroGLYCERIN (NITROSTAT) 0.4 MG SL tablet up to max of 3 total doses. If no relief after 1 dose, call 911.   Qty: 25 tablet, Refills: 3      vitamin D (ERGOCALCIFEROL) 1.25 MG (43474 UT) CAPS capsule Take 1 capsule by mouth once a week  Qty: 5 capsule, Refills: 0              Details   atorvastatin (LIPITOR) 20 MG tablet Take 0.5 tablets by mouth daily  Qty: 30 tablet, Refills: 3      lisinopril (PRINIVIL;ZESTRIL) 20 MG tablet Take 1 tablet by mouth nightly  Qty: 30 tablet, Refills: 1      metoprolol succinate (TOPROL XL) 100 MG extended release tablet Take 1 tablet by mouth daily  Qty: 30 tablet, Refills: 3      amLODIPine (NORVASC) 5 MG tablet Take 2 tablets by mouth nightly  Qty: 30 tablet, Refills: 3      clopidogrel (PLAVIX) 75 MG tablet Take 1 tablet by mouth daily  Qty: 30 tablet, Refills: 3      cyclobenzaprine (FLEXERIL) 10 MG tablet Take 1 tablet by mouth nightly for 10 days  Qty: 10 tablet, Refills: 0              Details   latanoprost (XALATAN) 0.005 % ophthalmic solution 1 drop nightly           Current Discharge Medication List STOP taking these medications       cloNIDine (CATAPRES) 0.1 MG tablet Comments:   Reason for Stopping:         spironolactone (ALDACTONE) 25 MG tablet Comments:   Reason for Stopping:         triamterene-hydroCHLOROthiazide (MAXZIDE-25) 37.5-25 MG per tablet Comments:   Reason for Stopping:         Cholecalciferol (VITAMIN D) 10 MCG (400 UNIT) CAPS Capsule Comments:   Reason for Stopping:         Metoprolol Tartrate 75 MG TABS Comments:   Reason for Stopping:         hydrochlorothiazide (HYDRODIURIL) 25 MG tablet Comments:   Reason for Stopping:         vitamin B-12 (CYANOCOBALAMIN) 1000 MCG tablet Comments:   Reason for Stopping:         Cholecalciferol (VITAMIN D3) 2000 units CAPS Comments:   Reason for Stopping:                 Consults:   cardiology and nephrology and hospitalist    Hospital Course: Ultimately the patient did quite well during his stay in the rehab unit. He did have recurrent chest pain which was relieved with nitroglycerin. Cardiology was consulted and he was placed on Imdur with good results. Patient has acute on chronic kidney disease and there is a concern about contrast-induced nephropathy and so a heart cath has been delayed until the near future. He continued on aspirin and Plavix. His lisinopril and Maxide were discontinued for several days mostly due to HUI but his blood pressure has risen. He will resume his lisinopril at a lower dose. Continue on Norvasc and his Toprol dose was increased. Renal function much improved. Nephrology has signed off.   Disposition upon dischargeimproved          Discharge Instructions     Patient Instructions:   Home  Therapy orders: PT and OT   Discharge lab work: none  Code status: Full Code   Activity: activity as tolerated  Diet: DIET CARDIAC;    Wound Care: as directed  Equipment: as per therapy      Joe Shook MD    At least 35 minutes were spent in discharging the patient

## 2021-02-23 NOTE — DISCHARGE SUMMARY
[address] [facility] Speech Therapy                Discharge Summary      Date: 2021  Patient Name: Geovany Mcneil        MRN: [id number]    Account #: [de-identified]  : 1955  (72 y.o.)  Gender: male     Admission Diagnoses:   Acute ischemic stroke [facility]) [I63.9]     Discharge Diagnoses: Active Problems:    Acute ischemic stroke [facility])  Resolved Problems:    * No resolved hospital problems. *      History of present illness: This 76 y. o. male   with history of HTN,hyperlipidemia,chronic back pain and CVA. He presented to [facility] on 21 with c/o lightheadedness, mild blurred vision and change in his gait that started about 3 days prior. He was hypertensive on presentation. MRI done revealed a right pontine infarct. CTA head/neck showed non signifigant steno-occlusive disease. He was admitted the the hospitalist service with consult for neurology. He was seen by Dr. Nadir Holguin. He was doing quite well. When on 21 he had a return of dizziness with N&V and worsening left sided weakness, mild dysarthria, B/P was low. Stat MRI was ordered that revealed showed a new right temporal lobe infarct. TTE showed thickening on anterior leaflet of mirtal valve. On the morning of 21 he had an accoute onset of chest pain/pressure with hypotension. Repeat EKG was concerning. Troponin was negative x 2. Chest pain resolved with nitro. Cardiology was consult for NIKI.  He was seen by  Christy. NIKI done on 2/5/21 showed no thrombus noted in the left atrial appendage,negative bubble study, no evidence of PFO or ASD,severely thickened left ventricle but no evidence of LV noncompaction. Dr. Sven Whitehead recommended a diagnostic left heart catheterization to assess his coronary anatomy. Patient wishes to posst-pone his cardiac work-up until after he completes the Rehab program. Geovanni Carias was also noted to have an increase in his creatinine of 5.12. Nephrology consulted. Patients creatine is rapidly improving now at 1.4 with BUN of 38. He is alert & oriented x 3, follows commands but does have mild dysarthia but demonstrates impulsivity and decreased safety awareness. Left sided weakness remains. Patient was evaluated by SPT for swallow and was cleared for a regular diet with thin liquids.  (per neurology note)        Patient's speech intelligibility ranks at 100% for unknown context and unfamiliar listener. Patient does present with mild distortions at times, however do not impact speech intelligibility. Patient was given hand outs and exercises to complete upon discharge. At this time do not recommend speech services. Patient has met all goals. Cassie Hurst SHORT TERM GOAL #1:  Goal 1: The patient will complete oral motor exercises to improve speech intelligibility with min verbal cues at 100% accuracy. MET    SHORT TERM GOAL #2:  Goal 2: The patient will recall/utilize dysarthric strategies during conversational discourse with min verbal cues at 100% accuracy in order to improve speech intelligibility. MET      SHORT TERM GOAL #3:  Goal 3: The patient will sustain attention for 15+ minutes with min verbal cues or redirection during structured/unstructured activities. MET      SHORT TERM GOAL #4:  Goal 4: The patient will complete executive function tasks (self-monitoring, organizing, etc) with min verbal cues at 100% accuracy.  MEt    Long-term Goals Goal 1: The patient will utilize dysarthric strategies independently and exhibit 100% speech intelligibility for conversational discourse  Goal 2: The patient will develop functional, cognitive linguistic based skills and utilize compensatory strategies to communicate wants and needs effectively to different conversational partners, maintain safety, and participate socially in functional living enviroment. Goals Met 4  [facility]'s     2  LTG's      Plan:  Discharge patient             Discharge Location: home with family             Further Speech treatment/recommendations: Do not recommend speech services at this time.

## 2021-02-24 NOTE — DISCHARGE SUMMARY
Occupational Therapy Discharge Summary         Date: 2021  Patient Name: Ciera Leos        MRN: [id number]    : 1955  (72 y.o.)  Gender: male      Diagnosis: RIGHT PONTINE/TEMPORAL LOBE INFARCTS; LEFT SIDE INVOLVED; CARDIAC  Restrictions/Precautions  Restrictions/Precautions: Fall Risk      Discharge Date: 21      UE Functioning:  LUE AROM WFLs, decreased strength and FM coordination  RUE WNLs    Home Management:  Functional Mobility  Functional - Mobility Device: Rolling Walker  Activity: Retrieve items, Transport items, To/from bathroom, Other  Assist Level: Supervision  Functional Mobility Comments: supervision with RW to love seat, recliner, and bed in apt setting----enters bed at home on side nearest to wall. ..has small space in between bed and wall, so pt practiced scooting from the foot of the bed to the head of the bed    Adaptive Equipment/DME Status:  Ordered RW and TTB, order sent for [facility] but patient/spouse decided to try without it    Pain Assessment:  Pain Level: 0  Pain Location: Shoulder    Remaining Problems:    Decreased functional mobility ; Decreased fine motor control; Decreased high-level IADLs; Decreased strength; Decreased balance; Decreased ROM; Decreased ADL status;  Decreased coordination; Decreased endurance  STGs:  Short term goals  Time Frame for Short term goals: 1 week  Short term goal 1: complete LB dressing with CGA  Short term goal 2: complete overall toileting with CGA  Short term goal 3: complete overall bathing with CGA  Short term goal 4: complete 1-2 handed standing level task for 3 mins with CGA  Short term goal 5: complete L UE FMC acts x 5 occasions to increase coordination and finger dexterity for ADLs  Short term goal 6: complete simple ambulatory home making task with CGA  6/6 met  LTGs:  Long term goals  Time Frame for Long term goals : 3 weeks  Long term goal 1: complete HEP with independence Long term goal 2: complete overall dressing with modified independence  Long term goal 3: complete overall bathing with modified independence  Long term goal 4: complete overall toileting with modified independence  Long term goal 5: complete ambulatory home making task with modified independence  Long term goals 6: pt/family verbalize DME  2/6 met    Discharge Setting and Recommendations:  Home with OP therapy services    Discharge Care Scores  Eating: CARE Score: 6  Oral Hygiene: CARE Score: 6  Toileting: CARE Score: 5  Shower/Bathe: CARE Score: 5  Upper Body Dressing: CARE Score: 6  Lower Body Dressing: CARE Score: 5  Footwear: CARE Score: 5  Toilet Transfers: CARE Score: 5  Picking Up Object:  CARE Score: 3    Electronically signed by Fariha Rosenberg OT on 2/24/21 at 9:30 AM CST

## 2021-02-25 VITALS
HEIGHT: 66 IN | RESPIRATION RATE: 18 BRPM | TEMPERATURE: 98.5 F | HEART RATE: 68 BPM | SYSTOLIC BLOOD PRESSURE: 134 MMHG | OXYGEN SATURATION: 96 % | BODY MASS INDEX: 26.42 KG/M2 | WEIGHT: 164.38 LBS | DIASTOLIC BLOOD PRESSURE: 88 MMHG

## 2021-02-26 ENCOUNTER — OFFICE VISIT (OUTPATIENT)
Dept: CARDIOLOGY | Facility: CLINIC | Age: 66
End: 2021-02-26

## 2021-02-26 VITALS
OXYGEN SATURATION: 98 % | DIASTOLIC BLOOD PRESSURE: 80 MMHG | BODY MASS INDEX: 25.23 KG/M2 | WEIGHT: 157 LBS | HEART RATE: 61 BPM | HEIGHT: 66 IN | SYSTOLIC BLOOD PRESSURE: 152 MMHG

## 2021-02-26 DIAGNOSIS — I63.511 ACUTE ISCHEMIC RIGHT MCA STROKE (HCC): ICD-10-CM

## 2021-02-26 DIAGNOSIS — N14.11 CONTRAST DYE INDUCED NEPHROPATHY: ICD-10-CM

## 2021-02-26 DIAGNOSIS — I10 ESSENTIAL HYPERTENSION: Primary | ICD-10-CM

## 2021-02-26 DIAGNOSIS — Z86.73 HISTORY OF STROKE: ICD-10-CM

## 2021-02-26 DIAGNOSIS — E78.2 MIXED HYPERLIPIDEMIA: ICD-10-CM

## 2021-02-26 DIAGNOSIS — T50.8X5A CONTRAST DYE INDUCED NEPHROPATHY: ICD-10-CM

## 2021-02-26 PROCEDURE — 99215 OFFICE O/P EST HI 40 MIN: CPT | Performed by: EMERGENCY MEDICINE

## 2021-02-26 RX ORDER — ATORVASTATIN CALCIUM 40 MG/1
40 TABLET, FILM COATED ORAL DAILY
Qty: 90 TABLET | Refills: 3 | Status: SHIPPED | OUTPATIENT
Start: 2021-02-26 | End: 2021-06-03

## 2021-02-26 RX ORDER — HYDRALAZINE HYDROCHLORIDE AND ISOSORBIDE DINITRATE 37.5; 2 MG/1; MG/1
1 TABLET, FILM COATED ORAL 3 TIMES DAILY
Qty: 90 TABLET | Refills: 3 | Status: SHIPPED | OUTPATIENT
Start: 2021-02-26 | End: 2021-06-03

## 2021-02-26 RX ORDER — ISOSORBIDE DINITRATE 30 MG/1
30 TABLET ORAL 4 TIMES DAILY
COMMUNITY
End: 2021-02-26

## 2021-02-26 RX ORDER — NITROGLYCERIN 0.4 MG/1
0.4 TABLET SUBLINGUAL
COMMUNITY

## 2021-02-26 RX ORDER — LISINOPRIL 20 MG/1
20 TABLET ORAL DAILY
COMMUNITY
End: 2021-04-22 | Stop reason: SDUPTHER

## 2021-02-26 RX ORDER — SODIUM BICARBONATE 325 MG/1
325 TABLET ORAL 3 TIMES DAILY
COMMUNITY
End: 2021-06-03

## 2021-02-26 RX ORDER — ASPIRIN 81 MG/1
81 TABLET ORAL DAILY
COMMUNITY

## 2021-02-26 RX ORDER — CYCLOBENZAPRINE HCL 10 MG
10 TABLET ORAL 3 TIMES DAILY PRN
COMMUNITY
End: 2021-03-03 | Stop reason: SDUPTHER
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
## 2021-03-01 DIAGNOSIS — E78.2 MIXED HYPERLIPIDEMIA: Primary | ICD-10-CM

## 2021-03-02 ENCOUNTER — TREATMENT (OUTPATIENT)
Dept: PHYSICAL THERAPY | Facility: CLINIC | Age: 66
End: 2021-03-02

## 2021-03-02 ENCOUNTER — TRANSCRIBE ORDERS (OUTPATIENT)
Dept: PHYSICAL THERAPY | Facility: CLINIC | Age: 66
End: 2021-03-02

## 2021-03-02 DIAGNOSIS — Z78.9 IMPAIRED INSTRUMENTAL ACTIVITIES OF DAILY LIVING (IADL): ICD-10-CM

## 2021-03-02 DIAGNOSIS — I63.9 ACUTE ISCHEMIC STROKE (HCC): Primary | ICD-10-CM

## 2021-03-02 DIAGNOSIS — R29.898 LEFT ARM WEAKNESS: ICD-10-CM

## 2021-03-02 DIAGNOSIS — I63.9 CEREBROVASCULAR ACCIDENT (CVA), UNSPECIFIED MECHANISM (HCC): Primary | ICD-10-CM

## 2021-03-02 PROCEDURE — 97166 OT EVAL MOD COMPLEX 45 MIN: CPT | Performed by: OCCUPATIONAL THERAPIST

## 2021-03-02 NOTE — PROGRESS NOTES
Occupational Therapy Initial Evaluation and Plan of Care    Patient: Dao Jhaveri   : 1955  Referring practitioner: Rod De Los Santos MD  Date of Initial Visit: 3/2/2021  Today's Date: 3/2/2021  Patient seen for 1 sessions    Visit Diagnoses:    ICD-10-CM ICD-9-CM   1. Cerebrovascular accident (CVA), unspecified mechanism (CMS/HCC)  I63.9 434.91   2. Left arm weakness  R29.898 729.89   3. Impaired instrumental activities of daily living (IADL)  Z78.9 V49.89       SUBJECTIVE     Outcome Measure: QuickDASH: 41% impaired.   9 hole peg test: Left: 31.96     Right: 27.63  No concerns noted for visual attention with the clock drawing test, Shree's test, star cancellation or line bisection tests.     Subjective Evaluation    History of Present Illness  Date of onset: 2021  Mechanism of injury: Pt had a R david and R temporal lobe CVA in February. [facility] He came home on  with his wife. He is using a RW and she is helping with some self care and all IADL. He is retired but is a . He has not resumed that role yet.       Patient Occupation: . Retired  for [employer].  Quality of life: good    Pain  Current pain ratin  At best pain ratin  At worst pain rating: 10  Location: L side. Leg is worse than L arm.   Quality: dull ache    Social Support  Lives in: multiple-level home  Lives with: spouse    Hand dominance: left    Treatments  Discharged from (in last 30 days): inpatient hospitalization  Patient Goals  Patient goals for therapy: decreased pain, improved balance, increased motion, increased strength, independence with ADLs/IADLs, return to sport/leisure activities and return to work         The patient living arrangement includes home with family member. Their home accessibility includes 2-story house. Their home assistive devices and adaptive equipment includes Walkers, Type: Rolling Walker and Hygiene  Aides, Type: Tub Seat/Chair and BSC.     OBJECTIVE     Objective          Active Range of Motion   Left Shoulder   Flexion: 126 degrees   Extension: WFL  Abduction: 144 degrees   Adduction: WFL    Left Elbow   Flexion: WFL  Extension: WFL  Forearm supination: WFL  Forearm pronation: WFL    Left Wrist   Wrist flexion: WFL  Wrist extension: WFL  Radial deviation: WFL  Ulnar deviation: WFL      Strength/Myotome Testing     Left Shoulder     Planes of Motion   Flexion: 3-   Extension: 4   Abduction: 3-   Adduction: 4     Left Elbow   Flexion: 4  Extension: 4  Forearm supination: 4  Forearm pronation: 4    Left Wrist/Hand   Wrist extension: 4  Wrist flexion: 4  Radial deviation: 4  Ulnar deviation: 4     (2nd hand position)     Trial 1: 41 lbs    Trial 2: 37 lbs    Trial 3: 33 lbs    Average: 37 lbs    Thumb Strength  Key/Lateral Pinch     Trial 1: 8 lbs    Trial 2: 10 lbs    Trial 3: 9 lbs    Average: 9 lbs  Tip/Two-Point Pinch     Trial 1: 5 lbs    Trial 2: 4 lbs    Trial 3: 4.5 lbs    Average: 4.5 lbs  Palmar/Three-Point Pinch     Trial 1: 7 lbs    Trial 2: 5 lbs    Trial 3: 6 lbs    Average: 6 lbs    Right Wrist/Hand      (2nd hand position)     Trial 1: 64 lbs    Trial 2: 65 lbs    Trial 3: 64 lbs    Average: 64.33 lbs    Thumb Strength   Key/Lateral Pinch     Trial 1: 10.5 lbs    Trial 2: 12.5 lbs    Trial 3: 11 lbs    Average: 11.33 lbs  Tip/Two-Point Pinch     Trial 1: 7 lbs    Trial 2: 7.5 lbs    Trial 3: 7 lbs    Average: 7.17 lbs  Palmar/Three-Point Pinch     Trial 1: 12 lbs    Trial 2: 12.5 lbs    Trial 3: 12 lbs    Average: 12.17 lbs    Ambulation     Comments   Mod I for functional mobility with RW. Limited L LE weightbearing due to pain.     Functional Assessment     Comments  ADL  Dressing: Min A  Bathing: Min A  Toileting: Mod I  Grooming: Mod I  Feeding: Mod I    IADL: Only completing light seated IADL. Spouse is performing all other IADL tasks.          General Comments     Shoulder Comments    R UE AROM is WNL. Strength 5/5.      Vision assessment: wears corrected lenses/contact. 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 Occasional blurriness with reading.     Cognitive status: oriented to Person, Place, Time and Situation    Sensation: no sensory deficits noted.    Tone: Grossly Normal    Midline orientation: Midline    Fine Motor:  - Rapid alternating movements of the hands, fingers, and forearms: Able to perform   - Sequential finger-to-thumb opposition: Able to perform   - Alternates fists and finger-to-nose tests: Able to perform    Cerebellar exam: finger to nose without dysmetria and rapid alternating movements in the upper extremities were normal    Therapy Education/Self Care [id number]   Details: L UE deficits, POC, increasing functional use.    Given Home Exercise Program   Progress: New   Who provided to: Patient   Level of understanding Verbalized   Timed Minutes        Total Timed Treatment:     55   mins  Total Time of Visit:            55   mins    ASSESSMENT/PLAN     Assessment & Plan     Assessment  Impairments: abnormal coordination, abnormal gait, abnormal or restricted ROM, activity intolerance, impaired balance, impaired physical strength, lacks appropriate home exercise program, pain with function and weight-bearing intolerance  Assessment details: This pt had a CVA 1 month ago and recently returned home from inpatient rehab. He continues to have L side weakness, pain, limited ROM and impaired coordination. He is a  and would like to resume that role. He would also like to return to home management tasks including yard work and playing with his grandchildren. OT will focus on L UE deficits to allow this pt to reach his max potential.   Prognosis: good  Functional Limitations: carrying objects, lifting, walking, pulling, pushing, uncomfortable because of pain, standing, reaching behind back, reaching overhead and unable to perform repetitive tasks  Plan  Planned modality interventions: TENS,  ultrasound and low level laser therapy  Planned therapy interventions: manual therapy, motor coordination training, neuromuscular re-education, balance/weight-bearing training, ADL retraining, fine motor coordination training, functional ROM exercises, home exercise program, IADL retraining, therapeutic activities, transfer training, stretching and strengthening  Frequency: 2x week  Duration in weeks: 8  Treatment plan discussed with: patient and family  Plan details: Will focus on L UE impairments limiting independence in ADL and IADL tasks.       Goals                                          Progress Note due by 4/1/21   STG by: 4/1/21 Comments Status   Pt will be independent with daily completion of a HEP to address L UE deficits.       Pt will complete all bathing and dressing with Mod I using AE as needed.        Pt will display improved L UE FMC by completing the 9 hole peg test in 26 seconds or less.            LTG by: 4/1/21     Pt will increase L hand  strength to 60# for improved performance with IADL tasks.       Pt will complete a handwriting task related to IADL with 100% legibility.      Pt will increase L UE strength to 4+/5 at all joints for improved IADL performance.                       OT SIGNATURE: Fabiana Sandhu OTR/L   DATE TREATMENT INITIATED: 3/2/2021      Initial Certification  Certification Period: 5/31/2021  I certify that the therapy services are furnished while this patient is under my care.  The services outlined above are required by this patient, and will be reviewed every 90 days.     PHYSICIAN: Rod De Los Santos MD______________________________________________DATE: ___________________     Please sign and return via fax to [phone].   Thank you so much for letting us work with Dao. I appreciate your letting us work with your patients. If you have any questions or concerns, please don't hesitate to contact me.

## 2021-03-03 ENCOUNTER — OFFICE VISIT (OUTPATIENT)
Dept: INTERNAL MEDICINE | Facility: CLINIC | Age: 66
End: 2021-03-03

## 2021-03-03 VITALS
DIASTOLIC BLOOD PRESSURE: 82 MMHG | RESPIRATION RATE: 16 BRPM | SYSTOLIC BLOOD PRESSURE: 124 MMHG | BODY MASS INDEX: 26.2 KG/M2 | WEIGHT: 163 LBS | OXYGEN SATURATION: 96 % | HEIGHT: 66 IN | TEMPERATURE: 97.9 F | HEART RATE: 93 BPM

## 2021-03-03 DIAGNOSIS — M62.838 MUSCLE SPASM OF LEFT LOWER EXTREMITY: ICD-10-CM

## 2021-03-03 DIAGNOSIS — I10 ESSENTIAL HYPERTENSION: Primary | ICD-10-CM

## 2021-03-03 DIAGNOSIS — R29.898 LEFT LEG WEAKNESS: ICD-10-CM

## 2021-03-03 DIAGNOSIS — E78.2 MIXED HYPERLIPIDEMIA: ICD-10-CM

## 2021-03-03 DIAGNOSIS — I63.511 ACUTE ISCHEMIC RIGHT MCA STROKE (HCC): ICD-10-CM

## 2021-03-03 DIAGNOSIS — R26.9 IMPAIRED GAIT: ICD-10-CM

## 2021-03-03 DIAGNOSIS — R29.898 LEFT ARM WEAKNESS: ICD-10-CM

## 2021-03-03 PROCEDURE — 99214 OFFICE O/P EST MOD 30 MIN: CPT | Performed by: NURSE PRACTITIONER

## 2021-03-03 RX ORDER — CYCLOBENZAPRINE HCL 10 MG
10 TABLET ORAL 3 TIMES DAILY PRN
Qty: 90 TABLET | Refills: 2 | Status: SHIPPED | OUTPATIENT
Start: 2021-03-03 | End: 2022-08-16 | Stop reason: SDUPTHER

## 2021-03-03 NOTE — PROGRESS NOTES
Chief Complaint   Patient presents with   • Hospital Follow Up Visit   • Leg Pain     left       History:  Dao Jhaveri is a 65 y.o. male who presents today for follow-up for evaluation of the above:  Patient presents today for hospital follow up. He was discharged on 02/23/2021 from [facility] following a acute ischemia stroke.   Radical therapy at [facility] was very beneficial. He has regained the ability to ambulate but still has left leg weakness.   Left leg does have pain. He is using a walker at home. He also report left shoulder and arm pain with activity due to weakness and fatigue. It does improve with flexeril.    He continues working with therapy tid related to left leg pain and weakness post TIA    Patient reports compliance with blood pressure medications without adverse side effects.       ROS:  Review of Systems   Constitutional: Negative for activity change, appetite change, fatigue, fever and unexpected weight change.   HENT: Negative.    Respiratory: Negative for cough, chest tightness, shortness of breath and wheezing.    Cardiovascular: Negative for chest pain, palpitations and leg swelling.   Gastrointestinal: Negative.    Endocrine: Negative.    Genitourinary: Negative.    Musculoskeletal: Positive for gait problem and myalgias.   Skin: Negative.    Neurological: Positive for weakness. Negative for dizziness and headaches.   Psychiatric/Behavioral: Negative.        Mr. Jhaveri  reports that he quit smoking about 7 years ago. His smoking use included cigarettes. He has a 52.50 pack-year smoking history. He has never used smokeless tobacco. He reports that he does not drink alcohol or use drugs.      Current Outpatient Medications:   •  amLODIPine (NORVASC) 10 MG tablet, Take 1 tablet by mouth every night at bedtime., Disp: 90 tablet, Rfl: 1  •  aspirin 81 MG EC tablet, Take 81 mg by mouth Daily., Disp: , Rfl:   •  atorvastatin (LIPITOR) 40 MG tablet, Take 1 tablet by mouth Daily., Disp: 90  "tablet, Rfl: 3  •  clopidogrel (PLAVIX) 75 MG tablet, Take 1 tablet by mouth Daily., Disp: 90 tablet, Rfl: 1  •  cyclobenzaprine (FLEXERIL) 10 MG tablet, Take 1 tablet by mouth 3 (Three) Times a Day As Needed for Muscle Spasms., Disp: 90 tablet, Rfl: 2  •  isosorbide-hydrALAZINE (BiDil) 20-37.5 MG per tablet, Take 1 tablet by mouth 3 (Three) Times a Day., Disp: 90 tablet, Rfl: 3  •  latanoprost (XALATAN) 0.005 % ophthalmic solution, 1 drop Every Night., Disp: , Rfl:   •  lisinopril (PRINIVIL,ZESTRIL) 20 MG tablet, Take 20 mg by mouth Daily., Disp: , Rfl:   •  metoprolol succinate XL (TOPROL-XL) 50 MG 24 hr tablet, Take 1 tablet by mouth Daily. (Patient taking differently: Take 100 mg by mouth Daily.), Disp: 90 tablet, Rfl: 1  •  nitroglycerin (NITROSTAT) 0.4 MG SL tablet, Place 0.4 mg under the tongue Every 5 (Five) Minutes As Needed for Chest Pain. Take no more than 3 doses in 15 minutes., Disp: , Rfl:   •  sodium bicarbonate 325 MG tablet, Take 325 mg by mouth 4 (Four) Times a Day., Disp: , Rfl:   •  vitamin B-12 (CYANOCOBALAMIN) 500 MCG tablet, Take 1 tablet by mouth Daily., Disp: 30 tablet, Rfl: 5  •  Vitamin D, Cholecalciferol, (CHOLECALCIFEROL) 400 units tablet, Take 400 Units by mouth Every 30 (Thirty) Days., Disp: , Rfl:   •  allopurinol (ZYLOPRIM) 100 MG tablet, Take 2 tablets by mouth Daily., Disp:  , Rfl:     Current Facility-Administered Medications:   •  Evolocumab (REPATHA) injection 140 mg, 140 mg, Subcutaneous, Q14 Days, Kodi Mcghee DO      OBJECTIVE:  /82 (BP Location: Right arm, Patient Position: Sitting, Cuff Size: Adult)   Pulse 93   Temp 97.9 °F (36.6 °C) (Temporal)   Resp 16   Ht 167.6 cm (65.98\")   Wt 73.9 kg (163 lb)   SpO2 96%   BMI 26.32 kg/m²    Physical Exam  Vitals signs reviewed.   Constitutional:       Appearance: He is well-developed.   HENT:      Head: Normocephalic and atraumatic.   Eyes:      Conjunctiva/sclera: Conjunctivae normal.      Pupils: Pupils " are equal, round, and reactive to light.   Neck:      Musculoskeletal: Normal range of motion and neck supple.   Cardiovascular:      Rate and Rhythm: Normal rate and regular rhythm.      Heart sounds: Normal heart sounds.   Pulmonary:      Effort: Pulmonary effort is normal.      Breath sounds: Normal breath sounds.   Abdominal:      General: Bowel sounds are normal.      Palpations: Abdomen is soft.   Skin:     General: Skin is warm and dry.   Neurological:      Mental Status: He is alert and oriented to person, place, and time.      Deep Tendon Reflexes: Reflexes are normal and symmetric.         Assessment/Plan    Diagnoses and all orders for this visit:    1. Essential hypertension (Primary)  Patient reports compliance with blood pressure medications without adverse side effects.     2. Mixed hyperlipidemia  continues on statin     3. Acute ischemic right MCA stroke (CMS/HCC)  Left arm weakness  Left leg weakness  Impaired gait  Reviewed hospital notes. He continues with OT.      4. Muscle spasm of left lower extremity  -     cyclobenzaprine (FLEXERIL) 10 MG tablet; Take 1 tablet by mouth 3 (Three) Times a Day As Needed for Muscle Spasms.  Dispense: 90 tablet; Refill: 2         An After Visit Summary was printed and given to the patient at discharge.  Return in about 3 months (around 6/3/2021) for Medicare Wellness cancel March appt. Sooner if problems arise.          Marsha CASTANO. 3/4/2021   Electronically Signed

## 2021-03-04 ENCOUNTER — TREATMENT (OUTPATIENT)
Dept: PHYSICAL THERAPY | Facility: CLINIC | Age: 66
End: 2021-03-04

## 2021-03-04 DIAGNOSIS — Z78.9 IMPAIRED INSTRUMENTAL ACTIVITIES OF DAILY LIVING (IADL): ICD-10-CM

## 2021-03-04 DIAGNOSIS — R29.898 LEFT ARM WEAKNESS: ICD-10-CM

## 2021-03-04 DIAGNOSIS — I63.9 CEREBROVASCULAR ACCIDENT (CVA), UNSPECIFIED MECHANISM (HCC): Primary | ICD-10-CM

## 2021-03-04 PROBLEM — G81.94 LEFT HEMIPARESIS (HCC): Status: ACTIVE | Noted: 2021-03-04

## 2021-03-04 PROCEDURE — 97110 THERAPEUTIC EXERCISES: CPT | Performed by: OCCUPATIONAL THERAPIST

## 2021-03-04 NOTE — PROGRESS NOTES
Occupational Therapy Treatment Note     Patient: Dao Jhaveri   : 1955  Referring practitioner: Rod De Los Santos MD  Date of Initial Visit:   Type: THERAPY  Noted: 3/2/2021  Today's Date: 3/4/2021  Patient seen for 2 sessions  Visit Diagnoses:    ICD-10-CM ICD-9-CM   1. Cerebrovascular accident (CVA), unspecified mechanism (CMS/HCC)  I63.9 434.91   2. Left arm weakness  R29.898 729.89   3. Impaired instrumental activities of daily living (IADL)  Z78.9 V49.89       SUBJECTIVE        Subjective  Patient reports pain on L side 6/10. Patient reports feeling weak today. He is walking less at home.   Pain:6/10    OBJECTIVE     Objective     Therapeutic Exercises    [id number] Comments   L Elbow flexion 2 pounds 20 reps     L Elbow extension 2 pounds 10 reps 1 pound 20 reps  Started with 2 pound, switched to 1 pound, complete successfully    L supination/pronation 2 pound 20 reps     L wrist flexion/extension 2 pounds 20 reps     Shoulder press, shoulder flexion, internal/external rotation L 2 pound 20 reps    Shoulder abduction L 2 pound 20 reps  Rest [facility] through set    L weight bearing standing and sitting tabletop 2-3 seconds 10 reps each     8 pound resistance ring L finger flexion and extension 20 reps         Timed Minutes 45         Total Timed Treatment:     45   mins  Total Time of Visit:            45   mins       Goals                                          Progress Note due by 21   STG by: 21 Comments Status   Pt will be independent with daily completion of a HEP to address L UE deficits.  Added L strengthening exercises to HEP  Ongoing   Pt will complete all bathing and dressing with Mod I using AE as needed.      Patient commented on improvement since last visit   Ongoing   Pt will display improved L UE FMC by completing the 9 hole peg test in 26 seconds or less.       Ongoing           LTG by: 21       Pt will increase L hand  strength to 60# for improved performance with IADL  tasks.     Addressed  strength   Ongoing   Pt will complete a handwriting task related to IADL with 100% legibility.     Ongoing   Pt will increase L UE strength to 4+/5 at all joints for improved IADL performance.   Addressed L UE strength   Ongoing                                 Therapy Education/Self Care [id number]   Details: Added L Ue strengthening, weightbearing, and putty exercises to HEP   Given Home Exercise Program   Progress: Progressed   Who provided to: Patient   Level of understanding Verbalized, Demonstrated and Teach back level of understanding   Timed Minutes       ASSESSMENT/PLAN     Assessment/Plan     Assessment:  Patient demonstrated understanding of HEP. Decreased endurance noted during session.   No increase in pain. Patient reports ADL tasks are improving at home.      Plan:  Will complete in hand manipulation and BITS next session.       Merari Sandhu, OTR/L  Occupational Therapist

## 2021-03-09 ENCOUNTER — OFFICE VISIT (OUTPATIENT)
Dept: NEUROLOGY | Facility: CLINIC | Age: 66
End: 2021-03-09

## 2021-03-09 VITALS
HEIGHT: 66 IN | DIASTOLIC BLOOD PRESSURE: 72 MMHG | WEIGHT: 162.8 LBS | BODY MASS INDEX: 26.16 KG/M2 | SYSTOLIC BLOOD PRESSURE: 140 MMHG | OXYGEN SATURATION: 96 % | HEART RATE: 80 BPM

## 2021-03-09 DIAGNOSIS — I10 ESSENTIAL HYPERTENSION: ICD-10-CM

## 2021-03-09 DIAGNOSIS — I63.511 ACUTE ISCHEMIC RIGHT MCA STROKE (HCC): Primary | ICD-10-CM

## 2021-03-09 DIAGNOSIS — Z91.199 HISTORY OF NONCOMPLIANCE WITH MEDICAL TREATMENT: ICD-10-CM

## 2021-03-09 DIAGNOSIS — E78.2 MIXED HYPERLIPIDEMIA: ICD-10-CM

## 2021-03-09 DIAGNOSIS — Z86.73 HISTORY OF STROKE: ICD-10-CM

## 2021-03-09 PROCEDURE — 99214 OFFICE O/P EST MOD 30 MIN: CPT | Performed by: CLINICAL NURSE SPECIALIST

## 2021-03-09 NOTE — PATIENT INSTRUCTIONS
"BMI for Adults  What is BMI?  Body mass index (BMI) is a number that is calculated from a person's weight and height. BMI can help estimate how much of a person's weight is composed of fat. BMI does not measure body fat directly. Rather, it is an alternative to procedures that directly measure body fat, which can be difficult and expensive.  BMI can help identify people who may be at higher risk for certain medical problems.  What are BMI measurements used for?  BMI is used as a screening tool to identify possible weight problems. It helps determine whether a person is obese, overweight, a healthy weight, or underweight.  BMI is useful for:  · Identifying a weight problem that may be related to a medical condition or may increase the risk for medical problems.  · Promoting changes, such as changes in diet and exercise, to help reach a healthy weight. BMI screening can be repeated to see if these changes are working.  How is BMI calculated?  BMI involves measuring your weight in relation to your height. Both height and weight are measured, and the BMI is calculated from those numbers. This can be done either in English ([place] or metric measurements. Note that charts and online BMI calculators are available to help you find your BMI quickly and easily without having to do these calculations yourself.  To calculate your BMI in English ([place] measurements:    1. Measure your weight in pounds (lb).  2. Multiply the number of pounds by 703.  ? For example, for a person who weighs 180 lb, multiply that number by 703, which equals 126,540.  3. Measure your height in inches. Then multiply that number by itself to get a measurement called \"inches squared.\"  ? For example, for a person who is 70 inches tall, the \"inches squared\" measurement is 70 inches x 70 inches, which equals 4,900 inches squared.  4. Divide the total from step 2 (number of lb x 703) by the total from step 3 (inches squared): 126,540 ÷ 4,900 = 25.8. This is " "your BMI.  To calculate your BMI in metric measurements:  1. Measure your weight in kilograms (kg).  2. Measure your height in meters (m). Then multiply that number by itself to get a measurement called \"meters squared.\"  ? For example, for a person who is 1.75 m tall, the \"meters squared\" measurement is 1.75 m x 1.75 m, which is equal to 3.1 meters squared.  3. Divide the number of kilograms (your weight) by the meters squared number. In this example: 70 ÷ 3.1 = 22.6. This is your BMI.  What do the results mean?  BMI charts are used to identify whether you are underweight, normal weight, overweight, or obese. The following guidelines will be used:  · Underweight: BMI less than 18.5.  · Normal weight: BMI between 18.5 and 24.9.  · Overweight: BMI between 25 and 29.9.  · Obese: BMI of 30 or above.  Keep these notes in mind:  · Weight includes both fat and muscle, so someone with a muscular build, such as an athlete, may have a BMI that is higher than 24.9. In cases like these, BMI is not an accurate measure of body fat.  · To determine if excess body fat is the cause of a BMI of 25 or higher, further assessments may need to be done by a health care provider.  · BMI is usually interpreted in the same way for men and women.  Where to find more information  For more information about BMI, including tools to quickly calculate your BMI, go to these websites:  · Centers for Disease Control and Prevention: www.cdc.gov  · American Heart Association: www.heart.org  · National Heart, Lung, and Blood [place]: www.nhlbi.nih.gov  Summary  · Body mass index (BMI) is a number that is calculated from a person's weight and height.  · BMI may help estimate how much of a person's weight is composed of fat. BMI can help identify those who may be at higher risk for certain medical problems.  · BMI can be measured using English measurements or metric measurements.  · BMI charts are used to identify whether you are underweight, normal " weight, overweight, or obese.  This information is not intended to replace advice given to you by your health care provider. Make sure you discuss any questions you have with your health care provider.  Document Revised: 09/09/2020 Document Reviewed: 07/17/2020  Elsevier Patient Education © 2020 [employer]  Stroke Prevention  Some medical conditions and behaviors are associated with a higher chance of having a stroke. You can help prevent a stroke by making nutrition, lifestyle, and other changes, including managing any medical conditions you may have.  What nutrition changes can be made?    · Eat healthy foods. You can do this by:  ? Choosing foods high in fiber, such as fresh fruits and vegetables and whole grains.  ? Eating at least 5 or more servings of fruits and vegetables a day. Try to fill half of your plate at each meal with fruits and vegetables.  ? Choosing lean protein foods, such as lean cuts of meat, poultry without skin, fish, tofu, beans, and nuts.  ? Eating low-fat dairy products.  ? Avoiding foods that are high in salt (sodium). This can help lower blood pressure.  ? Avoiding foods that have saturated fat, trans fat, and cholesterol. This can help prevent high cholesterol.  ? Avoiding processed and premade foods.  · Follow your health care provider's specific guidelines for losing weight, controlling high blood pressure (hypertension), lowering high cholesterol, and managing diabetes. These may include:  ? Reducing your daily calorie intake.  ? Limiting your daily sodium intake to 1,500 milligrams (mg).  ? Using only healthy fats for cooking, such as olive oil, canola oil, or sunflower oil.  ? Counting your daily carbohydrate intake.  What lifestyle changes can be made?  · Maintain a healthy weight. Talk to your health care provider about your ideal weight.  · Get at least 30 minutes of moderate physical activity at least 5 days a week. Moderate activity includes brisk walking, biking, and  swimming.  · Do not use any products that contain nicotine or tobacco, such as cigarettes and e-cigarettes. If you need help quitting, ask your health care provider. It may also be helpful to avoid exposure to secondhand smoke.  · Limit alcohol intake to no more than 1 drink a day for nonpregnant women and 2 drinks a day for men. One drink equals 12 oz of beer, 5 oz of wine, or 1½ oz of hard liquor.  · Stop any illegal drug use.  · Avoid taking birth control pills. Talk to your health care provider about the risks of taking birth control pills if:  ? You are over 35 years old.  ? You smoke.  ? You get migraines.  ? You have ever had a blood clot.  What other changes can be made?  · Manage your cholesterol levels.  ? Eating a healthy diet is important for preventing high cholesterol. If cholesterol cannot be managed through diet alone, you may also need to take medicines.  ? Take any prescribed medicines to control your cholesterol as told by your health care provider.  · Manage your diabetes.  ? Eating a healthy diet and exercising regularly are important parts of managing your blood sugar. If your blood sugar cannot be managed through diet and exercise, you may need to take medicines.  ? Take any prescribed medicines to control your diabetes as told by your health care provider.  · Control your hypertension.  ? To reduce your risk of stroke, try to keep your blood pressure below 130/80.  ? Eating a healthy diet and exercising regularly are an important part of controlling your blood pressure. If your blood pressure cannot be managed through diet and exercise, you may need to take medicines.  ? Take any prescribed medicines to control hypertension as told by your health care provider.  ? Ask your health care provider if you should monitor your blood pressure at home.  ? Have your blood pressure checked every year, even if your blood pressure is normal. Blood pressure increases with age and some medical  conditions.  · Get evaluated for sleep disorders (sleep apnea). Talk to your health care provider about getting a sleep evaluation if you snore a lot or have excessive sleepiness.  · Take over-the-counter and prescription medicines only as told by your health care provider. Aspirin or blood thinners (antiplatelets or anticoagulants) may be recommended to reduce your risk of forming blood clots that can lead to stroke.  · Make sure that any other medical conditions you have, such as atrial fibrillation or atherosclerosis, are managed.  What are the warning signs of a stroke?  The warning signs of a stroke can be easily remembered as BEFAST.  · B is for balance. Signs include:  ? Dizziness.  ? Loss of balance or coordination.  ? Sudden trouble walking.  · E is for eyes. Signs include:  ? A sudden change in vision.  ? Trouble seeing.  · F is for face. Signs include:  ? Sudden weakness or numbness of the face.  ? The face or eyelid drooping to one side.  · A is for arms. Signs include:  ? Sudden weakness or numbness of the arm, usually on one side of the body.  · S is for speech. Signs include:  ? Trouble speaking (aphasia).  ? Trouble understanding.  · T is for time.  ? These symptoms may represent a serious problem that is an emergency. Do not wait to see if the symptoms will go away. Get medical help right away. Call your local emergency services (911 in the [place] Do not drive yourself to the hospital.  · Other signs of stroke may include:  ? A sudden, severe headache with no known cause.  ? Nausea or vomiting.  ? Seizure.  Where to find more information  For more information, visit:  · American Stroke Association: www.strokeassociation.org  · National Stroke Association: www.stroke.org  Summary  · You can prevent a stroke by eating healthy, exercising, not smoking, limiting alcohol intake, and managing any medical conditions you may have.  · Do not use any products that contain nicotine or tobacco, such as  cigarettes and e-cigarettes. If you need help quitting, ask your health care provider. It may also be helpful to avoid exposure to secondhand smoke.  · Remember BEFAST for warning signs of stroke. Get help right away if you or a loved one has any of these signs.  This information is not intended to replace advice given to you by your health care provider. Make sure you discuss any questions you have with your health care provider.  Document Revised: 11/30/2018 Document Reviewed: 01/23/2018  Elsevier Patient Education © 2020 [employer]

## 2021-03-09 NOTE — PROGRESS NOTES
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 small in size. Left ventricular wall thickness is consistent with severe concentric hypertrophy.  · No evidence of left ventricular thrombus or mass present.  · No evidence of a left atrial appendage thrombus was present  · No evidence of a patent foramen ovale. No evidence of an atrial septal defect present.  · No evidence of left ventricular noncompaction      CT Head Without Contrast    Result Date: 2/2/2021  1. Mild cerebral volume loss with chronic small vessel ischemia. Old lacunar infarct adjacent the left caudate nucleus. No acute intracranial abnormality identified. This report was finalized on 02/02/2021 09:46 by Dr. Mae Jarquin MD.    CT Angiogram Neck    Result Date: 2/2/2021  1. Exam interpreted with NASCET criteria 2. No extracranial carotid or vertebral artery stenosis. No aneurysm or dissection. 3. Moderate to advanced degenerative change of the cervical spine as described above. 4. Emphysema. This report was finalized on 02/02/2021 12:05 by Dr. Mae Jarquin MD.    MRI Brain Without Contrast    Result Date: 2/4/2021  1. Stable appearing subacute right pontine infarct with a new 7 mm focus of diffusion restriction suggesting acute ischemia of the right temporal lobe in the right periventricular region adjacent the posterior horn of the right lateral ventricle. No intracranial hemorrhage. 2. Stable atrophy with  chronic small vessel ischemia. Old lacunar infarcts left basal ganglia. This report was finalized on 2021 10:25 by Dr. Mae Jarquin MD.    MRI Brain Without Contrast    Result Date: 2021  1. Acute/subacute right sided pontine infarct. 2. Atrophy and moderate chronic ischemic changes. This report was finalized on 2021 11:34 by Dr. Tyson Cabrera MD.    XR Chest 1 View    Result Date: 2021  Impression:  Left base opacity, may reflect atelectasis versus infection.  This report was finalized on 2021 07:38 by Dr. Theodora Carlton MD.    US Renal Bilateral    Result Date: 2021  1. Normal sonographic appearance of the kidneys. 2. Slightly nodular appearance to the prostate creating mild mass effect on the floor the bladder. This report was finalized on 2021 15:04 by Dr. Mae Jarquin MD.    CT Angiogram Head    Result Date: 2021  1. No large vessel occlusion, aneurysm, or dissection. Atherosclerotic changes suspected within the right posterior cerebral artery. 2. No abnormal intracranial enhancement identified. This report was finalized on 2021 12:09 by Dr. Mae Jarquin MD.        MRI brain from 2021  personally reviewed by me showing acute stroke right david and right temporal lobe.      ASSESSMENT/PLAN    Diagnoses and all orders for this visit:    Acute ischemic right MCA stroke (CMS/HCC)    History of previous left MCA stroke    Mixed hyperlipidemia    Essential hypertension    History of noncompliance with medical treatment      MEDICAL DECISION MAKIN. Continue ASA 81 mg and Plavix 75 mg daily.  2. Continue Lipitor 40 mg for LDL goal less than 70.   3. BP management per PCP and cardiology for systolic goal less than 130.  4. Counseled on medication compliance.  5. Continue Flexeril PRN for muscle spasms and PCP has prescribed.   6. Continue outpatient PT/PT/ST  7. Patient not cleared to drive yet and to be discussed at next follow up.  8. F/u 3 months with A.  Luis CASTANO.  9. Current outpatient and discharge medications have been reconciled for the patient.  Reviewed by: OMAIRA Rodriguez  10. Patient's Body mass index is 26.28 kg/m². BMI is above normal parameters. Recommendations include: educational material.  11. Discussed patient setting up MyChart and will see if  can assist.          allergies and all known medications/prescriptions have been reviewed using resources available on this encounter.    Return in about 3 months (around 6/9/2021).        OMAIRA Bills

## 2021-03-10 ENCOUNTER — TREATMENT (OUTPATIENT)
Dept: PHYSICAL THERAPY | Facility: CLINIC | Age: 66
End: 2021-03-10

## 2021-03-10 DIAGNOSIS — R29.898 LEFT ARM WEAKNESS: ICD-10-CM

## 2021-03-10 DIAGNOSIS — Z78.9 IMPAIRED INSTRUMENTAL ACTIVITIES OF DAILY LIVING (IADL): ICD-10-CM

## 2021-03-10 DIAGNOSIS — R26.9 GAIT DISTURBANCE: ICD-10-CM

## 2021-03-10 DIAGNOSIS — I63.9 CEREBROVASCULAR ACCIDENT (CVA), UNSPECIFIED MECHANISM (HCC): Primary | ICD-10-CM

## 2021-03-10 DIAGNOSIS — I69.354 HEMIPARESIS AFFECTING LEFT SIDE AS LATE EFFECT OF CEREBROVASCULAR ACCIDENT (CVA) (HCC): ICD-10-CM

## 2021-03-10 PROCEDURE — 97530 THERAPEUTIC ACTIVITIES: CPT | Performed by: OCCUPATIONAL THERAPIST

## 2021-03-10 PROCEDURE — 97162 PT EVAL MOD COMPLEX 30 MIN: CPT | Performed by: PHYSICAL THERAPIST

## 2021-03-10 NOTE — PROGRESS NOTES
Occupational Therapy Treatment Note     Patient: Dao Jhaveri   : 1955  Referring practitioner: Rod De Los Santos MD  Date of Initial Visit:   Type: THERAPY  Noted: 3/2/2021  Today's Date: 3/10/2021  Patient seen for 3 sessions  Visit Diagnoses:    ICD-10-CM ICD-9-CM   1. Cerebrovascular accident (CVA), unspecified mechanism (CMS/HCC)  I63.9 434.91   2. Left arm weakness  R29.898 729.89   3. Impaired instrumental activities of daily living (IADL)  Z78.9 V49.89       SUBJECTIVE        Subjective    Pain:0/10. Left leg pain has improved today. Pt reports difficulty typing with L hand over the weekend.   Daily HEP completion reported.     OBJECTIVE     Objective     Therapeutic Activities    62317 Comments   L in-hand manipulation skills completed with pencil including shift, rotation and translation with min droppage. Towel scrunch with L hand with min difficulty. Finger <> palm translation and rotation of coins with L hand with Min droppage. Added to HEP.   Mod I for locks and latches board with L hand. Pegboard and marble task completed with L hand with mod droppage. Purdue pegboard FM task with L hand with min difficulty and extra time. Min droppage noted.                 Timed Minutes 46       Total Timed Treatment:     46   mins  Total Time of Visit:            46   mins       Goals                                          Progress Note due by 21   STG by: 21 Comments Status   Pt will be independent with daily completion of a HEP to address L UE deficits. Added in-hand manipulation skills.   Ongoing   Pt will complete all bathing and dressing with Mod I using AE as needed.     L hand FMC addressed.   Ongoing   Pt will display improved L UE FMC by completing the 9 hole peg test in 26 seconds or less.     Focused on object manipulation. Min to Mod droppage of items noted.   Ongoing           LTG by: 21       Pt will increase L hand  strength to 60# for improved performance with IADL tasks.       Ongoing   Pt will complete a handwriting task related to IADL with 100% legibility.    Ongoing   Pt will increase L UE strength to 4+/5 at all joints for improved IADL performance.    Ongoing                                 Therapy Education/Self Care [id number]   Details: Added L in-hand manipulation skills.   Given Home Exercise Program   Progress: Progressed   Who provided to: Patient   Level of understanding Verbalized, Demonstrated and Teach back level of understanding   Timed Minutes       ASSESSMENT/PLAN     Assessment/Plan     Assessment:   HEP completion reported. Progressed HEP focusing on L hand coordination.   L side pain is improving. Pt is already making progress toward his goals.     Plan:  Will complete the BITS next session for L UE coordination.       Merari Sandhu, OTR/L  Occupational Therapist

## 2021-03-11 ENCOUNTER — TREATMENT (OUTPATIENT)
Dept: PHYSICAL THERAPY | Facility: CLINIC | Age: 66
End: 2021-03-11

## 2021-03-11 DIAGNOSIS — I63.9 CEREBROVASCULAR ACCIDENT (CVA), UNSPECIFIED MECHANISM (HCC): Primary | ICD-10-CM

## 2021-03-11 DIAGNOSIS — R26.9 GAIT DISTURBANCE: ICD-10-CM

## 2021-03-11 DIAGNOSIS — R29.898 LEFT ARM WEAKNESS: ICD-10-CM

## 2021-03-11 DIAGNOSIS — Z78.9 IMPAIRED INSTRUMENTAL ACTIVITIES OF DAILY LIVING (IADL): ICD-10-CM

## 2021-03-11 DIAGNOSIS — I69.354 HEMIPARESIS AFFECTING LEFT SIDE AS LATE EFFECT OF CEREBROVASCULAR ACCIDENT (CVA) (HCC): ICD-10-CM

## 2021-03-11 PROCEDURE — 97530 THERAPEUTIC ACTIVITIES: CPT | Performed by: OCCUPATIONAL THERAPIST

## 2021-03-11 PROCEDURE — 97110 THERAPEUTIC EXERCISES: CPT | Performed by: PHYSICAL THERAPIST

## 2021-03-11 PROCEDURE — 97530 THERAPEUTIC ACTIVITIES: CPT | Performed by: PHYSICAL THERAPIST

## 2021-03-11 NOTE — PROGRESS NOTES
Occupational Therapy Treatment Note     Patient: Dao Jhaveri   : 1955  Referring practitioner: Rod De Los Santos MD  Date of Initial Visit:   Type: THERAPY  Noted: 3/2/2021  Today's Date: 3/11/2021  Patient seen for 4 sessions  Visit Diagnoses:    ICD-10-CM ICD-9-CM   1. Cerebrovascular accident (CVA), unspecified mechanism (CMS/HCC)  I63.9 434.91   2. Left arm weakness  R29.898 729.89   3. Impaired instrumental activities of daily living (IADL)  Z78.9 V49.89       SUBJECTIVE        Subjective  Pt wrote a letter to a friend this week and reports he could read it well.   2/10 L leg pain. No issues with HEP.       OBJECTIVE     Objective     Therapeutic Activities    25911 Comments   The BITS system was used in sitting with R UE compared to L. R UE had 82% accuracy, 1.50 second reaction time and 39 hits in 1 minute. L UE displayed 93% accuracy, 1.36 seconds and 44 hits in 1 minute.  L hand dominant.   Alternated use of B UE for remaining BITS items. Accuracy ranged from % with a reaction time of 0.4 to 3.90 seconds.     Tracing of shapes and lines completed on BITS with 72-90% accuracy.             Timed Minutes 42       Total Timed Treatment:     42   mins  Total Time of Visit:            42   mins       Goals                                          Progress Note due by 21   STG by: 21 Comments Status   Pt will be independent with daily completion of a HEP to address L UE deficits. Reports daily completion.   Ongoing   Pt will complete all bathing and dressing with Mod I using AE as needed.     UE coordination addressed.   Ongoing   Pt will display improved L UE FMC by completing the 9 hole peg test in 26 seconds or less.      Ongoing           LTG by: 21       Pt will increase L hand  strength to 60# for improved performance with IADL tasks.      Ongoing   Pt will complete a handwriting task related to IADL with 100% legibility.  72-90% accuracy with BITS tracing tasks.   Ongoing   Pt  will increase L UE strength to 4+/5 at all joints for improved IADL performance.    Ongoing                                 Therapy Education/Self Care [id number]   Details: Reviewed HEP with no concerns noted.    Given Home Exercise Program   Progress: Reinforced   Who provided to: Patient   Level of understanding Verbalized, Demonstrated and Teach back level of understanding   Timed Minutes       ASSESSMENT/PLAN     Assessment/Plan     Assessment:  L UE GMC is improving, along with handwriting skills. Good endurance noted with BITS use.  Standing tolerance and balance remains limited, but mobility has improved. Pt is progressing as expected.     Plan:  Will complete a progress note for insurance.       Fabiana Sandhu, OTR/L  Occupational Therapist

## 2021-03-11 NOTE — PROGRESS NOTES
"Physical Therapy Treatment Note    Patient: Dao Jhaveri                                                                                     Visit Date: 3/11/2021  :     1955    Referring practitioner:    Rod De Los Santos MD  Date of Initial Visit:          Type: THERAPY  Noted: 3/10/2021    Patient seen for 2 sessions    Visit Diagnoses:    ICD-10-CM ICD-9-CM   1. Cerebrovascular accident (CVA), unspecified mechanism (CMS/HCC)  I63.9 434.91   2. Hemiparesis affecting left side as late effect of cerebrovascular accident (CVA) (CMS/HCC)  I69.354 438.20   3. Gait disturbance  R26.9 781.2     SUBJECTIVE     Subjective He reports a little sore today, but feeling much better. HE is walking daily, walking 1/2 block X 2 with rollator.     PAIN: \"sore\" LLE         OBJECTIVE     Objective     Therapeutic Exercises    50447 Comments   HL clams with red TB  2 X 10   BKFO with red TB 2 X 20   HL marches with red TB  2 X 20           Timed Minutes 25      Therapeutic Activities    04034 Comments   Sit to stands with focus on equal WSing Tends to have decreased WSing onto LLE   Reviewed gait/POC/stairs                Timed Minutes 15     Therapy Education/Self Care [id number]   Details:    [email or web address] Code: [email or web address]   Given Home Exercise Program, postural retraining, fall prevention and mobility training   Progress: Reinforced and Progressed   Who provided to: Patient   Level of understanding Verbalized and Demonstrated   Timed Minutes      Total Timed Treatment:    40  mins  Total Time of Visit:             40   mins         ASSESSMENT/PLAN     GOALS          Goals                                          Progress Note due by 2021   STG by: 3/24/2021 Comments Status   1. Patient will be able to transfer sit to stand to sit without difficulty and without use of hands.    Ongoing   2. Patient will be able to walk 500' without rest on flat surface.    Ongoing   3. Patient will be able to perform SLS on each leg 5 sec safely.   "  Ongoing           LTG by: 4/7/2021       1. Patient will be able to walk on uneven surfaces for 10 minutes without needing rest.     Ongoing   2. Patient will be compliant with HEP on a daily basis.   progressed today  Ongoing   3. Patient will report increased energy at home with less time spent in chair or bed.     Ongoing   4. Good+ hip strength bilaterally    Ongoing             Assessment/Plan     ASSESSMENT: No goals met today, this is his first visit since initial evaluation. He continues to have decreased LLE strength and is demonstrated by his gait. He is using a RW to ambulate today.     PLAN: Continue to work on LLE strengthening and progressing towards balance and gait mechanics, decreasing AD as able.     Signature: Elizabeth Siu, PT DPT

## 2021-03-15 ENCOUNTER — TELEPHONE (OUTPATIENT)
Dept: CARDIOLOGY CLINIC | Age: 66
End: 2021-03-15

## 2021-03-17 ENCOUNTER — TREATMENT (OUTPATIENT)
Dept: PHYSICAL THERAPY | Facility: CLINIC | Age: 66
End: 2021-03-17

## 2021-03-17 DIAGNOSIS — R26.9 GAIT DISTURBANCE: ICD-10-CM

## 2021-03-17 DIAGNOSIS — Z78.9 IMPAIRED INSTRUMENTAL ACTIVITIES OF DAILY LIVING (IADL): ICD-10-CM

## 2021-03-17 DIAGNOSIS — I63.9 CEREBROVASCULAR ACCIDENT (CVA), UNSPECIFIED MECHANISM (HCC): Primary | ICD-10-CM

## 2021-03-17 DIAGNOSIS — R29.898 LEFT ARM WEAKNESS: ICD-10-CM

## 2021-03-17 DIAGNOSIS — I69.354 HEMIPARESIS AFFECTING LEFT SIDE AS LATE EFFECT OF CEREBROVASCULAR ACCIDENT (CVA) (HCC): ICD-10-CM

## 2021-03-17 PROCEDURE — 97140 MANUAL THERAPY 1/> REGIONS: CPT | Performed by: PHYSICAL THERAPIST

## 2021-03-17 PROCEDURE — 97530 THERAPEUTIC ACTIVITIES: CPT | Performed by: OCCUPATIONAL THERAPIST

## 2021-03-17 PROCEDURE — 97110 THERAPEUTIC EXERCISES: CPT | Performed by: PHYSICAL THERAPIST

## 2021-03-17 NOTE — PROGRESS NOTES
Occupational Therapy Progress Note for Insurance Authorization    Patient: Dao Jhaveri   : 1955  Referring practitioner: Rod De Los Santos MD  Date of Initial Visit: 3/17/2021  Today's Date: 3/17/2021  Patient seen for 5 sessions    Visit Diagnoses:    ICD-10-CM ICD-9-CM   1. Cerebrovascular accident (CVA), unspecified mechanism (CMS/HCC)  I63.9 434.91   2. Left arm weakness  R29.898 729.89   3. Impaired instrumental activities of daily living (IADL)  Z78.9 V49.89       SUBJECTIVE     Outcome Measure: QuickDASH: 25% impaired.   9 hole peg test: Left: 28.59     Right: 26.69    Subjective   Feeling weak today. 4/10 L LE pain. No L UE pain noted.    OBJECTIVE     Objective          Active Range of Motion   Left Shoulder   Flexion: 170 degrees   Extension: WFL  Abduction: 151 degrees   Adduction: WFL    Left Elbow   Flexion: WFL  Extension: WFL  Forearm supination: WFL  Forearm pronation: WFL    Left Wrist   Wrist flexion: WFL  Wrist extension: WFL  Radial deviation: WFL  Ulnar deviation: WFL      Strength/Myotome Testing     Left Shoulder     Planes of Motion   Flexion: 4   Extension: 5   Abduction: 4   Adduction: 5     Left Elbow   Flexion: 5  Extension: 5  Forearm supination: 5  Forearm pronation: 5    Left Wrist/Hand   Wrist extension: 5  Wrist flexion: 5  Radial deviation: 5  Ulnar deviation: 5     (2nd hand position)     Trial 1: 27 lbs    Trial 2: 30 lbs    Trial 3: 35 lbs    Average: 30.67 lbs    Thumb Strength  Key/Lateral Pinch     Trial 1: 9.5 lbs    Trial 2: 10 lbs    Trial 3: 9.5 lbs    Average: 9.67 lbs  Tip/Two-Point Pinch     Trial 1: 4.5 lbs    Trial 2: 6 lbs    Trial 3: 5 lbs    Average: 5.17 lbs  Palmar/Three-Point Pinch     Trial 1: 7 lbs    Trial 2: 8 lbs    Trial 3: 7.5 lbs    Average: 7.5 lbs    Right Wrist/Hand      (2nd hand position)     Trial 1: 40 lbs    Trial 2: 50 lbs    Trial 3: 40 lbs    Average: 43.33 lbs    Thumb Strength   Key/Lateral Pinch     Trial 1: 14 lbs    Trial  2: 12 lbs    Trial 3: 13 lbs    Average: 13 lbs  Tip/Two-Point Pinch     Trial 1: 6 lbs    Trial 2: 6 lbs    Trial 3: 6 lbs    Average: 6 lbs  Palmar/Three-Point Pinch     Trial 1: 11.5 lbs    Trial 2: 11 lbs    Trial 3: 11 lbs    Average: 11.17 lbs    Ambulation     Comments   Mod I for functional mobility with RW. L LE weightbearing due to pain is improving. Increased speed noted.      Functional Assessment     Comments  ADL  Dressing: Mod I  Bathing: Mod I with shower chair.  Toileting: Mod I  Grooming: Mod I  Feeding: Mod I    IADL: Only completing light standing IADL with limited standing tolerance. Spouse is performing all other IADL tasks.          General Comments     Shoulder Comments   R UE AROM is WNL. Strength 5/5.      Vision assessment: wears corrected lenses/contact. 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 Occasional blurriness with reading in L eye.     Cognitive status: oriented to Person, Place, Time and Situation    Sensation: no sensory deficits noted.    Tone: Grossly Normal    Midline orientation: Midline    Fine Motor:  - Rapid alternating movements of the hands, fingers, and forearms: Able to perform   - Sequential finger-to-thumb opposition: Able to perform   - Alternates fists and finger-to-nose tests: Able to perform    Cerebellar exam: finger to nose without dysmetria and rapid alternating movements in the upper extremities were normal    Therapy Education/Self Care 76769   Details: Issued tracing worksheet to improve handwriting skills.   Given Home Exercise Program   Progress: Progressed   Who provided to: Patient   Level of understanding Verbalized and Demonstrated   Timed Minutes      Therapeutic Activities    17536 Comments   Addressed all goals and outcome measures. See additional portions of note for details.     Tracing task completed with L hand with 75% coverage.                 Timed Minutes 45       Total Timed Treatment:     45   mins  Total Time of Visit:            45    mins    ASSESSMENT/PLAN     Assessment & Plan     Assessment  Impairments: abnormal coordination, abnormal gait, abnormal or restricted ROM, activity intolerance, impaired balance, impaired physical strength, lacks appropriate home exercise program, pain with function and weight-bearing intolerance  Assessment details: One goal met today and another goal was partially met. Gains made in L UE AROM, strength and coordination. L hand weakness is still present. [place]by with ADL's has improved. Pt is improving as expected. OT will continue twice per week focusing on remaining L UE deficits and IADL tasks to return to pts PLOF.  Prognosis: good  Functional Limitations: carrying objects, lifting, walking, pulling, pushing, uncomfortable because of pain, standing, reaching behind back, reaching overhead and unable to perform repetitive tasks  Plan  Planned modality interventions: TENS, ultrasound and low level laser therapy  Planned therapy interventions: manual therapy, motor coordination training, neuromuscular re-education, balance/weight-bearing training, ADL retraining, fine motor coordination training, functional ROM exercises, home exercise program, IADL retraining, therapeutic activities, transfer training, stretching and strengthening  Frequency: 2x week  Duration in weeks: 6  Treatment plan discussed with: patient and family  Plan details: Will focus on L UE gross and fine motor coordination, L hand and shoulder strength and safety with standing IADL tasks.      Goals                                          Progress Note due by 4/16/21   STG by: 4/16/21 Comments Status   Pt will be independent with daily completion of a HEP to address L UE deficits.   No issues reported with current HEP. Will continue goal for modifications as pt improves.  Progressing   Pt will complete all bathing and dressing with Mod I using AE as needed.    Goal met today.  Met   Pt will display improved L UE FMC by completing the 9  hole peg test in 26 seconds or less.   Improved to 28.59 seconds.  Progressing        LTG by: 4/16/21     Pt will increase L hand  strength to 60# for improved performance with IADL tasks.   31#. Continue goal.  Ongoing   Pt will complete a handwriting task related to IADL with 100% legibility.  Improved to 80-90% legibility.  Progressing   Pt will increase L UE strength to 4+/5 at all joints for improved IADL performance.  L shoulder is 4/5. Distal L UE improved to 5/5.  Partially Met                    OT SIGNATURE: Fabiana Sandhu, OTR/L

## 2021-03-17 NOTE — PROGRESS NOTES
Physical Therapy Treatment Note    Patient: Dao Jhaveri                                                                                     Visit Date: 3/17/2021  :     1955    Referring practitioner:    Rod De Los Santos MD  Date of Initial Visit:          Type: THERAPY  Noted: 3/10/2021    Patient seen for 3 sessions    Visit Diagnoses:    ICD-10-CM ICD-9-CM   1. Cerebrovascular accident (CVA), unspecified mechanism (CMS/HCC)  I63.9 434.91   2. Hemiparesis affecting left side as late effect of cerebrovascular accident (CVA) (CMS/HCC)  I69.354 438.20   3. Gait disturbance  R26.9 781.2     SUBJECTIVE     Subjective Pain in L LE. He has been doing his exercises at home and it's made his leg pretty sore.     PAIN: 10 > 3/10 (he feels really good with a lot of relief. He can tell those stretches really helped)     OBJECTIVE     Objective      Therapeutic Exercises    08300 Comments   Alternating BKFOs with RTB 2 x 10 ea   Alternating resisted marches on Lg / bolster 2 x 10 ea   B SL clamshells with RTB 2 x 10 ea   B SL hip ext with 45 cm physioballs 2 x 10 ea -- stabilization at hip and ankle on L   Prescribed to HEP and reviewed self stretches piriformis and ER (figure 4) in sitting  Knees to chest in supine  Straight leg hamstring stretch on chair in sitting       Timed Minutes 25     Manual Therapy     [id number]  Comments   B hip flexion passive stretch More restricted on R > L   B hip ER stretch with manual OP  Responded trinh well to this   B hamstring stretch with contract-relax More limited on R > L   B hip ext stretch        Timed Minutes 20     Therapy Education/Self Care [id number]   Details: Seated Piriformis Stretch - 2-3 x daily - 7 x weekly - 3 sets - 30 sec hold  Hooklying Single Knee to Chest Stretch - 2-3 x daily - 7 x weekly - 3 sets - 30 sec hold  Seated Hamstring Stretch with Chair - 2-3 x daily - 7 x weekly - 3 sets - 2-3 hold   [employer] Code: [id number]   Given Home Exercise Program and symptom  relief   Progress: New, Reinforced and Progressed   Who provided to: Patient   Level of understanding Verbalized and Demonstrated   Timed Minutes      Total Timed Treatment:     45   mins  Total Time of Visit:             45   mins         ASSESSMENT/PLAN     GOALS  Goals                                          Progress Note due by 4/9/2021   STG by: 3/24/2021 Comments Status   1. Patient will be able to transfer sit to stand to sit without difficulty and without use of hands.    Ongoing   2. Patient will be able to walk 500' without rest on flat surface.  amb with RW, demonstrating scissoring gait pattern and L toe drop  Ongoing   3. Patient will be able to perform SLS on each leg 5 sec safely.    Ongoing   LTG by: 4/7/2021       1. Patient will be able to walk on uneven surfaces for 10 minutes without needing rest.     Ongoing   2. Patient will be compliant with HEP on a daily basis.  Reviewed and reinforced today  Ongoing   3. Patient will report increased energy at home with less time spent in chair or bed.     Ongoing   4. Good+ hip strength bilaterally  hip strengthening today during therex  Ongoing     Assessment/Plan     ASSESSMENT: He reports today with c/o L LE pain, which he refers to as pretty sore. During passive stretches, pt repeatedly verbalized relief and demonstrated increased mobility following stretches. As a result, we added a few self stretches to his HEP. Additionally, he was able to progress HL marches to include a more dynamic surface (ie large half bolster).     PLAN: Continue to progress hip, core, and postural strengthening as able. Consider utilizing shuttle press and continue passive and self stretches to hips in all planes. Consider introducing modified Alex stretch for hip flexor flexibility and bolster HEP to reflect if appropriate. Also consider sit <> stands, adding resistance if appropriate.     Signature: Sofia Li, PTA

## 2021-03-19 ENCOUNTER — TREATMENT (OUTPATIENT)
Dept: PHYSICAL THERAPY | Facility: CLINIC | Age: 66
End: 2021-03-19

## 2021-03-19 DIAGNOSIS — I63.9 CEREBROVASCULAR ACCIDENT (CVA), UNSPECIFIED MECHANISM (HCC): Primary | ICD-10-CM

## 2021-03-19 DIAGNOSIS — R26.9 GAIT DISTURBANCE: ICD-10-CM

## 2021-03-19 DIAGNOSIS — R29.898 LEFT ARM WEAKNESS: ICD-10-CM

## 2021-03-19 DIAGNOSIS — Z78.9 IMPAIRED INSTRUMENTAL ACTIVITIES OF DAILY LIVING (IADL): ICD-10-CM

## 2021-03-19 DIAGNOSIS — I69.354 HEMIPARESIS AFFECTING LEFT SIDE AS LATE EFFECT OF CEREBROVASCULAR ACCIDENT (CVA) (HCC): ICD-10-CM

## 2021-03-19 PROCEDURE — 97112 NEUROMUSCULAR REEDUCATION: CPT | Performed by: PHYSICAL THERAPIST

## 2021-03-19 PROCEDURE — 97110 THERAPEUTIC EXERCISES: CPT | Performed by: PHYSICAL THERAPIST

## 2021-03-19 PROCEDURE — 97530 THERAPEUTIC ACTIVITIES: CPT | Performed by: OCCUPATIONAL THERAPIST

## 2021-03-19 PROCEDURE — 97110 THERAPEUTIC EXERCISES: CPT | Performed by: OCCUPATIONAL THERAPIST

## 2021-03-19 NOTE — PROGRESS NOTES
Occupational Therapy Treatment Note     Patient: Dao Jhaveri   : 1955  Referring practitioner: Rod De Los Santos MD  Date of Initial Visit:   Type: THERAPY  Noted: 3/2/2021  Today's Date: 3/19/2021  Patient seen for 6 sessions  Visit Diagnoses:    ICD-10-CM ICD-9-CM   1. Cerebrovascular accident (CVA), unspecified mechanism (CMS/HCC)  I63.9 434.91   2. Left arm weakness  R29.898 729.89   3. Impaired instrumental activities of daily living (IADL)  Z78.9 V49.89       SUBJECTIVE        Subjective  Pain 0/10. Reports he is improving and feeling better. Continuing to improve with IADLs.      OBJECTIVE     Objective      Therapeutic Exercises    88043 Comments   Red flex bar mod resistance 20 reps all planes of L wrist. 2# dowel bar all planes of shoulder 20 reps.                     Timed Minutes 25     Therapeutic Activities    76712 Comments   FMC task with clips increasing in resistance. Min droppage noted.                    Timed Minutes 15       Total Timed Treatment:     40   mins  Total Time of Visit:            40   mins       Goals                                          Progress Note due by 21   STG by: 21 Comments Status   Pt will be independent with daily completion of a HEP to address L UE deficits. Still completing HEP with no difficulties. Educated on use of cane for shoulder exercises at home.  Ongoing   Pt will complete all bathing and dressing with Mod I using AE as needed.       Ongoing   Pt will display improved L UE FMC by completing the 9 hole peg test in 26 seconds or less.    Addressed FMC activity with clips increasing in resistance.  Ongoing           LTG by: 21       Pt will increase L hand  strength to 60# for improved performance with IADL tasks.    Completed flex bar and resistance clips exercise to target L hand  strength.  Ongoing   Pt will complete a handwriting task related to IADL with 100% legibility.    Ongoing   Pt will increase L UE strength to 4+/5 at  all joints for improved IADL performance.  Addressed with dowel bar, flex bar, and resistance clips.  Ongoing                                 Therapy Education/Self Care 78607   Details: Added using cane for shoulder exercises to HEP.   Given Home Exercise Program   Progress: Reinforced   Who provided to: Patient   Level of understanding Verbalized, Demonstrated and Teach back level of understanding   Timed Minutes       ASSESSMENT/PLAN     Assessment/Plan     Assessment:  Strength continues to improve in LUE. UE exercises with cane added to HEP. Pt continues to improve with IADLs at home. Decreased  strength in L hand is still limiting functional activities. Continues to have coordination difficulties in L hand.    Plan:  BITS will be used to target coordination in LUE. Continue to address L hand  strength.      JORGE Ware Student     The clinical instructor and/or supervising staff, EVE Rocha/SANDY, was present in clinic guiding the student by approving, concurring, and confirming the skilled judgement for all services rendered.        Signature:  EVE Rocha/EVE Barnett/L  Occupational Therapist

## 2021-03-19 NOTE — PROGRESS NOTES
Physical Therapy Treatment Note    Patient: Dao Jhaveri                                                                                     Visit Date: 3/19/2021  :     1955    Referring practitioner:    Rod De Los Santos MD  Date of Initial Visit:          Type: THERAPY  Noted: 3/10/2021    Patient seen for 4 sessions    Visit Diagnoses:    ICD-10-CM ICD-9-CM   1. Cerebrovascular accident (CVA), unspecified mechanism (CMS/[facility])  I63.9 434.91   2. Hemiparesis affecting left side as late effect of cerebrovascular accident (CVA) (CMS/HCC)  I69.354 438.20   3. Gait disturbance  R26.9 781.2     SUBJECTIVE     Subjective He reports no pain, no falls since last session. Denies no new medical issues.    PAIN: 0/10         OBJECTIVE     Objective      Neuromuscular Reeducation     92646 Comments   Limits of Stability and Rythmic WS'ing analysis on Neurocom                    Timed Minutes 25     Gait Training 16261   Activity Walking with SC then RWX with video analysis with Rwx    Gait Deviation Scissoring and reduced L WS and SP   Cueing Verbal, video   Assistance CGA/Zee x1 with SC    Asst Device Rwx and SC   Distance 40 ft x 4   Timed Minutes 10     Therapeutic Exercises    64116 Comments   B LE Shuttle Press with 5 bands with eccentric focus x 5 min    L LE Shuttle Press with 3 bands with eccentric focus x 5 min.                Timed Minutes 10       Therapy Education/Self Care 38558   Details:    Medbridge Code:    Given postural retraining, fall prevention and mobility training   Progress: New   Who provided to: Patient   Level of understanding Verbalized and Demonstrated   Timed Minutes      Total Timed Treatment:     45   mins  Total Time of Visit:             45   mins         ASSESSMENT/PLAN     GOALS  Goals                                          Progress Note due by 2021   STG by: 3/24/2021 Comments Status   1. Patient will be able to transfer sit to stand to sit without difficulty and without use  of hands.    Ongoing   2. Patient will be able to walk 500' without rest on flat surface.  40 ft x 2 with SC with CGA/Zee x 1  Ongoing   3. Patient will be able to perform SLS on each leg 5 sec safely.    Ongoing   LTG by: 4/7/2021       1. Patient will be able to walk on uneven surfaces for 10 minutes without needing rest.     Ongoing   2. Patient will be compliant with HEP on a daily basis.    Ongoing   3. Patient will report increased energy at home with less time spent in chair or bed.     Ongoing   4. Good+ hip strength bilaterally    Ongoing         Assessment/Plan     ASSESSMENT: His Neurocom results were very good but he does have some deficits such as L WS'ing and he tends to scissor with gait.    PLAN: Utilize the cones and wobble board to improve his WS'ing and increase his VINNY.     Signature: Larry Rodríguez, PTA

## 2021-03-23 ENCOUNTER — TREATMENT (OUTPATIENT)
Dept: PHYSICAL THERAPY | Facility: CLINIC | Age: 66
End: 2021-03-23

## 2021-03-23 DIAGNOSIS — I63.9 CEREBROVASCULAR ACCIDENT (CVA), UNSPECIFIED MECHANISM (HCC): ICD-10-CM

## 2021-03-23 DIAGNOSIS — Z78.9 IMPAIRED INSTRUMENTAL ACTIVITIES OF DAILY LIVING (IADL): ICD-10-CM

## 2021-03-23 DIAGNOSIS — R29.898 LEFT ARM WEAKNESS: ICD-10-CM

## 2021-03-23 DIAGNOSIS — R29.898 LEFT ARM WEAKNESS: Primary | ICD-10-CM

## 2021-03-23 DIAGNOSIS — I63.9 CEREBROVASCULAR ACCIDENT (CVA), UNSPECIFIED MECHANISM (HCC): Primary | ICD-10-CM

## 2021-03-23 PROCEDURE — 97530 THERAPEUTIC ACTIVITIES: CPT | Performed by: OCCUPATIONAL THERAPIST

## 2021-03-23 PROCEDURE — 97110 THERAPEUTIC EXERCISES: CPT | Performed by: PHYSICAL THERAPIST

## 2021-03-23 PROCEDURE — 97110 THERAPEUTIC EXERCISES: CPT | Performed by: OCCUPATIONAL THERAPIST

## 2021-03-23 NOTE — PROGRESS NOTES
Occupational Therapy Treatment Note     Patient: Dao Jhaveri   : 1955  Referring practitioner: Rod De Los Santos MD  Date of Initial Visit:   Type: THERAPY  Noted: 3/2/2021  Today's Date: 3/23/2021  Patient seen for 7 sessions  Visit Diagnoses:    ICD-10-CM ICD-9-CM   1. Left arm weakness  R29.898 729.89   2. Impaired instrumental activities of daily living (IADL)  Z78.9 V49.89   3. Cerebrovascular accident (CVA), unspecified mechanism (CMS/HCC)  I63.9 434.91       SUBJECTIVE        Subjective  Pain in L leg 6/10. Reports he is still using putty at home.      OBJECTIVE     Objective      Therapeutic Exercises    12181 Comments   Digit-flex 1.5# 20 reps L hand. Flex ring mod resistance 20 reps L hand.                     Timed Minutes 25     Therapeutic Activities    33085 Comments   BITS line tracing activity % accuracy. GMC BITS sequencing activity 75 % accuracy. Reaction time activity with 96% accuracy. Sequencing activity with rotation 93% accuracy. All BITS activities completed with LUE.                    Timed Minutes 15       Total Timed Treatment:     40   mins  Total Time of Visit:            40   mins       Goals                                          Progress Note due by 21   STG by: 21 Comments Status   Pt will be independent with daily completion of a HEP to address L UE deficits.    No issues reported with current HEP.  Progressing   Pt will complete all bathing and dressing with Mod I using AE as needed.      Met   Pt will display improved L UE FMC by completing the 9 hole peg test in 26 seconds or less.    Addressed with LUE exercises and BITS activities. Progressing           LTG by: 21       Pt will increase L hand  strength to 60# for improved performance with IADL tasks.    Addressed L hand  with digi flex and flex ring. Ongoing   Pt will complete a handwriting task related to IADL with 100% legibility.    Progressing   Pt will increase L UE strength to 4+/5  at all joints for improved IADL performance.  Addressed with L hand exercises and GMC and FMC activities with BITS. Partially Met                                    Therapy Education/Self Care 78581   Details: Encouraged to continue HEP.   Given Home Exercise Program   Progress: Reinforced   Who provided to: Patient   Level of understanding Verbalized, Demonstrated and Teach back level of understanding   Timed Minutes       ASSESSMENT/PLAN     Assessment/Plan     Assessment:  Pt experienced pain in L hand when trying to complete 7# digi flex but when lowered to 1.5# pt had no issues.  Decreased GMC in LUE noted during BITS activities. No increased pain reported after session.     Plan:  Address L hand FMC and LUE strength.       JORGE Ware Student     The clinical instructor and/or supervising staff, EVE Rocha/SANDY, was present in clinic guiding the student by approving, concurring, and confirming the skilled judgement for all services rendered.        Signature:  EVE Rocha/L        EVE Rocha/L  Occupational Therapist

## 2021-03-23 NOTE — PROGRESS NOTES
Physical Therapy Treatment Note    Patient: Dao Jhaveri                                                                                     Visit Date: 3/23/2021  :     1955    Referring practitioner:    Rod De Los Santos MD  Date of Initial Visit:          Type: THERAPY  Noted: 3/10/2021    Patient seen for 5 sessions    Visit Diagnoses:    ICD-10-CM ICD-9-CM   1. Cerebrovascular accident (CVA), unspecified mechanism (CMS/HCC)  I63.9 434.91   2. Left arm weakness  R29.898 729.89   3. Impaired instrumental activities of daily living (IADL)  Z78.9 V49.89     SUBJECTIVE     Subjective He is throbbing down his left leg, he rates this pain 7/10.  He woke up with this pain.  He has not had any falls since his last treatment.  Patient states he was 15 min late due to talking to his insurance company on the phone.     PAIN: 7/10 L leg, throbbing         OBJECTIVE     Objective          Therapeutic Exercises    79130 Comments   Reviewed HEP    Sit to stand with table at lowest level for height 2 x 10   At the parallel bars, stood on the wobble board, worked on anterior/posterior weight shift    Side step ups on the L on a 2 in step Concentrating on eccentric control of the L quads   Side step ups on the L on a 4 in step Concentrating on eccentric control of the L quads   Used RWX to walk to the treatment room, then the gym, then to OT    Timed Minutes 30       Therapy Education/Self Care 31560   Details: Continue with [facility]   Medbridge Code:    Given postural retraining, fall prevention and mobility training   Progress: New   Who provided to: Patient   Level of understanding Verbalized and Demonstrated   Timed Minutes      Total Timed Treatment:     30   mins  Total Time of Visit:             30   mins         ASSESSMENT/PLAN     GOALS  Goals                                          Progress Note due by 2021   STG by: 3/24/2021 Comments Status   1. Patient will be able to transfer sit to stand to sit without  difficulty and without use of hands.  Worked on sit to stand with no UE support  Ongoing, progressing, partially met   2. Patient will be able to walk 500' without rest on flat surface.    Ongoing   3. Patient will be able to perform SLS on each leg 5 sec safely.    Ongoing   LTG by: 4/7/2021       1. Patient will be able to walk on uneven surfaces for 10 minutes without needing rest.     Ongoing   2. Patient will be compliant with HEP on a daily basis.  He is compliant with his HEP  Ongoing, progressing   3. Patient will report increased energy at home with less time spent in chair or bed.   He is moving around a lot more at home.   Ongoing, progressing   4. Good+ hip strength bilaterally    Ongoing         Assessment/Plan     ASSESSMENT:    Patient was 15 minutes late today so we were unable to complete his entire treatment.  Patient did work very hard on the step ups and keeping the L knee from hyper extending and working on the eccentric contraction of the quads.  Patient was able to go from sit to stand with no UE support and with an even amount of weight through both legs. Patient is spending less time in the bed or chair at home as he is walking more in his home.     PLAN: Continue to work on improving weight shifting with gait as well as L quad eccentric control.  May look at progressing HEP next treatment.   Signature: Mary Lambert, PTA, VAN-MASOUD

## 2021-03-24 ENCOUNTER — IMMUNIZATION (OUTPATIENT)
Dept: VACCINE CLINIC | Facility: HOSPITAL | Age: 66
End: 2021-03-24

## 2021-03-24 PROCEDURE — 0011A: CPT | Performed by: OBSTETRICS & GYNECOLOGY

## 2021-03-24 PROCEDURE — 91301 HC SARSCO02 VAC 100MCG/0.5ML IM: CPT | Performed by: OBSTETRICS & GYNECOLOGY

## 2021-03-31 ENCOUNTER — TREATMENT (OUTPATIENT)
Dept: PHYSICAL THERAPY | Facility: CLINIC | Age: 66
End: 2021-03-31

## 2021-03-31 DIAGNOSIS — I69.354 HEMIPARESIS AFFECTING LEFT SIDE AS LATE EFFECT OF CEREBROVASCULAR ACCIDENT (CVA) (HCC): ICD-10-CM

## 2021-03-31 DIAGNOSIS — I63.9 CEREBROVASCULAR ACCIDENT (CVA), UNSPECIFIED MECHANISM (HCC): ICD-10-CM

## 2021-03-31 DIAGNOSIS — Z78.9 IMPAIRED INSTRUMENTAL ACTIVITIES OF DAILY LIVING (IADL): ICD-10-CM

## 2021-03-31 DIAGNOSIS — I63.9 CEREBROVASCULAR ACCIDENT (CVA), UNSPECIFIED MECHANISM (HCC): Primary | ICD-10-CM

## 2021-03-31 DIAGNOSIS — R26.9 GAIT DISTURBANCE: Primary | ICD-10-CM

## 2021-03-31 DIAGNOSIS — R29.898 LEFT ARM WEAKNESS: ICD-10-CM

## 2021-03-31 PROCEDURE — 97116 GAIT TRAINING THERAPY: CPT | Performed by: PHYSICAL THERAPIST

## 2021-03-31 PROCEDURE — 97110 THERAPEUTIC EXERCISES: CPT | Performed by: PHYSICAL THERAPIST

## 2021-03-31 PROCEDURE — 97110 THERAPEUTIC EXERCISES: CPT | Performed by: OCCUPATIONAL THERAPIST

## 2021-03-31 PROCEDURE — 97530 THERAPEUTIC ACTIVITIES: CPT | Performed by: OCCUPATIONAL THERAPIST

## 2021-03-31 NOTE — PROGRESS NOTES
"Physical Therapy Treatment Note    Patient: Dao Jhaveri                                                                                     Visit Date: 3/31/2021  :     1955    Referring practitioner:    Rod De Lo sSantos MD  Date of Initial Visit:          Type: THERAPY  Noted: 3/10/2021    Patient seen for 6 sessions    Visit Diagnoses:    ICD-10-CM ICD-9-CM   1. Gait disturbance  R26.9 781.2   2. Hemiparesis affecting left side as late effect of cerebrovascular accident (CVA) (CMS/[facility])  I69.354 438.20   3. Cerebrovascular accident (CVA), unspecified mechanism (CMS/[facility])  I63.9 434.91     SUBJECTIVE     Subjective He's pretty good. He is doing good with his exercises and is doing them pretty much every day. He is having pain in his L hand when he squeezes it. The day before yesterday he \"had it going on\". Yesterday felt different, he did them in the car on his way to [place].     PAIN: 0/10 > 0/10     OBJECTIVE     Objective      Therapeutic Exercises    79280 Comments   Shuttle press (5 bands) 5 min   Reaching beyond VINNY with 2 lb med ball and B feet unsupported while maintaining midline 2 x 10 -- reaching primarily with L UE   Resisted sit <> stand without B UE support using RIP  2 x 10 -- pt able to perform very well and did not need use of B UE, VCs to increase hip ext to which he responded very well and demonstrated a more erect posture following       Timed Minutes 20     Gait Training 02836   Activity Ambulating with SBQC (transitioning from step-to-step to step-over-step)   Gait Deviation Decreased heel width at times   Cueing verbal   Assistance CGA   Asst Device SBQC, RW   Distance 100' (cane), 30' (RW)   Timed Minutes 10     Therapy Education/Self Care 32707   Details: educ on gait with SBQC and to use step-over-step gait pattern with both SBQC and RW   [facility] Code: [id number]   Given Home Exercise Program and mobility training   Progress: New and Reinforced   Who provided to: Patient   Level " of understanding Verbalized and Demonstrated   Timed Minutes      Total Timed Treatment:     30   mins  Total Time of Visit:             30   mins         ASSESSMENT/PLAN     GOALS  Goals                                          Progress Note due by 4/9/2021   STG by: 3/24/2021 Comments Status   1. Patient will be able to transfer sit to stand to sit without difficulty and without use of hands.  pt able to perform resisted sit <> stand without use of B UE and did not need B UE support consistently.  MET   2. Patient will be able to walk 500' without rest on flat surface. Pt able to ambulate 100' with SBQC without rest  Ongoing   3. Patient will be able to perform SLS on each leg 5 sec safely.   Ongoing   LTG by: 4/7/2021      1. Patient will be able to walk on uneven surfaces for 10 minutes without needing rest.    Ongoing   2. Patient will be compliant with HEP on a daily basis.    Ongoing   3. Patient will report increased energy at home with less time spent in chair or bed.    Ongoing   4. Good+ hip strength bilaterally   Ongoing     Assessment/Plan     ASSESSMENT: Pt was fifteen minutes late to his appointment today, therefore truncating his full treatment time. Today, he met his goal for sit <> stand t/f without use of B UE for support. In fact, he was able to perform sit <> stand without B UE support consistently throughout his entire treatment, with or without resistance. Moreover, while he reported instability with a SC last session, he demonstrated stability with step-over-step reciprocating gait when using SBQC today, ambulating 100 feet without a rest break. He was also educated to perform step-over-step with his RW within his home and community. He would likely benefit from a SBQC to increase his independence with his ADLs within his home and within the community.    PLAN: Continue to progress hip, core, and postural strengthening as able. Consider utilizing neurocom for assessment, especially with SLS  (EO, EC). Reinforce gait with SBQC and work to increase his endurance with this as well.      Signature: Sofia Li, PTA

## 2021-03-31 NOTE — PROGRESS NOTES
Occupational Therapy Treatment Note     Patient: Dao Jhaveri   : 1955  Referring practitioner: Rod De Los Santos MD  Date of Initial Visit:   Type: THERAPY  Noted: 3/2/2021  Today's Date: 3/31/2021  Patient seen for 8 sessions  Visit Diagnoses:    ICD-10-CM ICD-9-CM   1. Left arm weakness  R29.898 729.89   2. Impaired instrumental activities of daily living (IADL)  Z78.9 V49.89   3. Cerebrovascular accident (CVA), unspecified mechanism (CMS/HCC)  I63.9 434.91       SUBJECTIVE        Subjective  Pain 3/10 in L hand. PTA reports that he was having pain when grasping objects with L hand.       OBJECTIVE     Objective      Therapeutic Exercises    16506 Comments   Finger web exercises completed in all planes of wrist and finger extension and flexion 1 set x 15 reps.                     Timed Minutes 15     Therapeutic Activities    02887 Comments   In hand manipulation activity with coins using L hand. Translation, shift, and picking up, Min droppage noted. FMC activity using tweezers to  tooth picks with L hand, min droppage noted. Purdue pegboard completed with L hand, min-mod droppage noted.                     Timed Minutes 25       Total Timed Treatment:     40   mins  Total Time of Visit:            40   mins       Goals                                          Progress Note due by 21   STG by: 21 Comments Status   Pt will be independent with daily completion of a HEP to address L UE deficits.    No concerns with current HEP. Progressing   Pt will complete all bathing and dressing with Mod I using AE as needed.      Met   Pt will display improved L UE FMC by completing the 9 hole peg test in 26 seconds or less.    Addressed with FMC and in hand manipulation activities. Progressing           LTG by: 21       Pt will increase L hand  strength to 60# for improved performance with IADL tasks.    Addressed with finger web exercises. Ongoing   Pt will complete a handwriting task related  to IADL with 100% legibility.   Progressing   Pt will increase L UE strength to 4+/5 at all joints for improved IADL performance.  Addressed with finger web exercises. Partially Met                                    Therapy Education/Self Care 52511   Details: Reviewed HEP.   Given Home Exercise Program   Progress: Reinforced   Who provided to: Patient   Level of understanding Verbalized, Demonstrated and Teach back level of understanding   Timed Minutes       ASSESSMENT/PLAN     Assessment/Plan     Assessment:  Pt reports he is feeling better everyday. Still displays FMC deficits in L hand and continues to drop objects during tasks.   He felt relief in L hand after completing finger web exercises. Pt was encouraged to participate in more household tasks.     Plan:  Will address UE strength and hand writing.       JORGE Ware Student     The clinical instructor and/or supervising staff, EVE Rocha/SANDY, was present in clinic guiding the student by approving, concurring, and confirming the skilled judgement for all services rendered.        Signature:  EVE Rocha/EVE Barnett/L  Occupational Therapist

## 2021-04-01 ENCOUNTER — OFFICE VISIT (OUTPATIENT)
Dept: CARDIOLOGY | Facility: CLINIC | Age: 66
End: 2021-04-01

## 2021-04-01 VITALS
DIASTOLIC BLOOD PRESSURE: 88 MMHG | WEIGHT: 168 LBS | HEIGHT: 66 IN | SYSTOLIC BLOOD PRESSURE: 142 MMHG | BODY MASS INDEX: 27 KG/M2 | OXYGEN SATURATION: 98 % | HEART RATE: 83 BPM

## 2021-04-01 DIAGNOSIS — E78.2 MIXED HYPERLIPIDEMIA: ICD-10-CM

## 2021-04-01 DIAGNOSIS — Z86.73 HISTORY OF STROKE: ICD-10-CM

## 2021-04-01 DIAGNOSIS — I10 ESSENTIAL HYPERTENSION: Primary | ICD-10-CM

## 2021-04-01 DIAGNOSIS — G81.94 LEFT HEMIPARESIS (HCC): ICD-10-CM

## 2021-04-01 PROCEDURE — 99214 OFFICE O/P EST MOD 30 MIN: CPT | Performed by: EMERGENCY MEDICINE

## 2021-04-01 NOTE — PROGRESS NOTES
Subjective:     Encounter Date:04/01/2021      Patient ID: Dao Jhaveri is a 65 y.o. male.    Chief Complaint:  History of Present Illness    Patient is a 1 month follow-up today.  He has a history of recent stroke, difficult to control hypertension, hyperlipidemia.  At the last appointment, I started him on high intensity statin with Lipitor 40.  He has been doing well with this medication and is not having any side effects.  We also discussed getting him set up for a PCSK9 inhibitor.  His current blood pressure regimen at that appointment include Norvasc 10, lisinopril 20, Toprol-XL 50 and Imdur 30.  I change the Imdur 32 BiDil 20/37.5.  We also continued his dual antiplatelet therapy with aspirin and Brilinta.  It looks like patient is on Plavix now instead of the Brilinta.    Patient states that in the past month he has been doing very well.  He denies any chest pain, shortness of breath, dizzy spells, passing out spells or further strokelike symptoms.  He states his blood pressures have been running very well at home.  No new complaints today.    Review of Systems   Constitutional: Negative for diaphoresis, fever and malaise/fatigue.   HENT: Negative for congestion.    Eyes: Negative for vision loss in left eye and vision loss in right eye.   Cardiovascular: Negative for chest pain, claudication, dyspnea on exertion, irregular heartbeat, leg swelling, orthopnea, palpitations and syncope.   Respiratory: Negative for cough, shortness of breath and wheezing.    Hematologic/Lymphatic: Negative for adenopathy.   Skin: Negative for rash.   Musculoskeletal: Negative for joint pain and joint swelling.   Gastrointestinal: Negative for abdominal pain, diarrhea, nausea and vomiting.   Neurological: Negative for excessive daytime sleepiness, dizziness, focal weakness, light-headedness, numbness and weakness.   Psychiatric/Behavioral: Negative for depression. The patient does not have insomnia.            Current  Outpatient Medications:   •  amLODIPine (NORVASC) 10 MG tablet, Take 1 tablet by mouth every night at bedtime., Disp: 90 tablet, Rfl: 1  •  aspirin 81 MG EC tablet, Take 81 mg by mouth Daily., Disp: , Rfl:   •  atorvastatin (LIPITOR) 40 MG tablet, Take 1 tablet by mouth Daily., Disp: 90 tablet, Rfl: 3  •  clopidogrel (PLAVIX) 75 MG tablet, Take 1 tablet by mouth Daily., Disp: 90 tablet, Rfl: 1  •  cyclobenzaprine (FLEXERIL) 10 MG tablet, Take 1 tablet by mouth 3 (Three) Times a Day As Needed for Muscle Spasms., Disp: 90 tablet, Rfl: 2  •  isosorbide-hydrALAZINE (BiDil) 20-37.5 MG per tablet, Take 1 tablet by mouth 3 (Three) Times a Day., Disp: 90 tablet, Rfl: 3  •  latanoprost (XALATAN) 0.005 % ophthalmic solution, 1 drop Every Night., Disp: , Rfl:   •  lisinopril (PRINIVIL,ZESTRIL) 20 MG tablet, Take 20 mg by mouth Daily., Disp: , Rfl:   •  metoprolol succinate XL (TOPROL-XL) 50 MG 24 hr tablet, Take 1 tablet by mouth Daily. (Patient taking differently: Take 100 mg by mouth Daily.), Disp: 90 tablet, Rfl: 1  •  nitroglycerin (NITROSTAT) 0.4 MG SL tablet, Place 0.4 mg under the tongue Every 5 (Five) Minutes As Needed for Chest Pain. Take no more than 3 doses in 15 minutes., Disp: , Rfl:   •  sodium bicarbonate 325 MG tablet, Take 325 mg by mouth 3 (Three) Times a Day., Disp: , Rfl:   •  vitamin B-12 (CYANOCOBALAMIN) 500 MCG tablet, Take 1 tablet by mouth Daily., Disp: 30 tablet, Rfl: 5  •  Vitamin D, Cholecalciferol, (CHOLECALCIFEROL) 400 units tablet, Take 400 Units by mouth Every 30 (Thirty) Days., Disp: , Rfl:     Current Facility-Administered Medications:   •  Evolocumab (REPATHA) injection 140 mg, 140 mg, Subcutaneous, Q14 Days, Kodi Mcghee DO       Objective:      Vitals:    04/01/21 0957   BP: 142/88   Pulse: 83   SpO2: 98%     Vitals and nursing note reviewed.   Constitutional:       Appearance: Normal and healthy appearance. Well-developed and not in distress.   Eyes:      Extraocular  Movements: Extraocular movements intact.      Pupils: Pupils are equal, round, and reactive to light.   HENT:      Head: Normocephalic and atraumatic.    Mouth/Throat:      Pharynx: Oropharynx is clear.   Neck:      Vascular: JVD normal.      Trachea: Trachea normal.   Pulmonary:      Effort: Pulmonary effort is normal.      Breath sounds: Normal breath sounds. No wheezing. No rhonchi. No rales.   Cardiovascular:      PMI at left midclavicular line. Normal rate. Regular rhythm. Normal S1. Normal S2.      Murmurs: There is no murmur.      No gallop. No click. No rub.   Pulses:     Dorsalis pedis: 2+ bilaterally.     Posterior tibial: 2+ bilaterally.  Abdominal:      General: Bowel sounds are normal.      Palpations: Abdomen is soft.      Tenderness: There is no abdominal tenderness.   Musculoskeletal: Normal range of motion.      Cervical back: Normal range of motion and neck supple. Skin:     General: Skin is warm and dry.      Capillary Refill: Capillary refill takes less than 2 seconds.   Feet:      Right foot:      Skin integrity: Skin integrity normal.      Left foot:      Skin integrity: Skin integrity normal.   Neurological:      Mental Status: Alert and oriented to person, place and time.      Cranial Nerves: Cranial nerves are intact.      Sensory: Sensation is intact.      Motor: Motor function is intact.      Coordination: Coordination is intact.   Psychiatric:         Speech: Speech normal.         Behavior: Behavior is cooperative.         Lab Review:             Assessment/Plan:     Problem List Items Addressed This Visit        Cardiac and Vasculature    Essential hypertension - Primary    Mixed hyperlipidemia       Neuro    History of previous left MCA stroke    Left hemiparesis (CMS/HCC)            Recommendations/plans:        As we suspected at last appointment, patient's symptoms were secondary to his difficult to control hypertension.  With the change in regimen to include the BiDil therapy,  patient's blood pressures are much better controlled and all of his symptoms have resolved.  He denies any further episodes of chest pain, shortness of breath, dizzy spells, passing out spells or strokelike symptoms.    He is doing well with high intensity statin, Lipitor 40.    Recommend that patient continue on his current regimen which includes aspirin 81, Plavix 75, Norvasc 10, Lipitor 40, BiDil 20/37.5, lisinopril 20, Toprol-XL 50.  Looks like our clinic is also getting him set up for Repatha injections in the near future.    Follow-up in the clinic in 6 months.              Kodi Mcghee, DO   Interventional cardiology  [facility]  04/01/2021  10:47 CDT

## 2021-04-06 ENCOUNTER — TREATMENT (OUTPATIENT)
Dept: PHYSICAL THERAPY | Facility: CLINIC | Age: 66
End: 2021-04-06

## 2021-04-06 DIAGNOSIS — R26.9 GAIT DISTURBANCE: ICD-10-CM

## 2021-04-06 DIAGNOSIS — I63.9 CEREBROVASCULAR ACCIDENT (CVA), UNSPECIFIED MECHANISM (HCC): Primary | ICD-10-CM

## 2021-04-06 DIAGNOSIS — R29.898 LEFT ARM WEAKNESS: ICD-10-CM

## 2021-04-06 DIAGNOSIS — I63.9 CEREBROVASCULAR ACCIDENT (CVA), UNSPECIFIED MECHANISM (HCC): ICD-10-CM

## 2021-04-06 DIAGNOSIS — I69.354 HEMIPARESIS AFFECTING LEFT SIDE AS LATE EFFECT OF CEREBROVASCULAR ACCIDENT (CVA) (HCC): Primary | ICD-10-CM

## 2021-04-06 DIAGNOSIS — Z78.9 IMPAIRED INSTRUMENTAL ACTIVITIES OF DAILY LIVING (IADL): ICD-10-CM

## 2021-04-06 PROCEDURE — 97110 THERAPEUTIC EXERCISES: CPT | Performed by: OCCUPATIONAL THERAPIST

## 2021-04-06 PROCEDURE — 97530 THERAPEUTIC ACTIVITIES: CPT | Performed by: OCCUPATIONAL THERAPIST

## 2021-04-06 PROCEDURE — 97116 GAIT TRAINING THERAPY: CPT | Performed by: PHYSICAL THERAPIST

## 2021-04-06 PROCEDURE — 97110 THERAPEUTIC EXERCISES: CPT | Performed by: PHYSICAL THERAPIST

## 2021-04-06 NOTE — PROGRESS NOTES
Occupational Therapy Treatment Note     Patient: Dao Jhaveri   : 1955  Referring practitioner: Rod De Los Santos MD  Date of Initial Visit:   Type: THERAPY  Noted: 3/2/2021  Today's Date: 2021  Patient seen for 9 sessions  Visit Diagnoses:    ICD-10-CM ICD-9-CM   1. Cerebrovascular accident (CVA), unspecified mechanism (CMS/HCC)  I63.9 434.91   2. Left arm weakness  R29.898 729.89   3. Impaired instrumental activities of daily living (IADL)  Z78.9 V49.89       SUBJECTIVE        Subjective  Pt reports he began walking with a quad cane last week. He is having some trouble carrying items.  His L hand, wrist and shoulder feel sore with 3/10 pain. Difficulty gripping items reported.       OBJECTIVE     Objective      Therapeutic Exercises    21102 Comments   Gentle joint traction and PROM completed to the L wrist and hand MP joints. L shoulder PROM completed for pain reduction. Moist heat applied to shoulder and hand for pain while other tasks were performed.  Pain decreased to 1/10.                    Timed Minutes 15         Therapeutic Activities    60949 Comments   FM tasks of opening various containers completed with Min to Mod difficulty. R hand needed for completion of 1/3 of containers. Min droppage of 1/4-1/2 inch items with L hand.     Addressed carrying various items in the L hand with use of quad cane in R hand. Min to Mod cues needed for safety and proper technique. CGA with 3 episodes of slight LOB noted. Pt was able to self correct balance.             Timed Minutes 30       Total Timed Treatment:     45   mins  Total Time of Visit:            45   mins       Goals                                          Progress Note due by 21   STG by: 21 Comments Status   Pt will be independent with daily completion of a HEP to address L UE deficits.    Encouraged less L hand strengthening until pain improves.  Progressing   Pt will complete all bathing and dressing with Mod I using AE as needed.       Met   Pt will display improved L UE FMC by completing the 9 hole peg test in 26 seconds or less.    Min droppage of FM items with L hand.  Progressing          LTG by: 4/16/21      Pt will increase L hand  strength to 60# for improved performance with IADL tasks.    Difficulty noted with opening containers with L hand.  Ongoing   Pt will complete a handwriting task related to IADL with 100% legibility.   Progressing   Pt will increase L UE strength to 4+/5 at all joints for improved IADL performance.  Addressed IADL of carrying items with L UE and focused on pain reduction. Partially Met                                    Therapy Education/Self Care 90971   Details: Pain management techniques for L UE.    Given Home Exercise Program   Progress: Reinforced   Who provided to: Patient   Level of understanding Verbalized, Demonstrated and Teach back level of understanding   Timed Minutes       ASSESSMENT/PLAN     Assessment/Plan     Assessment:  Pt had increased L hand, wrist and shoulder pain today reported as soreness. He responded well to pain reduction techniques.   He struggles with tasks involving L hand  strength and carrying items using the L UE. Overall he is improving and has very minimal difficulty with ADL.  Will continue to focus on IADL deficits.     Plan:  Will focus on carrying items with L hand, pain with gripping items and L UE GMC using various ball tasks.      Fabiana Sandhu, OTR/L  Occupational Therapist

## 2021-04-06 NOTE — PROGRESS NOTES
"Physical Therapy Treatment Note    Patient: Dao Jhaveri                                                                                     Visit Date: 2021  :     1955    Referring practitioner:    Rod De Los Santos MD  Date of Initial Visit:          Type: THERAPY  Noted: 3/10/2021    Patient seen for 7 sessions    Visit Diagnoses:    ICD-10-CM ICD-9-CM   1. Hemiparesis affecting left side as late effect of cerebrovascular accident (CVA) (CMS/HCC)  I69.354 438.20   2. Gait disturbance  R26.9 781.2   3. Cerebrovascular accident (CVA), unspecified mechanism (CMS/HCC)  I63.9 434.91     SUBJECTIVE     Subjective His L arm and hand ache and it goes into his shoulder. He got a SBQC and has been doing pretty good with this. \"No (falls), I make sure of that. I'll catch myself before I let myself fall because my L side is still weak. It wants to give up on me sometimes.\" He went to [facility] and used the langtaojingy to help him walk. He did have to take a few rest breaks as he was walking back to his car to prevent his     PAIN: 0/10 > 0/10     OBJECTIVE     Objective      Therapeutic Exercises    67771 Comments   B alternating BKFOs with GTB 2 x 10 ea -- progressed from RTB   B SL clamshells with GTB 2 x 10 ea -- progressed from RTB       Timed Minutes 16     Gait Training 77228   Activity Ambulation over uneven surface (grassy hill) and incline/decline with SBQC   Gait Deviation Prone to scissoring which is exacerbated by keeping his cane too close   Cueing VC and demonsration   Assistance SBA-CGA   Asst Device SBQC -- needed intermittent rest breaks with cane   Distance approx 250 ft (from gym over grassy hillside, around cul-de-sac and back)   Timed Minutes 24     Therapy Education/Self Care 48353   Details: educ on proper use of new SBQC and to keep it to the side as the closer it is to his body resulted in scissoring gait pattern.   [employer] Code: [id number]   Given Home Exercise Program and mobility training "   Progress: Reinforced and Progressed   Who provided to: Patient   Level of understanding Verbalized and Demonstrated   Timed Minutes      Total Timed Treatment:     40   mins  Total Time of Visit:             45   mins         ASSESSMENT/PLAN     GOALS  Goals                                          Progress Note due by 4/9/2021   STG by: 3/24/2021 Comments Status   1. Patient will be able to transfer sit to stand to sit without difficulty and without use of hands.  MET   2. Patient will be able to walk 500' without rest on flat surface. Ambulated through cul-de-sac over flat ground, with added incline/decline. Required intermittent rest breaks.   Ongoing   3. Patient will be able to perform SLS on each leg 5 sec safely.   Ongoing   LTG by: 4/7/2021      1. Patient will be able to walk on uneven surfaces for 10 minutes without needing rest.  Walked over uneven surfaces and with incline/decline for 20 minutes, but needed intermittent rest breaks.   Ongoing   2. Patient will be compliant with HEP on a daily basis.  Reinforced today   Ongoing   3. Patient will report increased energy at home with less time spent in chair or bed.  He gets out of bed at 8 AM and is out of it all day until bed time. He might sit in his chair for about an hour a day. He will then get outside and walk. He uses his rollator outside. He'll walk about two blocks per day.  MET   4. Good+ hip strength bilaterally Addressed hip strength today   Ongoing     Assessment/Plan     ASSESSMENT: Pt presented to the clinic today with a SBQC, which improved his gait pattern and subsequently, his safety. He was able to ambulate over uneven and flat surfaces, with and without an incline, well without LOB or near fall today. He did require intermittent rest breaks as he would begin to compensate, but was able to stop himself when this began, rest, and resume using proper mechanics. He did require some verbal cueing to keep his cane out to his side as he  would bring it closer to him as distance increased and then would end up using a scissoring gait pattern. Additionally, we progressed his hip strengthening to include increased resistance (GTB), to which he tolerated well. Furthermore, he met his goal today for spending less time in his chair / bed. He reports that he will wake up in the morning and no longer return to his bed and may spend maybe one hour per day in his chair. He is trying to increase his independence with mobility and distance with walking both around his home and within the community and is doing well with this. At this time, it appears that his biggest deficits are endurance, balance, and he continues to need some hip and core strength as well.     PLAN: Address all of his goals for his progress note and fill out re-authorization form. Continue to progress hip, core, and postural strengthening as able. Consider utilizing neurocom for assessment, especially with SLS (EO, EC).    Signature: Sofia Li, PTA

## 2021-04-08 ENCOUNTER — TREATMENT (OUTPATIENT)
Dept: PHYSICAL THERAPY | Facility: CLINIC | Age: 66
End: 2021-04-08

## 2021-04-08 DIAGNOSIS — R26.9 GAIT DISTURBANCE: ICD-10-CM

## 2021-04-08 DIAGNOSIS — I63.9 CEREBROVASCULAR ACCIDENT (CVA), UNSPECIFIED MECHANISM (HCC): Primary | ICD-10-CM

## 2021-04-08 DIAGNOSIS — Z78.9 IMPAIRED INSTRUMENTAL ACTIVITIES OF DAILY LIVING (IADL): ICD-10-CM

## 2021-04-08 DIAGNOSIS — I69.354 HEMIPARESIS AFFECTING LEFT SIDE AS LATE EFFECT OF CEREBROVASCULAR ACCIDENT (CVA) (HCC): ICD-10-CM

## 2021-04-08 DIAGNOSIS — R29.898 LEFT ARM WEAKNESS: ICD-10-CM

## 2021-04-08 PROCEDURE — 97112 NEUROMUSCULAR REEDUCATION: CPT | Performed by: PHYSICAL THERAPIST

## 2021-04-08 PROCEDURE — 97116 GAIT TRAINING THERAPY: CPT | Performed by: PHYSICAL THERAPIST

## 2021-04-08 PROCEDURE — G0283 ELEC STIM OTHER THAN WOUND: HCPCS | Performed by: OCCUPATIONAL THERAPIST

## 2021-04-08 PROCEDURE — 97110 THERAPEUTIC EXERCISES: CPT | Performed by: OCCUPATIONAL THERAPIST

## 2021-04-08 PROCEDURE — 97140 MANUAL THERAPY 1/> REGIONS: CPT | Performed by: OCCUPATIONAL THERAPIST

## 2021-04-08 NOTE — PROGRESS NOTES
"Physical Therapy Treatment Note and 30 Day Progress Note    Patient: Dao Jhaveri                                                                                     Visit Date: 2021  :     1955    Referring practitioner:    Rod De Los Santos MD  Date of Initial Visit:          Type: THERAPY  Noted: 3/10/2021    Patient seen for 8 sessions    Visit Diagnoses:    ICD-10-CM ICD-9-CM   1. Cerebrovascular accident (CVA), unspecified mechanism (CMS/[facility])  I63.9 434.91   2. Gait disturbance  R26.9 781.2   3. Hemiparesis affecting left side as late effect of cerebrovascular accident (CVA) (CMS/[facility])  I69.354 438.20     SUBJECTIVE     Subjective L hand is his primary complaints today. Stated his quad cane is \"uneven\" and he is considering taking it back to be looked at. No fall reported recently. He is having the most difficulty ascending/descending steps.     PAIN: 0/10 > 0/10     OBJECTIVE     Objective      Neuromuscular Reeducation     28350 Comments   Assessed all goals for progress note     Tandem standing EO    Lateral weight shifting on wobble board x10    Posterior weight shifting on wobble board  x10        Timed Minutes 35       Gait Training 02107   Activity Ambulation in hallway on flat surface; ascend/descend 5 stairs    Gait Deviation Scissoring, path deviation to L, decreased L knee flexion during swing phase    Cueing VC to widen VINNY    Assistance SBA - Min A    Asst Device Quad cane    Distance 260 ft    Timed Minutes 10     Therapy Education/Self Care 46638   Details:    [employer] Code: [id number]   Given Home Exercise Program and mobility training   Progress: Reinforced and Progressed   Who provided to: Patient   Level of understanding Verbalized and Demonstrated   Timed Minutes      Total Timed Treatment:     45   mins  Total Time of Visit:             45   mins         ASSESSMENT/PLAN     GOALS  Goals                                          Progress Note due by 2021   STG by: 3/24/2021 " Comments Status   1. Patient will be able to transfer sit to stand to sit without difficulty and without use of hands.  MET   2. Patient will be able to walk 500' without rest on flat surface. 1 LOB on flat surface; 260 ft today   Ongoing   3. Patient will be able to perform SLS on each leg 5 sec safely. 5 sec on R  3 sec on L   Ongoing   LTG by: 4/7/2021      1. Patient will be able to walk on uneven surfaces for 10 minutes without needing rest.   Patient requires intermittent rest breaks while ambulating, had 1 LOB required Min A during ambulation on flat surface   Ongoing   2. Patient will be compliant with HEP on a daily basis.  Reported compliance with HEP   Ongoing   3. Patient will report increased energy at home with less time spent in chair or bed.   MET   4. Good+ hip strength bilaterally Grossly 4+/5   L hip pain noted w extension and abduction  MET     Assessment & Plan     Assessment  Impairments: abnormal coordination, abnormal gait, activity intolerance, impaired balance, impaired physical strength and lacks appropriate home exercise program  Prognosis: good  Functional Limitations: carrying objects, lifting, walking and stooping  Plan  Therapy options: will be seen for skilled physical therapy services  Planned therapy interventions: balance/weight-bearing training, body mechanics training, gait training, home exercise program, manual therapy, neuromuscular re-education, motor coordination training, postural training, soft tissue mobilization, strengthening, stretching and therapeutic activities  Frequency: 2x week  Duration in visits: 4  Duration in weeks: 8  Treatment plan discussed with: patient         ASSESSMENT: Patient has met 3/7 goals at this point and he has made progress towards meeting the remaining goals. His primary limitation is currently his balance, he had 1 LOB while ambulating that required Min A for recovery and he had difficulty performing static balance activity. He has tendency  to bear more weight through RLE compared to LLE, addressed this today and demonstrated improved self awareness. Patient will benefit from continued skilled therapy services to address his balance and endurance in order to decrease his risk of falling and improve his functional mobility.     PLAN: Continue to challenge patient's static and dynamic balance. Consider utilizing the neurocom.     Signature: Cathie Baca, AMBROSIO Student    Session was supervised and documentation was reviewed by Arnaud Renee PT, DPT, ATC.    Signature: Arnaud Renee PT, FREDERICKT, ATC

## 2021-04-08 NOTE — PROGRESS NOTES
Occupational Therapy Treatment Note     Patient: Dao Jhaveri   : 1955  Referring practitioner: Rod De Los Santos MD  Date of Initial Visit:   Type: THERAPY  Noted: 3/2/2021  Today's Date: 2021  Patient seen for 10 sessions  Visit Diagnoses:    ICD-10-CM ICD-9-CM   1. Cerebrovascular accident (CVA), unspecified mechanism (CMS/HCC)  I63.9 434.91   2. Left arm weakness  R29.898 729.89   3. Impaired instrumental activities of daily living (IADL)  Z78.9 V49.89       SUBJECTIVE        Subjective  6/10 L hand pain at start of session. 2/10 at end of session.  Pt reports pain continues to increase with gripping tasks at home. He hopes to resume driving by the end of the month.       OBJECTIVE     Objective      Therapeutic Exercises    52485 Comments   L wrist and hand AROM completed in all planes 20 reps x 1 set.    L hand finger flexion and extension completed against medium resistance 10 reps x 2 sets each.                Timed Minutes 16       Modalities Comments   E-stim/cold laser combination completed at the L hand MP joints and anterior L wrist for pain reduction.        Minutes 15     Manual Therapy     [id number]  Comments   Gentle joint traction completed at the L wrist and hand joints for pain reduction followed by moist heat application.                     Timed Minutes 10          Total Timed Treatment:     41   mins  Total Time of Visit:            41   mins       Goals                                          Progress Note due by 21   STG by: 21 Comments Status   Pt will be independent with daily completion of a HEP to address L UE deficits.    Reviewed L hand and wrist AROM with no forceful gripping until pain improves.  Progressing   Pt will complete all bathing and dressing with Mod I using AE as needed.      Met   Pt will display improved L UE FMC by completing the 9 hole peg test in 26 seconds or less.     Progressing          LTG by: 21      Pt will increase L hand  strength  to 60# for improved performance with IADL tasks.    L hand strength and pain addressed.  Ongoing   Pt will complete a handwriting task related to IADL with 100% legibility.   Progressing   Pt will increase L UE strength to 4+/5 at all joints for improved IADL performance.  Focused on L hand and wrist pain. Partially Met                                    Therapy Education/Self Care 66477   Details: Avoiding tasks that increase L hand/wrist pain.   Given Home Exercise Program   Progress: Reinforced   Who provided to: Patient   Level of understanding Verbalized, Demonstrated and Teach back level of understanding   Timed Minutes       ASSESSMENT/PLAN     Assessment/Plan     Assessment:  L hand and wrist pain is still present and affects gripping during ADL and IADL. Modalities did help decrease pain and pt was able to exercise at end of session with minimal pain.  His balance continues to improve and he hopes to resume driving by the end of the month. Encouraged pt to speak with his doctor before driving.     Plan:  Will complete a 30 day progress note next week.      Fabiana Sandhu, OTR/L  Occupational Therapist

## 2021-04-13 ENCOUNTER — TREATMENT (OUTPATIENT)
Dept: PHYSICAL THERAPY | Facility: CLINIC | Age: 66
End: 2021-04-13

## 2021-04-13 DIAGNOSIS — R29.898 LEFT ARM WEAKNESS: ICD-10-CM

## 2021-04-13 DIAGNOSIS — I63.9 CEREBROVASCULAR ACCIDENT (CVA), UNSPECIFIED MECHANISM (HCC): Primary | ICD-10-CM

## 2021-04-13 DIAGNOSIS — I69.354 HEMIPARESIS AFFECTING LEFT SIDE AS LATE EFFECT OF CEREBROVASCULAR ACCIDENT (CVA) (HCC): ICD-10-CM

## 2021-04-13 DIAGNOSIS — Z78.9 IMPAIRED INSTRUMENTAL ACTIVITIES OF DAILY LIVING (IADL): ICD-10-CM

## 2021-04-13 DIAGNOSIS — R26.9 GAIT DISTURBANCE: ICD-10-CM

## 2021-04-13 PROCEDURE — 97140 MANUAL THERAPY 1/> REGIONS: CPT | Performed by: OCCUPATIONAL THERAPIST

## 2021-04-13 PROCEDURE — 97112 NEUROMUSCULAR REEDUCATION: CPT | Performed by: PHYSICAL THERAPIST

## 2021-04-13 PROCEDURE — 97530 THERAPEUTIC ACTIVITIES: CPT | Performed by: OCCUPATIONAL THERAPIST

## 2021-04-13 PROCEDURE — G0283 ELEC STIM OTHER THAN WOUND: HCPCS | Performed by: OCCUPATIONAL THERAPIST

## 2021-04-13 NOTE — PROGRESS NOTES
Occupational Therapy Treatment Note     Patient: Dao Jhaveri   : 1955  Referring practitioner: Rod De Los Santos MD  Date of Initial Visit:   Type: THERAPY  Noted: 3/2/2021  Today's Date: 2021  Patient seen for 11 sessions  Visit Diagnoses:    ICD-10-CM ICD-9-CM   1. Cerebrovascular accident (CVA), unspecified mechanism (CMS/HCC)  I63.9 434.91   2. Left arm weakness  R29.898 729.89   3. Impaired instrumental activities of daily living (IADL)  Z78.9 V49.89       SUBJECTIVE        Subjective  10 L hand pain at start of session.      OBJECTIVE     Objective      Therapeutic Activities    91243 Comments   FMC activity with pegboard using L hand to place 1 inch pegs into a board and placing a marble on top. Min droppage noted.                    Timed Minutes 15       Modalities Comments   E-stim/cold laser combination completed at the L hand MP joints and anterior L wrist for pain reduction.        Minutes 15     Manual Therapy     90478  Comments   Gentle joint traction completed at the L wrist and hand joints for pain reduction followed by moist heat application.                     Timed Minutes 10          Total Timed Treatment:     40   mins  Total Time of Visit:            40   mins       Goals                                          Progress Note due by 21   STG by: 21 Comments Status   Pt will be independent with daily completion of a HEP to address L UE deficits.    No concerns with current HEP.  Progressing   Pt will complete all bathing and dressing with Mod I using AE as needed.      Met   Pt will display improved L UE FMC by completing the 9 hole peg test in 26 seconds or less.    Addressed with FMC activity. Progressing          LTG by: 21      Pt will increase L hand  strength to 60# for improved performance with IADL tasks.    L hand pain addressed.  Ongoing   Pt will complete a handwriting task related to IADL with 100% legibility.   Progressing   Pt will increase L UE  strength to 4+/5 at all joints for improved IADL performance.  Focused on L hand and wrist pain. Partially Met                                    Therapy Education/Self Care 81181   Details: Educated on positions to avoid to help pain in wrist and hand.   Given Home Exercise Program   Progress: Reinforced   Who provided to: Patient   Level of understanding Verbalized, Demonstrated and Teach back level of understanding   Timed Minutes       ASSESSMENT/PLAN     Assessment/Plan     Assessment:  Pt reported it is painful for him to make a fist. Modalities are helping decrease pain in L hand and wrist.  After session pts pain decreased to 3/10. Pt displayed a decrease in FMC when completing FM task. Min droppage was noted.  Educated on ways to decrease pain and avoid positions that can cause an increase in pain.      Plan:  Will complete a 30 day progress note next visit.           JORGE Ware Student     The clinical instructor and/or supervising staff, EVE Rocha/SANDY, was present in clinic guiding the student by approving, concurring, and confirming the skilled judgement for all services rendered.        Signature:  EVE Rocha/EVE Barnett/L  Occupational Therapist

## 2021-04-13 NOTE — PROGRESS NOTES
"Physical Therapy Treatment Note    Patient: Dao Jhaveri                                                                                     Visit Date: 2021  :     1955    Referring practitioner:    Rod De Los Santos MD  Date of Initial Visit:          Type: THERAPY  Noted: 3/10/2021    Patient seen for 9 sessions    Visit Diagnoses:    ICD-10-CM ICD-9-CM   1. Cerebrovascular accident (CVA), unspecified mechanism (CMS/[facility])  I63.9 434.91   2. Gait disturbance  R26.9 781.2   3. Hemiparesis affecting left side as late effect of cerebrovascular accident (CVA) (CMS/HCC)  I69.354 438.20     SUBJECTIVE     Subjective He is late this PM because there was a tow truck in the middle of the parking lot and his ride did not want to go around it. His L shoulder and L hand hurt today, he had to take a Tylenol. His is pretty stiff today. When he wakes up in the AM, he can't even bend his hand, it hurts. He has to warm it up to make it work. He will have to start driving again since his wife RTW soon after being off work for 2 mos.     PAIN: 5/10 (shoulder) > 0/10 (\"I'm Good\")     OBJECTIVE     Objective      Therapeutic Exercises    15960 Comments   Shuttle press with BL pad (6 bands) 5 min       Timed Minutes 5     Neuromuscular Reeducation     47254 Comments   neurcom level 1 WS A/P and M/L   neurcom level 2 WS A/P and M/L   Midline orientation            Timed Minutes 30     Therapy Education/Self Care 27150   Details:    iPosition Code: [id number]   Given Home Exercise Program and mobility training   Progress: Reinforced and Progressed   Who provided to: Patient   Level of understanding Verbalized and Demonstrated   Timed Minutes      Total Timed Treatment:     35   mins  Total Time of Visit:             35   mins         ASSESSMENT/PLAN     GOALS  Goals                                          Progress Note due by 2021   STG by: 3/24/2021 Comments Status   1. Patient will be able to transfersit to stand to sit " without difficulty and without use of hands.  MET   2. Patient will be able to walk 500' without rest on flat surface. Addressed WS to help balance during ambulation and prevent LOB and therefore risk of fall.   Ongoing   3. Patient will be able to perform SLS on each leg 5 sec safely.   Ongoing   LTG by: 4/7/2021      1. Patient will be able to walk on uneven surfaces for 10 minutes without needing rest.    Ongoing   2. Patient will be compliant with HEP on a daily basis.    Ongoing   3. Patient will report increased energy at home with less time spent in chair or bed.   MET   4. Good+ hip strength bilaterally  MET     Assessment/Plan     ASSESSMENT: Pt was late today d/t tow truck in parking lot and his ride did not want to go around him; therefore, a full tx session was not possible today. We utilized the shuttle press today to help him warm up while also addressing his endurance and adding a dynamic surface. He was able to tolerate more resistance (6 bands) than historically noted. We reinforced his midline orientation and WS today. He is able to perform the tasks on the NeuroCom; however, he struggled the most with controlling his movements.    PLAN: Continue to address his balance and WS. Consider use of BITS machine to challenge his balance and reaction time as he will have to drive himself soon d/t wife's RTW.    Signature: Sofia Li PTA

## 2021-04-15 ENCOUNTER — TREATMENT (OUTPATIENT)
Dept: PHYSICAL THERAPY | Facility: CLINIC | Age: 66
End: 2021-04-15

## 2021-04-15 DIAGNOSIS — I63.9 CEREBROVASCULAR ACCIDENT (CVA), UNSPECIFIED MECHANISM (HCC): Primary | ICD-10-CM

## 2021-04-15 DIAGNOSIS — R26.9 GAIT DISTURBANCE: ICD-10-CM

## 2021-04-15 DIAGNOSIS — I69.354 HEMIPARESIS AFFECTING LEFT SIDE AS LATE EFFECT OF CEREBROVASCULAR ACCIDENT (CVA) (HCC): ICD-10-CM

## 2021-04-15 PROCEDURE — 97112 NEUROMUSCULAR REEDUCATION: CPT | Performed by: PHYSICAL THERAPIST

## 2021-04-15 NOTE — PROGRESS NOTES
Physical Therapy Treatment Note    Patient: Dao Jhaveri                                                                                     Visit Date: 4/15/2021  :     1955    Referring practitioner:    Rod De Los Santos MD  Date of Initial Visit:          Type: THERAPY  Noted: 3/10/2021    Patient seen for 10 sessions    Visit Diagnoses:    ICD-10-CM ICD-9-CM   1. Cerebrovascular accident (CVA), unspecified mechanism (CMS/HCC)  I63.9 434.91   2. Gait disturbance  R26.9 781.2   3. Hemiparesis affecting left side as late effect of cerebrovascular accident (CVA) (CMS/HCC)  I69.354 438.20     SUBJECTIVE     Subjective He's feeling pretty good today, even his L arm feels good. His hand does hurt when he balls it up and it hurt a lot during the night last night. But it's a whole lot better today, maybe because he had a hot shower. It (his L hand) feels like it sprung, that's how it feels. He may have slept on it wrong. He wishes he could take it off and put it in the corner until he's through. He's walking better / straighter with the cane vs the walker. He feels better after getting warmed up    PAIN: 410 > 2/10      OBJECTIVE     Objective     Neuromuscular Reeducation     18758 Comments   B SLS cone taps  6 cones x2    B shuffles with SLS alternating cone taps  6 cones x2   B lateral shuffle with SLS alternating foot cone taps while naming fruits/veggies the same color as cone 6 cones x3   B lateral shuffle with B SLS cone taps while naming fruits veggies    B SLS cone taps with naming foods beginning with the same letter that begins with the color (B = Bologna) 6 cones x3   B SLS cone taps with naming flowers the same color as the cone    Box steps (side, forward, backward, side) 6 cones x2       Timed Minutes 39     Therapy Education/Self Care 75966   Details:    [employer] Code: [id number]   Given Home Exercise Program and mobility training   Progress: Reinforced and Progressed   Who provided to: Patient    Level of understanding Verbalized and Demonstrated   Timed Minutes      Total Timed Treatment:     35   mins  Total Time of Visit:             35   mins         ASSESSMENT/PLAN     GOALS  Goals                                          Progress Note due by 4/9/2021   STG by: 3/24/2021 Comments Status   1. Patient will be able to transfersit to stand to sit without difficulty and without use of hands.  MET   2. Patient will be able to walk 500' without rest on flat surface.   Ongoing   3. Patient will be able to perform SLS on each leg 5 sec safely. Addressed today (see neuro table for details).   Ongoing   LTG by: 4/7/2021      1. Patient will be able to walk on uneven surfaces for 10 minutes without needing rest.    Ongoing   2. Patient will be compliant with HEP on a daily basis.    Ongoing   3. Patient will report increased energy at home with less time spent in chair or bed.   MET   4. Good+ hip strength bilaterally  MET     Assessment/Plan     ASSESSMENT: Today we addressed his balance with SLS, adding cognitive components where able. His balance was much improved when permitted to use his SBQC, but did demonstrate increased instability without it. Instability increased with added cognitive components as well. He demonstrated decreased balance on L > R.    PLAN: Consider use of BITS machine to challenge his balance and reaction time simultaneously as he will have to drive himself soon d/t wife's RTW. Continue to progress B LE strengthening to assist with SLS and challenge his balance as able.    Signature: Sofia Li, PTA

## 2021-04-20 ENCOUNTER — TREATMENT (OUTPATIENT)
Dept: PHYSICAL THERAPY | Facility: CLINIC | Age: 66
End: 2021-04-20

## 2021-04-20 DIAGNOSIS — Z78.9 IMPAIRED INSTRUMENTAL ACTIVITIES OF DAILY LIVING (IADL): ICD-10-CM

## 2021-04-20 DIAGNOSIS — R26.9 GAIT DISTURBANCE: ICD-10-CM

## 2021-04-20 DIAGNOSIS — R29.898 LEFT ARM WEAKNESS: ICD-10-CM

## 2021-04-20 DIAGNOSIS — I63.9 CEREBROVASCULAR ACCIDENT (CVA), UNSPECIFIED MECHANISM (HCC): Primary | ICD-10-CM

## 2021-04-20 DIAGNOSIS — I69.354 HEMIPARESIS AFFECTING LEFT SIDE AS LATE EFFECT OF CEREBROVASCULAR ACCIDENT (CVA) (HCC): ICD-10-CM

## 2021-04-20 PROCEDURE — 97110 THERAPEUTIC EXERCISES: CPT | Performed by: OCCUPATIONAL THERAPIST

## 2021-04-20 PROCEDURE — 97112 NEUROMUSCULAR REEDUCATION: CPT | Performed by: PHYSICAL THERAPIST

## 2021-04-20 NOTE — PROGRESS NOTES
Occupational Therapy 30 Day Progress Note     Patient: Dao Jhaveri   : 1955  Referring practitioner: Rod De Los Santos MD  Date of Initial Visit: 2021  Today's Date: 2021  Patient seen for 12 sessions    Visit Diagnoses:    ICD-10-CM ICD-9-CM   1. Cerebrovascular accident (CVA), unspecified mechanism (CMS/HCC)  I63.9 434.91   2. Left arm weakness  R29.898 729.89   3. Impaired instrumental activities of daily living (IADL)  Z78.9 V49.89       SUBJECTIVE     Outcome Measure: QuickDASH: 14% impaired.   9 hole peg test: Left: 29.47     Right: 25.53    Subjective   No pain reported today. Pt said he can now go down into the basement to retrieve items.     OBJECTIVE     Objective          Active Range of Motion   Left Shoulder   Flexion: 170 degrees   Extension: WFL  Abduction: 180 degrees   Adduction: WFL    Left Elbow   Flexion: WFL  Extension: WFL  Forearm supination: WFL  Forearm pronation: WFL    Left Wrist   Wrist flexion: WFL  Wrist extension: WFL  Radial deviation: WFL  Ulnar deviation: WFL      Strength/Myotome Testing     Left Shoulder     Planes of Motion   Flexion: 5   Extension: 5   Abduction: 4   Adduction: 5     Left Elbow   Flexion: 5  Extension: 5  Forearm supination: 5  Forearm pronation: 5    Left Wrist/Hand   Wrist extension: 5  Wrist flexion: 5  Radial deviation: 5  Ulnar deviation: 5     (2nd hand position)    Trial 1: 27 lbs    Trial 2: 36 lbs    Trial 3: 35 lbs    Average: 32.67 lbs    Thumb Strength  Key/Lateral Pinch     Trial 1: 13 lbs    Trial 2: 11 lbs    Trial 3: 10.5 lbs    Average: 11.5 lbs  Tip/Two-Point Pinch     Trial 1: 7 lbs    Trial 2: 7 lbs    Trial 3: 6 lbs    Average: 6.67 lbs  Palmar/Three-Point Pinch     Trial 1: 7 lbs    Trial 2: 8 lbs    Trial 3: 7.5 lbs    Average: 7.5 lbs    Right Wrist/Hand      (2nd hand position)     Trial 1: 60 lbs    Trial 2: 50 lbs    Trial 3: 62 lbs    Average: 57.33 lbs    Thumb Strength   Key/Lateral Pinch     Trial 1: 14  lbs    Trial 2: 14 lbs    Trial 3: 15 lbs    Average: 14.33 lbs  Tip/Two-Point Pinch     Trial 1: 8 lbs    Trial 2: 7 lbs    Trial 3: 7 lbs    Average: 7.33 lbs  Palmar/Three-Point Pinch     Trial 1: 10.5 lbs    Trial 2: 11 lbs    Trial 3: 11 lbs    Average: 10.83 lbs    Ambulation     Comments   Mod I for functional mobility with four point cane. L LE weightbearing due to pain is improving and he only feels pain after standing for long periods of time. Increased speed noted.      Functional Assessment     Comments  ADL  Dressing: Mod I  Bathing: Mod I with shower chair.  Toileting: Mod I  Grooming: Ind  Feeding: Mod I    IADL: Only completing light standing IADL with limited standing tolerance. Standing tolerance is improving. Spouse is performing all other IADL tasks.          General Comments     Shoulder Comments   R UE AROM is WNL. Strength 5/5.      Vision assessment: wears corrected lenses/contact. 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 Occasional blurriness with reading in L eye.     Cognitive status: oriented to Person, Place, Time and Situation    Sensation: no sensory deficits noted.    Tone: Grossly Normal    Midline orientation: Midline    Fine Motor:  - Rapid alternating movements of the hands, fingers, and forearms: Able to perform   - Sequential finger-to-thumb opposition: Able to perform   - Alternates fists and finger-to-nose tests: Able to perform    Cerebellar exam: finger to nose without dysmetria and rapid alternating movements in the upper extremities were normal    Therapy Education/Self Care 84869   Details: Educated on improvements.   Given Home Exercise Program   Progress: Progressed   Who provided to: Patient   Level of understanding Verbalized and Demonstrated   Timed Minutes        Therapeutic Exercises    85032 Comments   Addressed all goals and outcome measures. See additional portions of note for details    Hand strength exercises with clip increasing in resistance completed with ARABELLA. Min droppge  noted. FMC activity with 1/2 inch pegs using L hand. Min droppage noted.                Timed Minutes 45       Total Timed Treatment:     45   mins  Total Time of Visit:            45   mins    ASSESSMENT/PLAN     Assessment & Plan     Assessment  Impairments: abnormal coordination, abnormal gait, abnormal or restricted ROM, activity intolerance, impaired balance, impaired physical strength, lacks appropriate home exercise program, pain with function and weight-bearing intolerance  Assessment details: Pt made gains on some goals. Pain in L LE when weightbearing has decreased.The quick dash score improved. Strength in B UE greatly improved but pt is still limited in L shoulder abduction strength. L shoulder ROM increased. Pt is still unable to complete heavy IADLs at home due to decreased strength and endurance. Min droppage displayed when completing FMC activities with his L hand. Pt is improving as expected. OT will be continued for remaining deficits.   Prognosis: good  Functional Limitations: carrying objects, lifting, walking, pulling, pushing, uncomfortable because of pain, standing, reaching behind back, reaching overhead and unable to perform repetitive tasks  Plan  Planned modality interventions: TENS, ultrasound and low level laser therapy  Planned therapy interventions: manual therapy, motor coordination training, neuromuscular re-education, balance/weight-bearing training, ADL retraining, fine motor coordination training, functional ROM exercises, home exercise program, IADL retraining, therapeutic activities, transfer training, stretching and strengthening  Frequency: 2x week  Duration in weeks: 6  Treatment plan discussed with: patient and family  Plan details: Will focus on L UE gross and fine motor coordination, L hand and shoulder strength and safety with standing IADL tasks.      Goals                                          Progress Note due by 5/20/21   STG by: 5/20/21 Comments Status   Pt will  be independent with daily completion of a HEP to address L UE deficits.   No issues reported with current HEP. Will continue goal for modifications as pt improves.  Progressing   Pt will complete all bathing and dressing with Mod I using AE as needed.     Met   Pt will display improved L UE FMC by completing the 9 hole peg test in 26 seconds or less.   Remained at 29.47 sec. Ongoing        LTG by: 5/20/21     Pt will increase L hand  strength to 60# for improved performance with IADL tasks.   Increases with 33# Progressing   Pt will complete a handwriting task related to IADL with 100% legibility.  Remained at 80-90% legibility.  Ongoing   Pt will increase L UE strength to 4+/5 at all joints for improved IADL performance.  L UE shoulder abduction is 4/5. All other joints improved to 5/5. Partially Met                    OT SIGNATURE: Fabiana Sandhu OTR/JORGE Junior Student     The clinical instructor and/or supervising staff, EVE Rocha/SANDY, was present in clinic guiding the student by approving, concurring, and confirming the skilled judgement for all services rendered.        Signature:  EVE Rocha/SANDY

## 2021-04-20 NOTE — PROGRESS NOTES
Physical Therapy Treatment Note    Patient: Dao Jhaveri                                                                                     Visit Date: 2021  :     1955    Referring practitioner:    Rod De Los Santos MD  Date of Initial Visit:          Type: THERAPY  Noted: 3/10/2021    Patient seen for 11 sessions    Visit Diagnoses:    ICD-10-CM ICD-9-CM   1. Cerebrovascular accident (CVA), unspecified mechanism (CMS/[facility])  I63.9 434.91   2. Gait disturbance  R26.9 781.2   3. Hemiparesis affecting left side as late effect of cerebrovascular accident (CVA) (CMS/[facility])  I69.354 438.20     SUBJECTIVE     Subjective He denies pain or new complaints     PAIN: 0/10         OBJECTIVE     Objective      Neuromuscular Reeducation     63242 Comments   Visual tracking progressed to same with central fixation @ BITS    Sequencing numbers, letters, both progressed by same NS on theradisks @ BITS     Cognitive memory sequencing letters, numbers, and words progressed to same with competition @ BITS    User paced visual tracking standing on blue foam @ BITS        Timed Minutes 40       Therapy Education/Self Care 78070   Details:    [employer] Code:    Given postural retraining   Progress: Reinforced   Who provided to: Patient   Level of understanding Verbalized   Timed Minutes      Total Timed Treatment:     40   mins  Total Time of Visit:             40   mins         ASSESSMENT/PLAN     GOALS  Goals                                          Progress Note due by 2021   STG by: 3/24/2021 Comments Status   1. Patient will be able to transfersit to stand to sit without difficulty and without use of hands.   MET   2. Patient will be able to walk 500' without rest on flat surface.    Ongoing   3. Patient will be able to perform SLS on each leg 5 sec safely. .   Ongoing   LTG by: 2021       1. Patient will be able to walk on uneven surfaces for 10 minutes without needing rest.     Ongoing   2. Patient will be  compliant with HEP on a daily basis.     Ongoing   3. Patient will report increased energy at home with less time spent in chair or bed.    MET   4. Good+ hip strength bilaterally   MET         Assessment/Plan     ASSESSMENT: Initially he was having some issues with hand eye coordination but as I progressed the activities today his accuracy and reaction time continuously improved.    PLAN: Continue to work on B LE strengthening and work to address walking on different surfaces.    Signature: Larry Rodríguez, PTA

## 2021-04-21 ENCOUNTER — IMMUNIZATION (OUTPATIENT)
Dept: VACCINE CLINIC | Facility: HOSPITAL | Age: 66
End: 2021-04-21

## 2021-04-21 PROCEDURE — 0012A: CPT | Performed by: OBSTETRICS & GYNECOLOGY

## 2021-04-21 PROCEDURE — 91301 HC SARSCO02 VAC 100MCG/0.5ML IM: CPT | Performed by: OBSTETRICS & GYNECOLOGY

## 2021-04-22 ENCOUNTER — TREATMENT (OUTPATIENT)
Dept: PHYSICAL THERAPY | Facility: CLINIC | Age: 66
End: 2021-04-22

## 2021-04-22 DIAGNOSIS — I63.9 CEREBROVASCULAR ACCIDENT (CVA), UNSPECIFIED MECHANISM (HCC): Primary | ICD-10-CM

## 2021-04-22 DIAGNOSIS — R29.898 LEFT ARM WEAKNESS: ICD-10-CM

## 2021-04-22 DIAGNOSIS — Z78.9 IMPAIRED INSTRUMENTAL ACTIVITIES OF DAILY LIVING (IADL): ICD-10-CM

## 2021-04-22 DIAGNOSIS — I10 ESSENTIAL HYPERTENSION: ICD-10-CM

## 2021-04-22 DIAGNOSIS — R26.9 GAIT DISTURBANCE: ICD-10-CM

## 2021-04-22 PROCEDURE — 97530 THERAPEUTIC ACTIVITIES: CPT | Performed by: OCCUPATIONAL THERAPIST

## 2021-04-22 PROCEDURE — G0283 ELEC STIM OTHER THAN WOUND: HCPCS | Performed by: OCCUPATIONAL THERAPIST

## 2021-04-22 PROCEDURE — 97112 NEUROMUSCULAR REEDUCATION: CPT | Performed by: PHYSICAL THERAPIST

## 2021-04-22 RX ORDER — LISINOPRIL 20 MG/1
20 TABLET ORAL DAILY
Qty: 90 TABLET | Refills: 1 | Status: SHIPPED | OUTPATIENT
Start: 2021-04-22 | End: 2021-10-28 | Stop reason: SDUPTHER

## 2021-04-22 RX ORDER — AMLODIPINE BESYLATE 10 MG/1
10 TABLET ORAL
Qty: 90 TABLET | Refills: 1 | Status: SHIPPED | OUTPATIENT
Start: 2021-04-22 | End: 2021-10-28 | Stop reason: SDUPTHER

## 2021-04-22 NOTE — TELEPHONE ENCOUNTER
Caller: Dao Jhaveri    Relationship: Self    Best call back number: [phone]    Medication needed:   Requested Prescriptions     Pending Prescriptions Disp Refills   • amLODIPine (NORVASC) 10 MG tablet 90 tablet 1     Sig: Take 1 tablet by mouth every night at bedtime.   • lisinopril (PRINIVIL,ZESTRIL) 20 MG tablet       Sig: Take 1 tablet by mouth Daily.     What is the patient's preferred pharmacy: [facility] [address] [place] [address] 60 - [phone] [facility] [phone]

## 2021-04-22 NOTE — PROGRESS NOTES
Occupational Therapy Treatment Note     Patient: Dao Jhaveri   : 1955  Referring practitioner: Rod De Los Santos MD  Date of Initial Visit:   Type: THERAPY  Noted: 3/2/2021  Today's Date: 2021  Patient seen for 13 sessions  Visit Diagnoses:    ICD-10-CM ICD-9-CM   1. Cerebrovascular accident (CVA), unspecified mechanism (CMS/HCC)  I63.9 434.91   2. Left arm weakness  R29.898 729.89   3. Impaired instrumental activities of daily living (IADL)  Z78.9 V49.89       SUBJECTIVE      Subjective  Pt reports he is out of his blood pressure medicine. He is wanting to resume driving because his wife returns to work next week.  Encouraged pt to speak with MD about driving and medication needs. 4/10 L hand pain reported.       OBJECTIVE     Objective      Therapeutic Activities    07992 Comments   Moist heat applied to L hand for pain reduction. The BITS system was used in standing with the L UE to address coordination and simulate functional tasks. Accuracy ranged from %. Reaction time ranged from 1.38 to 6.04 seconds.  Standing tolerance of ~ 5 minutes noted.    Trail making part A completed with 11 errors. 13 errors noted on part B.                 Timed Minutes 35         Modalities Comments   E-stim/cold laser combination completed at the L hand MP joints for pain reduction. Pain decreased to 2/10.        Minutes 10       Total Timed Treatment:     45   mins  Total Time of Visit:            45   mins       Goals                                          Progress Note due by 21   STG by: 21 Comments Status   Pt will be independent with daily completion of a HEP to address L UE deficits.     Progressing   Pt will complete all bathing and dressing with Mod I using AE as needed.      Met   Pt will display improved L UE FMC by completing the 9 hole peg test in 26 seconds or less.    L UE coordination addressed using the BITS.  Ongoing          LTG by: 21      Pt will increase L hand  strength  to 60# for improved performance with IADL tasks.    Focused on L hand pain affecting functional tasks.  Progressing   Pt will complete a handwriting task related to IADL with 100% legibility.  L UE coordination addressed with the BITS.  Ongoing   Pt will increase L UE strength to 4+/5 at all joints for improved IADL performance.   Partially Met                                    Therapy Education/Self Care 77163   Details: Pain management at L hand.    Given Home Exercise Program   Progress: Reinforced   Who provided to: Patient   Level of understanding Verbalized, Demonstrated and Teach back level of understanding   Timed Minutes       ASSESSMENT/PLAN     Assessment/Plan     Assessment:  Pt is wanting to resume driving. Encouraged him to speak with his MD. Pt did struggle with Trail making task using the BITS.  Plan to complete all BITS assessments at next session related to skills for driving. L hand pain has returned, but did decrease with use of modalities during session.    Plan:  Will complete the assessment portion of the BITS.      Fabiana Sandhu, OTR/L  Occupational Therapist

## 2021-04-22 NOTE — PROGRESS NOTES
"Physical Therapy Treatment Note    Patient: Dao Jhaveri                                                                                     Visit Date: 2021  :     1955    Referring practitioner:    Rod De Los Santos MD  Date of Initial Visit:          Type: THERAPY  Noted: 3/10/2021    Patient seen for 12 sessions    Visit Diagnoses:    ICD-10-CM ICD-9-CM   1. Cerebrovascular accident (CVA), unspecified mechanism (CMS/HCC)  I63.9 434.91   2. Left arm weakness  R29.898 729.89   3. Gait disturbance  R26.9 781.2     SUBJECTIVE     Subjective The only thing hurting today is his R hand, which he can now ball into a fist. He thought maybe he was sleeping on it, but hasn't been the past few nights because he made sure of it. He got his second COVID vaccine, which he only had a little soreness in his arm but this is much better today. He hasn't been able to do his walking today, so he's a little stiffer than usual.     PAIN: 6/10 (hand) > 0/10 (hand still hurts)     OBJECTIVE     Objective      Therapeutic Exercises    29468 Comments   Unicam (level 3) 5 min                   Timed Minutes 5     Neuromuscular Reeducation     18303 Comments   Balance over dynamic surface obstacle course Balance beam x2, 2 TheraPads, wav    amb over dynamic surface (mulch around tree, incline/decline (grassy, concrete), grass, tree roots) and ascend/descend curb SBQC; CGA; occasional LOB but able to self-correct, required VCs for forwardly flexed posture, he was more \"stiff\" today which resulted in L hip hiking and trendelenburg; Gym > mailbox and back > one lap around the building, 1 seated rest break       Timed Minutes 33     Therapy Education/Self Care 07649   Details:    [employer] [place] Code: [id number]   Given Home Exercise Program and mobility training   Progress: Reinforced and Progressed   Who provided to: Patient   Level of understanding Verbalized and Demonstrated   Timed Minutes      Total Timed Treatment:     35   " mins  Total Time of Visit:             35   mins         ASSESSMENT/PLAN     GOALS  Goals                                          Progress Note due by 5/8/2021   STG by: 3/24/2021 Comments Status   1. Patient will be able to transfersit to stand to sit without difficulty and without use of hands.  MET   2. Patient will be able to walk 500' without rest on flat surface.   Ongoing   3. Patient will be able to perform SLS on each leg 5 sec safely.   Ongoing   LTG by: 4/7/2021      1. Patient will be able to walk on uneven surfaces for 10 minutes without needing rest.  Addressed today (see neuro table)  Ongoing   2. Patient will be compliant with HEP on a daily basis.    Ongoing   3. Patient will report increased energy at home with less time spent in chair or bed.   MET   4. Good+ hip strength bilaterally  MET     Assessment/Plan     ASSESSMENT: Pt was late today, therefore truncating his full treatment time. Today, we focused primarily on his balance and proprioception across several different dynamic surfaces as well as tandem walking on the balance beam. He did well with the balance beam, with only one LOB. During outside ambulation over dynamic surfaces, especially grass, he did have an occasional LOB, but was able to self-correct without cueing. He demonstrated safe descending technique with SBQC on curb, but did require some VCs and education on descending with his R LE vs his L. At this juncture, it is possible that he may benefit from higher cognitive tasks during balance and gait. He would like to get back to PLOF without an AD and will have to be driving on his own soon, therefore, higher functional cognitive tasks will be increasingly pertinent.     PLAN: Continue to progress B LE strengthening to assist with SLS and challenge his balance as able. Consider higher cognitive level tasks with added balance during gait.     Signature: Sofia Li, JAVON

## 2021-04-26 ENCOUNTER — TREATMENT (OUTPATIENT)
Dept: PHYSICAL THERAPY | Facility: CLINIC | Age: 66
End: 2021-04-26

## 2021-04-26 DIAGNOSIS — R29.898 LEFT ARM WEAKNESS: ICD-10-CM

## 2021-04-26 DIAGNOSIS — Z78.9 IMPAIRED INSTRUMENTAL ACTIVITIES OF DAILY LIVING (IADL): ICD-10-CM

## 2021-04-26 DIAGNOSIS — I63.9 CEREBROVASCULAR ACCIDENT (CVA), UNSPECIFIED MECHANISM (HCC): Primary | ICD-10-CM

## 2021-04-26 DIAGNOSIS — I69.354 HEMIPARESIS AFFECTING LEFT SIDE AS LATE EFFECT OF CEREBROVASCULAR ACCIDENT (CVA) (HCC): Primary | ICD-10-CM

## 2021-04-26 DIAGNOSIS — R26.9 GAIT DISTURBANCE: ICD-10-CM

## 2021-04-26 PROCEDURE — 97110 THERAPEUTIC EXERCISES: CPT | Performed by: OCCUPATIONAL THERAPIST

## 2021-04-26 PROCEDURE — 97530 THERAPEUTIC ACTIVITIES: CPT | Performed by: OCCUPATIONAL THERAPIST

## 2021-04-26 PROCEDURE — 97116 GAIT TRAINING THERAPY: CPT | Performed by: PHYSICAL THERAPIST

## 2021-04-26 PROCEDURE — 97112 NEUROMUSCULAR REEDUCATION: CPT | Performed by: PHYSICAL THERAPIST

## 2021-04-26 NOTE — PROGRESS NOTES
Physical Therapy Treatment Note    Patient: Dao Jhaveri                                                                                     Visit Date: 2021  :     1955    Referring practitioner:    Rod De Los Santos MD  Date of Initial Visit:          Type: THERAPY  Noted: 3/10/2021    Patient seen for 13 sessions    Visit Diagnoses:    ICD-10-CM ICD-9-CM   1. Hemiparesis affecting left side as late effect of cerebrovascular accident (CVA) (CMS/HCC)  I69.354 438.20   2. Gait disturbance  R26.9 781.2     SUBJECTIVE     Subjective He is doing really good.     PAIN: 3/10 (hand) > 0/10     OBJECTIVE     Objective      Gait Training 15043   Activity amb outside with SC   Gait Deviation He would either place the cane to far to R and push himself L or place cane in front of his R foot and end up deviating laterally to the L   Cueing VCs    Assistance CGA   Asst Device SC   Comments Decreased endurance, especially after ~150 ft   Distance Around building x1   Timed Minutes 16     Neuromuscular Reeducation     36786 Comments   Walking with SC with counting by even numbers every step on L 50 ft   Walking with SC with opposite head turns and counting by evens on every step with L LE 1 LOB to L, had to stop after 3-4 steps to reset procedure as he would become unstable at that point, second set, he was able to walk 8 steps before restarting with 1 minor LOB -- 50 ft x2   Walking with skipping a letter of the alphabet every step on R LE 50 ft   L SLS (while walking) during L stance phase naming a place for every letter of the alphabet 50 ft, 26 SLS (26 letters)   L side stepping in hallway with SC 50 ft       Timed Minutes 29     Therapy Education/Self Care 16836   Details:    [employer] Code: [id number]   Given Home Exercise Program and mobility training   Progress: Reinforced and Progressed   Who provided to: Patient   Level of understanding Verbalized and Demonstrated   Timed Minutes      Total Timed Treatment:      45   mins  Total Time of Visit:             45   mins         ASSESSMENT/PLAN     GOALS  Goals                                          Progress Note due by 5/8/2021   STG by: 3/24/2021 Comments Status   1. Patient will be able to transfer sit to stand to sit without difficulty and without use of hands.  MET   2. Patient will be able to walk 500' without rest on flat surface. He continues to demonstrate decreased endurance/increased fatigue beyond 150 ft. However, this is improving  Ongoing   3. Patient will be able to perform SLS on each leg 5 sec safely. Addressed today (see neuro table)  Ongoing   LTG by: 4/7/2021      1. Patient will be able to walk on uneven surfaces for 10 minutes without needing rest.  He continues to require rest with extended periods of walking. But, he is working to improve this each day.   Ongoing   2. Patient will be compliant with HEP on a daily basis.  He reports regular compliance  Ongoing   3. Patient will report increased energy at home with less time spent in chair or bed.  He walks 1 block twice daily. He will use rollator for this.  MET   4. Good+ hip strength bilaterally  MET     Assessment/Plan     ASSESSMENT: Mr. Jhaveri continues to make gains, especially with his dependence on an AD as he is now transitioning from SBQC to SC where able. However, he continues to demonstrate decreased endurance and frequent LOB/instability during tasks which challenge his balance. As a result, we addressed this today with ambulation using SC outside and challenged his balance in the hallway with added higher level cognitive components. He demonstrated increased fatigue and a few LOB, but overall tolerated this well and post-tx, he demonstrated and reported improved balance and feeling more steady overall. He is very motivated to decrease his dependence on an AD even more and wishes to be completely independent by the end of this POC; therefore, we will continue to really emphasize and challenge  his endurance and balance.     PLAN: Continue to progress strengthening as able, placing emphasis on increased sets to aid in increased endurance. Also, consider higher cognitive level tasks with added balance during gait.     Signature: Sofia Li, PTA

## 2021-04-26 NOTE — PROGRESS NOTES
Occupational Therapy Treatment Note     Patient: Dao Jhaveri   : 1955  Referring practitioner: Rod De Los Santos MD  Date of Initial Visit:   Type: THERAPY  Noted: 3/2/2021  Today's Date: 2021  Patient seen for 14 sessions  Visit Diagnoses:    ICD-10-CM ICD-9-CM   1. Cerebrovascular accident (CVA), unspecified mechanism (CMS/HCC)  I63.9 434.91   2. Left arm weakness  R29.898 729.89   3. Impaired instrumental activities of daily living (IADL)  Z78.9 V49.89       SUBJECTIVE      Subjective  0/10 pain in L hand. Talked to MD about driving. Pts doctor said it was up to him when he wanted to resume driving.      OBJECTIVE     Objective      Therapeutic Activities    28875 Comments   BITS assessment portion completed while standing with L hand. Trailing assessment Part A- 9 errors. Trailing assessment Part B- 51 errors. Weaver cancellation completed with 1 error. Maze assessment completed with 4 errors. Visual scanning and reaction time completed- 3 misses, 34 hits in 60 sec, 3.23 sec reaction time.                     Timed Minutes 15     Therapeutic Exercises    31674 Comments   2# weighted ball exercises 2 sets x 20 reps. L shoulder flexion and extension, pronation and supination, elbow flexion and extension, horizontal abduction. 7# digi-flex completed with L hand 2 sets x 15 reps.                     Timed Minutes 30           Total Timed Treatment:     45   mins  Total Time of Visit:            45   mins       Goals                                          Progress Note due by 21   STG by: 21 Comments Status   Pt will be independent with daily completion of a HEP to address L UE deficits.    No concerns with current HEP. Progressing   Pt will complete all bathing and dressing with Mod I using AE as needed.      Met   Pt will display improved L UE FMC by completing the 9 hole peg test in 26 seconds or less.    L UE coordination addressed using the BITS.  Ongoing          LTG by: 21      Pt  will increase L hand  strength to 60# for improved performance with IADL tasks.    Digi-flex completed with L hand. Progressing   Pt will complete a handwriting task related to IADL with 100% legibility.  L UE coordination addressed with the BITS.  Ongoing   Pt will increase L UE strength to 4+/5 at all joints for improved IADL performance.  Addressed with L UE exercises. Partially Met                                    Therapy Education/Self Care 75804   Details:  No issues reported.   Given Home Exercise Program   Progress: Reinforced   Who provided to: Patient   Level of understanding Verbalized, Demonstrated and Teach back level of understanding   Timed Minutes       ASSESSMENT/PLAN     Assessment/Plan     Assessment:  Pain increased to 3/10 after completing L hand exercises. Doctor told pt he is able to resume driving when he feels comfortable to do so.   Pt still feels uncomfortable with driving due to remaining deficits. Pt reports he is satisfied with the accomplishments he has made.  VC required for completion of BITS tasks. No concerns with completion of current HEP.     Plan:  Address L UE strength and coordination for functional task completion.       JORGE Ware Student     The clinical instructor and/or supervising staff, EVE Rocha/SANDY, was present in clinic guiding the student by approving, concurring, and confirming the skilled judgement for all services rendered.        Signature:  EVE Rocha/EVE Barnett/L  Occupational Therapist

## 2021-04-30 ENCOUNTER — TREATMENT (OUTPATIENT)
Dept: PHYSICAL THERAPY | Facility: CLINIC | Age: 66
End: 2021-04-30

## 2021-04-30 DIAGNOSIS — I63.9 CEREBROVASCULAR ACCIDENT (CVA), UNSPECIFIED MECHANISM (HCC): Primary | ICD-10-CM

## 2021-04-30 DIAGNOSIS — R29.898 LEFT ARM WEAKNESS: ICD-10-CM

## 2021-04-30 DIAGNOSIS — R26.9 GAIT DISTURBANCE: ICD-10-CM

## 2021-04-30 DIAGNOSIS — Z78.9 IMPAIRED INSTRUMENTAL ACTIVITIES OF DAILY LIVING (IADL): ICD-10-CM

## 2021-04-30 DIAGNOSIS — I69.354 HEMIPARESIS AFFECTING LEFT SIDE AS LATE EFFECT OF CEREBROVASCULAR ACCIDENT (CVA) (HCC): Primary | ICD-10-CM

## 2021-04-30 PROCEDURE — 97530 THERAPEUTIC ACTIVITIES: CPT | Performed by: OCCUPATIONAL THERAPIST

## 2021-04-30 PROCEDURE — G0283 ELEC STIM OTHER THAN WOUND: HCPCS | Performed by: OCCUPATIONAL THERAPIST

## 2021-04-30 PROCEDURE — 97140 MANUAL THERAPY 1/> REGIONS: CPT | Performed by: OCCUPATIONAL THERAPIST

## 2021-04-30 PROCEDURE — 97112 NEUROMUSCULAR REEDUCATION: CPT | Performed by: PHYSICAL THERAPIST

## 2021-04-30 NOTE — PROGRESS NOTES
Physical Therapy Treatment Note    Patient: Dao Jhaveri                                                                                     Visit Date: 2021  :     1955    Referring practitioner:    Rod De Los Santos MD  Date of Initial Visit:          Type: THERAPY  Noted: 3/10/2021    Patient seen for 14 sessions    Visit Diagnoses:    ICD-10-CM ICD-9-CM   1. Hemiparesis affecting left side as late effect of cerebrovascular accident (CVA) (CMS/[facility])  I69.354 438.20   2. Gait disturbance  R26.9 781.2     SUBJECTIVE     Subjective His hand feels better after OT. Pt denies any falls since last visit.     PAIN: 3/10 (L hand) > 0/10     OBJECTIVE     Objective      Neuromuscular Reeducation     32734 Comments   BITS Visual Pursuit: sequence  NBOS, NBOS L/R of screen, B tandem; Struggled w/ R leading, even with cane.    Rope ladder walk-throughs w/ SC    Rope ladder walk-throughs w/ SC counting by 5s    Rope ladder walk-throughs w/ SC stating colors    Side stepping through rope ladder w/ SC    Alternating cone taps in // bars w/ varying levels of UE support 3x10   3 cone taps in // bars w/ varying levels of support 3x3           Timed Minutes 45      Therapy Education/Self Care 01343   Details:    [employer] Code: [id number]   Given Home Exercise Program and mobility training   Progress: Reinforced and Progressed   Who provided to: Patient   Level of understanding Verbalized and Demonstrated   Timed Minutes      Total Timed Treatment:     45   mins  Total Time of Visit:             45   mins         ASSESSMENT/PLAN     GOALS  Goals                                          Progress Note due by 2021   STG by: 3/24/2021 Comments Status   1. Patient will be able to transfer sit to stand to sit without difficulty and without use of hands.  MET   2. Patient will be able to walk 500' without rest on flat surface.   Ongoing   3. Patient will be able to perform SLS on each leg 5 sec safely. Struggles on L.   Ongoing    LTG by: 4/7/2021      1. Patient will be able to walk on uneven surfaces for 10 minutes without needing rest.    Ongoing   2. Patient will be compliant with HEP on a daily basis.    Ongoing   3. Patient will report increased energy at home with less time spent in chair or bed.   MET   4. Good+ hip strength bilaterally  MET     Assessment/Plan     ASSESSMENT: Today we focused on challenging his dynamic balance involving cognitive components. He did very well at the BITS but did struggle w/ tandem stance w/ R LE leading. The rope ladder appeared to be a little too spaced out and he was unable to take big enough steps to place 1 foot between each rung, therefore, he demonstrated a step-to gait and utilized his SC because the rope ladder reduced his stability. He performed side stepping through the rope ladder better to the L vs R. He struggled to maintain stability w/ full WBing on L LE during // bars activities.     PLAN: Assess goals for progress note next visit!    Signature: Kristy Jacobson, PTA

## 2021-05-04 ENCOUNTER — TREATMENT (OUTPATIENT)
Dept: PHYSICAL THERAPY | Facility: CLINIC | Age: 66
End: 2021-05-04

## 2021-05-04 DIAGNOSIS — I63.9 CEREBROVASCULAR ACCIDENT (CVA), UNSPECIFIED MECHANISM (HCC): Primary | ICD-10-CM

## 2021-05-04 DIAGNOSIS — R29.898 LEFT ARM WEAKNESS: ICD-10-CM

## 2021-05-04 PROCEDURE — 97112 NEUROMUSCULAR REEDUCATION: CPT | Performed by: PHYSICAL THERAPIST

## 2021-05-04 PROCEDURE — 97116 GAIT TRAINING THERAPY: CPT | Performed by: PHYSICAL THERAPIST

## 2021-05-04 PROCEDURE — 97530 THERAPEUTIC ACTIVITIES: CPT | Performed by: PHYSICAL THERAPIST

## 2021-05-04 NOTE — PROGRESS NOTES
Physical Therapy Treatment Note and 30 Day Progress Note    Patient: Dao Jhaveri                                                                                     Visit Date: 2021  :     1955    Referring practitioner:    Rod De Los Santos MD  Date of Initial Visit:          Type: THERAPY  Noted: 3/10/2021    Patient seen for 15 sessions    Visit Diagnoses:    ICD-10-CM ICD-9-CM   1. Cerebrovascular accident (CVA), unspecified mechanism (CMS/HCC)  I63.9 434.91   2. Left arm weakness  R29.898 729.89     SUBJECTIVE     Subjective He feels that he has improved about 88% since his initial evaluation. He feels that he needs the most help with walking and stability because he would like to get to where he can walk without a cane independently. He feels that he has improved the most on his walking and arm/leg movement on the L side. It has really impressed him.     PAIN: 0/10 > 0/10     OBJECTIVE     Objective      Therapeutic Activities    81307 Comments   Addressed all goals for progress note See goals section for comments   Squatting to  cones (3) from inside box at shin level to side table -- reinforce proper squatting form when picking up obj from the floor to decrease fall risk  1 x 5 -- hip strategy present       Timed Minutes 10     Gait Training 24332   Activity amb in hallway with AFO (L) and SC ; amb with SC outside    Gait Deviation L hip hiking (decr hip ext and knee flex d/t decr ankle DF), L lateral lean, scissoring   Cueing Verbal, demonstration    Assistance CGA-SBA   Asst Device SC, AFO   Comments Gait improved significantly with use of AFO   Distance 1 lap around building (outside), 250 ft (1 lap inside)   Timed Minutes 30     Neuromuscular Reeducation     84760 Comments   B LE SLS  L SLS: 0:05.10 -- incr instability and steppage strategy  R SLS: 0:2.35 minutes   B SLS cone taps (3 cones) 1 x 5 with B UE support   1 x 5 without B UE support    Rhythmic step-ups on bottom step of  display model at 55 bpm on metronome Unable to maintain bouchra at 60 bpm, reinforced proper ascend/descend stairs (R up, L down) with pattern, to which he did demo some carryover today       Timed Minutes 14     Therapy Education/Self Care 71501   Details:    [employer] Code: [id number]   Given Home Exercise Program and mobility training   Progress: Reinforced and Progressed   Who provided to: Patient   Level of understanding Verbalized and Demonstrated   Timed Minutes      Total Timed Treatment:     54   mins  Total Time of Visit:             60   mins         ASSESSMENT/PLAN     GOALS  Goals                                          Progress Note due by 6/4/2021   STG by: 3/24/2021 Comments Status   1. Patient will be able to transfer sit to stand to sit without difficulty and without use of hands.  MET   2. Patient will be able to walk 500' without rest on flat surface. amb in hallway (250'), required 1 standing rest break.   Ongoing   3. Patient will be able to perform SLS on each leg 5 sec safely. L SLS: 0:05.10 -- incr instability and steppage strategy    R SLS: 0:2.35 minutes  Ongoing   LTG by: 4/7/2021      1. Patient will be able to walk on uneven surfaces for 10 minutes without needing rest.  Amb around building outside, significant increase in fatigue and few standing rest breaks.   Ongoing   2. Patient will be compliant with HEP on a daily basis.  He is compliant daily and will walk around the block with his rollator 1-2x daily. He is trying to use the rollator less and has started walking ~0.5 blocks with his SC  Ongoing   3. Patient will report increased energy at home with less time spent in chair or bed.   MET   4. Good+ hip strength bilaterally  MET     Assessment/Plan     ASSESSMENT: Today we addressed his goals for his progress note. Since his initial evaluation, he feels that he has improved by about 88%, with his biggest deficits being his balance and gait. He is very dedicated to his HEP and  motivated to ambulate safely without need for use of an AD. During gait analysis, he demonstrated L lateral deviation which he was able to improve with VCs to correct his posture. Additionally, he demonstrated (L) hip hiking d/t decreased L hip extension and knee flexion, likely resulting from L anterior tibialis weakness. When using an AFO on his L, he demonstrated a significant improvement in his gait pattern with decreased hip hiking. Moreover, he demonstrates the most difficulty with slow, controlled movements and static balance as he often relies on momentum during gait, frequently resulting in LOB associated with scissoring gait. He would benefit from continued skilled PT to address his balance deficits, especially with static balance and added cognitive components, and to progress his independence with AD during gait. This should improve his quality of life, increase his independence with ADLs, and decrease his fall risk.     PLAN: Assess Vaughan Balance score. Consider AFO (L) during gait. Continue to progress strengthening as able, placing emphasis on increased sets to aid in increased endurance. Also, consider higher cognitive level tasks with static balance, especially with EC to decrease reliance on visual imput.     Signature: Sofia Li, PTA

## 2021-05-05 NOTE — PROGRESS NOTES
Progress Note Addendum      Patient: Dao Jhaveri           : 1955  Visit Date: 2021  Referring practitioner: Rod De Los Santos MD  Date of Initial Visit: Type: THERAPY  Noted: 3/10/2021  Patient seen for 15 sessions  Visit Diagnoses:    ICD-10-CM ICD-9-CM   1. Cerebrovascular accident (CVA), unspecified mechanism (CMS/HCC)  I63.9 434.91   2. Left arm weakness  R29.898 729.89          Clinical Progress: improved  Home Program Compliance: Yes  Treatment has included: therapeutic exercise, neuromuscular re-education, manual therapy, therapeutic activity and gait training  Progress toward previous goals: Partially Met  Prognosis to achieve goals: good    Subjective     Objective     Assessment & Plan     Assessment  Assessment details: Will add electrical stimulation to POC for dorsiflexion assist and retraining.   Prognosis: good    Plan  Therapy options: will be seen for skilled physical therapy services  Planned modality interventions: electrical stimulation/[place]ian stimulation  Frequency: 2x week  Duration in weeks: 6  Treatment plan discussed with: PTA        I have reviewed the progress note information provided by Sofia Li PTA, and I concur with the findings.    Heidy Prince, PT, DPT, CLT-MASOUD  Physical Therapist        Updated POC  Certification Period: 8/10/2021  I certify that the therapy services are furnished while this patient is under my care.  The services outlined above are required by this patient, and will be reviewed every 90 days.     PHYSICIAN:     Rod De Los Santos MD______________________________________DATE: _________    Please sign and return via fax to [phone].   Thank you so much for letting us work with Dao. I appreciate your letting us work with your patients. If you have any questions or concerns, please don't hesitate to contact me.

## 2021-05-06 ENCOUNTER — TREATMENT (OUTPATIENT)
Dept: PHYSICAL THERAPY | Facility: CLINIC | Age: 66
End: 2021-05-06

## 2021-05-06 DIAGNOSIS — I63.9 CEREBROVASCULAR ACCIDENT (CVA), UNSPECIFIED MECHANISM (HCC): Primary | ICD-10-CM

## 2021-05-06 DIAGNOSIS — Z78.9 IMPAIRED INSTRUMENTAL ACTIVITIES OF DAILY LIVING (IADL): ICD-10-CM

## 2021-05-06 DIAGNOSIS — R29.898 LEFT ARM WEAKNESS: ICD-10-CM

## 2021-05-06 DIAGNOSIS — R26.9 GAIT DISTURBANCE: ICD-10-CM

## 2021-05-06 PROCEDURE — 97112 NEUROMUSCULAR REEDUCATION: CPT | Performed by: PHYSICAL THERAPIST

## 2021-05-06 PROCEDURE — 97116 GAIT TRAINING THERAPY: CPT | Performed by: PHYSICAL THERAPIST

## 2021-05-06 PROCEDURE — 97140 MANUAL THERAPY 1/> REGIONS: CPT | Performed by: OCCUPATIONAL THERAPIST

## 2021-05-06 PROCEDURE — 97110 THERAPEUTIC EXERCISES: CPT | Performed by: PHYSICAL THERAPIST

## 2021-05-06 PROCEDURE — 97530 THERAPEUTIC ACTIVITIES: CPT | Performed by: OCCUPATIONAL THERAPIST

## 2021-05-06 NOTE — PROGRESS NOTES
"Physical Therapy Treatment Note    Patient: Dao Jhaveri                                                                                     Visit Date: 2021  :     1955    Referring practitioner:    Rod De Los Santos MD  Date of Initial Visit:          Type: THERAPY  Noted: 3/10/2021    Patient seen for 16 sessions    Visit Diagnoses:    ICD-10-CM ICD-9-CM   1. Cerebrovascular accident (CVA), unspecified mechanism (CMS/HCC)  I63.9 434.91   2. Gait disturbance  R26.9 781.2     SUBJECTIVE     Subjective He is \"really making it\" today. He's doing real good. He's been doing his exercises about 3x a day and is walking 2 blocks each direction and back 3x per day. He's not been able to stand like this in a really long time, it feels really good. He's eventually going to get back in his car and drive a little bit.     PAIN: 6/10 (shoulder/arm, especially hand) > 0/10    PT G-Codes  Outcome Measure Options: Vaughan Balance  Vaughan Total Score: 47  OBJECTIVE     Objective      Gait Training 44570   Activity amb around cul-de-sac with AFO and SC    Gait Deviation L lateral lean, compensates with hip and knee flexors   Cueing Verbal, tactile, demonstration    Assistance CGA   Asst Device SC (AFO)   Comments    Distance Clinic <> cul-de-sac   Timed Minutes 15     Therapeutic Exercises    66059 Comments   Lateral shuffles with red can do  30 ft x2   Monster walks with red can do  30 ft x2   Shuttle press (4 cords) with BL pad under ft            Timed Minutes 15     Neuromuscular Reeducation     23611 Comments   Obtained Vaughan Balance score  47 today   27 initial evaluation       Timed Minutes 15     Therapy Education/Self Care 75784   Details:    [employer] Code: [id number]   Given Home Exercise Program and mobility training   Progress: Reinforced and Progressed   Who provided to: Patient   Level of understanding Verbalized and Demonstrated   Timed Minutes      Total Timed Treatment:     45   mins  Total Time of Visit:         "     45   mins         ASSESSMENT/PLAN     GOALS  Goals                                          Progress Note due by 5/8/2021   STG by: 3/24/2021 Comments Status   1. Patient will be able to transfer sit to stand to sit without difficulty and without use of hands.  MET   2. Patient will be able to walk 500' without rest on flat surface. He continues to demonstrate decreased endurance/increased fatigue beyond 150 ft. However, this is improving  Ongoing   3. Patient will be able to perform SLS on each leg 5 sec safely. Addressed today (see neuro table)  Ongoing   LTG by: 4/7/2021      1. Patient will be able to walk on uneven surfaces for 10 minutes without needing rest.  He continues to require rest with extended periods of walking. But, he is working to improve this each day.   Ongoing   2. Patient will be compliant with HEP on a daily basis.  He reports regular compliance  Ongoing   3. Patient will report increased energy at home with less time spent in chair or bed.  He walks 1 block twice daily. He will use rollator for this.  MET   4. Good+ hip strength bilaterally  MET     Assessment/Plan     ASSESSMENT: He continues to benefit from use of (L) AFO; however, at times, especially as fatigue increases, he will begin to rely on it and begin to compensate with his hip and knee flexors. Additionally, his Vaughan balance score has increased significantly, demonstrating a score of 47 today vs 27 during his initial evaluation. However, whenever he is required to extend beyond his VINNY, he demonstrates increased instability and subsequent LOB.     PLAN: Consider NMES to L anterior tibialis to improve strength, endurance, and activation of the muscle to assist with gait.     Signature: Sofia Li, JAVON

## 2021-05-06 NOTE — PROGRESS NOTES
Occupational Therapy Treatment Note     Patient: Dao Jhaveri   : 1955  Referring practitioner: Rod De Los Santos MD  Date of Initial Visit:   Type: THERAPY  Noted: 3/2/2021  Today's Date: 2021  Patient seen for 16 sessions  Visit Diagnoses:    ICD-10-CM ICD-9-CM   1. Cerebrovascular accident (CVA), unspecified mechanism (CMS/HCC)  I63.9 434.91   2. Left arm weakness  R29.898 729.89   3. Impaired instrumental activities of daily living (IADL)  Z78.9 V49.89       SUBJECTIVE      Subjective  L hand pain 6/10. Pain is worse in the morning and during gripping tasks. Pt thinks it may be arthritis.  He has not resumed driving yet.       OBJECTIVE     Objective      Therapeutic Activities    87856 Comments   The assessment portion of the BITS was completed in standing using B UE. 2 errors with trail making part A. 3 errors with part B. 1 missed item on the bell cancellation test. No errors on the maze assessment. Visual scanning and reaction time assessment attempted. Pt was unable to pass level 1 scoring 45/50 required hits.     Remaining BITS tasks completed focusing on UE coordination. Accuracy ranged from 88-97%. Reaction time was 1.30 to 3.08 seconds.                 Timed Minutes 34          Manual Therapy     38729  Comments    Moist heat applied to L hand followed by joint traction at L wrist and finger joints for pain reduction.  Pain decreased to 2/10 by end of session.                    Timed Minutes 8          Total Timed Treatment:     42   mins  Total Time of Visit:            42   mins       Goals                                          Progress Note due by 21   STG by: 21 Comments Status   Pt will be independent with daily completion of a HEP to address L UE deficits.    Reviewed L hand pain management techniques.  Progressing   Pt will complete all bathing and dressing with Mod I using AE as needed.      Met   Pt will display improved L UE FMC by completing the 9 hole peg test in 26  seconds or less.    The BITS was used for L UE coordination.  Ongoing          LTG by: 5/20/21      Pt will increase L hand  strength to 60# for improved performance with IADL tasks.    Addressed L hand pain affecting  strength.  Progressing   Pt will complete a handwriting task related to IADL with 100% legibility.  L UE coordination addressed.  Ongoing   Pt will increase L UE strength to 4+/5 at all joints for improved IADL performance.   Partially Met                                    Therapy Education/Self Care 72243   Details: L hand pain management techniques.    Given Home Exercise Program   Progress: Reinforced   Who provided to: Patient   Level of understanding Verbalized   Timed Minutes       ASSESSMENT/PLAN     Assessment/Plan     Assessment:  Pt is still having L hand pain and this is affecting gripping tasks. Manual techniques did decrease pain. He continues to struggle with the BITS assessments related to driving.  He feels that his stroke symptoms at the L UE continue to improve. No new concerns reported at home. Pt is progressing well.       Plan:   Will address L UE strength and L hand FMC.      Fabiana Sandhu, OTR/L  Occupational Therapist

## 2021-05-11 ENCOUNTER — TREATMENT (OUTPATIENT)
Dept: PHYSICAL THERAPY | Facility: CLINIC | Age: 66
End: 2021-05-11

## 2021-05-11 DIAGNOSIS — I63.9 CEREBROVASCULAR ACCIDENT (CVA), UNSPECIFIED MECHANISM (HCC): Primary | ICD-10-CM

## 2021-05-11 DIAGNOSIS — R26.9 GAIT DISTURBANCE: ICD-10-CM

## 2021-05-11 PROCEDURE — 97110 THERAPEUTIC EXERCISES: CPT | Performed by: PHYSICAL THERAPIST

## 2021-05-11 PROCEDURE — 97112 NEUROMUSCULAR REEDUCATION: CPT | Performed by: PHYSICAL THERAPIST

## 2021-05-11 NOTE — PROGRESS NOTES
"Physical Therapy Treatment Note    Patient: Dao Jhaveri                                                                                     Visit Date: 2021  :     1955    Referring practitioner:    Rod De Los Santos MD  Date of Initial Visit:          Type: THERAPY  Noted: 3/10/2021    Patient seen for 17 sessions    Visit Diagnoses:    ICD-10-CM ICD-9-CM   1. Cerebrovascular accident (CVA), unspecified mechanism (CMS/HCC)  I63.9 434.91   2. Gait disturbance  R26.9 781.2     SUBJECTIVE     Subjective He is doing good today, he has no pain today. \"Not even in his hand\".    PAIN: 0/10 > 0/10     OBJECTIVE     Objective     Therapeutic Exercises    73179 Comments   B LE eccentric calf raises on 3\" step 2 x 10 -- frequent VCs for equal WB during raise -- increased fatigue, required rest after rep # 2, set #2   L resisted PF w/ green ball under wobble board 2 x 10    Resisted (L) DF with RTB 2 x 10        Timed Minutes 18     Neuromuscular Reeducation     02441 Comments   NMES to R tibialis anterior ([place]izen of [place], 16 mHz, preset #1) with ankle pumps during on time    A/P WS on wobble board  2 x 10        Timed Minutes 23     Therapy Education/Self Care 08417   Details:    [employer] Code: [id number]   Given Home Exercise Program and mobility training   Progress: Reinforced and Progressed   Who provided to: Patient   Level of understanding Verbalized and Demonstrated   Timed Minutes      Total Timed Treatment:     41   mins  Total Time of Visit:             45   mins         ASSESSMENT/PLAN     GOALS  Goals                                          Progress Note due by 2021   STG by: 3/24/2021 Comments Status   1. Patient will be able to transfer sit to stand to sit without difficulty and without use of hands.  MET   2. Patient will be able to walk 500' without rest on flat surface.  Ongoing   3. Patient will be able to perform SLS on each leg 5 sec safely.  Ongoing   LTG by: 2021      1. Patient will be able " to walk on uneven surfaces for 10 minutes without needing rest.   Ongoing   2. Patient will be compliant with HEP on a daily basis.  Introduced resisted DF to be added to his HEP in future sessions as appropriate.  Ongoing   3. Patient will report increased energy at home with less time spent in chair or bed.   MET   4. Good+ hip strength bilaterally  MET     Assessment/Plan     ASSESSMENT: Pt continues to demonstrate decreased endurance on L LE during ambulation, specifically with L ankle PF and DF, which significantly increases his instability and subsequent fall risk. We addressed this today with NMES to his L tibialis anterior and followed with ankle PF/DF strengthening. During previous sessions, he has demonstrated improved gait pattern, increased stability, and decreased fatigue while ambulating with an AFO on the L, indicating he would greatly benefit from strengthening to these muscle groups.         PLAN: Assess long term reaction to tx today, especially NMES. If appropriate, continue NMES to L tibialis anterior to improve strength, endurance, and muscle activation to assist with endurance during gait. Continue to reinforce (L) DF strengthening, adding resisted DF with RTB to his HEP if appropriate. Assess carry over with gait following tx this date.     Signature: Sofia Li, PTA

## 2021-05-13 ENCOUNTER — TREATMENT (OUTPATIENT)
Dept: PHYSICAL THERAPY | Facility: CLINIC | Age: 66
End: 2021-05-13

## 2021-05-13 DIAGNOSIS — I63.9 CEREBROVASCULAR ACCIDENT (CVA), UNSPECIFIED MECHANISM (HCC): ICD-10-CM

## 2021-05-13 DIAGNOSIS — R29.898 LEFT ARM WEAKNESS: ICD-10-CM

## 2021-05-13 DIAGNOSIS — Z78.9 IMPAIRED INSTRUMENTAL ACTIVITIES OF DAILY LIVING (IADL): ICD-10-CM

## 2021-05-13 DIAGNOSIS — R26.9 GAIT DISTURBANCE: Primary | ICD-10-CM

## 2021-05-13 DIAGNOSIS — I63.9 CEREBROVASCULAR ACCIDENT (CVA), UNSPECIFIED MECHANISM (HCC): Primary | ICD-10-CM

## 2021-05-13 PROCEDURE — 97110 THERAPEUTIC EXERCISES: CPT | Performed by: OCCUPATIONAL THERAPIST

## 2021-05-13 PROCEDURE — 97110 THERAPEUTIC EXERCISES: CPT | Performed by: PHYSICAL THERAPIST

## 2021-05-13 PROCEDURE — 97112 NEUROMUSCULAR REEDUCATION: CPT | Performed by: PHYSICAL THERAPIST

## 2021-05-13 NOTE — PROGRESS NOTES
"Physical Therapy Treatment Note    Patient: Dao Jhaveri                                                                                     Visit Date: 2021  :     1955    Referring practitioner:    Rod De Los Santos MD  Date of Initial Visit:          Type: THERAPY  Noted: 3/10/2021    Patient seen for 18 sessions    Visit Diagnoses:    ICD-10-CM ICD-9-CM   1. Gait disturbance  R26.9 781.2   2. Cerebrovascular accident (CVA), unspecified mechanism (CMS/HCC)  I63.9 434.91     SUBJECTIVE     Subjective No changes or complaints    PAIN: 0/10 (7/10 in hand and wrist) > 0/10     OBJECTIVE     Objective     Therapeutic Exercises    77574 Comments   B LE eccentric calf raises on 3\" step 2 x 10 ea   L resisted PF and DF with green ball under wobble board 2 x 10 ea   Resisted (L) DF with RTB 2 x 10    B LE runner's stretch    Step ups onto 8\" step 1 x 10    Step up onto 6\" step at 60 bpm metronome  Struggled with maintaining bouchra and proper advancement of B LE (lead (ascend) with R and follow (descend) with L)       Timed Minutes 24     Neuromuscular Reeducation     70031 Comments   NMES to L tibialis anterior (Russian, 10 mA, preset #2) with ankle pumps during on time        Timed Minutes 15     Therapy Education/Self Care 16401   Details:    C[employer] Code: [id number]   Given Home Exercise Program and mobility training   Progress: Reinforced and Progressed   Who provided to: Patient   Level of understanding Verbalized and Demonstrated   Timed Minutes      Total Timed Treatment:     39   mins  Total Time of Visit:             40   mins         ASSESSMENT/PLAN     GOALS  Goals                                          Progress Note due by 2021   STG by: 3/24/2021 Comments Status   1. Patient will be able to transfer sit to stand to sit without difficulty and without use of hands.  MET   2. Patient will be able to walk 500' without rest on flat surface.  Ongoing   3. Patient will be able to perform SLS on " each leg 5 sec safely.  Ongoing   LTG by: 4/7/2021      1. Patient will be able to walk on uneven surfaces for 10 minutes without needing rest.   Ongoing   2. Patient will be compliant with HEP on a daily basis.  Introduced resisted DF to be added to his HEP in future sessions as appropriate.  Ongoing   3. Patient will report increased energy at home with less time spent in chair or bed.   MET   4. Good+ hip strength bilaterally  MET     Assessment/Plan     ASSESSMENT: Today, we continued to address his L ankle tibialis anterior strength with NMES and following with resisted exercises. He continues to demonstrate difficulty with his bouchra, which often reflects in his gait pattern and occasionally will result in LOB. Additionally, he continues to require VCs for ascend/descend safely.    PLAN:  Continue to reinforce (L) DF strengthening, adding resisted DF with RTB to his HEP if appropriate. Assess carry over with gait following tx this date. Address his balance and bouchra.     Signature: Sofia Li, PTA

## 2021-05-13 NOTE — PROGRESS NOTES
Occupational Therapy Treatment Note     Patient: Dao hJaveri   : 1955  Referring practitioner: Rod De Los Santos MD  Date of Initial Visit:   Type: THERAPY  Noted: 3/2/2021  Today's Date: 2021  Patient seen for 17 sessions  Visit Diagnoses:    ICD-10-CM ICD-9-CM   1. Cerebrovascular accident (CVA), unspecified mechanism (CMS/HCC)  I63.9 434.91   2. Left arm weakness  R29.898 729.89   3. Impaired instrumental activities of daily living (IADL)  Z78.9 V49.89       SUBJECTIVE      Subjective  Pt reports he is still not driving. His L hand and wrist continue to be painful with 6/10 reported today.      OBJECTIVE     Objective      Therapeutic Exercises    07799 Comments   Moist heat applied to L hand followed by joint traction at L wrist and finger joints for pain reduction.  Pain decreased to 4/10.    4# dowel bar used for UE strengthening 20 reps x 1 set in all planes of the shoulder, elbow, scapula and wrist. Medium resistance ring used for L hand  and lateral pinch strengthening 20 reps x 1 set each.                 Timed Minutes 42        Total Timed Treatment:     42   mins  Total Time of Visit:            42   mins       Goals                                          Progress Note due by 21   STG by: 21 Comments Status   Pt will be independent with daily completion of a HEP to address L UE deficits.    Reviewed UE strengthening and ROM.  Progressing   Pt will complete all bathing and dressing with Mod I using AE as needed.      Met   Pt will display improved L UE FMC by completing the 9 hole peg test in 26 seconds or less.     Ongoing          LTG by: 21      Pt will increase L hand  strength to 60# for improved performance with IADL tasks.    L hand  and pinch strength addressed.  Progressing   Pt will complete a handwriting task related to IADL with 100% legibility.   Ongoing   Pt will increase L UE strength to 4+/5 at all joints for improved IADL performance.  L UE  strength addressed.  Partially Met                                    Therapy Education/Self Care 52506   Details: Reviewed L UE strengthening and ROM exercises.    Given Home Exercise Program   Progress: Reinforced   Who provided to: Patient   Level of understanding Verbalized   Timed Minutes       ASSESSMENT/PLAN     Assessment/Plan     Assessment:  Pt continues to have L hand pain, but this did decrease during session. L UE weakness is still noted and affects IADL.  Pt reports his impaired balance affects outdoor tasks and playing with grandchildren. No issues with HEP completion.  Pt continues to make progress with most goals.     Plan:  Will complete a 30 day progress note next week.     Fabiana Sandhu, OTR/L  Occupational Therapist

## 2021-05-17 ENCOUNTER — TREATMENT (OUTPATIENT)
Dept: PHYSICAL THERAPY | Facility: CLINIC | Age: 66
End: 2021-05-17

## 2021-05-17 DIAGNOSIS — R29.898 LEFT ARM WEAKNESS: ICD-10-CM

## 2021-05-17 DIAGNOSIS — Z78.9 IMPAIRED INSTRUMENTAL ACTIVITIES OF DAILY LIVING (IADL): ICD-10-CM

## 2021-05-17 DIAGNOSIS — I69.354 HEMIPARESIS AFFECTING LEFT SIDE AS LATE EFFECT OF CEREBROVASCULAR ACCIDENT (CVA) (HCC): ICD-10-CM

## 2021-05-17 DIAGNOSIS — I63.9 CEREBROVASCULAR ACCIDENT (CVA), UNSPECIFIED MECHANISM (HCC): Primary | ICD-10-CM

## 2021-05-17 DIAGNOSIS — R26.9 GAIT DISTURBANCE: ICD-10-CM

## 2021-05-17 PROCEDURE — 97116 GAIT TRAINING THERAPY: CPT | Performed by: PHYSICAL THERAPIST

## 2021-05-17 PROCEDURE — 97530 THERAPEUTIC ACTIVITIES: CPT | Performed by: OCCUPATIONAL THERAPIST

## 2021-05-17 PROCEDURE — 97110 THERAPEUTIC EXERCISES: CPT | Performed by: PHYSICAL THERAPIST

## 2021-05-17 NOTE — PROGRESS NOTES
Physical Therapy Treatment Note    Patient: Dao Jhaveri                                                                                     Visit Date: 2021  :     1955    Referring practitioner:    Rod De Los Santos MD  Date of Initial Visit:          Type: THERAPY  Noted: 3/10/2021    Patient seen for 19 sessions    Visit Diagnoses:    ICD-10-CM ICD-9-CM   1. Cerebrovascular accident (CVA), unspecified mechanism (CMS/[facility])  I63.9 434.91   2. Gait disturbance  R26.9 781.2   3. Hemiparesis affecting left side as late effect of cerebrovascular accident (CVA) (CMS/[facility])  I69.354 438.20     SUBJECTIVE     Subjective He denies any pain today even in his hands.     PAIN: 0/10 no pain 0/10 even in his hands post session.      OBJECTIVE     Objective     Therapeutic Exercises    03479 Comments   Standing calf raises  2 x 10 Added to HEP    Resisted (L) PF with RTB  2 X 10    Resisted (L) DF with RTB 2 x 10 Added to HEP    BLE Shuttle press calf raises 3 cords focus on eccentric control 1 x 20    LLE shuttle press calf raises 2 cords focus on eccentric control  2 x 10    Standing wall ball hip abduction/flexion 2 x 10 BLE unsteady standing on LLE        Timed Minutes 30     Gait Training 77951   Activity  Gait training in hallway with and without SC for endurance.    Gait Deviation    Cueing Cues for pace    Assistance Independent    Asst Device SC   Distance 440 feet before requiring standing rest break with cane, another 200 feet without cane but 1 LOB able to self correct.    Timed Minutes 10        Therapy Education/Self Care 88468   Details: Added DF with RTB and calf raises 2 x 10    [facility] Code: [id number]   Given Home Exercise Program and mobility training   Progress: Reinforced and Progressed   Who provided to: Patient   Level of understanding Verbalized and Demonstrated   Timed Minutes      Total Timed Treatment:     40   mins  Total Time of Visit:             40   mins         ASSESSMENT/PLAN      GOALS  Goals                                          Progress Note due by 5/8/2021   STG by: 3/24/2021 Comments Status   1. Patient will be able to transfer sit to stand to sit without difficulty and without use of hands.  MET   2. Patient will be able to walk 500' without rest on flat surface. 440 feet with one standing rest break with cane.  Ongoing   3. Patient will be able to perform SLS on each leg 5 sec safely.  Ongoing   LTG by: 4/7/2021      1. Patient will be able to walk on uneven surfaces for 10 minutes without needing rest.  4 minutes today  Ongoing   2. Patient will be compliant with HEP on a daily basis.  Introduced resisted DF to be added to his HEP in future sessions as appropriate.  Ongoing   3. Patient will report increased energy at home with less time spent in chair or bed.   MET   4. Good+ hip strength bilaterally  MET     Assessment/Plan     ASSESSMENT: Mr. Jhaveri is doing well and was having no pain today. He reports wanting to work on weaning from the cane to using no AD. With gait training today he was able to increase his distance both with and without the cane but he fatigued quicker without. He had 1 LOB without the cane but was able to self correct. He fatigued by the end of the session overall and struggled with wall ball standing exercises especially when focusing on LLE stability. Two new components were added to HEP for DF strengthening.     PLAN: Assess response to added HEP components and continue to progress strengthening and challenge balance while Improving his endurance.     Signature: Ana Heck, PTA

## 2021-05-17 NOTE — PROGRESS NOTES
Occupational Therapy Treatment Note     Patient: Dao Jhaveri   : 1955  Referring practitioner: Rod De Los Santos MD  Date of Initial Visit:   Type: THERAPY  Noted: 3/2/2021  Today's Date: 2021  Patient seen for 18 sessions  Visit Diagnoses:    ICD-10-CM ICD-9-CM   1. Cerebrovascular accident (CVA), unspecified mechanism (CMS/HCC)  I63.9 434.91   2. Left arm weakness  R29.898 729.89   3. Impaired instrumental activities of daily living (IADL)  Z78.9 V49.89       SUBJECTIVE      Subjective  Pt states 0/10 pain in L hand. Pt reported he is not having any difficulty at home with ADLs.      OBJECTIVE     Objective      Therapeutic Activities    58034 Comments   Completed tracing letters worksheet using L hand with 90-95% accuracy. Writing sentences worksheet completed using L hand with 75% legibility. Velcro cube board activity completed using medium resistance clip with L hand, Min difficulty. Completed picking up different sized balls using [place] tweezers with L hand, Mod drops noted. Inserting/removing different sized beads into theraputty completed with L hand, difficulty removing the smallest beads. Light resistance foam 2 sets x 20 reps completed using L hand for  strength. 6.6# flex ring exercises completed with L hand for  strength, 1 set x 20 reps.                     Timed Minutes 46        Total Timed Treatment:     46   mins  Total Time of Visit:            46   mins       Goals                                          Progress Note due by 21   STG by: 21 Comments Status   Pt will be independent with daily completion of a HEP to address L UE deficits.    No concerns for current HEP.  Progressing   Pt will complete all bathing and dressing with Mod I using AE as needed.      Met   Pt will display improved L UE FMC by completing the 9 hole peg test in 26 seconds or less.    Addressed with FMC activities.  Ongoing          LTG by: 21      Pt will increase L hand  strength  to 60# for improved performance with IADL tasks.    Addressed with flex ring and resistance foam exercises. Progressing   Pt will complete a handwriting task related to IADL with 100% legibility.  Addressed with tracing letters (90-95% accuracy), and writing sentences worksheets (75% legible) Ongoing   Pt will increase L UE strength to 4+/5 at all joints for improved IADL performance.   Partially Met                                    Therapy Education/Self Care 38212   Details: Educated pt on hand strengthening activities he could do at home such as putting change into theraputty.   Given Home Exercise Program   Progress: Reinforced   Who provided to: Patient   Level of understanding Verbalized   Timed Minutes       ASSESSMENT/PLAN     Assessment/Plan     Assessment:  Pt reported he was in no pain today. Pt stated he is not having any difficulty at home with ADLs. Pt still displays L hand FMC deficits and L UE weakness.   No pain reported after session.    Plan:  Will complete a 30 day progress note next session.    JORGE Worley Student    The clinical instructor and/or supervising staff, EVE Rocha/L, was present in clinic guiding the student by approving, concurring, and confirming the skilled judgement for all services rendered.       Signature:  Fabiana Sandhu OTR/L       Fabiana Sandhu OTR/L  Occupational Therapist

## 2021-05-18 RX ORDER — SODIUM BICARBONATE 325 MG/1
325 TABLET ORAL 3 TIMES DAILY
Qty: 90 TABLET | Refills: 3 | OUTPATIENT
Start: 2021-05-18

## 2021-05-18 RX ORDER — HYDRALAZINE HYDROCHLORIDE AND ISOSORBIDE DINITRATE 37.5; 2 MG/1; MG/1
1 TABLET, FILM COATED ORAL 3 TIMES DAILY
Qty: 90 TABLET | Refills: 3 | OUTPATIENT
Start: 2021-05-18

## 2021-05-20 ENCOUNTER — TREATMENT (OUTPATIENT)
Dept: PHYSICAL THERAPY | Facility: CLINIC | Age: 66
End: 2021-05-20

## 2021-05-20 DIAGNOSIS — R29.898 LEFT ARM WEAKNESS: ICD-10-CM

## 2021-05-20 DIAGNOSIS — I63.9 CEREBROVASCULAR ACCIDENT (CVA), UNSPECIFIED MECHANISM (HCC): Primary | ICD-10-CM

## 2021-05-20 DIAGNOSIS — I69.354 HEMIPARESIS AFFECTING LEFT SIDE AS LATE EFFECT OF CEREBROVASCULAR ACCIDENT (CVA) (HCC): ICD-10-CM

## 2021-05-20 DIAGNOSIS — Z78.9 IMPAIRED INSTRUMENTAL ACTIVITIES OF DAILY LIVING (IADL): ICD-10-CM

## 2021-05-20 DIAGNOSIS — R26.9 GAIT DISTURBANCE: ICD-10-CM

## 2021-05-20 PROCEDURE — 97530 THERAPEUTIC ACTIVITIES: CPT | Performed by: OCCUPATIONAL THERAPIST

## 2021-05-20 PROCEDURE — 97110 THERAPEUTIC EXERCISES: CPT | Performed by: PHYSICAL THERAPIST

## 2021-05-20 PROCEDURE — 97535 SELF CARE MNGMENT TRAINING: CPT | Performed by: OCCUPATIONAL THERAPIST

## 2021-05-20 RX ORDER — SODIUM BICARBONATE 325 MG/1
325 TABLET ORAL 3 TIMES DAILY
Qty: 90 TABLET | Refills: 1 | OUTPATIENT
Start: 2021-05-20

## 2021-05-20 NOTE — PROGRESS NOTES
Occupational Therapy 90 Day Progress Note and Recertification    Patient: aDo Jhaveri   : 1955  Referring practitioner: Rod De Los Santos MD  Date of Initial Visit: 2021  Today's Date: 2021  Patient seen for 19 sessions    Visit Diagnoses:    ICD-10-CM ICD-9-CM   1. Cerebrovascular accident (CVA), unspecified mechanism (CMS/HCC)  I63.9 434.91   2. Left arm weakness  R29.898 729.89   3. Impaired instrumental activities of daily living (IADL)  Z78.9 V49.89       SUBJECTIVE     Outcome Measure: QuickDASH: 20% impaired at the L UE.   9 hole peg test: Left: 24.19, improved.     Right: 25.53    Subjective   7/10 L hand and shoulder pain. No issues with typing. Pt reports his handwriting has returned to normal legibility.     OBJECTIVE     Objective          Strength/Myotome Testing     Left Wrist/Hand      (2nd hand position)     Trial 1: 26 lbs    Trial 2: 24 lbs    Trial 3: 23 lbs    Average: 24.33 lbs    Right Wrist/Hand      (2nd hand position)     Trial 1: 60 lbs    Trial 2: 50 lbs    Trial 3: 62 lbs    Average: 57.33 lbs    Ambulation     Comments   Mod I for functional mobility with a straight cane. Reports L hip hurts at times with mobility.      Functional Assessment     Comments  ADL  Dressing: Mod I  Bathing: Mod I with shower chair.  Toileting: Mod I  Grooming: Ind  Feeding: Mod I    IADL: Only completing light standing IADL. Reports difficulty carrying items. Spouse is performing all other IADL tasks.  Encouraged pt to resume IADL as he feels comfortable.            General Comments     Shoulder Comments   B UE AROM is WNL. Strength is now 5/5 at B UE.      Vision assessment: wears corrected lenses/contact. 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 Occasional blurriness with reading in L eye.     Cognitive status: oriented to Person, Place, Time and Situation    Sensation: no sensory deficits noted.    Tone: Grossly Normal    Midline orientation: Midline    Fine Motor:  - Rapid alternating movements of  the hands, fingers, and forearms: Able to perform   - Sequential finger-to-thumb opposition: Able to perform   - Alternates fists and finger-to-nose tests: Able to perform    Cerebellar exam: finger to nose without dysmetria and rapid alternating movements in the upper extremities were normal    Therapy Education/Self Care 90783   Details: D/C planning. Educated pt to discuss L hand pain with MD.    Given Home Exercise Program   Progress: Progressed   Who provided to: Patient   Level of understanding Verbalized and Demonstrated   Timed Minutes        Therapeutic Activities    50836 Comments   All goals addressed and outcome measures completed for progress note.     Moist heat applied to L shoulder and hand for pain reduction following by L wrist and L hand MP joint traction. Pain decreased to 4/10.                 Timed Minutes 30     Self-care/IADL training    92587 Comments   Supervision and min cues to carry items in L hand while completing functional mobility with straight cane in R hand. Min cues for safety with opening cabinets and reaching high and low in kitchen simulating IADL.                     Timed Minutes 15         Total Timed Treatment:     45   mins  Total Time of Visit:            45   mins    ASSESSMENT/PLAN     Assessment & Plan     Assessment  Impairments: activity intolerance, impaired physical strength, pain with function and weight-bearing intolerance  Assessment details: Pt has met all goals except 2. His remaining deficits are L hand pain, weakness and impaired balance affecting IADL performance. He is doing well with his HEP. He has 2 remaining visits approved by insurance. Will focus on L hand deficits and IADL until d/c. Encouraged pt to contact his doctor about his ongoing L hand and occasional L shoulder pain.   Prognosis: good  Functional Limitations: carrying objects, lifting, walking, pulling, pushing, uncomfortable because of pain and unable to perform repetitive  tasks  Plan  Planned modality interventions: TENS, ultrasound and low level laser therapy  Planned therapy interventions: manual therapy, motor coordination training, neuromuscular re-education, balance/weight-bearing training, ADL retraining, fine motor coordination training, functional ROM exercises, home exercise program, IADL retraining, therapeutic activities, transfer training, stretching and strengthening  Frequency: 2x week (1-2x/week)  Duration in weeks: 4  Treatment plan discussed with: patient and family  Plan details: Plan to d/c within the next 1-2 visits if no new concerns arise.       Goals                                          Progress Note due by 6/19/21   STG by: 6/19/21 Comments Status   Pt will be independent with daily completion of a HEP to address L UE deficits.   No issues reported with current HEP. Will continue goal for modifications as needed.  Progressing   Pt will complete all bathing and dressing with Mod I using AE as needed.     Met   Pt will display improved L UE FMC by completing the 9 hole peg test in 26 seconds or less.   Improved to 24.19 seconds. Goal met today.  Met        LTG by: 6/19/21     Pt will increase L hand  strength to 60# for improved performance with IADL tasks.   24# with an increase in pain reported. Pain has limited progress with this goal.  Ongoing   Pt will complete a handwriting task related to IADL with 100% legibility.  100% legibility. Goal met today. Met   Pt will increase L UE strength to 4+/5 at all joints for improved IADL performance.  L UE now 5/5 at all joints.  Met                    OT SIGNATURE: Fabiana Sandhu OTR/L     Clinical Progress: improved  Home Program Compliance: Yes  Treatment has included: therapeutic exercise, therapeutic activity and self-care training  Progress toward previous goals: Partially Met      90 Day Recertification  Certification Period: 8/18/2021  I certify that the therapy services are furnished while this  patient is under my care.  The services outlined above are required by this patient, and will be reviewed every 90 days.     PHYSICIAN:     Rod De Los Santos MD______________________________________DATE: _________    Please sign and return via fax to [phone].   Thank you so much for letting us work with Dao. I appreciate your letting us work with your patients. If you have any questions or concerns, please don't hesitate to contact me.

## 2021-05-20 NOTE — PROGRESS NOTES
"Physical Therapy Treatment Note    Patient: Dao Jhaveri                                                                                     Visit Date: 2021  :     1955    Referring practitioner:    Rod De Los Santos MD  Date of Initial Visit:          Type: THERAPY  Noted: 3/10/2021    Patient seen for 20 sessions    Visit Diagnoses:    ICD-10-CM ICD-9-CM   1. Cerebrovascular accident (CVA), unspecified mechanism (CMS/[facility])  I63.9 434.91   2. Gait disturbance  R26.9 781.2   3. Hemiparesis affecting left side as late effect of cerebrovascular accident (CVA) (CMS/HCC)  I69.354 438.20     SUBJECTIVE     Subjective His hand and shoulder and wrist are hurting today, but is better since they worked on it. He feels that his biggest problem is walking without a cane.     PAIN: 4/10 (hand/shoulder) > 0/10 (\"I'm excellent now\")     OBJECTIVE     Objective     Therapeutic Exercises    08328 Comments   B LE SciFit at resistance level 4.0 8 min before d/c from fatigue    L resisted DF against RTB 2 x 10 (\"it's getting to where I can tell how it feels. It feels better. It feels like it's doing what it's supposed to do.\")   L resisted inversion against RTB 2 x 10   Calf raises on shuttle press (3 cords) 2 x 10    B single leg shuttle press (3 cords)  2 x 10    Shuttle press (3 cords)  2 x 10        Timed Minutes 41     Therapy Education/Self Care 05474   Details:    Dwllr Code: [id number]   Given Home Exercise Program and mobility training   Progress: Reinforced and Progressed   Who provided to: Patient   Level of understanding Verbalized and Demonstrated   Timed Minutes      Total Timed Treatment:     41   mins  Total Time of Visit:             45   mins         ASSESSMENT/PLAN     GOALS  Goals                                          Progress Note due by 2021   STG by: 3/24/2021 Comments Status   1. Patient will be able to transfer sit to stand to sit without difficulty and without use of hands.  MET   2. " Patient will be able to walk 500' without rest on flat surface.  Ongoing   3. Patient will be able to perform SLS on each leg 5 sec safely.  Ongoing   LTG by: 4/7/2021      1. Patient will be able to walk on uneven surfaces for 10 minutes without needing rest.   Ongoing   2. Patient will be compliant with HEP on a daily basis.  Introduced resisted DF to be added to his HEP in future sessions as appropriate.  Ongoing   3. Patient will report increased energy at home with less time spent in chair or bed.   MET   4. Good+ hip strength bilaterally  MET     Assessment/Plan     ASSESSMENT: Mr. Jhaveri continues to demonstrate decreased endurance and instability, which affects his gait and subsequent reliance on his SC. It is very important to him to reach a point where he can safely and reliably ambulate without any AD required. As a result, we focused primarily on his endurance and B LE strengthening today. He is currently able to ambulate short distances without an AD, but will demonstrate some instability and LOB as fatigue increases. However, he is able to self-correct. We will continue to progress his strengthening, balance, and endurance to progress towards his goal for ambulation without need for an AD.     PLAN:  Continue to progress his balance, endurance, B LE strength, and bouchra.     Signature: Sofia Li, PTA

## 2021-05-24 ENCOUNTER — TREATMENT (OUTPATIENT)
Dept: PHYSICAL THERAPY | Facility: CLINIC | Age: 66
End: 2021-05-24

## 2021-05-24 DIAGNOSIS — I63.9 CEREBROVASCULAR ACCIDENT (CVA), UNSPECIFIED MECHANISM (HCC): Primary | ICD-10-CM

## 2021-05-24 DIAGNOSIS — R26.9 GAIT DISTURBANCE: Primary | ICD-10-CM

## 2021-05-24 DIAGNOSIS — I69.354 HEMIPARESIS AFFECTING LEFT SIDE AS LATE EFFECT OF CEREBROVASCULAR ACCIDENT (CVA) (HCC): ICD-10-CM

## 2021-05-24 DIAGNOSIS — I63.9 CEREBROVASCULAR ACCIDENT (CVA), UNSPECIFIED MECHANISM (HCC): ICD-10-CM

## 2021-05-24 DIAGNOSIS — R29.898 LEFT ARM WEAKNESS: ICD-10-CM

## 2021-05-24 DIAGNOSIS — Z78.9 IMPAIRED INSTRUMENTAL ACTIVITIES OF DAILY LIVING (IADL): ICD-10-CM

## 2021-05-24 PROCEDURE — 97116 GAIT TRAINING THERAPY: CPT | Performed by: PHYSICAL THERAPIST

## 2021-05-24 PROCEDURE — 97110 THERAPEUTIC EXERCISES: CPT | Performed by: OCCUPATIONAL THERAPIST

## 2021-05-24 PROCEDURE — 97112 NEUROMUSCULAR REEDUCATION: CPT | Performed by: PHYSICAL THERAPIST

## 2021-05-24 NOTE — PROGRESS NOTES
Occupational Therapy Treatment Note     Patient: Dao Jhaveri   : 1955  Referring practitioner: Rod De Los Santos MD  Date of Initial Visit:   Type: THERAPY  Noted: 3/2/2021  Today's Date: 2021  Patient seen for 20 sessions  Visit Diagnoses:    ICD-10-CM ICD-9-CM   1. Cerebrovascular accident (CVA), unspecified mechanism (CMS/HCC)  I63.9 434.91   2. Left arm weakness  R29.898 729.89   3. Impaired instrumental activities of daily living (IADL)  Z78.9 V49.89       SUBJECTIVE      Subjective    4/10 L hand pain at start of session. Pt feels that his L hand is improving.    OBJECTIVE     Objective        Therapeutic Exercises    45856 Comments   Moist heat applied to L hand/wrist followed by joint traction at the L wrist and MP joints for pain reduction. Pain decreased to 1/10.     Min to Max resistance clips use for L hand  strengthening 30 reps x 2 sets. Medium resistance flex-bar used for distal L UE strengthening 20 reps x 1 set in all planes of the wrist and pronation/supination.     Heavy resistance foam used for L hand power grasp and pinch strengthening 20 reps x 1 set each.             Timed Minutes 44        Total Timed Treatment:     44   mins  Total Time of Visit:            44   mins       Goals                                          Progress Note due by 21   STG by: 21 Comments Status   Pt will be independent with daily completion of a HEP to address L UE deficits.    Reviewed  strengthening aspects of HEP with no concerns reported.  Progressing   Pt will complete all bathing and dressing with Mod I using AE as needed.      Met   Pt will display improved L UE FMC by completing the 9 hole peg test in 26 seconds or less.     Met           LTG by: 21       Pt will increase L hand  strength to 60# for improved performance with IADL tasks.    L hand  strength and pain addressed.  Ongoing   Pt will complete a handwriting task related to IADL with 100% legibility.   Met    Pt will increase L UE strength to 4+/5 at all joints for improved IADL performance.   Met                                      Therapy Education/Self Care 37672   Details: Reviewed L hand strengthening exercises and HEP.    Given Home Exercise Program   Progress: Reinforced   Who provided to: Patient   Level of understanding Verbalized   Timed Minutes       ASSESSMENT/PLAN     Assessment/Plan     Assessment:  Pt continues to respond well to pain reduction techniques for the L hand. Pain did not increased with  strengthening.  Pt continues to report HEP completion. He is doing well and approaching his max potential. Plan to d/c at next visit.     Plan:  Will review previous HEP and d/c at next visit if no new concerns are noted.        Fabiana Sandhu, OTR/L  Occupational Therapist

## 2021-05-24 NOTE — PROGRESS NOTES
Physical Therapy Treatment Note    Patient: Dao Jhaveri                                                                                     Visit Date: 2021  :     1955    Referring practitioner:    Rod De Los Santos MD  Date of Initial Visit:          Type: THERAPY  Noted: 3/10/2021    Patient seen for 21 sessions    Visit Diagnoses:    ICD-10-CM ICD-9-CM   1. Gait disturbance  R26.9 781.2   2. Cerebrovascular accident (CVA), unspecified mechanism (CMS/HCC)  I63.9 434.91   3. Hemiparesis affecting left side as late effect of cerebrovascular accident (CVA) (CMS/HCC)  I69.354 438.20     SUBJECTIVE     Subjective His hand feels a lot better after they (OT) worked on it, especially after heat and massage. He's a little stiff today as he's not been able to do his walking yet. He'll get to it though. He's sure he'll do better after therapy.     PAIN: 1/10 (hand) > 0/10     OBJECTIVE     Objective     Therapeutic Exercises    01076 Comments   B LE SciFit at resistance level 5.0 8 mins -- progressed from level 4.0   Calf raises on shuttle press (4 cords) 2 x 10 -- progressed from 3 cords       Timed Minutes 12     Gait Training 86812   Activity amb in hallway with and without SC, focusing on L hip ext    Gait Deviation Decreased hip ext/trunk rotation and heel strike, slight hip hike resulting from decreased L ankle DF and knee flex   Cueing VC, TC, demonstration, video analysis   Assistance SBA - CGA   Asst Device SC   Distance 250'+ with intermittent rest breaks   Timed Minutes 23     Neuromuscular Reeducation     90942 Comments   Resisted walking with RPL using GTB Demonstrated some carryover following, fatigued very quickly       Timed Minutes 8     Therapy Education/Self Care 44880   Details: educ on proper gait using video analysis to educate him on current vs proper gait   [facility] Code: [id number]   Given Home Exercise Program  mobility training   Progress: Reinforced and Progressed   Who provided to:  Patient   Level of understanding Verbalized and Continued reinforcement needed    Timed Minutes      Total Timed Treatment:     43   mins  Total Time of Visit:             45   mins         ASSESSMENT/PLAN     GOALS  Goals                                          Progress Note due by 6/04/2021   STG by: 3/24/2021 Comments Status   1. Patient will be able to transfer sit to stand to sit without difficulty and without use of hands.  MET   2. Patient will be able to walk 500' without rest on flat surface. Addressed with strengthening and endurance today. He was able to tolerate increased resistance on SciFit for 8 minutes. He reported fatigue but was able to tolerate this and the rest of his session.  Ongoing   3. Patient will be able to perform SLS on each leg 5 sec safely.  Ongoing   LTG by: 4/7/2021      1. Patient will be able to walk on uneven surfaces for 10 minutes without needing rest.   Ongoing   2. Patient will be compliant with HEP on a daily basis.   Ongoing   3. Patient will report increased energy at home with less time spent in chair or bed.   MET   4. Good+ hip strength bilaterally  MET     Assessment/Plan     ASSESSMENT: Today, we focused on Mr. Jhaveri's endurance and gait deficits to progress him towards his goal of ambulating safely without use of any AD. During gait analysis, he demonstrated decreased hip and trunk rotation and minimal hip hiking, which exacerbated LE gait deviations. Following walking with resisted RPL, he demonstrated some carryover but will continue to need reinforcement in future sessions. Moreover, he was able to tolerate increased resistance on B LE SciFit today which likely exacerbated fatigue demonstrated following resisted walking.     PLAN: Schedule more visits. Continue to progress his balance, endurance, B LE strength, and endurance. Also continue to reinforce hip ext with resisted walking as well as bouchra to improve his safety and endurance with ambulation without an  AD.    Signature: Sofia Li PTA

## 2021-05-27 ENCOUNTER — TREATMENT (OUTPATIENT)
Dept: PHYSICAL THERAPY | Facility: CLINIC | Age: 66
End: 2021-05-27

## 2021-05-27 DIAGNOSIS — I63.9 CEREBROVASCULAR ACCIDENT (CVA), UNSPECIFIED MECHANISM (HCC): Primary | ICD-10-CM

## 2021-05-27 DIAGNOSIS — R26.9 GAIT DISTURBANCE: Primary | ICD-10-CM

## 2021-05-27 DIAGNOSIS — I69.354 HEMIPARESIS AFFECTING LEFT SIDE AS LATE EFFECT OF CEREBROVASCULAR ACCIDENT (CVA) (HCC): ICD-10-CM

## 2021-05-27 DIAGNOSIS — R29.898 LEFT ARM WEAKNESS: ICD-10-CM

## 2021-05-27 DIAGNOSIS — I63.9 CEREBROVASCULAR ACCIDENT (CVA), UNSPECIFIED MECHANISM (HCC): ICD-10-CM

## 2021-05-27 DIAGNOSIS — Z78.9 IMPAIRED INSTRUMENTAL ACTIVITIES OF DAILY LIVING (IADL): ICD-10-CM

## 2021-05-27 PROCEDURE — 97116 GAIT TRAINING THERAPY: CPT | Performed by: PHYSICAL THERAPIST

## 2021-05-27 PROCEDURE — 97112 NEUROMUSCULAR REEDUCATION: CPT | Performed by: PHYSICAL THERAPIST

## 2021-05-27 PROCEDURE — 97530 THERAPEUTIC ACTIVITIES: CPT | Performed by: PHYSICAL THERAPIST

## 2021-05-27 PROCEDURE — 97110 THERAPEUTIC EXERCISES: CPT | Performed by: OCCUPATIONAL THERAPIST

## 2021-05-27 NOTE — PROGRESS NOTES
Occupational Therapy Treatment Note and Discharge Summary    Patient: Dao Jhaveri   : 1955  Referring practitioner: Rod De Los Santos MD  Date of Initial Visit:   Type: THERAPY  Noted: 3/2/2021  Today's Date: 2021  Patient seen for 21 sessions  Visit Diagnoses:    ICD-10-CM ICD-9-CM   1. Cerebrovascular accident (CVA), unspecified mechanism (CMS/HCC)  I63.9 434.91   2. Left arm weakness  R29.898 729.89   3. Impaired instrumental activities of daily living (IADL)  Z78.9 V49.89       SUBJECTIVE      Subjective    4/10 L hand pain. Encouraged pt to speak with his doctor about his ongoing pain.     OBJECTIVE     Objective        Therapeutic Exercises    66614 Comments   Moist heat applied to L hand/wrist followed by joint traction at the L wrist and MP joints for pain reduction. Pain decreased to 2/10.     Reviewed L in-hand manipulation HEP with no difficulty noted and independence with completion.     3# hand weight used for L UE AROM HEP in all planes of the shoulder, elbow and wrist 20 reps x 1 set. Pt displayed independence.             Timed Minutes 42        Total Timed Treatment:     42   mins  Total Time of Visit:            42   mins       Goals                                          Progress Note due by 21   STG by: 21 Comments Status   Pt will be independent with daily completion of a HEP to address L UE deficits.    Reviewed previous HEP with no concerns reported. Goal Met today.  Met   Pt will complete all bathing and dressing with Mod I using AE as needed.      Met   Pt will display improved L UE FMC by completing the 9 hole peg test in 26 seconds or less.     Met           LTG by: 21       Pt will increase L hand  strength to 60# for improved performance with IADL tasks.    32#. Continues to have daily L hand pain.  Not Met   Pt will complete a handwriting task related to IADL with 100% legibility.   Met   Pt will increase L UE strength to 4+/5 at all joints for improved  IADL performance.   Met                                      Therapy Education/Self Care 52099   Details: Reviewed L hand and UE strengthening exercises and HEP. [place] noted.    Given Home Exercise Program   Progress: Reinforced   Who provided to: Patient   Level of understanding Verbalized   Timed Minutes       ASSESSMENT/PLAN     Assessment/Plan     Assessment:  Pt has reached his max potential with ADL and daily tasks. His LE has recovered well following his CVA, but he still has L hand pain.  Encouraged pt to follow up with his doctor about his ongoing pain. No issues with HEP. Pt feels prepared to d/c today.       Plan:  D/C today due to pt reaching his max potential.        Fabiana Sandhu, OTR/L  Occupational Therapist

## 2021-05-27 NOTE — PROGRESS NOTES
"Physical Therapy Treatment Note, 30 Day Progress Note and 90 Day Recertification Note    Patient: Dao Jhaveri                                                                                     Visit Date: 2021  :     1955    Referring practitioner:    Rod De Los Santos MD  Date of Initial Visit:          Type: THERAPY  Noted: 3/10/2021    Patient seen for 22 sessions    Visit Diagnoses:    ICD-10-CM ICD-9-CM   1. Gait disturbance  R26.9 781.2   2. Cerebrovascular accident (CVA), unspecified mechanism (CMS/[facility])  I63.9 434.91   3. Hemiparesis affecting left side as late effect of cerebrovascular accident (CVA) (CMS/[facility])  I69.354 438.20     SUBJECTIVE     Subjective He feels pretty good. He's been d/c'ed from OT today. He feels that he is about 70% improved since his initial evaluation. He would like to continue to address his balance and work towards walking without needing a cane (or any AD). He also reports he needs to improve his flexibility too. He feels that his L arm and leg are a lot better, especially with his strength. He couldn't lift his L before, \"When I came here, I couldn't hardly walk. I couldn't lift this L leg.\"    PAIN: 2/10 (hand) and 0/10 (everywhere else) > 0/10    PT G-Codes  Outcome Measure Options: Vaughan Balance  Vaughan Total Score: 47  OBJECTIVE     Objective      Therapeutic Activities    13122 Comments   Addressed his goals for progress note See goals section for comments       Timed Minutes 10     Gait Training 12235   Activity amb in hallway over flat surface with and without SC   Gait Deviation Decreased hip ext, L hip hiking with decr DF/knee flex, occasional crosses midline with R LE and will result in a L lateral deviation   Cueing VCs   Assistance SBA   Asst Device SC    Distance 750' consecutively without rest (500' with SC and 250' without AD)   Timed Minutes 20     Neuromuscular Reeducation     71626 Comments   Tandem Stance (EO, EC)  R foot forward (EO): >30 sec with " "instability   R foot forward (EC): 0:03.88 sec before LOB  L foot forward (EO): >30 sec with instability  L foot forward (EC): 0:02.85 sec before LOB   SLS (EO, EC) R SLS (EO): 0:10.31 sec before LOB  R SLS (EC): 0:02.85 sec before LOB  L SLS (EO): 0:1.18 sec before LOB  L SLS (EC): 0:04.64 sec before LOB   Vaughan Balance Test Score: 47 -- largest deficit is tandem and SLS and stepping bouchra   Rhomberg stance (EO, EC) >60 sec with minimal sway noted during EC       Timed Minutes 15     Therapy Education/Self Care 41150   Details:    [employer] Code: [id number]   Given Home Exercise Program  mobility training   Progress: Reinforced and Progressed   Who provided to: Patient   Level of understanding Verbalized and Continued reinforcement needed    Timed Minutes      Total Timed Treatment:     45   mins  Total Time of Visit:             45   mins         ASSESSMENT/PLAN     GOALS  Goals                                          Progress Note due by: 7/27/2021                                                       RECERT Due By: 9/27/2021   STG by: 3/24/2021 Comments Status   1. Patient will be able to transfer sit to stand to sit without difficulty and without use of hands.  met   2. Patient will be able to walk 500' without rest on flat surface. Able to walk a total of 750' (500' with SC and 250' without an AD) without a rest break; however, significant increase in fatigue. met     3. Patient will be able to perform SLS on each leg 5 sec safely. R SLS (EO): 0:10.31 sec before LOB  R SLS (EC): 0:02.85 sec before LOB  L SLS (EO): 0:1.18 sec before LOB  L SLS (EC): 0:04.64 sec before LOB ongoing   LTG by: 4/7/2021      1. Patient will be able to walk on uneven surfaces for 10 minutes without needing rest.  Able to walk on flat surface (hallway) for approx 10 minutes (with and without an AD) but significantly increased his fatigue. He reports walking into and around [employer] with use of a \"buggy\" for stability. " ongoing  progressing    2. Patient will be compliant with HEP on a daily basis.  He is compliant daily and will also walk one block ea direction every day. He will use the rollator, but only because he'll have a seat in case he fatigues.  ongoing  progressing    3. Patient will report increased energy at home with less time spent in chair or bed.   met   4. Good+ hip strength bilaterally  met   5. Pt will be able to ambulate 1 city block with use of SC only.  NEW   6. Pt will be able to ambulate 500 feet without rest or gait deviation on flat surface.  NEW   7. Pt will be able to perform 8 step-ups with B LE in <20 seconds at a bouchra of 60 BPM.  NEW   8. Pt will be able to ambulate 250 feet without use of AD and no instances of instability.   NEW     Assessment/Plan     ASSESSMENT: Due to tight clinical scheduling pt is unable to attend PT for one week, therefore we addressed his goals and re-certification early. As of today, Mr. Jhaveri has met four of his seven goals and feels that he has improved by about 70% since his initial evaluation. He is very motivated to achieve his personal goal of being independent from all AD and is working actively towards this by performing his HEP daily. He will walk a total of 2 city blocks with his rollator (for a seated rest break) each day as well. Furthermore, at this juncture, he demonstrates the most difficulty with maintaining L LE flexibility, endurance, balance, and bouchra, all of which directly affect his gait. This is reinforced with his Vaughan balance score, which was 47 today, where he scored lowest on step-ups, tandem stance, and B LE unilateral stance. He would benefit from continued skilled physical therapy to address his deficits as previously listed in order to improve his independence with his ADLs and IADLS, decrease his fall risk and subsequent risk of re-injury, and to increase his quality of life 2x per week for 4 weeks.     PLAN: Focus on stretching and L LE  flexibility to assist with L hip ext, knee flexion, and ankle DF during ambulation. Reinforce importance of daily compliance with stretches to maintain his flexibility vs focusing solely on exercise and walking. Continue to progress his balance, endurance, and B LE strength. Also continue to reinforce hip ext with resisted walking as well as bouchra during step-ups to improve his safety and endurance with ambulation without need for an AD.    Signature: Sofia Li, PTA

## 2021-05-28 NOTE — PROGRESS NOTES
Progress Note Addendum      Patient: Dao Jhaveri           : 1955  Visit Date: 2021  Referring practitioner: Rod De Los Santos MD  Date of Initial Visit: Type: THERAPY  Noted: 3/10/2021  Patient seen for 22 sessions  Visit Diagnoses:    ICD-10-CM ICD-9-CM   1. Gait disturbance  R26.9 781.2   2. Cerebrovascular accident (CVA), unspecified mechanism (CMS/HCC)  I63.9 434.91   3. Hemiparesis affecting left side as late effect of cerebrovascular accident (CVA) (CMS/HCC)  I69.354 438.20       PT G-Codes  Outcome Measure Options: Vaughan Balance  Vaughan Total Score: 47  Clinical Progress: improved  Home Program Compliance: Yes  Treatment has included: therapeutic exercise, neuromuscular re-education, manual therapy, therapeutic activity, gait training and electrical stimulation  Progress toward previous goals: Partially Met  Prognosis to achieve goals: good    Subjective     Objective     Assessment & Plan     Assessment  Impairments: abnormal gait, impaired balance, impaired physical strength and lacks appropriate home exercise program  Prognosis: good  Functional Limitations: lifting, walking, standing and stooping  Plan  Therapy options: will be seen for skilled physical therapy services  Planned modality interventions: electrical stimulation/Russian stimulation  Planned therapy interventions: abdominal trunk stabilization, balance/weight-bearing training, neuromuscular re-education, motor coordination training, flexibility, functional ROM exercises, strengthening, gait training, home exercise program and therapeutic activities  Frequency: 2x week  Duration in visits: 8  Duration in weeks: 4  Treatment plan discussed with: PTA        I have reviewed the progress note information provided by Sofia Li PTA, and I concur with the findings.    Arnaud Renee, PT DPT  Physical Therapist

## 2021-06-03 ENCOUNTER — OFFICE VISIT (OUTPATIENT)
Dept: INTERNAL MEDICINE | Facility: CLINIC | Age: 66
End: 2021-06-03

## 2021-06-03 VITALS
DIASTOLIC BLOOD PRESSURE: 82 MMHG | HEART RATE: 54 BPM | HEIGHT: 66 IN | RESPIRATION RATE: 16 BRPM | BODY MASS INDEX: 26.92 KG/M2 | OXYGEN SATURATION: 94 % | WEIGHT: 167.5 LBS | SYSTOLIC BLOOD PRESSURE: 132 MMHG

## 2021-06-03 DIAGNOSIS — G81.94 LEFT HEMIPARESIS (HCC): ICD-10-CM

## 2021-06-03 DIAGNOSIS — R09.89 CHEST CONGESTION: ICD-10-CM

## 2021-06-03 DIAGNOSIS — Z87.891 PERSONAL HISTORY OF NICOTINE DEPENDENCE: ICD-10-CM

## 2021-06-03 DIAGNOSIS — Z86.73 HISTORY OF STROKE: Primary | ICD-10-CM

## 2021-06-03 DIAGNOSIS — I10 ESSENTIAL HYPERTENSION: ICD-10-CM

## 2021-06-03 DIAGNOSIS — E78.2 MIXED HYPERLIPIDEMIA: ICD-10-CM

## 2021-06-03 PROBLEM — I63.511 ACUTE ISCHEMIC RIGHT MCA STROKE: Status: RESOLVED | Noted: 2021-02-02 | Resolved: 2021-06-03

## 2021-06-03 PROBLEM — H35.3190 NONEXUDATIVE AGE-RELATED MACULAR DEGENERATION: Status: ACTIVE | Noted: 2020-09-09

## 2021-06-03 PROBLEM — R07.9 CHEST PAIN: Status: RESOLVED | Noted: 2021-02-05 | Resolved: 2021-06-03

## 2021-06-03 PROBLEM — N17.9 ACUTE KIDNEY INJURY: Status: RESOLVED | Noted: 2021-02-05 | Resolved: 2021-06-03

## 2021-06-03 PROCEDURE — 96160 PT-FOCUSED HLTH RISK ASSMT: CPT | Performed by: INTERNAL MEDICINE

## 2021-06-03 PROCEDURE — 1159F MED LIST DOCD IN RCRD: CPT | Performed by: INTERNAL MEDICINE

## 2021-06-03 PROCEDURE — 99214 OFFICE O/P EST MOD 30 MIN: CPT | Performed by: INTERNAL MEDICINE

## 2021-06-03 PROCEDURE — 1170F FXNL STATUS ASSESSED: CPT | Performed by: INTERNAL MEDICINE

## 2021-06-03 PROCEDURE — G0439 PPPS, SUBSEQ VISIT: HCPCS | Performed by: INTERNAL MEDICINE

## 2021-06-03 RX ORDER — ATORVASTATIN CALCIUM 80 MG/1
80 TABLET, FILM COATED ORAL DAILY
Qty: 90 TABLET | Refills: 1 | Status: SHIPPED | OUTPATIENT
Start: 2021-06-03 | End: 2021-11-18 | Stop reason: SDUPTHER

## 2021-06-03 RX ORDER — GUAIFENESIN AND DEXTROMETHORPHAN HYDROBROMIDE 600; 30 MG/1; MG/1
1 TABLET, EXTENDED RELEASE ORAL 2 TIMES DAILY PRN
Qty: 30 TABLET | Refills: 1 | Status: SHIPPED | OUTPATIENT
Start: 2021-06-03 | End: 2022-02-23

## 2021-06-03 RX ORDER — CLOPIDOGREL BISULFATE 75 MG/1
75 TABLET ORAL DAILY
Qty: 90 TABLET | Refills: 1 | Status: SHIPPED | OUTPATIENT
Start: 2021-06-03 | End: 2021-11-18 | Stop reason: SDUPTHER

## 2021-06-03 RX ORDER — HYDRALAZINE HYDROCHLORIDE 50 MG/1
50 TABLET, FILM COATED ORAL 3 TIMES DAILY
Qty: 270 TABLET | Refills: 1 | Status: SHIPPED | OUTPATIENT
Start: 2021-06-03 | End: 2022-08-16 | Stop reason: SDUPTHER

## 2021-06-03 RX ORDER — METOPROLOL SUCCINATE 100 MG/1
100 TABLET, EXTENDED RELEASE ORAL DAILY
Qty: 90 TABLET | Refills: 1 | Status: SHIPPED | OUTPATIENT
Start: 2021-06-03 | End: 2021-11-18 | Stop reason: SDUPTHER

## 2021-06-03 RX ORDER — ISOSORBIDE DINITRATE 20 MG/1
20 TABLET ORAL 3 TIMES DAILY
Qty: 270 TABLET | Refills: 1 | Status: SHIPPED | OUTPATIENT
Start: 2021-06-03 | End: 2021-11-18 | Stop reason: SDUPTHER

## 2021-06-03 NOTE — PROGRESS NOTES
The ABCs of the Annual Wellness Visit  Subsequent Medicare Wellness Visit    No chief complaint on file.  See  note    Subjective   History of Present Illness:  Dao Jhaveri is a 65 y.o. male who presents for a Subsequent Medicare Wellness Visit.    HEALTH RISK ASSESSMENT    Recent Hospitalizations:  Recently treated at the following:  [facility]    Current Medical Providers:  Patient Care Team:  Dallin Miller DO as PCP - General (Internal Medicine)    Smoking Status:  Social History     Tobacco Use   Smoking Status Former Smoker   • Packs/day: 1.50   • Years: 35.00   • Pack years: 52.50   • Types: Cigarettes   • Quit date: 7/3/2013   • Years since quittin.9   Smokeless Tobacco Never Used       Alcohol Consumption:  Social History     Substance and Sexual Activity   Alcohol Use No       Depression Screen:   PHQ-2/PHQ-9 Depression Screening 3/6/2020   Little interest or pleasure in doing things 0   Feeling down, depressed, or hopeless 0   Total Score 0       Fall Risk Screen:  HEIDI Fall Risk Assessment was completed, and patient is at MODERATE risk for falls. Assessment completed on:6/3/2021    Health Habits and Functional and Cognitive Screening:  Functional & Cognitive Status 6/3/2021   Do you have difficulty preparing food and eating? No   Do you have difficulty bathing yourself, getting dressed or grooming yourself? No   Do you have difficulty using the toilet? No   Do you have difficulty moving around from place to place? No   Do you have trouble with steps or getting out of a bed or a chair? No   Current Diet Unhealthy Diet   Dental Exam Not up to date   Eye Exam Up to date   Exercise (times per week) 7 times per week   Current Exercises Include Walking   Current Exercise Activities Include -   Do you need help using the phone?  No   Are you deaf or do you have serious difficulty hearing?  No   Do you need help with transportation? No   Do you need help shopping? No   Do you  need help preparing meals?  No   Do you need help with housework?  No   Do you need help with laundry? No   Do you need help taking your medications? No   Do you need help managing money? No   Do you ever drive or ride in a car without wearing a seat belt? No   Have you felt unusual stress, anger or loneliness in the last month? No   Who do you live with? Spouse   If you need help, do you have trouble finding someone available to you? No   Have you been bothered in the last four weeks by sexual problems? No   Do you have difficulty concentrating, remembering or making decisions? No         Does the patient have evidence of cognitive impairment? No    Asprin use counseling:Taking ASA appropriately as indicated    Age-appropriate Screening Schedule:  Refer to the list below for future screening recommendations based on patient's age, sex and/or medical conditions. Orders for these recommended tests are listed in the plan section. The patient has been provided with a written plan.    Health Maintenance   Topic Date Due   • TDAP/TD VACCINES (1 - Tdap) Never done   • ZOSTER VACCINE (1 of 2) Never done   • INFLUENZA VACCINE  08/01/2021   • LIPID PANEL  02/02/2022          The following portions of the patient's history were reviewed and updated as appropriate: allergies, current medications, past family history, past medical history, past social history, past surgical history and problem list.    Outpatient Medications Prior to Visit   Medication Sig Dispense Refill   • amLODIPine (NORVASC) 10 MG tablet Take 1 tablet by mouth every night at bedtime. 90 tablet 1   • aspirin 81 MG EC tablet Take 81 mg by mouth Daily.     • cyclobenzaprine (FLEXERIL) 10 MG tablet Take 1 tablet by mouth 3 (Three) Times a Day As Needed for Muscle Spasms. 90 tablet 2   • lisinopril (PRINIVIL,ZESTRIL) 20 MG tablet Take 1 tablet by mouth Daily. 90 tablet 1   • nitroglycerin (NITROSTAT) 0.4 MG SL tablet Place 0.4 mg under the tongue Every 5 (Five)  Minutes As Needed for Chest Pain. Take no more than 3 doses in 15 minutes.     • vitamin B-12 (CYANOCOBALAMIN) 500 MCG tablet Take 1 tablet by mouth Daily. 30 tablet 5   • atorvastatin (LIPITOR) 40 MG tablet Take 1 tablet by mouth Daily. 90 tablet 3   • clopidogrel (PLAVIX) 75 MG tablet Take 1 tablet by mouth Daily. 90 tablet 1   • isosorbide-hydrALAZINE (BiDil) 20-37.5 MG per tablet Take 1 tablet by mouth 3 (Three) Times a Day. 90 tablet 3   • metoprolol succinate XL (TOPROL-XL) 50 MG 24 hr tablet Take 1 tablet by mouth Daily. (Patient taking differently: Take 100 mg by mouth Daily.) 90 tablet 1   • sodium bicarbonate 325 MG tablet Take 325 mg by mouth 3 (Three) Times a Day.     • latanoprost (XALATAN) 0.005 % ophthalmic solution 1 drop Every Night.     • Vitamin D, Cholecalciferol, (CHOLECALCIFEROL) 400 units tablet Take 400 Units by mouth Every 30 (Thirty) Days.     • Evolocumab (REPATHA) injection 140 mg        No facility-administered medications prior to visit.       Patient Active Problem List   Diagnosis   • Essential hypertension   • Mixed hyperlipidemia   • History of noncompliance with medical treatment   • History of stroke   • Contrast dye induced nephropathy   • Left hemiparesis (CMS/HCC)   • Nonexudative age-related macular degeneration       Advanced Care Planning:  ACP discussion was held with the patient during this visit. Patient does not have an advance directive, information provided.    Review of Systems See  note    Compared to one year ago, the patient feels his physical health is worse.  Compared to one year ago, the patient feels his mental health is the same.    Reviewed chart for potential of high risk medication in the elderly: yes  Reviewed chart for potential of harmful drug interactions in the elderly:yes    Objective         Vitals:    06/03/21 1428   BP: 132/82   BP Location: Left arm   Patient Position: Sitting   Cuff Size: Adult   Pulse: 54   Resp: 16   SpO2: 94%  "  Weight: 76 kg (167 lb 8 oz)   Height: 167.6 cm (65.98\")       Body mass index is 27.05 kg/m².  Discussed the patient's BMI with him. The BMI is above average; BMI management plan is completed.    Physical Exam See  note          Assessment/Plan   Medicare Risks and Personalized Health Plan  CMS Preventative Services Quick Reference  Advance Directive Discussion  Colon Cancer Screening  Fall Risk  Immunizations Discussed/Encouraged (specific immunizations; Tdap and Shingrix )  Lung Cancer Risk  Obesity/Overweight     The above risks/problems have been discussed with the patient.  Pertinent information has been shared with the patient in the After Visit Summary.  Follow up plans and orders are seen below in the Assessment/Plan Section.    Diagnoses and all orders for this visit:    1. History of stroke (Primary)  -     clopidogrel (PLAVIX) 75 MG tablet; Take 1 tablet by mouth Daily.  Dispense: 90 tablet; Refill: 1  -     atorvastatin (LIPITOR) 80 MG tablet; Take 1 tablet by mouth Daily.  Dispense: 90 tablet; Refill: 1    2. Left hemiparesis (CMS/[facility])    3. Essential hypertension  -     metoprolol succinate XL (TOPROL-XL) 100 MG 24 hr tablet; Take 1 tablet by mouth Daily.  Dispense: 90 tablet; Refill: 1  -     isosorbide dinitrate (ISORDIL) 20 MG tablet; Take 1 tablet by mouth 3 (Three) Times a Day.  Dispense: 270 tablet; Refill: 1  -     hydrALAZINE (APRESOLINE) 50 MG tablet; Take 1 tablet by mouth 3 (Three) Times a Day.  Dispense: 270 tablet; Refill: 1    4. Mixed hyperlipidemia  -     atorvastatin (LIPITOR) 80 MG tablet; Take 1 tablet by mouth Daily.  Dispense: 90 tablet; Refill: 1    5. Chest congestion  -     guaifenesin-dextromethorphan (MUCINEX DM)  MG tablet sustained-release 12 hour tablet; Take 1 tablet by mouth 2 (Two) Times a Day As Needed (cough & congestion).  Dispense: 30 tablet; Refill: 1    6. Personal history of nicotine dependence  -     CT Chest Low Dose Wo; Future      Follow " Up:  Return in about 6 months (around 12/3/2021) for Recheck.     An After Visit Summary and PPPS were given to the patient.

## 2021-06-03 NOTE — PATIENT INSTRUCTIONS
Medicare Wellness  Personal Prevention Plan of Service     Date of Office Visit:  2021  Encounter Provider:  Dallin Miller DO  Place of Service:  [facility] INTERNAL MEDICINE  Patient Name: Dao Jhaveri  :  1955    As part of the Medicare Wellness portion of your visit today, we are providing you with this personalized preventive plan of services (PPPS). This plan is based upon recommendations of the [place] Preventive Services Task Force (USPSTF) and the Advisory Committee on Immunization Practices (ACIP).    This lists the preventive care services that should be considered, and provides dates of when you are due. Items listed as completed are up-to-date and do not require any further intervention.    Health Maintenance   Topic Date Due   • TDAP/TD VACCINES (1 - Tdap) Never done   • ZOSTER VACCINE (1 of 2) Never done   • ANNUAL WELLNESS VISIT  2021   • LUNG CANCER SCREENING  2022 (Originally 2015)   • INFLUENZA VACCINE  2021   • Pneumococcal Vaccine 65+ (2 of 2 - PPSV23) 2021   • LIPID PANEL  2022   • COLORECTAL CANCER SCREENING  2024   • HEPATITIS C SCREENING  Completed   • COVID-19 Vaccine  Completed   • AAA SCREEN (ONE-TIME)  Completed       Orders Placed This Encounter   Procedures   • CT Chest Low Dose Wo     Standing Status:   Future     Standing Expiration Date:   6/3/2022     Order Specific Question:   The patient is age 50-80:     Answer:   65     Order Specific Question:   The patient is a current smoker?     Answer:   No     Order Specific Question:   The patient is a former smoker who has quit within the last 15 years?     Answer:   Yes     Order Specific Question:   The number of years since quitting smoking.     Answer:   6     Order Specific Question:   The patient has a smoking history of 20 pack-years or greater:     Answer:   Yes     Order Specific Question:   Actual pack - year smoking history (number):      Answer:   52     Order Specific Question:   Has the Patient had a Chest CT scan within the past 12 months?     Answer:   No     Order Specific Question:   Does the patient have any clinical signs/symptoms of lung cancer?     Answer:   No     Order Specific Question:   The patient was engaged in shared decision-making for this test:     Answer:   Yes     Order Specific Question:   Release to patient     Answer:   Immediate       Return in about 6 months (around 12/3/2021) for Recheck.

## 2021-06-07 ENCOUNTER — TREATMENT (OUTPATIENT)
Dept: PHYSICAL THERAPY | Facility: CLINIC | Age: 66
End: 2021-06-07

## 2021-06-07 DIAGNOSIS — I63.9 CEREBROVASCULAR ACCIDENT (CVA), UNSPECIFIED MECHANISM (HCC): ICD-10-CM

## 2021-06-07 DIAGNOSIS — R26.9 GAIT DISTURBANCE: Primary | ICD-10-CM

## 2021-06-07 DIAGNOSIS — I69.354 HEMIPARESIS AFFECTING LEFT SIDE AS LATE EFFECT OF CEREBROVASCULAR ACCIDENT (CVA) (HCC): ICD-10-CM

## 2021-06-07 PROCEDURE — 97116 GAIT TRAINING THERAPY: CPT | Performed by: PHYSICAL THERAPIST

## 2021-06-07 PROCEDURE — 97110 THERAPEUTIC EXERCISES: CPT | Performed by: PHYSICAL THERAPIST

## 2021-06-07 PROCEDURE — 97112 NEUROMUSCULAR REEDUCATION: CPT | Performed by: PHYSICAL THERAPIST

## 2021-06-07 NOTE — PROGRESS NOTES
CC: chest congestion    History:  Dao Jhaveri is a 65 y.o. male   He notes he has been doing alright, but does have chest congestion that has not been relieved with time, but he has not tried any other medication consistently. He has ongoing hemiparesis from CVA in 2/2021, but has no new symptoms suggestive of recurrent stroke and he has been progressing well with ongoing PT.        ROS:  Review of Systems   Constitutional: Negative for chills and fever.   HENT: Positive for congestion. Negative for sinus pressure and sinus pain.    Respiratory: Positive for cough. Negative for shortness of breath.    Cardiovascular: Negative for chest pain and palpitations.   Neurological: Positive for weakness.        reports that he quit smoking about 7 years ago. His smoking use included cigarettes. He has a 52.50 pack-year smoking history. He has never used smokeless tobacco. He reports that he does not drink alcohol and does not use drugs.      Current Outpatient Medications:   •  amLODIPine (NORVASC) 10 MG tablet, Take 1 tablet by mouth every night at bedtime., Disp: 90 tablet, Rfl: 1  •  aspirin 81 MG EC tablet, Take 81 mg by mouth Daily., Disp: , Rfl:   •  atorvastatin (LIPITOR) 80 MG tablet, Take 1 tablet by mouth Daily., Disp: 90 tablet, Rfl: 1  •  clopidogrel (PLAVIX) 75 MG tablet, Take 1 tablet by mouth Daily., Disp: 90 tablet, Rfl: 1  •  cyclobenzaprine (FLEXERIL) 10 MG tablet, Take 1 tablet by mouth 3 (Three) Times a Day As Needed for Muscle Spasms., Disp: 90 tablet, Rfl: 2  •  lisinopril (PRINIVIL,ZESTRIL) 20 MG tablet, Take 1 tablet by mouth Daily., Disp: 90 tablet, Rfl: 1  •  metoprolol succinate XL (TOPROL-XL) 100 MG 24 hr tablet, Take 1 tablet by mouth Daily., Disp: 90 tablet, Rfl: 1  •  nitroglycerin (NITROSTAT) 0.4 MG SL tablet, Place 0.4 mg under the tongue Every 5 (Five) Minutes As Needed for Chest Pain. Take no more than 3 doses in 15 minutes., Disp: , Rfl:   •  vitamin B-12 (CYANOCOBALAMIN) 500 MCG  "tablet, Take 1 tablet by mouth Daily., Disp: 30 tablet, Rfl: 5  •  guaifenesin-dextromethorphan (MUCINEX DM)  MG tablet sustained-release 12 hour tablet, Take 1 tablet by mouth 2 (Two) Times a Day As Needed (cough & congestion)., Disp: 30 tablet, Rfl: 1  •  hydrALAZINE (APRESOLINE) 50 MG tablet, Take 1 tablet by mouth 3 (Three) Times a Day., Disp: 270 tablet, Rfl: 1  •  isosorbide dinitrate (ISORDIL) 20 MG tablet, Take 1 tablet by mouth 3 (Three) Times a Day., Disp: 270 tablet, Rfl: 1  •  latanoprost (XALATAN) 0.005 % ophthalmic solution, 1 drop Every Night., Disp: , Rfl:     OBJECTIVE:  /82 (BP Location: Left arm, Patient Position: Sitting, Cuff Size: Adult)   Pulse 54   Resp 16   Ht 167.6 cm (65.98\")   Wt 76 kg (167 lb 8 oz)   SpO2 94%   BMI 27.05 kg/m²    Physical Exam  Constitutional:       General: He is not in acute distress.  Pulmonary:      Effort: Pulmonary effort is normal. No respiratory distress.   Neurological:      Mental Status: He is alert and oriented to person, place, and time.   Psychiatric:         Mood and Affect: Mood normal.         Behavior: Behavior normal.         Assessment/Plan     Diagnoses and all orders for this visit:    1. History of stroke (Primary)  2. Left hemiparesis (CMS/HCC)  -     clopidogrel (PLAVIX) 75 MG tablet; Take 1 tablet by mouth Daily.  Dispense: 90 tablet; Refill: 1  -     atorvastatin (LIPITOR) 80 MG tablet; Take 1 tablet by mouth Daily.  Dispense: 90 tablet; Refill: 1  Continue Plavix and high intensity statin for secondary prevention. Continue PT.     3. Essential hypertension  -     metoprolol succinate XL (TOPROL-XL) 100 MG 24 hr tablet; Take 1 tablet by mouth Daily.  Dispense: 90 tablet; Refill: 1  -     isosorbide dinitrate (ISORDIL) 20 MG tablet; Take 1 tablet by mouth 3 (Three) Times a Day.  Dispense: 270 tablet; Refill: 1  -     hydrALAZINE (APRESOLINE) 50 MG tablet; Take 1 tablet by mouth 3 (Three) Times a Day.  Dispense: 270 tablet; " Refill: 1  Well controlled, BP goal for age is <140/90 per JNC 8 guidelines and separate Bidil to its components given its cost for him.     4. Mixed hyperlipidemia  -     atorvastatin (LIPITOR) 80 MG tablet; Take 1 tablet by mouth Daily.  Dispense: 90 tablet; Refill: 1  Stable on high intensity statin therapy per ACC/AHA guidelines.    5. Chest congestion  -     guaifenesin-dextromethorphan (MUCINEX DM)  MG tablet sustained-release 12 hour tablet; Take 1 tablet by mouth 2 (Two) Times a Day As Needed (cough & congestion).  Dispense: 30 tablet; Refill: 1  Mucinex DM to assist given symptoms high in the oropharynx to suggest more of an upper airway cough syndrome. Encourage adequate hydration.     6. Personal history of nicotine dependence  -     CT Chest Low Dose Wo; Future  Lung cancer screening.    An After Visit Summary was printed and given to the patient at discharge.  Return in about 6 months (around 12/3/2021) for Recheck.          Dallin Miller D.O. 6/7/2021   Electronically signed.

## 2021-06-07 NOTE — PROGRESS NOTES
"Physical Therapy Treatment Note    Patient: Dao Jhaveri                                                                                     Visit Date: 2021  :     1955    Referring practitioner:    Rod De Los Santos MD  Date of Initial Visit:          Type: THERAPY  Noted: 3/10/2021    Patient seen for 23 sessions    Visit Diagnoses:    ICD-10-CM ICD-9-CM   1. Gait disturbance  R26.9 781.2   2. Cerebrovascular accident (CVA), unspecified mechanism (CMS/HCC)  I63.9 434.91   3. Hemiparesis affecting left side as late effect of cerebrovascular accident (CVA) (CMS/HCC)  I69.354 438.20     SUBJECTIVE     Subjective He doesn't have any pain, \"not even in my hand\". He returns to OMAIRA Bauman on Wednesday (2021) for a follow-up regarding his CVA.    PAIN: 0/10 > 0/10     OBJECTIVE     Objective     Gait Training 00155   Activity amb in hallway over flat surface with and without AFO on L    Gait Deviation Improved knee flexion and toe off which decr dragging his feet with AFO  Following shuttle press: 2 occurrences of LOB with lateral deviations. Pt able to self-correct and req only min assist from therapist once   Cueing VCs   Assistance SBA   Asst Device SC    Distance 50 ft x 2 ea   Timed Minutes 15     Therapeutic Exercises    40279 Comments   Shuttle press (5 cords): B LE  2 x 10    Shuttle press (5 cords): unilateral LE  2 x 10 ea       Timed Minutes 15     Neuromuscular Reeducation     08224 Comments   Step-ups on 6\" steps with bouchra of 55 bpm 3 x 10   Following step-ups: step-ups performed in 20 sec  7 reps   Resisted walking with rip  with RPL  30 ft x4, significantly increased his fatigue       Timed Minutes 15     Therapy Education/Self Care 93260   Details:    Discount[employer] Code: [email or web address]   Given Home Exercise Program  mobility training   Progress: Reinforced and Progressed   Who provided to: Patient   Level of understanding Verbalized and Continued reinforcement needed    Timed " "Minutes      Total Timed Treatment:     45   mins  Total Time of Visit:             45   mins         ASSESSMENT/PLAN     GOALS  Goals           Progress Note due by: 2021                 RECERT Due By: 2021   STG by: 2021 Comments Last Date Assessed Status   1. Patient will be able to transfer sit to stand to sit without difficulty and without use of hands.   met   2. Patient will be able to walk 500' without rest on flat surface. Able to walk a total of 750' (500' with SC and 250' without an AD) without a rest break; however, significant increase in fatigue. 2021 met     3. Patient will be able to perform SLS on each leg 5 sec safely. R SLS (EO): 0:10.31 sec before LOB  R SLS (EC): 0:02.85 sec before LOB  L SLS (EO): 0:1.18 sec before LOB  L SLS (EC): 0:04.64 sec before LOB 2021 ongoing   LT2021      1. Patient will be able to walk on uneven surfaces for 10 minutes without needing rest.  Able to walk on flat surface (hallway) for approx 10 minutes (with and without an AD) but significantly increased his fatigue. He reports walking into and around [facility] with use of a \"buggy\" for stability. 2021 ongoing  Progressing    2. Patient will be compliant with HEP on a daily basis.  He is compliant daily and will also walk one block ea direction every day. He will use the rollator, but only because he'll have a seat in case he fatigues.  2021 ongoing  Progressing    3. Patient will report increased energy at home with less time spent in chair or bed.    met   4. Good+ hip strength bilaterally Grossly 4+/5 -- L hip pain noted w extension and abduction  2021 met   5. Pt will be able to ambulate 1 city block with use of SC only.   ongoing   6. Pt will be able to ambulate 500 feet without rest or gait deviation on flat surface.   ongoing   7. Pt will be able to perform 8 step-ups with B LE in <20 seconds at a bouchra of 60 BPM He was able to perform 7 step-ups in 20 seconds " following step-ups for bouchra  6/07/2021 ongoing   8. Pt will be able to ambulate 250 feet without use of AD and no instances of instability.    ongoing     Assessment/Plan     ASSESSMENT: During gait, pt continues to demonstrate instability, which improved with use of AFO on L. He required min assist from therapist once when ambulating without SC to recover, but was otherwise able to self correct throughout his session. Additionally, following step-ups to a bouchra of 55 bpm, pt demonstrated carryover and was able to safely complete 7 step-ups in 20 seconds. At this juncture, his balance, hip strength, and flexibility appear to be the most limiting.    PLAN: Consider beginning session with stretches and follow with MMT for hip strength. Consider hip strengthening and balance training.     Signature: Sofia Li, PTA

## 2021-06-09 ENCOUNTER — OFFICE VISIT (OUTPATIENT)
Dept: NEUROLOGY | Facility: CLINIC | Age: 66
End: 2021-06-09

## 2021-06-09 VITALS
OXYGEN SATURATION: 98 % | DIASTOLIC BLOOD PRESSURE: 78 MMHG | HEART RATE: 63 BPM | HEIGHT: 66 IN | WEIGHT: 177 LBS | RESPIRATION RATE: 17 BRPM | SYSTOLIC BLOOD PRESSURE: 104 MMHG | BODY MASS INDEX: 28.45 KG/M2

## 2021-06-09 DIAGNOSIS — I63.511 ACUTE ISCHEMIC RIGHT MCA STROKE (HCC): Primary | ICD-10-CM

## 2021-06-09 DIAGNOSIS — E78.2 MIXED HYPERLIPIDEMIA: ICD-10-CM

## 2021-06-09 DIAGNOSIS — Z86.73 HISTORY OF STROKE: ICD-10-CM

## 2021-06-09 DIAGNOSIS — I10 ESSENTIAL HYPERTENSION: ICD-10-CM

## 2021-06-09 PROCEDURE — 99213 OFFICE O/P EST LOW 20 MIN: CPT | Performed by: NURSE PRACTITIONER
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
## 2021-06-11 ENCOUNTER — TREATMENT (OUTPATIENT)
Dept: PHYSICAL THERAPY | Facility: CLINIC | Age: 66
End: 2021-06-11

## 2021-06-11 DIAGNOSIS — R26.9 GAIT DISTURBANCE: Primary | ICD-10-CM

## 2021-06-11 DIAGNOSIS — I63.9 CEREBROVASCULAR ACCIDENT (CVA), UNSPECIFIED MECHANISM (HCC): ICD-10-CM

## 2021-06-11 DIAGNOSIS — I69.354 HEMIPARESIS AFFECTING LEFT SIDE AS LATE EFFECT OF CEREBROVASCULAR ACCIDENT (CVA) (HCC): ICD-10-CM

## 2021-06-11 PROCEDURE — 97140 MANUAL THERAPY 1/> REGIONS: CPT | Performed by: PHYSICAL THERAPIST

## 2021-06-11 PROCEDURE — 97110 THERAPEUTIC EXERCISES: CPT | Performed by: PHYSICAL THERAPIST

## 2021-06-11 NOTE — PROGRESS NOTES
"Physical Therapy Treatment Note    Patient: Dao Jhaveri                                                                                     Visit Date: 2021  :     1955    Referring practitioner:    Rod De Los Santos MD  Date of Initial Visit:          Type: THERAPY  Noted: 3/10/2021    Patient seen for 24 sessions    Visit Diagnoses:    ICD-10-CM ICD-9-CM   1. Gait disturbance  R26.9 781.2     SUBJECTIVE     Subjective He went to his neurologist and reports very good results. He's doing especially well with his walking. He told pt he could start to drive \"very slowly\". Pt reports that he hasn't been driving since January, \"I know I can drive again, I just need to go slow.\" His dr wants him to \"keep up what he's doing and continue to exercise.\" He reports \"I feel way better\" than he did last session.     PAIN: 0/10 > 0/10 (he felt a lot looser, he could tell the stretches help)     OBJECTIVE     Objective      Manual Therapy     00770  Comments   B hip flexion, ER, and IR passive stretch L ER and B IR very restricted   Contract-relax to B LE  Improved hamstring length following    TMR to B hamstrings Improved following    B hip inferolateral mobilization  L > R, Gr 3   B hip extension in SL passively  L -- WNL, R -- restricted which improved following       Timed Minutes 41     Therapeutic Exercises    14068 Comments   Hamstring length assessed passively  R: 52 deg and L: 36 deg   SKTC -- supine and sitting Especially on L   Hamstring stretch in sitting     B SLR  1 x 10 -- encouraged to flex only as far and he can maintain fully extended knee       Timed Minutes 15     Therapy Education/Self Care 84939   Details: Hooklying Clamshell with Resistance - 5 x weekly - 10 reps - 3 sets  Hooklying Single Leg Bent Knee Fallouts with Resistance - 5 x weekly - 20 reps - 2 sets  Supine March with Resistance Band - 5 x weekly - 20 reps - 2 sets  Sit to Stand - 10 reps  Seated Piriformis Stretch - 2-3 x daily - 7 x " "weekly - 3 sets - 30 sec hold  Seated Hamstring Stretch with Chair - 2-3 x daily - 7 x weekly - 3 sets - 2-3 hold  Ankle Dorsiflexion with Resistance - 1 x daily - 7 x weekly - 2 sets - 10 reps  Standing Heel Raise - 1 x daily - 7 x weekly - 10 reps  Supine Single Knee to Chest Stretch - 2-3 x daily - 7 x weekly - 3 sets - 30 sec ea hold   [employer] Code: [email or web address]   Given Home Exercise Program   Progress: Reinforced and Modified   Who provided to: Patient   Level of understanding Verbalized and Continued reinforcement needed    Timed Minutes      Total Timed Treatment:     56   mins  Total Time of Visit:             60   mins         ASSESSMENT/PLAN     GOALS  Goals           Progress Note due by: 2021                 RECERT Due By: 2021   STG by: 2021 Comments Last Date Assessed Status   1. Patient will be able to transfer sit to stand to sit without difficulty and without use of hands.   met   2. Patient will be able to walk 500' without rest on flat surface. Able to walk a total of 750' (500' with SC and 250' without an AD) without a rest break; however, significant increase in fatigue. 2021 met     3. Patient will be able to perform SLS on each leg 5 sec safely. R SLS (EO): 0:10.31 sec before LOB  R SLS (EC): 0:02.85 sec before LOB  L SLS (EO): 0:1.18 sec before LOB  L SLS (EC): 0:04.64 sec before LOB 2021 ongoing   LT2021      1. Patient will be able to walk on uneven surfaces for 10 minutes without needing rest.  Able to walk on flat surface (hallway) for approx 10 minutes (with and without an AD) but significantly increased his fatigue. He reports walking into and around [facility] with use of a \"buggy\" for stability. 2021 ongoing  Progressing    2. Patient will be compliant with HEP on a daily basis.  Reinforced importance of daily compliance with stretches and provided another copy of his stretches from his HEP, encouraging him to perform them 2-3x daily to maintain " "flexibility 6/11/2021 ongoing  Progressing    3. Patient will report increased energy at home with less time spent in chair or bed.    met   4. Good+ hip strength bilaterally Grossly 4+/5 -- L hip pain noted w extension and abduction  4/08/2021 met   5. Pt will be able to ambulate 1 city block with use of SC only.   ongoing   6. Pt will be able to ambulate 500 feet without rest or gait deviation on flat surface.   ongoing   7. Pt will be able to perform 8 step-ups with B LE in <20 seconds at a bouchra of 60 BPM He was able to perform 7 step-ups in 20 seconds following step-ups for bouchra  6/07/2021 ongoing   8. Pt will be able to ambulate 250 feet without use of AD and no instances of instability.    ongoing     Assessment/Plan     ASSESSMENT: Pt demonstrated hypomobility in B LE within all planes. He was especially restricted with L ER and flexion, B IR, B hamstrings (L > R). He demonstrated 52 deg (R) and (L) 36 deg hamstring length today, which improved following TMR and contract-relax. Furthermore, as he is not regularly performing his stretches at home, as demonstrated today, I provided him another copy of his stretches from his HEP and educated/encouraged him on the importance of daily compliance. Moreover, he returns today following apt with his neurologist, reporting very good results. He has been cleared to drive \"very slowly\" and is excited to return to this. He is waiting to hear a decision regarding an order for his AFO.     PLAN: Reinforce stretches from his HEP and address flexibility as need. Consider following stretches with gait training. Consider assessing MMT for hip strength. Continue hip strengthening and balance training as able.     Signature: Sofia Li PTA      "

## 2021-06-14 ENCOUNTER — TREATMENT (OUTPATIENT)
Dept: PHYSICAL THERAPY | Facility: CLINIC | Age: 66
End: 2021-06-14

## 2021-06-14 DIAGNOSIS — I63.9 CEREBROVASCULAR ACCIDENT (CVA), UNSPECIFIED MECHANISM (HCC): ICD-10-CM

## 2021-06-14 DIAGNOSIS — R29.898 LEFT ARM WEAKNESS: ICD-10-CM

## 2021-06-14 DIAGNOSIS — I69.354 HEMIPARESIS AFFECTING LEFT SIDE AS LATE EFFECT OF CEREBROVASCULAR ACCIDENT (CVA) (HCC): ICD-10-CM

## 2021-06-14 DIAGNOSIS — R26.9 GAIT DISTURBANCE: Primary | ICD-10-CM

## 2021-06-14 PROCEDURE — 97530 THERAPEUTIC ACTIVITIES: CPT | Performed by: PHYSICAL THERAPIST

## 2021-06-14 NOTE — PROGRESS NOTES
Physical Therapy 30 Day Progress Note    Patient: Dao Jhaveri                                                                                     Visit Date: 2021  :     1955    Referring practitioner:    Rod De Los Santos MD  Date of Initial Visit:          Type: THERAPY  Noted: 3/10/2021    Patient seen for 25 sessions    Visit Diagnoses:    ICD-10-CM ICD-9-CM   1. Gait disturbance  R26.9 781.2   2. Cerebrovascular accident (CVA), unspecified mechanism (CMS/[facility])  I63.9 434.91   3. Hemiparesis affecting left side as late effect of cerebrovascular accident (CVA) (CMS/HCC)  I69.354 438.20   4. Left arm weakness  R29.898 729.89     SUBJECTIVE     Subjective He denies pain or new complaints. He reports he is still having difficulty walking without his SC, he still has intermittent L hip pain when he is still a long time or standing for long periods. He reports 70 to 80 % recovery thus far post stroke    PAIN: 0/10         OBJECTIVE     Objective      Therapeutic Activities    50686 Comments   Reviewed all remaining goals and updated a patient authorization survey form (see goals and media sections)    At length discussion and education re: his physician order for an AFO                 Timed Minutes 40         Therapy Education/Self Care 12161   Details:    Given Home Exercise Program   Progress: Reinforced   Education provided to:  Patient   Level of understanding Verbalized   Timed Minutes          Total Timed Treatment:     40   mins  Total Time of Visit:             40  mins         ASSESSMENT/PLAN     GOALS  Goals           Progress Note due by: 2021                 RECERT Due By: 2021   STG by: 2021 Comments Last Date Assessed Status   1. Patient will be able to transfer sit to stand to sit without difficulty and without use of hands.    21 met   2. Patient will be able to walk 500' without rest on flat surface.  2021 met      3. Patient will be able to perform SLS on each leg  5 sec safely. R 10 sec L LE 2 sec. 21 Partially Met   LT2021       1. Patient will be able to walk on uneven surfaces for 10 minutes without needing rest.   21 ongoing      2. Patient will be compliant with HEP on a daily basis.  Reports daily compliance and reinforced today 21 ongoing      3. Patient will report increased energy at home with less time spent in chair or bed.   21 met   4. Good+ hip strength bilaterally  21 met   5. Pt will be able to ambulate 1 city block with use of SC only.  see goals 6 and 8 comments  21 ongoing   6. Pt will be able to ambulate 500 feet without rest or gait deviation on flat surface.  400 ft (200 ft with and w/o SC) non stop and there were deviations with both see goal #8  comments  21 ongoing   7. Pt will be able to perform 8 step-ups with B LE in <20 seconds at a bouchra of 60 BPM  8 each LE alternating with 6 inch step in :17. 45 seconds 21 Met    8. Pt will be able to ambulate 250 feet without use of AD and no instances of instability.   200 ft w/o SC with 2 path deviations and 2 instances of decreased L foot clearance, with B lateral torso lurch 21 ongoing         Assessment/Plan     ASSESSMENT: Thus far he has met five of his eleven goals to date. He also reports an eighty percent improvement to date but he still lacks stability with his SC and desires to walk without his SC eventually which is possible with continued skilled therapy services.    PLAN: Continue pre gait activities and work on transferring them over to gait. Also continue balance activities to improve his QOL and reduce his fall risk. We will submit an authorization request for continued visits as well.    Signature: Larry Rodríguez, PTA

## 2021-06-15 NOTE — PROGRESS NOTES
Progress Note Addendum      Patient: Dao Jhaveri           : 1955  Visit Date: 2021  Referring practitioner: Rod De Los Santos MD  Date of Initial Visit: Type: THERAPY  Noted: 3/10/2021  Patient seen for 25 sessions  Visit Diagnoses:    ICD-10-CM ICD-9-CM   1. Gait disturbance  R26.9 781.2   2. Cerebrovascular accident (CVA), unspecified mechanism (CMS/[facility])  I63.9 434.91   3. Hemiparesis affecting left side as late effect of cerebrovascular accident (CVA) (CMS/HCC)  I69.354 438.20   4. Left arm weakness  R29.898 729.89          Clinical Progress: improved  Home Program Compliance: Yes  Treatment has included: therapeutic exercise, neuromuscular re-education, manual therapy, therapeutic activity and gait training  Progress toward previous goals: Partially Met  Prognosis to achieve goals: good    Subjective     Objective     Assessment & Plan     Assessment  Impairments: abnormal gait, impaired balance, impaired physical strength and lacks appropriate home exercise program  Assessment details: Will add electrical stimulation to POC for dorsiflexion assist and retraining.   Prognosis: good  Functional Limitations: lifting, walking, standing and stooping  Plan  Therapy options: will be seen for skilled physical therapy services  Planned modality interventions: electrical stimulation/Russian stimulation  Planned therapy interventions: abdominal trunk stabilization, balance/weight-bearing training, neuromuscular re-education, motor coordination training, flexibility, functional ROM exercises, strengthening, gait training, home exercise program and therapeutic activities  Frequency: 2x week  Duration in visits: 8  Treatment plan discussed with: PTA        I have reviewed the progress note information provided by Larry Rodríguez PTA, and I concur with the findings.    Joshua Sim, PT  Physical Therapist

## 2021-06-16 ENCOUNTER — TREATMENT (OUTPATIENT)
Dept: PHYSICAL THERAPY | Facility: CLINIC | Age: 66
End: 2021-06-16

## 2021-06-16 DIAGNOSIS — R26.9 GAIT DISTURBANCE: Primary | ICD-10-CM

## 2021-06-16 DIAGNOSIS — I63.9 CEREBROVASCULAR ACCIDENT (CVA), UNSPECIFIED MECHANISM (HCC): ICD-10-CM

## 2021-06-16 PROCEDURE — 97112 NEUROMUSCULAR REEDUCATION: CPT | Performed by: PHYSICAL THERAPIST

## 2021-06-16 NOTE — PROGRESS NOTES
"Physical Therapy Treatment Note    Patient: Dao Jhaveri                                                                                     Visit Date: 2021  :     1955    Referring practitioner:    Rod De Los Santos MD  Date of Initial Visit:          Type: THERAPY  Noted: 3/10/2021    Patient seen for 26 sessions    Visit Diagnoses:    ICD-10-CM ICD-9-CM   1. Gait disturbance  R26.9 781.2   2. Cerebrovascular accident (CVA), unspecified mechanism (CMS/HCC)  I63.9 434.91     SUBJECTIVE     Subjective His hand hurt all night long, it \"throbbed\" all night long. He \"kept waking up shaking his arm\". It hurt even after taking Flexeril     PAIN: 7/10 (L hand mostly but shoulder is hurting as well) > 6/10 (hand) 0/10 otherwise     OBJECTIVE     Objective      Therapeutic Exercises    68041 Comments   Unicam at level 3 5 minutes        Timed Minutes 5     Neuromuscular Reeducation     16300 Comments   M <> L WS  2 x 10 ea -- Knee \"buckled\" on L with fatigue onset    SLS on BL TheraDisc 1 x 10 ea   Knee walking to emphasize lateral WS Frequently fell forward as he would not full WS onto unilateral LE and demo'd difficulty with hip ext   Forward lunges onto BL side BOSU 2 x 10 ea       Timed Minutes 40     Therapy Education/Self Care 88555   Details:    Given Home Exercise Program   Progress: Reinforced   Education provided to:  Patient   Level of understanding Verbalized   Timed Minutes      Total Timed Treatment:     45   mins  Total Time of Visit:             45  mins         ASSESSMENT/PLAN     GOALS  Goals           Progress Note due by: 2021                 RECERT Due By: 2021   STG by: 2021 Comments Last Date Assessed Status   1. Patient will be able to transfer sit to stand to sit without difficulty and without use of hands.    21 met   2. Patient will be able to walk 500' without rest on flat surface.  2021 met      3. Patient will be able to perform SLS on each leg 5 sec safely. " "Pt demonstrated decreased lateral WS and difficulty with SLS today, trinh on L, with fatigue onset 21 Partially Met   LT2021       1. Patient will be able to walk on uneven surfaces for 10 minutes without needing rest.   21 ongoing      2. Patient will be compliant with HEP on a daily basis.  Reports daily compliance and reinforced today 21 ongoing      3. Patient will report increased energy at home with less time spent in chair or bed.   21 met   4. Good+ hip strength bilaterally  21 met   5. Pt will be able to ambulate 1 city block with use of SC only.  see goals 6 and 8 comments  21 ongoing   6. Pt will be able to ambulate 500 feet without rest or gait deviation on flat surface.  400 ft (200 ft with and w/o SC) non stop and there were deviations with both see goal #8  comments  21 ongoing   7. Pt will be able to perform 8 step-ups with B LE in <20 seconds at a bouchra of 60 BPM  8 each LE alternating with 6 inch step in :17. 45 seconds 21 Met    8. Pt will be able to ambulate 250 feet without use of AD and no instances of instability.   200 ft w/o SC with 2 path deviations and 2 instances of decreased L foot clearance, with B lateral torso lurch 21 ongoing     Assessment/Plan     ASSESSMENT: Today, pt demonstrated decreased M <> L WS, especially onto L LE. We utilized knee walking in tall kneeling to remove his LE and focus on hip extension and lateral WS; however, he frequently fell forward and would catch himself with B UE. We reinforced this with M <> L WS and SLS at // bars; however, he reported fatigue in his L LE and c/o \"buckling\" in L knee with onset of fatigue.     PLAN: Continue to address WS, especially M <> L to increase WB on L LE and transition into pre-gait activities. Progress with knee walking in tall kneeling to emphasize proper WS and hip ext. Follow-up with status of AFO.     Signature: Sofia Li, PTA    "

## 2021-06-17 ENCOUNTER — HOSPITAL ENCOUNTER (OUTPATIENT)
Dept: CT IMAGING | Facility: HOSPITAL | Age: 66
Discharge: HOME | End: 2021-06-17
Admitting: INTERNAL MEDICINE

## 2021-06-17 DIAGNOSIS — Z87.891 PERSONAL HISTORY OF NICOTINE DEPENDENCE: ICD-10-CM

## 2021-06-17 PROCEDURE — 71271 CT THORAX LUNG CANCER SCR C-: CPT

## 2021-06-21 ENCOUNTER — TREATMENT (OUTPATIENT)
Dept: PHYSICAL THERAPY | Facility: CLINIC | Age: 66
End: 2021-06-21

## 2021-06-21 DIAGNOSIS — I69.354 HEMIPARESIS AFFECTING LEFT SIDE AS LATE EFFECT OF CEREBROVASCULAR ACCIDENT (CVA) (HCC): ICD-10-CM

## 2021-06-21 DIAGNOSIS — I63.9 CEREBROVASCULAR ACCIDENT (CVA), UNSPECIFIED MECHANISM (HCC): ICD-10-CM

## 2021-06-21 DIAGNOSIS — R26.9 GAIT DISTURBANCE: Primary | ICD-10-CM

## 2021-06-21 PROCEDURE — 97116 GAIT TRAINING THERAPY: CPT | Performed by: PHYSICAL THERAPIST

## 2021-06-21 PROCEDURE — 97110 THERAPEUTIC EXERCISES: CPT | Performed by: PHYSICAL THERAPIST

## 2021-06-21 NOTE — PROGRESS NOTES
Physical Therapy Treatment Note    Patient: Dao Jhaveri                                                                                     Visit Date: 2021  :     1955    Referring practitioner:    Rod De Los Santos MD  Date of Initial Visit:          Type: THERAPY  Noted: 3/10/2021    Patient seen for 27 sessions    Visit Diagnoses:    ICD-10-CM ICD-9-CM   1. Gait disturbance  R26.9 781.2   2. Cerebrovascular accident (CVA), unspecified mechanism (CMS/HCC)  I63.9 434.91   3. Hemiparesis affecting left side as late effect of cerebrovascular accident (CVA) (CMS/HCC)  I69.354 438.20     SUBJECTIVE     Subjective He reports L hand pain but no new complaints    PAIN: pre 2/10         OBJECTIVE     Objective      Therapeutic Exercises    68142 Comments   Seated manual B pec and thoracic stretches with 1/2 foam roller     B LE Shuttle Press with eccentric focus with 6 bands x 5 min    L LE Shuttle Press with eccentric focus with 3 bands x 5 min            Timed Minutes 15     Gait Training 81680   Activity Video analysis x 80 ft x 3, pregait training standing WS'ing, walking with increased L UE swing, walking with WBOS, and walking with increased L WS    Gait Deviation Decreased L UE swing, intermittent NBOS, decreased L WS and SP   Cueing Verbal, video, and demo   Assistance none   Asst Device SC   Distance 80 ft x8    Timed Minutes 30         Therapy Education/Self Care 10776   Details:    Given mobility training   Progress: New and Reinforced   Education provided to:  Patient   Level of understanding Verbalized   Timed Minutes          Total Timed Treatment:     45   mins  Total Time of Visit:             45   mins         ASSESSMENT/PLAN     GOALS  Goals           Progress Note due by: 2021                 RECERT Due By: 2021   STG by: 2021 Comments Last Date Assessed Status   1. Patient will be able to transfer sit to stand to sit without difficulty and without use of hands.    21 met    2. Patient will be able to walk 500' without rest on flat surface.   2021 met      3. Patient will be able to perform SLS on each leg 5 sec safely. Pt demonstrated decreased lateral WS and difficulty with SLS today, trinh on L, with fatigue onset 21 Partially Met   LT2021         1. Patient will be able to walk on uneven surfaces for 10 minutes without needing rest.   utilized video analysis for training.  21 ongoing      2. Patient will be compliant with HEP on a daily basis.  Reports daily compliance and reinforced today 21 ongoing      3. Patient will report increased energy at home with less time spent in chair or bed.    21 met   4. Good+ hip strength bilaterally   21 met   5. Pt will be able to ambulate 1 city block with use of SC only.  see goals 6 and 8 comments  21 ongoing   6. Pt will be able to ambulate 500 feet without rest or gait deviation on flat surface.  400 ft (200 ft with and w/o SC) non stop and there were deviations with both see goal #8  comments  21 ongoing   7. Pt will be able to perform 8 step-ups with B LE in <20 seconds at a bouchra of 60 BPM  8 each LE alternating with 6 inch step in :17. 45 seconds 21 Met    8. Pt will be able to ambulate 250 feet without use of AD and no instances of instability.   200 ft w/o SC with 2 path deviations and 2 instances of decreased L foot clearance, with B lateral torso lurch 21 Ongoing         Assessment/Plan     ASSESSMENT: Video analysis proved helpful and I think helped him have more carry over with the pregait B WS'ing activity. His gait pattern improved post session especially in regard to increased L WS'ing and stance phase as compared to initially.    PLAN: Assess his carryover from gait training today and reinforcement will likely be required to some degree.    Signature: Larry Rodríguez, PTA

## 2021-06-23 ENCOUNTER — TREATMENT (OUTPATIENT)
Dept: PHYSICAL THERAPY | Facility: CLINIC | Age: 66
End: 2021-06-23

## 2021-06-23 DIAGNOSIS — I63.9 CEREBROVASCULAR ACCIDENT (CVA), UNSPECIFIED MECHANISM (HCC): ICD-10-CM

## 2021-06-23 DIAGNOSIS — R26.9 GAIT DISTURBANCE: Primary | ICD-10-CM

## 2021-06-23 DIAGNOSIS — I69.354 HEMIPARESIS AFFECTING LEFT SIDE AS LATE EFFECT OF CEREBROVASCULAR ACCIDENT (CVA) (HCC): ICD-10-CM

## 2021-06-23 PROCEDURE — 97116 GAIT TRAINING THERAPY: CPT | Performed by: PHYSICAL THERAPIST

## 2021-06-23 PROCEDURE — 97112 NEUROMUSCULAR REEDUCATION: CPT | Performed by: PHYSICAL THERAPIST

## 2021-06-23 NOTE — PROGRESS NOTES
Physical Therapy Treatment Note    Patient: Dao Jhaveri                                                                                     Visit Date: 2021  :     1955    Referring practitioner:    Rod De Los Santos MD  Date of Initial Visit:          Type: THERAPY  Noted: 3/10/2021    Patient seen for 28 sessions    Visit Diagnoses:    ICD-10-CM ICD-9-CM   1. Gait disturbance  R26.9 781.2   2. Cerebrovascular accident (CVA), unspecified mechanism (CMS/[facility])  I63.9 434.91   3. Hemiparesis affecting left side as late effect of cerebrovascular accident (CVA) (CMS/[facility])  I69.354 438.20     SUBJECTIVE     Subjective He reports L shoulder pain today but that is all. No falls or near falls and no new complaints.     PAIN: pre 2/10 (L shoulder) > 2/10 (shoulder)     OBJECTIVE     Objective     Gait Training 53225   Activity Step through onto R and L emphasizing unilateral WS on neurocom (level 3) to assist with pre-gait mechanics    Gait Deviation    Cueing VC, TC, video feedback, neurocom, and mirror feedback    Assistance    Asst Device    Comments demo'd carry over following tx, continues to need SLS training for balance and to increase trust in B LE (L > R), gait was improved and pt demo'd/verbalized better understanding of proper WS during gait   Distance    Timed Minutes 25     Neuromuscular Reeducation     29476 Comments   M <> L WS with mirror feedback and neurocom (with and without mirror feedback) He would side bend (L > R), he demo'd difficulty with 100% FWB, bearing only 85-90% of his BW on his L LE and 95% on R LE; additionally, by the end of this exercise, he was able to perform 100% WB unilaterally and required minimal TCs to prevent SB.        Timed Minutes 20     Therapy Education/Self Care 79675   Details:    Given mobility training   Progress: New and Reinforced   Education provided to:  Patient   Level of understanding Verbalized   Timed Minutes      Total Timed Treatment:     45    mins  Total Time of Visit:             45   mins         ASSESSMENT/PLAN     GOALS  Goals           Progress Note due by: 2021                 RECERT Due By: 2021   STG by: 2021 Comments Last Date Assessed Status   1. Patient will be able to transfer sit to stand to sit without difficulty and without use of hands.    2021 met   2. Patient will be able to walk 500' without rest on flat surface.   2021 met      3. Patient will be able to perform SLS on each leg 5 sec safely. Pt demonstrated decreased lateral WS and difficulty with SLS today, trinh on L, with fatigue onset 2021 Partially Met   LT2021         1. Patient will be able to walk on uneven surfaces for 10 minutes without needing rest.   utilized video analysis for training.  2021 ongoing      2. Patient will be compliant with HEP on a daily basis.  Reports daily compliance and reinforced today 2021 ongoing      3. Patient will report increased energy at home with less time spent in chair or bed.    2021 met   4. Good+ hip strength bilaterally   2021 met   5. Pt will be able to ambulate 1 city block with use of SC only. Addressed his gait and safety without SC today using neurocom and reinforced pre-gait WS. He demo'd some carryover following tx  2021 ongoing   6. Pt will be able to ambulate 500 feet without rest or gait deviation on flat surface.  400 ft (200 ft with and w/o SC) non stop and there were deviations with both see goal #8  comments  2021 ongoing   7. Pt will be able to perform 8 step-ups with B LE in <20 seconds at a bouchra of 60 BPM  8 each LE alternating with 6 inch step in :17. 45 seconds 2021 Met    8. Pt will be able to ambulate 250 feet without use of AD and no instances of instability.  Addressed with pre-gait today which was furthered with WS training to improve mechanics and allow him to amb safely  2021 Ongoing     Assessment/Plan     ASSESSMENT: During M <> L  WS today, he would side bend (L > R) vs WS. He also demonstrated difficulty with 100% FWB, bearing only 85-90% of his BW on his L LE and 95% on R LE. Following WS training on the neurocom (with visual feedback), he was able to perform 100% WB unilaterally on B LE, requiring only minimal TCs to prevent SB. Furthermore, we addressed his pre-gait mechanics today with step-through onto both R and L LE to emphasize unilateral WS.   He demonstrated carry over following tx; however, he continues to need SLS training for balance and to increase trust in B LE (L > R).    PLAN: Assess his carryover from gait training and WS today. Consider continued use of mirror and neurocom feedback, focusing on stance phase of gait with SLS to improve his balance and increase his confidence with FWB on B LE (trinh L LE).     Signature: Sofia Li, PTA

## 2021-06-28 ENCOUNTER — TREATMENT (OUTPATIENT)
Dept: PHYSICAL THERAPY | Facility: CLINIC | Age: 66
End: 2021-06-28

## 2021-06-28 DIAGNOSIS — I69.354 HEMIPARESIS AFFECTING LEFT SIDE AS LATE EFFECT OF CEREBROVASCULAR ACCIDENT (CVA) (HCC): ICD-10-CM

## 2021-06-28 DIAGNOSIS — I63.9 CEREBROVASCULAR ACCIDENT (CVA), UNSPECIFIED MECHANISM (HCC): ICD-10-CM

## 2021-06-28 DIAGNOSIS — R26.9 GAIT DISTURBANCE: Primary | ICD-10-CM

## 2021-06-28 PROCEDURE — 97116 GAIT TRAINING THERAPY: CPT | Performed by: PHYSICAL THERAPIST

## 2021-06-28 PROCEDURE — 97112 NEUROMUSCULAR REEDUCATION: CPT | Performed by: PHYSICAL THERAPIST

## 2021-06-28 NOTE — PROGRESS NOTES
"Physical Therapy Treatment Note    Patient: Dao Jhaveri                                                                                     Visit Date: 2021  :     1955    Referring practitioner:    Rod De Los Santos MD  Date of Initial Visit:          Type: THERAPY  Noted: 3/10/2021    Patient seen for 29 sessions    Visit Diagnoses:    ICD-10-CM ICD-9-CM   1. Gait disturbance  R26.9 781.2   2. Cerebrovascular accident (CVA), unspecified mechanism (CMS/[facility])  I63.9 434.91   3. Hemiparesis affecting left side as late effect of cerebrovascular accident (CVA) (CMS/HCC)  I69.354 438.20     SUBJECTIVE     Subjective He forgot to call [facility] about his AFO, but will do that today. His L hand hurts, \"but other than that, I'm doing good\".    PAIN: pre 3/10 (L hand) > 0/10      OBJECTIVE     Objective     Gait Training 97774   Activity Step through onto R, L, and B emphasizing unilateral WS on neurocom with anterolateral WS (level 3) to assist with pre-gait mechanics    Gait Deviation decr WB B (L > R)   Cueing Verbal, tactile, demonstration    Assistance Zee x1 at times to correct LOB, otherwise CGA   Asst Device N/A   Comments    Distance N/A   Timed Minutes 25     Neuromuscular Reeducation     19012 Comments   M <> L WS with neurocom feedback working on 100% WB bilaterally  Still apprehensive with FWB onto L > R. Improved following   M <> L WS with tactile assist 2 x 10    Step-ups onto 6\" step with bouchra of 60 bpm 2 x 2 min   M <> L WS on wobble board 1 x 10 ea       Timed Minutes 15     Therapy Education/Self Care 14912   Details: Encouraged pt to contact [facility] and reinforced purp   Given mobility training   Progress: Reinforced   Education provided to:  Patient   Level of understanding Verbalized   Timed Minutes      Total Timed Treatment:     40   mins  Total Time of Visit:             40   mins         ASSESSMENT/PLAN     GOALS  Goals           Progress Note due by: " "2021                 RECERT Due By: 2021   STG by: 2021 Comments Last Date Assessed Status   1. Patient will be able to transfer sit to stand to sit without difficulty and without use of hands.    2021 met   2. Patient will be able to walk 500' without rest on flat surface.   2021 met      3. Patient will be able to perform SLS on each leg 5 sec safely. Pt demonstrated decreased lateral WS and difficulty with SLS today, trinh on L, with fatigue onset 2021 Partially Met   LT2021         1. Patient will be able to walk on uneven surfaces for 10 minutes without needing rest.   utilized video analysis for training.  2021 ongoing      2. Patient will be compliant with HEP on a daily basis.  Reports daily compliance and reinforced today 2021 ongoing      3. Patient will report increased energy at home with less time spent in chair or bed.    2021 met   4. Good+ hip strength bilaterally   2021 met   5. Pt will be able to ambulate 1 city block with use of SC only. Addressed his gait and safety without SC today using neurocom and reinforced pre-gait WS. He demo'd some carryover following tx  2021 ongoing   6. Pt will be able to ambulate 500 feet without rest or gait deviation on flat surface.  400 ft (200 ft with and w/o SC) non stop and there were deviations with both see goal #8  comments 2021 ongoing   7. Pt will be able to perform 8 step-ups with B LE in <20 seconds at a bouchra of 60 BPM Step-ups onto 6\" step at a bouchra of 60 bpm today. Improved significantly with this, but req continued VCs for ascend/descend procedures and would need to \"reset\" a few times. 2021 Met    8. Pt will be able to ambulate 250 feet without use of AD and no instances of instability.  Addressed with pre-gait today which was furthered with WS training to improve mechanics and allow him to amb safely  2021 Ongoing     Assessment/Plan     ASSESSMENT: Pt demonstrated some " carry-over from last session with increasing WB unilaterally; however, he continues to need verbal and tactile cues for FWB (L > R). At this juncture, it appears a good portion of his lack of adequate WS is d/t fear/lack of trust in the LE, so I encouraged him to practice this at home. His bouchra with step-ups has improved, but he continues to require VCs for curb ascend/descend procedure. Moreover, he forgot to contact [facility] regarding his AFO, so I provided him another card of information for this, encouraging him to call today.      PLAN: Consider knee walking re-assessment on mat table. Continue to emphasize FWB bilaterally and M <> L WS. Follow-up with status of AFO. Reduce POC to 1x weekly until receiving AFO.     Signature: Sofia Li, PTA

## 2021-07-07 ENCOUNTER — TREATMENT (OUTPATIENT)
Dept: PHYSICAL THERAPY | Facility: CLINIC | Age: 66
End: 2021-07-07

## 2021-07-07 DIAGNOSIS — I63.9 CEREBROVASCULAR ACCIDENT (CVA), UNSPECIFIED MECHANISM (HCC): ICD-10-CM

## 2021-07-07 DIAGNOSIS — I69.354 HEMIPARESIS AFFECTING LEFT SIDE AS LATE EFFECT OF CEREBROVASCULAR ACCIDENT (CVA) (HCC): ICD-10-CM

## 2021-07-07 DIAGNOSIS — R26.9 GAIT DISTURBANCE: Primary | ICD-10-CM

## 2021-07-07 PROCEDURE — 97116 GAIT TRAINING THERAPY: CPT | Performed by: PHYSICAL THERAPIST

## 2021-07-07 PROCEDURE — 97110 THERAPEUTIC EXERCISES: CPT | Performed by: PHYSICAL THERAPIST

## 2021-07-07 NOTE — PROGRESS NOTES
Physical Therapy Treatment Note    Patient: Dao Jhaveri                                                                                     Visit Date: 2021  :     1955    Referring practitioner:    Rod De Los Santos MD  Date of Initial Visit:          Type: THERAPY  Noted: 3/10/2021    Patient seen for 30 sessions    Visit Diagnoses:    ICD-10-CM ICD-9-CM   1. Gait disturbance  R26.9 781.2   2. Cerebrovascular accident (CVA), unspecified mechanism (CMS/HCC)  I63.9 434.91   3. Hemiparesis affecting left side as late effect of cerebrovascular accident (CVA) (CMS/HCC)  I69.354 438.20     SUBJECTIVE     Subjective  He spoke to [facility] and they have ordered the AFO, he is not sure when it will be in.     PAIN:0/10     OBJECTIVE     Objective     Therapeutic Exercises    43327 Comments   Bridges 2 x 10 added to HEP   Clamshells 2 x 10 added to HEP   On the exercise ball LAQ, holding on with L hand to table  2 x 10   On the exercise ball marching, holding on with L hand to table  2 x 10   On the exercise ball while holding onto a small ball, had patient reach to the L, R, up, then down X 10 reps each direction   SLS holing on to counter X 10 reps each LE               Timed Minutes 30       Gait Training 12877   Activity Gait training   Gait Deviation Decrease hip extension with heel strike   Cueing Verbal, tactile, demonstration    Assistance CGA-SBA   Asst Device cane   Comments Stand tall   Distance 3 x 100 feet   Timed Minutes 10     Therapy Education/Self Care 83612   Details: HEP   Given [employer] [facility] (access code [email or web address])   Progress: New and Reinforced   Education provided to:  Patient   Level of understanding Verbalized and demonstrated   Timed Minutes        Access Code: [email or web address]  URL: [email or web address]  Date: 2021  Prepared by: Mary Lambert    Exercises  Seated Piriformis Stretch - 2-3 x daily - 7 x weekly - 3 sets - 30 sec hold  Seated Hamstring Stretch with Chair - 2-3 x  daily - 7 x weekly - 3 sets - 2-3 hold  Seated March with Resistance - 1 x daily - 7 x weekly - 3 sets - 10 reps  Sit to Stand - 10 reps  Hooklying Clamshell with Resistance - 5 x weekly - 10 reps - 3 sets  Hooklying Single Leg Bent Knee Fallouts with Resistance - 5 x weekly - 20 reps - 2 sets  Supine March with Resistance Band - 5 x weekly - 20 reps - 2 sets  Supine Bridge - 2 x daily - 7 x weekly - 1 sets - 10 reps - 2-3 seconds hold  Ankle Dorsiflexion with Resistance - 1 x daily - 7 x weekly - 2 sets - 10 reps  Beginner Clam - 2 x daily - 7 x weekly - 1 sets - 10 reps  Supine Single Knee to Chest Stretch - 2-3 x daily - 7 x weekly - 3 sets - 30 sec ea hold  Standing Heel Raise - 1 x daily - 7 x weekly - 10 reps    Total Timed Treatment:     40   mins  Total Time of Visit:             40   mins         ASSESSMENT/PLAN     GOALS  Goals           Progress Note due by: 2021                 RECERT Due By: 2021   STG by: 2021 Comments Last Date Assessed Status   1. Patient will be able to transfer sit to stand to sit without difficulty and without use of hands.    2021 met   2. Patient will be able to walk 500' without rest on flat surface.   2021 met      3. Patient will be able to perform SLS on each leg 5 sec safely. Pt demonstrated decreased lateral WS and difficulty with SLS today, trinh on L, with fatigue onset 2021 Partially Met   LT2021         1. Patient will be able to walk on uneven surfaces for 10 minutes without needing rest.   utilized video analysis for training.  2021 ongoing      2. Patient will be compliant with HEP on a daily basis.  Reports daily compliance and reinforced today 2021 ongoing      3. Patient will report increased energy at home with less time spent in chair or bed.    2021 met   4. Good+ hip strength bilaterally   2021 met   5. Pt will be able to ambulate 1 city block with use of SC only. Addressed his gait and safety without SC  "today using neurocom and reinforced pre-gait WS. He demo'd some carryover following tx  6/23/2021 ongoing   6. Pt will be able to ambulate 500 feet without rest or gait deviation on flat surface.  400 ft (200 ft with and w/o SC) non stop and there were deviations with both see goal #8  comments 6/14/2021 ongoing   7. Pt will be able to perform 8 step-ups with B LE in <20 seconds at a bouchra of 60 BPM Step-ups onto 6\" step at a bouchra of 60 bpm today. Improved significantly with this, but req continued VCs for ascend/descend procedures and would need to \"reset\" a few times. 6/28/2021 Met    8. Pt will be able to ambulate 250 feet without use of AD and no instances of instability.  Addressed with pre-gait today which was furthered with WS training to improve mechanics and allow him to amb safely  6/14/2021 Ongoing     Assessment/Plan     ASSESSMENT:  Added hip and core exercises today to patient's HEP.  Worked on balance as well as strengthening on the ball, patient needs cues for keeping head and shoulders back when exercising as well as gait.      PLAN: Will plan to see patient 1 x a week working on improving gait as well as hip/core strength and balance.     Signature: Mary Lambert, PTA, AUNDREA    "

## 2021-07-14 ENCOUNTER — TREATMENT (OUTPATIENT)
Dept: PHYSICAL THERAPY | Facility: CLINIC | Age: 66
End: 2021-07-14

## 2021-07-14 DIAGNOSIS — R26.9 GAIT DISTURBANCE: Primary | ICD-10-CM

## 2021-07-14 DIAGNOSIS — I69.354 HEMIPARESIS AFFECTING LEFT SIDE AS LATE EFFECT OF CEREBROVASCULAR ACCIDENT (CVA) (HCC): ICD-10-CM

## 2021-07-14 DIAGNOSIS — I63.9 CEREBROVASCULAR ACCIDENT (CVA), UNSPECIFIED MECHANISM (HCC): ICD-10-CM

## 2021-07-14 PROCEDURE — 97112 NEUROMUSCULAR REEDUCATION: CPT | Performed by: PHYSICAL THERAPIST

## 2021-07-14 PROCEDURE — 97116 GAIT TRAINING THERAPY: CPT | Performed by: PHYSICAL THERAPIST

## 2021-07-14 NOTE — PROGRESS NOTES
Physical Therapy Treatment Note and 30 Day Progress Note    Patient: Dao Jhaveri                                                                                     Visit Date: 2021  :     1955    Referring practitioner:    Rod De Los Santos MD  Date of Initial Visit:          Type: THERAPY  Noted: 3/10/2021    Patient seen for 31 sessions    Visit Diagnoses:    ICD-10-CM ICD-9-CM   1. Gait disturbance  R26.9 781.2   2. Cerebrovascular accident (CVA), unspecified mechanism (CMS/[facility])  I63.9 434.91   3. Hemiparesis affecting left side as late effect of cerebrovascular accident (CVA) (CMS/[facility])  I69.354 438.20     SUBJECTIVE     Subjective  He denies any news on the AFO. He reports calling [facility] orthopedics and they said it had been ordered but is not in yet. He reports that his HEP is going good with the new added HEP components. He reports continued improvement in his walking and walking a city block yesterday without the cane.     PAIN: 0/10     OBJECTIVE     Objective     Neuromuscular Reeducation     50378 Comments   Steps ups onto regular step at 60 BPM     SLS                 Timed Minutes 10         Gait Training 49899     Activity Gait training outside on uneven surfaces and up and down inclines.  Gait training inside with cane  Gait training inside without can e   Gait Deviation Decrease hip extension with heel strike Decrease hip extension with heel strike Increased limp and decrease step length and stability.    Cueing Verbal, tactile, demonstration  Verbal, tactile, demonstration  Verbal    Assistance SBA-CGA SBA SBA   Asst Device cane Cane  Cane    Distance 12 minutes  Over 500 feet  300 feet    Timed Minutes 12 10 8   Total gait minutes 30 minutes     Therapy Education/Self Care 89873   Details: AFO vs alternate    Given Medbridge HEP (access code [email or web address])   Progress: new   Education provided to:  Patient   Level of understanding Verbalized and demonstrated   Timed Minutes        Access Code:  [email or web address]  URL: [email or web address]  Date: 2021  Prepared by: Mary Lambert    Exercises  Seated Piriformis Stretch - 2-3 x daily - 7 x weekly - 3 sets - 30 sec hold  Seated Hamstring Stretch with Chair - 2-3 x daily - 7 x weekly - 3 sets - 2-3 hold  Seated March with Resistance - 1 x daily - 7 x weekly - 3 sets - 10 reps  Sit to Stand - 10 reps  Hooklying Clamshell with Resistance - 5 x weekly - 10 reps - 3 sets  Hooklying Single Leg Bent Knee Fallouts with Resistance - 5 x weekly - 20 reps - 2 sets  Supine March with Resistance Band - 5 x weekly - 20 reps - 2 sets  Supine Bridge - 2 x daily - 7 x weekly - 1 sets - 10 reps - 2-3 seconds hold  Ankle Dorsiflexion with Resistance - 1 x daily - 7 x weekly - 2 sets - 10 reps  Beginner Clam - 2 x daily - 7 x weekly - 1 sets - 10 reps  Supine Single Knee to Chest Stretch - 2-3 x daily - 7 x weekly - 3 sets - 30 sec ea hold  Standing Heel Raise - 1 x daily - 7 x weekly - 10 reps    Total Timed Treatment:     40   mins  Total Time of Visit:             40   mins         ASSESSMENT/PLAN     GOALS  Goals           Progress Note due by: 8/3/2021                 RECERT Due By: 2021   STG by: 2021 Comments Last Date Assessed Status   1. Patient will be able to transfer sit to stand to sit without difficulty and without use of hands.    2021 met   2. Patient will be able to walk 500' without rest on flat surface.   2021 met      3. Patient will be able to perform SLS on each leg 5 sec safely. RLE 6 seconds with mild sway, 3 seconds with increased sway on the L  21 Partially Met   LT2021         1. Patient will be able to walk on uneven surfaces for 10 minutes without needing rest.   walked outside 12 minutes today on uneven surfaces  With no rest break but did show signs of fatigue towards the end.  21 Met       2. Patient will be compliant with HEP on a daily basis.  He reports ongoing compliance with HEP  21 ongoing      3.  Patient will report increased energy at home with less time spent in chair or bed.      5/27/2021 met   4. Good+ hip strength bilaterally   5/27/2021 met   5. Pt will be able to ambulate 1 city block with use of SC only. He walked a city block yesterday without the cane and reports a few stumbles.  7/14/21 Met    6. Pt will be able to ambulate 500 feet without rest or gait deviation on flat surface.  over 500 feet today with AD but still presents with lateral lurch  7/14/21 Partially Met    7. Pt will be able to perform 8 step-ups with B LE in <20 seconds  He was able to do step ups at a rate of 60 BPM today.  7/14/21 Partially Met    8. Pt will be able to ambulate 250 feet without use of AD and no instances of instability.  Walked 300 feet today with no AD but did still present with a limp and a few instances of toe drag  7/14/21 Partially Met     Assessment/Plan     ASSESSMENT:  He has met or partially met of 10 of his 11 goals and continues to do well. His still struggles with his gait mechanics but his endurance has greatly improved. He was able to walk 12 minutes outside today on uneven surfaces without any LOB but did still require the use of his straight cane. He is still awaiting his AFO but he is concerned about the price and if this is an issue he may benefit from a lace up brace from a cheaper supplier. He will continue to benefit from therapy at once a week to progress with gait training and fine tune his mechanics.     PLAN: Will plan to see patient 1 x a week working on improving gait as well as hip/core strength and balance.     Signature: Ana Heck, PTA     Mirvaso Counseling: Mirvaso is a topical medication which can decrease superficial blood flow where applied. Side effects are uncommon and include stinging, redness and allergic reactions.

## 2021-07-15 NOTE — PROGRESS NOTES
Progress Note Addendum      Patient: Dao Jhaveri           : 1955  Visit Date: 2021  Referring practitioner: Rod De Los Santos MD  Date of Initial Visit: Type: THERAPY  Noted: 3/10/2021  Patient seen for 31 sessions  Visit Diagnoses:    ICD-10-CM ICD-9-CM   1. Gait disturbance  R26.9 781.2   2. Cerebrovascular accident (CVA), unspecified mechanism (CMS/[facility])  I63.9 434.91   3. Hemiparesis affecting left side as late effect of cerebrovascular accident (CVA) (CMS/[facility])  I69.354 438.20          Clinical Progress: improved  Home Program Compliance: Yes  Treatment has included: therapeutic exercise, neuromuscular re-education, manual therapy, therapeutic activity, gait training, electrical stimulation and dry needling  Progress toward previous goals: 10 of 11 met  Prognosis to achieve goals: good    Subjective     Objective     Assessment & Plan     Assessment  Prognosis: good    Plan  Therapy options: will be seen for skilled physical therapy services  Frequency: 1x week  Duration in weeks: 4  Treatment plan discussed with: PTA        I have reviewed the progress note information provided by Ana Heck PTA, and I concur with the findings.    Heidy Prince, PT, DPT, CLT-MASOUD  Physical Therapist

## 2021-07-21 ENCOUNTER — TREATMENT (OUTPATIENT)
Dept: PHYSICAL THERAPY | Facility: CLINIC | Age: 66
End: 2021-07-21

## 2021-07-21 DIAGNOSIS — R26.9 GAIT DISTURBANCE: Primary | ICD-10-CM

## 2021-07-21 DIAGNOSIS — I69.354 HEMIPARESIS AFFECTING LEFT SIDE AS LATE EFFECT OF CEREBROVASCULAR ACCIDENT (CVA) (HCC): ICD-10-CM

## 2021-07-21 DIAGNOSIS — I63.9 CEREBROVASCULAR ACCIDENT (CVA), UNSPECIFIED MECHANISM (HCC): ICD-10-CM

## 2021-07-21 PROCEDURE — 97116 GAIT TRAINING THERAPY: CPT | Performed by: PHYSICAL THERAPIST

## 2021-07-21 PROCEDURE — 97110 THERAPEUTIC EXERCISES: CPT | Performed by: PHYSICAL THERAPIST

## 2021-07-21 NOTE — PROGRESS NOTES
"Physical Therapy Treatment Note    Patient: Dao Jhaveri                                                                                     Visit Date: 2021  :     1955    Referring practitioner:    Rod De Los Santos MD  Date of Initial Visit:          Type: THERAPY  Noted: 3/10/2021    Patient seen for 32 sessions    Visit Diagnoses:    ICD-10-CM ICD-9-CM   1. Gait disturbance  R26.9 781.2   2. Cerebrovascular accident (CVA), unspecified mechanism (CMS/[facility])  I63.9 434.91   3. Hemiparesis affecting left side as late effect of cerebrovascular accident (CVA) (CMS/HCC)  I69.354 438.20     SUBJECTIVE     Subjective  He doesn't have any new complaints. He forgot to call about his AFO.     PAIN: 0/10 > 0/10     OBJECTIVE     Objective     Therapeutic Exercises    27695 Comments   unicam (level 3) 5 min   Reviewed, modified, and bolstered his HEP    Sit <> stand with 10 lb weighted vest  2 x 10    Hip abduction shuffles with red Chetan 10 ft x 2 ea direction   Calf/toe raises 2 x 10        Timed Minutes 32      Gait Training 42178   Activity Gait in hallway   Gait Deviation \"limping\" on L LE, decreased hip ext and arm swing   Cueing Verbal, tactile, demonstration   Assistance CGA-SBA   Asst Device N/A   Distance 250 ft   Timed Minutes 8     Therapy Education/Self Care 30666   Details: AFO vs alternate    Given Medbridge HEP (access code [email or web address])   Progress: new   Education provided to:  Patient   Level of understanding Verbalized and demonstrated   Timed Minutes      Therapy Education/Self Care 23875   Details: See below   Given Home Exercise Program  Medbridge HEP (access code [email or web address])  mobility training   Progress: New, Reinforced and Modified   Education provided to:  Patient   Level of understanding Verbalized and Demonstrated   Timed Minutes      Access Code: [email or web address]  Date: 2021  Prepared by: Sofia Li    Exercises  Seated Piriformis Stretch - 2-3 x daily - 7 x weekly - 3 sets - 30 sec " hold  Seated Hamstring Stretch with Chair - 2-3 x daily - 7 x weekly - 3 sets - 2-3 hold  Seated Single Knee to Chest - 1 x daily - 7 x weekly - 3 sets - 30 sec hold  Seated Figure 4 Piriformis Stretch - 1 x daily - 7 x weekly - 3 sets - 30 sec ea hold  Sit to Stand - 1 x daily - 7 x weekly - 2 sets - 10 reps  Heel rises with counter support - 1 x daily - 7 x weekly - 2 sets - 10 reps  Clamshell with Resistance - 1 x daily - 7 x weekly - 2 sets - 10 reps  Supine Bridge - 1 x daily - 7 x weekly - 2 sets - 10 reps    Total Timed Treatment:     40   mins  Total Time of Visit:             40   mins         ASSESSMENT/PLAN     GOALS  Goals        Progress Note due by: 8/3/2021             RECERT Due By: 2021   STG by: 2021 Comments Last Date Assessed Status   1. Patient will be able to transfer sit to stand to sit without difficulty and without use of hands.    2021 met   2. Patient will be able to walk 500' without rest on flat surface.   2021 met      3. Patient will be able to perform SLS on each leg 5 sec safely. RLE 6 seconds with mild sway, 3 seconds with increased sway on the L  21 Partially Met   LT2021         1. Patient will be able to walk on uneven surfaces for 10 minutes without needing rest.   walked outside 12 minutes today on uneven surfaces  With no rest break but did show signs of fatigue towards the end.  21 Met       2. Patient will be compliant with HEP on a daily basis.  Reviewed, modified, and progressed. Included daily intermittent stretches and daily to every other day exercises.  2021 ongoing      3. Patient will report increased energy at home with less time spent in chair or bed.      2021 met   4. Good+ hip strength bilaterally   2021 met   5. Pt will be able to ambulate 1 city block with use of SC only. He walked a city block yesterday without the cane and reports a few stumbles.  21 Met    6. Pt will be able to ambulate 500 feet  without rest or gait deviation on flat surface.  over 500 feet today with AD but still presents with lateral lurch  7/14/21 Partially Met    7. Pt will be able to perform 8 step-ups with B LE in <20 seconds  He was able to do step ups at a rate of 60 BPM today.  7/14/21 Partially Met    8. Pt will be able to ambulate 250 feet without use of AD and no instances of instability.  Walked 300 feet today with no AD but did still present with a limp and a few instances of toe drag  7/14/21 Partially Met     Assessment/Plan     ASSESSMENT: During gait, he continues to demonstrate decreased trunk rotation/hip extension and arm swing. He requires cues to maintain erect posture and prevent limping as he will frequently tend to ambulate with a L lateral side bend which exacerbates his gait deviations. He was able to ambulate the entire distance without use of an AD; however, there were several occurrences of crossing midline and instability. He continues to be the most safe with use of SC but appears to be progressing well with his independence as his strength continues to improve.     PLAN: Continue to progress with endurance and strengthening, reinforcing his HEP and bolstering as needed. Follow-up with AFO status.     Signature: Sofia Li, PTA

## 2021-07-28 ENCOUNTER — TREATMENT (OUTPATIENT)
Dept: PHYSICAL THERAPY | Facility: CLINIC | Age: 66
End: 2021-07-28

## 2021-07-28 DIAGNOSIS — I69.354 HEMIPARESIS AFFECTING LEFT SIDE AS LATE EFFECT OF CEREBROVASCULAR ACCIDENT (CVA) (HCC): ICD-10-CM

## 2021-07-28 DIAGNOSIS — I63.9 CEREBROVASCULAR ACCIDENT (CVA), UNSPECIFIED MECHANISM (HCC): ICD-10-CM

## 2021-07-28 DIAGNOSIS — R26.9 GAIT DISTURBANCE: Primary | ICD-10-CM

## 2021-07-28 PROCEDURE — 97112 NEUROMUSCULAR REEDUCATION: CPT | Performed by: PHYSICAL THERAPIST

## 2021-07-28 PROCEDURE — 97110 THERAPEUTIC EXERCISES: CPT | Performed by: PHYSICAL THERAPIST

## 2021-07-28 PROCEDURE — 97116 GAIT TRAINING THERAPY: CPT | Performed by: PHYSICAL THERAPIST

## 2021-07-28 NOTE — PROGRESS NOTES
I have reviewed the notes, assessments, and/or procedures performed by Sofia Li PTA, I concur with her/his documentation of Dao Jhaveri.

## 2021-07-28 NOTE — PROGRESS NOTES
Physical Therapy Treatment Note and Discharge Note    Patient: Dao Jhaveri                                                                                     Visit Date: 2021  :     1955    Referring practitioner:    Rod De Los Santos MD  Date of Initial Visit:          Type: THERAPY  Noted: 3/10/2021    Patient seen for 33 sessions    Visit Diagnoses:    ICD-10-CM ICD-9-CM   1. Gait disturbance  R26.9 781.2   2. Cerebrovascular accident (CVA), unspecified mechanism (CMS/[facility])  I63.9 434.91   3. Hemiparesis affecting left side as late effect of cerebrovascular accident (CVA) (CMS/HCC)  I69.354 438.20     SUBJECTIVE     Subjective The calf raises are the hardest exercises for him to do. He's been rehearsing and practicing. He called about the AFO and it's going to be $400. He's able to walk within his home without the cane and is able to walk 2 city blocks with rollator vs 1 block previously.     PAIN: 0/10 > 0/10     OBJECTIVE     Objective     Therapeutic Exercises    72827 Comments   Shuttle press (6 cords)  6 mins   R hip MMT Flexion: 4+/5  Abduction: 5/5  Extension: 3+/5   L hip MMT Flexion: 4/5  Abduction: 4/5  Extension: 4-/5   Attempted prone hip ext  Painful on L with reps, tolerated for testing   Calf/toe raises  2 x 20 ea -- fatigued quickly        Timed Minutes 27      Gait Training 55734   Activity Gait analysis, walking in hallway with and withtout SC   Gait Deviation With SC: decreased ankle DF with occasional L hip drop d/t min knee buckling  Without SC: occasional L deviation with R LE crossing midline   Cueing verbal   Assistance SBA with and without SC   Asst Device With and without SC   Distance 100 ft   Timed Minutes 8     Neuromuscular Reeducation     38209 Comments   B SLS with visual fixation  L: 5 seconds and R: 8 seconds with visual fixation, some instability trinh on L -- on final attempt  4.37 sec on L and 3.29 sec on R at first attempt       Timed Minutes 10     Therapy  Education/Self Care 60082   Details: educ on POC and progression while at home, educ on return to dr for new order if needed   Given Home Exercise Program  Medbridge HEP (access code [email or web address])  mobility training   Progress: Reinforced   Education provided to:  Patient   Level of understanding Verbalized, Demonstrated and Teach back level of understanding   Timed Minutes      Access Code: [email or web address]  Date: 2021  Prepared by: Sofia Li    Exercises  Seated Piriformis Stretch - 2-3 x daily - 7 x weekly - 3 sets - 30 sec hold  Seated Hamstring Stretch with Chair - 2-3 x daily - 7 x weekly - 3 sets - 2-3 hold  Seated Single Knee to Chest - 1 x daily - 7 x weekly - 3 sets - 30 sec hold  Seated Figure 4 Piriformis Stretch - 1 x daily - 7 x weekly - 3 sets - 30 sec ea hold  Sit to Stand - 1 x daily - 7 x weekly - 2 sets - 10 reps  Heel rises with counter support - 1 x daily - 7 x weekly - 2 sets - 10 reps  Clamshell with Resistance - 1 x daily - 7 x weekly - 2 sets - 10 reps  Supine Bridge - 1 x daily - 7 x weekly - 2 sets - 10 reps    Total Timed Treatment:     45   mins  Total Time of Visit:             45   mins         ASSESSMENT/PLAN     GOALS  Goals        Progress Note due by: 8/3/2021             RECERT Due By: 2021   STG by: 2021 Comments Last Date Assessed Status   1. Patient will be able to transfer sit to stand to sit without difficulty and without use of hands.    2021 met   2. Patient will be able to walk 500' without rest on flat surface.   2021 met      3. Patient will be able to perform SLS on each leg 5 sec safely. L: 5 seconds and R: 8 seconds with visual fixation, some instability trinh on L 2021 Partially Met    LT2021         1. Patient will be able to walk on uneven surfaces for 10 minutes without needing rest.   walked outside 12 minutes today on uneven surfaces  With no rest break but did show signs of fatigue towards the end.  21 Met       2. Patient will  "be compliant with HEP on a daily basis.  He has been \"rehearsing and practicing\". He's had the most difficulty with calf raises d/t weaknesses. Able to perform teach-back educ properly 7/28/2021 Met   3. Patient will report increased energy at home with less time spent in chair or bed.      5/27/2021 met   4. Good+ hip strength bilaterally  L hip MMT: (flex) 4/5, (abd) 4/5, (ext) 4-/5   R hip MMT: (flex) 4+/5, (abd) 4/5, (ext) 4-/5 5/27/2021 met   5. Pt will be able to ambulate 1 city block with use of SC only. He walked a city block yesterday without the cane and reports a few stumbles.  7/14/21 Met    6. Pt will be able to ambulate 500 feet without rest or gait deviation on flat surface.  over 500 feet today with AD but still presents with lateral lurch  7/14/21 Partially Met    7. Pt will be able to perform 8 step-ups with B LE in <20 seconds  He was able to do step ups at a rate of 60 BPM today.  7/14/21 Partially Met    8. Pt will be able to ambulate 250 feet without use of AD and no instances of instability.  Walked 300 feet today with no AD but did still present with a limp and a few instances of toe drag  7/14/21 Partially Met     Assessment/Plan     ASSESSMENT: As of today, pt has either met or partially met all of his eleven goals. He is now able to ambulate with SC with limited gait deviations noted and has begun to progress ambulation within his home without AD as able. When ambulating without AD, he does demonstrate some occurrences of L lateral deviation following R LE crossing midline, which usually occurs once L LE fatigues. He is now able to ambulate 2 city blocks with his rollator vs 1 city block previously and is progressing his distance with his SC as able. He has recently increased his compliance with his HEP, which is especially reflected with his MMT of 4/5 to 4+/5 gross B hip strength today. However, he continues to demonstrate decreased balance, which directly affects his gait, and " demonstrates increased fatigue/decreased endurance with increased distances. He is very active within his community and is motivated to reach his personal goal of ambulating without need of AD. As such, I encouraged him to continue progressing towards his goal by ambulating within his home without use of AD as safe and participating in his HEP daily. Should he have difficulty with independent progression and desire to seek out continued PT to progress his balance, gait, and B LE and core strength, he will need to return to his physician under a new order.     PLAN: D/C home with comprehensive HEP. If he wishes to pursue further PT for balance, strengthening, and gait, he will need to return to his PCP for a new order.    DISCHARGE SUMMARY   Discharge date 7/28/2021   Dates of this episode 3/10/2021 through 7/28/2021   Number of visits on this episode 33   Reason for discharge at baseline functional level   Outcomes achieved Able to achieve some goals and partially achieve other goals   Discharge instructions See above.    Discharge plan Continue with current home exercise program as instructed   Summary of care See above.       Signature: Sofia Li, PTA

## 2021-08-17 ENCOUNTER — OFFICE VISIT (OUTPATIENT)
Dept: INTERNAL MEDICINE | Facility: CLINIC | Age: 66
End: 2021-08-17

## 2021-08-17 VITALS
BODY MASS INDEX: 27.88 KG/M2 | WEIGHT: 173.5 LBS | RESPIRATION RATE: 16 BRPM | OXYGEN SATURATION: 98 % | SYSTOLIC BLOOD PRESSURE: 114 MMHG | TEMPERATURE: 97.9 F | HEIGHT: 66 IN | DIASTOLIC BLOOD PRESSURE: 82 MMHG | HEART RATE: 74 BPM

## 2021-08-17 DIAGNOSIS — G89.29 CHRONIC NECK PAIN: ICD-10-CM

## 2021-08-17 DIAGNOSIS — Z86.73 HISTORY OF STROKE: Primary | ICD-10-CM

## 2021-08-17 DIAGNOSIS — G81.94 LEFT HEMIPARESIS (HCC): ICD-10-CM

## 2021-08-17 DIAGNOSIS — I10 ESSENTIAL HYPERTENSION: ICD-10-CM

## 2021-08-17 DIAGNOSIS — M54.2 CHRONIC NECK PAIN: ICD-10-CM

## 2021-08-17 DIAGNOSIS — S16.1XXA CERVICAL MYOFASCIAL STRAIN, INITIAL ENCOUNTER: ICD-10-CM

## 2021-08-17 DIAGNOSIS — I69.354 HEMIPARESIS AFFECTING LEFT SIDE AS LATE EFFECT OF STROKE (HCC): ICD-10-CM

## 2021-08-17 DIAGNOSIS — M54.12 CERVICAL RADICULOPATHY: ICD-10-CM

## 2021-08-17 PROCEDURE — 99214 OFFICE O/P EST MOD 30 MIN: CPT | Performed by: INTERNAL MEDICINE

## 2021-08-17 RX ORDER — NORTRIPTYLINE HYDROCHLORIDE 25 MG/1
25 CAPSULE ORAL NIGHTLY
Qty: 30 CAPSULE | Refills: 0 | Status: SHIPPED | OUTPATIENT
Start: 2021-08-17 | End: 2021-11-18 | Stop reason: SDUPTHER

## 2021-08-17 NOTE — PROGRESS NOTES
CC: left arm and neck pain    History:  Dao Jhaveri is a 66 y.o. male   He notes he has been doing reasonably well and continues working with therapy to improve his left hemiparesis and has been doing very well, even able to decrease the use of his cane.  Blood pressure has been well controlled, but he does have some aching and throbbing in his left neck and shoulder that radiates down into his hand.  He notes this has been present since his stroke and is not improved by anything in particular.  He uses Flexeril, but does not have much relief.       ROS:  Review of Systems   Cardiovascular: Negative for chest pain and palpitations.   Musculoskeletal: Positive for myalgias, neck pain and neck stiffness.   Neurological: Positive for weakness.        reports that he quit smoking about 8 years ago. His smoking use included cigarettes. He has a 52.50 pack-year smoking history. He has never used smokeless tobacco. He reports that he does not drink alcohol and does not use drugs.      Current Outpatient Medications:   •  amLODIPine (NORVASC) 10 MG tablet, Take 1 tablet by mouth every night at bedtime., Disp: 90 tablet, Rfl: 1  •  aspirin 81 MG EC tablet, Take 81 mg by mouth Daily., Disp: , Rfl:   •  atorvastatin (LIPITOR) 80 MG tablet, Take 1 tablet by mouth Daily., Disp: 90 tablet, Rfl: 1  •  clopidogrel (PLAVIX) 75 MG tablet, Take 1 tablet by mouth Daily., Disp: 90 tablet, Rfl: 1  •  cyclobenzaprine (FLEXERIL) 10 MG tablet, Take 1 tablet by mouth 3 (Three) Times a Day As Needed for Muscle Spasms., Disp: 90 tablet, Rfl: 2  •  guaifenesin-dextromethorphan (MUCINEX DM)  MG tablet sustained-release 12 hour tablet, Take 1 tablet by mouth 2 (Two) Times a Day As Needed (cough & congestion)., Disp: 30 tablet, Rfl: 1  •  hydrALAZINE (APRESOLINE) 50 MG tablet, Take 1 tablet by mouth 3 (Three) Times a Day., Disp: 270 tablet, Rfl: 1  •  isosorbide dinitrate (ISORDIL) 20 MG tablet, Take 1 tablet by mouth 3 (Three) Times a  "Day., Disp: 270 tablet, Rfl: 1  •  latanoprost (XALATAN) 0.005 % ophthalmic solution, 1 drop Every Night., Disp: , Rfl:   •  lisinopril (PRINIVIL,ZESTRIL) 20 MG tablet, Take 1 tablet by mouth Daily., Disp: 90 tablet, Rfl: 1  •  metoprolol succinate XL (TOPROL-XL) 100 MG 24 hr tablet, Take 1 tablet by mouth Daily., Disp: 90 tablet, Rfl: 1  •  nitroglycerin (NITROSTAT) 0.4 MG SL tablet, Place 0.4 mg under the tongue Every 5 (Five) Minutes As Needed for Chest Pain. Take no more than 3 doses in 15 minutes., Disp: , Rfl:   •  vitamin B-12 (CYANOCOBALAMIN) 500 MCG tablet, Take 1 tablet by mouth Daily., Disp: 30 tablet, Rfl: 5  •  nortriptyline (Pamelor) 25 MG capsule, Take 1 capsule by mouth Every Night., Disp: 30 capsule, Rfl: 0    OBJECTIVE:  /82 (BP Location: Left arm, Patient Position: Sitting, Cuff Size: Adult)   Pulse 74   Temp 97.9 °F (36.6 °C)   Resp 16   Ht 167.6 cm (65.98\")   Wt 78.7 kg (173 lb 8 oz)   SpO2 98%   BMI 28.02 kg/m²    Physical Exam  Constitutional:       General: He is not in acute distress.  Pulmonary:      Effort: Pulmonary effort is normal. No respiratory distress.   Musculoskeletal:      Comments: Boggy tissue texture changes with spasm of the left trapezius and parascapular muscles.  He has negative Neer's and Mora on the left.  Negative Yergason's on the left.  Muscle strength is 4/5 in the left upper extremity as compared to 5/5 in the contralateral arm.  He does have 3/5 strength of the deltoid on the left.   Neurological:      Mental Status: He is alert and oriented to person, place, and time.   Psychiatric:         Mood and Affect: Mood normal.         Behavior: Behavior normal.         Assessment/Plan     Diagnoses and all orders for this visit:    1. History of stroke (Primary)  2. Left hemiparesis (CMS/HCC)  Hemiparesis affecting left side as late effect of stroke (CMS/HCC)  -     Ambulatory Referral to Physical Therapy Evaluate and treat  Referral  To PT for ongoing " strengthening post stroke. He has had some improvements, and was getting closer to walking without a cane, which he is very motivated toward.     3. Cervical myofascial strain, initial encounter  4. Chronic neck pain  5. Cervical radiculopathy  -     Ambulatory Referral to Physical Therapy Evaluate and treat  -     nortriptyline (Pamelor) 25 MG capsule; Take 1 capsule by mouth Every Night.  Dispense: 30 capsule; Refill: 0  Add nortriptyline to assist with neuropathic component and will ask PT to work with neck and shoulder to relieve pain and improve mobility and strength.     6. Essential hypertension  Well controlled, BP goal for age is <140/90 per JNC 8 guidelines and continue current medications    An After Visit Summary was printed and given to the patient at discharge.  Return for Next scheduled follow up.          Dallin Miller D.O. 8/19/2021   Electronically signed.

## 2021-08-17 NOTE — PATIENT INSTRUCTIONS
Cervical Strain and Sprain Rehab  Ask your health care provider which exercises are safe for you. Do exercises exactly as told by your health care provider and adjust them as directed. It is normal to feel mild stretching, pulling, tightness, or discomfort as you do these exercises. Stop right away if you feel sudden pain or your pain gets worse. Do not begin these exercises until told by your health care provider.  Stretching and range-of-motion exercises  Cervical side bending    1. Using good posture, sit on a stable chair or stand up.  2. Without moving your shoulders, slowly tilt your left / right ear to your shoulder until you feel a stretch in the opposite side neck muscles. You should be looking straight ahead.  3. Hold for __________ seconds.  4. Repeat with the other side of your neck.  Repeat __________ times. Complete this exercise __________ times a day.  Cervical rotation    1. Using good posture, sit on a stable chair or stand up.  2. Slowly turn your head to the side as if you are looking over your left / right shoulder.  ? Keep your eyes level with the ground.  ? Stop when you feel a stretch along the side and the back of your neck.  3. Hold for __________ seconds.  4. Repeat this by turning to your other side.  Repeat __________ times. Complete this exercise __________ times a day.  Thoracic extension and pectoral stretch  1. Roll a towel or a small blanket so it is about 4 inches (10 cm) in diameter.  2. Lie down on your back on a firm surface.  3. Put the towel lengthwise, under your spine in the middle of your back. It should not be under your shoulder blades. The towel should line up with your spine from your middle back to your lower back.  4. Put your hands behind your head and let your elbows fall out to your sides.  5. Hold for __________ seconds.  Repeat __________ times. Complete this exercise __________ times a day.  Strengthening exercises  Isometric upper cervical flexion  1. Lie on  your back with a thin pillow behind your head and a small rolled-up towel under your neck.  2. Gently tuck your chin toward your chest and nod your head down to look toward your feet. Do not lift your head off the pillow.  3. Hold for __________ seconds.  4. Release the tension slowly. Relax your neck muscles completely before you repeat this exercise.  Repeat __________ times. Complete this exercise __________ times a day.  Isometric cervical extension    1. Stand about 6 inches (15 cm) away from a wall, with your back facing the wall.  2. Place a soft object, about 6-8 inches (15-20 cm) in diameter, between the back of your head and the wall. A soft object could be a small pillow, a ball, or a folded towel.  3. Gently tilt your head back and press into the soft object. Keep your jaw and forehead relaxed.  4. Hold for __________ seconds.  5. Release the tension slowly. Relax your neck muscles completely before you repeat this exercise.  Repeat __________ times. Complete this exercise __________ times a day.  Posture and body mechanics  Body mechanics refers to the movements and positions of your body while you do your daily activities. Posture is part of body mechanics. Good posture and healthy body mechanics can help to relieve stress in your body's tissues and joints. Good posture means that your spine is in its natural S-curve position (your spine is neutral), your shoulders are pulled back slightly, and your head is not tipped forward. The following are general guidelines for applying improved posture and body mechanics to your everyday activities.  Sitting    1. When sitting, keep your spine neutral and keep your feet flat on the floor. Use a footrest, if necessary, and keep your thighs parallel to the floor. Avoid rounding your shoulders, and avoid tilting your head forward.  2. When working at a desk or a computer, keep your desk at a height where your hands are slightly lower than your elbows. Slide your  chair under your desk so you are close enough to maintain good posture.  3. When working at a computer, place your monitor at a height where you are looking straight ahead and you do not have to tilt your head forward or downward to look at the screen.  Standing    · When standing, keep your spine neutral and keep your feet about hip-width apart. Keep a slight bend in your knees. Your ears, shoulders, and hips should line up.  · When you do a task in which you  one place for a long time, place one foot up on a stable object that is 2-4 inches (5-10 cm) high, such as a footstool. This helps keep your spine neutral.  Resting  When lying down and resting, avoid positions that are most painful for you. Try to support your neck in a neutral position. You can use a contour pillow or a small rolled-up towel. Your pillow should support your neck but not push on it.  This information is not intended to replace advice given to you by your health care provider. Make sure you discuss any questions you have with your health care provider.  Document Revised: 04/08/2020 Document Reviewed: 09/18/2019  Elsevier Patient Education © 2021 [employer]       Statement Selected

## 2021-08-31 ENCOUNTER — TREATMENT (OUTPATIENT)
Dept: PHYSICAL THERAPY | Facility: CLINIC | Age: 66
End: 2021-08-31

## 2021-08-31 DIAGNOSIS — Z74.09 IMPAIRED MOBILITY: ICD-10-CM

## 2021-08-31 DIAGNOSIS — M54.12 RADICULOPATHY, CERVICAL: Primary | ICD-10-CM

## 2021-08-31 DIAGNOSIS — Z74.09 IMPAIRED FUNCTIONAL MOBILITY, BALANCE, GAIT, AND ENDURANCE: ICD-10-CM

## 2021-08-31 PROCEDURE — 97161 PT EVAL LOW COMPLEX 20 MIN: CPT

## 2021-08-31 NOTE — PROGRESS NOTES
I have reviewed the notes, assessments, and/or procedures performed by Cathie Baca, PT, DPT, I concur with her/his documentation of Dao Jhaveri.
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
## 2021-09-10 ENCOUNTER — TREATMENT (OUTPATIENT)
Dept: PHYSICAL THERAPY | Facility: CLINIC | Age: 66
End: 2021-09-10

## 2021-09-10 DIAGNOSIS — Z74.09 IMPAIRED MOBILITY: ICD-10-CM

## 2021-09-10 DIAGNOSIS — M54.12 RADICULOPATHY, CERVICAL: Primary | ICD-10-CM

## 2021-09-10 PROCEDURE — 97140 MANUAL THERAPY 1/> REGIONS: CPT | Performed by: PHYSICAL THERAPIST

## 2021-09-10 NOTE — PROGRESS NOTES
Physical Therapy Treatment Note    Patient: Dao Jhaveri                                                                                     Visit Date: 9/10/2021  :     1955    Referring practitioner:    Dallin Miller DO  Date of Initial Visit:          Type: THERAPY  Noted: 2021    Patient seen for 2 sessions    Visit Diagnoses:    ICD-10-CM ICD-9-CM   1. Radiculopathy, cervical  M54.12 723.4   2. Impaired mobility  Z74.09 799.89     SUBJECTIVE     Subjective Pt reports that he had pain in the neck that radiates down the left side and that it effected him all night.    PAIN: 6 /10 < 4/10         OBJECTIVE     Objective      Manual Therapy     14210  Comments   L UT, LS, suboccipitals STM    Cervical PA's    Cervical side glides    Thoracic IASTM with blue ridged foam roller    Cervical traction with R side bending    Timed Minutes 45          Therapy Education/Self Care [id number]   Details:    Given symptom relief   Progress: Reinforced   Education provided to:  Patient   Level of understanding Verbalized   Timed Minutes          Total Timed Treatment:     45   mins  Total Time of Visit:             45   mins         ASSESSMENT/PLAN     GOALS  Goals                                          Progress Note due by 10/1/21                                                      Recert due by 21   STG by: 3 weeks  Comments Date Status   Patient will demonstrate improved upright posture with minimal cueing required       ongoing   Patient will demonstrate decreased cervical musculature guarding   Left side guarded in the UT    ongoing   Patient will demonstrate improved cervical mobility       ongoing             LTG by: 6 weeks          Patient will demonstrate independence with comprehensive HEP      ongoing   Patient will demonstrate 4+/5 postural strength       ongoing   Patient will demonstrate ability to maintain tandem standing for >15 sec with no swaying       ongoing   Patient will  demonstrate improved upright posture during ambulation       ongoing   Patient will report <2/10 LUE pain on average for 1 week      ongoing   Patient will score <24 on Quick DASH    ongoing         Assessment/Plan     ASSESSMENT: His L side was guarded with soft tissue mobilization along with hypomobility in the cervical spine. Releif was provided following suboccipital distraction and cervical traction with R lateral flexion.    PLAN: Continue to address his soft tissue restrictions and spinal mobility. He seemed to get good relief with traction and R lateral flexion.    Signature: Arnaud Renee PT DPT

## 2021-09-13 ENCOUNTER — TREATMENT (OUTPATIENT)
Dept: PHYSICAL THERAPY | Facility: CLINIC | Age: 66
End: 2021-09-13

## 2021-09-13 DIAGNOSIS — R29.898 LEFT ARM WEAKNESS: Primary | ICD-10-CM

## 2021-09-13 DIAGNOSIS — M54.12 RADICULOPATHY, CERVICAL: ICD-10-CM

## 2021-09-13 DIAGNOSIS — Z78.9 IMPAIRED INSTRUMENTAL ACTIVITIES OF DAILY LIVING (IADL): ICD-10-CM

## 2021-09-13 PROCEDURE — 97140 MANUAL THERAPY 1/> REGIONS: CPT | Performed by: PHYSICAL THERAPIST

## 2021-09-13 NOTE — PROGRESS NOTES
Physical Therapy Treatment Note    Patient: Dao Jhaveri                                                                                     Visit Date: 2021  :     1955    Referring practitioner:    Dallin Miller DO  Date of Initial Visit:          Type: THERAPY  Noted: 2021    Patient seen for 3 sessions    Visit Diagnoses:    ICD-10-CM ICD-9-CM   1. Left arm weakness  R29.898 729.89   2. Impaired instrumental activities of daily living (IADL)  Z78.9 V49.89   3. Radiculopathy, cervical  M54.12 723.4     SUBJECTIVE     Subjective he reports L shoulder and arm pain that bothers him more at night and disrupts his sleep, reports it's a deep ache    PAIN: 7/10         OBJECTIVE     Objective     Manual Therapy     44431  Comments   STM B UT, LS, and cervical paraspinals Massage chair   Thoracic extension mobilizations Grades 2 and 3 Massage chair   Cervical suboccipital releases, manual traction, and traction with B lateral bends Grades 2 and 3 HL           Timed Minutes 40        Therapy Education/Self Care 08046   Details:    Given postural retraining  symptom relief   Progress: New   Education provided to:  Patient   Level of understanding Verbalized   Timed Minutes          Total Timed Treatment:     40   mins  Total Time of Visit:             40   mins         ASSESSMENT/PLAN     GOALS  Goals                                          Progress Note due by 10/1/21                                                      Recert due by 21   STG by: 3 weeks  Comments Date Status   Patient will demonstrate improved upright posture with minimal cueing required       ongoing   Patient will demonstrate decreased cervical musculature guarding   Left side guarded in the UT    ongoing   Patient will demonstrate improved cervical mobility       ongoing             LTG by: 6 weeks          Patient will demonstrate independence with comprehensive HEP      ongoing   Patient will demonstrate 4+/5  postural strength       ongoing   Patient will demonstrate ability to maintain tandem standing for >15 sec with no swaying       ongoing   Patient will demonstrate improved upright posture during ambulation       ongoing   Patient will report <2/10 LUE pain on average for 1 week  7/10 today  9/13/21  ongoing   Patient will score <24 on Quick DASH      ongoing         Assessment/Plan     ASSESSMENT: His upper thoracic is very restricted and exhibited decreased mobility with extension mobilizations today. He reported more L shoulder and UE pain today as opposed to the neck and due to his reports I have begun to suspect he may have some RTC involvement as well.    PLAN: Continue to monitor his symptoms closely, continue cervical and thoracic mobilizations as well.    Signature: Larry Rodríguez, PTA

## 2021-09-15 ENCOUNTER — TREATMENT (OUTPATIENT)
Dept: PHYSICAL THERAPY | Facility: CLINIC | Age: 66
End: 2021-09-15

## 2021-09-15 DIAGNOSIS — R29.898 LEFT ARM WEAKNESS: Primary | ICD-10-CM

## 2021-09-15 DIAGNOSIS — M54.12 RADICULOPATHY, CERVICAL: ICD-10-CM

## 2021-09-15 DIAGNOSIS — Z78.9 IMPAIRED INSTRUMENTAL ACTIVITIES OF DAILY LIVING (IADL): ICD-10-CM

## 2021-09-15 PROCEDURE — 97140 MANUAL THERAPY 1/> REGIONS: CPT | Performed by: PHYSICAL THERAPIST

## 2021-09-15 NOTE — PROGRESS NOTES
"Physical Therapy Treatment Note    Patient: Dao Jhaveri                                                                                     Visit Date: 9/15/2021  :     1955    Referring practitioner:    Dallin Miller DO  Date of Initial Visit:          Type: THERAPY  Noted: 2021    Patient seen for 4 sessions    Visit Diagnoses:    ICD-10-CM ICD-9-CM   1. Left arm weakness  R29.898 729.89   2. Impaired instrumental activities of daily living (IADL)  Z78.9 V49.89   3. Radiculopathy, cervical  M54.12 723.4     SUBJECTIVE     Subjective He reports he felt good after his last session and \"slept good that night\".     PAIN: 5/10 > 5/10 (L arm) and 7/10 (L LE) \"but I feel a whole lot better. I can tell you did something to it.\"     OBJECTIVE     Objective     Manual Therapy     79862  Comments   STM with massage cream, prone  B UT, LS, and cervical paraspinals   Thoracic PA's, prone Gr 3 -- hypomobile most throughout mid thoracic   Cervical T/D with sustained gentle suboccipital release in HL Good response with this   Gentle cervical PA's, supine Gr 2       Timed Minutes 42      Therapy Education/Self Care 19034   Details:    Given postural retraining  symptom relief   Progress: Reinforced   Education provided to:  Patient   Level of understanding Verbalized   Timed Minutes      Total Timed Treatment:     42   mins  Total Time of Visit:             44   mins         ASSESSMENT/PLAN     GOALS  Goals                                         Progress Note due by 10/1/21                                                            Recert due by 21   STG by: 3 weeks  Comments Date Status   Patient will demonstrate improved upright posture with minimal cueing required       ongoing   Patient will demonstrate decreased cervical musculature guarding   Left side guarded in the UT 9/10/2021  ongoing   Patient will demonstrate improved cervical mobility  Hypomobile throughout cervicothoracic spine 9/15/2021 "  ongoing   LTG by: 6 weeks          Patient will demonstrate independence with comprehensive HEP      ongoing   Patient will demonstrate 4+/5 postural strength       ongoing   Patient will demonstrate ability to maintain tandem standing for >15 sec with no swaying       ongoing   Patient will demonstrate improved upright posture during ambulation       ongoing   Patient will report <2/10 LUE pain on average for 1 week  7/10 today  9/13/21  ongoing   Patient will score <24 on Quick DASH   34.09 on 8/31/2021 8/31/2021 ongoing     Assessment/Plan     ASSESSMENT: Patient reported good response following last session, so we continued with this approach today. He demonstrated continued cervicothoracic hypomobility today, especially in mid thoracic region.     PLAN: Continue to address spinal hypomobility and restrictions, closely monitoring symptoms. Consider introducing light postural strengthening and gravity-eliminated cervical ROM.    Signature: Sofia Li, PTA

## 2021-09-20 ENCOUNTER — TREATMENT (OUTPATIENT)
Dept: PHYSICAL THERAPY | Facility: CLINIC | Age: 66
End: 2021-09-20

## 2021-09-20 DIAGNOSIS — Z78.9 IMPAIRED INSTRUMENTAL ACTIVITIES OF DAILY LIVING (IADL): ICD-10-CM

## 2021-09-20 DIAGNOSIS — M54.12 RADICULOPATHY, CERVICAL: ICD-10-CM

## 2021-09-20 DIAGNOSIS — R29.898 LEFT ARM WEAKNESS: Primary | ICD-10-CM

## 2021-09-20 PROCEDURE — 97140 MANUAL THERAPY 1/> REGIONS: CPT | Performed by: PHYSICAL THERAPIST

## 2021-09-20 NOTE — PROGRESS NOTES
Physical Therapy Treatment Note    Patient: Dao Jhaveri                                                                                     Visit Date: 2021  :     1955    Referring practitioner:    Dallin Miller DO  Date of Initial Visit:          Type: THERAPY  Noted: 2021    Patient seen for 5 sessions    Visit Diagnoses:    ICD-10-CM ICD-9-CM   1. Left arm weakness  R29.898 729.89   2. Impaired instrumental activities of daily living (IADL)  Z78.9 V49.89   3. Radiculopathy, cervical  M54.12 723.4     SUBJECTIVE     Subjective He reports his neck had him suffering all night long    PAIN: 8/10         OBJECTIVE     Objective      Manual Therapy     97719  Comments   STM B UT, LS, and cervical paraspinals  Massage chair   Thoracic extension mobilizations Grade 2 Massage chair   Cervical suboccipital releases, manual traction, traction with B lateral bends Grades 2 and 3            Timed Minutes 40          Therapy Education/Self Care 82927   Details:    Given postural retraining   Progress: Reinforced   Education provided to:  Patient   Level of understanding Verbalized   Timed Minutes          Total Timed Treatment:     40   mins  Total Time of Visit:             40   mins         ASSESSMENT/PLAN     GOALS    Goals                                         Progress Note due by 10/1/21                                                            Recert due by 21   STG by: 3 weeks  Comments Date Status   Patient will demonstrate improved upright posture with minimal cueing required       ongoing   Patient will demonstrate decreased cervical musculature guarding   Left side guarded in the UT 9/10/2021  ongoing   Patient will demonstrate improved cervical mobility  Hypomobile throughout cervicothoracic spine 9/15/2021  ongoing   LTG by: 6 weeks          Patient will demonstrate independence with comprehensive HEP      ongoing   Patient will demonstrate 4+/5 postural strength       ongoing    Patient will demonstrate ability to maintain tandem standing for >15 sec with no swaying       ongoing   Patient will demonstrate improved upright posture during ambulation       ongoing   Patient will report <2/10 LUE pain on average for 1 week  7/10 today  9/13/21  ongoing   Patient will score <24 on Quick DASH   34.09 on 8/31/2021 8/31/2021 ongoing       Assessment/Plan     ASSESSMENT: He was a little more flared today with no real clear activity or position he could contribute the flare to. His cervical region wasn't guarded but it was very restricted.    PLAN: Continue STM and mobilizations as well as consider utilizing a taping technique for B scapular retraction.    Signature: Larry Rodríguez, PTA

## 2021-09-22 ENCOUNTER — TREATMENT (OUTPATIENT)
Dept: PHYSICAL THERAPY | Facility: CLINIC | Age: 66
End: 2021-09-22

## 2021-09-22 DIAGNOSIS — Z74.09 IMPAIRED MOBILITY: ICD-10-CM

## 2021-09-22 DIAGNOSIS — Z74.09 IMPAIRED FUNCTIONAL MOBILITY, BALANCE, GAIT, AND ENDURANCE: ICD-10-CM

## 2021-09-22 DIAGNOSIS — M54.12 RADICULOPATHY, CERVICAL: Primary | ICD-10-CM

## 2021-09-22 PROCEDURE — 97140 MANUAL THERAPY 1/> REGIONS: CPT | Performed by: PHYSICAL THERAPIST

## 2021-09-22 PROCEDURE — 97110 THERAPEUTIC EXERCISES: CPT | Performed by: PHYSICAL THERAPIST

## 2021-09-22 NOTE — PROGRESS NOTES
Physical Therapy Treatment Note    Patient: Dao Jhaveri                                                                                     Visit Date: 2021  :     1955    Referring practitioner:    Dallin Miller DO  Date of Initial Visit:          Type: THERAPY  Noted: 2021    Patient seen for 6 sessions    Visit Diagnoses:    ICD-10-CM ICD-9-CM   1. Radiculopathy, cervical  M54.12 723.4   2. Impaired mobility  Z74.09 799.89   3. Impaired functional mobility, balance, gait, and endurance  Z74.09 V49.89     SUBJECTIVE     Subjective He reports he is feeling pretty good today. His L arm is hurting him a little.     PAIN: 2/10 > 2/10     OBJECTIVE     Objective      Manual Therapy     88733  Comments   STM with massage cream to B UT, LS, and cervical paraspinals  prone   IASTM with BL ridge roller, prone Thoracic paraspinals       Timed Minutes 12     Therapeutic Exercises    89264 Comments   B UE SciFit at 2.0 resistance level, seat #9 5 min   B UE phasic against YTB  2 x 10   Supine horizontal abduction with YTB  2 x 10        Timed Minutes 22     Therapy Education/Self Care 18497   Details: Introduced exercises for HEP, provided HEP from previous POC to continue hip strengthening at home for stability with gait   Given Home Exercise Program  postural retraining    Progress: Reinforced   Education provided to:  Patient   Level of understanding Verbalized   Timed Minutes      HEP from Previous POC:  Access Code: [email or web address]  Date: 2021  Prepared by: Sofia Li     Exercises  Seated Piriformis Stretch - 2-3 x daily - 7 x weekly - 3 sets - 30 sec hold  Seated Hamstring Stretch with Chair - 2-3 x daily - 7 x weekly - 3 sets - 2-3 hold  Seated Single Knee to Chest - 1 x daily - 7 x weekly - 3 sets - 30 sec hold  Seated Figure 4 Piriformis Stretch - 1 x daily - 7 x weekly - 3 sets - 30 sec ea hold  Sit to Stand - 1 x daily - 7 x weekly - 2 sets - 10 reps  Heel rises with counter support -  1 x daily - 7 x weekly - 2 sets - 10 reps  Clamshell with Resistance - 1 x daily - 7 x weekly - 2 sets - 10 reps  Supine Bridge - 1 x daily - 7 x weekly - 2 sets - 10 reps    Total Timed Treatment:     34   mins  Total Time of Visit:             36   mins         ASSESSMENT/PLAN     GOALS    Goals                                                  Progress Note due by 10/1/21                                                                    Recert due by 11/29/21   STG by: 3 weeks  Comments Date Status   Patient will demonstrate improved upright posture with minimal cueing required       ongoing   Patient will demonstrate decreased cervical musculature guarding   Left side guarded in the UT 9/10/21  ongoing   Patient will demonstrate improved cervical mobility  Hypomobile throughout cervicothoracic spine 9/15/21  ongoing   LTG by: 6 weeks          Patient will demonstrate independence with comprehensive HEP      ongoing   Patient will demonstrate 4+/5 postural strength  Light resistance with postural strengthening (exercises) today  9/22/21  ongoing   Patient will demonstrate ability to maintain tandem standing for >15 sec with no swaying       ongoing   Patient will demonstrate improved upright posture during ambulation       ongoing   Patient will report <2/10 LUE pain on average for 1 week  7/10 today  9/13/21  ongoing   Patient will score <24 on Quick DASH   34.09 on 8/31/2021 8/31/21 ongoing     Assessment/Plan     ASSESSMENT: Patient was late today therefore truncating his full tx session. He presents today reporting significantly improved pain, so we were able to progress with some light postural strengthening.     PLAN: Consider leukotape technique for B scapular retraction and postural awareness. Continue to address pain and guarding/restrictions as needed and progress with core and postural strengthening as tolerated. Consider introducing chin tucks. Bolster his HEP if appropriate.     Signature: Sofia  Guillermo, PTA

## 2021-09-28 ENCOUNTER — TREATMENT (OUTPATIENT)
Dept: PHYSICAL THERAPY | Facility: CLINIC | Age: 66
End: 2021-09-28

## 2021-09-28 DIAGNOSIS — M54.12 RADICULOPATHY, CERVICAL: Primary | ICD-10-CM

## 2021-09-28 DIAGNOSIS — R29.898 LEFT ARM WEAKNESS: ICD-10-CM

## 2021-09-28 PROCEDURE — 97110 THERAPEUTIC EXERCISES: CPT | Performed by: PHYSICAL THERAPIST

## 2021-09-28 PROCEDURE — 97140 MANUAL THERAPY 1/> REGIONS: CPT | Performed by: PHYSICAL THERAPIST

## 2021-09-28 NOTE — PROGRESS NOTES
"Physical Therapy Treatment Note    Patient: Dao Jhaveri                                                                                     Visit Date: 2021  :     1955    Referring practitioner:    No ref. provider found  Date of Initial Visit:          Type: THERAPY  Noted: 2021    Patient seen for 7 sessions    Visit Diagnoses:    ICD-10-CM ICD-9-CM   1. Radiculopathy, cervical  M54.12 723.4   2. Left arm weakness  R29.898 729.89     SUBJECTIVE     Subjective He reports he really hurt bad last and his pain is usually more at night and N/T almost daily. His shoulder ached really bad     PAIN: not collected due to therapist error/10         OBJECTIVE     Objective      Therapeutic Exercises    92333 Comments   Progressive thoracic stretch on 1/2 foam roller    Empty can x 2    HL marching on 1/2 foam roller x 5    Supine L serratus punches with #1 2 x 10    Taping application Cover roll and Leukotape inverted U pattern for impingement inhibition, 2 KT \"I\": strips 60% stretch B thoracic to facilitate upright posture. 20 minutes not included in 80667 time entry               Timed Minutes 10     Manual Therapy     96109  Comments   Cervical suboccipital releases, manual traction, and traction with B lateral SB Grades 2 and 3                    Timed Minutes 10          Therapy Education/Self Care 62461   Details:    Given postural retraining  symptom relief   Progress: New   Education provided to:  Patient   Level of understanding Verbalized   Timed Minutes    HEP from Previous POC:  Access Code: [email or web address]  Date: 2021  Prepared by: Sofia Li     Exercises  Seated Piriformis Stretch - 2-3 x daily - 7 x weekly - 3 sets - 30 sec hold  Seated Hamstring Stretch with Chair - 2-3 x daily - 7 x weekly - 3 sets - 2-3 hold  Seated Single Knee to Chest - 1 x daily - 7 x weekly - 3 sets - 30 sec hold  Seated Figure 4 Piriformis Stretch - 1 x daily - 7 x weekly - 3 sets - 30 sec ea hold  Sit to Stand " - 1 x daily - 7 x weekly - 2 sets - 10 reps  Heel rises with counter support - 1 x daily - 7 x weekly - 2 sets - 10 reps  Clamshell with Resistance - 1 x daily - 7 x weekly - 2 sets - 10 reps  Supine Bridge - 1 x daily - 7 x weekly - 2 sets - 10 reps         Total Timed Treatment:     40   mins  Total Time of Visit:             40   mins         ASSESSMENT/PLAN     GOALS  Goals                                                  Progress Note due by 10/1/21                                                                    Recert due by 11/29/21   STG by: 3 weeks  Comments Date Status   Patient will demonstrate improved upright posture with minimal cueing required       ongoing   Patient will demonstrate decreased cervical musculature guarding   Left side guarded in the UT 9/10/21  ongoing   Patient will demonstrate improved cervical mobility  Hypomobile throughout cervicothoracic spine 9/15/21  ongoing   LTG by: 6 weeks          Patient will demonstrate independence with comprehensive HEP      ongoing   Patient will demonstrate 4+/5 postural strength  Light resistance with postural strengthening (exercises) today  9/22/21  ongoing   Patient will demonstrate ability to maintain tandem standing for >15 sec with no swaying       ongoing   Patient will demonstrate improved upright posture during ambulation   utilized photo analysis for postural correction in sitting  9/28/21  ongoing   Patient will report <2/10 LUE pain on average for 1 week  7/10 today  9/13/21  ongoing   Patient will score <24 on Quick DASH   34.09 on 8/31/2021 8/31/21 ongoing         Assessment/Plan     ASSESSMENT: He was positive for L empty can assessment and very point tender along the superior short head bicep tendon. In addition, his scapula rests in a depressed and protracted position. Given the aforementioned along with his reports of increased pain at night I highly suspect he has a RTC issue and a probable partial thickness tear of his  superior bicep tendon although more thorough formal tested is warranted.    PLAN: See assessment comments, follow up his long term response to the taping applications and ice use and progress accordingly.    Signature: Larry Rodríguez, PTA

## 2021-09-30 ENCOUNTER — TREATMENT (OUTPATIENT)
Dept: PHYSICAL THERAPY | Facility: CLINIC | Age: 66
End: 2021-09-30

## 2021-09-30 DIAGNOSIS — Z74.09 IMPAIRED MOBILITY: ICD-10-CM

## 2021-09-30 DIAGNOSIS — R29.898 LEFT ARM WEAKNESS: ICD-10-CM

## 2021-09-30 DIAGNOSIS — M54.12 RADICULOPATHY, CERVICAL: Primary | ICD-10-CM

## 2021-09-30 PROCEDURE — 97110 THERAPEUTIC EXERCISES: CPT | Performed by: PHYSICAL THERAPIST

## 2021-09-30 PROCEDURE — 97140 MANUAL THERAPY 1/> REGIONS: CPT | Performed by: PHYSICAL THERAPIST

## 2021-09-30 NOTE — PROGRESS NOTES
Physical Therapy Treatment Note    Patient: Dao Jhaveri                                                                                     Visit Date: 2021  :     1955    Referring practitioner:    Dallin Miller DO  Date of Initial Visit:          Type: THERAPY  Noted: 2021    Patient seen for 8 sessions    Visit Diagnoses:    ICD-10-CM ICD-9-CM   1. Radiculopathy, cervical  M54.12 723.4   2. Left arm weakness  R29.898 729.89   3. Impaired mobility  Z74.09 799.89     SUBJECTIVE     Subjective He reports that the ice and tape have both helped some however pain is still present in he shoulder and down the arm.    PAIN: 8/10 > 4/10         OBJECTIVE     Objective      Therapeutic Exercises    81297 Comments   Shoulder flexion to 90 deg circles CW/CCW 2 x 10   Supine SA punches  2 x 10   Seated rows with YTB 2 x 10   Seated scapular depression/retraction  2 x 10                   Timed Minutes 15     Manual Therapy     23896  Comments   Cervical suboccipital releases, manual traction, and traction with B lateral SB Grades 2 and 3    L biceps STM                Timed Minutes 25          Therapy Education/Self Care 46105   Details: R shoulder rotator cuff involvement    Given postural retraining  symptom relief   Progress: New   Education provided to:  Patient   Level of understanding Verbalized   Timed Minutes    HEP from Previous POC:  Access Code: [email or web address]  Date: 2021  Prepared by: Sofia Li     Exercises  Seated Piriformis Stretch - 2-3 x daily - 7 x weekly - 3 sets - 30 sec hold  Seated Hamstring Stretch with Chair - 2-3 x daily - 7 x weekly - 3 sets - 2-3 hold  Seated Single Knee to Chest - 1 x daily - 7 x weekly - 3 sets - 30 sec hold  Seated Figure 4 Piriformis Stretch - 1 x daily - 7 x weekly - 3 sets - 30 sec ea hold  Sit to Stand - 1 x daily - 7 x weekly - 2 sets - 10 reps  Heel rises with counter support - 1 x daily - 7 x weekly - 2 sets - 10 reps  Clamshell with  Resistance - 1 x daily - 7 x weekly - 2 sets - 10 reps  Supine Bridge - 1 x daily - 7 x weekly - 2 sets - 10 reps         Total Timed Treatment:     40   mins  Total Time of Visit:             40   mins         ASSESSMENT/PLAN     GOALS  Goals                                                  Progress Note due by 10/1/21                                                                    Recert due by 11/29/21   STG by: 3 weeks  Comments Date Status   Patient will demonstrate improved upright posture with minimal cueing required       ongoing   Patient will demonstrate decreased cervical musculature guarding   Left side guarded in the UT 9/10/21  ongoing   Patient will demonstrate improved cervical mobility  Hypomobile throughout cervicothoracic spine 9/15/21  ongoing   LTG by: 6 weeks          Patient will demonstrate independence with comprehensive HEP      ongoing   Patient will demonstrate 4+/5 postural strength  Light resistance with postural strengthening (exercises) today  9/22/21  ongoing   Patient will demonstrate ability to maintain tandem standing for >15 sec with no swaying       ongoing   Patient will demonstrate improved upright posture during ambulation   utilized photo analysis for postural correction in sitting  9/28/21  ongoing   Patient will report <2/10 LUE pain on average for 1 week  7/10 today  9/13/21  ongoing   Patient will score <24 on Quick DASH   34.09 on 8/31/2021 8/31/21 ongoing         Assessment/Plan     ASSESSMENT: As Larry Rodríguez, PTA assessed last visit he does seem to have some rotator cuff involvement along with his cervical symptoms leading to increased pain in the shoulder and down the UE. He did well with light proximal and postural strengthening today getting relief post session.    PLAN: Assess the difference long term between tape and no tape. Continue to work on soft tissue restrictions along with proximal and postural strengthening.    Signature: Arnaud Renee, PT DPT

## 2021-10-05 ENCOUNTER — TREATMENT (OUTPATIENT)
Dept: PHYSICAL THERAPY | Facility: CLINIC | Age: 66
End: 2021-10-05

## 2021-10-05 DIAGNOSIS — I69.354 HEMIPARESIS AFFECTING LEFT SIDE AS LATE EFFECT OF CEREBROVASCULAR ACCIDENT (CVA) (HCC): ICD-10-CM

## 2021-10-05 DIAGNOSIS — I63.9 CEREBROVASCULAR ACCIDENT (CVA), UNSPECIFIED MECHANISM (HCC): Primary | ICD-10-CM

## 2021-10-05 PROCEDURE — 97140 MANUAL THERAPY 1/> REGIONS: CPT | Performed by: PHYSICAL THERAPIST

## 2021-10-05 NOTE — PROGRESS NOTES
"Physical Therapy 30 Day Progress Note    Patient: Dao Jhaveri                                                                                     Visit Date: 10/5/2021  :     1955    Referring practitioner:    Dallin Miller DO  Date of Initial Visit:          Type: THERAPY  Noted: 2021    Patient seen for 9 sessions    Visit Diagnoses:    ICD-10-CM ICD-9-CM   1. Cerebrovascular accident (CVA), unspecified mechanism ([facility])  I63.9 434.91   2. Hemiparesis affecting left side as late effect of cerebrovascular accident (CVA) ([facility])  I69.354 438.20     SUBJECTIVE     Subjective He reports numbness in L hand and a lot of pain in the L UE a little neck pain    PAIN: 8/10    PT G-Codes  Outcome Measure Options: Quick DASH  Quick DASH Score: 54.55    OBJECTIVE     Objective     Manual Therapy     89229  Comments   Thoracic extension mobilizations prone   STM B UT, LS, and cervical paraspinals                 Timed Minutes 30     Therapeutic Exercises    75231 Comments   Assessed B cervical rotation, MMT B lower and middle traps, and assessed B tandem stance    applied Sureprep, Cover Roll, and Leukotape L shoulder in inverted \"U\" pattern for posterior inferior glide 10 min not included in 38281 time entry               Timed Minutes 5        Therapy Education/Self Care 07555   Details:    Given postural retraining  symptom relief   Progress: Reinforced   Education provided to:  Patient   Level of understanding Verbalized   Timed Minutes    HEP from Previous POC:  Access Code: [email or web address]  Date: 2021  Prepared by: Sofia Li     Exercises  Seated Piriformis Stretch - 2-3 x daily - 7 x weekly - 3 sets - 30 sec hold  Seated Hamstring Stretch with Chair - 2-3 x daily - 7 x weekly - 3 sets - 2-3 hold  Seated Single Knee to Chest - 1 x daily - 7 x weekly - 3 sets - 30 sec hold  Seated Figure 4 Piriformis Stretch - 1 x daily - 7 x weekly - 3 sets - 30 sec ea hold  Sit to Stand - 1 x daily - 7 x weekly - 2 " sets - 10 reps  Heel rises with counter support - 1 x daily - 7 x weekly - 2 sets - 10 reps  Clamshell with Resistance - 1 x daily - 7 x weekly - 2 sets - 10 reps  Supine Bridge - 1 x daily - 7 x weekly - 2 sets - 10 reps            Total Timed Treatment:     45   mins  Total Time of Visit:             45   mins         ASSESSMENT/PLAN     GOALS    Goals                                                  Progress Note due by 11/5/21                                                                    Recert due by 11/29/21   STG by: 3 weeks  Comments Date Status   Patient will demonstrate improved upright posture with minimal cueing required   requires much cueing  10/5/21  ongoing   Patient will demonstrate decreased cervical musculature guarding  No guarding with STM today 10/5/21  ongoing   Patient will demonstrate improved cervical mobility  R rotation 62 degrees L 65 degrees AROM 10/5/21  ongoing   LTG by: 6 weeks         Patient will demonstrate independence with comprehensive HEP  reinforced today 10/5/21  ongoing   Patient will demonstrate 4+/5 postural strength  Unable to tolerate L lower trap testing, R middle trap 3+/5 L 3-/5 10/5/21  ongoing   Patient will demonstrate ability to maintain tandem standing for >15 sec with no swaying  Omitted today due to therapist error 10/5/21  ongoing   Patient will demonstrate improved upright posture during ambulation   utilized photo analysis for postural correction in sitting 10/5/21  ongoing   Patient will report <2/10 LUE pain on average for 1 week  8/10 today 10/5/21  ongoing   Patient will score <24 on Quick DASH   34.09 on 8/31/2021 54.55 today 10/5/21 ongoing       Assessment/Plan     ASSESSMENT: He has not met any of his goals at this time and his Quick Dash score has increased which causes me to be very concerned. I believe imaging and further diagnostics are warranted. I reapplied a taping application to stabilize the L shoulder that was helpful for him  previously in hopes that it would relieve his symptoms and allow for progression.     PLAN: Continue to monitor his symptoms but over the next few visits we may want to consider contacting his referring physician.    Signature: Larry Rodríguez, PTA

## 2021-10-06 NOTE — PROGRESS NOTES
Progress Note Addendum      Patient: Dao Jhaveri           : 1955  Visit Date: 10/5/2021  Referring practitioner: Dallin Miller DO  Date of Initial Visit: Type: THERAPY  Noted: 2021  Patient seen for 9 sessions  Visit Diagnoses:    ICD-10-CM ICD-9-CM   1. Cerebrovascular accident (CVA), unspecified mechanism (HCC)  I63.9 434.91   2. Hemiparesis affecting left side as late effect of cerebrovascular accident (CVA) ([facility])  I69.354 438.20       PT G-Codes  Outcome Measure Options: Quick DASH  Quick DASH Score: 54.55  Clinical Progress: worse  Home Program Compliance: Yes  Progress toward previous goals: Not Met  Prognosis to achieve goals: fair    Objective     Assessment & Plan     Assessment  Impairments: abnormal gait, abnormal or restricted ROM, impaired balance, impaired physical strength, lacks appropriate home exercise program and pain with function  Prognosis: good  Functional Limitations: carrying objects, lifting, sleeping, walking, uncomfortable because of pain, reaching behind back and reaching overhead  Plan  Therapy options: will be seen for skilled physical therapy services  Planned modality interventions: dry needling  Planned therapy interventions: balance/weight-bearing training, gait training, home exercise program, joint mobilization, manual therapy, neuromuscular re-education, postural training, spinal/joint mobilization, soft tissue mobilization, strengthening, stretching and therapeutic activities  Frequency: 2x week  Duration in visits: 8  Duration in weeks: 4  Treatment plan discussed with: PTA        I have reviewed the progress note information provided by Larry Rodríguez PTA, and I concur with the findings.    Arnaud Renee, PT DPT  Physical Therapist

## 2021-10-07 ENCOUNTER — OFFICE VISIT (OUTPATIENT)
Dept: CARDIOLOGY | Facility: CLINIC | Age: 66
End: 2021-10-07

## 2021-10-07 ENCOUNTER — TREATMENT (OUTPATIENT)
Dept: PHYSICAL THERAPY | Facility: CLINIC | Age: 66
End: 2021-10-07

## 2021-10-07 ENCOUNTER — TELEPHONE (OUTPATIENT)
Dept: INTERNAL MEDICINE | Facility: CLINIC | Age: 66
End: 2021-10-07

## 2021-10-07 VITALS
SYSTOLIC BLOOD PRESSURE: 142 MMHG | HEIGHT: 66 IN | OXYGEN SATURATION: 98 % | WEIGHT: 180 LBS | BODY MASS INDEX: 28.93 KG/M2 | DIASTOLIC BLOOD PRESSURE: 80 MMHG | HEART RATE: 76 BPM

## 2021-10-07 DIAGNOSIS — R29.898 LEFT ARM WEAKNESS: Primary | ICD-10-CM

## 2021-10-07 DIAGNOSIS — E78.2 MIXED HYPERLIPIDEMIA: ICD-10-CM

## 2021-10-07 DIAGNOSIS — Z86.73 HISTORY OF STROKE: ICD-10-CM

## 2021-10-07 DIAGNOSIS — R29.898 LEFT ARM WEAKNESS: ICD-10-CM

## 2021-10-07 DIAGNOSIS — G89.29 CHRONIC NECK PAIN: ICD-10-CM

## 2021-10-07 DIAGNOSIS — M79.602 PAIN OF LEFT UPPER EXTREMITY: ICD-10-CM

## 2021-10-07 DIAGNOSIS — I10 ESSENTIAL HYPERTENSION: Primary | ICD-10-CM

## 2021-10-07 DIAGNOSIS — M54.2 CHRONIC NECK PAIN: ICD-10-CM

## 2021-10-07 DIAGNOSIS — M54.12 CERVICAL RADICULOPATHY: ICD-10-CM

## 2021-10-07 DIAGNOSIS — I63.9 CEREBROVASCULAR ACCIDENT (CVA), UNSPECIFIED MECHANISM (HCC): Primary | ICD-10-CM

## 2021-10-07 PROCEDURE — 93000 ELECTROCARDIOGRAM COMPLETE: CPT | Performed by: EMERGENCY MEDICINE

## 2021-10-07 PROCEDURE — 99213 OFFICE O/P EST LOW 20 MIN: CPT | Performed by: EMERGENCY MEDICINE

## 2021-10-07 PROCEDURE — 97140 MANUAL THERAPY 1/> REGIONS: CPT | Performed by: PHYSICAL THERAPIST

## 2021-10-07 NOTE — TELEPHONE ENCOUNTER
Caller: Dao Jhaveri    Relationship: Self    Best call back number: [phone]    What orders are you requesting (i.e. lab or imaging):   MRI OF LEFT SHOULDER AND ARM    In what timeframe would the patient need to come in:   ASAP    Where will you receive your lab/imaging services:   [facility]      Additional notes:   N/A

## 2021-10-07 NOTE — PROGRESS NOTES
Physical Therapy Treatment Note    Patient: Dao Jhaveri                                                                                     Visit Date: 10/7/2021  :     1955    Referring practitioner:    Dallin Miller DO  Date of Initial Visit:          Type: THERAPY  Noted: 2021    Patient seen for 10 sessions    Visit Diagnoses:    ICD-10-CM ICD-9-CM   1. Cerebrovascular accident (CVA), unspecified mechanism (HCC)  I63.9 434.91   2. Left arm weakness  R29.898 729.89     SUBJECTIVE     Subjective Reports he went to the Dr this morning he'll have some MRI's later this month. He continues to have a lot of pain in his R UE    PAIN: 6/10         OBJECTIVE     Objective     Manual Therapy     95092  Comments   Thoracic extension mobilizations Grade 2 sustained Massage chair   STM B UT,LS, and cervical paraspinals Massage chair   Cervical suboccipital releases, manual traction, and traction with B lateral SB Grades 2 and 3 HL           Timed Minutes 45        Therapy Education/Self Care 69923   Details:    Given postural retraining   Progress: Reinforced   Education provided to:  Patient   Level of understanding Verbalized   Timed Minutes      HEP from Previous POC:  Access Code: [email or web address]  Date: 2021  Prepared by: Sofia Li     Exercises  Seated Piriformis Stretch - 2-3 x daily - 7 x weekly - 3 sets - 30 sec hold  Seated Hamstring Stretch with Chair - 2-3 x daily - 7 x weekly - 3 sets - 2-3 hold  Seated Single Knee to Chest - 1 x daily - 7 x weekly - 3 sets - 30 sec hold  Seated Figure 4 Piriformis Stretch - 1 x daily - 7 x weekly - 3 sets - 30 sec ea hold  Sit to Stand - 1 x daily - 7 x weekly - 2 sets - 10 reps  Heel rises with counter support - 1 x daily - 7 x weekly - 2 sets - 10 reps  Clamshell with Resistance - 1 x daily - 7 x weekly - 2 sets - 10 reps  Supine Bridge - 1 x daily - 7 x weekly - 2 sets - 10 reps          Total Timed Treatment:     45   mins  Total Time of Visit:              45   mins         ASSESSMENT/PLAN     GOALS    Goals                                                  Progress Note due by 11/5/21                                                                    Recert due by 11/29/21   STG by: 3 weeks  Comments Date Status   Patient will demonstrate improved upright posture with minimal cueing required   requires much cueing  10/5/21  ongoing   Patient will demonstrate decreased cervical musculature guarding  No guarding with STM today 10/5/21  ongoing   Patient will demonstrate improved cervical mobility  R rotation 62 degrees L 65 degrees AROM 10/5/21  ongoing   LTG by: 6 weeks          Patient will demonstrate independence with comprehensive HEP  reinforced today 10/5/21  ongoing   Patient will demonstrate 4+/5 postural strength  Unable to tolerate L lower trap testing, R middle trap 3+/5 L 3-/5 10/5/21  ongoing   Patient will demonstrate ability to maintain tandem standing for >15 sec with no swaying  Omitted today due to therapist error 10/5/21  ongoing   Patient will demonstrate improved upright posture during ambulation   utilized photo analysis for postural correction in sitting 10/5/21  ongoing   Patient will report <2/10 LUE pain on average for 1 week  8/10 today 10/5/21  ongoing   Patient will score <24 on Quick DASH   34.09 on 8/31/2021 54.55 today 10/5/21 ongoing       Assessment/Plan     ASSESSMENT: He will be having an MRI of his neck and L shoulder 10/21/21 which is warranted as his Quick Dash score has increased from 34.09 to 54.55. In addition he can no longer tolerate being placed in prone and it is highly symptom provocative.    PLAN: Continue a conservative approach, focus on increasing cervical mobility and continue to reinforce postural awareness and control.    Signature: Larry Rodríguez, PTA

## 2021-10-12 NOTE — PROGRESS NOTES
Subjective:     Encounter Date:10/07/2021      Patient ID: Dao Jhaveri is a 66 y.o. male.    Chief Complaint:  History of Present Illness    Patient is a 6-month follow-up today.  He has a history of stroke, hypertension and hyperlipidemia.  At previous appointments we changed his regimen around including starting him on BiDil therapy.  His blood pressures have been much better controlled.  Currently he is on the following cardiac medications: Norvasc 10, aspirin 81, Lipitor 80, Plavix 75, hydralazine 50 3 times daily, lisinopril 20, Toprol-XL 50.  He denies any symptoms and states he is doing very good.  He needs to undergo orthopedic surgery in the near future for his shoulder.    Review of Systems   Constitutional: Negative for diaphoresis, fever and malaise/fatigue.   HENT: Negative for congestion.    Eyes: Negative for vision loss in left eye and vision loss in right eye.   Cardiovascular: Negative for chest pain, claudication, dyspnea on exertion, irregular heartbeat, leg swelling, orthopnea, palpitations and syncope.   Respiratory: Negative for cough, shortness of breath and wheezing.    Hematologic/Lymphatic: Negative for adenopathy.   Skin: Negative for rash.   Musculoskeletal: Positive for joint pain. Negative for joint swelling.   Gastrointestinal: Negative for abdominal pain, diarrhea, nausea and vomiting.   Neurological: Negative for excessive daytime sleepiness, dizziness, focal weakness, light-headedness, numbness and weakness.   Psychiatric/Behavioral: Negative for depression. The patient does not have insomnia.            Current Outpatient Medications:   •  amLODIPine (NORVASC) 10 MG tablet, Take 1 tablet by mouth every night at bedtime., Disp: 90 tablet, Rfl: 1  •  aspirin 81 MG EC tablet, Take 81 mg by mouth Daily., Disp: , Rfl:   •  atorvastatin (LIPITOR) 80 MG tablet, Take 1 tablet by mouth Daily., Disp: 90 tablet, Rfl: 1  •  clopidogrel (PLAVIX) 75 MG tablet, Take 1 tablet by mouth  Daily., Disp: 90 tablet, Rfl: 1  •  cyclobenzaprine (FLEXERIL) 10 MG tablet, Take 1 tablet by mouth 3 (Three) Times a Day As Needed for Muscle Spasms., Disp: 90 tablet, Rfl: 2  •  guaifenesin-dextromethorphan (MUCINEX DM)  MG tablet sustained-release 12 hour tablet, Take 1 tablet by mouth 2 (Two) Times a Day As Needed (cough & congestion)., Disp: 30 tablet, Rfl: 1  •  hydrALAZINE (APRESOLINE) 50 MG tablet, Take 1 tablet by mouth 3 (Three) Times a Day., Disp: 270 tablet, Rfl: 1  •  isosorbide dinitrate (ISORDIL) 20 MG tablet, Take 1 tablet by mouth 3 (Three) Times a Day., Disp: 270 tablet, Rfl: 1  •  latanoprost (XALATAN) 0.005 % ophthalmic solution, 1 drop Every Night., Disp: , Rfl:   •  lisinopril (PRINIVIL,ZESTRIL) 20 MG tablet, Take 1 tablet by mouth Daily., Disp: 90 tablet, Rfl: 1  •  metoprolol succinate XL (TOPROL-XL) 100 MG 24 hr tablet, Take 1 tablet by mouth Daily., Disp: 90 tablet, Rfl: 1  •  nitroglycerin (NITROSTAT) 0.4 MG SL tablet, Place 0.4 mg under the tongue Every 5 (Five) Minutes As Needed for Chest Pain. Take no more than 3 doses in 15 minutes., Disp: , Rfl:   •  nortriptyline (Pamelor) 25 MG capsule, Take 1 capsule by mouth Every Night., Disp: 30 capsule, Rfl: 0  •  vitamin B-12 (CYANOCOBALAMIN) 500 MCG tablet, Take 1 tablet by mouth Daily., Disp: 30 tablet, Rfl: 5       Objective:      Vitals:    10/07/21 0857   BP: 142/80   Pulse: 76   SpO2: 98%     Vitals and nursing note reviewed.   Constitutional:       Appearance: Normal and healthy appearance. Well-developed and not in distress.   Eyes:      Extraocular Movements: Extraocular movements intact.      Pupils: Pupils are equal, round, and reactive to light.   HENT:      Head: Normocephalic and atraumatic.    Mouth/Throat:      Pharynx: Oropharynx is clear.   Neck:      Vascular: JVD normal.      Trachea: Trachea normal.   Pulmonary:      Effort: Pulmonary effort is normal.      Breath sounds: Normal breath sounds. No wheezing. No  rhonchi. No rales.   Cardiovascular:      PMI at left midclavicular line. Normal rate. Regular rhythm. Normal S1. Normal S2.      Murmurs: There is no murmur.      No gallop. No click. No rub.   Pulses:     Dorsalis pedis: 2+ bilaterally.     Posterior tibial: 2+ bilaterally.  Abdominal:      General: Bowel sounds are normal.      Palpations: Abdomen is soft.      Tenderness: There is no abdominal tenderness.   Musculoskeletal: Normal range of motion.      Cervical back: Normal range of motion and neck supple. Skin:     General: Skin is warm and dry.      Capillary Refill: Capillary refill takes less than 2 seconds.   Feet:      Right foot:      Skin integrity: Skin integrity normal.      Left foot:      Skin integrity: Skin integrity normal.   Neurological:      Mental Status: Alert and oriented to person, place and time.      Cranial Nerves: Cranial nerves are intact.      Sensory: Sensation is intact.      Motor: Motor function is intact.      Coordination: Coordination is intact.   Psychiatric:         Speech: Speech normal.         Behavior: Behavior is cooperative.         Lab Review:         ECG 12 Lead    Date/Time: 10/7/2021 12:38 PM  Performed by: Kdoi Mcghee DO  Authorized by: Kodi Mcghee DO   Comparison: compared with previous ECG   Similar to previous ECG  Rhythm: sinus rhythm  Rate: normal  Conduction: conduction normal  ST Segments: ST segments normal  T Waves: T waves normal  QRS axis: normal  Other: no other findings    Clinical impression: normal ECG              Assessment/Plan:     Problem List Items Addressed This Visit        Cardiac and Vasculature    Essential hypertension - Primary    Mixed hyperlipidemia       Neuro    History of stroke    Overview     Bilateral MCA. Left then right                 Recommendations/plans:    Patient is doing well from a cardiovascular standpoint.  He denies any significant chest pains, shortness of breath, palpitations,  dizziness or passing out spells.  Recommend he continue on all of his current cardiac medications at their current doses.    Feel the patient is medically maximized to undergo orthopedic surgery at this time.  No further testing required before undergoing surgery.    We will send a letter to patient's orthopedic surgeon letting them know that he is clear to proceed with surgery in the near future.    Follow-up with cardiology in 1 year or sooner if necessary      Kodi Mcghee, DO   Interventional cardiology  [facility]  10/07/2021  12:36 CDT

## 2021-10-14 ENCOUNTER — TREATMENT (OUTPATIENT)
Dept: PHYSICAL THERAPY | Facility: CLINIC | Age: 66
End: 2021-10-14

## 2021-10-14 DIAGNOSIS — I63.9 CEREBROVASCULAR ACCIDENT (CVA), UNSPECIFIED MECHANISM (HCC): Primary | ICD-10-CM

## 2021-10-14 DIAGNOSIS — R29.898 LEFT ARM WEAKNESS: ICD-10-CM

## 2021-10-14 PROCEDURE — 97140 MANUAL THERAPY 1/> REGIONS: CPT | Performed by: PHYSICAL THERAPIST

## 2021-10-14 NOTE — PROGRESS NOTES
Physical Therapy Treatment Note    Patient: Dao Jhaveri                                                                                     Visit Date: 10/14/2021  :     1955    Referring practitioner:    Dallin Miller DO  Date of Initial Visit:          Type: THERAPY  Noted: 2021    Patient seen for 11 sessions    Visit Diagnoses:    ICD-10-CM ICD-9-CM   1. Cerebrovascular accident (CVA), unspecified mechanism (HCC)  I63.9 434.91   2. Left arm weakness  R29.898 729.89     SUBJECTIVE     Subjective He reports pain is bad today. His neck hurts all the way down his L shoulder. MRI scheduled for 10/21/2021    PAIN: 9/10 > 6/10 (reports can move L shoulder now, feels better)     OBJECTIVE     Objective     Manual Therapy     82662  Comments   Thoracic extension mobilizations, massage chair Gr 3   STM with massage cream, massage chair  B UT (L > R),LS, and cervical paraspinals   IASTM with BL ridge roller to thoracic spine and shoulder girdle, massage chair        Timed Minutes 40      Therapy Education/Self Care 42200   Details:    Given postural retraining   Progress: Reinforced   Education provided to:  Patient   Level of understanding Verbalized   Timed Minutes      HEP from Previous POC:  Access Code: [email or web address]  Date: 2021  Prepared by: Sofia Li     Exercises  Seated Piriformis Stretch - 2-3 x daily - 7 x weekly - 3 sets - 30 sec hold  Seated Hamstring Stretch with Chair - 2-3 x daily - 7 x weekly - 3 sets - 2-3 hold  Seated Single Knee to Chest - 1 x daily - 7 x weekly - 3 sets - 30 sec hold  Seated Figure 4 Piriformis Stretch - 1 x daily - 7 x weekly - 3 sets - 30 sec ea hold  Sit to Stand - 1 x daily - 7 x weekly - 2 sets - 10 reps  Heel rises with counter support - 1 x daily - 7 x weekly - 2 sets - 10 reps  Clamshell with Resistance - 1 x daily - 7 x weekly - 2 sets - 10 reps  Supine Bridge - 1 x daily - 7 x weekly - 2 sets - 10 reps    Total Timed Treatment:     40   mins  Total  Time of Visit:             40   mins         ASSESSMENT/PLAN     GOALS    Goals                                                  Progress Note due by 11/5/21                                                                    Recert due by 11/29/21   STG by: 3 weeks  Comments Date Status   Patient will demonstrate improved upright posture with minimal cueing required   requires much cueing  10/5/21  ongoing   Patient will demonstrate decreased cervical musculature guarding  No guarding with STM today 10/5/21  ongoing   Patient will demonstrate improved cervical mobility  R rotation 62 degrees L 65 degrees AROM 10/5/21  ongoing   LTG by: 6 weeks          Patient will demonstrate independence with comprehensive HEP  reinforced today 10/5/21  ongoing   Patient will demonstrate 4+/5 postural strength  Unable to tolerate L lower trap testing, R middle trap 3+/5 L 3-/5 10/5/21  ongoing   Patient will demonstrate ability to maintain tandem standing for >15 sec with no swaying  Omitted today due to therapist error 10/5/21  ongoing   Patient will demonstrate improved upright posture during ambulation   utilized photo analysis for postural correction in sitting 10/5/21  ongoing   Patient will report <2/10 LUE pain on average for 1 week  8/10 today 10/5/21  ongoing   Patient will score <24 on Quick DASH   34.09 on 8/31/2021 54.55 today 10/5/21 ongoing     Assessment/Plan     ASSESSMENT: We continued conservative approach d/t significantly flared symptoms. He has an MRI scheduled for 10/21/2021 which will hopefully provide more information regarding his symptoms, especially with suspected RTC tear.    PLAN: Assess and follow-up with results of MRI. Fill out auth form for more visits. Continue a conservative approach, focus on increasing cervical mobility.     Signature: Sofia Li PTA

## 2021-10-18 ENCOUNTER — TELEPHONE (OUTPATIENT)
Dept: INTERNAL MEDICINE | Facility: CLINIC | Age: 66
End: 2021-10-18

## 2021-10-20 ENCOUNTER — TELEPHONE (OUTPATIENT)
Dept: CARDIOLOGY | Facility: CLINIC | Age: 66
End: 2021-10-20

## 2021-10-20 NOTE — TELEPHONE ENCOUNTER
----- Message from Kodi Mcghee, DO sent at 10/12/2021 12:37 PM CDT -----  Can you please send a letter to patient's orthopedic surgeon letting them know that he is cleared for surgery from our standpoint.  Recommend that he continue on both of his antiplatelet agents preoperatively and postoperatively.  Thank you.

## 2021-10-21 ENCOUNTER — HOSPITAL ENCOUNTER (OUTPATIENT)
Dept: MRI IMAGING | Facility: HOSPITAL | Age: 66
Discharge: HOME | End: 2021-10-21

## 2021-10-21 DIAGNOSIS — R29.898 LEFT ARM WEAKNESS: ICD-10-CM

## 2021-10-21 DIAGNOSIS — M79.602 PAIN OF LEFT UPPER EXTREMITY: ICD-10-CM

## 2021-10-21 DIAGNOSIS — G89.29 CHRONIC NECK PAIN: ICD-10-CM

## 2021-10-21 DIAGNOSIS — M54.12 CERVICAL RADICULOPATHY: ICD-10-CM

## 2021-10-21 DIAGNOSIS — M54.2 CHRONIC NECK PAIN: ICD-10-CM

## 2021-10-21 PROCEDURE — 72141 MRI NECK SPINE W/O DYE: CPT

## 2021-10-21 PROCEDURE — 73221 MRI JOINT UPR EXTREM W/O DYE: CPT

## 2021-10-22 ENCOUNTER — TREATMENT (OUTPATIENT)
Dept: PHYSICAL THERAPY | Facility: CLINIC | Age: 66
End: 2021-10-22

## 2021-10-22 DIAGNOSIS — R93.89 ABNORMAL MRI: ICD-10-CM

## 2021-10-22 DIAGNOSIS — Z74.09 IMPAIRED MOBILITY: ICD-10-CM

## 2021-10-22 DIAGNOSIS — M54.12 RADICULOPATHY, CERVICAL: Primary | ICD-10-CM

## 2021-10-22 DIAGNOSIS — M79.602 PAIN OF LEFT UPPER EXTREMITY: Primary | ICD-10-CM

## 2021-10-22 DIAGNOSIS — Z74.09 IMPAIRED FUNCTIONAL MOBILITY, BALANCE, GAIT, AND ENDURANCE: ICD-10-CM

## 2021-10-22 PROCEDURE — 97140 MANUAL THERAPY 1/> REGIONS: CPT

## 2021-10-22 NOTE — PROGRESS NOTES
Physical Therapy Treatment Note and Discharge Note    Patient: Dao Jhaveri                                                                                     Visit Date: 10/22/2021  :     1955    Referring practitioner:    Dallin Miller DO  Date of Initial Visit:          Type: THERAPY  Noted: 2021    Patient seen for 12 sessions    Visit Diagnoses:    ICD-10-CM ICD-9-CM   1. Radiculopathy, cervical  M54.12 723.4   2. Impaired mobility  Z74.09 799.89   3. Impaired functional mobility, balance, gait, and endurance  Z74.09 V49.89     SUBJECTIVE     Subjective Primary complaint is L shoulder pain, he is wondering about his MRI results.     PAIN: 5/10 neck, 8/10 L shoulder      OBJECTIVE     Objective     Manual Therapy     49438  Comments   Supine suboccipital release, L cervical upglides, cervical distraction     STM with massage cream, massage chair  B UT (L > R),LS, and cervical paraspinals   IASTM with BL ridge roller to thoracic spine and shoulder girdle, massage chair        Timed Minutes 40      Therapy Education/Self Care [id number]   Details:    Given postural retraining   Progress: Reinforced   Education provided to:  Patient   Level of understanding Verbalized   Timed Minutes      HEP from Previous POC:  Access Code: [email or web address]  Date: 2021  Prepared by: Sofia Li     Exercises  Seated Piriformis Stretch - 2-3 x daily - 7 x weekly - 3 sets - 30 sec hold  Seated Hamstring Stretch with Chair - 2-3 x daily - 7 x weekly - 3 sets - 2-3 hold  Seated Single Knee to Chest - 1 x daily - 7 x weekly - 3 sets - 30 sec hold  Seated Figure 4 Piriformis Stretch - 1 x daily - 7 x weekly - 3 sets - 30 sec ea hold  Sit to Stand - 1 x daily - 7 x weekly - 2 sets - 10 reps  Heel rises with counter support - 1 x daily - 7 x weekly - 2 sets - 10 reps  Clamshell with Resistance - 1 x daily - 7 x weekly - 2 sets - 10 reps  Supine Bridge - 1 x daily - 7 x weekly - 2 sets - 10 reps    Total Timed Treatment:      40   mins  Total Time of Visit:             45   mins         ASSESSMENT/PLAN     GOALS    Goals                                                  Progress Note due by 11/5/21                                                                    Recert due by 11/29/21   STG by: 3 weeks  Comments Date Status   Patient will demonstrate improved upright posture with minimal cueing required   requires much cueing  10/5/21  ongoing   Patient will demonstrate decreased cervical musculature guarding  No guarding with STM today 10/5/21  ongoing   Patient will demonstrate improved cervical mobility  R rotation 62 degrees L 65 degrees AROM 10/5/21  ongoing   LTG by: 6 weeks          Patient will demonstrate independence with comprehensive HEP  reinforced today 10/5/21  ongoing   Patient will demonstrate 4+/5 postural strength  Unable to tolerate L lower trap testing, R middle trap 3+/5 L 3-/5 10/5/21  ongoing   Patient will demonstrate ability to maintain tandem standing for >15 sec with no swaying  Omitted today due to therapist error 10/5/21  ongoing   Patient will demonstrate improved upright posture during ambulation   utilized photo analysis for postural correction in sitting 10/5/21  ongoing   Patient will report <2/10 LUE pain on average for 1 week  8/10 today 10/5/21  ongoing   Patient will score <24 on Quick DASH   34.09 on 8/31/2021 54.55 today 10/5/21 ongoing     Assessment/Plan     ASSESSMENT: Showed patient his MRI report regarding his cervical spine and L shoulder, suggested that he contact his PCP as he has not been contacted yet. His MRI report does show RTC tears in his L shoulder and bicep tendon involvement. Although he does have cervical stenosis, his L shoulder pathology is likely contributing to the ongoing neck pain. He does respond well to manual therapies for the neck pain, however patient feels he should focus on L shoulder at this time. We will be placing his plan of care on hold while he returns to  PCP and he will contact us should other needs arise.     PLAN: POC on hold at this time     Signature: Cathie Baca, PT, DPT    Physical Therapy Discharge Summary    Patient: Dao Jhaveri                                                                                     Today's Date: 2022  :     1955    Date of Initial Visit:          Type: THERAPY  Noted: 2021    Patient seen for 12 sessions    Visit Diagnoses:    ICD-10-CM ICD-9-CM   1. Radiculopathy, cervical  M54.12 723.4   2. Impaired mobility  Z74.09 799.89   3. Impaired functional mobility, balance, gait, and endurance  Z74.09 V49.89       GOALS:  See above     DISCHARGE SUMMARY   Discharge date 2022   Dates of this episode 21 through 10/22/21   Number of visits on this episode 12   Reason for discharge lack of progress   Outcomes achieved Refer to the goals table for specifics on goals   Discharge plan Patient to return to referring/providing physician   Summary of care Mr. Jhaveir was evaluated for cervical radiculopathy, however due to lack of progress we recommended a L shoulder MRI. It was found that he had extensive RTC tearing. His POC was placed on hold and he did not return.    Discharge instruction Return to MD Cathie Esposito, PT, DPT License #: [id number]  Electronically signed 2022

## 2021-10-27 DIAGNOSIS — I10 ESSENTIAL HYPERTENSION: ICD-10-CM

## 2021-10-27 RX ORDER — LISINOPRIL 20 MG/1
20 TABLET ORAL DAILY
Qty: 90 TABLET | Refills: 1 | OUTPATIENT
Start: 2021-10-27

## 2021-10-27 RX ORDER — AMLODIPINE BESYLATE 10 MG/1
10 TABLET ORAL
Qty: 90 TABLET | Refills: 1 | OUTPATIENT
Start: 2021-10-27

## 2021-10-28 RX ORDER — AMLODIPINE BESYLATE 10 MG/1
10 TABLET ORAL
Qty: 90 TABLET | Refills: 0 | Status: SHIPPED | OUTPATIENT
Start: 2021-10-28 | End: 2022-01-31 | Stop reason: SDUPTHER

## 2021-10-28 RX ORDER — LISINOPRIL 20 MG/1
20 TABLET ORAL DAILY
Qty: 90 TABLET | Refills: 0 | Status: SHIPPED | OUTPATIENT
Start: 2021-10-28 | End: 2022-01-31 | Stop reason: SDUPTHER

## 2021-11-18 ENCOUNTER — OFFICE VISIT (OUTPATIENT)
Dept: INTERNAL MEDICINE | Facility: CLINIC | Age: 66
End: 2021-11-18

## 2021-11-18 ENCOUNTER — LAB (OUTPATIENT)
Dept: LAB | Facility: HOSPITAL | Age: 66
End: 2021-11-18

## 2021-11-18 VITALS
TEMPERATURE: 98.2 F | BODY MASS INDEX: 30.07 KG/M2 | WEIGHT: 187.1 LBS | HEART RATE: 74 BPM | HEIGHT: 66 IN | SYSTOLIC BLOOD PRESSURE: 110 MMHG | DIASTOLIC BLOOD PRESSURE: 74 MMHG | OXYGEN SATURATION: 98 % | RESPIRATION RATE: 16 BRPM

## 2021-11-18 DIAGNOSIS — G81.94 LEFT HEMIPARESIS (HCC): ICD-10-CM

## 2021-11-18 DIAGNOSIS — I10 ESSENTIAL HYPERTENSION: ICD-10-CM

## 2021-11-18 DIAGNOSIS — S16.1XXA CERVICAL MYOFASCIAL STRAIN, INITIAL ENCOUNTER: ICD-10-CM

## 2021-11-18 DIAGNOSIS — M25.512 CHRONIC LEFT SHOULDER PAIN: ICD-10-CM

## 2021-11-18 DIAGNOSIS — G89.29 CHRONIC LEFT SHOULDER PAIN: ICD-10-CM

## 2021-11-18 DIAGNOSIS — E78.2 MIXED HYPERLIPIDEMIA: ICD-10-CM

## 2021-11-18 DIAGNOSIS — J30.2 SEASONAL ALLERGIES: ICD-10-CM

## 2021-11-18 DIAGNOSIS — Z01.818 PREOP EXAM FOR INTERNAL MEDICINE: Primary | ICD-10-CM

## 2021-11-18 DIAGNOSIS — M54.12 CERVICAL RADICULOPATHY: ICD-10-CM

## 2021-11-18 DIAGNOSIS — Z86.73 HISTORY OF STROKE: ICD-10-CM

## 2021-11-18 DIAGNOSIS — G89.29 CHRONIC NECK PAIN: ICD-10-CM

## 2021-11-18 DIAGNOSIS — M54.2 CHRONIC NECK PAIN: ICD-10-CM

## 2021-11-18 LAB
ANION GAP SERPL CALCULATED.3IONS-SCNC: 8.6 MMOL/L (ref 5–15)
BUN SERPL-MCNC: 12 MG/DL (ref 8–23)
BUN/CREAT SERPL: 11.5 (ref 7–25)
CALCIUM SPEC-SCNC: 9.8 MG/DL (ref 8.6–10.5)
CHLORIDE SERPL-SCNC: 104 MMOL/L (ref 98–107)
CO2 SERPL-SCNC: 26.4 MMOL/L (ref 22–29)
CREAT SERPL-MCNC: 1.04 MG/DL (ref 0.76–1.27)
DEPRECATED RDW RBC AUTO: 43.1 FL (ref 37–54)
ERYTHROCYTE [DISTWIDTH] IN BLOOD BY AUTOMATED COUNT: 15.5 % (ref 12.3–15.4)
GFR SERPL CREATININE-BSD FRML MDRD: 87 ML/MIN/1.73
GLUCOSE SERPL-MCNC: 101 MG/DL (ref 65–99)
HCT VFR BLD AUTO: 37.2 % (ref 37.5–51)
HGB BLD-MCNC: 12.3 G/DL (ref 13–17.7)
MCH RBC QN AUTO: 25.9 PG (ref 26.6–33)
MCHC RBC AUTO-ENTMCNC: 33.1 G/DL (ref 31.5–35.7)
MCV RBC AUTO: 78.5 FL (ref 79–97)
PLATELET # BLD AUTO: 264 10*3/MM3 (ref 140–450)
PMV BLD AUTO: 11.1 FL (ref 6–12)
POTASSIUM SERPL-SCNC: 4.2 MMOL/L (ref 3.5–5.2)
RBC # BLD AUTO: 4.74 10*6/MM3 (ref 4.14–5.8)
SODIUM SERPL-SCNC: 139 MMOL/L (ref 136–145)
WBC NRBC COR # BLD: 5.14 10*3/MM3 (ref 3.4–10.8)

## 2021-11-18 PROCEDURE — 36415 COLL VENOUS BLD VENIPUNCTURE: CPT

## 2021-11-18 PROCEDURE — 80048 BASIC METABOLIC PNL TOTAL CA: CPT

## 2021-11-18 PROCEDURE — 85027 COMPLETE CBC AUTOMATED: CPT

## 2021-11-18 PROCEDURE — 99214 OFFICE O/P EST MOD 30 MIN: CPT | Performed by: INTERNAL MEDICINE

## 2021-11-18 RX ORDER — CLOPIDOGREL BISULFATE 75 MG/1
75 TABLET ORAL DAILY
Qty: 90 TABLET | Refills: 1 | Status: SHIPPED | OUTPATIENT
Start: 2021-11-18 | End: 2022-08-16 | Stop reason: SDUPTHER

## 2021-11-18 RX ORDER — ATORVASTATIN CALCIUM 80 MG/1
80 TABLET, FILM COATED ORAL DAILY
Qty: 90 TABLET | Refills: 1 | Status: SHIPPED | OUTPATIENT
Start: 2021-11-18 | End: 2022-08-16 | Stop reason: SDUPTHER

## 2021-11-18 RX ORDER — METOPROLOL SUCCINATE 100 MG/1
100 TABLET, EXTENDED RELEASE ORAL DAILY
Qty: 90 TABLET | Refills: 1 | Status: SHIPPED | OUTPATIENT
Start: 2021-11-18 | End: 2022-08-16 | Stop reason: SDUPTHER

## 2021-11-18 RX ORDER — ISOSORBIDE DINITRATE 20 MG/1
20 TABLET ORAL 3 TIMES DAILY
Qty: 270 TABLET | Refills: 1 | Status: SHIPPED | OUTPATIENT
Start: 2021-11-18 | End: 2022-08-16 | Stop reason: SDUPTHER

## 2021-11-18 RX ORDER — NORTRIPTYLINE HYDROCHLORIDE 25 MG/1
25 CAPSULE ORAL NIGHTLY
Qty: 30 CAPSULE | Refills: 0 | Status: SHIPPED | OUTPATIENT
Start: 2021-11-18 | End: 2022-11-02

## 2021-11-18 RX ORDER — LORATADINE 10 MG/1
10 TABLET ORAL DAILY
Qty: 90 TABLET | Refills: 3 | Status: SHIPPED | OUTPATIENT
Start: 2021-11-18 | End: 2022-02-23

## 2021-11-18 NOTE — PROGRESS NOTES
CC: surgical evaluation for left shoulder surgery    History:  Dao Jhaveri is a 66 y.o. male   He notes he has been doing well without any acute illness.  He is planning on a left shoulder surgery with Dr. Dover on December 10.  He reports no new symptoms of chest pain, arrhythmia, increasing shortness of breath nor new swelling in the lower extremities.  He is able to climb 1-2 flights of steps without difficulty which is equivalent to greater than or equal to 4 METS.        ROS:  Review of Systems   Constitutional: Negative for chills and fever.   Respiratory: Negative for cough and shortness of breath.    Cardiovascular: Negative for chest pain and palpitations.   Gastrointestinal: Negative for abdominal pain and constipation.   Genitourinary: Negative for difficulty urinating and dysuria.        reports that he quit smoking about 8 years ago. His smoking use included cigarettes. He has a 52.50 pack-year smoking history. He has never used smokeless tobacco. He reports that he does not drink alcohol and does not use drugs.      Current Outpatient Medications:   •  amLODIPine (NORVASC) 10 MG tablet, Take 1 tablet by mouth every night at bedtime., Disp: 90 tablet, Rfl: 0  •  aspirin 81 MG EC tablet, Take 81 mg by mouth Daily., Disp: , Rfl:   •  atorvastatin (LIPITOR) 80 MG tablet, Take 1 tablet by mouth Daily., Disp: 90 tablet, Rfl: 1  •  clopidogrel (PLAVIX) 75 MG tablet, Take 1 tablet by mouth Daily., Disp: 90 tablet, Rfl: 1  •  cyclobenzaprine (FLEXERIL) 10 MG tablet, Take 1 tablet by mouth 3 (Three) Times a Day As Needed for Muscle Spasms., Disp: 90 tablet, Rfl: 2  •  guaifenesin-dextromethorphan (MUCINEX DM)  MG tablet sustained-release 12 hour tablet, Take 1 tablet by mouth 2 (Two) Times a Day As Needed (cough & congestion)., Disp: 30 tablet, Rfl: 1  •  hydrALAZINE (APRESOLINE) 50 MG tablet, Take 1 tablet by mouth 3 (Three) Times a Day., Disp: 270 tablet, Rfl: 1  •  isosorbide dinitrate (ISORDIL) 20 MG  "tablet, Take 1 tablet by mouth 3 (Three) Times a Day., Disp: 270 tablet, Rfl: 1  •  lisinopril (PRINIVIL,ZESTRIL) 20 MG tablet, Take 1 tablet by mouth Daily., Disp: 90 tablet, Rfl: 0  •  metoprolol succinate XL (TOPROL-XL) 100 MG 24 hr tablet, Take 1 tablet by mouth Daily., Disp: 90 tablet, Rfl: 1  •  nitroglycerin (NITROSTAT) 0.4 MG SL tablet, Place 0.4 mg under the tongue Every 5 (Five) Minutes As Needed for Chest Pain. Take no more than 3 doses in 15 minutes., Disp: , Rfl:   •  nortriptyline (Pamelor) 25 MG capsule, Take 1 capsule by mouth Every Night., Disp: 30 capsule, Rfl: 0  •  vitamin B-12 (CYANOCOBALAMIN) 500 MCG tablet, Take 1 tablet by mouth Daily., Disp: 30 tablet, Rfl: 5    OBJECTIVE:  /74 (BP Location: Left arm, Patient Position: Sitting, Cuff Size: Adult)   Pulse 74   Temp 98.2 °F (36.8 °C)   Resp 16   Ht 167.6 cm (65.98\")   Wt 84.9 kg (187 lb 1.6 oz)   SpO2 98%   BMI 30.22 kg/m²    Physical Exam  Constitutional:       General: He is not in acute distress.  Pulmonary:      Effort: Pulmonary effort is normal. No respiratory distress.   Neurological:      Mental Status: He is alert and oriented to person, place, and time.   Psychiatric:         Mood and Affect: Mood normal.         Behavior: Behavior normal.         Assessment/Plan     Diagnoses and all orders for this visit:    1. Preop exam for internal medicine (Primary)  2. Chronic left shoulder pain  RCRI risk is 1 indicating a 6% 30-day risk of Mace.  This is all owing to his history of stroke.  He is not on dialysis and preoperative CBC and BMP have been ordered.  He is able to perform greater than or equal to 4 METS and has no new evidence of ACS, arrhythmia, decompensated heart failure, nor valvular dysfunction.  In the absence of abnormal labs, I would recommend he move forward with the planned procedure without any further work-up.  Review of Dr. Mcghee's most recent note indicated he felt Mr. Jhaveri was optimized for surgery as " well.  EKG from October is reviewed with no evidence of acute ischemic changes and with normal sinus rhythm.  Notably, he should continue aspirin, metoprolol, and statin perioperatively in accordance with ACC recommendations.  He may hold his Plavix as directed by Dr. Dover and restart on hemostasis.    3. Cervical myofascial strain, initial encounter  4. Chronic neck pain  5. Cervical radiculopathy  -     nortriptyline (Pamelor) 25 MG capsule; Take 1 capsule by mouth Every Night.  Dispense: 30 capsule; Refill: 0    6. Essential hypertension  -     metoprolol succinate XL (TOPROL-XL) 100 MG 24 hr tablet; Take 1 tablet by mouth Daily.  Dispense: 90 tablet; Refill: 1  -     isosorbide dinitrate (ISORDIL) 20 MG tablet; Take 1 tablet by mouth 3 (Three) Times a Day.  Dispense: 270 tablet; Refill: 1  -     CBC (No Diff); Future  -     Basic Metabolic Panel; Future  Well controlled, BP goal for age is <140/90 per JNC 8 guidelines and continue current medications    7. History of stroke  8. Left hemiparesis (HCC)  -     clopidogrel (PLAVIX) 75 MG tablet; Take 1 tablet by mouth Daily.  Dispense: 90 tablet; Refill: 1  -     atorvastatin (LIPITOR) 80 MG tablet; Take 1 tablet by mouth Daily.  Dispense: 90 tablet; Refill: 1  Continue ASA, statin and plavix except as detailed above perioperatively.    9. Mixed hyperlipidemia  -     atorvastatin (LIPITOR) 80 MG tablet; Take 1 tablet by mouth Daily.  Dispense: 90 tablet; Refill: 1  Stable on high intensity statin therapy per ACC/AHA guidelines.    10. Seasonal allergies  -     loratadine (Claritin) 10 MG tablet; Take 1 tablet by mouth Daily.  Dispense: 90 tablet; Refill: 3    An After Visit Summary was printed and given to the patient at discharge.  Return in about 3 months (around 2/18/2022) for Recheck; holly December.          Dallin Miller D.O. 11/18/2021   Electronically signed.

## 2021-11-22 PROBLEM — D50.9 MICROCYTIC ANEMIA: Status: ACTIVE | Noted: 2021-11-22

## 2021-12-28 NOTE — PROGRESS NOTES
Occupational Therapy Treatment Note     Patient: Dao Jhaveri   : 1955  Referring practitioner: Rod De Los Santos MD  Date of Initial Visit:   Type: THERAPY  Noted: 3/2/2021  Today's Date: 2021  Patient seen for 15 sessions  Visit Diagnoses:    ICD-10-CM ICD-9-CM   1. Cerebrovascular accident (CVA), unspecified mechanism (CMS/HCC)  I63.9 434.91   2. Left arm weakness  R29.898 729.89   3. Impaired instrumental activities of daily living (IADL)  Z78.9 V49.89       SUBJECTIVE      Subjective  7/10 pain in L hand.      OBJECTIVE     Objective      Therapeutic Activities    47506 Comments   Shape tracing worksheet completed with L hand. 80-90% accuracy. Tracing patterns worksheet completed with L hand. % accuracy. Increased time required for both worksheets.                    Timed Minutes 20     Modalities  Comments   E-stim/ cold laser completed at L MCP joints for pain reduction.                     Timed Minutes 15        Manual Therapy     08945  Comments    Moist heat applied to L hand for 5 min for pain reduction. Joint traction completed at L wrist and MCPs.                     Timed Minutes 10              Total Timed Treatment:     45   mins  Total Time of Visit:            45   mins       Goals                                          Progress Note due by 21   STG by: 21 Comments Status   Pt will be independent with daily completion of a HEP to address L UE deficits.    No concerns with current HEP. Progressing   Pt will complete all bathing and dressing with Mod I using AE as needed.      Met   Pt will display improved L UE FMC by completing the 9 hole peg test in 26 seconds or less.    FMC addressed with tracing activities. Ongoing          LTG by: 21      Pt will increase L hand  strength to 60# for improved performance with IADL tasks.    L hand pain addressed. Progressing   Pt will complete a handwriting task related to IADL with 100% legibility.  Tracing worksheets  completed. Ongoing   Pt will increase L UE strength to 4+/5 at all joints for improved IADL performance.  L hand pain addressed. Partially Met                                    Therapy Education/Self Care 95179   Details: Educated on joint traction that can be completed at home for pain reduction.    Given Home Exercise Program   Progress: Reinforced   Who provided to: Patient   Level of understanding Verbalized, Demonstrated and Teach back level of understanding   Timed Minutes       ASSESSMENT/PLAN     Assessment/Plan     Assessment:  Increased pain noted in L hand at beginning of session 7/10. Pain in L hand decreased to 5/10 after modalities.  Pt educated on joint traction that can be completed at home to reduce pain. Decreased coordination still noted in L hand when completing tracing tasks.  No concerns with current HEP.      Plan:   Continue to address L hand pain and B UE strength.      JORGE Ware Student     The clinical instructor and/or supervising staff, EVE Rocha/SANDY, was present in clinic guiding the student by approving, concurring, and confirming the skilled judgement for all services rendered.        Signature:  EVE Rocha/EVE Barnett/L  Occupational Therapist     [Sleeps at: ____] : On weekdays, sleeps at [unfilled] [Wakes up at: ____] : wakes up at [unfilled] [Snoring] : snoring [FreeTextEntry1] : Vazquez is a 16 year old boy with a recent diagnosis of Juvenile myoclonic epilepsy.  Pt. was initially seen in the office on 10/27/21 with jerking episodes x 1 year that occurred 3-4 hours after falling asleep and also occurs while awake lasting approximately 10 minutes.  Routine EEG on 11/9 was found to be normal.  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 \par \par Interval History:\par On 11/16, Vazquez had a 7 minute episode of UE and truncal jerking which he remained conscious during.  Mom called neuro on call and was advised to go to [facility] ED.  Pt. was admitted to [facility] from 11/16-11/18, in which he was hooked up to vEEG and an event was captured showing an EEG correlate of frontally predominant spike/polyspike and wave complexes.  Pt. was loaded with 1 gram of Keppra and was continued on Keppra 500mg BID.  Pt. did not have any further seizure episodes and was discharged on 11/18/21.  On 11/21/21 pt. had Brain MRI without contrast which was normal. \par \par Currently, has per patient's Mom, Vazquez has been doing well since starting Keppra and has not had any further seizure like episodes.  [employer] panel for Dad revealed negative gene for FBXO11.  Discussed with Mom, no need for sleep study at this time as discussed during previous visit. \par \par Current medications:\par Norditropin 2.2 mg daily -growth hormone \par Doxycycline for acne \par Keppra 500mg BID\par Valtoco PRN\par \par (10/27/21): Vazquez is a 15 year old boy who presents for new concern of jerking episodes. \par Episodes of jerking started about 1 year ago. It happens 3-4 hours after falling asleep and happens when he is awake. Last for about 10 minutes. Legs felt weak and had one episode where he fell in bathroom during episode. \par Episodes of jerking have happened during the day if he closes his eyes. No zoning out episode reported. \par History of sleep walking and woke up on bathroom floor. \par No vivid dreams. No sleep paralysis. No difficulty falling asleep or staying asleep. \par No family history of seizures or neurological disorders\par \par

## 2022-01-03 ENCOUNTER — TELEPHONE (OUTPATIENT)
Dept: INTERNAL MEDICINE | Facility: CLINIC | Age: 67
End: 2022-01-03

## 2022-01-03 NOTE — TELEPHONE ENCOUNTER
Caller: Dao Jhaveri    Relationship: Self    Best call back number: [phone]    What medications are you currently taking:   Current Outpatient Medications on File Prior to Visit   Medication Sig Dispense Refill    amLODIPine (NORVASC) 10 MG tablet Take 1 tablet by mouth every night at bedtime. 90 tablet 0    aspirin 81 MG EC tablet Take 81 mg by mouth Daily.      atorvastatin (LIPITOR) 80 MG tablet Take 1 tablet by mouth Daily. 90 tablet 1    clopidogrel (PLAVIX) 75 MG tablet Take 1 tablet by mouth Daily. 90 tablet 1    cyclobenzaprine (FLEXERIL) 10 MG tablet Take 1 tablet by mouth 3 (Three) Times a Day As Needed for Muscle Spasms. 90 tablet 2    guaifenesin-dextromethorphan (MUCINEX DM)  MG tablet sustained-release 12 hour tablet Take 1 tablet by mouth 2 (Two) Times a Day As Needed (cough & congestion). 30 tablet 1    hydrALAZINE (APRESOLINE) 50 MG tablet Take 1 tablet by mouth 3 (Three) Times a Day. 270 tablet 1    isosorbide dinitrate (ISORDIL) 20 MG tablet Take 1 tablet by mouth 3 (Three) Times a Day. 270 tablet 1    lisinopril (PRINIVIL,ZESTRIL) 20 MG tablet Take 1 tablet by mouth Daily. 90 tablet 0    loratadine (Claritin) 10 MG tablet Take 1 tablet by mouth Daily. 90 tablet 3    metoprolol succinate XL (TOPROL-XL) 100 MG 24 hr tablet Take 1 tablet by mouth Daily. 90 tablet 1    nitroglycerin (NITROSTAT) 0.4 MG SL tablet Place 0.4 mg under the tongue Every 5 (Five) Minutes As Needed for Chest Pain. Take no more than 3 doses in 15 minutes.      nortriptyline (Pamelor) 25 MG capsule Take 1 capsule by mouth Every Night. 30 capsule 0    vitamin B-12 (CYANOCOBALAMIN) 500 MCG tablet Take 1 tablet by mouth Daily. 30 tablet 5     No current facility-administered medications on file prior to visit.      When did you start taking these medications: ABOUT A WEEK     Which medication are you concerned about: PAIN MEDICATION OXYCODONE     Who prescribed you this medication: DR. PASCUAL    What  are your concerns: PATIENT IS CALLING ABOUT HIS PAIN MEDICATION THAT DR. PASCUAL PRESCRIBED HIM. PATIENT STATES THE MEDICATION IS NOT ALLOWING HIM TO RELAX AND REST IT IS NOT LETTING HIM GET ANY SLEEP, PATIENT HAD CONTACTED THE DOCTOR THAT PRESCRIBED THE MEDICATION AND WAS TOLD TO CONTACT DR. MANE TO GET A MEDICATION CHANGE

## 2022-01-31 DIAGNOSIS — I10 ESSENTIAL HYPERTENSION: ICD-10-CM

## 2022-01-31 RX ORDER — METOPROLOL SUCCINATE 100 MG/1
100 TABLET, EXTENDED RELEASE ORAL DAILY
Qty: 90 TABLET | Refills: 1 | OUTPATIENT
Start: 2022-01-31

## 2022-01-31 RX ORDER — AMLODIPINE BESYLATE 10 MG/1
10 TABLET ORAL
Qty: 90 TABLET | Refills: 1 | Status: SHIPPED | OUTPATIENT
Start: 2022-01-31 | End: 2022-08-16 | Stop reason: SDUPTHER

## 2022-01-31 RX ORDER — LISINOPRIL 20 MG/1
20 TABLET ORAL DAILY
Qty: 90 TABLET | Refills: 1 | Status: SHIPPED | OUTPATIENT
Start: 2022-01-31 | End: 2022-08-16 | Stop reason: SDUPTHER

## 2022-01-31 NOTE — TELEPHONE ENCOUNTER
Caller: Dao Jhaveri (Self)    Relationship: SELF     Best call back number: [phone] (H)    Requested Prescriptions:      metoprolol succinate XL (TOPROL-XL) 100 MG 24 hr tablet  100 mg, Daily     lisinopril (PRINIVIL,ZESTRIL) 20 MG tablet  20 mg, Daily     amLODIPine (NORVASC) 10 MG tablet  10 mg, Every Night at Bedtime         Pharmacy where request should be sent:    [facility] [address] [place] [address] - [phone] [facility] [phone]   [phone]  Additional details provided by patient:     Does the patient have less than a 3 day supply:  [x] Yes  [] No    Ko Christensen Rep   01/31/22 12:01 CST

## 2022-02-23 ENCOUNTER — OFFICE VISIT (OUTPATIENT)
Dept: INTERNAL MEDICINE | Facility: CLINIC | Age: 67
End: 2022-02-23

## 2022-02-23 VITALS
HEIGHT: 66 IN | WEIGHT: 183 LBS | DIASTOLIC BLOOD PRESSURE: 78 MMHG | HEART RATE: 66 BPM | RESPIRATION RATE: 16 BRPM | SYSTOLIC BLOOD PRESSURE: 118 MMHG | OXYGEN SATURATION: 98 % | TEMPERATURE: 98.6 F | BODY MASS INDEX: 29.41 KG/M2

## 2022-02-23 DIAGNOSIS — Z74.09 IMPAIRED FUNCTIONAL MOBILITY, BALANCE, GAIT, AND ENDURANCE: ICD-10-CM

## 2022-02-23 DIAGNOSIS — Z86.73 HISTORY OF STROKE: ICD-10-CM

## 2022-02-23 DIAGNOSIS — I10 ESSENTIAL HYPERTENSION: Primary | ICD-10-CM

## 2022-02-23 DIAGNOSIS — E78.2 MIXED HYPERLIPIDEMIA: ICD-10-CM

## 2022-02-23 DIAGNOSIS — G81.94 LEFT HEMIPARESIS: ICD-10-CM

## 2022-02-23 DIAGNOSIS — Z23 NEED FOR VACCINATION: ICD-10-CM

## 2022-02-23 PROCEDURE — 99214 OFFICE O/P EST MOD 30 MIN: CPT | Performed by: NURSE PRACTITIONER

## 2022-02-23 PROCEDURE — 0003A COVID-19 (PFIZER): CPT | Performed by: NURSE PRACTITIONER

## 2022-02-23 PROCEDURE — 91300 COVID-19 (PFIZER): CPT | Performed by: NURSE PRACTITIONER

## 2022-02-23 RX ORDER — HYDROCODONE BITARTRATE AND ACETAMINOPHEN 7.5; 325 MG/1; MG/1
1 TABLET ORAL EVERY 8 HOURS PRN
COMMUNITY
End: 2022-08-16

## 2022-02-23 NOTE — PROGRESS NOTES
Chief Complaint   Patient presents with   • Medication Problem     Pt would like to discuss being taken off one or more of his bp meds   • Hypertension       History:  Dao Jhaveri is a 66 y.o. male who presents today for follow-up for evaluation of the above:    HPI   Patient presents today for f/u HTN  Patient reports compliance with blood pressure medications without adverse side effects. He does desire to possibly decrease pill burden but does not report side effects currently  He continues to have gait and balance issues related to left side hemiparesis after CVA.     Working with orthopedic specialist for left shoulder pain.  BMI is 29      ROS:  Review of Systems   Constitutional: Negative for activity change, appetite change, fatigue, fever and unexpected weight change.   HENT: Negative.    Respiratory: Negative for cough, chest tightness, shortness of breath and wheezing.    Cardiovascular: Negative for chest pain, palpitations and leg swelling.   Gastrointestinal: Negative.    Endocrine: Negative.    Genitourinary: Negative.    Musculoskeletal: Positive for myalgias.   Skin: Negative.    Neurological: Negative for dizziness and headaches.   Psychiatric/Behavioral: Negative.        Mr. Jhaveri  reports that he quit smoking about 8 years ago. His smoking use included cigarettes. He has a 52.50 pack-year smoking history. He has never used smokeless tobacco. He reports that he does not drink alcohol and does not use drugs.      Current Outpatient Medications:   •  amLODIPine (NORVASC) 10 MG tablet, Take 1 tablet by mouth every night at bedtime., Disp: 90 tablet, Rfl: 1  •  aspirin 81 MG EC tablet, Take 81 mg by mouth Daily., Disp: , Rfl:   •  atorvastatin (LIPITOR) 80 MG tablet, Take 1 tablet by mouth Daily., Disp: 90 tablet, Rfl: 1  •  clopidogrel (PLAVIX) 75 MG tablet, Take 1 tablet by mouth Daily., Disp: 90 tablet, Rfl: 1  •  cyclobenzaprine (FLEXERIL) 10 MG tablet, Take 1 tablet by mouth 3 (Three) Times a  "Day As Needed for Muscle Spasms., Disp: 90 tablet, Rfl: 2  •  hydrALAZINE (APRESOLINE) 50 MG tablet, Take 1 tablet by mouth 3 (Three) Times a Day., Disp: 270 tablet, Rfl: 1  •  HYDROcodone-acetaminophen (NORCO) 7.5-325 MG per tablet, Take 1 tablet by mouth Every 8 (Eight) Hours As Needed for Moderate Pain ., Disp: , Rfl:   •  isosorbide dinitrate (ISORDIL) 20 MG tablet, Take 1 tablet by mouth 3 (Three) Times a Day., Disp: 270 tablet, Rfl: 1  •  lisinopril (PRINIVIL,ZESTRIL) 20 MG tablet, Take 1 tablet by mouth Daily., Disp: 90 tablet, Rfl: 1  •  metoprolol succinate XL (TOPROL-XL) 100 MG 24 hr tablet, Take 1 tablet by mouth Daily., Disp: 90 tablet, Rfl: 1  •  nitroglycerin (NITROSTAT) 0.4 MG SL tablet, Place 0.4 mg under the tongue Every 5 (Five) Minutes As Needed for Chest Pain. Take no more than 3 doses in 15 minutes., Disp: , Rfl:   •  nortriptyline (Pamelor) 25 MG capsule, Take 1 capsule by mouth Every Night., Disp: 30 capsule, Rfl: 0      OBJECTIVE:  /78 (BP Location: Left arm, Patient Position: Sitting, Cuff Size: Adult)   Pulse 66   Temp 98.6 °F (37 °C) (Temporal)   Resp 16   Ht 167.6 cm (65.98\")   Wt 83 kg (183 lb)   SpO2 98%   BMI 29.55 kg/m²    Physical Exam  Vitals reviewed.   Constitutional:       Appearance: He is well-developed.   HENT:      Head: Normocephalic and atraumatic.   Eyes:      Conjunctiva/sclera: Conjunctivae normal.      Pupils: Pupils are equal, round, and reactive to light.   Cardiovascular:      Rate and Rhythm: Normal rate and regular rhythm.      Heart sounds: Normal heart sounds.   Pulmonary:      Effort: Pulmonary effort is normal.      Breath sounds: Normal breath sounds.   Abdominal:      General: Bowel sounds are normal.      Palpations: Abdomen is soft.   Musculoskeletal:      Cervical back: Normal range of motion and neck supple.      Comments: LLE chronic weakness    Skin:     General: Skin is warm and dry.   Neurological:      Mental Status: He is alert and " oriented to person, place, and time.      Deep Tendon Reflexes: Reflexes are normal and symmetric.         Assessment/Plan    Diagnoses and all orders for this visit:    1. Essential hypertension (Primary)  Well controlled, BP goal for age is <140/90 per JNC 8 guidelines and continue current medications    2. Mixed hyperlipidemia  -     Lipid panel; Future  Continues on statin therapy without adverse side effects.     3. Need for vaccination  -     COVID-19 Vaccine (Pfizer) Purple Cap    4. Left hemiparesis (HCC)  -     Ambulatory Referral to Physical Therapy Evaluate and treat; Stretching, ROM, Strengthening  5. History of stroke  -     Ambulatory Referral to Physical Therapy Evaluate and treat; Stretching, ROM, Strengthening  6. Impaired functional mobility, balance, gait, and endurance  -     Ambulatory Referral to Physical Therapy Evaluate and treat; Stretching, ROM, Strengthening         An After Visit Summary was printed and given to the patient at discharge.  Return in about 4 months (around 6/23/2022) for Medicare Wellness. Sooner if problems arise.          Marsha CASTANO. 2/23/2022   Electronically Signed

## 2022-03-09 ENCOUNTER — TRANSCRIBE ORDERS (OUTPATIENT)
Dept: PHYSICAL THERAPY | Facility: CLINIC | Age: 67
End: 2022-03-09

## 2022-03-09 ENCOUNTER — TREATMENT (OUTPATIENT)
Dept: PHYSICAL THERAPY | Facility: CLINIC | Age: 67
End: 2022-03-09

## 2022-03-09 DIAGNOSIS — Z98.890 S/P LEFT ROTATOR CUFF REPAIR: Primary | ICD-10-CM

## 2022-03-09 DIAGNOSIS — M25.552 LEFT HIP PAIN: ICD-10-CM

## 2022-03-09 DIAGNOSIS — Z98.890 STATUS POST LABRAL REPAIR OF SHOULDER: Primary | ICD-10-CM

## 2022-03-09 PROCEDURE — 97162 PT EVAL MOD COMPLEX 30 MIN: CPT | Performed by: PHYSICAL THERAPIST

## 2022-03-09 PROCEDURE — 97140 MANUAL THERAPY 1/> REGIONS: CPT | Performed by: PHYSICAL THERAPIST
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
## 2022-03-11 ENCOUNTER — TREATMENT (OUTPATIENT)
Dept: PHYSICAL THERAPY | Facility: CLINIC | Age: 67
End: 2022-03-11

## 2022-03-11 DIAGNOSIS — M54.12 RADICULOPATHY, CERVICAL: ICD-10-CM

## 2022-03-11 DIAGNOSIS — M25.552 LEFT HIP PAIN: Primary | ICD-10-CM

## 2022-03-11 DIAGNOSIS — Z98.890 S/P LEFT ROTATOR CUFF REPAIR: ICD-10-CM

## 2022-03-11 PROCEDURE — 97140 MANUAL THERAPY 1/> REGIONS: CPT | Performed by: PHYSICAL THERAPIST

## 2022-03-11 PROCEDURE — 97110 THERAPEUTIC EXERCISES: CPT | Performed by: PHYSICAL THERAPIST

## 2022-03-11 NOTE — PROGRESS NOTES
"                                                                Physical Therapy Treatment Note    Patient: Dao Jhaveri                                                                     Visit Date: 3/11/2022  :     1955    Referring practitioner:    Beto Dover MD  Date of Initial Visit:          Type: THERAPY  Noted: 3/9/2022    Patient seen for 2 sessions    Visit Diagnoses:    ICD-10-CM ICD-9-CM   1. S/P left rotator cuff repair  Z98.890 V45.89   2. Left hip pain  M25.552 719.45   3. Radiculopathy, cervical  M54.12 723.4     SUBJECTIVE     Subjective   His L shoulder has been aching with the tape on it. His L hip only hurts when he is on it.   PAIN: 7/10         OBJECTIVE     Objective      Manual Therapy     [id number]  Comments   Stretched LLE in supine- HS, SKTC, calf, piriformis                    Timed Minutes 15          Therapeutic Exercises    67978 Comments   Sit to stand with focus on equal weight bearing    Bridges in HL    Hip ER controlled in sitting with green tband    In II bars: forward lunges, toe taps onto 4\" block, side lunge over 4\" block Focused on weight shifting throughout. Used bars for stability most of the time.            Timed Minutes 30       Therapy Education/Self Care 44961   Details: Pay attention to posture when walking   Given mobility training   Progress: Reinforced   Education provided to:  Patient   Level of understanding Verbalized   Timed Minutes          Total Timed Treatment:     45   mins  Total Time of Visit:             45   mins         ASSESSMENT/PLAN     GOALS    Goals                                          Progress Note due by 22                                                      Recert due by 22   STG by: 3 weeks Comments Date Status   Able to elevate left shoulder to 150 deg without pain         Improve left weight shifting with midline upright trunk in left midstance  did better after stretching the L hip  3/11/22 ongoing   Pain reports " "consistently <3/10 with shoulder elevation                   LTG by: 6 weeks         Able to walk with more symmetrical gait with least assistive device he can Discussed that he may not be able to \"get rid of the cane\" and that is OK. He needs to be steady and not limp.  3/11/22  ongoing   Gross MMT left shoulder 4/5         Able to use left UE for household functional activities.          Able to walk community distances without exacerbation of left hip pain.          Independent with HEP for strengthening and flexibility.             Assessment/Plan     ASSESSMENT: He walked better after I stretched out the L hip. Focused on weight shifting and balance. Conscious use of the LLE. He wants to focus on his hip and walking more than his shoulder. I took the tape off as he thought it was making it ache more. Nothing done to the shoulder today.     PLAN: Cont with focus on his gait and hip pain. His goal is to not need the cane.     SIGNATURE: Heidy Prince, PT, DPT, [place], License #: [id number]  Electronically Signed on 3/11/2022        [address] [place] [address]. [address]  [phone]    "

## 2022-03-15 ENCOUNTER — OFFICE VISIT (OUTPATIENT)
Dept: NEUROLOGY | Facility: CLINIC | Age: 67
End: 2022-03-15

## 2022-03-15 VITALS
OXYGEN SATURATION: 99 % | BODY MASS INDEX: 29.25 KG/M2 | HEIGHT: 66 IN | RESPIRATION RATE: 17 BRPM | HEART RATE: 66 BPM | WEIGHT: 182 LBS | DIASTOLIC BLOOD PRESSURE: 68 MMHG | SYSTOLIC BLOOD PRESSURE: 110 MMHG

## 2022-03-15 DIAGNOSIS — I69.354 HEMIPARESIS AFFECTING LEFT SIDE AS LATE EFFECT OF STROKE: Primary | ICD-10-CM

## 2022-03-15 DIAGNOSIS — E78.2 MIXED HYPERLIPIDEMIA: ICD-10-CM

## 2022-03-15 DIAGNOSIS — Z91.199 HISTORY OF NONCOMPLIANCE WITH MEDICAL TREATMENT: ICD-10-CM

## 2022-03-15 DIAGNOSIS — I10 ESSENTIAL HYPERTENSION: ICD-10-CM

## 2022-03-15 PROCEDURE — 99213 OFFICE O/P EST LOW 20 MIN: CPT | Performed by: NURSE PRACTITIONER
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
## 2022-03-16 ENCOUNTER — TREATMENT (OUTPATIENT)
Dept: PHYSICAL THERAPY | Facility: CLINIC | Age: 67
End: 2022-03-16

## 2022-03-16 DIAGNOSIS — Z98.890 S/P LEFT ROTATOR CUFF REPAIR: ICD-10-CM

## 2022-03-16 DIAGNOSIS — M25.552 LEFT HIP PAIN: Primary | ICD-10-CM

## 2022-03-16 PROCEDURE — 97140 MANUAL THERAPY 1/> REGIONS: CPT | Performed by: PHYSICAL THERAPIST

## 2022-03-16 PROCEDURE — 97110 THERAPEUTIC EXERCISES: CPT | Performed by: PHYSICAL THERAPIST

## 2022-03-16 NOTE — PROGRESS NOTES
Physical Therapy Treatment Note    Patient: Dao Jhaveri                                                                     Visit Date: 3/16/2022  :     1955    Referring practitioner:    Beto Dover MD  Date of Initial Visit:          Type: THERAPY  Noted: 3/9/2022    Patient seen for 3 sessions    Visit Diagnoses:    ICD-10-CM ICD-9-CM   1. Left hip pain  M25.552 719.45   2. S/P left rotator cuff repair  Z98.890 V45.89     SUBJECTIVE     Subjective His hip is hurting worse than his shoulder. He takes medicine for his shoulder.     PAIN: 7/10 hip, 6/10 shoulder < 5/10 shoulder, 4/10 hip      OBJECTIVE     Objective     Manual Therapy     01354  Comments   IASTM w/ blue ridged roller to L hip, gluteals, and ITB             Timed Minutes 8     Therapeutic Exercises    00372 Comments   L UE isometric ball presses into wall- flexion 2x10   L UE isometric ball presses into wall- abduction 2x10   L UE isometric ball presses into wall- ER  2x10   B alternating standing hip abd  2x10   B alternating standing hip ext  2x10   Sit to stands at TRX straps w/ staggered feet: L post, R ant  x5                   Timed Minutes 30     Therapy Education/Self Care 45915   Details:    Given mobility training   Progress: Reinforced   Education provided to:  Patient   Level of understanding Verbalized   Timed Minutes        Total Timed Treatment:     38   mins  Total Time of Visit:             40   mins         ASSESSMENT/PLAN     GOALS          Goals                                          Progress Note due by 22                                                      Recert due by 22   STG by: 3 weeks Comments Date Status   Able to elevate left shoulder to 150 deg without pain         Improve left weight shifting with midline upright trunk in left midstance  did better after stretching the L hip  3/11/22 ongoing   Pain reports consistently <3/10 with  "shoulder elevation                   LTG by: 6 weeks         Able to walk with more symmetrical gait with least assistive device he can Discussed that he may not be able to \"get rid of the cane\" and that is OK. He needs to be steady and not limp.  3/11/22  ongoing   Gross MMT left shoulder 4/5         Able to use left UE for household functional activities.          Able to walk community distances without exacerbation of left hip pain.          Independent with HEP for strengthening and flexibility.         Assessment/Plan     ASSESSMENT: He has pretty good AROM of the L shoulder, however it is painful at end range. Gentle isometric shoulder strengthening was initiated today to which he responded well. We also worked on some hip strengthening in an alternating pattern to simulate gait and not overload his L LE. He did demonstrate L glut med weakness when extending his R hip and his leg would almost drag the floor. He responded very well to IASTM and stretching post-session and reported decreased pain.     PLAN: Continue to work on improving L WB tolerance and L shoulder strength.     SIGNATURE: Kristy Jacobson PTA, License #: [id number]  Electronically Signed on 3/16/2022        [address] [place] [address]. [address]  [phone]    "

## 2022-03-18 ENCOUNTER — TREATMENT (OUTPATIENT)
Dept: PHYSICAL THERAPY | Facility: CLINIC | Age: 67
End: 2022-03-18

## 2022-03-18 DIAGNOSIS — Z98.890 S/P LEFT ROTATOR CUFF REPAIR: ICD-10-CM

## 2022-03-18 DIAGNOSIS — M25.552 LEFT HIP PAIN: Primary | ICD-10-CM

## 2022-03-18 PROCEDURE — 97140 MANUAL THERAPY 1/> REGIONS: CPT

## 2022-03-18 PROCEDURE — 97110 THERAPEUTIC EXERCISES: CPT

## 2022-03-18 NOTE — PROGRESS NOTES
Physical Therapy Treatment Note    Patient: Dao Jhaveri                                                                     Visit Date: 3/18/2022  :     1955    Referring practitioner:    Beto Dover MD  Date of Initial Visit:          Type: THERAPY  Noted: 3/9/2022    Patient seen for 4 sessions    Visit Diagnoses:    ICD-10-CM ICD-9-CM   1. Left hip pain  M25.552 719.45   2. S/P left rotator cuff repair  Z98.890 V45.89     SUBJECTIVE     Subjective His hip is hurting worse than his shoulder. He takes medicine for his shoulder.     PAIN: 710 > 5/10      OBJECTIVE     Objective     Manual Therapy     [id number]  Comments   IASTM w/ blue ridged roller to L hip, gluteals, hamstrings, and ITB  Focus on gluteals and ITB           Timed Minutes 15     Therapeutic Exercises    36797 Comments   L UE isometric ball presses into wall- flex/ext 2 x 10 ea -- added to HEP   L UE isometric ball presses into wall- abduction 2 x 10 -- added to HEP   L UE isometric ball presses into wall- ER/IR  2 x 10 ea -- ER added to HEP   B hip abd with red Chetan, standing  2 x 10 ea   B alternating standing hip ext  2 x 10 ea   Passive hip flex and piriformis stretches on L R WNL   Reviewed, bolstered HEP Provided med bridge code and printout   L UE isometric IR/ER walkouts (1 step) with YTB, at shuttle press 2 x 10 ea   Passive L quad stretch in prone         Timed Minutes 40     Therapy Education/Self Care 76083   Details:    Given mobility training   Progress: Reinforced   Education provided to:  Patient   Level of understanding Verbalized   Timed Minutes      Access Code: [email or web address]  Date: 2022  Prepared by: Sofia Li    Exercises  Isometric Shoulder External Rotation at Wall - 1-2 x daily - 7 x weekly - 2 sets - 10 reps  Isometric Shoulder Extension at Wall - 1-2 x daily - 7 x weekly - 2 sets - 10 reps  Isometric Shoulder Abduction at Wall - 1-2 x daily - 7  "x weekly - 2 sets - 10 reps  Isometric Shoulder Flexion at Wall - 1-2 x daily - 7 x weekly - 2 sets - 10 reps    Total Timed Treatment:     55   mins  Total Time of Visit:             58   mins         ASSESSMENT/PLAN     GOALS    Goals                                          Progress Note due by 4/8/22                                                      Recert due by 6/6/22   STG by: 3 weeks Comments Date Status   Able to elevate left shoulder to 150 deg without pain  100 deg with pain on 3/9/22 (eval) 3/9/22 ongoing   Improve left weight shifting with midline upright trunk in left midstance  did better after stretching the L hip  3/11/22 ongoing   Pain reports consistently <3/10 with shoulder elevation     ongoing   LTG by: 6 weeks         Able to walk with more symmetrical gait with least assistive device he can Discussed that he may not be able to \"get rid of the cane\" and that is OK. He needs to be steady and not limp.  3/11/22  ongoing   Gross MMT left shoulder 4/5  flex: 3/5 on 3/9/22 3/9/22 ongoing   Able to use left UE for household functional activities.      ongoing   Able to walk community distances without exacerbation of left hip pain.  Gait mechanics improved following passive stretches today.  3/18/22 ongoing   Independent with HEP for strengthening and flexibility.  Bolstered to reflect isometric strengthening for L UE, provided printout  3/18/22 ongoing     Assessment/Plan     ASSESSMENT: Patient responded very well to passive stretches today and demonstrated improved gait mechanics thereafter. During strengthening, pt fatigued quickly (hip strengthening > shoulder strengthening). He has been very dedicated to continuing therapy for his shoulder at home, therefore HEP bolstered to reflect isometric shoulder strengthening today.     PLAN: Assess response to HEP bolstered this date and continue to progress shoulder/hip strengthening as tolerated. Consider obtaining L shoulder MMT in all planes. " Consider beginning session with SANDY Lujan.     SIGNATURE: Sofia Li PTA, License #: [id number]    Electronically Signed on 3/18/2022        [address] [place] [address]  [phone]

## 2022-03-21 ENCOUNTER — TREATMENT (OUTPATIENT)
Dept: PHYSICAL THERAPY | Facility: CLINIC | Age: 67
End: 2022-03-21

## 2022-03-21 DIAGNOSIS — Z98.890 S/P LEFT ROTATOR CUFF REPAIR: ICD-10-CM

## 2022-03-21 DIAGNOSIS — M25.552 LEFT HIP PAIN: Primary | ICD-10-CM

## 2022-03-21 PROCEDURE — 97110 THERAPEUTIC EXERCISES: CPT

## 2022-03-21 PROCEDURE — 97140 MANUAL THERAPY 1/> REGIONS: CPT

## 2022-03-21 NOTE — PROGRESS NOTES
"                                                                Physical Therapy Treatment Note    Patient: Dao Jhaveri                                                                     Visit Date: 3/21/2022  :     1955    Referring practitioner:    Beto Dover MD  Date of Initial Visit:          Type: THERAPY  Noted: 3/9/2022    Patient seen for 5 sessions    Visit Diagnoses:    ICD-10-CM ICD-9-CM   1. Left hip pain  M25.552 719.45   2. S/P left rotator cuff repair  Z98.890 V45.89     SUBJECTIVE     Subjective Pain is mostly in his hip. He felt really good after his last session, felt like he could walk better. His shoulder doesn't feel that bad, he had a \"tingle\" where he had the surgery but it's not as bad as it was. His surgery was in 2021. He used his \"rollinator\" this morning and walked about a block.     PAIN: 7/10 > 4/10      OBJECTIVE     Objective     Manual Therapy     97029  Comments   IASTM w/ blue ridged roller to L hip, gluteals, and ITB  Reports feels better today than last session (Friday)       Timed Minutes 15     Therapeutic Exercises    39785 Comments   L UE isometric ball presses into wall- flex/ext 2 x 10 ea   L UE isometric ball presses into wall- abduction 2 x 10   L UE isometric ball presses into wall- ER 2 x 10    Passive hip flex and piriformis stretches on L Flex much improved (WNL today), piriformis remains slightly restricted   B ant hip stretch with towel roll in supine 2 min ea   Passive L quad stretch in prone  Reports this really helped him    B SL hip ext stretch, intermit passive quad stretch  L > R   L shoulder MMT See goals   Mid and lower trap MMT  Mid: 3-/5 and lower trap -- unable   Standing on 4\" step, hip abd against red Chetan 2 x 10 ea -- in // bars   Step-ups on 4\" step with 2.5 lb ankle wt bilaterally 1 x 10 ea   Standing isometric hip abd with ball against wall  1 x 10 ea -- buckling on L LE during R abduction x2 d/t fatigue so d/c'd     " "  Timed Minutes 45     Therapy Education/Self Care 10290   Details: Black TheraBand for prone quad stretch   Given mobility training   Progress: Reinforced   Education provided to:  Patient   Level of understanding Verbalized   Timed Minutes      Access Code: [email or web address]  Date: 03/18/2022  Prepared by: Sofia Li    Exercises  Isometric Shoulder External Rotation at Wall - 1-2 x daily - 7 x weekly - 2 sets - 10 reps  Isometric Shoulder Extension at Wall - 1-2 x daily - 7 x weekly - 2 sets - 10 reps  Isometric Shoulder Abduction at Wall - 1-2 x daily - 7 x weekly - 2 sets - 10 reps  Isometric Shoulder Flexion at Wall - 1-2 x daily - 7 x weekly - 2 sets - 10 reps    Total Timed Treatment:     60   mins  Total Time of Visit:             60   mins         ASSESSMENT/PLAN     GOALS    Goals                                          Progress Note due by 4/8/22                                                      Recert due by 6/6/22   STG by: 3 weeks Comments Date Status   Able to elevate left shoulder to 150 deg without pain  100 deg with pain on 3/9/22 (eval) 3/9/22 ongoing   Improve left weight shifting with midline upright trunk in left midstance  did better after stretching the L hip  3/11/22 ongoing   Pain reports consistently <3/10 with shoulder elevation     ongoing   LTG by: 6 weeks         Able to walk with more symmetrical gait with least assistive device he can Discussed that he may not be able to \"get rid of the cane\" and that is OK. He needs to be steady and not limp.  3/11/22  ongoing   Gross MMT left shoulder 4/5  flex: 3/5 on 3/9/22  Abd: 3-/5, flex: 3/5 IR: 5/5, ER; 5/5 - 3/21/22 3/21/22 ongoing   Able to use left UE for household functional activities.      ongoing   Able to walk community distances without exacerbation of left hip pain.  Gait mechanics improved following passive stretches today.  3/18/22 ongoing   Independent with [facility] for strengthening and flexibility.  Bolstered to reflect isometric " strengthening for L UE, provided printout  3/18/22 ongoing     Assessment/Plan     ASSESSMENT: Patient demonstrated improved flexibility in B hips today vs last session and also demonstrated decreased L shoulder flex and abd MMT. Continued with isometric strengthening today and while this did significantly increase his fatigue, he may be ready to progress towards AAROM/AROM. When required to stabilize on L hip, he demonstrated buckling x2 and exercise was d/c'd as a result.     PLAN: Consider more AAROM/AROM strengthening throughout scapular stabilizers and posterior RTC. Continue strengthening and endurance training to B hips, especially focusing on L hip stabilizers.     SIGNATURE: Sofia Li PTA, License #: [id number]    Electronically Signed on 3/21/2022        [address] [place] [address]. [address]  [phone]

## 2022-03-23 ENCOUNTER — TREATMENT (OUTPATIENT)
Dept: PHYSICAL THERAPY | Facility: CLINIC | Age: 67
End: 2022-03-23

## 2022-03-23 DIAGNOSIS — M25.552 LEFT HIP PAIN: Primary | ICD-10-CM

## 2022-03-23 DIAGNOSIS — Z98.890 S/P LEFT ROTATOR CUFF REPAIR: ICD-10-CM

## 2022-03-23 PROCEDURE — 97112 NEUROMUSCULAR REEDUCATION: CPT | Performed by: PHYSICAL THERAPIST

## 2022-03-23 PROCEDURE — 97110 THERAPEUTIC EXERCISES: CPT | Performed by: PHYSICAL THERAPIST

## 2022-03-23 PROCEDURE — 97140 MANUAL THERAPY 1/> REGIONS: CPT | Performed by: PHYSICAL THERAPIST

## 2022-03-23 NOTE — PROGRESS NOTES
Physical Therapy Treatment Note    Patient: Dao Jhaveri                                                                     Visit Date: 3/23/2022  :     1955    Referring practitioner:    Beto Dover MD  Date of Initial Visit:          Type: THERAPY  Noted: 3/9/2022    Patient seen for 6 sessions    Visit Diagnoses:    ICD-10-CM ICD-9-CM   1. Left hip pain  M25.552 719.45   2. S/P left rotator cuff repair  Z98.890 V45.89     SUBJECTIVE     Subjective His pain is still mostly in his hip. He was in more pain yesterday because of the rain. He feels like he can walk on it better. His shoulder isn't hurting today. He denies any soreness following his last session.     PAIN: 5/10 > 3/10      OBJECTIVE     Objective      Neuromuscular Reeducation     09037 Comments   B standing hip abd w/ varying UE support (2, 1, then 0)  3x5   B step ups onto 4 in step w/ varying UE support (2, 1, then 0)  3x5   B marching w/ varying UE support (2, 1, then 0)  3x5   Standing w/out UE support w/ L UE flexion w/ 1# weight to 90 deg  1x10   Standing w/out UE support w/ L UE D2 flexion w/ 1# weight 1x10       Timed Minutes 15     Manual Therapy     69647  Comments   IASTM w/ blue ridged roller to L hip, gluteals, and ITB         Timed Minutes 10     Therapeutic Exercises    89825 Comments   L UE supine circles  2x10 CW/CCW    HL bridges  2x10   L SA punches  2x10   L SL clamshells  2x10   L SL ER  2x10   L hip passive stretch SKTC, IR/ER, and hamstrings     Timed Minutes 30     Therapy Education/Self Care 10209   Details:    Given mobility training   Progress: Reinforced   Education provided to:  Patient   Level of understanding Verbalized   Timed Minutes      Access Code: [email or web address]  Date: 2022  Prepared by: Sofia Li    Exercises  Isometric Shoulder External Rotation at Wall - 1-2 x daily - 7 x weekly - 2 sets - 10 reps  Isometric Shoulder Extension at  "Wall - 1-2 x daily - 7 x weekly - 2 sets - 10 reps  Isometric Shoulder Abduction at Wall - 1-2 x daily - 7 x weekly - 2 sets - 10 reps  Isometric Shoulder Flexion at Wall - 1-2 x daily - 7 x weekly - 2 sets - 10 reps    Total Timed Treatment:     55   mins  Total Time of Visit:             60   mins         ASSESSMENT/PLAN     GOALS    Goals                                          Progress Note due by 4/8/22                                                      Recert due by 6/6/22   STG by: 3 weeks Comments Date Status   Able to elevate left shoulder to 150 deg without pain  100 deg with pain on 3/9/22 (eval) 3/9/22 ongoing   Improve left weight shifting with midline upright trunk in left midstance  did better after stretching the L hip  3/11/22 ongoing   Pain reports consistently <3/10 with shoulder elevation     ongoing   LTG by: 6 weeks         Able to walk with more symmetrical gait with least assistive device he can Discussed that he may not be able to \"get rid of the cane\" and that is OK. He needs to be steady and not limp.  3/11/22  ongoing   Gross MMT left shoulder 4/5  flex: 3/5 on 3/9/22  Abd: 3-/5, flex: 3/5 IR: 5/5, ER; 5/5 - 3/21/22 3/21/22 ongoing   Able to use left UE for household functional activities.      ongoing   Able to walk community distances without exacerbation of left hip pain.  Gait mechanics improved following passive stretches today.  3/18/22 ongoing   Independent with HEP for strengthening and flexibility.  Bolstered to reflect isometric strengthening for L UE, provided printout  3/18/22 ongoing     Assessment/Plan     ASSESSMENT: We began session engaging in activities that challenge L LE balance and stability while challenging his hip, core, and L UE strength. He did struggle on his L LE however it did not buckle or hurt too badly, he would just adjust his posture to compensate. He performed very well with step ups without UE support and was pleased. We then worked on hip and UE " strengthening without the added challenge of balance and WBing and he also did very well.     PLAN: Continue to improve L LE WBing and stability as well and L UE proximal stability.     SIGNATURE: Kristy Jacobson PTA, License #: [id number]    Electronically Signed on 3/23/2022        [address] [place] [address]  [phone]

## 2022-03-28 ENCOUNTER — TREATMENT (OUTPATIENT)
Dept: PHYSICAL THERAPY | Facility: CLINIC | Age: 67
End: 2022-03-28

## 2022-03-28 DIAGNOSIS — Z98.890 S/P LEFT ROTATOR CUFF REPAIR: Primary | ICD-10-CM

## 2022-03-28 DIAGNOSIS — M25.552 LEFT HIP PAIN: ICD-10-CM

## 2022-03-28 PROCEDURE — 97140 MANUAL THERAPY 1/> REGIONS: CPT | Performed by: PHYSICAL THERAPIST

## 2022-03-28 PROCEDURE — 97112 NEUROMUSCULAR REEDUCATION: CPT | Performed by: PHYSICAL THERAPIST

## 2022-03-28 PROCEDURE — 97110 THERAPEUTIC EXERCISES: CPT | Performed by: PHYSICAL THERAPIST

## 2022-03-28 NOTE — PROGRESS NOTES
"                                                                Physical Therapy Treatment Note    Patient: Dao Jhaveri                                                                     Visit Date: 3/28/2022  :     1955    Referring practitioner:    Beto Dover MD  Date of Initial Visit:          Type: THERAPY  Noted: 3/9/2022    Patient seen for 7 sessions    Visit Diagnoses:    ICD-10-CM ICD-9-CM   1. S/P left rotator cuff repair  Z98.890 V45.89   2. Left hip pain  M25.552 719.45     SUBJECTIVE     Subjective He reports he took some medicine before bed last night and it helped him sleep better. His leg doesn't buckle anymore, but still has difficulty standing on one leg.     PAIN: 4/10 (hip, shoulder) > 2/10 (\"Doin' good. Feelin' good.)     OBJECTIVE     Objective      Neuromuscular Reeducation     43641 Comments   Standing on BL BOSU: mini squats with progressive UE support (2 > 1 > 0) 3 x 5 total, 1 x 5 ea   Standing on BL BOSU: marches with progressive UE support (2 > 1 > 0) 3 x 5 total, 1 x 5 ea   M/L, A/P wobble board, // bars 2 X 10 ea       Timed Minutes 20     Manual Therapy     52780  Comments   IASTM with pink foam roller to L hip, gluteals, and ITB  R SL with pillow between knees       Timed Minutes 15     Therapeutic Exercises    20453 Comments   Standing on 6\" step hip abduction with progressive UE support (2 > 1 > 0) 1 x 5 ea   Tall kneeling hip thrust  2 x 10   L UE at 90 deg flex isometric strengthening into flex, abd, add, ext. 2 x 10 ea       Timed Minutes 25     Therapy Education/Self Care 20943   Details:    Given mobility training   Progress: Reinforced   Education provided to:  Patient   Level of understanding Verbalized   Timed Minutes      Access Code: [email or web address]  Date: 2022  Prepared by: Sofia Li    Exercises  Isometric Shoulder External Rotation at Wall - 1-2 x daily - 7 x weekly - 2 sets - 10 reps  Isometric Shoulder Extension at Wall - 1-2 x daily - 7 x weekly - " "2 sets - 10 reps  Isometric Shoulder Abduction at Wall - 1-2 x daily - 7 x weekly - 2 sets - 10 reps  Isometric Shoulder Flexion at Wall - 1-2 x daily - 7 x weekly - 2 sets - 10 reps    Total Timed Treatment:     60   mins  Total Time of Visit:             60   mins         ASSESSMENT/PLAN     GOALS    Goals                                          Progress Note due by 4/8/22                                                           Recert due by 6/6/22   STG by: 3 weeks Comments Date Status   Able to elevate left shoulder to 150 deg without pain  100 deg with pain on 3/9/22 (eval) 3/9/22 ongoing   Improve left weight shifting with midline upright trunk in left midstance  did better after stretching the L hip  3/11/22 ongoing   Pain reports consistently <3/10 with shoulder elevation  L UE is stiff but not painful. It usually will hurt though. He feels it's improving.  3/28/22 ongoing   LTG by: 6 weeks         Able to walk with more symmetrical gait with least assistive device he can Discussed that he may not be able to \"get rid of the cane\" and that is OK. He needs to be steady and not limp.  3/11/22  ongoing   Gross MMT left shoulder 4/5  flex: 3/5 on 3/9/22  Abd: 3-/5, flex: 3/5 IR: 5/5, ER; 5/5 - 3/21/22 3/21/22 ongoing   Able to use left UE for household functional activities.      ongoing   Able to walk community distances without exacerbation of left hip pain.  Gait mechanics improved following passive stretches today.  3/18/22 ongoing   Independent with HEP for strengthening and flexibility.  Bolstered to reflect isometric strengthening for L UE, provided printout  3/18/22 ongoing     Assessment/Plan     ASSESSMENT: He demonstrated difficulty with L UE and LE stability during exercises today, but is improving. He reports he no longer has occurrences of knee buckling and feels he is progressing well, which is reflected throughout his sessions. He would benefit from continued PT for increasing stability and " endurance.      PLAN: Continue to progress L UE and LE stability.      SIGNATURE: Sofia Li PTA, License #: [id number]    Electronically Signed on 3/28/2022        [address] [place] [address]  [phone]

## 2022-04-01 ENCOUNTER — TREATMENT (OUTPATIENT)
Dept: PHYSICAL THERAPY | Facility: CLINIC | Age: 67
End: 2022-04-01

## 2022-04-01 DIAGNOSIS — Z98.890 S/P LEFT ROTATOR CUFF REPAIR: Primary | ICD-10-CM

## 2022-04-01 DIAGNOSIS — M25.552 LEFT HIP PAIN: ICD-10-CM

## 2022-04-01 PROCEDURE — 97140 MANUAL THERAPY 1/> REGIONS: CPT

## 2022-04-01 PROCEDURE — 97110 THERAPEUTIC EXERCISES: CPT

## 2022-04-01 NOTE — PROGRESS NOTES
"                                                                Physical Therapy Treatment Note    Patient: Dao Jhaveri                                                                     Visit Date: 2022  :     1955    Referring practitioner:    Beto Dover MD  Date of Initial Visit:          Type: THERAPY  Noted: 3/9/2022    Patient seen for 8 sessions    Visit Diagnoses:    ICD-10-CM ICD-9-CM   1. S/P left rotator cuff repair  Z98.890 V45.89   2. Left hip pain  M25.552 719.45     SUBJECTIVE     Subjective He reports feeling pretty good today, he didn't feel too well yesterday as pain was increased. His shoulder feels stiff today. He's been working on walking without the cane. He has not gotten an AFO d/t it being too expensive, reports insurance wouldn't pay for it because, per pt, \"it wasn't enough time.\"    PAIN: 3/10 (shoulder) 2/10 (L hip) > 3/10 (L shoulder) 2/10 (L hip) -- unchanged     OBJECTIVE     Objective      Manual Therapy     [id number]  Comments   L shoulder AP's HL, gr 2   Thoracic PA's, prone Very hypomobile in upper thoracic       Timed Minutes 10     Therapeutic Exercises    07079 Comments   Tall kneeling hip thrust  2 x 10 -- LOB x2   Standing hip abduction at TRX straps  2 x 10 ea -- added to HEP (UE support)   B UE SciFit L shoulder AAROM, resistance lvl 1.5 3 min fwd, 3 min bckwd   L shoulder passive flex, abd, ER Flex improving, slightly painful in abd > 90 deg, ER limited   Assessed gait in hallway Significant improvement! Continues to laterally deviate to L with LE cross over and L hip drop (improved!), limited by L ankle DF   Standing hip ext at TRX straps 2 x 10 ea -- added to HEP   Sit <> stand from lowered tx table 2 x 10 -- added to HEP -- cues to prevent throwing back of legs against table   B SL clamshells against YTB (tied tightly)  2 x 10 -- added to HEP   Reviewed HEP with pt, provided written printout, educ about common compensations and how to avoid        Timed " Minutes 50     Therapy Education/Self Care 19981   Details: See below, continue use of SC (trinh with longer distances, outside of home)   Given Home Exercise Program  [facility] (access code [email or web address])  postural retraining  mobility training   Progress: New and Reinforced   Education provided to:  Patient   Level of understanding Verbalized and Demonstrated   Timed Minutes      Access Code: [email or web address]  Date: 03/18/2022  Prepared by: Sofia Brambila    Shoulder:  Isometric Shoulder External Rotation at Wall - 1-2 x daily - 7 x weekly - 2 sets - 10 reps  Isometric Shoulder Extension at Wall - 1-2 x daily - 7 x weekly - 2 sets - 10 reps  Isometric Shoulder Abduction at Wall - 1-2 x daily - 7 x weekly - 2 sets - 10 reps  Isometric Shoulder Flexion at Wall - 1-2 x daily - 7 x weekly - 2 sets - 10 reps    Hips:  Standing Hip Abduction with Counter Support - 1-2 x daily - 7 x weekly - 2 sets - 10 reps  Clamshell with Resistance - 1 x daily - 7 x weekly - 2 sets - 10 reps  Sit to Stand Without Arm Support - 1-2 x daily - 7 x weekly - 2 sets - 10 reps  Standing Hip Extension with Counter Support - 1-2 x daily - 7 x weekly - 2 sets - 10 reps    Total Timed Treatment:     60   mins  Total Time of Visit:             60   mins         ASSESSMENT/PLAN     GOALS    Goals                                          Progress Note due by 4/8/22                                                           Recert due by 6/6/22   STG by: 3 weeks Comments Date Status   Able to elevate left shoulder to 150 deg without pain  100 deg with pain on 3/9/22 (eval) 3/9/22 ongoing   Improve left weight shifting with midline upright trunk in left midstance  did better after stretching the L hip  3/11/22 ongoing   Pain reports consistently <3/10 with shoulder elevation  L UE is stiff but not painful. It usually will hurt though. He feels it's improving.  3/28/22 ongoing   LTG by: 6 weeks         Able to walk with more symmetrical gait with  least assistive device he can Continues to laterally deviate to L with LE cross over and L hip drop (improved!), limited by L ankle DF. 4/1/22  ongoing   Gross MMT left shoulder 4/5  flex: 3/5 on 3/9/22  Abd: 3-/5, flex: 3/5 IR: 5/5, ER; 5/5 - 3/21/22 3/21/22 ongoing   Able to use left UE for household functional activities.      ongoing   Able to walk community distances without exacerbation of left hip pain.  Gait mechanics improved following passive stretches today.  3/18/22 ongoing   Independent with HEP for strengthening and flexibility.  Bolstered to include hip strengthening  4/1/22 ongoing     Assessment/Plan     ASSESSMENT: Patient demonstrates significantly improved gait pattern since initial evaluation, but especially since previous POC. He did demonstrate (L) lateral deviation x2, decreased L ankle DF, and L hip drop; however, hip drop has significantly improved since progressing L hip strength. He is very determined to ambulate without SC, but is still limited especially by L ankle DF. We have previously sought out an AFO for him; however, he reports insurance would not cover as enough time had not passed, per pt. It may be worth seeking out an AFO once again, especially if hip strength progresses to where he no longer need an AD.    PLAN: Continue to progress L UE and LE stability as appropriate.     SIGNATURE: Sofia Li PTA, License #: [id number]    Electronically Signed on 4/1/2022        [address] [place] [address]. [address]  [phone]

## 2022-04-04 ENCOUNTER — TREATMENT (OUTPATIENT)
Dept: PHYSICAL THERAPY | Facility: CLINIC | Age: 67
End: 2022-04-04

## 2022-04-04 DIAGNOSIS — Z98.890 S/P LEFT ROTATOR CUFF REPAIR: Primary | ICD-10-CM

## 2022-04-04 DIAGNOSIS — M25.552 LEFT HIP PAIN: ICD-10-CM

## 2022-04-04 PROCEDURE — 97110 THERAPEUTIC EXERCISES: CPT

## 2022-04-04 PROCEDURE — 97140 MANUAL THERAPY 1/> REGIONS: CPT

## 2022-04-04 NOTE — PROGRESS NOTES
"                                                                Physical Therapy Treatment Note    Patient: Dao Jhaveri                                                                     Visit Date: 2022  :     1955    Referring practitioner:    Beto Dover MD  Date of Initial Visit:          Type: THERAPY  Noted: 3/9/2022    Patient seen for 9 sessions    Visit Diagnoses:    ICD-10-CM ICD-9-CM   1. S/P left rotator cuff repair  Z98.890 V45.89   2. Left hip pain  M25.552 719.45     SUBJECTIVE     Subjective He reports no new changes or complaints since last visit. He felt really tired after last visit, took a 2 hour nap. But, reports he felt really good, especially in his hip and shoulder.     PAIN: 3/10 > 3/10 (\"I'm just tired. But I feel good. I don't feel bad at all.\")     OBJECTIVE     Objective     Therapeutic Exercises    29184 Comments   Tall kneeling hip thrust  2 x 10 -- often neglects L side, all the way to heels increases L hip pain, but improves when decreasing ROM    Standing hip abduction at TRX straps  2 x 10    B UE/LE SciFit, resistance level 4.5  3 min fwd/bckwd   L UE wall circles with BL ball (CW, CCW)  2 x 10    PPT in HL  2 x 10    Seated on 75 cm physioball: B alt marching 1 x 10 -- difficulty maintaining core activation and neutral pelvis   Ball presses with 45 cm physioball  2 x 10    B oblique ball presses with 45 cm physioball  2 x 10   Modified praying mantis on 45 cm physioball  1 x 10        Timed Minutes 45     Manual Therapy     [id number]  Comments   L shoulder passive flex, abduction  Initially more restricted, but increased ROM vs last session by end   IASTM with BL ridge roller, prone L gluteals   Thoracic side glides, prone Improved this session vs last           Timed Minutes 15     Therapy Education/Self Care 67214   Details: Discouraged lifting of 14 mos old grandson, especially on L side   Given Home Exercise Program  [employer] HEP (access code " [email or web address])  postural retraining   Progress: Reinforced   Education provided to:  Patient   Level of understanding Verbalized and Continued reinforcement needed   Timed Minutes      Access Code: [email or web address]  Date: 03/18/2022  Prepared by: Sofia Brambila    Shoulder:  Isometric Shoulder External Rotation at Wall - 1-2 x daily - 7 x weekly - 2 sets - 10 reps  Isometric Shoulder Extension at Wall - 1-2 x daily - 7 x weekly - 2 sets - 10 reps  Isometric Shoulder Abduction at Wall - 1-2 x daily - 7 x weekly - 2 sets - 10 reps  Isometric Shoulder Flexion at Wall - 1-2 x daily - 7 x weekly - 2 sets - 10 reps    Hips:  Standing Hip Abduction with Counter Support - 1-2 x daily - 7 x weekly - 2 sets - 10 reps  Clamshell with Resistance - 1 x daily - 7 x weekly - 2 sets - 10 reps  Sit to Stand Without Arm Support - 1-2 x daily - 7 x weekly - 2 sets - 10 reps  Standing Hip Extension with Counter Support - 1-2 x daily - 7 x weekly - 2 sets - 10 reps    Total Timed Treatment:     60   mins  Total Time of Visit:             60   mins         ASSESSMENT/PLAN     GOALS    Goals                                          Progress Note due by 4/8/22                                                           Recert due by 6/6/22   STG by: 3 weeks Comments Date Status   Able to elevate left shoulder to 150 deg without pain  100 deg with pain on 3/9/22 (eval) 3/9/22 ongoing   Improve left weight shifting with midline upright trunk in left midstance  did better after stretching the L hip  3/11/22 ongoing   Pain reports consistently <3/10 with shoulder elevation  L UE is stiff but not painful. It usually will hurt though. He feels it's improving.  3/28/22 ongoing   LTG by: 6 weeks         Able to walk with more symmetrical gait with least assistive device he can Continues to laterally deviate to L with LE cross over and L hip drop (improved!), limited by L ankle DF. 4/1/22  ongoing   Gross MMT left shoulder 4/5  flex: 3/5 on  3/9/22  Abd: 3-/5, flex: 3/5 IR: 5/5, ER; 5/5 - 3/21/22 3/21/22 ongoing   Able to use left UE for household functional activities.  Painful beyond 90 degs active flexion. Tolerates within 0-90 deg actively  4/4/22 ongoing   Able to walk community distances without exacerbation of left hip pain.  Gait mechanics improved following passive stretches today.  3/18/22 ongoing   Independent with HEP for strengthening and flexibility.  Bolstered to include hip strengthening  4/1/22 ongoing     Assessment/Plan     ASSESSMENT: Patient demonstrates habitually poor core activation, affecting his posture and therefore places additional stress on his shoulder. His thoracic mobility is improving, and while initially more restricted, he did demonstrate improved PROM by end of session vs last session. He continues to report good response to current approach, reporting increased fatigue, but also improved symptoms.    PLAN: Address all goals for progress note. Continue to progress L UE and LE stability as appropriate. Also consider reinforcing PPT and core strengthening.      SIGNATURE: Sofia Li PTA, License #: [id number]    Electronically Signed on 4/4/2022        [address] [place] [address]. [address]  [phone]

## 2022-04-06 ENCOUNTER — TREATMENT (OUTPATIENT)
Dept: PHYSICAL THERAPY | Facility: CLINIC | Age: 67
End: 2022-04-06

## 2022-04-06 DIAGNOSIS — M25.552 LEFT HIP PAIN: ICD-10-CM

## 2022-04-06 DIAGNOSIS — Z98.890 S/P LEFT ROTATOR CUFF REPAIR: Primary | ICD-10-CM

## 2022-04-06 PROCEDURE — 97110 THERAPEUTIC EXERCISES: CPT | Performed by: PHYSICAL THERAPIST

## 2022-04-06 PROCEDURE — 97140 MANUAL THERAPY 1/> REGIONS: CPT | Performed by: PHYSICAL THERAPIST

## 2022-04-06 NOTE — PROGRESS NOTES
"                                                                Physical Therapy Treatment Note    Patient: Dao Jhaveri                                                                     Visit Date: 2022  :     1955    Referring practitioner:    Beto Dover MD  Date of Initial Visit:          Type: THERAPY  Noted: 3/9/2022    Patient seen for 10 sessions    Visit Diagnoses:    ICD-10-CM ICD-9-CM   1. S/P left rotator cuff repair  Z98.890 V45.89   2. Left hip pain  M25.552 719.45     SUBJECTIVE     Subjective He feels like his left shoulder is doing better at 3/10 with about 30% improvement. His left hip is \"way better\" at 2/10 at 25% improvement.     PAIN: 3/10   OBJECTIVE     Objective      Manual Therapy     [id number]  Comments   Supine left GH post mob Gr 3   STM left ant/post shld    IATM left lat/post shoulder Homedic vibrating roller   L shoulder passive flex, abduction  Gained quite a bit after working on his lat                   Timed Minutes 30     Therapeutic Exercises    71967 Comments   SLS with hip abd in // bars Cued on keeping trunk in midline and using UE as little as possible   SLS with hip ext and forward T with UE support in // bars Fatigued quickly with this.   Forward and side lunges in // bars                                Timed Minutes 15         Therapy Education/Self Care 11439   Details: Discouraged lifting of 14 mos old grandson, especially on L side   Given Home Exercise Program  [employer] HEP (access code [email or web address])  postural retraining   Progress: Reinforced   Education provided to:  Patient   Level of understanding Verbalized and Continued reinforcement needed   Timed Minutes      Access Code: [email or web address]  Date: 2022  Prepared by: Sofia Li    Exercises    Shoulder:  Isometric Shoulder External Rotation at Wall - 1-2 x daily - 7 x weekly - 2 sets - 10 reps  Isometric Shoulder Extension at Wall - 1-2 x daily - 7 x weekly - 2 sets - 10 reps  Isometric Shoulder " Abduction at Wall - 1-2 x daily - 7 x weekly - 2 sets - 10 reps  Isometric Shoulder Flexion at Wall - 1-2 x daily - 7 x weekly - 2 sets - 10 reps    Hips:  Standing Hip Abduction with Counter Support - 1-2 x daily - 7 x weekly - 2 sets - 10 reps  Clamshell with Resistance - 1 x daily - 7 x weekly - 2 sets - 10 reps  Sit to Stand Without Arm Support - 1-2 x daily - 7 x weekly - 2 sets - 10 reps  Standing Hip Extension with Counter Support - 1-2 x daily - 7 x weekly - 2 sets - 10 reps    Total Timed Treatment:     45 mins  Total Time of Visit:             45 mins         ASSESSMENT/PLAN     GOALS    Goals                                          Progress Note due by 5/6/22                                                           Recert due by 6/6/22   STG by: 3 weeks Comments Date Status   Able to elevate left shoulder to 150 deg without pain  120 deg cold; 150 deg after manual therapy and stretch  100 deg at eval 3/9 4/6 progressing   Improve left weight shifting with midline upright trunk in left midstance Improve left weight shift with more symmetrical gait 4/6 MET   Pain reports consistently <3/10 with shoulder elevation  shoulder 3/10 today; up to about 7/10 last week 4/6 ongoing   LTG by: 6 weeks         Able to walk with more symmetrical gait with least assistive device he can Diminished lateral lurch in left midstance with cane 4/6 progressing   Gross MMT left shoulder 4/5 Flex: 4-/5  Abd/4/5  ER 4+/5  IR 4+/5 4/6 progressing   Able to use left UE for household functional activities.  Still painful and weak with elevation  4/6 ongoing   Able to walk community distances without exacerbation of left hip pain.  Gait improved with less lateral lurch; walks about 20 min before left hip pain flares 4/6 progressing   Independent with HEP for strengthening and flexibility.  Bolstered to include hip strengthening  4/6 progressing     Assessment & Plan     Assessment  Impairments: abnormal gait, abnormal or restricted  ROM, activity intolerance, impaired physical strength, lacks appropriate home exercise program and pain with function  Functional Limitations: carrying objects, walking, uncomfortable because of pain, reaching behind back, reaching overhead and unable to perform repetitive tasksPrognosis: good    Plan  Therapy options: will be seen for skilled therapy services  Planned modality interventions: dry needling, low level laser therapy, TENS and electrical stimulation/Russian stimulation  Planned therapy interventions: manual therapy, joint mobilization, home exercise program, gait training, stretching, strengthening, therapeutic activities, soft tissue mobilization, neuromuscular re-education, balance/weight-bearing training and abdominal trunk stabilization  Frequency: 2x week  Duration in weeks: 6  Treatment plan discussed with: patient         ASSESSMENT: He is progressing well on his shoulder. Actively he was able to flex to 150 deg but only after his mobs and stretches. His walking was much better today and more symmetrical but he showed the weakness through the left hip with the attempts at SLS in the // bars still needing UE support. Overall, he is pleased with his progress but could use more PT.     PLAN: Cont to work on releasing his left lats and posterior shoulder and strengthen elevation. Work on left hip stability for midstance and incorporate into gait, maybe without the cane.     SIGNATURE: Joshua Sim, PT, License #: [id number]    Electronically Signed on 4/6/2022        [facility] [address]  [place] [address]  [phone]

## 2022-04-11 ENCOUNTER — TREATMENT (OUTPATIENT)
Dept: PHYSICAL THERAPY | Facility: CLINIC | Age: 67
End: 2022-04-11

## 2022-04-11 DIAGNOSIS — Z98.890 S/P LEFT ROTATOR CUFF REPAIR: Primary | ICD-10-CM

## 2022-04-11 DIAGNOSIS — M25.552 LEFT HIP PAIN: ICD-10-CM

## 2022-04-11 PROCEDURE — 97110 THERAPEUTIC EXERCISES: CPT

## 2022-04-11 PROCEDURE — 97140 MANUAL THERAPY 1/> REGIONS: CPT

## 2022-04-11 NOTE — PROGRESS NOTES
Physical Therapy Treatment Note    Patient: Dao Jhaveri                                                                     Visit Date: 2022  :     1955    Referring practitioner:    Beto Dover MD  Date of Initial Visit:          Type: THERAPY  Noted: 3/9/2022    Patient seen for 11 sessions    Visit Diagnoses:  No diagnosis found.  SUBJECTIVE     Subjective He reports he was feeling a little sore after his last session.     PAIN: 4/10/10 (hip, shoulder) > 4/10 (shoulder, sore not painful)     OBJECTIVE     Objective      Manual Therapy     [id number]  Comments   IASTM with BL ridged roller, prone L gluteals, hamstrings, shoulder girdle, upper thoracic paraspinals       Timed Minutes 10     Therapeutic Exercises    82570 Comments   B air planes at TRX straps 2 x 5 ea   Box squats at TRX straps 2 x 10   B UE phasic against red [place]tan 2 x 10    Quadruped alternating bird dogs  2 x 5   Tall kneeling hip thrusts 2 x 10    B unilateral resisted front raises against YTB 4 x 5   B UE unilateral raises with 1 lb DB (L), 2 lb DB (R) 1 x 10    B LE unsupported Russian twists with 3.3 lb TheraBall 2 x 10        Timed Minutes 43     Therapy Education/Self Care 97222   Details:    Given Home Exercise Program  [employer] HEP (access code [email or web address])  postural retraining   Progress: Reinforced   Education provided to:  Patient   Level of understanding Verbalized   Timed Minutes      Access Code: [email or web address]  Date: 2022  Prepared by: Sofia Brambila     Shoulder:  Isometric Shoulder External Rotation at Wall - 1-2 x daily - 7 x weekly - 2 sets - 10 reps  Isometric Shoulder Extension at Wall - 1-2 x daily - 7 x weekly - 2 sets - 10 reps  Isometric Shoulder Abduction at Wall - 1-2 x daily - 7 x weekly - 2 sets - 10 reps  Isometric Shoulder Flexion at Wall - 1-2 x daily - 7 x weekly - 2 sets - 10 reps     Hips:  Standing Hip Abduction with  Counter Support - 1-2 x daily - 7 x weekly - 2 sets - 10 reps  Clamshell with Resistance - 1 x daily - 7 x weekly - 2 sets - 10 reps  Sit to Stand Without Arm Support - 1-2 x daily - 7 x weekly - 2 sets - 10 reps  Standing Hip Extension with Counter Support - 1-2 x daily - 7 x weekly - 2 sets - 10 reps    Total Timed Treatment:     53   mins  Total Time of Visit:             58   mins         ASSESSMENT/PLAN     GOALS  Goals                                          Progress Note due by 5/6/22                                                           Recert due by 6/6/22   STG by: 3 weeks Comments Date Status   Able to elevate left shoulder to 150 deg without pain  120 deg cold; 150 deg after manual therapy and stretch  100 deg at eval 3/9 4/6 progressing   Improve left weight shifting with midline upright trunk in left midstance Improve left weight shift with more symmetrical gait 4/6 MET   Pain reports consistently <3/10 with shoulder elevation shoulder 3/10 today; up to about 7/10 last week 4/6 ongoing   LTG by: 6 weeks         Able to walk with more symmetrical gait with least assistive device he can Diminished lateral lurch in left midstance with cane 4/6 progressing   Gross MMT left shoulder 4/5 Flex: 4-/5  Abd/4/5  ER 4+/5  IR 4+/5 4/6 progressing   Able to use left UE for household functional activities.  Progressed strengthening with elevation, compensates with UT and leaning laterally. 4/11/22 ongoing   Able to walk community distances without exacerbation of left hip pain.  Gait improved with less lateral lurch; walks about 20 min before left hip pain flares 4/6 progressing   Independent with [facility] for strengthening and flexibility.  Bolstered to include hip strengthening  4/6 progressing     Assessment/Plan     ASSESSMENT: Patient was really challenged by exercises at TRX, demonstrating decreased stability and compensating with UE at times. He also demonstrated difficulty with core activation today,  affecting overall stability.     PLAN: Continue progressing hip stability and core activation. Consider hip stretches and L shoulder PROM.     SIGNATURE: Sofia Li PTA, [place] License #: [id number]    Electronically Signed on 4/11/2022        [address] [facility] [place] [address]  [phone]

## 2022-04-13 ENCOUNTER — TELEPHONE (OUTPATIENT)
Dept: PHYSICAL THERAPY | Facility: CLINIC | Age: 67
End: 2022-04-13

## 2022-04-20 ENCOUNTER — TREATMENT (OUTPATIENT)
Dept: PHYSICAL THERAPY | Facility: CLINIC | Age: 67
End: 2022-04-20

## 2022-04-20 DIAGNOSIS — M25.552 LEFT HIP PAIN: ICD-10-CM

## 2022-04-20 DIAGNOSIS — Z98.890 S/P LEFT ROTATOR CUFF REPAIR: Primary | ICD-10-CM

## 2022-04-20 PROCEDURE — 97110 THERAPEUTIC EXERCISES: CPT | Performed by: PHYSICAL THERAPIST

## 2022-04-20 NOTE — PROGRESS NOTES
Physical Therapy Treatment Note    Patient: Dao Jhaveri                                                                     Visit Date: 2022  :     1955    Referring practitioner:    Beto Dover MD  Date of Initial Visit:          Type: THERAPY  Noted: 3/9/2022    Patient seen for 12 sessions    Visit Diagnoses:    ICD-10-CM ICD-9-CM   1. S/P left rotator cuff repair  Z98.890 V45.89   2. Left hip pain  M25.552 719.45     SUBJECTIVE     Subjective He reports he's hurtin' today    PAIN: 6/10 (hip, shoulder) > 5/10 (shoulder) 4/10 (hip)      OBJECTIVE     Objective     Therapeutic Exercises    [id number] Comments   B UE phasic against red Chetan 3 x 10   Quadruped alternating bird dogs  2 x 5 -- decreased stability    L UE wall circles with yellow TheraBall (2.2 lbs) 1 x 10 ea (CW, CCW)   L shoulder MMT  Flex: 4/5, abd: 4/5, ER: 4+/5, IR: 5/5   B hip MMT Abd: (L) 3+/5 and (R) 4/5  Ext: 4-/5   Flex: (L) 3+/5 and (R) 4-/5   B SL hip abduction  2 x 10 ea   Prone hip ext 2 x 10 ea   B SLR  2 x 10 ea   Reviewed and bolstered his HEP    Sit to stand  3 x 10        Timed Minutes 45     Therapy Education/Self Care 77659   Details: See below   Given Home Exercise Program  [employer] HEP (access code [email or web address])  postural retraining   Progress: New, Reinforced and Progressed   Education provided to:  Patient   Level of understanding Verbalized   Timed Minutes      Access Code: [email or web address]  Date: 2022  Prepared by: Sofia Li     Exercises     Shoulder:  B UE Phasic with Resistance - 1-2 x daily - 7 x weekly - 2 sets - 10 reps  Supine Shoulder Horizontal Abduction with Resistance - 1-2 x daily - 7 x weekly - 2 sets - 10 reps  Seated Shoulder Front Raise with Resistance - 1-2 x daily - 7 x weekly - 2 sets - 10 reps     Hips:  Standing Hip Abduction with Counter Support - 1-2 x daily - 7 x weekly - 2 sets - 10 reps  Clamshell with Resistance - 1 x daily  - 7 x weekly - 2 sets - 10 reps  Sit to Stand Without Arm Support - 1-2 x daily - 7 x weekly - 2 sets - 10 reps  Standing Hip Extension with Counter Support - 1-2 x daily - 7 x weekly - 2 sets - 10 reps    Total Timed Treatment:     45   mins  Total Time of Visit:             45   mins         ASSESSMENT/PLAN     GOALS  Goals                                          Progress Note due by 5/6/22                                                           Recert due by 6/6/22   STG by: 3 weeks Comments Date Status   Able to elevate left shoulder to 150 deg without pain  120 deg cold; 150 deg after manual therapy and stretch  100 deg at eval 3/9 4/6 progressing   Improve left weight shifting with midline upright trunk in left midstance Improve left weight shift with more symmetrical gait 4/6 MET   Pain reports consistently <3/10 with shoulder elevation shoulder 3/10 today; up to about 7/10 last week 4/6 ongoing   LTG by: 6 weeks         Able to walk with more symmetrical gait with least assistive device he can Diminished lateral lurch in left midstance with cane 4/6 progressing   Gross MMT left shoulder 4/5 Flex: 4-/5, Abd: 4/5, ER 4+/5, IR 4+/5 - 4/6  Flex: 4/5, abd: 4/5, ER: 4+/5, IR: 5/5 - 4/20 4/20/22 MET   Able to use left UE for household functional activities.  Progressed strengthening with elevation, compensates with UT and leaning laterally. 4/11/22 ongoing   Able to walk community distances without exacerbation of left hip pain.  Gait improved with less lateral lurch; walks about 20 min before left hip pain flares 4/6 progressing   Independent with HEP for strengthening and flexibility.  Bolstered to include hip strengthening  4/6 progressing     Assessment/Plan     ASSESSMENT: His shoulder strength is improving well, meeting his goal for this today. He continues to be limited moreso by his hip stability, demonstrating decreased hip strength (see above for measurements) today. He was more fatigued today than  previously, but continued to emphasize endurance. He tolerated this well and despite more elevated pain levels reported at beginning of session, he reported decreased pain at end of session.    PLAN: Continue progressing hip stability and core activation. Consider hip stretches in all planes.      SIGNATURE: Sofia Li PTA, [place] License #: [id number]    Electronically Signed on 4/20/2022        [address] [place] [address], [place] [address]  [phone]

## 2022-04-27 ENCOUNTER — TREATMENT (OUTPATIENT)
Dept: PHYSICAL THERAPY | Facility: CLINIC | Age: 67
End: 2022-04-27

## 2022-04-27 DIAGNOSIS — Z98.890 S/P LEFT ROTATOR CUFF REPAIR: Primary | ICD-10-CM

## 2022-04-27 DIAGNOSIS — M25.552 LEFT HIP PAIN: ICD-10-CM

## 2022-04-27 PROCEDURE — 97140 MANUAL THERAPY 1/> REGIONS: CPT

## 2022-04-27 PROCEDURE — 97110 THERAPEUTIC EXERCISES: CPT

## 2022-04-27 PROCEDURE — 97116 GAIT TRAINING THERAPY: CPT

## 2022-04-27 NOTE — PROGRESS NOTES
"                                                                Physical Therapy Treatment Note    Patient: Dao Jhaveri                                                                     Visit Date: 2022  :     1955    Referring practitioner:    Beto Dover MD  Date of Initial Visit:          Type: THERAPY  Noted: 3/9/2022    Patient seen for 13 sessions    Visit Diagnoses:    ICD-10-CM ICD-9-CM   1. S/P left rotator cuff repair  Z98.890 V45.89   2. Left hip pain  M25.552 719.45     SUBJECTIVE     Subjective He reports he's \"stiffened up\" after his ride to [place], [place].    PAIN: 7/10 > 4/10 (more sore than pain, \"I'm good with it.\")      OBJECTIVE     Objective     Therapeutic Exercises    77475 Comments   Resisted sit to stand against RIP   2 x 10   B alternating air planes at TRX straps 2 x 10 ea -- L knee buckling at times   Modified RDLs 2 x 10 ea   Standing hip abduction at TRX straps on 4\" step 2 x 10 ea       Timed Minutes 30     Manual Therapy     32352  Comments   L hip passive stretch into flex, IR, ER, hamstring, and piriformis    L shoulder PROM into flex, abd, ER Limited into abd and ER, improved abd with gentle distraction   IASTM with BL ridge roller, R SL L gluteals and lateral quads, focus on ITB           Timed Minutes 21     Gait Training          [id number]   Task/Terrain Asst AD Comments   Gait training in hallway without AD CGA N/A L lateral deviation d/t R LE crossing midline, L hip drop continued but significantly improved, decreased endurance, would frequently stop self to correct mechanics, cues for posture               Timed Minutes 8     Therapy Education/Self Care 25606   Details: When walking at home, consider d/c use of rollator, use SC and decrease distance to focus more on posture and gait mechanics   Given Home Exercise Program  [employer] HEP (access code [email or web address])  postural retraining   Progress: New, Reinforced and Progressed   Education provided to:  " Patient   Level of understanding Verbalized   Timed Minutes      Access Code: [email or web address]  Date: 03/18/2022  Prepared by: Sofia Li     Exercises     Shoulder:  B UE Phasic with Resistance - 1-2 x daily - 7 x weekly - 2 sets - 10 reps  Supine Shoulder Horizontal Abduction with Resistance - 1-2 x daily - 7 x weekly - 2 sets - 10 reps  Seated Shoulder Front Raise with Resistance - 1-2 x daily - 7 x weekly - 2 sets - 10 reps     Hips:  Standing Hip Abduction with Counter Support - 1-2 x daily - 7 x weekly - 2 sets - 10 reps  Clamshell with Resistance - 1 x daily - 7 x weekly - 2 sets - 10 reps  Sit to Stand Without Arm Support - 1-2 x daily - 7 x weekly - 2 sets - 10 reps  Standing Hip Extension with Counter Support - 1-2 x daily - 7 x weekly - 2 sets - 10 reps    Total Timed Treatment:     59   mins  Total Time of Visit:             60   mins         ASSESSMENT/PLAN     GOALS  Goals                                          Progress Note due by 5/6/22                                                           Recert due by 6/6/22   STG by: 3 weeks Comments Date Status   Able to elevate left shoulder to 150 deg without pain  120 deg cold; 150 deg after manual therapy and stretch  100 deg at eval 3/9 4/6 progressing   Improve left weight shifting with midline upright trunk in left midstance Improve left weight shift with more symmetrical gait 4/6 MET   Pain reports consistently <3/10 with shoulder elevation shoulder 3/10 today; up to about 7/10 last week 4/6 ongoing   LTG by: 6 weeks         Able to walk with more symmetrical gait with least assistive device he can Significantly improving, when walking without AD - improved but cont L hip drop and will deviate laterally L d/t crossing midline with R LE, cues for posture req 4/27/22 progressing   Gross MMT left shoulder 4/5 Flex: 4-/5, Abd: 4/5, ER 4+/5, IR 4+/5 - 4/6  Flex: 4/5, abd: 4/5, ER: 4+/5, IR: 5/5 - 4/20 4/20/22 MET   Able to use left UE for household functional  activities.  Progressed strengthening with elevation, compensates with UT and leaning laterally. 4/11/22 ongoing   Able to walk community distances without exacerbation of left hip pain.  Gait improved with less lateral lurch; walks about 20 min before left hip pain flares 4/6 progressing   Independent with HEP for strengthening and flexibility.  Bolstered to include hip strengthening  4/6 progressing     Assessment/Plan     ASSESSMENT: Patient demonstrates significantly improved gait mechanics and was able to ambulate without AD today. He demonstrates improved, but continued, L hip drop and will deviate laterally to L d/t crossing midline with R LE. He has been using his rollator at home when walking increased distances, which was reflectant with forward flexed posture when ambulating without SC. I encouraged him to utilize SC vs rollator at home, but consider decreasing distance d/t decreased endurance without rollator. His shoulder mobility is improving, but still limited with abduction and especially ER; however, nearly WFL.     PLAN: Continue progressing hip stability and core activation. Consider hip ext stretch.      SIGNATURE: Sofia Li PTA [place] License #: [id number]    Electronically Signed on 4/27/2022        [address] [place] [address] [place]  [place] [address]  [phone]

## 2022-05-09 ENCOUNTER — TREATMENT (OUTPATIENT)
Dept: PHYSICAL THERAPY | Facility: CLINIC | Age: 67
End: 2022-05-09

## 2022-05-09 DIAGNOSIS — Z98.890 S/P LEFT ROTATOR CUFF REPAIR: Primary | ICD-10-CM

## 2022-05-09 DIAGNOSIS — M25.552 LEFT HIP PAIN: ICD-10-CM

## 2022-05-09 PROCEDURE — 97110 THERAPEUTIC EXERCISES: CPT | Performed by: PHYSICAL THERAPIST

## 2022-05-09 PROCEDURE — 97140 MANUAL THERAPY 1/> REGIONS: CPT | Performed by: PHYSICAL THERAPIST

## 2022-05-09 NOTE — PROGRESS NOTES
Progress Note Addendum      Patient: Dao Jhaveri           : 1955  Visit Date: 2022  Referring practitioner: Beto Dover MD  Date of Initial Visit: Type: THERAPY  Noted: 3/9/2022  Patient seen for 14 sessions  Visit Diagnoses:    ICD-10-CM ICD-9-CM   1. S/P left rotator cuff repair  Z98.890 V45.89   2. Left hip pain  M25.552 719.45          Clinical Progress: improved  Home Program Compliance: Yes  Progress toward previous goals: Partially Met  Prognosis to achieve goals: good    Objective   Therapeutic Exercises    80684 Comments   L shoulder ROM  Flex AROM: 148 deg with pain   ER PROM: 39 deg                           Timed Minutes 5     Assessment & Plan     Assessment  Impairments: abnormal gait, abnormal or restricted ROM, activity intolerance, impaired physical strength, lacks appropriate home exercise program and pain with function  Functional Limitations: carrying objects, walking, uncomfortable because of pain, reaching behind back, reaching overhead and unable to perform repetitive tasksPrognosis: good    Plan  Therapy options: will be seen for skilled therapy services  Planned modality interventions: dry needling, low level laser therapy, TENS and electrical stimulation/Russian stimulation  Planned therapy interventions: manual therapy, joint mobilization, home exercise program, gait training, stretching, strengthening, therapeutic activities, soft tissue mobilization, neuromuscular re-education, balance/weight-bearing training and abdominal trunk stabilization  Frequency: 2x week  Duration in weeks: 6  Treatment plan discussed with: patient and PTA      Mr. Jhaveri reports improvements overall. He is still limited with raising his L arm and prolonged walking.   I spent 5 minutes with the patient and Sofia Li PTA and reviewed their progress and plan of care. The patient is in agreement with this plan.     SIGNATURE: Elizabeth Siu, PT DPT, License #: [id number]  Electronically Signed  on 5/9/2022

## 2022-05-09 NOTE — PROGRESS NOTES
Physical Therapy Treatment Note and 30 Day Progress Note    Patient: Dao Jhaveri                                                                     Visit Date: 2022  :     1955    Referring practitioner:    Beto Dover MD  Date of Initial Visit:          Type: THERAPY  Noted: 3/9/2022    Patient seen for 14 sessions    Visit Diagnoses:    ICD-10-CM ICD-9-CM   1. S/P left rotator cuff repair  Z98.890 V45.89   2. Left hip pain  M25.552 719.45     SUBJECTIVE     Subjective He reports he didn't have a car for the last week and has had to miss PT. He reports that his shoulder is sore and L hip is sore. He's been trying to walk more with his SC vs rollator but isn't going so well because he wants to rest after about a block. He feels about 65% improved since his evaluation. His leg is better and his shoulder is less painful.    PAIN: 8/10 (L shoulder) and 5/10 (hip) > 5/10 (L shoulder) and 5/10 (hip)      OBJECTIVE     Objective     Therapeutic Exercises    91057 Comments   L shoulder ROM  Flex AROM: 148 deg with pain   ER PROM: 39 deg   Gait in hallway, no SC  L hip drop, knee buckling - 250 ft,1 standing rest break       Timed Minutes 10     Manual Therapy     76094  Comments   L hip passive stretch in all planes and piriformis    STM with massage cream, R SL L posterior RTC and shoulder globally   IASTM with BL ridge roller, R SL L gluteals, ITB           Timed Minutes 30     Therapy Education/Self Care 50789   Details: Reduce distance with SC, perform HEP daily   Given Home Exercise Program  [employer] HEP (access code [email or web address])  postural retraining   Progress: Reinforced   Education provided to:  Patient   Level of understanding Verbalized   Timed Minutes      Access Code: [email or web address]  Date: 2022  Prepared by: Sofia Li     Exercises     Shoulder:  B UE Phasic with Resistance - 1-2 x daily - 7 x weekly - 2 sets - 10 reps  Supine  "Shoulder Horizontal Abduction with Resistance - 1-2 x daily - 7 x weekly - 2 sets - 10 reps  Seated Shoulder Front Raise with Resistance - 1-2 x daily - 7 x weekly - 2 sets - 10 reps     Hips:  Standing Hip Abduction with Counter Support - 1-2 x daily - 7 x weekly - 2 sets - 10 reps  Clamshell with Resistance - 1 x daily - 7 x weekly - 2 sets - 10 reps  Sit to Stand Without Arm Support - 1-2 x daily - 7 x weekly - 2 sets - 10 reps  Standing Hip Extension with Counter Support - 1-2 x daily - 7 x weekly - 2 sets - 10 reps    Total Timed Treatment:     40   mins  Total Time of Visit:             45   mins    Elizabeth Siu, PT, DPT performed 5 min assessment for progress note. See attached note for details         ASSESSMENT/PLAN     GOALS  Goals                                          Progress Note due by 5/6/22                                                           Recert due by 6/6/22   STG by: 3 weeks Comments Date Status   Able to elevate left shoulder to 150 deg without pain 148 deg with pain on 5/9/22 4/6/22:  120 deg cold; 150 deg after man therapy and stretch  100 deg at eval 3/9 5/9/22 progressing   Improve left weight shifting with midline upright trunk in left midstance Improve left weight shift with more symmetrical gait 4/6 MET   Pain reports consistently <3/10 with shoulder elevation Pain increase to 6/10 with pain 5/9/22 ongoing   LTG by: 6 weeks         Able to walk with more symmetrical gait with least assistive device he can Can walk without SC with CGA but L hip drop and intermittent knee buckling. Continued use of SC when alone. 5/9/22 progressing   Gross MMT left shoulder 4/5 Flex: 4-/5, Abd: 4/5, ER 4+/5, IR 4+/5 - 4/6  Flex: 4/5, abd: 4/5, ER: 4+/5, IR: 5/5 - 4/20 4/20/22 MET   Able to use left UE for household functional activities.  His L hand hurts \"quite a lot\" and will have to shake it to get it to stop hurting. But he still uses it on the reg every day.  5/9/22 ongoing   Able to " walk community distances without exacerbation of left hip pain.  He can walk about 1 block without pain increasing. Encouraged use of SC vs rollator when walking at home 5/9/22 progressing   Independent with HEP for strengthening and flexibility.  He reports compliance every other day. He walks every day, trying to progress to SC  5/9/22 progressing     Assessment/Plan     ASSESSMENT: Patient presents today following a one week hiatus from clinic d/t transportation issues. He has met two of his goals and feels about 65% improved thus far. He c/o increased posterior RTC/shoulder pain and pain in hip, so addressed with manual therapies. He demonstrated increased L hip drop and knee buckling during gait without AD. Prior to lapse in care, pt was progressing very well thus indicating need for continued skilled PT. His shoulder ROM is especially improving as he demonstrated 148 deg L shoulder flex prior to stretches/manual therapies vs 120 deg previously. He would benefit from continued skilled PT to address his gait deficits, shoulder mobility/guarding, and hip stability to decrease fall risk and improve independence with ADLs.     PLAN: Continue progressing hip stability and core activation. Consider hip ext stretch prior to gait training. Progress HEP as appropriate.       SIGNATURE: Sofia Li PTA, [place] License #: [id number]    Electronically Signed on 5/9/2022        [address] [place] [address]. [address]  [phone]

## 2022-05-11 ENCOUNTER — TREATMENT (OUTPATIENT)
Dept: PHYSICAL THERAPY | Facility: CLINIC | Age: 67
End: 2022-05-11

## 2022-05-11 DIAGNOSIS — Z98.890 S/P LEFT ROTATOR CUFF REPAIR: Primary | ICD-10-CM

## 2022-05-11 DIAGNOSIS — Z74.09 IMPAIRED MOBILITY: ICD-10-CM

## 2022-05-11 DIAGNOSIS — M54.12 RADICULOPATHY, CERVICAL: ICD-10-CM

## 2022-05-11 DIAGNOSIS — M25.552 LEFT HIP PAIN: ICD-10-CM

## 2022-05-11 PROCEDURE — 97112 NEUROMUSCULAR REEDUCATION: CPT | Performed by: PHYSICAL THERAPIST

## 2022-05-11 PROCEDURE — 97110 THERAPEUTIC EXERCISES: CPT | Performed by: PHYSICAL THERAPIST

## 2022-05-11 PROCEDURE — 97140 MANUAL THERAPY 1/> REGIONS: CPT | Performed by: PHYSICAL THERAPIST

## 2022-05-11 NOTE — PROGRESS NOTES
Physical Therapy Treatment Note    Patient: Dao Jhaveri                                                                     Visit Date: 2022  :     1955    Referring practitioner:    Beto Dover MD  Date of Initial Visit:          Type: THERAPY  Noted: 3/9/2022    Patient seen for 15 sessions    Visit Diagnoses:    ICD-10-CM ICD-9-CM   1. S/P left rotator cuff repair  Z98.890 V45.89   2. Left hip pain  M25.552 719.45   3. Radiculopathy, cervical  M54.12 723.4   4. Impaired mobility  Z74.09 799.89     SUBJECTIVE     Subjective He is feeling really good today. His shoulder still aches but is tolerable. He denies any falls since last visit.     PAIN: 4/10 (L shoulder) and 2/10 (hip) > 3/10 (L shoulder) and 3/10 (hip)      OBJECTIVE     Objective      Neuromuscular Reeducation     91582 Comments   B standing hip abd w/out UE support 2x10   B standing hip ext w/out UE support  2x10   B step ups onto 4in step w/out UE support  1x10   B lateral step ups onto 4in step w/out UE support 1x10   L SLS w/ R cone step overs w/out UE support  2x10       Timed Minutes 20     Gait Training          [id number]   Task/Terrain Asst AD Comments   Agility ladder: 1 foot each square  CGA SC Down and back   Agility ladder: 1 foot each square  CGA none Down and back x2         Timed Minutes 10     Manual Therapy     84508  Comments   IASTM w/ blue ridged roller to L hip, gluteals, and ITB SL (R)   IASTM w/ blue flexibar to L posterior shoulder SL (R)   L passive hip IR/ER stretches     L GH posterior mobs w/ intermittent distraction         Timed Minutes 13     Therapy Education/Self Care 75082   Details: Reduce distance with SC, perform HEP daily   Given Home Exercise Program  [email or web address] HEP (access code [email or web address])  postural retraining   Progress: Reinforced   Education provided to:  Patient   Level of understanding Verbalized   Timed Minutes      Access Code:  [email or web address]  Date: 03/18/2022  Prepared by: Sofia Li     Exercises     Shoulder:  B UE Phasic with Resistance - 1-2 x daily - 7 x weekly - 2 sets - 10 reps  Supine Shoulder Horizontal Abduction with Resistance - 1-2 x daily - 7 x weekly - 2 sets - 10 reps  Seated Shoulder Front Raise with Resistance - 1-2 x daily - 7 x weekly - 2 sets - 10 reps     Hips:  Standing Hip Abduction with Counter Support - 1-2 x daily - 7 x weekly - 2 sets - 10 reps  Clamshell with Resistance - 1 x daily - 7 x weekly - 2 sets - 10 reps  Sit to Stand Without Arm Support - 1-2 x daily - 7 x weekly - 2 sets - 10 reps  Standing Hip Extension with Counter Support - 1-2 x daily - 7 x weekly - 2 sets - 10 reps    Total Timed Treatment:     43   mins  Total Time of Visit:             48   mins         ASSESSMENT/PLAN     GOALS  Goals                                          Progress Note due by 5/6/22                                                           Recert due by 6/6/22   STG by: 3 weeks Comments Date Status   Able to elevate left shoulder to 150 deg without pain 148 deg with pain on 5/9/22 4/6/22:  120 deg cold; 150 deg after man therapy and stretch  100 deg at eval 3/9 5/9/22 progressing   Improve left weight shifting with midline upright trunk in left midstance Improve left weight shift with more symmetrical gait 4/6 MET   Pain reports consistently <3/10 with shoulder elevation Pain increase to 6/10 with pain 5/9/22 ongoing   LTG by: 6 weeks         Able to walk with more symmetrical gait with least assistive device he can Can walk without SC with CGA but L hip drop and intermittent knee buckling. Continued use of SC when alone. 5/9/22 progressing   Gross MMT left shoulder 4/5 Flex: 4-/5, Abd: 4/5, ER 4+/5, IR 4+/5 - 4/6  Flex: 4/5, abd: 4/5, ER: 4+/5, IR: 5/5 - 4/20 4/20/22 MET   Able to use left UE for household functional activities.  His L hand hurts \"quite a lot\" and will have to shake it to get it to stop hurting. But he " still uses it on the reg every day.  5/9/22 ongoing   Able to walk community distances without exacerbation of left hip pain.  He can walk about 1 block without pain increasing. Encouraged use of SC vs rollator when walking at home 5/9/22 progressing   Independent with HEP for strengthening and flexibility.  He reports compliance every other day. He walks every day, trying to progress to SC  5/9/22 progressing     Assessment/Plan     ASSESSMENT: Pt arrived to the clinic feeling much better than at last session, therefore we progressed hip strengthening by also combining balance challenges to enhance stability. He struggled w/ trusting his L LE and denied pain w/ WBing. He occasionally lost his balance and always in L LE WBing. The agility ladder was utilized to work on his gait and he performed very well with and without an AD, demonstrating good foot clearance, equal step length, and good heel strike. We concluded session addressing his pain symptoms and he responded very well.     PLAN: Continue progressing hip challenge his hip and core stability and endurance. Consider hip ext stretch prior to gait training. Progress HEP as appropriate.       SIGNATURE: Kristy Jacobson PTA, [place] License #: [id number]    Electronically Signed on 5/11/2022        [address] [place] [address] [place] [address]  [phone]

## 2022-05-16 ENCOUNTER — TREATMENT (OUTPATIENT)
Dept: PHYSICAL THERAPY | Facility: CLINIC | Age: 67
End: 2022-05-16

## 2022-05-16 DIAGNOSIS — M25.552 LEFT HIP PAIN: ICD-10-CM

## 2022-05-16 DIAGNOSIS — Z98.890 S/P LEFT ROTATOR CUFF REPAIR: Primary | ICD-10-CM

## 2022-05-16 PROCEDURE — 97110 THERAPEUTIC EXERCISES: CPT | Performed by: PHYSICAL THERAPIST

## 2022-05-16 NOTE — PROGRESS NOTES
"                                                                Physical Therapy Treatment Note    Patient: Dao Jhaveri                                                                     Visit Date: 2022  :     1955    Referring practitioner:    Beto Dover MD  Date of Initial Visit:          Type: THERAPY  Noted: 3/9/2022    Patient seen for 16 sessions    Visit Diagnoses:    ICD-10-CM ICD-9-CM   1. S/P left rotator cuff repair  Z98.890 V45.89   2. Left hip pain  M25.552 719.45     SUBJECTIVE     Subjective His shoulder has been hurting him today. He went to [place] this weekend, he thinks all the walking did it. \"Cause Lord knows I walked.\" He's been trying to walk more at home with the SC vs the rollator, he's not done it as much d/t weather, \"but I'm still at it.\"    PAIN: 7/10 (L shoulder) and 4/10 (hip) > 3/10 (L shoulder) and 3/10 (hip)      OBJECTIVE     Objective      Therapeutic Exercises    82375 Comments   Airplanes at [facility] straps 2 x 10 -- cues to prevent rotation   Passive hip stretches into flex, ext, IR, ER,  pirifromis, hamstring L > R quads and ITB   Gait in hallway for endurance, no AD ~120 ft -- cues for posture and equal WS           Timed Minutes 30     Therapy Education/Self Care 46730   Details: Perform HEP daily, reprinted HEP   Given Home Exercise Program  Virtual [employer] HEP (access code [email or web address])  postural retraining   Progress: Reinforced   Education provided to:  Patient   Level of understanding Verbalized   Timed Minutes      Access Code: [email or web address]  Date: 2022  Prepared by: Sofia Li     Exercises     Shoulder:  B UE Phasic with Resistance - 1-2 x daily - 7 x weekly - 2 sets - 10 reps  Supine Shoulder Horizontal Abduction with Resistance - 1-2 x daily - 7 x weekly - 2 sets - 10 reps  Seated Shoulder Front Raise with Resistance - 1-2 x daily - 7 x weekly - 2 sets - 10 reps     Hips:  Standing Hip Abduction with Counter Support - 1-2 x daily - 7 x weekly - 2 sets " "- 10 reps  Clamshell with Resistance - 1 x daily - 7 x weekly - 2 sets - 10 reps  Sit to Stand Without Arm Support - 1-2 x daily - 7 x weekly - 2 sets - 10 reps  Standing Hip Extension with Counter Support - 1-2 x daily - 7 x weekly - 2 sets - 10 reps    Total Timed Treatment:     30   mins  Total Time of Visit:             30   mins         ASSESSMENT/PLAN     GOALS  Goals                                          Progress Note due by 5/6/22                                                           Recert due by 6/6/22   STG by: 3 weeks Comments Date Status   Able to elevate left shoulder to 150 deg without pain 148 deg with pain on 5/9/22 4/6/22:  120 deg cold; 150 deg after man therapy and stretch  100 deg at eval 3/9 5/9/22 progressing   Improve left weight shifting with midline upright trunk in left midstance Improve left weight shift with more symmetrical gait 4/6 MET   Pain reports consistently <3/10 with shoulder elevation Pain increase to 6/10 with pain 5/9/22 ongoing   LTG by: 6 weeks         Able to walk with more symmetrical gait with least assistive device he can Can walk without SC with CGA but L hip drop and intermittent knee buckling. Continued use of SC when alone. 5/9/22 progressing   Gross MMT left shoulder 4/5 Flex: 4-/5, Abd: 4/5, ER 4+/5, IR 4+/5 - 4/6  Flex: 4/5, abd: 4/5, ER: 4+/5, IR: 5/5 - 4/20 4/20/22 MET   Able to use left UE for household functional activities.  His L hand hurts \"quite a lot\" and will have to shake it to get it to stop hurting. But he still uses it on the reg every day.  5/9/22 ongoing   Able to walk community distances without exacerbation of left hip pain.  He can walk about 1 block without pain increasing. Encouraged use of SC vs rollator when walking at home 5/9/22 progressing   Independent with HEP for strengthening and flexibility.  Reprinted HEP and provided to pt. Pt is progressing distance walked with SC 5/16/22 progressing     Assessment/Plan     ASSESSMENT: Pt " was late to the clinic, therefore truncating his full session. He presents following a long weekend of travel and increased walking, c/o increased soreness in his hips and shoulder, improved following stretches. Due to his recent lapse in care and transportation issues, he demonstrates a slight regression in hip stability. Therefore, provided another written printout of his exercises and instructed him to perform exercises daily. He reports he is continuing to progress distance walked at home with SC vs rollator.     PLAN: Continue progressing hip challenge his hip and core stability and endurance. Consider bolstering his HEP.        SIGNATURE: Sofia Li PTA, [place] License #: [id number]    Electronically Signed on 5/16/2022        [address] [place] [address] [place] [address]  [phone]

## 2022-05-23 ENCOUNTER — TREATMENT (OUTPATIENT)
Dept: PHYSICAL THERAPY | Facility: CLINIC | Age: 67
End: 2022-05-23

## 2022-05-23 DIAGNOSIS — Z98.890 S/P LEFT ROTATOR CUFF REPAIR: Primary | ICD-10-CM

## 2022-05-23 DIAGNOSIS — M25.552 LEFT HIP PAIN: ICD-10-CM

## 2022-05-23 PROCEDURE — 97110 THERAPEUTIC EXERCISES: CPT

## 2022-05-23 PROCEDURE — 97116 GAIT TRAINING THERAPY: CPT

## 2022-05-23 NOTE — PROGRESS NOTES
"                                                                Physical Therapy Treatment Note    Patient: Dao Jhaveri                                                                     Visit Date: 2022  :     1955    Referring practitioner:    Beto Dover MD  Date of Initial Visit:          Type: THERAPY  Noted: 3/9/2022    Patient seen for 17 sessions    Visit Diagnoses:    ICD-10-CM ICD-9-CM   1. S/P left rotator cuff repair  Z98.890 V45.89   2. Left hip pain  M25.552 719.45     SUBJECTIVE     Subjective He reports that he doesn't have any pain, \"Not even a sore spot. Can you believe it?\" He's been walking with and without the cane. Denies falls. He always walks with his grandchildren. \"My doctor released me on my shoulder\" He reports  \"took me as far as he could go, so he let me go.\" This was on Friday. He reports  was very happy with his progress.     PAIN: 0/10      OBJECTIVE     Objective      Therapeutic Exercises    26220 Comments   Tall kneeling hip thrusts 3 x 10    L shoulder AROM (pre-stretch) 142 deg   OH wand flexion with 3 lb wand x10   L shoulder passive stretch into flexion     L shoulder AROM (post-stretch) 155 deg   B alternating quadruped bird dogs  2 x 5 ea -- modified version added to HEP   L LAQ with 2 lb ankle wt 3 x 10    Knee MMT  Flex: 4/5  Ext: 4+/5       Timed Minutes 40     Gait Training          14226   Task/Terrain Asst AD Comments   Gait training in hallway, no AD, with video analysis SBA None  Cues for heel strike and hip ext, knee flex   Pre-gait on neurocom (mobility level 3)   Focus on step-through, difficulty understanding procedure         Timed Minutes 15     Therapy Education/Self Care 62185   Details: Bolstered, see below. Also, gait mechanics   Given Home Exercise Program  Compound Time HEP (access code [email or web address])  mobility training   Progress: New and Reinforced   Education provided to:  Patient   Level of understanding Verbalized and Demonstrated "   Timed Minutes      Access Code: [email or web address]  Date: 05/23/2022  Prepared by: Sofia Li     Exercises     Shoulder:  B UE Phasic with Resistance - 1-2 x daily - 7 x weekly - 2 sets - 10 reps  Supine Shoulder Horizontal Abduction with Resistance - 1-2 x daily - 7 x weekly - 2 sets - 10 reps  Seated Shoulder Front Raise with Resistance - 1-2 x daily - 7 x weekly - 2 sets - 10 reps     Hips:  Standing Hip Abduction with Counter Support - 1-2 x daily - 7 x weekly - 2 sets - 10 reps  Clamshell with Resistance - 1 x daily - 7 x weekly - 2 sets - 10 reps  Sit to Stand Without Arm Support - 1-2 x daily - 7 x weekly - 2 sets - 10 reps  Standing Hip Extension with Counter Support - 1-2 x daily - 7 x weekly - 2 sets - 10 reps  Bird Dog on Counter - 1-2 x daily - 7 x weekly - 2 sets - 10 reps  Single Leg Balance with Opposite Leg Star Reach - 1-2 x daily - 7 x weekly - 2 sets - 10 reps    Total Timed Treatment:     55   mins  Total Time of Visit:             55   mins         ASSESSMENT/PLAN     GOALS  Goals                                          Progress Note due by 6/8/22                                                           Recert due by 6/6/22   STG by: 3 weeks Comments Date Status   Able to elevate left shoulder to 150 deg without pain 142 deg cold, 155 deg post-stretch on 5/23  148 deg with pain on 5/9/22 4/6/22:  120 deg cold; 150 deg after man therapy and stretch  100 deg at eval 3/9 5/23/22 partially met  ongoing   Improve left weight shifting with midline upright trunk in left midstance Improve left weight shift with more symmetrical gait 4/6 MET   Pain reports consistently <3/10 with shoulder elevation Reports pain is consistently 2/10  5/23/22 MET   LTG by: 6 weeks         Able to walk with more symmetrical gait with least assistive device he can Improving. Amb without AD within clinic. Decr hip ext, assumes hip hiking. Will cont to refine gait. Cont use of SC at home 5/23/22 progressing   Gross MMT left  "shoulder 4/5 Flex: 4-/5, Abd: 4/5, ER 4+/5, IR 4+/5 - 4/6  Flex: 4/5, abd: 4/5, ER: 4+/5, IR: 5/5 - 4/20 4/20/22 MET   Able to use left UE for household functional activities.  His L hand hurts \"quite a lot\" and will have to shake it to get it to stop hurting. But he still uses it on the reg every day.  5/9/22 ongoing   Able to walk community distances without exacerbation of left hip pain.  He can walk about 1 block without pain increasing. Encouraged use of SC vs rollator when walking at home 5/9/22 progressing   Independent with HEP for strengthening and flexibility.  Progressed/bolstered today. Reviewed and provided written printout of new exs 5/23/22 progressing     Assessment/Plan     ASSESSMENT: He met one and partially met another one of his eight goals. He entered the clinic carrying his SC, demonstrating improved mechanics vs last session. He has been increasing his activity level, walking with SC vs rollator, at home. He reports his dr was pleased with progress regarding his RTC and has been released from his care. However, he continues to neglect his L side at times and will utilize hip hiking vs extension when walking. He also demonstrates decreased heel strike bilaterally and decreased (L) knee flexion.     PLAN: Continue to progress hip stability, but consider focus on eccentric (L) quad control and continue to refine gait mechanics. Assess carryover of shoulder flexion prior to stretches.    SIGNATURE: Sofia Li PTA, [place] License #: [id number]    Electronically Signed on 5/23/2022        [address] [place] [address] [place] [address]  [phone]  "

## 2022-05-25 ENCOUNTER — TREATMENT (OUTPATIENT)
Dept: PHYSICAL THERAPY | Facility: CLINIC | Age: 67
End: 2022-05-25

## 2022-05-25 DIAGNOSIS — Z98.890 S/P LEFT ROTATOR CUFF REPAIR: Primary | ICD-10-CM

## 2022-05-25 DIAGNOSIS — M25.552 LEFT HIP PAIN: ICD-10-CM

## 2022-05-25 PROCEDURE — 97110 THERAPEUTIC EXERCISES: CPT

## 2022-05-25 NOTE — PROGRESS NOTES
"                                                                Physical Therapy Treatment Note    Patient: Dao Jhaveri                                                                     Visit Date: 2022  :     1955    Referring practitioner:    Beto Dover MD  Date of Initial Visit:          Type: THERAPY  Noted: 3/9/2022    Patient seen for 18 sessions    Visit Diagnoses:    ICD-10-CM ICD-9-CM   1. S/P left rotator cuff repair  Z98.890 V45.89   2. Left hip pain  M25.552 719.45     SUBJECTIVE     Subjective He denies pain, falls, near falls. He's been trying to walk more without the cane.     PAIN: 0/10 > 0/10 (fatigued)      OBJECTIVE     Objective      Therapeutic Exercises    93120 Comments   Tall kneeling hip thrusts 1 x 10, 1 x 5 -- attempted resisted against YTB, but began compensations in lumbar spine   L shoulder AROM See goals   L LAQ with 2 lb ankle wt 3 x 10 ea   Walking in hallway without AD Improved heel strike, best mechanics demonstrated thus far, did fatigue >80 ft which resulted in deviations. req cues for posture - good posture improved mechanics   B heel taps from 4\" step 2 x 10 ea -- 6\" step was too difficult   B step-ups onto 6\" step at stair case model 2 x 10 ea       Timed Minutes 53     Therapy Education/Self Care 14827   Details: Walking distances of 50 ft at a time without AD, rest and use SC when fatiguing   Given Home Exercise Program  [employer] [facility] (access code [email or web address])  mobility training   Progress: Reinforced   Education provided to:  Patient   Level of understanding Verbalized and Demonstrated   Timed Minutes      Access Code: [email or web address]  Date: 2022  Prepared by: Sofia Li     Exercises     Shoulder:  B UE Phasic with Resistance - 1-2 x daily - 7 x weekly - 2 sets - 10 reps  Supine Shoulder Horizontal Abduction with Resistance - 1-2 x daily - 7 x weekly - 2 sets - 10 reps  Seated Shoulder Front Raise with Resistance - 1-2 x daily - 7 x weekly - 2 sets - " 10 reps     Hips:  Standing Hip Abduction with Counter Support - 1-2 x daily - 7 x weekly - 2 sets - 10 reps  Clamshell with Resistance - 1 x daily - 7 x weekly - 2 sets - 10 reps  Sit to Stand Without Arm Support - 1-2 x daily - 7 x weekly - 2 sets - 10 reps  Standing Hip Extension with Counter Support - 1-2 x daily - 7 x weekly - 2 sets - 10 reps  Bird Dog on Counter - 1-2 x daily - 7 x weekly - 2 sets - 10 reps  Single Leg Balance with Opposite Leg Star Reach - 1-2 x daily - 7 x weekly - 2 sets - 10 reps    Total Timed Treatment:     53   mins  Total Time of Visit:             54   mins         ASSESSMENT/PLAN     GOALS  Goals                                          Progress Note due by 6/8/22                                                           Recert due by 6/6/22   STG by: 3 weeks Comments Date Status   Able to elevate left shoulder to 150 deg without pain 152 deg cold  142 deg cold, 155 deg post-stretch on 5/23  148 deg with pain on 5/9/22 4/6/22:  120 deg cold; 150 deg after man therapy and stretch  100 deg at eval 3/9 5/25/22 MET   Improve left weight shifting with midline upright trunk in left midstance Improve left weight shift with more symmetrical gait 4/6 MET   Pain reports consistently <3/10 with shoulder elevation Reports pain is consistently 2/10  5/23/22 MET   LTG by: 6 weeks         Able to walk with more symmetrical gait with least assistive device he can Amb without AD in clinic, progressing amb with SC x4 blocks at home. Inc heel strike and overall improved gait mechanics. Continues to be asymmetrical, albeit minimal. Fatigues quickly. Cont to refine 5/25/22 progressing   Gross MMT left shoulder 4/5 Flex: 4-/5, Abd: 4/5, ER 4+/5, IR 4+/5 - 4/6  Flex: 4/5, abd: 4/5, ER: 4+/5, IR: 5/5 - 4/20 4/20/22 MET   Able to use left UE for household functional activities.  He is able to use L UE for household functions. Pain 3/10 at worst.  5/25/22 MET    Able to walk community distances without  exacerbation of left hip pain.  He has been walking 4 blocks daily with only SC without increased pain, only fatigue/soreness. Able to walk throughout [facility] with only buggy. 5/25/22 partially met  ongoing   Independent with HEP for strengthening and flexibility.  Progressed/bolstered today. Reviewed and provided written printout of new exs 5/23/22 progressing     Assessment/Plan     ASSESSMENT: As of today, he has met five and partially met one of his eight goals. His gait today was the most improved observed in clinic thus far. He is able to ambulate a total of four city blocks with only SC and is ambulating within his home without AD. He demonstrated carryover of gait mechanics taught last session, showing improved heel strike this date. His hip stability and endurance are improving, but does fatigue > 50-80 ft, subsequently demonstrating compensations and decreased gait mechanics.     PLAN: Reduce POC to 1x/week and continue to refine gait mechanics. Progress hip stability and LE strength (especially eccentric control) with focus on endurance. Continue to progress distance ambulated with good mechanics/symmetrical gait without AD, working towards being independent without AD.     SIGNATURE: Sofia Li PTA, [place] License #: [id number]    Electronically Signed on 5/25/2022        [facility]  [place] [address]  [phone]

## 2022-06-01 ENCOUNTER — TREATMENT (OUTPATIENT)
Dept: PHYSICAL THERAPY | Facility: CLINIC | Age: 67
End: 2022-06-01

## 2022-06-01 DIAGNOSIS — Z98.890 S/P LEFT ROTATOR CUFF REPAIR: Primary | ICD-10-CM

## 2022-06-01 DIAGNOSIS — M25.552 LEFT HIP PAIN: ICD-10-CM

## 2022-06-01 PROCEDURE — 97116 GAIT TRAINING THERAPY: CPT

## 2022-06-01 PROCEDURE — 97110 THERAPEUTIC EXERCISES: CPT

## 2022-06-01 NOTE — PROGRESS NOTES
"                                                                Physical Therapy Treatment Note    Patient: Dao Jhaveri                                                                     Visit Date: 2022  :     1955    Referring practitioner:    Beto Dover MD  Date of Initial Visit:          Type: THERAPY  Noted: 3/9/2022    Patient seen for 19 sessions    Visit Diagnoses:    ICD-10-CM ICD-9-CM   1. S/P left rotator cuff repair  Z98.890 V45.89   2. Left hip pain  M25.552 719.45     SUBJECTIVE     Subjective He reports near fall on Saturday. He reports his porch step is about two inches tall and forgot it was there. Denied near fall d/t LOB. He did not fall though. He is doing well, has been working on walking more without his SC.     PAIN: 0/10 > 0/10      OBJECTIVE     Objective      Therapeutic Exercises    87942 Comments   Tall kneeling hip thrusts. RIP  RPL resist 2 x 10    B alternating heel taps from 4\" step, UE assist 2 x 10    Attempted wall squats with 45 cm physioball D/C'd d/t APT vs PPT   Wall squats with PPT  2 x 10   HL bridges with sustained PPT 3 x 10    B alternating marches in HL over 1/2 bolster 2 x 10    Quadruped donkey kicks 2 x 5 -- difficult for him       Timed Minutes 40     Gait Training          07900   Task/Terrain Asst AD Comments   Resisted walking with RIP  with RPL  N/A N/A 100 ft, 50 ft x2 -- improved hip ext and heel strike/gait mechanics, fatigued quickly   Walking in hallway without AD N/A N/A x250 ft -- assumes hip hiking vs hip ext          Timed Minutes 10     Manual Therapy     01722  Comments   IASTM with BL ridge roller, R SL L gluteals, lateral quads       Timed Minutes 10     Therapy Education/Self Care 15146   Details: Use of hip ext vs hip hiking   Given Home Exercise Program  [employer] [facility] (access code [email or web address])  mobility training   Progress: Reinforced   Education provided to:  Patient   Level of understanding Verbalized and " Demonstrated   Timed Minutes      Access Code: [email or web address]  Date: 05/23/2022  Prepared by: Sofia Li     Exercises     Shoulder:  B UE Phasic with Resistance - 1-2 x daily - 7 x weekly - 2 sets - 10 reps  Supine Shoulder Horizontal Abduction with Resistance - 1-2 x daily - 7 x weekly - 2 sets - 10 reps  Seated Shoulder Front Raise with Resistance - 1-2 x daily - 7 x weekly - 2 sets - 10 reps     Hips:  Standing Hip Abduction with Counter Support - 1-2 x daily - 7 x weekly - 2 sets - 10 reps  Clamshell with Resistance - 1 x daily - 7 x weekly - 2 sets - 10 reps  Sit to Stand Without Arm Support - 1-2 x daily - 7 x weekly - 2 sets - 10 reps  Standing Hip Extension with Counter Support - 1-2 x daily - 7 x weekly - 2 sets - 10 reps  Bird Dog on Counter - 1-2 x daily - 7 x weekly - 2 sets - 10 reps  Single Leg Balance with Opposite Leg Star Reach - 1-2 x daily - 7 x weekly - 2 sets - 10 reps    Total Timed Treatment:     60   mins  Total Time of Visit:             63   mins         ASSESSMENT/PLAN     GOALS  Goals                                          Progress Note due by 6/8/22                                                           Recert due by 6/6/22   STG by: 3 weeks Comments Date Status   Able to elevate left shoulder to 150 deg without pain 152 deg cold  142 deg cold, 155 deg post-stretch on 5/23  148 deg with pain on 5/9/22 4/6/22:  120 deg cold; 150 deg after man therapy and stretch  100 deg at eval 3/9 5/25/22 MET   Improve left weight shifting with midline upright trunk in left midstance Improve left weight shift with more symmetrical gait 4/6 MET   Pain reports consistently <3/10 with shoulder elevation Reports pain is consistently 2/10  5/23/22 MET   LTG by: 6 weeks         Able to walk with more symmetrical gait with least assistive device he can Amb without AD in clinic, progressing amb with SC x4 blocks at home. Inc heel strike and overall improved gait mechanics. Continues to be asymmetrical,  albeit minimal. Fatigues quickly. Cont to refine 5/25/22 progressing   Gross MMT left shoulder 4/5 Flex: 4-/5, Abd: 4/5, ER 4+/5, IR 4+/5 - 4/6  Flex: 4/5, abd: 4/5, ER: 4+/5, IR: 5/5 - 4/20 4/20/22 MET   Able to use left UE for household functional activities.  He is able to use L UE for household functions. Pain 3/10 at worst.  5/25/22 MET    Able to walk community distances without exacerbation of left hip pain.  He has been walking 4 blocks daily with only SC without increased pain, only fatigue/soreness. Able to walk throughout [facility] with only buggy. 5/25/22 partially met  ongoing   Independent with HEP for strengthening and flexibility.  Progressed/bolstered today. Reviewed and provided written printout of new exs 5/23/22 progressing     Assessment/Plan     ASSESSMENT: Patient entered the clinic and handed therapist his cane and did not require use for remainder of his session. He reports he is progressing distance walked without AD at home and feels he is progressing well. He continues to assume hip hiking vs hip ext, resulting in LE crossing midline and decreased heel strike/knee flexion at times. Therefore, prioritized hip extension today; however, he continues to fatigue quickly when activating hip extensors.     PLAN: Address all goals for progress note. Continue to refine gait mechanics, encouraging hip ext vs hip hiking, progressing towards being independent without need for AD. Continue to progress hip stability and LE strength (especially eccentric control) with focus on endurance.     SIGNATURE: Sofia Li PTA [place] License #: [id number]    Electronically Signed on 6/1/2022        [facility] [place] [address]  [phone]

## 2022-06-08 ENCOUNTER — TREATMENT (OUTPATIENT)
Dept: PHYSICAL THERAPY | Facility: CLINIC | Age: 67
End: 2022-06-08

## 2022-06-08 DIAGNOSIS — Z98.890 S/P LEFT ROTATOR CUFF REPAIR: Primary | ICD-10-CM

## 2022-06-08 DIAGNOSIS — M25.552 LEFT HIP PAIN: ICD-10-CM

## 2022-06-08 PROCEDURE — 97116 GAIT TRAINING THERAPY: CPT

## 2022-06-08 PROCEDURE — 97110 THERAPEUTIC EXERCISES: CPT

## 2022-06-08 PROCEDURE — 97112 NEUROMUSCULAR REEDUCATION: CPT

## 2022-06-08 NOTE — PROGRESS NOTES
"                                                                Physical Therapy Treatment Note, 30 Day Progress Note and 90 Day Recertification Note    Patient: Dao Jhaveri                                                                     Visit Date: 2022  :     1955    Referring practitioner:    Beto Dover MD  Date of Initial Visit:          Type: THERAPY  Noted: 3/9/2022    Patient seen for 20 sessions    Visit Diagnoses:    ICD-10-CM ICD-9-CM   1. S/P left rotator cuff repair  Z98.890 V45.89   2. Left hip pain  M25.552 719.45     SUBJECTIVE     Subjective He reports he feels about 75% improved. He feels his legs are getting stronger. He is able to walk about 1 city block without AD or need for rest. He would like to continue to address L LE strengthening.     PAIN: 3/10 (shoulder) > 2/10 (shoulder) 0/10 (hip)     PT G-Codes  Outcome Measure Options: Vaughan Balance  Vaughan Total Score: 42  OBJECTIVE     Objective      Therapeutic Exercises    15683 Comments   Tall kneeling hip thrusts 2 x 10 -- added to HEP   Heel taps from 4\" step in // bars, HHA for balance only 2 x 10 ea -- cues to maintain hip stability   Reviewed and provided written printout of HEP        Timed Minutes 10     Neuromuscular Reeducation     67820 Comments   SLS cone taps (x1) in // bars 2 x 10 -- difficulty with hip stability   SLS in // bars, EO R SLS: 5 sec and L: <1 sec   Tandem in // bars, EO R post: 12 sec with L lateral lean  L post: >15 sec with min instability   rhomberg stance in // bars, EC 10 sec with R LE WB and min sway   Vaughan balance 42 -- good with t/f, standing NBOS, sit <> stand -- struggled with dynamic balance, hip stability and turning 360 deg       Timed Minutes 20     Therapy Education/Self Care 14090   Details: HEP, POC, progression with PT, gait mechanics, posture   Given Home Exercise Program  [employer] [facility] (access code [email or web address])  postural retraining  mobility training   Progress: New and Reinforced "   Education provided to:  Patient   Level of understanding Verbalized and Demonstrated   Timed Minutes      Access Code: [email or web address]  Date: 05/23/2022  Prepared by: Sofia Brambila     Shoulder:  B UE Phasic with Resistance - 1-2 x daily - 7 x weekly - 2 sets - 10 reps  Supine Shoulder Horizontal Abduction with Resistance - 1-2 x daily - 7 x weekly - 2 sets - 10 reps  Seated Shoulder Front Raise with Resistance - 1-2 x daily - 7 x weekly - 2 sets - 10 reps     Hips:  Standing Hip Abduction with Counter Support - 1-2 x daily - 7 x weekly - 2 sets - 10 reps  Clamshell with Resistance - 1 x daily - 7 x weekly - 2 sets - 10 reps  Sit to Stand Without Arm Support - 1-2 x daily - 7 x weekly - 2 sets - 10 reps  Standing Hip Extension with Counter Support - 1-2 x daily - 7 x weekly - 2 sets - 10 reps  Bird Dog on Counter - 1-2 x daily - 7 x weekly - 2 sets - 10 reps  Single Leg Balance with Opposite Leg Star Reach - 1-2 x daily - 7 x weekly - 2 sets - 10 reps  Tall Kneeling Hip Thrust - 1-2 x daily - 7 x weekly - 2 sets - 10 reps    Total Timed Treatment:     30   mins  Total Time of Visit:             43   mins         Cathie Esposito, PT, DPT performed 10 min assessment for progress note (see attached)    ASSESSMENT/PLAN     GOALS  Goals                                          Progress Note due by 7/8/22                                                           Recert due by 9/6/22   STG by: 3 weeks Comments Date Status   Able to elevate left shoulder to 150 deg without pain 152 deg cold  142 deg cold, 155 deg post-stretch on 5/23  148 deg with pain on 5/9/22 4/6/22:  120 deg cold; 150 deg after man therapy and stretch  100 deg at eval 3/9 5/25/22 MET   Improve left weight shifting with midline upright trunk in left midstance Improve left weight shift with more symmetrical gait 4/6 MET   Pain reports consistently <3/10 with shoulder elevation Reports pain is consistently 2/10  5/23/22 MET   LTG by: 6 weeks          Able to walk with more symmetrical gait with least assistive device he can Able to ambulate without AD; however will hip hike and occasionally circumduct on L. Dec L knee flex/ankle DF. Dec stance phase/WS onto L LE. Will cross midline with B LE (R > L) resulting in lateral deviations. Able to self correct without asst 6/8/22 improving  ongoing   Gross MMT left shoulder 4/5 Flex: 4-/5, Abd: 4/5, ER 4+/5, IR 4+/5 - 4/6  Flex: 4/5, abd: 4/5, ER: 4+/5, IR: 5/5 - 4/20 4/20/22 MET   Able to use left UE for household functional activities.  He is able to use L UE for household functions. Pain 3/10 at worst.  5/25/22 MET    Able to walk community distances without exacerbation of left hip pain.  Able to walk 1 city block. L hip pain does increase to 4/10 6/8/22 partially met  improving  ongoing   Independent with HEP for strengthening and flexibility.  He reports daily complaints without issue.  6/8/22 progressing     Assessment/Plan     ASSESSMENT: Since beginning PT, pt feels 75% improved, meeting five and partially met one of his eight goals thus far. While his shoulder still pains him, his primary limitation at this juncture is his hip pain and subsequent gait deficits. He has demonstrated significant improvement over last several sessions and is now able to walk short distances without any AD. We have been prioritizing hip stability and eccentric control, which has proven fruitful; however, he continues to demonstrate hip hiking, circumduction, decreased heel strike and L WS/stance phase, as well as decreased (L) knee flexion. He will also cross midline with B LE (R > L) resulting in lateral deviation and resulting in LOB, though he is frequently able to self correct. Furthermore, he is limited especially by SLS and dynamic balance, which is also reflectant in his gait pattern. He would benefit from continued skilled PT to improve his hip stability, balance deficits, and gait deviations to improve his independence  with gait, decreasing reliance on AD, and decrease his fall risk.     PLAN: Continue to progress hip stability and refine gait mechanics. Consider addressing static (especially SLS) and dynamic balance. Consider focus on M/L WS and pre-gait mechanics next session. Continue to progress and bolster HEP as appropriate.     SIGNATURE: Sofia Li PTA, [place] License #: [id number]    Electronically Signed on 6/8/2022        [address] [place] [address]. [address]  [phone]

## 2022-06-09 NOTE — PROGRESS NOTES
Recertification Note Addendum      Patient: Dao Jhaveri           : 1955  Visit Date: 2022  Referring practitioner: Beto Dover MD  Date of Initial Visit: Type: THERAPY  Noted: 3/9/2022  Patient seen for 20 sessions  Visit Diagnoses:    ICD-10-CM ICD-9-CM   1. S/P left rotator cuff repair  Z98.890 V45.89   2. Left hip pain  M25.552 719.45       PT G-Codes  Outcome Measure Options: Vaughan Balance  Vaughan Total Score: 42  Clinical Progress: improved  Home Program Compliance: Yes  Progress toward previous goals: Not all met, however progressing  Prognosis to achieve goals: good    Objective     Gait Training          72920   Task/Terrain Asst AD Comments   Amb on level surface in hallway  CGA-SBA No AD Decreased weight shifting over LLE during stance phase, leading to decreased RLE swing through    Forward diagonal step with weight shift over LLE   // bars Tactile cue for full weight shift          Timed Minutes 10       Goals                                          Progress Note due by 22                                                           Recert due by 22   STG by: 3 weeks Comments Date Status   Able to elevate left shoulder to 150 deg without pain 152 deg cold  142 deg cold, 155 deg post-stretch on   148 deg with pain on 22:  120 deg cold; 150 deg after man therapy and stretch  100 deg at eval 3/9 5/25/22 MET   Improve left weight shifting with midline upright trunk in left midstance Improve left weight shift with more symmetrical gait  MET   Pain reports consistently <3/10 with shoulder elevation Reports pain is consistently 2/10  22 MET   LTG by: 6 weeks         Able to walk with more symmetrical gait with least assistive device he can Able to ambulate without AD; however will hip hike and occasionally circumduct on L. Dec L knee flex/ankle DF. Dec stance phase/WS onto L LE. Will cross midline with B LE (R > L) resulting in lateral deviations. Able to self  correct without asst 6/8/22 improving  ongoing   Gross MMT left shoulder 4/5 Flex: 4-/5, Abd: 4/5, ER 4+/5, IR 4+/5 - 4/6  Flex: 4/5, abd: 4/5, ER: 4+/5, IR: 5/5 - 4/20 4/20/22 MET   Able to use left UE for household functional activities.  He is able to use L UE for household functions. Pain 3/10 at worst.  5/25/22 MET    Able to walk community distances without exacerbation of left hip pain.  Able to walk 1 city block. L hip pain does increase to 4/10 6/8/22 partially met  improving  ongoing   Patient will ambulate on uneven ground with least restrictive AD while maintaining good stability   6/8 NEW   Patient will maintain SLS with good stability on each leg for at least 10 seconds   6/8 NEW   Independent with HEP for strengthening and flexibility.  He reports daily complaints without issue.  6/8/22 progressing       Assessment & Plan     Assessment  Impairments: abnormal gait, abnormal or restricted ROM, activity intolerance, impaired physical strength, lacks appropriate home exercise program and pain with function  Functional Limitations: carrying objects, walking, uncomfortable because of pain, reaching behind back, reaching overhead and unable to perform repetitive tasks  Assessment details: Mr. Jhaveri continues to progress well, his primary deficit now appears to be decreased dynamic balance. Because of his asymmetric gait mechanics, that is likely contributing to his ongoing L hip pain and decreased balance. His self awareness of maintaining upright trunk is improving, which is promising.   Prognosis: good    Plan  Therapy options: will be seen for skilled therapy services  Planned modality interventions: dry needling, low level laser therapy, TENS and electrical stimulation/Russian stimulation  Planned therapy interventions: manual therapy, joint mobilization, home exercise program, gait training, stretching, strengthening, therapeutic activities, soft tissue mobilization, neuromuscular re-education,  balance/weight-bearing training and abdominal trunk stabilization  Frequency: 1x week  Duration in weeks: 6  Treatment plan discussed with: patient and PTA        I spent 10 minutes with the patient and Sofia Li PTA and reviewed their progress and plan of care. The patient is in agreement with this plan.     SIGNATURE: Cathie Esposito, PT DPT, License #:[id number]  Electronically Signed on 6/9/2022

## 2022-06-15 ENCOUNTER — TREATMENT (OUTPATIENT)
Dept: PHYSICAL THERAPY | Facility: CLINIC | Age: 67
End: 2022-06-15

## 2022-06-15 DIAGNOSIS — M25.552 LEFT HIP PAIN: ICD-10-CM

## 2022-06-15 DIAGNOSIS — Z98.890 S/P LEFT ROTATOR CUFF REPAIR: Primary | ICD-10-CM

## 2022-06-15 PROCEDURE — 97110 THERAPEUTIC EXERCISES: CPT

## 2022-06-15 PROCEDURE — 97140 MANUAL THERAPY 1/> REGIONS: CPT

## 2022-06-15 NOTE — PROGRESS NOTES
"                                                                Physical Therapy Treatment Note    Patient: Dao Jhaveri                                                                     Visit Date: 6/15/2022  :     1955    Referring practitioner:    Beto Dover MD  Date of Initial Visit:          Type: THERAPY  Noted: 3/9/2022    Patient seen for 21 sessions    Visit Diagnoses:    ICD-10-CM ICD-9-CM   1. S/P left rotator cuff repair  Z98.890 V45.89   2. Left hip pain  M25.552 719.45     SUBJECTIVE     Subjective He reports that he has been walking more, he walked 3 blocks (total) the other pushing the rollator (melanie sitting on rollator, using as a stroller). He thinks this may be why he's feeling so stiff today.     PAIN: 5/10 (L shoulder) 2/10 (L hip) > 2/10 (L shoulder, hip)     OBJECTIVE     Objective     Manual Therapy     49735  Comments   IASTM with BL ridge roller in R SL L post RTC, lats, UT, gluteals, ITB, lateral quads       Timed Minutes 10     Therapeutic Exercises    47781 Comments   Attempted heel taps on 4\" step in // bars Several compensations, d/c'd   Standing on 4\" step hip abduction in // bars 2 x 10 ea   Step-overs onto BL BOSU 4 x 5 ea -- focus on d/c circumduction, SLS, eccentric lowering   SLS and SLS marches in // bars Added to EHP       Timed Minutes 30     Therapy Education/Self Care 43611   Details: Added marches with focus on SLS stability.    Date last updated: 6/15/22   Given Home Exercise Program  [employer] HEP (access code [email or web address])  mobility training   Progress: New and Reinforced   Education provided to:  Patient   Level of understanding Verbalized   Timed Minutes      Access Code: [email or web address]  Date: 06/15/2022  Prepared by: Sofia Li     Exercises     Shoulder:  B UE Phasic with Resistance - 1-2 x daily - 7 x weekly - 2 sets - 10 reps  Supine Shoulder Horizontal Abduction with Resistance - 1-2 x daily - 7 x weekly - 2 sets - 10 reps  Seated Shoulder Front Raise " with Resistance - 1-2 x daily - 7 x weekly - 2 sets - 10 reps     Hips:  Standing Hip Abduction with Counter Support - 1-2 x daily - 7 x weekly - 2 sets - 10 reps  Clamshell with Resistance - 1 x daily - 7 x weekly - 2 sets - 10 reps  Sit to Stand Without Arm Support - 1-2 x daily - 7 x weekly - 2 sets - 10 reps  Standing Hip Extension with Counter Support - 1-2 x daily - 7 x weekly - 2 sets - 10 reps  Bird Dog on Counter - 1-2 x daily - 7 x weekly - 2 sets - 10 reps  Single Leg Balance with Opposite Leg Star Reach - 1-2 x daily - 7 x weekly - 2 sets - 10 reps  Tall Kneeling Hip Thrust - 1-2 x daily - 7 x weekly - 2 sets - 10 reps  Standing March with Counter Support - 1-2 x daily - 7 x weekly - 2 sets - 10 reps    Total Timed Treatment:     40   mins  Total Time of Visit:             40   mins         ASSESSMENT/PLAN     GOALS  Goals                                          Progress Note due by 7/8/22                                                           Recert due by 9/6/22   STG by: 3 weeks Comments Date Status   Able to elevate left shoulder to 150 deg without pain 152 deg cold  142 deg cold, 155 deg post-stretch on 5/23  148 deg with pain on 5/9/22 4/6/22:  120 deg cold; 150 deg after man therapy and stretch  100 deg at eval 3/9 5/25/22 MET   Improve left weight shifting with midline upright trunk in left midstance Improve left weight shift with more symmetrical gait 4/6 MET   Pain reports consistently <3/10 with shoulder elevation Reports pain is consistently 2/10  5/23/22 MET   LTG by: 6 weeks         Able to walk with more symmetrical gait with least assistive device he can Able to ambulate without AD; however will hip hike and occasionally circumduct on L. Dec L knee flex/ankle DF. Dec stance phase/WS onto L LE. Will cross midline with B LE (R > L) resulting in lateral deviations. Able to self correct without asst 6/8/22 improving  ongoing   Gross MMT left shoulder 4/5 Flex: 4-/5, Abd: 4/5, ER 4+/5, IR  4+/5 - 4/6  Flex: 4/5, abd: 4/5, ER: 4+/5, IR: 5/5 - 4/20 4/20/22 MET   Able to use left UE for household functional activities.  He is able to use L UE for household functions. Pain 3/10 at worst.  5/25/22 MET    Able to walk community distances without exacerbation of left hip pain.  Able to walk 1 city block. L hip pain does increase to 4/10 6/8/22 partially met  improving  ongoing   Patient will ambulate on uneven ground with least restrictive AD while maintaining good stability    6/8 ongoing   Patient will maintain SLS with good stability on each leg for at least 10 seconds  Significant instability. Bolstered HEP to reflect marches. Will review and progress next session as appropriate.  6/15 ongoing   Independent with HEP for strengthening and flexibility.  He reports daily complaints without issue.  6/8/22 progressing     Assessment/Plan     ASSESSMENT: He reports he is more stiff today and his shoulder was hurting. He has recently increased distance walked to 3 blocks, using rollator only as a stroller for his grandson. He was very fatigued today and relied on UE frequently to support, explaining much of his increased shoulder pain. He continues to demonstrate hip instability during stance phase and required cues to prevent hip circumduction with step-overs.     PLAN: Assess response to bolstered HEP today and continue to emphasize hip stability, especially with step-overs.     SIGNATURE: Sofia Li PTA [place] License #: [id number]    Electronically Signed on 6/15/2022        [address] [place] [address]. [address]  [phone]

## 2022-06-22 ENCOUNTER — TREATMENT (OUTPATIENT)
Dept: PHYSICAL THERAPY | Facility: CLINIC | Age: 67
End: 2022-06-22

## 2022-06-22 DIAGNOSIS — M25.552 LEFT HIP PAIN: ICD-10-CM

## 2022-06-22 DIAGNOSIS — Z98.890 S/P LEFT ROTATOR CUFF REPAIR: Primary | ICD-10-CM

## 2022-06-22 PROCEDURE — 97110 THERAPEUTIC EXERCISES: CPT

## 2022-06-22 PROCEDURE — 97116 GAIT TRAINING THERAPY: CPT

## 2022-06-22 PROCEDURE — 97112 NEUROMUSCULAR REEDUCATION: CPT

## 2022-06-22 NOTE — PROGRESS NOTES
"                                                                Physical Therapy Treatment Note    Patient: Dao Jhaveri                                                                     Visit Date: 2022  :     1955    Referring practitioner:    Beto Dover MD  Date of Initial Visit:          Type: THERAPY  Noted: 3/9/2022    Patient seen for 22 sessions    Visit Diagnoses:    ICD-10-CM ICD-9-CM   1. S/P left rotator cuff repair  Z98.890 V45.89   2. Left hip pain  M25.552 719.45     SUBJECTIVE     Subjective He reports, \"Quite frankly, I feel pretty good today. I don't know how I'm gonna feel when I leave here...\" He reports he had to go home and take a nap after last session. He denies falls or near falls.     PAIN: 0/10 > 0/10     OBJECTIVE     Objective    Therapeutic Exercises    31675 Comments   Unicam - began session with this 6 min total -- resist level 2 (5 min), level 3 (1 min)   SLS in // bars See goals   Walking   2 x 10 -- decreased stability on L           Timed Minutes 10     Gait Training          31836   Task/Terrain Asst AD Comments   Gait in hallway SBA N/A Utilized video analysis feedback. Initially significant L hip drop with increased knee flex but decreased heel strike. Following (correction to erect posture), improved heel strike, decreased instability, increased arm swing and trunk rotation. Req several cues               Timed Minutes 15     Neuromuscular Reeducation     69641 Comments   SLS cone taps (x4) on BL TheraDisc (x4) x2    SLS onto BL BOSU step-overs on BL foam sq in // bars 1 x 10                Timed Minutes 15     Therapy Education/Self Care 20890   Details: Added  with focus on SLS stability.    Date last updated: 6/15/22   Given Home Exercise Program  [employer] HEP (access code [email or web address])  mobility training   Progress: New and Reinforced   Education provided to:  Patient   Level of understanding Verbalized   Timed Minutes      Access Code: " [email or web address]  Date: 06/15/2022  Prepared by: Sofia Brambila     Shoulder:  B UE Phasic with Resistance - 1-2 x daily - 7 x weekly - 2 sets - 10 reps  Supine Shoulder Horizontal Abduction with Resistance - 1-2 x daily - 7 x weekly - 2 sets - 10 reps  Seated Shoulder Front Raise with Resistance - 1-2 x daily - 7 x weekly - 2 sets - 10 reps     Hips:  Standing Hip Abduction with Counter Support - 1-2 x daily - 7 x weekly - 2 sets - 10 reps  Clamshell with Resistance - 1 x daily - 7 x weekly - 2 sets - 10 reps  Sit to Stand Without Arm Support - 1-2 x daily - 7 x weekly - 2 sets - 10 reps  Standing Hip Extension with Counter Support - 1-2 x daily - 7 x weekly - 2 sets - 10 reps  Bird Dog on Counter - 1-2 x daily - 7 x weekly - 2 sets - 10 reps  Single Leg Balance with Opposite Leg Star Reach - 1-2 x daily - 7 x weekly - 2 sets - 10 reps  Tall Kneeling Hip Thrust - 1-2 x daily - 7 x weekly - 2 sets - 10 reps  Standing March with Counter Support - 1-2 x daily - 7 x weekly - 2 sets - 10 reps    Total Timed Treatment:     40   mins  Total Time of Visit:             40   mins         ASSESSMENT/PLAN     GOALS  Goals                                          Progress Note due by 7/8/22                                                           Recert due by 9/6/22   STG by: 3 weeks Comments Date Status   Able to elevate left shoulder to 150 deg without pain 152 deg cold  142 deg cold, 155 deg post-stretch on 5/23  148 deg with pain on 5/9/22 4/6/22:  120 deg cold; 150 deg after man therapy and stretch  100 deg at eval 3/9 5/25/22 MET   Improve left weight shifting with midline upright trunk in left midstance Improve left weight shift with more symmetrical gait 4/6 MET   Pain reports consistently <3/10 with shoulder elevation Reports pain is consistently 2/10  5/23/22 MET   LTG by: 6 weeks         Able to walk with more symmetrical gait with least assistive device he can Able to ambulate without AD; however will hip  hike and occasionally circumduct on L. Dec L knee flex/ankle DF. Dec stance phase/WS onto L LE. Will cross midline with B LE (R > L) resulting in lateral deviations. Able to self correct without asst 6/8/22 improving  ongoing   Gross MMT left shoulder 4/5 Flex: 4-/5, Abd: 4/5, ER 4+/5, IR 4+/5 - 4/6  Flex: 4/5, abd: 4/5, ER: 4+/5, IR: 5/5 - 4/20 4/20/22 MET   Able to use left UE for household functional activities.  He is able to use L UE for household functions. Pain 3/10 at worst.  5/25/22 MET    Able to walk community distances without exacerbation of left hip pain.  Able to walk 1 city block. L hip pain does increase to 4/10. 6/22 partially met  improving  ongoing   Patient will ambulate on uneven ground with least restrictive AD while maintaining good stability  Instability when using dynamic surfaces today  6/22 ongoing   Patient will maintain SLS with good stability on each leg for at least 10 seconds  R SLS: 10 sec and L SLS: 3 sec 6/22 Partially met  ongoing   Independent with [facility] for strengthening and flexibility.  He reports daily complaints without issue.  6/8/22 progressing     Assessment/Plan     ASSESSMENT: Patient presents today without pain! Initially, when ambulating, he demonstrated significant L hip drop with increased knee/hip flex but decreased heel strike and decreased trunk rotation. Following correction to posture, heel strike, arm swing, and trunk rotation improved and instability decreased. It was revealed he had been practicing marches with SC, therefore not challenging his hip stability and was encouraged to perform without SC at home.     PLAN: Continue to progress balance and stability, consider placing POC on hold.     SIGNATURE: Sofia Li PTA, [place] License #: [id number]    Electronically Signed on 6/22/2022        [address] [place] [address]  [place] [address]  [phone]

## 2022-07-01 ENCOUNTER — TREATMENT (OUTPATIENT)
Dept: PHYSICAL THERAPY | Facility: CLINIC | Age: 67
End: 2022-07-01

## 2022-07-01 DIAGNOSIS — Z98.890 S/P LEFT ROTATOR CUFF REPAIR: Primary | ICD-10-CM

## 2022-07-01 DIAGNOSIS — M25.552 LEFT HIP PAIN: ICD-10-CM

## 2022-07-01 PROCEDURE — 97116 GAIT TRAINING THERAPY: CPT | Performed by: PHYSICAL THERAPIST

## 2022-07-01 PROCEDURE — 97140 MANUAL THERAPY 1/> REGIONS: CPT | Performed by: PHYSICAL THERAPIST

## 2022-07-01 NOTE — PROGRESS NOTES
Physical Therapy Treatment Note and Discharge Note    Patient: Dao Jhaveri                                                                     Visit Date: 2022  :     1955    Referring practitioner:    Beto Dover MD  Date of Initial Visit:          Type: THERAPY  Noted: 3/9/2022    Patient seen for 23 sessions    Visit Diagnoses:    ICD-10-CM ICD-9-CM   1. S/P left rotator cuff repair  Z98.890 V45.89   2. Left hip pain  M25.552 719.45     SUBJECTIVE     Subjective He reports a few near falls as he adjusts to not using his cane. He stopped using his cane right after his last session, about a week ago. He denies falls. His shoulder is hurting him a little today.     PAIN: 2/10 (L shoulder), 0/10 hips > 0/10     OBJECTIVE     Objective    Gait Training          [id number]   Task/Terrain Asst AD Comments   Gait in hallway Independent N/A L hip drop, occasionally crosses midline with R LE but able to self correct. Asymmetrical gait d/t L hip drop and decreased WS onto L LE. No trunk rotation or arm swing but improved with correct. Significantly improved after retro-walking. Utilized metronome but was difficult (60 bpm > 65 bpm). 300 ft, 250 ft   Resisted gait with RIP  -- -- x20 ft   Resisted retro-walking with RIP  -- -- 4 x 20 ft    Assisted retro-walking --  -- 2 x 20 ft    B alt marching - walking and in place -- -- Use of metronome helped, but still difficult          Timed Minutes 30     Manual Therapy     [id number]  Comments   IASTM with BL ridge roller, R SL L gluteals and posterior RTC       Timed Minutes 14     Therapy Education/Self Care [id number]   Details: Reinforced marches with focus on SLS stability and also retro gait (emphasized safety)   Date last updated: 22   Given Home Exercise Program  [employer] HEP (access code [email or web address])  mobility training   Progress: New and Reinforced   Education provided to:  Patient   Level  of understanding Verbalized and Demonstrated   Timed Minutes      Access Code: [email or web address]  Date: 06/15/2022  Prepared by: Sofia Brambila     Shoulder:  B UE Phasic with Resistance - 1-2 x daily - 7 x weekly - 2 sets - 10 reps  Supine Shoulder Horizontal Abduction with Resistance - 1-2 x daily - 7 x weekly - 2 sets - 10 reps  Seated Shoulder Front Raise with Resistance - 1-2 x daily - 7 x weekly - 2 sets - 10 reps     Hips:  Standing Hip Abduction with Counter Support - 1-2 x daily - 7 x weekly - 2 sets - 10 reps  Clamshell with Resistance - 1 x daily - 7 x weekly - 2 sets - 10 reps  Sit to Stand Without Arm Support - 1-2 x daily - 7 x weekly - 2 sets - 10 reps  Standing Hip Extension with Counter Support - 1-2 x daily - 7 x weekly - 2 sets - 10 reps  Bird Dog on Counter - 1-2 x daily - 7 x weekly - 2 sets - 10 reps  Single Leg Balance with Opposite Leg Star Reach - 1-2 x daily - 7 x weekly - 2 sets - 10 reps  Tall Kneeling Hip Thrust - 1-2 x daily - 7 x weekly - 2 sets - 10 reps  Standing March with Counter Support - 1-2 x daily - 7 x weekly - 2 sets - 10 reps    Total Timed Treatment:     44   mins  Total Time of Visit:             45   mins         ASSESSMENT/PLAN     GOALS  Goals                                          Progress Note due by 7/8/22                                                           Recert due by 9/6/22   STG by: 3 weeks Comments Date Status   Able to elevate left shoulder to 150 deg without pain 152 deg cold  142 deg cold, 155 deg post-stretch on 5/23  148 deg with pain on 5/9/22 4/6/22:  120 deg cold; 150 deg after man therapy and stretch  100 deg at eval 3/9 5/25/22 MET   Improve left weight shifting with midline upright trunk in left midstance Improve left weight shift with more symmetrical gait 4/6 MET   Pain reports consistently <3/10 with shoulder elevation Reports pain is consistently 2/10  5/23/22 MET   LTG by: 6 weeks         Able to walk with more symmetrical gait with  least assistive device he can Improved mechanics, no AD - continued L hip drop, cues for arm swing and trunk rotation today. Occasionally crosses midline with R LE, but able to self correct 7/1/22 MET   Gross MMT left shoulder 4/5 Flex: 4-/5, Abd: 4/5, ER 4+/5, IR 4+/5 - 4/6  Flex: 4/5, abd: 4/5, ER: 4+/5, IR: 5/5 - 4/20 4/20/22 MET   Able to use left UE for household functional activities.  He is able to use L UE for household functions. Pain 3/10 at worst.  5/25/22 MET    Able to walk community distances without exacerbation of left hip pain.  Increased fatigue and slight discomfort associated with fatigue, but ambulating without AD >1 week 7/1/22 MET   Patient will ambulate on uneven ground with least restrictive AD while maintaining good stability  Has been ambulating >1 week without AD within the community, denies falls. Does have occasional occurrences of crossing midline, but able to self correct.  7/1/22 MET   Patient will maintain SLS with good stability on each leg for at least 10 seconds  R SLS: 10 sec and L SLS: 3 sec 6/22 Partially met     Independent with HEP for strengthening and flexibility.  He is regularly compliant and has been progressing with gait mechanics at home 7/1/22 MET     Assessment/Plan     ASSESSMENT: Patient has made exorbitant improvements since his evaluation and is now able to safely ambulate without need for AD. He does fatigue quickly and resorts to asymmetric gait pattern with L hip drop. He demonstrated decreased trunk rotation with no B arm swing, improved by end of session. His gait especially improved following retro walking. He would likely benefit continued PT to continue refining gait mechanics and especially for L hip drop; however, he is competent to continue independently for the time being.      PLAN: D/C home with comprehensive HEP    DISCHARGE SUMMARY   Discharge date 7/1/2022   Dates of this episode 3/9/2022 through 7/1/2022   Number of visits on this episode 23    Reason for discharge at baseline functional level   Outcomes achieved Refer to the goals table for specifics on goals  Able to achieve some goals and partially achieve other goals   Discharge plan Continue with current home exercise program as instructed   Summary of care See above   Discharge instruction See above     SIGNATURE: Sofia Li PTA, [place] License #: [id number]    Electronically Signed on 7/1/2022        [address] [place] [address]  [phone]

## 2022-08-09 ENCOUNTER — HOSPITAL ENCOUNTER (OUTPATIENT)
Dept: CT IMAGING | Facility: HOSPITAL | Age: 67
Discharge: HOME | End: 2022-08-09
Admitting: INTERNAL MEDICINE

## 2022-08-09 DIAGNOSIS — Z87.891 PERSONAL HISTORY OF TOBACCO USE, PRESENTING HAZARDS TO HEALTH: ICD-10-CM

## 2022-08-09 PROCEDURE — 71271 CT THORAX LUNG CANCER SCR C-: CPT

## 2022-08-16 ENCOUNTER — LAB (OUTPATIENT)
Dept: LAB | Facility: HOSPITAL | Age: 67
End: 2022-08-16

## 2022-08-16 ENCOUNTER — OFFICE VISIT (OUTPATIENT)
Dept: INTERNAL MEDICINE | Facility: CLINIC | Age: 67
End: 2022-08-16

## 2022-08-16 VITALS
TEMPERATURE: 98 F | SYSTOLIC BLOOD PRESSURE: 112 MMHG | HEIGHT: 66 IN | HEART RATE: 65 BPM | WEIGHT: 184.5 LBS | BODY MASS INDEX: 29.65 KG/M2 | OXYGEN SATURATION: 98 % | DIASTOLIC BLOOD PRESSURE: 74 MMHG | RESPIRATION RATE: 16 BRPM

## 2022-08-16 DIAGNOSIS — I10 ESSENTIAL HYPERTENSION: ICD-10-CM

## 2022-08-16 DIAGNOSIS — Z86.73 HISTORY OF STROKE: ICD-10-CM

## 2022-08-16 DIAGNOSIS — H53.8 BLURRY VISION, LEFT EYE: ICD-10-CM

## 2022-08-16 DIAGNOSIS — M62.838 MUSCLE SPASM OF LEFT LOWER EXTREMITY: ICD-10-CM

## 2022-08-16 DIAGNOSIS — Z86.73 HISTORY OF STROKE: Primary | ICD-10-CM

## 2022-08-16 DIAGNOSIS — Z23 NEED FOR VACCINATION: ICD-10-CM

## 2022-08-16 DIAGNOSIS — I69.359 HEMIPARESIS AFFECTING NONDOMINANT SIDE AS LATE EFFECT OF CEREBROVASCULAR ACCIDENT (CVA): ICD-10-CM

## 2022-08-16 DIAGNOSIS — Z00.01 ANNUAL VISIT FOR GENERAL ADULT MEDICAL EXAMINATION WITH ABNORMAL FINDINGS: ICD-10-CM

## 2022-08-16 DIAGNOSIS — R73.02 IMPAIRED GLUCOSE TOLERANCE: ICD-10-CM

## 2022-08-16 DIAGNOSIS — E66.3 OVERWEIGHT (BMI 25.0-29.9): ICD-10-CM

## 2022-08-16 DIAGNOSIS — E78.2 MIXED HYPERLIPIDEMIA: ICD-10-CM

## 2022-08-16 LAB
ALBUMIN SERPL-MCNC: 4.3 G/DL (ref 3.5–5.2)
ALBUMIN/GLOB SERPL: 1.5 G/DL
ALP SERPL-CCNC: 78 U/L (ref 39–117)
ALT SERPL W P-5'-P-CCNC: 16 U/L (ref 1–41)
ANION GAP SERPL CALCULATED.3IONS-SCNC: 7 MMOL/L (ref 5–15)
AST SERPL-CCNC: 20 U/L (ref 1–40)
BILIRUB SERPL-MCNC: 0.3 MG/DL (ref 0–1.2)
BUN SERPL-MCNC: 12 MG/DL (ref 8–23)
BUN/CREAT SERPL: 11.2 (ref 7–25)
CALCIUM SPEC-SCNC: 9.6 MG/DL (ref 8.6–10.5)
CHLORIDE SERPL-SCNC: 107 MMOL/L (ref 98–107)
CHOLEST SERPL-MCNC: 79 MG/DL (ref 0–200)
CO2 SERPL-SCNC: 28 MMOL/L (ref 22–29)
CREAT SERPL-MCNC: 1.07 MG/DL (ref 0.76–1.27)
DEPRECATED RDW RBC AUTO: 49.9 FL (ref 37–54)
EGFRCR SERPLBLD CKD-EPI 2021: 76.1 ML/MIN/1.73
ERYTHROCYTE [DISTWIDTH] IN BLOOD BY AUTOMATED COUNT: 17.4 % (ref 12.3–15.4)
GLOBULIN UR ELPH-MCNC: 2.9 GM/DL
GLUCOSE SERPL-MCNC: 101 MG/DL (ref 65–99)
HBA1C MFR BLD: 5.9 % (ref 4.8–5.6)
HCT VFR BLD AUTO: 39.9 % (ref 37.5–51)
HDLC SERPL-MCNC: 36 MG/DL (ref 40–60)
HGB BLD-MCNC: 12.7 G/DL (ref 13–17.7)
LDLC SERPL CALC-MCNC: 28 MG/DL (ref 0–100)
LDLC/HDLC SERPL: 0.83 {RATIO}
MCH RBC QN AUTO: 25.5 PG (ref 26.6–33)
MCHC RBC AUTO-ENTMCNC: 31.8 G/DL (ref 31.5–35.7)
MCV RBC AUTO: 80 FL (ref 79–97)
PLATELET # BLD AUTO: 217 10*3/MM3 (ref 140–450)
PMV BLD AUTO: 11.1 FL (ref 6–12)
POTASSIUM SERPL-SCNC: 4.2 MMOL/L (ref 3.5–5.2)
PROT SERPL-MCNC: 7.2 G/DL (ref 6–8.5)
RBC # BLD AUTO: 4.99 10*6/MM3 (ref 4.14–5.8)
SODIUM SERPL-SCNC: 142 MMOL/L (ref 136–145)
TRIGL SERPL-MCNC: 66 MG/DL (ref 0–150)
VLDLC SERPL-MCNC: 15 MG/DL (ref 5–40)
WBC NRBC COR # BLD: 5.15 10*3/MM3 (ref 3.4–10.8)

## 2022-08-16 PROCEDURE — 80061 LIPID PANEL: CPT

## 2022-08-16 PROCEDURE — 83036 HEMOGLOBIN GLYCOSYLATED A1C: CPT

## 2022-08-16 PROCEDURE — 80053 COMPREHEN METABOLIC PANEL: CPT

## 2022-08-16 PROCEDURE — 99214 OFFICE O/P EST MOD 30 MIN: CPT | Performed by: INTERNAL MEDICINE

## 2022-08-16 PROCEDURE — 90732 PPSV23 VACC 2 YRS+ SUBQ/IM: CPT | Performed by: INTERNAL MEDICINE

## 2022-08-16 PROCEDURE — G0009 ADMIN PNEUMOCOCCAL VACCINE: HCPCS | Performed by: INTERNAL MEDICINE

## 2022-08-16 PROCEDURE — 1159F MED LIST DOCD IN RCRD: CPT | Performed by: INTERNAL MEDICINE

## 2022-08-16 PROCEDURE — 36415 COLL VENOUS BLD VENIPUNCTURE: CPT

## 2022-08-16 PROCEDURE — 85027 COMPLETE CBC AUTOMATED: CPT

## 2022-08-16 PROCEDURE — G0439 PPPS, SUBSEQ VISIT: HCPCS | Performed by: INTERNAL MEDICINE

## 2022-08-16 PROCEDURE — 1126F AMNT PAIN NOTED NONE PRSNT: CPT | Performed by: INTERNAL MEDICINE

## 2022-08-16 PROCEDURE — 1170F FXNL STATUS ASSESSED: CPT | Performed by: INTERNAL MEDICINE

## 2022-08-16 RX ORDER — AMLODIPINE BESYLATE 10 MG/1
10 TABLET ORAL
Qty: 90 TABLET | Refills: 1 | Status: SHIPPED | OUTPATIENT
Start: 2022-08-16

## 2022-08-16 RX ORDER — ATORVASTATIN CALCIUM 80 MG/1
80 TABLET, FILM COATED ORAL DAILY
Qty: 90 TABLET | Refills: 1 | Status: SHIPPED | OUTPATIENT
Start: 2022-08-16

## 2022-08-16 RX ORDER — CLOPIDOGREL BISULFATE 75 MG/1
75 TABLET ORAL DAILY
Qty: 90 TABLET | Refills: 1 | Status: SHIPPED | OUTPATIENT
Start: 2022-08-16

## 2022-08-16 RX ORDER — ISOSORBIDE DINITRATE 20 MG/1
20 TABLET ORAL 3 TIMES DAILY
Qty: 270 TABLET | Refills: 1 | Status: SHIPPED | OUTPATIENT
Start: 2022-08-16 | End: 2022-11-02

## 2022-08-16 RX ORDER — METOPROLOL SUCCINATE 100 MG/1
100 TABLET, EXTENDED RELEASE ORAL DAILY
Qty: 90 TABLET | Refills: 1 | Status: SHIPPED | OUTPATIENT
Start: 2022-08-16

## 2022-08-16 RX ORDER — HYDRALAZINE HYDROCHLORIDE 50 MG/1
50 TABLET, FILM COATED ORAL 3 TIMES DAILY
Qty: 270 TABLET | Refills: 1 | Status: SHIPPED | OUTPATIENT
Start: 2022-08-16 | End: 2022-11-02 | Stop reason: SDUPTHER

## 2022-08-16 RX ORDER — CYCLOBENZAPRINE HCL 10 MG
10 TABLET ORAL 3 TIMES DAILY PRN
Qty: 90 TABLET | Refills: 2 | Status: SHIPPED | OUTPATIENT
Start: 2022-08-16 | End: 2022-11-02

## 2022-08-16 RX ORDER — LISINOPRIL 20 MG/1
20 TABLET ORAL DAILY
Qty: 90 TABLET | Refills: 1 | Status: SHIPPED | OUTPATIENT
Start: 2022-08-16 | End: 2022-11-07 | Stop reason: SDUPTHER

## 2022-08-16 NOTE — PROGRESS NOTES
The ABCs of the Annual Wellness Visit  Subsequent Medicare Wellness Visit    No chief complaint on file.   See  note  Subjective    History of Present Illness:  Dao Jhaveri is a 67 y.o. male who presents for a Subsequent Medicare Wellness Visit.    The following portions of the patient's history were reviewed and   updated as appropriate: allergies, current medications, past family history, past medical history, past social history, past surgical history and problem list.    Compared to one year ago, the patient feels his physical   health is better.    Compared to one year ago, the patient feels his mental   health is better.    Recent Hospitalizations:  He was not admitted to the hospital during the last year.       Current Medical Providers:  Patient Care Team:  Dallin Miller DO as PCP - General (Internal Medicine)  Beto Dover MD as Surgeon (Orthopedic Surgery)    Outpatient Medications Prior to Visit   Medication Sig Dispense Refill   • aspirin 81 MG EC tablet Take 81 mg by mouth Daily.     • nitroglycerin (NITROSTAT) 0.4 MG SL tablet Place 0.4 mg under the tongue Every 5 (Five) Minutes As Needed for Chest Pain. Take no more than 3 doses in 15 minutes.     • nortriptyline (Pamelor) 25 MG capsule Take 1 capsule by mouth Every Night. 30 capsule 0   • amLODIPine (NORVASC) 10 MG tablet Take 1 tablet by mouth every night at bedtime. 90 tablet 1   • atorvastatin (LIPITOR) 80 MG tablet Take 1 tablet by mouth Daily. 90 tablet 1   • clopidogrel (PLAVIX) 75 MG tablet Take 1 tablet by mouth Daily. 90 tablet 1   • cyclobenzaprine (FLEXERIL) 10 MG tablet Take 1 tablet by mouth 3 (Three) Times a Day As Needed for Muscle Spasms. 90 tablet 2   • hydrALAZINE (APRESOLINE) 50 MG tablet Take 1 tablet by mouth 3 (Three) Times a Day. 270 tablet 1   • HYDROcodone-acetaminophen (NORCO) 7.5-325 MG per tablet Take 1 tablet by mouth Every 8 (Eight) Hours As Needed for Moderate Pain .     • isosorbide dinitrate  "(ISORDIL) 20 MG tablet Take 1 tablet by mouth 3 (Three) Times a Day. 270 tablet 1   • lisinopril (PRINIVIL,ZESTRIL) 20 MG tablet Take 1 tablet by mouth Daily. 90 tablet 1   • metoprolol succinate XL (TOPROL-XL) 100 MG 24 hr tablet Take 1 tablet by mouth Daily. 90 tablet 1     No facility-administered medications prior to visit.       No opioid medication identified on active medication list. I have reviewed chart for other potential  high risk medication/s and harmful drug interactions in the elderly.          Aspirin is on active medication list. Aspirin use is indicated based on review of current medical condition/s. Pros and cons of this therapy have been discussed today. Benefits of this medication outweigh potential harm.  Patient has been encouraged to continue taking this medication.  .      Patient Active Problem List   Diagnosis   • Essential hypertension   • Mixed hyperlipidemia   • History of noncompliance with medical treatment   • History of stroke   • Contrast dye induced nephropathy   • Hemiparesis affecting nondominant side as late effect of cerebrovascular accident (CVA) (HCC)   • Nonexudative age-related macular degeneration   • Microcytic anemia   • Overweight (BMI 25.0-29.9)   • Impaired glucose tolerance     Advance Care Planning  Advance Directive is not on file.  ACP discussion was held with the patient during this visit. Patient does not have an advance directive, declines further assistance.          Objective    Vitals:    08/16/22 1350   BP: 112/74   BP Location: Left arm   Patient Position: Sitting   Cuff Size: Adult   Pulse: 65   Resp: 16   Temp: 98 °F (36.7 °C)   SpO2: 98%   Weight: 83.7 kg (184 lb 8 oz)   Height: 167.6 cm (66\")     Estimated body mass index is 29.78 kg/m² as calculated from the following:    Height as of this encounter: 167.6 cm (66\").    Weight as of this encounter: 83.7 kg (184 lb 8 oz).    BMI is >= 25 and <30. (Overweight) The following options were offered after " discussion;: exercise counseling/recommendations and nutrition counseling/recommendations      Does the patient have evidence of cognitive impairment? No    Physical Exam            HEALTH RISK ASSESSMENT    Smoking Status:  Social History     Tobacco Use   Smoking Status Former Smoker   • Packs/day: 1.50   • Years: 35.00   • Pack years: 52.50   • Types: Cigarettes   • Quit date: 7/3/2013   • Years since quittin.1   Smokeless Tobacco Never Used     Alcohol Consumption:  Social History     Substance and Sexual Activity   Alcohol Use No     Fall Risk Screen:    STEADI Fall Risk Assessment was completed, and patient is at LOW risk for falls.Assessment completed on:2022    Depression Screening:  PHQ-2/PHQ-9 Depression Screening 2022   Retired PHQ-9 Total Score -   Retired Total Score -   Little Interest or Pleasure in Doing Things 0-->not at all   Feeling Down, Depressed or Hopeless 0-->not at all   PHQ-9: Brief Depression Severity Measure Score 0       Health Habits and Functional and Cognitive Screening:  Functional & Cognitive Status 2022   Do you have difficulty preparing food and eating? No   Do you have difficulty bathing yourself, getting dressed or grooming yourself? No   Do you have difficulty using the toilet? No   Do you have difficulty moving around from place to place? No   Do you have trouble with steps or getting out of a bed or a chair? No   Current Diet Unhealthy Diet   Dental Exam Up to date   Eye Exam Not up to date   Exercise (times per week) 3 times per week   Current Exercises Include Walking   Current Exercise Activities Include -   Do you need help using the phone?  No   Are you deaf or do you have serious difficulty hearing?  No   Do you need help with transportation? No   Do you need help shopping? No   Do you need help preparing meals?  No   Do you need help with housework?  No   Do you need help with laundry? No   Do you need help taking your medications? No   Do you need  help managing money? No   Do you ever drive or ride in a car without wearing a seat belt? No   Have you felt unusual stress, anger or loneliness in the last month? No   Who do you live with? Spouse   If you need help, do you have trouble finding someone available to you? No   Have you been bothered in the last four weeks by sexual problems? No   Do you have difficulty concentrating, remembering or making decisions? No       Age-appropriate Screening Schedule:  Refer to the list below for future screening recommendations based on patient's age, sex and/or medical conditions. Orders for these recommended tests are listed in the plan section. The patient has been provided with a written plan.    Health Maintenance   Topic Date Due   • TDAP/TD VACCINES (1 - Tdap) Never done   • ZOSTER VACCINE (1 of 2) Never done   • LIPID PANEL  02/02/2022   • INFLUENZA VACCINE  10/01/2022              Assessment & Plan   CMS Preventative Services Quick Reference  Risk Factors Identified During Encounter  Cardiovascular Disease  Depression/Dysphoria  Fall Risk-High or Moderate  Immunizations Discussed/Encouraged (specific Immunizations; Tdap, Influenza, Pneumococcal 23, Shingrix and COVID19  Obesity/Overweight   The above risks/problems have been discussed with the patient.  Follow up actions/plans if indicated are seen below in the Assessment/Plan Section.  Pertinent information has been shared with the patient in the After Visit Summary.    Diagnoses and all orders for this visit:    1. History of stroke (Primary)  2. Hemiparesis affecting nondominant side as late effect of cerebrovascular accident (CVA) (HCC)  -     clopidogrel (PLAVIX) 75 MG tablet; Take 1 tablet by mouth Daily.  Dispense: 90 tablet; Refill: 1  -     atorvastatin (LIPITOR) 80 MG tablet; Take 1 tablet by mouth Daily.  Dispense: 90 tablet; Refill: 1  -     Lipid Panel; Future  -     Ambulatory Referral to Optometry  -     Ambulatory Referral to Physical Therapy  -      Ambulatory Referral to Occupational Therapy  -     Ambulatory Referral to Speech Therapy  He feels his speech is still off. Will refer to SLP as well as PT/OT as he has been feeling somewhat weaker since finishing last.     3. Blurry vision, left eye  -     Ambulatory Referral to Optometry  He has known macular degeneration, but has noticed progression. Referral to optometry for routine screening and evaluation.     4. Muscle spasm of left lower extremity  -     cyclobenzaprine (FLEXERIL) 10 MG tablet; Take 1 tablet by mouth 3 (Three) Times a Day As Needed for Muscle Spasms.  Dispense: 90 tablet; Refill: 2    5. Essential hypertension  -     metoprolol succinate XL (TOPROL-XL) 100 MG 24 hr tablet; Take 1 tablet by mouth Daily.  Dispense: 90 tablet; Refill: 1  -     lisinopril (PRINIVIL,ZESTRIL) 20 MG tablet; Take 1 tablet by mouth Daily.  Dispense: 90 tablet; Refill: 1  -     isosorbide dinitrate (ISORDIL) 20 MG tablet; Take 1 tablet by mouth 3 (Three) Times a Day.  Dispense: 270 tablet; Refill: 1  -     hydrALAZINE (APRESOLINE) 50 MG tablet; Take 1 tablet by mouth 3 (Three) Times a Day.  Dispense: 270 tablet; Refill: 1  -     amLODIPine (NORVASC) 10 MG tablet; Take 1 tablet by mouth every night at bedtime.  Dispense: 90 tablet; Refill: 1  -     Comprehensive Metabolic Panel; Future  -     Lipid Panel; Future  -     CBC (No Diff); Future  Well controlled, BP goal for age is <140/90 per JNC 8 guidelines and continue current medications    6. Mixed hyperlipidemia  -     atorvastatin (LIPITOR) 80 MG tablet; Take 1 tablet by mouth Daily.  Dispense: 90 tablet; Refill: 1  Stable on high intensity statin therapy per ACC/AHA guidelines.    7. Overweight (BMI 25.0-29.9)  BMI is >= 25 and <30. (Overweight) The following options were offered after discussion;: exercise counseling/recommendations and nutrition counseling/recommendations    8. Impaired glucose tolerance  A1c with labs.     9. Need for vaccination  -      Pneumococcal Polysaccharide Vaccine 23-Valent Greater Than or Equal To 1yo Subcutaneous / IM    10. Annual visit for general adult medical examination with abnormal findings  -     Hemoglobin A1c; Future        Follow Up:   Return in about 6 months (around 2/16/2023) for Recheck.     An After Visit Summary and PPPS were made available to the patient.

## 2022-08-16 NOTE — PATIENT INSTRUCTIONS
Medicare Wellness  Personal Prevention Plan of Service     Date of Office Visit:    Encounter Provider:  Dallin Miller DO  Place of Service:  [facility] INTERNAL MEDICINE  Patient Name: Dao Jhaveri  :  1955    As part of the Medicare Wellness portion of your visit today, we are providing you with this personalized preventive plan of services (PPPS). This plan is based upon recommendations of the [place] Preventive Services Task Force (USPSTF) and the Advisory Committee on Immunization Practices (ACIP).    This lists the preventive care services that should be considered, and provides dates of when you are due. Items listed as completed are up-to-date and do not require any further intervention.    Health Maintenance   Topic Date Due    TDAP/TD VACCINES (1 - Tdap) Never done    ZOSTER VACCINE (1 of 2) Never done    Pneumococcal Vaccine 65+ (2 - PPSV23 or PCV20) 2021    LIPID PANEL  2022    COVID-19 Vaccine (4 - Booster for Moderna series) 2022    INFLUENZA VACCINE  10/01/2022    LUNG CANCER SCREENING  2023    ANNUAL WELLNESS VISIT  2023    COLORECTAL CANCER SCREENING  2024    HEPATITIS C SCREENING  Completed    AAA SCREEN (ONE-TIME)  Completed       Orders Placed This Encounter   Procedures    Pneumococcal Polysaccharide Vaccine 23-Valent Greater Than or Equal To 1yo Subcutaneous / IM    Comprehensive Metabolic Panel     Standing Status:   Future     Standing Expiration Date:   2023     Order Specific Question:   Release to patient     Answer:   Routine Release    Hemoglobin A1c     Standing Status:   Future     Standing Expiration Date:   2023     Order Specific Question:   Release to patient     Answer:   Routine Release    Lipid Panel     Standing Status:   Future     Standing Expiration Date:   2023    CBC (No Diff)     Standing Status:   Future     Standing Expiration Date:   2023     Order Specific Question:    Release to patient     Answer:   Routine Release    Ambulatory Referral to Optometry     Referral Priority:   Routine     Referral Type:   Consultation     Referral Reason:   Specialty Services Required     Requested Specialty:   Optometry     Number of Visits Requested:   1    Ambulatory Referral to Physical Therapy     Referral Priority:   Routine     Referral Type:   Physical Therapy     Referral Reason:   Specialty Services Required     Requested Specialty:   Physical Therapy     Number of Visits Requested:   1    Ambulatory Referral to Occupational Therapy     Referral Priority:   Routine     Referral Type:   Occupational Therapy     Referral Reason:   Specialty Services Required     Requested Specialty:   Occupational Therapy     Number of Visits Requested:   1    Ambulatory Referral to Speech Therapy     Referral Priority:   Routine     Referral Type:   Speech Pathology     Referral Reason:   Specialty Services Required     Requested Specialty:   Speech Pathology     Number of Visits Requested:   1       Return in about 6 months (around 2/16/2023) for Recheck.          Advance Care Planning and Advance Directives     You make decisions on a daily basis - decisions about where you want to live, your career, your home, your life. Perhaps one of the most important decisions you face is your choice for future medical care. Take time to talk with your family and your healthcare team and start planning today.  Advance Care Planning is a process that can help you:  Understand possible future healthcare decisions in light of your own experiences  Reflect on those decision in light of your goals and values  Discuss your decisions with those closest to you and the healthcare professionals that care for you  Make a plan by creating a document that reflects your wishes    Surrogate Decision Maker  In the event of a medical emergency, which has left you unable to communicate or to make your own decisions, you would need  someone to make decisions for you.  It is important to discuss your preferences for medical treatment with this person while you are in good health.     Qualities of a surrogate decision maker:  Willing to take on this role and responsibility  Knows what you want for future medical care  Willing to follow your wishes even if they don't agree with them  Able to make difficult medical decisions under stressful circumstances    Advance Directives  These are legal documents you can create that will guide your healthcare team and decision maker(s) when needed. These documents can be stored in the electronic medical record.    Living Will - a legal document to guide your care if you have a terminal condition or a serious illness and are unable to communicate. States vary by statute in document names/types, but most forms may include one or more of the following:        -  Directions regarding life-prolonging treatments        -  Directions regarding artificially provided nutrition/hydration        -  Choosing a healthcare decision maker        -  Direction regarding organ/tissue donation    Durable Power of  for Healthcare - this document names an -in-fact to make medical decisions for you, but it may also allow this person to make personal and financial decisions for you. Please seek the advice of an  if you need this type of document.    **Advance Directives are not required and no one may discriminate against you if you do not sign one.    Medical Orders  Many states allow specific forms/orders signed by your physician to record your wishes for medical treatment in your current state of health. This form, signed in personal communication with your physician, addresses resuscitation and other medical interventions that you may or may not want.      For more information or to schedule a time with a [facility] Advance Care Planning Facilitator contact: [email or web address] or call [phone]  and someone will contact you directly.

## 2022-08-16 NOTE — PROGRESS NOTES
CC: f/u stroke and speech difficulty    History:  Dao Jhaveri is a 67 y.o. male   He reports feeling reasonably well, though he notes his speech is slower and more labored than it was prior to strokes. Weakness worse since his last course of PT/OT.        ROS:  Review of Systems   Respiratory: Negative for shortness of breath.    Cardiovascular: Negative for chest pain.   Neurological: Positive for speech difficulty and weakness.        reports that he quit smoking about 9 years ago. His smoking use included cigarettes. He has a 52.50 pack-year smoking history. He has never used smokeless tobacco. He reports that he does not drink alcohol and does not use drugs.      Current Outpatient Medications:   •  amLODIPine (NORVASC) 10 MG tablet, Take 1 tablet by mouth every night at bedtime., Disp: 90 tablet, Rfl: 1  •  aspirin 81 MG EC tablet, Take 81 mg by mouth Daily., Disp: , Rfl:   •  atorvastatin (LIPITOR) 80 MG tablet, Take 1 tablet by mouth Daily., Disp: 90 tablet, Rfl: 1  •  clopidogrel (PLAVIX) 75 MG tablet, Take 1 tablet by mouth Daily., Disp: 90 tablet, Rfl: 1  •  cyclobenzaprine (FLEXERIL) 10 MG tablet, Take 1 tablet by mouth 3 (Three) Times a Day As Needed for Muscle Spasms., Disp: 90 tablet, Rfl: 2  •  hydrALAZINE (APRESOLINE) 50 MG tablet, Take 1 tablet by mouth 3 (Three) Times a Day., Disp: 270 tablet, Rfl: 1  •  isosorbide dinitrate (ISORDIL) 20 MG tablet, Take 1 tablet by mouth 3 (Three) Times a Day., Disp: 270 tablet, Rfl: 1  •  lisinopril (PRINIVIL,ZESTRIL) 20 MG tablet, Take 1 tablet by mouth Daily., Disp: 90 tablet, Rfl: 1  •  metoprolol succinate XL (TOPROL-XL) 100 MG 24 hr tablet, Take 1 tablet by mouth Daily., Disp: 90 tablet, Rfl: 1  •  nitroglycerin (NITROSTAT) 0.4 MG SL tablet, Place 0.4 mg under the tongue Every 5 (Five) Minutes As Needed for Chest Pain. Take no more than 3 doses in 15 minutes., Disp: , Rfl:   •  nortriptyline (Pamelor) 25 MG capsule, Take 1 capsule by mouth Every Night.,  "Disp: 30 capsule, Rfl: 0    OBJECTIVE:  /74 (BP Location: Left arm, Patient Position: Sitting, Cuff Size: Adult)   Pulse 65   Temp 98 °F (36.7 °C)   Resp 16   Ht 167.6 cm (66\")   Wt 83.7 kg (184 lb 8 oz)   SpO2 98%   BMI 29.78 kg/m²    Physical Exam  Constitutional:       General: He is not in acute distress.  Cardiovascular:      Rate and Rhythm: Normal rate and regular rhythm.      Heart sounds: Normal heart sounds. No murmur heard.  Pulmonary:      Effort: Pulmonary effort is normal. No respiratory distress.      Breath sounds: Normal breath sounds. No wheezing.   Neurological:      Mental Status: He is alert and oriented to person, place, and time.      Gait: Gait normal.   Psychiatric:         Mood and Affect: Mood normal.         Behavior: Behavior normal.         Assessment/Plan       1. History of stroke (Primary)  2. Hemiparesis affecting nondominant side as late effect of cerebrovascular accident (CVA) (HCC)  -     clopidogrel (PLAVIX) 75 MG tablet; Take 1 tablet by mouth Daily.  Dispense: 90 tablet; Refill: 1  -     atorvastatin (LIPITOR) 80 MG tablet; Take 1 tablet by mouth Daily.  Dispense: 90 tablet; Refill: 1  -     Lipid Panel; Future  -     Ambulatory Referral to Optometry  -     Ambulatory Referral to Physical Therapy  -     Ambulatory Referral to Occupational Therapy  -     Ambulatory Referral to Speech Therapy  He feels his speech is still off. Will refer to SLP as well as PT/OT as he has been feeling somewhat weaker since finishing last.     3. Blurry vision, left eye  -     Ambulatory Referral to Optometry  He has known macular degeneration, but has noticed progression. Referral to optometry for routine screening and evaluation.     4. Muscle spasm of left lower extremity  -     cyclobenzaprine (FLEXERIL) 10 MG tablet; Take 1 tablet by mouth 3 (Three) Times a Day As Needed for Muscle Spasms.  Dispense: 90 tablet; Refill: 2    5. Essential hypertension  -     metoprolol succinate XL " (TOPROL-XL) 100 MG 24 hr tablet; Take 1 tablet by mouth Daily.  Dispense: 90 tablet; Refill: 1  -     lisinopril (PRINIVIL,ZESTRIL) 20 MG tablet; Take 1 tablet by mouth Daily.  Dispense: 90 tablet; Refill: 1  -     isosorbide dinitrate (ISORDIL) 20 MG tablet; Take 1 tablet by mouth 3 (Three) Times a Day.  Dispense: 270 tablet; Refill: 1  -     hydrALAZINE (APRESOLINE) 50 MG tablet; Take 1 tablet by mouth 3 (Three) Times a Day.  Dispense: 270 tablet; Refill: 1  -     amLODIPine (NORVASC) 10 MG tablet; Take 1 tablet by mouth every night at bedtime.  Dispense: 90 tablet; Refill: 1  -     Comprehensive Metabolic Panel; Future  -     Lipid Panel; Future  -     CBC (No Diff); Future  Well controlled, BP goal for age is <140/90 per JNC 8 guidelines and continue current medications    6. Mixed hyperlipidemia  -     atorvastatin (LIPITOR) 80 MG tablet; Take 1 tablet by mouth Daily.  Dispense: 90 tablet; Refill: 1  Stable on high intensity statin therapy per ACC/AHA guidelines.    7. Overweight (BMI 25.0-29.9)  BMI is >= 25 and <30. (Overweight) The following options were offered after discussion;: exercise counseling/recommendations and nutrition counseling/recommendations    8. Impaired glucose tolerance  A1c with labs.     9. Need for vaccination  -     Pneumococcal Polysaccharide Vaccine 23-Valent Greater Than or Equal To 1yo Subcutaneous / IM    10. Annual visit for general adult medical examination with abnormal findings  -     Hemoglobin A1c; Future      An After Visit Summary was printed and given to the patient at discharge.  Return in about 6 months (around 2/16/2023) for Recheck.          Dallin Miller D.O. 8/16/2022   Electronically signed.

## 2022-08-22 ENCOUNTER — OFFICE VISIT (OUTPATIENT)
Dept: PHYSICAL THERAPY | Facility: CLINIC | Age: 67
End: 2022-08-22

## 2022-08-22 ENCOUNTER — TREATMENT (OUTPATIENT)
Dept: PHYSICAL THERAPY | Facility: CLINIC | Age: 67
End: 2022-08-22

## 2022-08-22 DIAGNOSIS — R47.89 SLOW RATE OF SPEECH: ICD-10-CM

## 2022-08-22 DIAGNOSIS — I63.9 CEREBROVASCULAR ACCIDENT (CVA), UNSPECIFIED MECHANISM: ICD-10-CM

## 2022-08-22 DIAGNOSIS — Z86.73 HISTORY OF STROKE: Primary | ICD-10-CM

## 2022-08-22 DIAGNOSIS — I69.359 HEMIPARESIS AFFECTING NONDOMINANT SIDE AS LATE EFFECT OF CEREBROVASCULAR ACCIDENT (CVA): ICD-10-CM

## 2022-08-22 DIAGNOSIS — R47.81 DEFICIT IN COMMUNICATION DUE TO SLURRED SPEECH: Primary | ICD-10-CM

## 2022-08-22 PROCEDURE — 92523 SPEECH SOUND LANG COMPREHEN: CPT

## 2022-08-22 PROCEDURE — 97165 OT EVAL LOW COMPLEX 30 MIN: CPT
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
## 2022-08-23 ENCOUNTER — TREATMENT (OUTPATIENT)
Dept: PHYSICAL THERAPY | Facility: CLINIC | Age: 67
End: 2022-08-23

## 2022-08-23 DIAGNOSIS — Z86.73 HISTORY OF STROKE: Primary | ICD-10-CM

## 2022-08-23 DIAGNOSIS — R26.9 GAIT DISTURBANCE: ICD-10-CM

## 2022-08-23 DIAGNOSIS — I69.359 HEMIPARESIS AFFECTING NONDOMINANT SIDE AS LATE EFFECT OF CEREBROVASCULAR ACCIDENT (CVA): ICD-10-CM

## 2022-08-23 PROCEDURE — 97535 SELF CARE MNGMENT TRAINING: CPT | Performed by: PHYSICAL THERAPIST

## 2022-08-23 PROCEDURE — 97161 PT EVAL LOW COMPLEX 20 MIN: CPT | Performed by: PHYSICAL THERAPIST
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
## 2022-08-29 ENCOUNTER — TREATMENT (OUTPATIENT)
Dept: PHYSICAL THERAPY | Facility: CLINIC | Age: 67
End: 2022-08-29

## 2022-08-29 DIAGNOSIS — I63.9 CEREBROVASCULAR ACCIDENT (CVA), UNSPECIFIED MECHANISM: ICD-10-CM

## 2022-08-29 DIAGNOSIS — R47.81 DEFICIT IN COMMUNICATION DUE TO SLURRED SPEECH: Primary | ICD-10-CM

## 2022-08-29 DIAGNOSIS — R47.89 SLOW RATE OF SPEECH: ICD-10-CM

## 2022-08-29 PROCEDURE — 92507 TX SP LANG VOICE COMM INDIV: CPT

## 2022-08-29 NOTE — PROGRESS NOTES
Speech Language Pathology Treatment Note    Patient: Dao Jhaveri                                                                                     Visit Date: 2022  :     1955    Referring practitioner:    Dallin Miller DO  Date of Initial Visit:          Type: THERAPY  Noted: 2022    Patient seen for 2 sessions    Visit Diagnoses:    ICD-10-CM ICD-9-CM   1. Deficit in communication due to slurred speech  F80.89 315.39   2. Slow rate of speech  R47.89 784.59   3. Cerebrovascular accident (CVA), unspecified mechanism (HCC)  I63.9 434.91     SUBJECTIVE     Patient was alert and ready to participate in therapy today. He stated that he is having seasonal allergies and coughing more and choking on liquids.     OBJECTIVE   GOALS  Goals                                         STG Comments Status   Patient will demonstrate natural speech rate while targeting sentences up to 10 words across 3 consecutive sessions.   Patient demonstrated proper prosody with minimal verbal cues with 5 word sentences. Targeted 3 word phrases and built up to 5 word sentences with minimal fatigue after verbal cue.   Ongoing  2022     Patient will demonstarte decreased fatigue while talking by demonstrated diaphragmatic breathing.      Patient demonstrated proper use of diaphragmatic breathing with minimal prompts. Instructed to practice 3x daily for 5 reps to improve breath support to decrease vocal fatigue while speaking.    Ongoing  2022     Assess for swallowing     Performed swallow screening where he took two sips of water with minimal vocal wet quality. After drinking 3oz of water he coughed twice and wet vocal quality. This warrants for FLVS to be performed to rule out aspiration.                LTG by:        In 3 months, patient will demonstrate proper speech rate as measured by clinical observation.     Introduction to goals  Ongoing  8/29/2022     In 3 months, patient will demonstrate decreased fatigue while talking as measured by clinical observation.     Introduction to goals Ongoing  8/29/2022           Therapy Education/Self Care    Details: Reading longer utterances and breathing exercises   Given home strategies   Progress: Progressed   Education provided to:  Patient   Level of understanding Verbalized             Total Time of Visit:             40   mins         ASSESSMENT/PLAN     ASSESSMENT: Introduction to goals targeting prosody, articulation, and breath support. Participated in swallow screen that warrants for swallow study to be performed to rule out aspiration.     PLAN: continue therapy; order FLVS swallow study    Progress Note Due Date: 09/21/2022  Recertification Due Date: 11/19/2022    SIGNATURE: Elsa Alonso CCC-SLP, [place] License #: [id number]  Electronically Signed on 8/29/2022          [address] [address]  [phone]

## 2022-09-12 ENCOUNTER — TREATMENT (OUTPATIENT)
Dept: PHYSICAL THERAPY | Facility: CLINIC | Age: 67
End: 2022-09-12

## 2022-09-12 DIAGNOSIS — I63.9 CEREBROVASCULAR ACCIDENT (CVA), UNSPECIFIED MECHANISM: ICD-10-CM

## 2022-09-12 DIAGNOSIS — R47.89 SLOW RATE OF SPEECH: ICD-10-CM

## 2022-09-12 DIAGNOSIS — R47.81 DEFICIT IN COMMUNICATION DUE TO SLURRED SPEECH: Primary | ICD-10-CM

## 2022-09-12 PROCEDURE — 92507 TX SP LANG VOICE COMM INDIV: CPT

## 2022-09-12 NOTE — PROGRESS NOTES
Speech Language Pathology Treatment Note    Patient: Dao Jhaveri                                                                                     Visit Date: 2022  :     1955    Referring practitioner:    Dallin Miller DO  Date of Initial Visit:          Type: THERAPY  Noted: 2022    Patient seen for 3 sessions    Visit Diagnoses:    ICD-10-CM ICD-9-CM   1. Deficit in communication due to slurred speech  F80.89 315.39   2. Slow rate of speech  R47.89 784.59   3. Cerebrovascular accident (CVA), unspecified mechanism (HCC)  I63.9 434.91     SUBJECTIVE     Patient was alert and ready to participate in therapy today. He stated that he is having seasonal allergies and coughing more and choking on liquids.     OBJECTIVE   GOALS  Goals                                         STG Comments Status   Patient will demonstrate natural speech rate while targeting sentences up to 10 words across 3 consecutive sessions.   Patient demonstrated proper prosody with minimal verbal cues with 7 word sentences. Targeted 5 word phrases and built up to 7 word sentences with minimal fatigue after verbal cue.   Ongoing  2022     Patient will demonstarte decreased fatigue while talking by demonstrated diaphragmatic breathing.      Patient demonstrated proper use of diaphragmatic breathing with minimal prompts. Instructed to practice 3x daily for 5 reps to improve breath support to decrease vocal fatigue while speaking.    Ongoing  2022     Assess for swallowing     Swallow study scheduled for Thursday at 8am.                LTG by:        In 3 months, patient will demonstrate proper speech rate as measured by clinical observation.     Goals are progressing Ongoing  2022     In 3 months, patient will demonstrate decreased fatigue while talking as measured by clinical observation.     Goals are progressing Ongoing  2022            Therapy Education/Self Care    Details: Reading longer utterances and breathing exercises   Given home strategies   Progress: Progressed   Education provided to:  Patient   Level of understanding Verbalized             Total Time of Visit:             45   mins         ASSESSMENT/PLAN     ASSESSMENT: Introduction to goals targeting prosody, articulation, and breath support. Swallow study scheduled for Thursday.     PLAN: continue therapy; order FLVS swallow study    Progress Note Due Date: 09/21/2022  Recertification Due Date: 11/19/2022    SIGNATURE: Elsa Alonso CCC-SLP, [place] License #: [id number]  Electronically Signed on 9/12/2022          [address] [place] [address] [place] [address]  [phone]

## 2022-09-13 ENCOUNTER — TELEPHONE (OUTPATIENT)
Dept: INTERNAL MEDICINE | Facility: CLINIC | Age: 67
End: 2022-09-13

## 2022-09-13 DIAGNOSIS — I10 ESSENTIAL HYPERTENSION: ICD-10-CM

## 2022-09-13 DIAGNOSIS — E78.2 MIXED HYPERLIPIDEMIA: ICD-10-CM

## 2022-09-13 DIAGNOSIS — Z86.73 HISTORY OF STROKE: ICD-10-CM

## 2022-09-13 RX ORDER — LISINOPRIL 20 MG/1
20 TABLET ORAL DAILY
Qty: 90 TABLET | Refills: 1 | OUTPATIENT
Start: 2022-09-13

## 2022-09-13 RX ORDER — CLOPIDOGREL BISULFATE 75 MG/1
75 TABLET ORAL DAILY
Qty: 90 TABLET | Refills: 1 | OUTPATIENT
Start: 2022-09-13

## 2022-09-13 RX ORDER — METOPROLOL SUCCINATE 100 MG/1
100 TABLET, EXTENDED RELEASE ORAL DAILY
Qty: 90 TABLET | Refills: 1 | OUTPATIENT
Start: 2022-09-13

## 2022-09-13 RX ORDER — ATORVASTATIN CALCIUM 80 MG/1
80 TABLET, FILM COATED ORAL DAILY
Qty: 90 TABLET | Refills: 1 | OUTPATIENT
Start: 2022-09-13

## 2022-09-13 NOTE — TELEPHONE ENCOUNTER
Caller: Dao Jhaveri    Relationship: Self    Best call back number: [phone]    Requested Prescriptions: amoxicillin 500 MG  Requested Prescriptions     Pending Prescriptions Disp Refills   • metoprolol succinate XL (TOPROL-XL) 100 MG 24 hr tablet 90 tablet 1     Sig: Take 1 tablet by mouth Daily.   • atorvastatin (LIPITOR) 80 MG tablet 90 tablet 1     Sig: Take 1 tablet by mouth Daily.   • lisinopril (PRINIVIL,ZESTRIL) 20 MG tablet 90 tablet 1     Sig: Take 1 tablet by mouth Daily.   • clopidogrel (PLAVIX) 75 MG tablet 90 tablet 1     Sig: Take 1 tablet by mouth Daily.        Pharmacy where request should be sent: [facility] [address] [place] [address] - [address] - [phone] [facility] [phone] FX     Does the patient have less than a 3 day supply:  [x] Yes  [] No    Ko Sage Rep   09/13/22 12:28 CDT

## 2022-09-15 ENCOUNTER — APPOINTMENT (OUTPATIENT)
Dept: GENERAL RADIOLOGY | Facility: HOSPITAL | Age: 67
End: 2022-09-15

## 2022-09-19 ENCOUNTER — TREATMENT (OUTPATIENT)
Dept: PHYSICAL THERAPY | Facility: CLINIC | Age: 67
End: 2022-09-19

## 2022-09-19 DIAGNOSIS — R47.89 SLOW RATE OF SPEECH: ICD-10-CM

## 2022-09-19 DIAGNOSIS — R47.81 DEFICIT IN COMMUNICATION DUE TO SLURRED SPEECH: Primary | ICD-10-CM

## 2022-09-19 DIAGNOSIS — I63.9 CEREBROVASCULAR ACCIDENT (CVA), UNSPECIFIED MECHANISM: ICD-10-CM

## 2022-09-19 PROCEDURE — 92507 TX SP LANG VOICE COMM INDIV: CPT

## 2022-09-19 NOTE — PROGRESS NOTES
Speech Language Pathology 30 Day Progress Note    Patient: Dao Jhaveri                                                                                     Visit Date: 2022  :     1955    Referring practitioner:    Dallin Miller DO  Date of Initial Visit:          Type: THERAPY  Noted: 2022    Patient seen for 4 sessions    Visit Diagnoses:    ICD-10-CM ICD-9-CM   1. Deficit in communication due to slurred speech  F80.89 315.39   2. Slow rate of speech  R47.89 784.59   3. Cerebrovascular accident (CVA), unspecified mechanism (HCC)  I63.9 434.91     SUBJECTIVE     Patient was alert and ready to participate in therapy today. He stated that his voice sounds better and that he has his swallow study tomorrow.     OBJECTIVE   GOALS  Goals                                         STG Comments Status   Patient will demonstrate natural speech rate while targeting sentences up to 10 words across 3 consecutive sessions.   Patient demonstrated proper prosody with minimal verbal cues with 10 word sentences. Targeted 7 word phrases and built up to 10 word sentences with minimal fatigue after verbal cue.   Ongoing  2022     Patient will demonstarte decreased fatigue while talking by demonstrated diaphragmatic breathing.      Patient demonstrated proper use of diaphragmatic breathing with minimal prompts. Instructed to practice 3x daily for 5 reps to improve breath support to decrease vocal fatigue while speaking.    Ongoing  2022     Assess for swallowing     Swallow study scheduled for tomorrow at 9am.                LTG by:        In 3 months, patient will demonstrate proper speech rate as measured by clinical observation.     Goals are progressing Ongoing  2022     In 3 months, patient will demonstrate decreased fatigue while talking as measured by clinical observation.     Goals are progressing Ongoing  2022            Therapy Education/Self Care    Details: Reading longer utterances and breathing exercises   Given home strategies   Progress: Progressed   Education provided to:  Patient   Level of understanding Verbalized             Total Time of Visit:             45   mins         ASSESSMENT/PLAN     ASSESSMENT: Introduction to goals targeting prosody, articulation, and breath support. Swallow study scheduled for tomorrow.     PLAN: continue therapy; order FLVS swallow study    Progress Note Due Date: 10/18/2022  Recertification Due Date: 11/19/2022    SIGNATURE: Elsa Alonso CCC-SLP, [place] License #: [id number]  Electronically Signed on 9/19/2022          [address] [place] [address] [place] [address]  [phone]

## 2022-09-20 ENCOUNTER — HOSPITAL ENCOUNTER (OUTPATIENT)
Dept: GENERAL RADIOLOGY | Facility: HOSPITAL | Age: 67
Discharge: HOME | End: 2022-09-20
Admitting: INTERNAL MEDICINE

## 2022-09-20 ENCOUNTER — TELEPHONE (OUTPATIENT)
Dept: INTERNAL MEDICINE | Facility: CLINIC | Age: 67
End: 2022-09-20

## 2022-09-20 DIAGNOSIS — R13.13 PHARYNGEAL DYSPHAGIA: Primary | ICD-10-CM

## 2022-09-20 DIAGNOSIS — R47.89 SLOW RATE OF SPEECH: ICD-10-CM

## 2022-09-20 DIAGNOSIS — I63.9 CEREBROVASCULAR ACCIDENT (CVA), UNSPECIFIED MECHANISM: ICD-10-CM

## 2022-09-20 DIAGNOSIS — R47.81 DEFICIT IN COMMUNICATION DUE TO SLURRED SPEECH: ICD-10-CM

## 2022-09-20 PROCEDURE — 92611 MOTION FLUOROSCOPY/SWALLOW: CPT | Performed by: SPEECH-LANGUAGE PATHOLOGIST

## 2022-09-20 PROCEDURE — 74230 X-RAY XM SWLNG FUNCJ C+: CPT

## 2022-09-20 RX ADMIN — BARIUM SULFATE 25 ML: 400 PASTE ORAL at 09:11

## 2022-09-20 RX ADMIN — BARIUM SULFATE 55 ML: 0.81 POWDER, FOR SUSPENSION ORAL at 09:11

## 2022-09-20 RX ADMIN — BARIUM SULFATE 60 ML: 400 SUSPENSION ORAL at 09:11

## 2022-09-20 NOTE — TELEPHONE ENCOUNTER
Caller: Dao Jhaveri    Relationship: Self    Best call back number: [phone]    What form or medical record are you requesting: MEDICATION LIST       Who is requesting this form or medical record from you: SELF       How would you like to receive the form or medical records (pick-up, mail, fax): FAX    DR SMITH'S OFFICE  [facility]     Timeframe paperwork needed: SOON PLEASE HAS APPOINTMENT ON Friday
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
## 2022-09-26 ENCOUNTER — TREATMENT (OUTPATIENT)
Dept: PHYSICAL THERAPY | Facility: CLINIC | Age: 67
End: 2022-09-26

## 2022-09-26 DIAGNOSIS — I63.9 CEREBROVASCULAR ACCIDENT (CVA), UNSPECIFIED MECHANISM: ICD-10-CM

## 2022-09-26 DIAGNOSIS — R47.89 SLOW RATE OF SPEECH: ICD-10-CM

## 2022-09-26 DIAGNOSIS — R47.81 DEFICIT IN COMMUNICATION DUE TO SLURRED SPEECH: Primary | ICD-10-CM

## 2022-09-26 DIAGNOSIS — R13.13 DYSPHAGIA, PHARYNGEAL PHASE: ICD-10-CM

## 2022-09-26 PROCEDURE — 92526 ORAL FUNCTION THERAPY: CPT

## 2022-09-26 NOTE — PROGRESS NOTES
Speech Language Pathology Treatment Note    Patient: Dao Jhaveri                                                                                     Visit Date: 2022  :     1955    Referring practitioner:    Dallin Miller DO  Date of Initial Visit:          Type: THERAPY  Noted: 2022    Patient seen for 5 sessions    Visit Diagnoses:    ICD-10-CM ICD-9-CM   1. Deficit in communication due to slurred speech  F80.89 315.39   2. Slow rate of speech  R47.89 784.59   3. Cerebrovascular accident (CVA), unspecified mechanism (HCC)  I63.9 434.91   4. Dysphagia, pharyngeal phase  R13.13 787.23     SUBJECTIVE     Patient was alert and ready to participate in therapy today. He stated that his voice sounds better and that he had his swallow study done last week.     OBJECTIVE   GOALS  Goals                                         STG Comments Status   Patient will demonstrate natural speech rate while targeting sentences up to 10 words across 3 consecutive sessions.   Patient demonstrated proper prosody while reading short paragraphs with minimal cues. He spoke with a loud voice with good inflection and proper speech rate used for public speaking.   Ongoing  2022     Patient will demonstarte decreased fatigue while talking by demonstrated diaphragmatic breathing.      Patient demonstrated proper use of diaphragmatic breathing with minimal prompts. Instructed to practice 3x daily for 5 reps to improve breath support to decrease vocal fatigue while speaking.    Ongoing  2022     Patient will increase strength of tongue base and posterior pharyngeal walls to reduce residue that might fall into airway by completing    Patient demonstrated proper use of the effortful swallow and Frances Maneuver to improve swallowing and given handouts to perform at home. He performed the exercises 3 sets of 5xs with minimal cues.      New  9/26/2022      Patient will compensate for oral/pharyngeal deficits and reduce risks while eating by utilizing compensatory strategies  Eat 10 score: 10    Patient advised to swallow twice while eating to reduce risk of aspiration.     New  9/26/2022     LTG by:        In 3 months, patient will demonstrate proper speech rate as measured by clinical observation.     Goals are progressing Ongoing  9/26/2022     In 3 months, patient will demonstrate decreased fatigue while talking as measured by clinical observation.     Goals are progressing Ongoing  9/26/2022     Patient will safely consume the recommended diet without complications such as aspiration pneumonia. Went over swallow study and gave swallowing exercises to improve pharyngeal swallowing deficits.           Therapy Education/Self Care    Details: Reading longer utterances and breathing exercises   Given [employer]   Access Code: [email or web address]  URL: [email or web address]  Date: 09/26/2022  Prepared by: Elsa Alonso    Exercises  Effortful Swallow - 1 x daily - 7 x weekly - 3 sets - 10 reps  Frances Maneuver - 1 x daily - 7 x weekly - 3 sets - 10 reps  Seated Diaphragmatic Breathing - 1 x daily - 7 x weekly - 3 sets - 10 reps   Progress: Progressed   Education provided to:  Patient   Level of understanding Verbalized             Total Time of Visit:             45   mins         ASSESSMENT/PLAN     ASSESSMENT: Patient targeted reading short paragraphs with good prosody and speech rate. Introduction to swallowing goals.     PLAN: continue therapy    Progress Note Due Date: 10/18/2022  Recertification Due Date: 11/19/2022    SIGNATURE: Elsa Alonso CCC-SLP, [place] License #: [id number]  Electronically Signed on 9/26/2022          [address] [place] [address]  [phone]

## 2022-09-28 ENCOUNTER — TELEPHONE (OUTPATIENT)
Dept: INTERNAL MEDICINE | Facility: CLINIC | Age: 67
End: 2022-09-28

## 2022-09-28 NOTE — TELEPHONE ENCOUNTER
He is due for COVID booster and flu shot. He will need to get these 2 weeks apart. He is also due for shingles vaccine and Tdap however these can wait if needed.

## 2022-09-28 NOTE — TELEPHONE ENCOUNTER
Caller: Dao Jhaveri    Relationship: Self    Best call back number: [phone]    What is the best time to reach you: ANYTIME    Who are you requesting to speak with (clinical staff, provider,  specific staff member): DO LEARCH    What was the call regarding: PATIENT CALLED WANTED CLARIFICATION ON WHICH VACCINE HE WAS NEEDING, STATES HE SPOKE TO DO LEARCH DURING A VISIT AND HAD A DISCUSSION, BUT DOES NOT RECALL WHICH ONE TO TAKE. ALSO WANTED TO DETERMINE IF HE NEEDED THE RECENT BOOSTER.    Do you require a callback: YES

## 2022-10-17 ENCOUNTER — OFFICE VISIT (OUTPATIENT)
Dept: CARDIOLOGY | Facility: CLINIC | Age: 67
End: 2022-10-17

## 2022-10-17 VITALS
HEART RATE: 66 BPM | OXYGEN SATURATION: 99 % | DIASTOLIC BLOOD PRESSURE: 84 MMHG | SYSTOLIC BLOOD PRESSURE: 130 MMHG | BODY MASS INDEX: 29.57 KG/M2 | WEIGHT: 184 LBS | HEIGHT: 66 IN

## 2022-10-17 DIAGNOSIS — Z86.73 HISTORY OF STROKE: ICD-10-CM

## 2022-10-17 DIAGNOSIS — I69.359 HEMIPARESIS AFFECTING NONDOMINANT SIDE AS LATE EFFECT OF CEREBROVASCULAR ACCIDENT (CVA): ICD-10-CM

## 2022-10-17 DIAGNOSIS — E78.2 MIXED HYPERLIPIDEMIA: ICD-10-CM

## 2022-10-17 DIAGNOSIS — E66.3 OVERWEIGHT (BMI 25.0-29.9): ICD-10-CM

## 2022-10-17 DIAGNOSIS — I10 ESSENTIAL HYPERTENSION: Primary | ICD-10-CM

## 2022-10-17 DIAGNOSIS — R73.02 IMPAIRED GLUCOSE TOLERANCE: ICD-10-CM

## 2022-10-17 PROCEDURE — 99214 OFFICE O/P EST MOD 30 MIN: CPT | Performed by: EMERGENCY MEDICINE

## 2022-10-17 PROCEDURE — 93000 ELECTROCARDIOGRAM COMPLETE: CPT | Performed by: EMERGENCY MEDICINE

## 2022-10-17 NOTE — PROGRESS NOTES
Subjective:     Encounter Date:10/17/2022      Patient ID: Dao Jhaveri is a 67 y.o. male.    Chief Complaint:  History of Present Illness    The patient is a 67-year-old male who presents today for a follow-up    The patient has suffered several strokes and was hospitalized in recent months. When asked he reports no chest pain or shortness of breath. He reports taking the steps needed to recover from the strokes and is in speech therapy but has stopped physical therapy.  He reports having difficulty with some mobility but maintains his speech has seen significant improvement with the speech therapy and today will be his final session.    He reports that his blood pressure is doing better and is trying to maintain a low blood pressure number consistently. He denies any dizziness or light headiness. He reports that he quit smoking around 5 years ago.        Review of Systems   Constitutional: Negative for diaphoresis, fever and malaise/fatigue.   HENT: Negative for congestion.    Eyes: Negative for vision loss in left eye and vision loss in right eye.   Cardiovascular: Negative for chest pain, claudication, dyspnea on exertion, irregular heartbeat, leg swelling, orthopnea, palpitations and syncope.   Respiratory: Negative for cough, shortness of breath and wheezing.    Hematologic/Lymphatic: Negative for adenopathy.   Skin: Negative for rash.   Musculoskeletal: Negative for joint pain and joint swelling.   Gastrointestinal: Negative for abdominal pain, diarrhea, nausea and vomiting.   Neurological: Negative for excessive daytime sleepiness, dizziness, focal weakness, light-headedness, numbness and weakness.   Psychiatric/Behavioral: Negative for depression. The patient does not have insomnia.            Current Outpatient Medications:   •  amLODIPine (NORVASC) 10 MG tablet, Take 1 tablet by mouth every night at bedtime., Disp: 90 tablet, Rfl: 1  •  aspirin 81 MG EC tablet, Take 81 mg by mouth Daily., Disp: ,  Rfl:   •  atorvastatin (LIPITOR) 80 MG tablet, Take 1 tablet by mouth Daily., Disp: 90 tablet, Rfl: 1  •  clopidogrel (PLAVIX) 75 MG tablet, Take 1 tablet by mouth Daily., Disp: 90 tablet, Rfl: 1  •  cyclobenzaprine (FLEXERIL) 10 MG tablet, Take 1 tablet by mouth 3 (Three) Times a Day As Needed for Muscle Spasms., Disp: 90 tablet, Rfl: 2  •  hydrALAZINE (APRESOLINE) 50 MG tablet, Take 1 tablet by mouth 3 (Three) Times a Day., Disp: 270 tablet, Rfl: 1  •  isosorbide dinitrate (ISORDIL) 20 MG tablet, Take 1 tablet by mouth 3 (Three) Times a Day., Disp: 270 tablet, Rfl: 1  •  lisinopril (PRINIVIL,ZESTRIL) 20 MG tablet, Take 1 tablet by mouth Daily., Disp: 90 tablet, Rfl: 1  •  metoprolol succinate XL (TOPROL-XL) 100 MG 24 hr tablet, Take 1 tablet by mouth Daily., Disp: 90 tablet, Rfl: 1  •  nitroglycerin (NITROSTAT) 0.4 MG SL tablet, Place 0.4 mg under the tongue Every 5 (Five) Minutes As Needed for Chest Pain. Take no more than 3 doses in 15 minutes., Disp: , Rfl:   •  nortriptyline (Pamelor) 25 MG capsule, Take 1 capsule by mouth Every Night., Disp: 30 capsule, Rfl: 0       Objective:      Vitals:    10/17/22 1121   BP: 130/84   Pulse: 66   SpO2: 99%     Vitals and nursing note reviewed.   Constitutional:       Appearance: Normal and healthy appearance. Well-developed and not in distress.   Eyes:      Extraocular Movements: Extraocular movements intact.      Pupils: Pupils are equal, round, and reactive to light.   HENT:      Head: Normocephalic and atraumatic.    Mouth/Throat:      Pharynx: Oropharynx is clear.   Neck:      Vascular: JVD normal.      Trachea: Trachea normal.   Pulmonary:      Effort: Pulmonary effort is normal.      Breath sounds: Normal breath sounds. No wheezing. No rhonchi. No rales.   Cardiovascular:      PMI at left midclavicular line. Normal rate. Regular rhythm. Normal S1. Normal S2.      Murmurs: There is no murmur.      No gallop. No click. No rub.   Edema:     Peripheral edema absent.    Abdominal:      General: Bowel sounds are normal.      Palpations: Abdomen is soft.      Tenderness: There is no abdominal tenderness.   Musculoskeletal: Normal range of motion.      Cervical back: Normal range of motion and neck supple. Skin:     General: Skin is warm and dry.      Capillary Refill: Capillary refill takes less than 2 seconds.   Feet:      Right foot:      Skin integrity: Skin integrity normal.      Left foot:      Skin integrity: Skin integrity normal.   Neurological:      Mental Status: Alert and oriented to person, place and time.      Cranial Nerves: Cranial nerves are intact.      Sensory: Sensation is intact.      Motor: Motor function is intact.      Coordination: Coordination is intact.   Psychiatric:         Speech: Speech normal.         Behavior: Behavior is cooperative.         Lab Review:         ECG 12 Lead    Date/Time: 10/17/2022 5:25 PM  Performed by: Kodi Mcghee DO  Authorized by: Kodi Mcghee DO   Comparison: compared with previous ECG   Similar to previous ECG  Rhythm: sinus rhythm  Rate: normal  Conduction: conduction normal  ST Segments: ST segments normal  T Waves: T waves normal  QRS axis: normal  Other: no other findings    Clinical impression: normal ECG            Past Blood pressure systolic readings for the patient were 112 mmHg [id number] mmHg. Today's systolic is at 130 mmHg.    Last echocardiogram was done in 2021, results indicated Normal heart function with concentric hypertrophy from the hypertension. Severe hypertrophy, aortic mitral and additional heart valves functioning normal.    The patients EKG was normal.    Assessment/Plan:     Problem List Items Addressed This Visit        Cardiac and Vasculature    Essential hypertension - Primary    Mixed hyperlipidemia       Endocrine and Metabolic    Overweight (BMI 25.0-29.9)    Impaired glucose tolerance       Neuro    History of stroke    Overview     Bilateral MCA. Left then right          Hemiparesis affecting nondominant side as late effect of cerebrovascular accident (CVA) (HCC)       - The patient will continue to take a baby aspirin daily. He will continue to take amlodipine 10 mg. He will continue to take his medications Lipitor, Plavix, hydralazine and Imdur as prescribed. He is also taking Lisinopril and metoprolol and will continue these as prescribed.  The patient was advised to contact the office if any cardiac issues arise.    The patient will follow-up in 10/2023.    Recommendations/plans:    Transcribed from ambient dictation for Kodi Mcghee DO by Aleksander Pablo.  10/17/22   13:26 CDT    Patient or patient representative verbalized consent to the visit recording.  I have personally performed the services described in this document as transcribed by the above individual, and it is both accurate and complete.  Kodi Mcghee DO  10/17/2022  17:26 CDT

## 2022-10-26 ENCOUNTER — TREATMENT (OUTPATIENT)
Dept: PHYSICAL THERAPY | Facility: CLINIC | Age: 67
End: 2022-10-26

## 2022-10-26 DIAGNOSIS — I63.9 CEREBROVASCULAR ACCIDENT (CVA), UNSPECIFIED MECHANISM: ICD-10-CM

## 2022-10-26 DIAGNOSIS — R13.13 DYSPHAGIA, PHARYNGEAL PHASE: ICD-10-CM

## 2022-10-26 DIAGNOSIS — R47.89 SLOW RATE OF SPEECH: ICD-10-CM

## 2022-10-26 DIAGNOSIS — R47.81 DEFICIT IN COMMUNICATION DUE TO SLURRED SPEECH: Primary | ICD-10-CM

## 2022-10-26 PROCEDURE — 92526 ORAL FUNCTION THERAPY: CPT

## 2022-10-26 NOTE — PROGRESS NOTES
Speech Language Pathology Treatment Note and 30 Day Progress Note    Patient: Dao Jhaveri                                                                                     Visit Date: 10/26/2022  :     1955    Referring practitioner:    Dallin Miller DO  Date of Initial Visit:          Type: THERAPY  Noted: 2022    Patient seen for 6 sessions    Visit Diagnoses:    ICD-10-CM ICD-9-CM   1. Deficit in communication due to slurred speech  R47.81 784.59   2. Slow rate of speech  R47.89 784.59   3. Cerebrovascular accident (CVA), unspecified mechanism (HCC)  I63.9 434.91   4. Dysphagia, pharyngeal phase  R13.13 787.23     SUBJECTIVE     Patient was alert and ready to participate in therapy today. He stated that his voice sounds better and doesn't get as fatigued when speaking. His swallow has improved but thinks he takes too big of gulps with liquids.     OBJECTIVE   GOALS  Goals                                         STG Comments Status   Patient will demonstrate natural speech rate while targeting sentences up to 10 words across 3 consecutive sessions.   Patient demonstrated proper prosody while reading short paragraphs with minimal cues. He spoke with a loud voice with good inflection and proper speech rate used for public speaking. (3/3 criteria)    Met  10/26/2022     Patient will demonstarte decreased fatigue while talking by demonstrated diaphragmatic breathing.      Patient demonstrated proper use of diaphragmatic breathing with minimal prompts. Instructed to practice 3x daily for 5 reps to improve breath support to decrease vocal fatigue while speaking.    Ongoing  10/26/2022     Patient will increase strength of tongue base and posterior pharyngeal walls to reduce residue that might fall into airway by completing    Patient demonstrated proper use of the effortful swallow and Frances Maneuver to improve swallowing and  given handouts to perform at home. He performed the exercises 3 sets of 5xs with minimal cues.     Patient is able to perform 10 swallows at home without difficulty.     Ongoing  10/26/2022      Patient will compensate for oral/pharyngeal deficits and reduce risks while eating by utilizing compensatory strategies  Eat 10 score: 10    Patient advised to swallow twice and take smaller bites and sips while eating to reduce risk of aspiration.     Ongoing  10/26/2022     LTG by:        In 3 months, patient will demonstrate proper speech rate as measured by clinical observation.     Goals are progressing Ongoing  10/26/2022     In 3 months, patient will demonstrate decreased fatigue while talking as measured by clinical observation.     Goals are progressing Ongoing  10/26/2022     Patient will safely consume the recommended diet without complications such as aspiration pneumonia. Went over swallow study and gave swallowing exercises to improve pharyngeal swallowing deficits.  Ongoing  10/26/2022           Therapy Education/Self Care    Details: Reading longer utterances and breathing exercises   Given [employer]   Access Code: [email or web address]  URL: [email or web address]  Date: 09/26/2022  Prepared by: Elsa Alonso    Exercises  Effortful Swallow - 1 x daily - 7 x weekly - 3 sets - 10 reps  Frances Maneuver - 1 x daily - 7 x weekly - 3 sets - 10 reps  Seated Diaphragmatic Breathing - 1 x daily - 7 x weekly - 3 sets - 10 reps   Progress: Progressed   Education provided to:  Patient   Level of understanding Verbalized             Total Time of Visit:             20   mins         ASSESSMENT/PLAN     ASSESSMENT: Patient targeted reading short paragraphs with good prosody and speech rate. Performed swallowing exercises without difficulty.     PLAN: continue therapy; return in a month    Progress Note Due Date: 11/25/2022  Recertification Due Date: 11/19/2022    SIGNATURE: Elsa Alonso, ANDIE-SLP, [place] License #:  [id number]  Electronically Signed on 10/26/2022          [address] [place] [address]  [phone]

## 2022-11-01 ENCOUNTER — APPOINTMENT (OUTPATIENT)
Dept: CT IMAGING | Facility: HOSPITAL | Age: 67
End: 2022-11-01

## 2022-11-01 ENCOUNTER — HOSPITAL ENCOUNTER (OUTPATIENT)
Facility: HOSPITAL | Age: 67
Discharge: HOME | End: 2022-11-01
Attending: STUDENT IN AN ORGANIZED HEALTH CARE EDUCATION/TRAINING PROGRAM | Admitting: FAMILY MEDICINE

## 2022-11-01 ENCOUNTER — READMISSION MANAGEMENT (OUTPATIENT)
Dept: CALL CENTER | Facility: HOSPITAL | Age: 67
End: 2022-11-01

## 2022-11-01 ENCOUNTER — TELEPHONE (OUTPATIENT)
Dept: INTERNAL MEDICINE | Facility: CLINIC | Age: 67
End: 2022-11-01

## 2022-11-01 VITALS
HEIGHT: 66 IN | HEART RATE: 76 BPM | DIASTOLIC BLOOD PRESSURE: 80 MMHG | SYSTOLIC BLOOD PRESSURE: 125 MMHG | WEIGHT: 174.5 LBS | RESPIRATION RATE: 12 BRPM | BODY MASS INDEX: 28.04 KG/M2 | OXYGEN SATURATION: 97 % | TEMPERATURE: 97.7 F

## 2022-11-01 DIAGNOSIS — R51.9 ACUTE INTRACTABLE HEADACHE, UNSPECIFIED HEADACHE TYPE: ICD-10-CM

## 2022-11-01 DIAGNOSIS — I10 ESSENTIAL HYPERTENSION: ICD-10-CM

## 2022-11-01 DIAGNOSIS — I16.0 HYPERTENSIVE URGENCY: Primary | ICD-10-CM

## 2022-11-01 PROBLEM — G44.1 INTRACTABLE VASCULAR HEADACHE: Status: ACTIVE | Noted: 2022-11-01

## 2022-11-01 PROBLEM — F17.201 TOBACCO ABUSE, IN REMISSION: Status: ACTIVE | Noted: 2022-11-01

## 2022-11-01 LAB
AMPHET+METHAMPHET UR QL: NEGATIVE
AMPHETAMINES UR QL: NEGATIVE
ANION GAP SERPL CALCULATED.3IONS-SCNC: 9 MMOL/L (ref 5–15)
BACTERIA UR QL AUTO: ABNORMAL /HPF
BARBITURATES UR QL SCN: POSITIVE
BASOPHILS # BLD AUTO: 0.04 10*3/MM3 (ref 0–0.2)
BASOPHILS NFR BLD AUTO: 0.8 % (ref 0–1.5)
BENZODIAZ UR QL SCN: NEGATIVE
BILIRUB UR QL STRIP: NEGATIVE
BUN SERPL-MCNC: 19 MG/DL (ref 8–23)
BUN/CREAT SERPL: 15.4 (ref 7–25)
BUPRENORPHINE SERPL-MCNC: NEGATIVE NG/ML
CALCIUM SPEC-SCNC: 9.3 MG/DL (ref 8.6–10.5)
CANNABINOIDS SERPL QL: POSITIVE
CHLORIDE SERPL-SCNC: 106 MMOL/L (ref 98–107)
CLARITY UR: CLEAR
CO2 SERPL-SCNC: 26 MMOL/L (ref 22–29)
COCAINE UR QL: NEGATIVE
COLOR UR: YELLOW
CREAT SERPL-MCNC: 1.23 MG/DL (ref 0.76–1.27)
DEPRECATED RDW RBC AUTO: 42.9 FL (ref 37–54)
EGFRCR SERPLBLD CKD-EPI 2021: 64.3 ML/MIN/1.73
EOSINOPHIL # BLD AUTO: 0.11 10*3/MM3 (ref 0–0.4)
EOSINOPHIL NFR BLD AUTO: 2.1 % (ref 0.3–6.2)
ERYTHROCYTE [DISTWIDTH] IN BLOOD BY AUTOMATED COUNT: 15.3 % (ref 12.3–15.4)
GLUCOSE SERPL-MCNC: 106 MG/DL (ref 65–99)
GLUCOSE UR STRIP-MCNC: NEGATIVE MG/DL
HCT VFR BLD AUTO: 39 % (ref 37.5–51)
HGB BLD-MCNC: 12.5 G/DL (ref 13–17.7)
HGB UR QL STRIP.AUTO: NEGATIVE
HYALINE CASTS UR QL AUTO: ABNORMAL /LPF
IMM GRANULOCYTES # BLD AUTO: 0.01 10*3/MM3 (ref 0–0.05)
IMM GRANULOCYTES NFR BLD AUTO: 0.2 % (ref 0–0.5)
KETONES UR QL STRIP: NEGATIVE
LEUKOCYTE ESTERASE UR QL STRIP.AUTO: ABNORMAL
LYMPHOCYTES # BLD AUTO: 1.59 10*3/MM3 (ref 0.7–3.1)
LYMPHOCYTES NFR BLD AUTO: 30.2 % (ref 19.6–45.3)
MCH RBC QN AUTO: 25 PG (ref 26.6–33)
MCHC RBC AUTO-ENTMCNC: 32.1 G/DL (ref 31.5–35.7)
MCV RBC AUTO: 77.8 FL (ref 79–97)
METHADONE UR QL SCN: NEGATIVE
MONOCYTES # BLD AUTO: 0.4 10*3/MM3 (ref 0.1–0.9)
MONOCYTES NFR BLD AUTO: 7.6 % (ref 5–12)
NEUTROPHILS NFR BLD AUTO: 3.11 10*3/MM3 (ref 1.7–7)
NEUTROPHILS NFR BLD AUTO: 59.1 % (ref 42.7–76)
NITRITE UR QL STRIP: NEGATIVE
NRBC BLD AUTO-RTO: 0 /100 WBC (ref 0–0.2)
OPIATES UR QL: NEGATIVE
OXYCODONE UR QL SCN: NEGATIVE
PCP UR QL SCN: NEGATIVE
PH UR STRIP.AUTO: 7.5 [PH] (ref 5–8)
PLATELET # BLD AUTO: 253 10*3/MM3 (ref 140–450)
PMV BLD AUTO: 11.3 FL (ref 6–12)
POTASSIUM SERPL-SCNC: 3.9 MMOL/L (ref 3.5–5.2)
PROPOXYPH UR QL: NEGATIVE
PROT UR QL STRIP: NEGATIVE
RBC # BLD AUTO: 5.01 10*6/MM3 (ref 4.14–5.8)
RBC # UR STRIP: ABNORMAL /HPF
REF LAB TEST METHOD: ABNORMAL
SODIUM SERPL-SCNC: 141 MMOL/L (ref 136–145)
SP GR UR STRIP: 1.01 (ref 1–1.03)
SQUAMOUS #/AREA URNS HPF: ABNORMAL /HPF
TRICYCLICS UR QL SCN: NEGATIVE
TROPONIN T SERPL-MCNC: <0.01 NG/ML (ref 0–0.03)
UROBILINOGEN UR QL STRIP: ABNORMAL
WBC # UR STRIP: ABNORMAL /HPF
WBC NRBC COR # BLD: 5.26 10*3/MM3 (ref 3.4–10.8)

## 2022-11-01 PROCEDURE — 80306 DRUG TEST PRSMV INSTRMNT: CPT | Performed by: INTERNAL MEDICINE

## 2022-11-01 PROCEDURE — 96375 TX/PRO/DX INJ NEW DRUG ADDON: CPT

## 2022-11-01 PROCEDURE — 70450 CT HEAD/BRAIN W/O DYE: CPT

## 2022-11-01 PROCEDURE — 96366 THER/PROPH/DIAG IV INF ADDON: CPT

## 2022-11-01 PROCEDURE — 80048 BASIC METABOLIC PNL TOTAL CA: CPT | Performed by: STUDENT IN AN ORGANIZED HEALTH CARE EDUCATION/TRAINING PROGRAM

## 2022-11-01 PROCEDURE — 84484 ASSAY OF TROPONIN QUANT: CPT | Performed by: STUDENT IN AN ORGANIZED HEALTH CARE EDUCATION/TRAINING PROGRAM

## 2022-11-01 PROCEDURE — G0378 HOSPITAL OBSERVATION PER HR: HCPCS

## 2022-11-01 PROCEDURE — 25010000002 ONDANSETRON PER 1 MG: Performed by: INTERNAL MEDICINE

## 2022-11-01 PROCEDURE — 85025 COMPLETE CBC W/AUTO DIFF WBC: CPT | Performed by: STUDENT IN AN ORGANIZED HEALTH CARE EDUCATION/TRAINING PROGRAM

## 2022-11-01 PROCEDURE — 96365 THER/PROPH/DIAG IV INF INIT: CPT

## 2022-11-01 PROCEDURE — 99285 EMERGENCY DEPT VISIT HI MDM: CPT

## 2022-11-01 PROCEDURE — 81001 URINALYSIS AUTO W/SCOPE: CPT | Performed by: INTERNAL MEDICINE

## 2022-11-01 RX ORDER — BUTALBITAL, ACETAMINOPHEN AND CAFFEINE 50; 325; 40 MG/1; MG/1; MG/1
1 TABLET ORAL ONCE
Status: COMPLETED | OUTPATIENT
Start: 2022-11-01 | End: 2022-11-01

## 2022-11-01 RX ORDER — LISINOPRIL 20 MG/1
20 TABLET ORAL DAILY
Status: DISCONTINUED | OUTPATIENT
Start: 2022-11-02 | End: 2022-11-01 | Stop reason: HOSPADM

## 2022-11-01 RX ORDER — METOPROLOL SUCCINATE 100 MG/1
100 TABLET, EXTENDED RELEASE ORAL DAILY
Status: DISCONTINUED | OUTPATIENT
Start: 2022-11-02 | End: 2022-11-01 | Stop reason: HOSPADM

## 2022-11-01 RX ORDER — SODIUM CHLORIDE 0.9 % (FLUSH) 0.9 %
10 SYRINGE (ML) INJECTION AS NEEDED
Status: DISCONTINUED | OUTPATIENT
Start: 2022-11-01 | End: 2022-11-01 | Stop reason: HOSPADM

## 2022-11-01 RX ORDER — ASPIRIN 81 MG/1
81 TABLET ORAL DAILY
Status: DISCONTINUED | OUTPATIENT
Start: 2022-11-01 | End: 2022-11-01 | Stop reason: HOSPADM

## 2022-11-01 RX ORDER — AMLODIPINE BESYLATE 10 MG/1
10 TABLET ORAL
Status: DISCONTINUED | OUTPATIENT
Start: 2022-11-01 | End: 2022-11-01 | Stop reason: HOSPADM

## 2022-11-01 RX ORDER — ACETAMINOPHEN 160 MG/5ML
650 SOLUTION ORAL EVERY 4 HOURS PRN
Status: DISCONTINUED | OUTPATIENT
Start: 2022-11-01 | End: 2022-11-01 | Stop reason: HOSPADM

## 2022-11-01 RX ORDER — HYDRALAZINE HYDROCHLORIDE 50 MG/1
50 TABLET, FILM COATED ORAL ONCE
Status: COMPLETED | OUTPATIENT
Start: 2022-11-01 | End: 2022-11-01

## 2022-11-01 RX ORDER — SODIUM CHLORIDE 0.9 % (FLUSH) 0.9 %
10 SYRINGE (ML) INJECTION EVERY 12 HOURS SCHEDULED
Status: DISCONTINUED | OUTPATIENT
Start: 2022-11-01 | End: 2022-11-01 | Stop reason: HOSPADM

## 2022-11-01 RX ORDER — ACETAMINOPHEN 325 MG/1
650 TABLET ORAL EVERY 4 HOURS PRN
Status: DISCONTINUED | OUTPATIENT
Start: 2022-11-01 | End: 2022-11-01 | Stop reason: HOSPADM

## 2022-11-01 RX ORDER — ATORVASTATIN CALCIUM 40 MG/1
80 TABLET, FILM COATED ORAL DAILY
Status: DISCONTINUED | OUTPATIENT
Start: 2022-11-01 | End: 2022-11-01 | Stop reason: HOSPADM

## 2022-11-01 RX ORDER — CHLORHEXIDINE GLUCONATE 500 MG/1
1 CLOTH TOPICAL ONCE
Status: COMPLETED | OUTPATIENT
Start: 2022-11-01 | End: 2022-11-01

## 2022-11-01 RX ORDER — LISINOPRIL 20 MG/1
20 TABLET ORAL ONCE
Status: COMPLETED | OUTPATIENT
Start: 2022-11-01 | End: 2022-11-01

## 2022-11-01 RX ORDER — CHLORHEXIDINE GLUCONATE 500 MG/1
1 CLOTH TOPICAL EVERY 24 HOURS
Status: DISCONTINUED | OUTPATIENT
Start: 2022-11-02 | End: 2022-11-01 | Stop reason: HOSPADM

## 2022-11-01 RX ORDER — ACETAMINOPHEN 650 MG/1
650 SUPPOSITORY RECTAL EVERY 4 HOURS PRN
Status: DISCONTINUED | OUTPATIENT
Start: 2022-11-01 | End: 2022-11-01 | Stop reason: HOSPADM

## 2022-11-01 RX ORDER — HYDRALAZINE HYDROCHLORIDE 50 MG/1
50 TABLET, FILM COATED ORAL 3 TIMES DAILY
Status: DISCONTINUED | OUTPATIENT
Start: 2022-11-01 | End: 2022-11-01 | Stop reason: HOSPADM

## 2022-11-01 RX ORDER — ISOSORBIDE DINITRATE 20 MG/1
20 TABLET ORAL
Status: DISCONTINUED | OUTPATIENT
Start: 2022-11-01 | End: 2022-11-01 | Stop reason: HOSPADM

## 2022-11-01 RX ORDER — METOPROLOL SUCCINATE 100 MG/1
100 TABLET, EXTENDED RELEASE ORAL ONCE
Status: COMPLETED | OUTPATIENT
Start: 2022-11-01 | End: 2022-11-01

## 2022-11-01 RX ORDER — LABETALOL HYDROCHLORIDE 5 MG/ML
10 INJECTION, SOLUTION INTRAVENOUS ONCE
Status: COMPLETED | OUTPATIENT
Start: 2022-11-01 | End: 2022-11-01

## 2022-11-01 RX ORDER — CLOPIDOGREL BISULFATE 75 MG/1
75 TABLET ORAL DAILY
Status: DISCONTINUED | OUTPATIENT
Start: 2022-11-01 | End: 2022-11-01 | Stop reason: HOSPADM

## 2022-11-01 RX ORDER — ONDANSETRON 2 MG/ML
4 INJECTION INTRAMUSCULAR; INTRAVENOUS EVERY 6 HOURS PRN
Status: DISCONTINUED | OUTPATIENT
Start: 2022-11-01 | End: 2022-11-01 | Stop reason: HOSPADM

## 2022-11-01 RX ADMIN — ISOSORBIDE DINITRATE 20 MG: 20 TABLET ORAL at 07:17

## 2022-11-01 RX ADMIN — ONDANSETRON 4 MG: 2 INJECTION INTRAMUSCULAR; INTRAVENOUS at 08:09

## 2022-11-01 RX ADMIN — ISOSORBIDE DINITRATE 20 MG: 20 TABLET ORAL at 13:33

## 2022-11-01 RX ADMIN — ATORVASTATIN CALCIUM 80 MG: 40 TABLET, FILM COATED ORAL at 08:10

## 2022-11-01 RX ADMIN — LABETALOL HYDROCHLORIDE 10 MG: 5 INJECTION, SOLUTION INTRAVENOUS at 03:02

## 2022-11-01 RX ADMIN — ACETAMINOPHEN 650 MG: 325 TABLET, FILM COATED ORAL at 11:16

## 2022-11-01 RX ADMIN — Medication 10 ML: at 08:11

## 2022-11-01 RX ADMIN — CLOPIDOGREL BISULFATE 75 MG: 75 TABLET ORAL at 09:02

## 2022-11-01 RX ADMIN — ASPIRIN 81 MG: 81 TABLET, COATED ORAL at 08:10

## 2022-11-01 RX ADMIN — CHLORHEXIDINE GLUCONATE 1 APPLICATION: 500 CLOTH TOPICAL at 06:32

## 2022-11-01 RX ADMIN — LISINOPRIL 20 MG: 20 TABLET ORAL at 04:49

## 2022-11-01 RX ADMIN — Medication 1 APPLICATION: at 07:17

## 2022-11-01 RX ADMIN — ACETAMINOPHEN 650 MG: 325 TABLET, FILM COATED ORAL at 07:16

## 2022-11-01 RX ADMIN — HYDRALAZINE HYDROCHLORIDE 50 MG: 50 TABLET, FILM COATED ORAL at 04:49

## 2022-11-01 RX ADMIN — METOPROLOL SUCCINATE 100 MG: 100 TABLET, FILM COATED, EXTENDED RELEASE ORAL at 04:49

## 2022-11-01 RX ADMIN — BUTALBITAL, ACETAMINOPHEN, AND CAFFEINE 1 TABLET: 50; 325; 40 TABLET ORAL at 04:15

## 2022-11-01 RX ADMIN — SODIUM CHLORIDE 5 MG/HR: 9 INJECTION, SOLUTION INTRAVENOUS at 05:22
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
## 2022-11-01 NOTE — OUTREACH NOTE
Prep Survey    Flowsheet Row Responses   [facility] facility patient discharged from? [place]   Is LACE score < 7 ? No   Emergency Room discharge w/ pulse ox? No   Eligibility [facility]   Date of Admission 11/01/22   Date of Discharge 11/01/22   Discharge Disposition Home or Self Care   Discharge diagnosis Hypertensive urgency   Does the patient have one of the following disease processes/diagnoses(primary or secondary)? Other   Does the patient have Home health ordered? No   Is there a DME ordered? No   Prep survey completed? Yes          JW A - Registered Nurse

## 2022-11-01 NOTE — DISCHARGE SUMMARY
[facility] Medicine Services  DISCHARGE SUMMARY       Date of Admission: 11/1/2022  Date of Discharge:  11/1/2022  Primary Care Physician: Dallin Miller,     Discharge Diagnoses:  Active Hospital Problems    Diagnosis    • **Hypertensive urgency    • Intractable vascular headache    • Tobacco abuse, in remission    • History of stroke    • Mixed hyperlipidemia          Presenting Problem/History of Present Illness:  Hypertensive urgency [I16.0]     Chief Complaint on Day of Discharge:   No complaint    History of Present Illness on Day of Discharge:   Patient has no complaints today.  Cardene was discontinued early this morning and the patient's blood pressure has remained controlled ever since.  He was administered his usual a.m. dosages of antihypertensives and has done well.  He is anxious for discharge home this morning and is stable for discharge.  The patient and I had a long discussion regarding his hypertension.  I have recommended that he follow-up with Dr. Miller this week to discuss possible modifications to his medication regimen.    Hospital Course  This is a 67-year-old -American gentleman who resides in [place], [place].  He sees Dr. Miller for primary care.  He has a history of difficult to control blood pressure and is on multiple medications for this.  He presents to the emergency department earlier this morning with complaints of intractable headache and elevated blood pressure.  He was given IV labetalol and few of his oral antihypertensive medications earlier by Dr. Dwyer.  This was unsuccessful in improving his headache to bring down his blood pressure.  Cardene drip was initiated.     He states that his headache is maybe a bit better after receiving some medication, but definitely still present.     He states that he has been compliant with all of his medications.     No chest pain.  No shortness of breath.  Negative troponin.    Plan:  "  The patient will be admitted to my service for [facility].  He presents to the emergency department with complaints of difficult to control blood pressure and headache.  He has a history of difficult to control hypertension and is on multiple medications for this.  He also has a history of stroke.  Dr. Dwyer gave him IV labetalol and a few of his oral antihypertensives early without significant improvement of his blood pressure or headache.  A Cardene drip was started.     Resume antihypertensives and titrate as needed.  Wean off Cardene as able.     Reconcile and resume other home medications as appropriate.     SCDs for DVT prophylaxis.      Result Review    Result Review:  I have personally reviewed the results from the time of this admission to 11/1/2022 12:11 CDT and agree with these findings:  []  Laboratory  []  Microbiology  []  Radiology  []  EKG/Telemetry   []  Cardiology/Vascular   []  Pathology  []  Old records  []  Other:  Condition on Discharge:    Stable with controlled blood pressure    Physical Exam on Discharge:  /80   Pulse 76   Temp 97.7 °F (36.5 °C) (Oral)   Resp 12   Ht 167.6 cm (66\")   Wt 79.2 kg (174 lb 8 oz)   SpO2 97%   BMI 28.17 kg/m²   Physical Exam     Constitutional:       Comments: No distress.  His youngest daughter is present in the room at the time of my evaluation.  HENT:      Head: Normocephalic and atraumatic.   Eyes:      Conjunctiva/sclera: Conjunctivae normal.   Cardiovascular:      Rate and Rhythm: Normal rate and regular rhythm.      Heart sounds: Normal heart sounds.   Pulmonary:      Effort: Pulmonary effort is normal. No respiratory distress.      Breath sounds: Normal breath sounds. No wheezing or rales.   Abdominal:      General: Bowel sounds are normal. There is no distension.      Palpations: Abdomen is soft.      Tenderness: There is no abdominal tenderness.   Musculoskeletal:         General: No tenderness or deformity. Normal range of " motion.      Cervical back: Neck supple.   Skin:     General: Skin is warm and dry.      Findings: No rash.   Neurological:      Mental Status: He is alert and oriented to person, place, and time.      Motor: Weakness (chronic from prior CVA) present. No abnormal muscle tone.   Psychiatric:         Mood and Affect: Mood normal.         Behavior: Behavior normal.         Comments: Conversational, appropriate.     Discharge Disposition:  Home or Self Care    Discharge Medications:     Discharge Medications      Continue These Medications      Instructions Start Date   amLODIPine 10 MG tablet  Commonly known as: NORVASC   10 mg, Oral, Every Night at Bedtime      aspirin 81 MG EC tablet   81 mg, Oral, Daily      atorvastatin 80 MG tablet  Commonly known as: LIPITOR   80 mg, Oral, Daily      clopidogrel 75 MG tablet  Commonly known as: PLAVIX   75 mg, Oral, Daily      cyclobenzaprine 10 MG tablet  Commonly known as: FLEXERIL   10 mg, Oral, 3 Times Daily PRN      hydrALAZINE 50 MG tablet  Commonly known as: APRESOLINE   50 mg, Oral, 3 Times Daily      isosorbide dinitrate 20 MG tablet  Commonly known as: ISORDIL   20 mg, Oral, 3 Times Daily      lisinopril 20 MG tablet  Commonly known as: PRINIVIL,ZESTRIL   20 mg, Oral, Daily      metoprolol succinate  MG 24 hr tablet  Commonly known as: TOPROL-XL   100 mg, Oral, Daily      nitroglycerin 0.4 MG SL tablet  Commonly known as: NITROSTAT   0.4 mg, Sublingual, Every 5 Minutes PRN, Take no more than 3 doses in 15 minutes.       nortriptyline 25 MG capsule  Commonly known as: Pamelor   25 mg, Oral, Nightly             Discharge Diet:   Diet Instructions     Diet: Regular; Thin      Discharge Diet: Regular    Fluid Consistency: Thin          Discharge Care Plan / Instructions:   Discharge home    Activity at Discharge:   Activity Instructions     Activity as Tolerated            Follow-up Appointments:  Follow-up with Dr. Miller this week       Electronically signed by  Virgil Siu DO, 11/01/22, 12:11 CDT.    Time: Discharge Less than 30 min    Part of this note may be an electronic transcription/translation of spoken language to printed text using the Dragon Dictation system.

## 2022-11-02 ENCOUNTER — OFFICE VISIT (OUTPATIENT)
Dept: INTERNAL MEDICINE | Facility: CLINIC | Age: 67
End: 2022-11-02

## 2022-11-02 ENCOUNTER — TRANSITIONAL CARE MANAGEMENT TELEPHONE ENCOUNTER (OUTPATIENT)
Dept: CALL CENTER | Facility: HOSPITAL | Age: 67
End: 2022-11-02

## 2022-11-02 VITALS
HEART RATE: 62 BPM | WEIGHT: 182 LBS | OXYGEN SATURATION: 97 % | HEIGHT: 66 IN | DIASTOLIC BLOOD PRESSURE: 98 MMHG | TEMPERATURE: 98.2 F | BODY MASS INDEX: 29.25 KG/M2 | SYSTOLIC BLOOD PRESSURE: 146 MMHG | RESPIRATION RATE: 16 BRPM

## 2022-11-02 DIAGNOSIS — Z86.73 HISTORY OF STROKE: ICD-10-CM

## 2022-11-02 DIAGNOSIS — I10 ESSENTIAL HYPERTENSION: Primary | ICD-10-CM

## 2022-11-02 DIAGNOSIS — G44.1 INTRACTABLE VASCULAR HEADACHE: ICD-10-CM

## 2022-11-02 PROCEDURE — 99214 OFFICE O/P EST MOD 30 MIN: CPT | Performed by: NURSE PRACTITIONER

## 2022-11-02 RX ORDER — HYDRALAZINE HYDROCHLORIDE 50 MG/1
50 TABLET, FILM COATED ORAL 3 TIMES DAILY
Qty: 90 TABLET | Refills: 5 | Status: SHIPPED | OUTPATIENT
Start: 2022-11-02

## 2022-11-02 NOTE — PROGRESS NOTES
CC: follow up for hypertension and recent ER visit     HPI     Dao Jhaveri is a 67 y.o. male presents for ER follow up. He was seen on for headache and hypertension. Upon arrival to ER, blood pressure was 213/121. He was given IV labetalol and oral medications. Blood pressure remained elevated and Cardene drip was initiated. Blood pressure improved and he was discharged home. He does have history of stroke, CT of the head was normal. Today, his blood pressure has improved although diastolic remains slightly elevated. He says he was out of hydralazine because he could not afford to refill it. He mentions he has been off of Isordil due to cost as well.          ROS:  Review of Systems   Constitutional: Negative for fatigue and fever.   Respiratory: Negative for cough and shortness of breath.    Cardiovascular: Negative for chest pain, palpitations and leg swelling.   Neurological: Positive for headaches.          reports that he quit smoking about 9 years ago. His smoking use included cigarettes. He has a 52.50 pack-year smoking history. He has never used smokeless tobacco. He reports that he does not drink alcohol and does not use drugs.    Current Outpatient Medications:   •  amLODIPine (NORVASC) 10 MG tablet, Take 1 tablet by mouth every night at bedtime., Disp: 90 tablet, Rfl: 1  •  aspirin 81 MG EC tablet, Take 81 mg by mouth Daily., Disp: , Rfl:   •  atorvastatin (LIPITOR) 80 MG tablet, Take 1 tablet by mouth Daily., Disp: 90 tablet, Rfl: 1  •  clopidogrel (PLAVIX) 75 MG tablet, Take 1 tablet by mouth Daily., Disp: 90 tablet, Rfl: 1  •  hydrALAZINE (APRESOLINE) 50 MG tablet, Take 1 tablet by mouth 3 (Three) Times a Day., Disp: 90 tablet, Rfl: 5  •  lisinopril (PRINIVIL,ZESTRIL) 20 MG tablet, Take 1 tablet by mouth Daily., Disp: 90 tablet, Rfl: 1  •  metoprolol succinate XL (TOPROL-XL) 100 MG 24 hr tablet, Take 1 tablet by mouth Daily., Disp: 90 tablet, Rfl: 1  •  nitroglycerin (NITROSTAT) 0.4 MG SL tablet,  "Place 0.4 mg under the tongue Every 5 (Five) Minutes As Needed for Chest Pain. Take no more than 3 doses in 15 minutes., Disp: , Rfl:   •  isosorbide dinitrate (ISORDIL) 20 MG tablet, Take 1 tablet by mouth 3 (Three) Times a Day. (Patient not taking: No sig reported), Disp: 270 tablet, Rfl: 1  No current facility-administered medications for this visit.    OBJECTIVE:  /98 (BP Location: Left arm, Patient Position: Sitting, Cuff Size: Adult)   Pulse 62   Temp 98.2 °F (36.8 °C) (Temporal)   Resp 16   Ht 167.6 cm (66\")   Wt 82.6 kg (182 lb)   SpO2 97%   BMI 29.38 kg/m²    Physical Exam  Constitutional:       General: He is not in acute distress.  Cardiovascular:      Rate and Rhythm: Normal rate and regular rhythm.      Heart sounds: Normal heart sounds.   Pulmonary:      Effort: Pulmonary effort is normal.      Breath sounds: Normal breath sounds.   Neurological:      Mental Status: He is alert and oriented to person, place, and time.      Cranial Nerves: No facial asymmetry.      Gait: Gait abnormal (baseline, uses cane).         ASSESSMENT/PLAN:     Diagnoses and all orders for this visit:    1. Essential hypertension (Primary)  -     hydrALAZINE (APRESOLINE) 50 MG tablet; Take 1 tablet by mouth 3 (Three) Times a Day.  Dispense: 90 tablet; Refill: 5  Restart hydralazine and continue current medications. We will send refill to a different pharmacy due to cost. He will return in 2 weeks for blood pressure recheck. Discussed the importance of med compliance and risks with uncontrolled blood pressure. He will continue to monitor this at home.     2. Intractable vascular headache  Headache significantly improved.     3. History of stroke  Discussed the importance of blood pressure control and risk for stroke. He continues Plavix, ASA and statin.     An After Visit Summary was printed and given to the patient at discharge.  Return in about 3 weeks (around 11/23/2022), or if symptoms worsen or fail to improve, " for RN visit in 3 weeks for blood pressure today .          OMAIRA Null 11/2/2022   Electronically signed.

## 2022-11-02 NOTE — OUTREACH NOTE
Call Center TCM Note    Flowsheet Row Responses   [facility] patient discharged from? [place]   Does the patient have one of the following disease processes/diagnoses(primary or secondary)? Other   TCM attempt successful? Yes   TCM call completed? Yes   Wrap up additional comments PCP Dr Miller. Patient has completed appt today 11/2/22 in PCP office with Sola Sosa APRN. This appt within 2 business days of discharge fulfills TCM requirement, no call needed.           Zaria Maurer RN    11/2/2022, 12:29 CDT

## 2022-11-07 ENCOUNTER — TELEPHONE (OUTPATIENT)
Dept: INTERNAL MEDICINE | Facility: CLINIC | Age: 67
End: 2022-11-07

## 2022-11-07 DIAGNOSIS — I10 ESSENTIAL HYPERTENSION: ICD-10-CM

## 2022-11-07 DIAGNOSIS — N17.9 AKI (ACUTE KIDNEY INJURY): Primary | ICD-10-CM

## 2022-11-07 RX ORDER — LISINOPRIL 40 MG/1
40 TABLET ORAL DAILY
Qty: 90 TABLET | Refills: 1 | Status: SHIPPED | OUTPATIENT
Start: 2022-11-07

## 2022-11-07 RX ORDER — CHLORTHALIDONE 25 MG/1
25 TABLET ORAL DAILY
Qty: 90 TABLET | Refills: 0 | Status: SHIPPED | OUTPATIENT
Start: 2022-11-07 | End: 2023-02-03

## 2022-11-07 NOTE — TELEPHONE ENCOUNTER
PATIENT HAS CALLED, HE STATED THAT HIS BP /92.  HE STATED THAT IT WAS HIGHER THAN THAT THIS MORNING.      PATIENT STATED THAT HE HAS TAKEN HIS BP MEDICATIONS.    HE STATED THAT HE HAS NOT BEEN RECORDING HIS BLOOD PRESSURE READINGS.

## 2022-11-07 NOTE — TELEPHONE ENCOUNTER
Patient informed to increase lisinopril dose to 40 mg daily.  Patient advised he may take 2 of the 20 mg lisinopril tablets to total 40 mg daily until they run out and then he may start the new prescription for the once daily 40 mg dose.    2.  Patient informed a new bp medication, chlorthalidone, has been prescribed to add to current medications.    3. Patient informed to continue all other medications as prescribed.    4. Patient informed to continue to monitor his BP at home and return to the office on 11/16/22 for his scheduled nurse visit for a BP check.    5.  Patient informed Dr. Miller has placed lab orders for him to have done on the same day he comes to the office for his BP check to monitor his kidneys and electrolytes with the changes.    Patient was advised to write down Dr. Miller's instructions and medication changes and he said he did so.  He verbalized understanding of instructions.

## 2022-11-07 NOTE — TELEPHONE ENCOUNTER
We need a couple of things:  Increase lisinopril to 40mg (take 2 x 20mg with what he has left) and I have sent a higher prescriptoin to the pharmacy.   2.   Start new medication, chlorthalidone, which is at the pharmacy.   3.   Continue all other medications.   4.   Continue monitoring BP at home and we need to have him return in 7-10 days for a BP check in the office. Also, I have placed a lab order to have done on the same day he comes for the BP check to monitor kidneys and electrolytes with the changes.

## 2022-11-15 ENCOUNTER — READMISSION MANAGEMENT (OUTPATIENT)
Dept: CALL CENTER | Facility: HOSPITAL | Age: 67
End: 2022-11-15

## 2022-11-15 NOTE — OUTREACH NOTE
Medical Week 2 Survey    Flowsheet Row Responses   [facility] patient discharged from? [place]   Does the patient have one of the following disease processes/diagnoses(primary or secondary)? Other   Week 2 attempt successful? Yes   Call start time 1308   Discharge diagnosis Hypertensive urgency   Call end time 1309   Meds reviewed with patient/caregiver? Yes   Is the patient having any side effects they believe may be caused by any medication additions or changes? No   Does the patient have all medications ordered at discharge? N/A   Is the patient taking all medications as directed (includes completed medication regime)? Yes   Does the patient have a primary care provider?  Yes   Does the patient have an appointment with their PCP within 7 days of discharge? Yes   Comments regarding PCP 11/2/22,  2nd f/u is on 11/16/22   Has the patient kept scheduled appointments due by today? Yes   Psychosocial issues? No   Did the patient receive a copy of their discharge instructions? Yes   Nursing interventions Reviewed instructions with patient   What is the patient's perception of their health status since discharge? Improving   Is the patient/caregiver able to teach back signs and symptoms related to disease process for when to call PCP? Yes   Is the patient/caregiver able to teach back signs and symptoms related to disease process for when to call 911? Yes   Is the patient/caregiver able to teach back the hierarchy of who to call/visit for symptoms/problems? PCP, Specialist, Home health nurse, Urgent Care, ED, 911 Yes   If the patient is a current smoker, are they able to teach back resources for cessation? Not a smoker   Week 2 Call Completed? Yes   Is the patient interested in additional calls from an ambulatory ?  NOTE:  applies to high risk patients requiring additional follow-up. No   Revoked No further contact(revokes)-requires comment   Graduated/Revoked comments Pt is doing well. He will f/u with  PCP for the second time on 11/16/22          HUNTER PATTEN - Registered Nurse

## 2022-11-16 ENCOUNTER — LAB (OUTPATIENT)
Dept: LAB | Facility: HOSPITAL | Age: 67
End: 2022-11-16

## 2022-11-16 ENCOUNTER — CLINICAL SUPPORT (OUTPATIENT)
Dept: INTERNAL MEDICINE | Facility: CLINIC | Age: 67
End: 2022-11-16

## 2022-11-16 VITALS
SYSTOLIC BLOOD PRESSURE: 86 MMHG | RESPIRATION RATE: 16 BRPM | OXYGEN SATURATION: 96 % | HEART RATE: 101 BPM | DIASTOLIC BLOOD PRESSURE: 58 MMHG

## 2022-11-16 DIAGNOSIS — I10 ESSENTIAL HYPERTENSION: Primary | ICD-10-CM

## 2022-11-16 DIAGNOSIS — I10 ESSENTIAL HYPERTENSION: ICD-10-CM

## 2022-11-16 LAB
ANION GAP SERPL CALCULATED.3IONS-SCNC: 10 MMOL/L (ref 5–15)
BUN SERPL-MCNC: 24 MG/DL (ref 8–23)
BUN/CREAT SERPL: 12.6 (ref 7–25)
CALCIUM SPEC-SCNC: 9.5 MG/DL (ref 8.6–10.5)
CHLORIDE SERPL-SCNC: 107 MMOL/L (ref 98–107)
CO2 SERPL-SCNC: 28 MMOL/L (ref 22–29)
CREAT SERPL-MCNC: 1.91 MG/DL (ref 0.76–1.27)
EGFRCR SERPLBLD CKD-EPI 2021: 37.9 ML/MIN/1.73
GLUCOSE SERPL-MCNC: 96 MG/DL (ref 65–99)
POTASSIUM SERPL-SCNC: 3.5 MMOL/L (ref 3.5–5.2)
SODIUM SERPL-SCNC: 145 MMOL/L (ref 136–145)

## 2022-11-16 PROCEDURE — 99211 OFF/OP EST MAY X REQ PHY/QHP: CPT | Performed by: NURSE PRACTITIONER

## 2022-11-16 PROCEDURE — 80048 BASIC METABOLIC PNL TOTAL CA: CPT

## 2022-11-16 PROCEDURE — 36415 COLL VENOUS BLD VENIPUNCTURE: CPT

## 2022-11-17 ENCOUNTER — TELEPHONE (OUTPATIENT)
Dept: INTERNAL MEDICINE | Facility: CLINIC | Age: 67
End: 2022-11-17

## 2022-11-17 NOTE — TELEPHONE ENCOUNTER
FAUSTO WITH DR PADMINI THORNE OFFICE HAS CALLED, SHE STATED THAT SHE HAD FAXED PAPERS OVER REGARDING PATIENTS SURGERY AND NEEDING PLAVIX AND ASPIRIN INSTRUCTIONS.  PLEASE CALL   [phone]

## 2022-11-17 NOTE — TELEPHONE ENCOUNTER
----- Message from Dallin Miller DO sent at 11/16/2022  9:56 PM CST -----  Kidney function worsened over the last couple of weeks. Let's focus on getting plenty of hydration over the next several days and recheck labs on Monday or Tuesday. Order is in.

## 2022-11-17 NOTE — TELEPHONE ENCOUNTER
Patient informed of lab results.  Patient will go to lab before or after his nurse visit on Tuesday.

## 2022-11-17 NOTE — PROGRESS NOTES
I have reviewed the notes, assessments, and/or procedures performed by Rigo Kasper, I concur with her/his documentation of Dao Jhaveri.

## 2022-11-22 ENCOUNTER — CLINICAL SUPPORT (OUTPATIENT)
Dept: INTERNAL MEDICINE | Facility: CLINIC | Age: 67
End: 2022-11-22

## 2022-11-22 ENCOUNTER — LAB (OUTPATIENT)
Dept: LAB | Facility: HOSPITAL | Age: 67
End: 2022-11-22

## 2022-11-22 VITALS
RESPIRATION RATE: 16 BRPM | SYSTOLIC BLOOD PRESSURE: 112 MMHG | OXYGEN SATURATION: 97 % | HEART RATE: 71 BPM | DIASTOLIC BLOOD PRESSURE: 84 MMHG

## 2022-11-22 DIAGNOSIS — I10 ESSENTIAL HYPERTENSION: Primary | ICD-10-CM

## 2022-11-22 DIAGNOSIS — Z23 NEED FOR VACCINATION: Primary | ICD-10-CM

## 2022-11-22 DIAGNOSIS — N17.9 AKI (ACUTE KIDNEY INJURY): ICD-10-CM

## 2022-11-22 LAB
ALBUMIN SERPL-MCNC: 4.6 G/DL (ref 3.5–5.2)
ANION GAP SERPL CALCULATED.3IONS-SCNC: 9 MMOL/L (ref 5–15)
BUN SERPL-MCNC: 21 MG/DL (ref 8–23)
BUN/CREAT SERPL: 16.3 (ref 7–25)
CALCIUM SPEC-SCNC: 9.5 MG/DL (ref 8.6–10.5)
CHLORIDE SERPL-SCNC: 103 MMOL/L (ref 98–107)
CO2 SERPL-SCNC: 30 MMOL/L (ref 22–29)
CREAT SERPL-MCNC: 1.29 MG/DL (ref 0.76–1.27)
EGFRCR SERPLBLD CKD-EPI 2021: 60.8 ML/MIN/1.73
GLUCOSE SERPL-MCNC: 92 MG/DL (ref 65–99)
PHOSPHATE SERPL-MCNC: 2.9 MG/DL (ref 2.5–4.5)
POTASSIUM SERPL-SCNC: 3.8 MMOL/L (ref 3.5–5.2)
SODIUM SERPL-SCNC: 142 MMOL/L (ref 136–145)

## 2022-11-22 PROCEDURE — 91312 COVID-19 (PFIZER) BIVALENT BOOSTER 12+YRS: CPT | Performed by: NURSE PRACTITIONER

## 2022-11-22 PROCEDURE — 0124A PR ADM SARSCOV2 30MCG/0.3ML BST: CPT | Performed by: NURSE PRACTITIONER

## 2022-11-22 PROCEDURE — 36415 COLL VENOUS BLD VENIPUNCTURE: CPT

## 2022-11-22 PROCEDURE — 80069 RENAL FUNCTION PANEL: CPT

## 2022-11-22 NOTE — PROGRESS NOTES
Chief complaint: F/u hypertension with BP Check    History:  Dao Jhaveri is a 67 y.o. male who presents today for BP check for hypertension.    OBJECTIVE:  There were no vitals taken for this visit.    Assessment/Plan    Diagnoses and all orders for this visit:    1. Essential hypertension (Primary)        Patient presented to the office today for a BP check.  Patient's BP is 112/84 in the office today.  Patient informed to continue medications as advised during last BP check and return on 2/28/2023 for his next scheduled office visit or sooner if problems arise.         Sola Quinn, APRN  11/22/2022

## 2022-11-23 NOTE — PROGRESS NOTES
Injection    [employer] bivalent booster performed in left deltoid by Rigo Kasper MA.  Patient tolerated procedure well without difficulty.      11/22/2022  Rigo Kasper MA

## 2022-12-29 ENCOUNTER — DOCUMENTATION (OUTPATIENT)
Dept: PHYSICAL THERAPY | Facility: CLINIC | Age: 67
End: 2022-12-29

## 2022-12-29 DIAGNOSIS — R47.81 DEFICIT IN COMMUNICATION DUE TO SLURRED SPEECH: Primary | ICD-10-CM

## 2022-12-29 DIAGNOSIS — R13.13 DYSPHAGIA, PHARYNGEAL PHASE: ICD-10-CM

## 2022-12-29 DIAGNOSIS — R47.89 SLOW RATE OF SPEECH: ICD-10-CM

## 2022-12-29 NOTE — PROGRESS NOTES
Speech Language Pathology Discharge Note    Patient: Dao Jhaveri                                                                        Date: 2022               :     1955    Referring practitioner:    Dallin Miller DO  Type/Date of Initial Visit:    Type: THERAPY  Noted: 2022    Patient seen for 6 sessions    Visit Diagnoses:    ICD-10-CM ICD-9-CM   1. Deficit in communication due to slurred speech  R47.81 784.59   2. Slow rate of speech  R47.89 784.59   3. Dysphagia, pharyngeal phase  R13.13 787.23     SUBJECTIVE     Documentation for discharge only today. Patient not seen for therapy today. Patient failed to arrive for remaining appointments Please see belowfor progress on goals. Date of last visit: 10/26/2022    OBJECTIVE   GOALS  Goals                                         STG Comments Status   Patient will demonstrate natural speech rate while targeting sentences up to 10 words across 3 consecutive sessions.   Patient demonstrated proper prosody while reading short paragraphs with minimal cues. He spoke with a loud voice with good inflection and proper speech rate used for public speaking. (3/3 criteria)    Met      Patient will demonstarte decreased fatigue while talking by demonstrated diaphragmatic breathing.      Patient demonstrated proper use of diaphragmatic breathing with minimal prompts. Instructed to practice 3x daily for 5 reps to improve breath support to decrease vocal fatigue while speaking.     Discontinue      Patient will increase strength of tongue base and posterior pharyngeal walls to reduce residue that might fall into airway by completing    Patient demonstrated proper use of the effortful swallow and Frances Maneuver to improve swallowing and given handouts to perform at home. He performed the exercises 3 sets of 5xs with minimal cues.      Patient is able to perform 10 swallows at home without difficulty.      Discontinue       Patient will compensate for  oral/pharyngeal deficits and reduce risks while eating by utilizing compensatory strategies  Eat 10 score: 10     Patient advised to swallow twice and take smaller bites and sips while eating to reduce risk of aspiration.      Discontinue      LTG by:        In 3 months, patient will demonstrate proper speech rate as measured by clinical observation.     Goals are progressing Discontinue      In 3 months, patient will demonstrate decreased fatigue while talking as measured by clinical observation.     Goals are progressing Discontinue      Patient will safely consume the recommended diet without complications such as aspiration pneumonia. Went over swallow study and gave swallowing exercises to improve pharyngeal swallowing deficits.  Discontinue              ASSESSMENT/PLAN     DISCHARGE SUMMARY   Discharge date 12/29/2022   Dates of this episode 8/22/2022 through 12/29/2022   Number of visits on this episode 6   Reason for discharge noncompliant with attendance   Outcomes achieved Refer to the goals table for specifics on goals   Summary of care Patient did not return for therapy but he was able to complete goals without difficulty   Discharge plan Continue with current home exercise program as instructed     Thank you for this referral and for allowing us to participate in the care of this patient.       Signature: Elsa Alonso CCC-SLP  License # [id number]  Electronically signed on 12/29/2022

## 2023-02-03 DIAGNOSIS — I10 ESSENTIAL HYPERTENSION: ICD-10-CM

## 2023-02-03 RX ORDER — CHLORTHALIDONE 25 MG/1
25 TABLET ORAL DAILY
Qty: 90 TABLET | Refills: 1 | Status: SHIPPED | OUTPATIENT
Start: 2023-02-03

## 2023-04-24 ENCOUNTER — LAB (OUTPATIENT)
Dept: LAB | Facility: HOSPITAL | Age: 68
End: 2023-04-24
Payer: MEDICARE

## 2023-04-24 ENCOUNTER — OFFICE VISIT (OUTPATIENT)
Dept: INTERNAL MEDICINE | Facility: CLINIC | Age: 68
End: 2023-04-24
Payer: MEDICARE

## 2023-04-24 VITALS
SYSTOLIC BLOOD PRESSURE: 134 MMHG | WEIGHT: 184.5 LBS | BODY MASS INDEX: 29.65 KG/M2 | DIASTOLIC BLOOD PRESSURE: 82 MMHG | OXYGEN SATURATION: 98 % | HEART RATE: 56 BPM | RESPIRATION RATE: 16 BRPM | TEMPERATURE: 98 F | HEIGHT: 66 IN

## 2023-04-24 DIAGNOSIS — Z86.73 HISTORY OF STROKE: ICD-10-CM

## 2023-04-24 DIAGNOSIS — I69.359 HEMIPARESIS AFFECTING NONDOMINANT SIDE AS LATE EFFECT OF CEREBROVASCULAR ACCIDENT (CVA): ICD-10-CM

## 2023-04-24 DIAGNOSIS — I10 PRIMARY HYPERTENSION: ICD-10-CM

## 2023-04-24 DIAGNOSIS — I10 PRIMARY HYPERTENSION: Primary | ICD-10-CM

## 2023-04-24 DIAGNOSIS — E66.3 OVERWEIGHT (BMI 25.0-29.9): ICD-10-CM

## 2023-04-24 DIAGNOSIS — Z87.891 PERSONAL HISTORY OF NICOTINE DEPENDENCE: ICD-10-CM

## 2023-04-24 DIAGNOSIS — E78.2 MIXED HYPERLIPIDEMIA: ICD-10-CM

## 2023-04-24 PROBLEM — T50.8X5A CONTRAST DYE INDUCED NEPHROPATHY: Status: RESOLVED | Noted: 2021-02-06 | Resolved: 2023-04-24

## 2023-04-24 PROBLEM — N14.11 CONTRAST DYE INDUCED NEPHROPATHY: Status: RESOLVED | Noted: 2021-02-06 | Resolved: 2023-04-24

## 2023-04-24 PROBLEM — F17.201 TOBACCO ABUSE, IN REMISSION: Status: RESOLVED | Noted: 2022-11-01 | Resolved: 2023-04-24

## 2023-04-24 PROBLEM — Z91.199 HISTORY OF NONCOMPLIANCE WITH MEDICAL TREATMENT: Status: RESOLVED | Noted: 2021-02-03 | Resolved: 2023-04-24

## 2023-04-24 PROBLEM — I16.0 HYPERTENSIVE URGENCY: Status: RESOLVED | Noted: 2022-11-01 | Resolved: 2023-04-24

## 2023-04-24 LAB
ALBUMIN SERPL-MCNC: 4.4 G/DL (ref 3.5–5.2)
ALBUMIN/GLOB SERPL: 1.4 G/DL
ALP SERPL-CCNC: 83 U/L (ref 39–117)
ALT SERPL W P-5'-P-CCNC: 31 U/L (ref 1–41)
ANION GAP SERPL CALCULATED.3IONS-SCNC: 11 MMOL/L (ref 5–15)
AST SERPL-CCNC: 28 U/L (ref 1–40)
BILIRUB SERPL-MCNC: 0.4 MG/DL (ref 0–1.2)
BUN SERPL-MCNC: 29 MG/DL (ref 8–23)
BUN/CREAT SERPL: 21.8 (ref 7–25)
CALCIUM SPEC-SCNC: 9.6 MG/DL (ref 8.6–10.5)
CHLORIDE SERPL-SCNC: 101 MMOL/L (ref 98–107)
CO2 SERPL-SCNC: 27 MMOL/L (ref 22–29)
CREAT SERPL-MCNC: 1.33 MG/DL (ref 0.76–1.27)
DEPRECATED RDW RBC AUTO: 46.6 FL (ref 37–54)
EGFRCR SERPLBLD CKD-EPI 2021: 58.6 ML/MIN/1.73
ERYTHROCYTE [DISTWIDTH] IN BLOOD BY AUTOMATED COUNT: 16 % (ref 12.3–15.4)
GLOBULIN UR ELPH-MCNC: 3.1 GM/DL
GLUCOSE SERPL-MCNC: 133 MG/DL (ref 65–99)
HCT VFR BLD AUTO: 46.7 % (ref 37.5–51)
HGB BLD-MCNC: 14.6 G/DL (ref 13–17.7)
MCH RBC QN AUTO: 25.3 PG (ref 26.6–33)
MCHC RBC AUTO-ENTMCNC: 31.3 G/DL (ref 31.5–35.7)
MCV RBC AUTO: 81.1 FL (ref 79–97)
PLATELET # BLD AUTO: 192 10*3/MM3 (ref 140–450)
PMV BLD AUTO: 10.4 FL (ref 6–12)
POTASSIUM SERPL-SCNC: 3.9 MMOL/L (ref 3.5–5.2)
PROT SERPL-MCNC: 7.5 G/DL (ref 6–8.5)
RBC # BLD AUTO: 5.76 10*6/MM3 (ref 4.14–5.8)
SODIUM SERPL-SCNC: 139 MMOL/L (ref 136–145)
WBC NRBC COR # BLD: 6.72 10*3/MM3 (ref 3.4–10.8)

## 2023-04-24 PROCEDURE — 36415 COLL VENOUS BLD VENIPUNCTURE: CPT

## 2023-04-24 PROCEDURE — 85027 COMPLETE CBC AUTOMATED: CPT

## 2023-04-24 PROCEDURE — 80053 COMPREHEN METABOLIC PANEL: CPT

## 2023-04-24 RX ORDER — ATORVASTATIN CALCIUM 80 MG/1
80 TABLET, FILM COATED ORAL DAILY
Qty: 90 TABLET | Refills: 1 | Status: SHIPPED | OUTPATIENT
Start: 2023-04-24

## 2023-04-24 RX ORDER — LISINOPRIL 40 MG/1
40 TABLET ORAL DAILY
Qty: 90 TABLET | Refills: 1 | Status: SHIPPED | OUTPATIENT
Start: 2023-04-24

## 2023-04-24 RX ORDER — METOPROLOL SUCCINATE 100 MG/1
100 TABLET, EXTENDED RELEASE ORAL DAILY
Qty: 90 TABLET | Refills: 1 | Status: SHIPPED | OUTPATIENT
Start: 2023-04-24

## 2023-04-24 RX ORDER — CLOPIDOGREL BISULFATE 75 MG/1
75 TABLET ORAL DAILY
Qty: 90 TABLET | Refills: 1 | Status: SHIPPED | OUTPATIENT
Start: 2023-04-24

## 2023-04-24 RX ORDER — AMLODIPINE BESYLATE 10 MG/1
10 TABLET ORAL
Qty: 90 TABLET | Refills: 1 | Status: SHIPPED | OUTPATIENT
Start: 2023-04-24

## 2023-04-24 NOTE — PROGRESS NOTES
CC: f/u htn    History:  Dao Jhaveri is a 67 y.o. male   He notes he has been doing well and reports she is pleased his blood pressure has been under control.  He has no recurrent symptoms of stroke, but does have some ongoing hemiparesis that has affected his walking.  He has not had a fall, but has heaviness in his legs.      ROS:  Review of Systems   Respiratory: Negative for shortness of breath.    Cardiovascular: Negative for chest pain.   Musculoskeletal: Positive for gait problem.        reports that he quit smoking about 9 years ago. His smoking use included cigarettes. He has a 52.50 pack-year smoking history. He has never used smokeless tobacco. He reports that he does not drink alcohol and does not use drugs.      Current Outpatient Medications:   •  amLODIPine (NORVASC) 10 MG tablet, Take 1 tablet by mouth every night at bedtime., Disp: 90 tablet, Rfl: 1  •  aspirin 81 MG EC tablet, Take 1 tablet by mouth Daily., Disp: , Rfl:   •  atorvastatin (LIPITOR) 80 MG tablet, Take 1 tablet by mouth Daily., Disp: 90 tablet, Rfl: 1  •  chlorthalidone (HYGROTON) 25 MG tablet, Take 1 tablet by mouth Daily., Disp: 90 tablet, Rfl: 1  •  clopidogrel (PLAVIX) 75 MG tablet, Take 1 tablet by mouth Daily., Disp: 90 tablet, Rfl: 1  •  lisinopril (PRINIVIL,ZESTRIL) 40 MG tablet, Take 1 tablet by mouth Daily., Disp: 90 tablet, Rfl: 1  •  metoprolol succinate XL (TOPROL-XL) 100 MG 24 hr tablet, Take 1 tablet by mouth Daily., Disp: 90 tablet, Rfl: 1  •  hydrALAZINE (APRESOLINE) 50 MG tablet, Take 1 tablet by mouth 3 (Three) Times a Day. (Patient not taking: Reported on 4/24/2023), Disp: 90 tablet, Rfl: 5  •  nitroglycerin (NITROSTAT) 0.4 MG SL tablet, Place 0.4 mg under the tongue Every 5 (Five) Minutes As Needed for Chest Pain. Take no more than 3 doses in 15 minutes. (Patient not taking: Reported on 4/24/2023), Disp: , Rfl:     OBJECTIVE:  /82 (BP Location: Left arm, Patient Position: Sitting, Cuff Size: Adult)    "Pulse 56   Temp 98 °F (36.7 °C)   Resp 16   Ht 167.6 cm (66\")   Wt 83.7 kg (184 lb 8 oz)   SpO2 98%   BMI 29.78 kg/m²    Physical Exam  Constitutional:       General: He is not in acute distress.  Cardiovascular:      Rate and Rhythm: Normal rate and regular rhythm.      Heart sounds: Normal heart sounds. No murmur heard.  Pulmonary:      Effort: Pulmonary effort is normal. No respiratory distress.      Breath sounds: Normal breath sounds. No wheezing.   Neurological:      Mental Status: He is alert and oriented to person, place, and time.      Gait: Gait normal.   Psychiatric:         Mood and Affect: Mood normal.         Behavior: Behavior normal.         Assessment/Plan     Diagnoses and all orders for this visit:    1. Primary hypertension (Primary)  -     amLODIPine (NORVASC) 10 MG tablet; Take 1 tablet by mouth every night at bedtime.  Dispense: 90 tablet; Refill: 1  -     lisinopril (PRINIVIL,ZESTRIL) 40 MG tablet; Take 1 tablet by mouth Daily.  Dispense: 90 tablet; Refill: 1  -     metoprolol succinate XL (TOPROL-XL) 100 MG 24 hr tablet; Take 1 tablet by mouth Daily.  Dispense: 90 tablet; Refill: 1  -     Comprehensive Metabolic Panel; Future  -     CBC (No Diff); Future  Well controlled, BP goal for age is <140/90 per JNC 8 guidelines and continue current medications    2. History of stroke  -     atorvastatin (LIPITOR) 80 MG tablet; Take 1 tablet by mouth Daily.  Dispense: 90 tablet; Refill: 1  -     clopidogrel (PLAVIX) 75 MG tablet; Take 1 tablet by mouth Daily.  Dispense: 90 tablet; Refill: 1  -     Ambulatory Referral to Physical Therapy Evaluate and treat; Stretching, ROM, Strengthening  Stable on Plavix and high intensity statin with LDL at goal <70.     3. Mixed hyperlipidemia  -     atorvastatin (LIPITOR) 80 MG tablet; Take 1 tablet by mouth Daily.  Dispense: 90 tablet; Refill: 1  Stable on high intensity statin therapy per ACC/AHA guidelines.    4. Hemiparesis affecting nondominant side as " late effect of cerebrovascular accident (CVA) (HCC)  -     Ambulatory Referral to Physical Therapy Evaluate and treat; Stretching, ROM, Strengthening  Referral to PT for strengthening.     5. Personal history of nicotine dependence  -     CT Chest Low Dose Wo; Future    6. Overweight (BMI 25.0-29.9)  BMI is >= 25 and <30. (Overweight) The following options were offered after discussion;: exercise counseling/recommendations and nutrition counseling/recommendations        An After Visit Summary was printed and given to the patient at discharge.  Return in about 6 months (around 10/24/2023) for Medicare Wellness.      Dallin Miller D.O. 4/24/2023   Electronically signed.

## 2023-04-25 PROBLEM — R73.09 ELEVATED RANDOM BLOOD GLUCOSE LEVEL: Status: ACTIVE | Noted: 2023-04-25

## 2023-04-25 PROBLEM — R73.9 ELEVATED RANDOM BLOOD GLUCOSE LEVEL: Status: ACTIVE | Noted: 2023-04-25

## 2023-06-06 ENCOUNTER — TREATMENT (OUTPATIENT)
Dept: PHYSICAL THERAPY | Facility: CLINIC | Age: 68
End: 2023-06-06
Payer: MEDICARE

## 2023-06-06 DIAGNOSIS — I69.359 HEMIPARESIS AFFECTING NONDOMINANT SIDE AS LATE EFFECT OF CEREBROVASCULAR ACCIDENT (CVA): ICD-10-CM

## 2023-06-06 DIAGNOSIS — Z86.73 HISTORY OF STROKE: Primary | ICD-10-CM
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
## 2023-06-12 ENCOUNTER — TREATMENT (OUTPATIENT)
Dept: PHYSICAL THERAPY | Facility: CLINIC | Age: 68
End: 2023-06-12
Payer: MEDICARE

## 2023-06-12 DIAGNOSIS — Z86.73 HISTORY OF STROKE: Primary | ICD-10-CM

## 2023-06-12 DIAGNOSIS — I69.359 HEMIPARESIS AFFECTING NONDOMINANT SIDE AS LATE EFFECT OF CEREBROVASCULAR ACCIDENT (CVA): ICD-10-CM

## 2023-06-12 PROCEDURE — 97110 THERAPEUTIC EXERCISES: CPT

## 2023-06-12 NOTE — PROGRESS NOTES
Physical Therapy Treatment Note  [address] [place] [address]    Patient: Dao Jahveri                                                 Visit Date: 2023  :     1955    Referring practitioner:    Dallin Miller DO  Date of Initial Visit:          Type: THERAPY  Noted: 2023    Patient seen for 2 sessions    Visit Diagnoses:    ICD-10-CM ICD-9-CM   1. History of stroke  Z86.73 V12.54   2. Hemiparesis affecting nondominant side as late effect of cerebrovascular accident (CVA)  I69.359 438.22     SUBJECTIVE     Subjective: Patient denies falls or near falls. He denies changes or complaints. Reports that his wife fell a few ago and broke her heel, so he's had to do all their ADLs recently. Reports this has not been going well.     PAIN: 4/10 (L hip)     OBJECTIVE     Objective     Therapeutic Exercises    23941 Units Comments   B SL clamshells with YTB  1 x 10 R    B SL clamshells without resistance  1 x 10 R  2 x 10 L Added to HEP   B SL hip abd SLR against gravity  2 x 10 Added to HEP   Bridges  2 x 10  Added to HEP   Sit <> stand from tx table 2 x 10  Added to HEP   SLS at countertop, ivone  Added to HEP, very unstable   B shuttle press (4 cords) 2 x 10     Bolstered HEP and reviewed with pt  See education table         Timed Minutes 40     Therapy Education/Self Care 44182   Education offered today See below   Angelo Code [email or web address]   Ongoing HEP   Date: 2023  Prepared by: Sofia Li    Exercises  - Sidelying Hip Abduction  - 1-2 x daily - 7 x weekly - 2 sets - 10 reps  - Clamshell with Resistance  - 1 x daily - 7 x weekly - 1-2 sets - 10 reps  - Sit to Stand  - 1-2 x daily - 7 x weekly - 2 sets - 10 reps  - Standing Single Leg Stance with Counter Support  - 1-2 x daily - 7 x weekly - 2 sets - 10 reps   Timed Minutes      Total Timed Treatment:     40   mins  Total Time of Visit:             40   mins          ASSESSMENT/PLAN     GOALS  Goals                                                    Progress Note due by 7/6/2023                                                                Recert due by 9/4/2023   LTG by: 12 weeks Comments Date Status   Patient to improve DEY balance score to >/= 50/56 to decrease patient's risk of falls.     ongoing   Patient to perform TUG within 18 sec w/ LRAD and without LOB for improved functional mobility.     ongoing   Patient to improve FGA score to >/= 24/30 to decrease patient's risk of falls and improve community ambulation.    ongoing   Patient will be I with final HEP.  Provided formal HEP today  6/12 ongoing   Improve gross LE strength to >/=4+/5     ongoing     Assessment/Plan     ASSESSMENT: No goals have been met at this time; however, it was his first tx session since initial evaluation. He presents today with increased L hip pain and though he tolerated SL, did report this increased his discomfort. HEP was bolstered to reflect hip strengthening and static balance so that he can continue progressing strength independently at home. He reports regression in strength secondary to decreased dedication to HEP since last POC and this was reflected with notable decrease in hip strength. Due to cancellations, we were able to schedule him more visits before his previously next scheduled apt which was 7/7/2023.    PLAN: Assess response to tx today and continue to progress LE strengthening and balance. Modify and progress HEP as appropriate.     SIGNATURE: Sofia Li PTA, [place] License #: [id number]  Electronically Signed on 6/12/2023        [address] [address]  [phone]

## 2023-07-11 ENCOUNTER — TELEPHONE (OUTPATIENT)
Dept: INTERNAL MEDICINE | Facility: CLINIC | Age: 68
End: 2023-07-11
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
## 2023-07-27 DIAGNOSIS — I10 ESSENTIAL HYPERTENSION: ICD-10-CM

## 2023-07-27 NOTE — TELEPHONE ENCOUNTER
Caller: [facility] - [place], [place] - [address] - [phone] [facility] [phone] FX    Relationship: Pharmacy    Best call back number: [phone]     Requested Prescriptions:   Requested Prescriptions     Pending Prescriptions Disp Refills    chlorthalidone (HYGROTON) 25 MG tablet 90 tablet 1     Sig: Take 1 tablet by mouth Daily.        Pharmacy where request should be sent: [facility] - [place], [place] - [address] - [phone] [facility] [phone]     Last office visit with prescribing clinician: 4/24/2023   Last telemedicine visit with prescribing clinician: Visit date not found   Next office visit with prescribing clinician: 11/1/2023     Additional details provided by patient:     Does the patient have less than a 3 day supply:  [] Yes  [] No    Would you like a call back once the refill request has been completed: [] Yes [] No    If the office needs to give you a call back, can they leave a voicemail: [] Yes [] No    Ko Abraham Rep   07/27/23 14:40 CDT

## 2023-07-28 RX ORDER — CHLORTHALIDONE 25 MG/1
25 TABLET ORAL DAILY
Qty: 90 TABLET | Refills: 1 | Status: SHIPPED | OUTPATIENT
Start: 2023-07-28

## 2023-09-22 ENCOUNTER — HOSPITAL ENCOUNTER (OUTPATIENT)
Dept: CT IMAGING | Facility: HOSPITAL | Age: 68
Discharge: HOME | End: 2023-09-22

## 2023-10-09 DIAGNOSIS — Z86.73 HISTORY OF STROKE: ICD-10-CM

## 2023-10-09 DIAGNOSIS — I10 PRIMARY HYPERTENSION: ICD-10-CM

## 2023-10-09 RX ORDER — LISINOPRIL 40 MG/1
40 TABLET ORAL DAILY
Qty: 90 TABLET | Refills: 1 | Status: SHIPPED | OUTPATIENT
Start: 2023-10-09

## 2023-10-09 RX ORDER — METOPROLOL SUCCINATE 100 MG/1
100 TABLET, EXTENDED RELEASE ORAL DAILY
Qty: 90 TABLET | Refills: 1 | Status: SHIPPED | OUTPATIENT
Start: 2023-10-09

## 2023-10-09 RX ORDER — CLOPIDOGREL BISULFATE 75 MG/1
75 TABLET ORAL DAILY
Qty: 90 TABLET | Refills: 1 | Status: SHIPPED | OUTPATIENT
Start: 2023-10-09

## 2023-10-09 RX ORDER — AMLODIPINE BESYLATE 10 MG/1
10 TABLET ORAL
Qty: 90 TABLET | Refills: 1 | Status: SHIPPED | OUTPATIENT
Start: 2023-10-09

## 2023-10-09 NOTE — TELEPHONE ENCOUNTER
Rx Refill Note  Requested Prescriptions     Pending Prescriptions Disp Refills    metoprolol succinate XL (TOPROL-XL) 100 MG 24 hr tablet [Pharmacy Med Name: METOPROLOL SUCCINATE  MG Tablet Extended Release 24 Hour] 90 tablet 10     Sig: TAKE 1 TABLET EVERY DAY    lisinopril (PRINIVIL,ZESTRIL) 40 MG tablet [Pharmacy Med Name: LISINOPRIL 40 MG Tablet] 90 tablet 10     Sig: TAKE 1 TABLET EVERY DAY    clopidogrel (PLAVIX) 75 MG tablet [Pharmacy Med Name: CLOPIDOGREL 75 MG Tablet] 90 tablet 10     Sig: TAKE 1 TABLET EVERY DAY    amLODIPine (NORVASC) 10 MG tablet [Pharmacy Med Name: AMLODIPINE BESYLATE 10 MG Tablet] 90 tablet 10     Sig: TAKE 1 TABLET AT BEDTIME      Last office visit with prescribing clinician: 4/24/2023   Last telemedicine visit with prescribing clinician: Visit date not found   Next office visit with prescribing clinician: 11/1/2023                         Would you like a call back once the refill request has been completed: [] Yes [] No    If the office needs to give you a call back, can they leave a voicemail: [] Yes [] No    Gregor Daniel MA  10/09/23, 09:45 CDT

## 2023-10-11 ENCOUNTER — TELEPHONE (OUTPATIENT)
Dept: INTERNAL MEDICINE | Facility: CLINIC | Age: 68
End: 2023-10-11

## 2023-10-11 DIAGNOSIS — G81.94 LEFT HEMIPARESIS: ICD-10-CM

## 2023-10-11 DIAGNOSIS — Z74.09 IMPAIRED FUNCTIONAL MOBILITY, BALANCE, GAIT, AND ENDURANCE: ICD-10-CM

## 2023-10-11 DIAGNOSIS — I69.359 HEMIPARESIS AFFECTING NONDOMINANT SIDE AS LATE EFFECT OF CEREBROVASCULAR ACCIDENT (CVA): Primary | ICD-10-CM

## 2023-10-11 NOTE — TELEPHONE ENCOUNTER
Patient stated he was unable to make the PT appointments that were scheduled before because he did not have transportation.  He said he tried to call and schedule it again and he was told he will need a new referral.

## 2023-10-11 NOTE — TELEPHONE ENCOUNTER
Caller: Dao Jhaveri    Relationship: Self    Best call back number: [phone](LEAVE MESSAGE)    What is the best time to reach you: BEFORE 5    Who are you requesting to speak with (clinical staff, provider,  specific staff member): CLINICAL      What was the call regarding: PATIENT STATED THAT HE IS CHECKING ON THE STATUS OF HIS PHYSICAL THERAPY REFERRAL THAT WAS SUPPOSED TO BE SCHEDULED LAST MONTH, BUT PATIENT NEVER HEARD FROM ANYONE.    Is it okay if the provider responds through [facility]: NO, PLEASE CALL

## 2023-10-16 ENCOUNTER — TELEPHONE (OUTPATIENT)
Dept: INTERNAL MEDICINE | Facility: CLINIC | Age: 68
End: 2023-10-16

## 2023-10-16 NOTE — TELEPHONE ENCOUNTER
Hub staff attempted to follow warm transfer process and was unsuccessful     Caller:  PHYSICAL THERAPY    Relationship to patient: Other    Best call back number: [phone]     Patient is needing: PT CALLED FROM .  NO LONGER IN NETWORK.  REFERRAL FOR PHYSICAL THERAPY WILL NEED TO GO TO ANOTHER FACILITY.  SHE DID SAY MAYBE [facility]?

## 2023-11-01 ENCOUNTER — OFFICE VISIT (OUTPATIENT)
Dept: INTERNAL MEDICINE | Facility: CLINIC | Age: 68
End: 2023-11-01
Payer: MEDICARE

## 2023-11-01 ENCOUNTER — LAB (OUTPATIENT)
Dept: LAB | Facility: HOSPITAL | Age: 68
End: 2023-11-01
Payer: MEDICARE

## 2023-11-01 VITALS
RESPIRATION RATE: 16 BRPM | WEIGHT: 186.6 LBS | BODY MASS INDEX: 29.99 KG/M2 | OXYGEN SATURATION: 98 % | HEIGHT: 66 IN | HEART RATE: 60 BPM | DIASTOLIC BLOOD PRESSURE: 88 MMHG | SYSTOLIC BLOOD PRESSURE: 138 MMHG

## 2023-11-01 DIAGNOSIS — E78.2 MIXED HYPERLIPIDEMIA: ICD-10-CM

## 2023-11-01 DIAGNOSIS — Z86.73 HISTORY OF STROKE: ICD-10-CM

## 2023-11-01 DIAGNOSIS — I69.359 HEMIPARESIS AFFECTING NONDOMINANT SIDE AS LATE EFFECT OF CEREBROVASCULAR ACCIDENT (CVA): ICD-10-CM

## 2023-11-01 DIAGNOSIS — Z23 NEED FOR VACCINATION: ICD-10-CM

## 2023-11-01 DIAGNOSIS — R73.02 IMPAIRED GLUCOSE TOLERANCE: ICD-10-CM

## 2023-11-01 DIAGNOSIS — E66.09 CLASS 1 OBESITY DUE TO EXCESS CALORIES WITH SERIOUS COMORBIDITY AND BODY MASS INDEX (BMI) OF 30.0 TO 30.9 IN ADULT: ICD-10-CM

## 2023-11-01 DIAGNOSIS — I10 PRIMARY HYPERTENSION: ICD-10-CM

## 2023-11-01 DIAGNOSIS — I10 PRIMARY HYPERTENSION: Primary | ICD-10-CM

## 2023-11-01 PROBLEM — E66.811 CLASS 1 OBESITY DUE TO EXCESS CALORIES WITH SERIOUS COMORBIDITY AND BODY MASS INDEX (BMI) OF 30.0 TO 30.9 IN ADULT: Status: ACTIVE | Noted: 2022-08-16

## 2023-11-01 PROBLEM — R73.09 ELEVATED RANDOM BLOOD GLUCOSE LEVEL: Status: RESOLVED | Noted: 2023-04-25 | Resolved: 2023-11-01

## 2023-11-01 PROBLEM — R73.9 ELEVATED RANDOM BLOOD GLUCOSE LEVEL: Status: RESOLVED | Noted: 2023-04-25 | Resolved: 2023-11-01

## 2023-11-01 LAB
ALBUMIN SERPL-MCNC: 4.3 G/DL (ref 3.5–5.2)
ALBUMIN/GLOB SERPL: 1.4 G/DL
ALP SERPL-CCNC: 60 U/L (ref 39–117)
ALT SERPL W P-5'-P-CCNC: 12 U/L (ref 1–41)
ANION GAP SERPL CALCULATED.3IONS-SCNC: 6 MMOL/L (ref 5–15)
AST SERPL-CCNC: 17 U/L (ref 1–40)
BILIRUB SERPL-MCNC: 0.3 MG/DL (ref 0–1.2)
BUN SERPL-MCNC: 22 MG/DL (ref 8–23)
BUN/CREAT SERPL: 16.9 (ref 7–25)
CALCIUM SPEC-SCNC: 9.7 MG/DL (ref 8.6–10.5)
CHLORIDE SERPL-SCNC: 103 MMOL/L (ref 98–107)
CHOLEST SERPL-MCNC: 129 MG/DL (ref 0–200)
CO2 SERPL-SCNC: 30 MMOL/L (ref 22–29)
CREAT SERPL-MCNC: 1.3 MG/DL (ref 0.76–1.27)
DEPRECATED RDW RBC AUTO: 41.9 FL (ref 37–54)
EGFRCR SERPLBLD CKD-EPI 2021: 59.8 ML/MIN/1.73
ERYTHROCYTE [DISTWIDTH] IN BLOOD BY AUTOMATED COUNT: 14.3 % (ref 12.3–15.4)
GLOBULIN UR ELPH-MCNC: 3.1 GM/DL
GLUCOSE SERPL-MCNC: 116 MG/DL (ref 65–99)
HBA1C MFR BLD: 6.3 % (ref 4.8–5.6)
HCT VFR BLD AUTO: 44.6 % (ref 37.5–51)
HDLC SERPL-MCNC: 33 MG/DL (ref 40–60)
HGB BLD-MCNC: 14 G/DL (ref 13–17.7)
LDLC SERPL CALC-MCNC: 77 MG/DL (ref 0–100)
LDLC/HDLC SERPL: 2.28 {RATIO}
MCH RBC QN AUTO: 25.5 PG (ref 26.6–33)
MCHC RBC AUTO-ENTMCNC: 31.4 G/DL (ref 31.5–35.7)
MCV RBC AUTO: 81.1 FL (ref 79–97)
PLATELET # BLD AUTO: 205 10*3/MM3 (ref 140–450)
PMV BLD AUTO: 10.5 FL (ref 6–12)
POTASSIUM SERPL-SCNC: 3.9 MMOL/L (ref 3.5–5.2)
PROT SERPL-MCNC: 7.4 G/DL (ref 6–8.5)
RBC # BLD AUTO: 5.5 10*6/MM3 (ref 4.14–5.8)
SODIUM SERPL-SCNC: 139 MMOL/L (ref 136–145)
TRIGL SERPL-MCNC: 103 MG/DL (ref 0–150)
VLDLC SERPL-MCNC: 19 MG/DL (ref 5–40)
WBC NRBC COR # BLD: 4.99 10*3/MM3 (ref 3.4–10.8)

## 2023-11-01 PROCEDURE — 80053 COMPREHEN METABOLIC PANEL: CPT

## 2023-11-01 PROCEDURE — 36415 COLL VENOUS BLD VENIPUNCTURE: CPT

## 2023-11-01 PROCEDURE — 85027 COMPLETE CBC AUTOMATED: CPT

## 2023-11-01 PROCEDURE — 83036 HEMOGLOBIN GLYCOSYLATED A1C: CPT

## 2023-11-01 PROCEDURE — 80061 LIPID PANEL: CPT

## 2023-11-01 RX ORDER — ATORVASTATIN CALCIUM 80 MG/1
80 TABLET, FILM COATED ORAL DAILY
Qty: 90 TABLET | Refills: 3 | Status: SHIPPED | OUTPATIENT
Start: 2023-11-01

## 2023-11-01 NOTE — PROGRESS NOTES
The ABCs of the Annual Wellness Visit  Subsequent Medicare Wellness Visit    Subjective      Dao Jhaveri is a 68 y.o. male who presents for a Subsequent Medicare Wellness Visit.    The following portions of the patient's history were reviewed and   updated as appropriate: allergies, current medications, past family history, past medical history, past social history, past surgical history, and problem list.    Compared to one year ago, the patient feels his physical   health is better.    Compared to one year ago, the patient feels his mental   health is better.    Recent Hospitalizations:  He was not admitted to the hospital during the last year.       Current Medical Providers:  Patient Care Team:  Dallin Miller DO as PCP - General (Internal Medicine)  Beto Dover MD as Surgeon (Orthopedic Surgery)    Outpatient Medications Prior to Visit   Medication Sig Dispense Refill    amLODIPine (NORVASC) 10 MG tablet Take 1 tablet by mouth every night at bedtime. 90 tablet 1    chlorthalidone (HYGROTON) 25 MG tablet Take 1 tablet by mouth Daily. 90 tablet 1    clopidogrel (PLAVIX) 75 MG tablet Take 1 tablet by mouth Daily. 90 tablet 1    hydrALAZINE (APRESOLINE) 50 MG tablet Take 1 tablet by mouth 3 (Three) Times a Day. 90 tablet 5    lisinopril (PRINIVIL,ZESTRIL) 40 MG tablet Take 1 tablet by mouth Daily. 90 tablet 1    metoprolol succinate XL (TOPROL-XL) 100 MG 24 hr tablet Take 1 tablet by mouth Daily. 90 tablet 1    nitroglycerin (NITROSTAT) 0.4 MG SL tablet Place 1 tablet under the tongue Every 5 (Five) Minutes As Needed for Chest Pain. Take no more than 3 doses in 15 minutes.      atorvastatin (LIPITOR) 80 MG tablet Take 1 tablet by mouth Daily. 90 tablet 1     No facility-administered medications prior to visit.       No opioid medication identified on active medication list. I have reviewed chart for other potential  high risk medication/s and harmful drug interactions in the elderly.        Aspirin is  "not on active medication list.   On Plavix instead of ASA .    Patient Active Problem List   Diagnosis    Primary hypertension    Mixed hyperlipidemia    History of stroke    Hemiparesis affecting nondominant side as late effect of cerebrovascular accident (CVA)    Nonexudative age-related macular degeneration    Microcytic anemia    Class 1 obesity due to excess calories with serious comorbidity and body mass index (BMI) of 30.0 to 30.9 in adult    Impaired glucose tolerance    Intractable vascular headache     Advance Care Planning   Advance Care Planning     Advance Directive is not on file.  ACP discussion was held with the patient during this visit. Patient does not have an advance directive, declines further assistance.     Objective    Vitals:    11/01/23 1007   BP: 138/88   BP Location: Left arm   Patient Position: Sitting   Cuff Size: Adult   Pulse: 60   Resp: 16   SpO2: 98%   Weight: 84.6 kg (186 lb 9.6 oz)   Height: 167.6 cm (66\")     Estimated body mass index is 30.12 kg/m² as calculated from the following:    Height as of this encounter: 167.6 cm (66\").    Weight as of this encounter: 84.6 kg (186 lb 9.6 oz).           Does the patient have evidence of cognitive impairment?   No    Lab Results   Component Value Date    TRIG 103 11/01/2023    HDL 33 (L) 11/01/2023    LDL 77 11/01/2023    VLDL 19 11/01/2023    HGBA1C 6.30 (H) 11/01/2023          HEALTH RISK ASSESSMENT    Smoking Status:  Social History     Tobacco Use   Smoking Status Former    Packs/day: 1.50    Years: 35.00    Additional pack years: 0.00    Total pack years: 52.50    Types: Cigarettes    Quit date: 7/3/2013    Years since quitting: 10.3    Passive exposure: Past   Smokeless Tobacco Never     Alcohol Consumption:  Social History     Substance and Sexual Activity   Alcohol Use No     Fall Risk Screen:    STEADI Fall Risk Assessment was completed, and patient is at MODERATE risk for falls. Assessment completed on:11/1/2023    Depression " Screenin/1/2023    10:14 AM   PHQ-2/PHQ-9 Depression Screening   Little Interest or Pleasure in Doing Things 0-->not at all   Feeling Down, Depressed or Hopeless 0-->not at all   PHQ-9: Brief Depression Severity Measure Score 0       Health Habits and Functional and Cognitive Screenin/1/2023    10:00 AM   Functional & Cognitive Status   Do you have difficulty preparing food and eating? No   Do you have difficulty bathing yourself, getting dressed or grooming yourself? No   Do you have difficulty using the toilet? No   Do you have difficulty moving around from place to place? No   Do you have trouble with steps or getting out of a bed or a chair? No   Current Diet Unhealthy Diet   Dental Exam Not up to date   Eye Exam Up to date   Exercise (times per week) 4 times per week   Current Exercises Include Walking   Do you need help using the phone?  No   Are you deaf or do you have serious difficulty hearing?  No   Do you need help to go to places out of walking distance? No   Do you need help shopping? No   Do you need help preparing meals?  No   Do you need help with housework?  No   Do you need help with laundry? No   Do you need help taking your medications? No   Do you need help managing money? No   Do you ever drive or ride in a car without wearing a seat belt? No   Have you felt unusual stress, anger or loneliness in the last month? No   Who do you live with? Spouse   If you need help, do you have trouble finding someone available to you? No   Have you been bothered in the last four weeks by sexual problems? No   Do you have difficulty concentrating, remembering or making decisions? No       Age-appropriate Screening Schedule:  Refer to the list below for future screening recommendations based on patient's age, sex and/or medical conditions. Orders for these recommended tests are listed in the plan section. The patient has been provided with a written plan.    Health Maintenance   Topic Date Due     TDAP/TD VACCINES (1 - Tdap) Never done    ZOSTER VACCINE (1 of 2) Never done    LUNG CANCER SCREENING  08/09/2023    COVID-19 Vaccine (5 - 2023-24 season) 09/01/2023    COLORECTAL CANCER SCREENING  09/04/2024    BMI FOLLOWUP  10/12/2024    ANNUAL WELLNESS VISIT  11/01/2024    LIPID PANEL  11/01/2024    HEPATITIS C SCREENING  Completed    INFLUENZA VACCINE  Completed    Pneumococcal Vaccine 65+  Completed    AAA SCREEN (ONE-TIME)  Completed                  CMS Preventative Services Quick Reference  Risk Factors Identified During Encounter:    Fall Risk-High or Moderate: Discussed Fall Prevention in the home  Immunizations Discussed/Encouraged: Tdap, Influenza, Shingrix, COVID19, and RSV (Respiratory Syncytial Virus)  Dental Screening Recommended  Vision Screening Recommended    The above risks/problems have been discussed with the patient.  Pertinent information has been shared with the patient in the After Visit Summary.    Diagnoses and all orders for this visit:    1. Primary hypertension (Primary)  -     Comprehensive Metabolic Panel; Future  -     CBC (No Diff); Future  Well controlled, BP goal for age is <140/90 per JNC 8 guidelines, and continue current medications    2. Hemiparesis affecting nondominant side as late effect of cerebrovascular accident (CVA)  3. History of stroke  -     atorvastatin (LIPITOR) 80 MG tablet; Take 1 tablet by mouth Daily.  Dispense: 90 tablet; Refill: 3  Continue statin. He has not heard from PT, but they tried to contact him 4 times. He is sent the number for their office.     4. Mixed hyperlipidemia  -     atorvastatin (LIPITOR) 80 MG tablet; Take 1 tablet by mouth Daily.  Dispense: 90 tablet; Refill: 3  -     Lipid Panel; Future    5. Impaired glucose tolerance  -     Hemoglobin A1c; Future    6. Class 1 obesity due to excess calories with serious comorbidity and body mass index (BMI) of 30.0 to 30.9 in adult    7. Need for vaccination  -     Fluzone High-Dose 65+yrs  ([id number])        Follow Up:   Next Medicare Wellness visit to be scheduled in 1 year.      An After Visit Summary and PPPS were made available to the patient.

## 2023-11-01 NOTE — PATIENT INSTRUCTIONS
You can call [phone] to reschedule your CT scan for your lungs.       Medicare Wellness  Personal Prevention Plan of Service     Date of Office Visit:    Encounter Provider:  Dallin Miller DO  Place of Service:  [facility] INTERNAL MEDICINE  Patient Name: Dao Jhaveri  :  1955    As part of the Medicare Wellness portion of your visit today, we are providing you with this personalized preventive plan of services (PPPS). This plan is based upon recommendations of the [place] Preventive Services Task Force (USPSTF) and the Advisory Committee on Immunization Practices (ACIP).    This lists the preventive care services that should be considered, and provides dates of when you are due. Items listed as completed are up-to-date and do not require any further intervention.    Health Maintenance   Topic Date Due    TDAP/TD VACCINES (1 - Tdap) Never done    ZOSTER VACCINE (1 of 2) Never done    INFLUENZA VACCINE  2023    LUNG CANCER SCREENING  2023    ANNUAL WELLNESS VISIT  2023    LIPID PANEL  2023    COVID-19 Vaccine (2023-24 season) 2023    COLORECTAL CANCER SCREENING  2024    BMI FOLLOWUP  10/12/2024    HEPATITIS C SCREENING  Completed    Pneumococcal Vaccine 65+  Completed    AAA SCREEN (ONE-TIME)  Completed       Orders Placed This Encounter   Procedures    Fluzone High-Dose 65+yrs ([id number])    Comprehensive Metabolic Panel     Standing Status:   Future     Standing Expiration Date:   2024     Order Specific Question:   Release to patient     Answer:   Routine Release [[id number]]    Hemoglobin A1c     Standing Status:   Future     Standing Expiration Date:   2024     Order Specific Question:   Release to patient     Answer:   Routine Release [[id number]]    Lipid Panel     Standing Status:   Future     Standing Expiration Date:   2024     Order Specific Question:   Release to patient     Answer:   Routine Release  [[id number]]    CBC (No Diff)     Standing Status:   Future     Standing Expiration Date:   10/31/2024     Order Specific Question:   Release to patient     Answer:   Routine Release [[id number]]       Return in about 6 months (around 5/1/2024) for Recheck.          Advance Care Planning and Advance Directives     You make decisions on a daily basis - decisions about where you want to live, your career, your home, your life. Perhaps one of the most important decisions you face is your choice for future medical care. Take time to talk with your family and your healthcare team and start planning today.  Advance Care Planning is a process that can help you:  Understand possible future healthcare decisions in light of your own experiences  Reflect on those decision in light of your goals and values  Discuss your decisions with those closest to you and the healthcare professionals that care for you  Make a plan by creating a document that reflects your wishes    Surrogate Decision Maker  In the event of a medical emergency, which has left you unable to communicate or to make your own decisions, you would need someone to make decisions for you.  It is important to discuss your preferences for medical treatment with this person while you are in good health.     Qualities of a surrogate decision maker:  Willing to take on this role and responsibility  Knows what you want for future medical care  Willing to follow your wishes even if they don't agree with them  Able to make difficult medical decisions under stressful circumstances    Advance Directives  These are legal documents you can create that will guide your healthcare team and decision maker(s) when needed. These documents can be stored in the electronic medical record.    Living Will - a legal document to guide your care if you have a terminal condition or a serious illness and are unable to communicate. States vary by statute in document names/types, but most forms  may include one or more of the following:        -  Directions regarding life-prolonging treatments        -  Directions regarding artificially provided nutrition/hydration        -  Choosing a healthcare decision maker        -  Direction regarding organ/tissue donation    Durable Power of  for Healthcare - this document names an -in-fact to make medical decisions for you, but it may also allow this person to make personal and financial decisions for you. Please seek the advice of an  if you need this type of document.    **Advance Directives are not required and no one may discriminate against you if you do not sign one.    Medical Orders  Many states allow specific forms/orders signed by your physician to record your wishes for medical treatment in your current state of health. This form, signed in personal communication with your physician, addresses resuscitation and other medical interventions that you may or may not want.      For more information or to schedule a time with a [facility] Advance Care Planning Facilitator contact: [facility].[employer]/ACP or call [phone] and someone will contact you directly.

## 2023-11-03 PROBLEM — N18.31 STAGE 3A CHRONIC KIDNEY DISEASE: Status: ACTIVE | Noted: 2023-11-03

## 2023-11-06 ENCOUNTER — HOSPITAL ENCOUNTER (OUTPATIENT)
Dept: CT IMAGING | Facility: HOSPITAL | Age: 68
Discharge: HOME | End: 2023-11-06
Admitting: INTERNAL MEDICINE
Payer: MEDICARE

## 2023-11-06 DIAGNOSIS — R91.1 LUNG NODULE: Primary | ICD-10-CM

## 2023-11-06 DIAGNOSIS — Z87.891 PERSONAL HISTORY OF NICOTINE DEPENDENCE: ICD-10-CM

## 2023-11-06 PROCEDURE — 71271 CT THORAX LUNG CANCER SCR C-: CPT

## 2023-11-16 ENCOUNTER — OFFICE VISIT (OUTPATIENT)
Dept: CARDIOLOGY | Facility: CLINIC | Age: 68
End: 2023-11-16
Payer: MEDICARE

## 2023-11-16 VITALS
BODY MASS INDEX: 30.05 KG/M2 | DIASTOLIC BLOOD PRESSURE: 78 MMHG | HEIGHT: 66 IN | HEART RATE: 59 BPM | SYSTOLIC BLOOD PRESSURE: 132 MMHG | WEIGHT: 187 LBS

## 2023-11-16 DIAGNOSIS — E78.2 MIXED HYPERLIPIDEMIA: Primary | ICD-10-CM

## 2023-11-16 DIAGNOSIS — E66.09 CLASS 1 OBESITY DUE TO EXCESS CALORIES WITH SERIOUS COMORBIDITY AND BODY MASS INDEX (BMI) OF 30.0 TO 30.9 IN ADULT: ICD-10-CM

## 2023-11-16 DIAGNOSIS — I10 PRIMARY HYPERTENSION: ICD-10-CM

## 2023-11-16 PROCEDURE — 3075F SYST BP GE 130 - 139MM HG: CPT | Performed by: EMERGENCY MEDICINE

## 2023-11-16 PROCEDURE — 99213 OFFICE O/P EST LOW 20 MIN: CPT | Performed by: EMERGENCY MEDICINE

## 2023-11-16 PROCEDURE — 3078F DIAST BP <80 MM HG: CPT | Performed by: EMERGENCY MEDICINE

## 2023-11-16 NOTE — PROGRESS NOTES
Subjective:     Encounter Date:11/16/2023      Patient ID: Dao Jhaveri is a 68 y.o. male.    Chief Complaint:  History of Present Illness  Dao Jhaveri is a 68-year-old male who presents for a follow-up appointment on hypertension.    The patient reports he has been feeling perfect over the past year. He states that he has not had any more strokes. He denies any chest pain, shortness of breath, dizzy spells, passing out, or feeling his heart skipping or fluttering. He reports he recently had an appointment with Dr. Walker and he seemed happy with everything.    His blood pressure today is 132/78 mmHg.    Current medications include amlodipine every night, Lipitor, chlorthalidone 25 mg, Plavix, hydralazine as needed, lisinopril 40 mg, and metoprolol 100 mg. He notes that he was taking baby aspirin, but he was told to discontinue it.      Review of Systems   Constitutional: Negative for diaphoresis, fever and malaise/fatigue.   HENT:  Negative for congestion.    Eyes:  Negative for vision loss in left eye and vision loss in right eye.   Cardiovascular:  Negative for chest pain, claudication, dyspnea on exertion, irregular heartbeat, leg swelling, orthopnea, palpitations and syncope.   Respiratory:  Negative for cough, shortness of breath and wheezing.    Hematologic/Lymphatic: Negative for adenopathy.   Skin:  Negative for rash.   Musculoskeletal:  Negative for joint pain and joint swelling.   Gastrointestinal:  Negative for abdominal pain, diarrhea, nausea and vomiting.   Neurological:  Negative for excessive daytime sleepiness, dizziness, focal weakness, light-headedness, numbness and weakness.   Psychiatric/Behavioral:  Negative for depression. The patient does not have insomnia.            Current Outpatient Medications:     amLODIPine (NORVASC) 10 MG tablet, Take 1 tablet by mouth every night at bedtime., Disp: 90 tablet, Rfl: 1    atorvastatin (LIPITOR) 80 MG tablet, Take 1 tablet by mouth Daily., Disp:  90 tablet, Rfl: 3    chlorthalidone (HYGROTON) 25 MG tablet, Take 1 tablet by mouth Daily., Disp: 90 tablet, Rfl: 1    clopidogrel (PLAVIX) 75 MG tablet, Take 1 tablet by mouth Daily., Disp: 90 tablet, Rfl: 1    hydrALAZINE (APRESOLINE) 50 MG tablet, Take 1 tablet by mouth 3 (Three) Times a Day., Disp: 90 tablet, Rfl: 5    lisinopril (PRINIVIL,ZESTRIL) 40 MG tablet, Take 1 tablet by mouth Daily., Disp: 90 tablet, Rfl: 1    metoprolol succinate XL (TOPROL-XL) 100 MG 24 hr tablet, Take 1 tablet by mouth Daily., Disp: 90 tablet, Rfl: 1    nitroglycerin (NITROSTAT) 0.4 MG SL tablet, Place 1 tablet under the tongue Every 5 (Five) Minutes As Needed for Chest Pain. Take no more than 3 doses in 15 minutes. (Patient not taking: Reported on 11/16/2023), Disp: , Rfl:        Objective:      Vitals:    11/16/23 0913   BP: 132/78   Pulse: 59     Vitals and nursing note reviewed.   Constitutional:       Appearance: Normal and healthy appearance. Well-developed and not in distress.   Eyes:      Extraocular Movements: Extraocular movements intact.      Pupils: Pupils are equal, round, and reactive to light.   HENT:      Head: Normocephalic and atraumatic.    Mouth/Throat:      Pharynx: Oropharynx is clear.   Neck:      Vascular: JVD normal.      Trachea: Trachea normal.   Pulmonary:      Effort: Pulmonary effort is normal.      Breath sounds: Normal breath sounds. No wheezing. No rhonchi. No rales.   Cardiovascular:      PMI at left midclavicular line. Normal rate. Regular rhythm. Normal S1. Normal S2.       Murmurs: There is no murmur.      No gallop.  No click. No rub.   Abdominal:      General: Bowel sounds are normal.      Palpations: Abdomen is soft.      Tenderness: There is no abdominal tenderness.   Musculoskeletal: Normal range of motion.      Cervical back: Normal range of motion and neck supple. Skin:     General: Skin is warm and dry.      Capillary Refill: Capillary refill takes less than 2 seconds.   Feet:      Right  foot:      Skin integrity: Skin integrity normal.      Left foot:      Skin integrity: Skin integrity normal.   Neurological:      Mental Status: Alert and oriented to person, place and time.      Sensory: Sensation is intact.      Motor: Motor function is intact.      Coordination: Coordination is intact.   Psychiatric:         Speech: Speech normal.         Behavior: Behavior is cooperative.       Lab Review:         ECG 12 Lead    Date/Time: 12/5/2023 8:09 PM  Performed by: Kodi Mcghee DO    Authorized by: Kodi Mcghee DO  Comparison: compared with previous ECG   Similar to previous ECG  Rhythm: sinus bradycardia  Rate: bradycardic  Conduction: conduction normal  QRS axis: normal  Other findings: non-specific ST-T wave changes    Clinical impression: abnormal EKG            Assessment/Plan:     Problem List Items Addressed This Visit       Primary hypertension    Mixed hyperlipidemia - Primary    Class 1 obesity due to excess calories with serious comorbidity and body mass index (BMI) of 30.0 to 30.9 in adult     ASSESSMENT  1. Hypertension    PLAN  The patient has been doing and feeling well. He has not had any new issues. He is currently taking amlodipine every night, Lipitor, chlorthalidone 25 mg, Plavix, hydralazine as needed, lisinopril 40 mg, and metoprolol 100 mg. His most recent EKG showed normal sinus rhythm at 60 beats per minute. He will continue taking his current medications.    Recommendations/plans: The patient will follow-up with me in 1 year.    Transcribed from ambient dictation for Kodi Mcghee DO by Ml Fournier.  11/16/23   11:48 CST    Patient or patient representative verbalized consent to the visit recording.  I have personally performed the services described in this document as transcribed by the above individual, and it is both accurate and complete.  Kodi Mcghee DO  12/5/2023  20:09 CST

## 2023-12-05 PROCEDURE — 93000 ELECTROCARDIOGRAM COMPLETE: CPT | Performed by: EMERGENCY MEDICINE

## 2023-12-26 DIAGNOSIS — I10 ESSENTIAL HYPERTENSION: ICD-10-CM

## 2023-12-26 RX ORDER — CHLORTHALIDONE 25 MG/1
25 TABLET ORAL DAILY
Qty: 90 TABLET | Refills: 1 | Status: SHIPPED | OUTPATIENT
Start: 2023-12-26

## 2024-02-05 ENCOUNTER — HOSPITAL ENCOUNTER (OUTPATIENT)
Dept: CT IMAGING | Facility: HOSPITAL | Age: 69
Discharge: HOME | End: 2024-02-05
Admitting: INTERNAL MEDICINE
Payer: MEDICARE

## 2024-02-05 DIAGNOSIS — R91.1 LUNG NODULE: ICD-10-CM

## 2024-02-05 PROCEDURE — 71250 CT THORAX DX C-: CPT

## 2024-02-09 ENCOUNTER — PATIENT ROUNDING (BHMG ONLY) (OUTPATIENT)
Dept: URGENT CARE | Facility: CLINIC | Age: 69
End: 2024-02-09
Payer: MEDICARE

## 2024-02-09 NOTE — ED NOTES
Thank you for letting us care for you in your recent visit to our urgent care center. We would love to hear about your experience with us. Was this the first time you have visited our location?    We’re always looking for ways to make our patients’ experiences even better. Do you have any recommendations on ways we may improve?     I appreciate you taking the time to respond. Please be on the lookout for a survey about your recent visit from [employer] via text or email. We would greatly appreciate if you could fill that out and turn it back in. We want your voice to be heard and we value your feedback.   Thank you for choosing [facility] for your healthcare needs.

## 2024-03-04 ENCOUNTER — TELEPHONE (OUTPATIENT)
Dept: CARDIOLOGY | Facility: CLINIC | Age: 69
End: 2024-03-04
Payer: MEDICARE

## 2024-03-04 RX ORDER — NITROGLYCERIN 0.4 MG/1
0.4 TABLET SUBLINGUAL
Qty: 25 TABLET | Refills: 11 | Status: SHIPPED | OUTPATIENT
Start: 2024-03-04

## 2024-03-04 NOTE — TELEPHONE ENCOUNTER
Patient called with new onset CP. He has taken 2-3 nitro's with relief. His prescription has .New prescription has been sent to provider to sign and send. Advised to go to ER if continued CP occurs and/or having to take 3 nitro's in a row. V/U

## 2024-03-11 ENCOUNTER — OFFICE VISIT (OUTPATIENT)
Dept: CARDIOLOGY | Facility: CLINIC | Age: 69
End: 2024-03-11
Payer: MEDICARE

## 2024-03-11 VITALS
BODY MASS INDEX: 31.82 KG/M2 | OXYGEN SATURATION: 98 % | HEART RATE: 52 BPM | HEIGHT: 65 IN | WEIGHT: 191 LBS | RESPIRATION RATE: 18 BRPM | SYSTOLIC BLOOD PRESSURE: 110 MMHG | DIASTOLIC BLOOD PRESSURE: 74 MMHG

## 2024-03-11 DIAGNOSIS — N18.31 STAGE 3A CHRONIC KIDNEY DISEASE: ICD-10-CM

## 2024-03-11 DIAGNOSIS — I69.359 HEMIPARESIS AFFECTING NONDOMINANT SIDE AS LATE EFFECT OF CEREBROVASCULAR ACCIDENT (CVA): ICD-10-CM

## 2024-03-11 DIAGNOSIS — I10 PRIMARY HYPERTENSION: ICD-10-CM

## 2024-03-11 DIAGNOSIS — E78.2 MIXED HYPERLIPIDEMIA: ICD-10-CM

## 2024-03-11 DIAGNOSIS — R07.9 CHEST PAIN, UNSPECIFIED TYPE: Primary | ICD-10-CM

## 2024-03-11 NOTE — PROGRESS NOTES
Subjective:     Encounter Date:03/11/2024      Patient ID: Dao Jhaveri is a 68 y.o. male     Chief Complaint:  Chest Pain   Associated symptoms include malaise/fatigue. Pertinent negatives include no abdominal pain, back pain, cough, dizziness, fever, headaches, hemoptysis, nausea, near-syncope, orthopnea, palpitations, PND, shortness of breath, syncope or vomiting.     Patient presents today for concerns for chest pain. He notes he has had some chest pain. He notes he has had a couple of episodes over the last 2 weeks. He notes that these occur at rest. They do not last. He notes that he took some nitro with relief. He notes he tried coughing because he was told this might help and he thinks it might have. Denies shortness of breath or palpitations. Overall he is stable. He has had strokes and peripheral vascular disease. He has hypertension. He follows with Dr. Miller as his PCP. Former smoker. Grandmother had CAD.     The following portions of the patient's history were reviewed and updated as appropriate: allergies, current medications, past medical history, past social history, past and problem list.    Allergies   Allergen Reactions    Codeine Anxiety and GI Intolerance       Current Outpatient Medications:     albuterol sulfate  (90 Base) MCG/ACT inhaler, Inhale 2 puffs Every 4 (Four) Hours As Needed for Wheezing or Shortness of Air., Disp: 6.7 g, Rfl: 0    amLODIPine (NORVASC) 10 MG tablet, Take 1 tablet by mouth every night at bedtime., Disp: 90 tablet, Rfl: 1    atorvastatin (LIPITOR) 80 MG tablet, Take 1 tablet by mouth Daily., Disp: 90 tablet, Rfl: 3    chlorthalidone (HYGROTON) 25 MG tablet, Take 1 tablet by mouth Daily., Disp: 90 tablet, Rfl: 1    clopidogrel (PLAVIX) 75 MG tablet, Take 1 tablet by mouth Daily., Disp: 90 tablet, Rfl: 1    hydrALAZINE (APRESOLINE) 50 MG tablet, Take 1 tablet by mouth 3 (Three) Times a Day., Disp: 90 tablet, Rfl: 5    lisinopril (PRINIVIL,ZESTRIL) 40 MG tablet,  Take 1 tablet by mouth Daily., Disp: 90 tablet, Rfl: 1    metoprolol succinate XL (TOPROL-XL) 100 MG 24 hr tablet, Take 1 tablet by mouth Daily., Disp: 90 tablet, Rfl: 1    nitroglycerin (NITROSTAT) 0.4 MG SL tablet, Place 1 tablet under the tongue Every 5 (Five) Minutes As Needed for Chest Pain. Take no more than 3 doses in 15 minutes., Disp: 25 tablet, Rfl: 11    Social History     Socioeconomic History    Marital status:    Tobacco Use    Smoking status: Former     Current packs/day: 0.00     Average packs/day: 1.5 packs/day for 35.0 years (52.5 ttl pk-yrs)     Types: Cigarettes     Start date: 7/3/1978     Quit date: 7/3/2013     Years since quitting: 10.6     Passive exposure: Past    Smokeless tobacco: Never   Vaping Use    Vaping status: Never Used   Substance and Sexual Activity    Alcohol use: No    Drug use: No    Sexual activity: Defer       Review of Systems   Constitutional: Positive for malaise/fatigue. Negative for chills, decreased appetite, fever, weight gain and weight loss.   HENT:  Negative for nosebleeds.    Eyes:  Negative for visual disturbance.   Cardiovascular:  Positive for chest pain. Negative for dyspnea on exertion, leg swelling, near-syncope, orthopnea, palpitations, paroxysmal nocturnal dyspnea and syncope.   Respiratory:  Negative for cough, hemoptysis, shortness of breath and snoring.    Endocrine: Negative for cold intolerance and heat intolerance.   Hematologic/Lymphatic: Negative for bleeding problem. Does not bruise/bleed easily.   Skin:  Negative for rash.   Musculoskeletal:  Negative for back pain and falls.        Right leg neuropathy    Gastrointestinal:  Negative for abdominal pain, constipation, diarrhea, heartburn, melena, nausea and vomiting.   Genitourinary:  Negative for hematuria.   Neurological:  Negative for dizziness, headaches and light-headedness.   Psychiatric/Behavioral:  Negative for altered mental status.    Allergic/Immunologic: Negative for persistent  "infections.              Objective:     Constitutional:       Appearance: Well-developed.   Eyes:      Pupils: Pupils are equal, round, and reactive to light.   HENT:      Head: Normocephalic and atraumatic.   Neck:      Vascular: No carotid bruit or JVD.   Pulmonary:      Effort: Pulmonary effort is normal.      Breath sounds: Normal breath sounds.   Cardiovascular:      Normal rate. Regular rhythm.      Murmurs: There is no murmur.   Pulses:     Intact distal pulses.   Edema:     Peripheral edema absent.   Abdominal:      General: Bowel sounds are normal.      Palpations: Abdomen is soft.   Musculoskeletal: Normal range of motion.      Cervical back: Normal range of motion and neck supple. Skin:     General: Skin is warm and dry.   Neurological:      Mental Status: Alert and oriented to person, place, and time.      Deep Tendon Reflexes: Reflexes are normal and symmetric.   Psychiatric:         Behavior: Behavior normal.         Thought Content: Thought content normal.         Judgment: Judgment normal.             ECG 12 Lead    Date/Time: 3/11/2024 1:44 PM  Performed by: Guillermo Daniels APRN    Authorized by: Guillermo Daniels APRN  Comparison: compared with previous ECG from 11/16/2023  Similar to previous ECG  Rhythm: sinus bradycardia  T flattening: III and II        /74 (BP Location: Right arm, Patient Position: Sitting)   Pulse 52   Resp 18   Ht 165.1 cm (65\")   Wt 86.6 kg (191 lb)   SpO2 98%   BMI 31.78 kg/m²   Lab Review:   I have reviewed        Lab Results   Component Value Date    GLUCOSE 116 (H) 11/01/2023    CALCIUM 9.7 11/01/2023     11/01/2023    K 3.9 11/01/2023    CO2 30.0 (H) 11/01/2023     11/01/2023    BUN 22 11/01/2023    CREATININE 1.30 (H) 11/01/2023    EGFR 59.8 (L) 11/01/2023    BCR 16.9 11/01/2023    ANIONGAP 6.0 11/01/2023          Lab Results   Component Value Date    CHOL 129 11/01/2023    CHLPL 161 07/06/2016    TRIG 103 11/01/2023    HDL 33 (L) 11/01/2023    " LDL 77 11/01/2023      Results for orders placed during the hospital encounter of 02/02/21    Adult Transesophageal Echo (JOEL) W/ Cont if Necessary Per Protocol    Interpretation Summary  · Left ventricular systolic function is normal. The left ventricular cavity is small in size. Left ventricular wall thickness is consistent with severe concentric hypertrophy.  · No evidence of left ventricular thrombus or mass present.  · No evidence of a left atrial appendage thrombus was present  · No evidence of a patent foramen ovale. No evidence of an atrial septal defect present.  · No evidence of left ventricular noncompaction     Assessment:          Diagnosis Plan   1. Chest pain, unspecified type  Adult Stress Echo W/ Cont or Stress Agent if Necessary Per Protocol      2. Mixed hyperlipidemia        3. Stage 3a chronic kidney disease        4. Primary hypertension        5. Hemiparesis affecting nondominant side as late effect of cerebrovascular accident (CVA)               Plan:       Chest Pain- will check stress echo.   Hyperlipidemia - LDL 77. On statin  Chronic Kidney disease- stable. Followed with PCP  Hypertension- blood pressure stable  Stroke- on Plavix.     Follow up in 2 months.

## 2024-03-25 ENCOUNTER — HOSPITAL ENCOUNTER (OUTPATIENT)
Dept: CARDIOLOGY | Facility: HOSPITAL | Age: 69
Discharge: HOME | End: 2024-03-25
Admitting: EMERGENCY MEDICINE
Payer: MEDICARE

## 2024-03-25 VITALS
WEIGHT: 191 LBS | DIASTOLIC BLOOD PRESSURE: 78 MMHG | SYSTOLIC BLOOD PRESSURE: 108 MMHG | BODY MASS INDEX: 31.82 KG/M2 | HEIGHT: 65 IN | HEART RATE: 56 BPM

## 2024-03-25 PROCEDURE — 93352 ADMIN ECG CONTRAST AGENT: CPT | Performed by: EMERGENCY MEDICINE

## 2024-03-25 PROCEDURE — 93350 STRESS TTE ONLY: CPT

## 2024-03-25 PROCEDURE — 0 DOBUTAMINE PER 250 MG: Performed by: EMERGENCY MEDICINE

## 2024-03-25 PROCEDURE — 25510000001 PERFLUTREN 6.52 MG/ML SUSPENSION: Performed by: EMERGENCY MEDICINE

## 2024-03-25 PROCEDURE — 93018 CV STRESS TEST I&R ONLY: CPT | Performed by: EMERGENCY MEDICINE

## 2024-03-25 PROCEDURE — 93350 STRESS TTE ONLY: CPT | Performed by: EMERGENCY MEDICINE

## 2024-03-25 PROCEDURE — 93017 CV STRESS TEST TRACING ONLY: CPT

## 2024-03-25 RX ORDER — METOPROLOL TARTRATE 1 MG/ML
5 INJECTION, SOLUTION INTRAVENOUS ONCE
Status: DISCONTINUED | OUTPATIENT
Start: 2024-03-25 | End: 2024-03-26 | Stop reason: HOSPADM

## 2024-03-25 RX ORDER — DOBUTAMINE HYDROCHLORIDE 100 MG/100ML
10-50 INJECTION INTRAVENOUS CONTINUOUS
Status: DISCONTINUED | OUTPATIENT
Start: 2024-03-25 | End: 2024-03-25

## 2024-03-25 RX ADMIN — DOBUTAMINE HYDROCHLORIDE 10 MCG/KG/MIN: 100 INJECTION INTRAVENOUS at 13:57

## 2024-03-25 RX ADMIN — PERFLUTREN 8.48 MG: 6.52 INJECTION, SUSPENSION INTRAVENOUS at 13:40

## 2024-03-26 LAB
BH CV STRESS BP STAGE 1: NORMAL
BH CV STRESS BP STAGE 2: NORMAL
BH CV STRESS BP STAGE 3: NORMAL
BH CV STRESS BP STAGE 4: NORMAL
BH CV STRESS BP STAGE 5: NORMAL
BH CV STRESS DOSE DOBUTAMINE STAGE 1: 10
BH CV STRESS DOSE DOBUTAMINE STAGE 2: 20
BH CV STRESS DOSE DOBUTAMINE STAGE 3: 30
BH CV STRESS DOSE DOBUTAMINE STAGE 4: 40
BH CV STRESS DOSE DOBUTAMINE STAGE 5: 50
BH CV STRESS DURATION MIN STAGE 1: 3
BH CV STRESS DURATION MIN STAGE 2: 3
BH CV STRESS DURATION MIN STAGE 3: 3
BH CV STRESS DURATION MIN STAGE 4: 3
BH CV STRESS DURATION MIN STAGE 5: 1
BH CV STRESS DURATION SEC STAGE 1: 0
BH CV STRESS DURATION SEC STAGE 2: 0
BH CV STRESS DURATION SEC STAGE 3: 0
BH CV STRESS DURATION SEC STAGE 4: 0
BH CV STRESS DURATION SEC STAGE 5: 11
BH CV STRESS HR STAGE 1: 53
BH CV STRESS HR STAGE 2: 57
BH CV STRESS HR STAGE 3: 71
BH CV STRESS HR STAGE 4: 71
BH CV STRESS HR STAGE 5: 93
BH CV STRESS PROTOCOL 1: NORMAL
BH CV STRESS RECOVERY BP: NORMAL MMHG
BH CV STRESS RECOVERY HR: 68 BPM
BH CV STRESS STAGE 1: 1
BH CV STRESS STAGE 2: 2
BH CV STRESS STAGE 3: 3
BH CV STRESS STAGE 4: 4
BH CV STRESS STAGE 5: 5
MAXIMAL PREDICTED HEART RATE: 152 BPM
PERCENT MAX PREDICTED HR: 61.18 %
STRESS BASELINE BP: NORMAL MMHG
STRESS BASELINE HR: 58 BPM
STRESS PERCENT HR: 72 %
STRESS POST EXERCISE DUR MIN: 13 MIN
STRESS POST EXERCISE DUR SEC: 11 SEC
STRESS POST PEAK BP: NORMAL MMHG
STRESS POST PEAK HR: 93 BPM
STRESS TARGET HR: 129 BPM

## 2024-03-27 DIAGNOSIS — R07.9 CHEST PAIN, UNSPECIFIED TYPE: Primary | ICD-10-CM

## 2024-04-20 NOTE — PLAN OF CARE
Goal Outcome Evaluation:  Plan of Care Reviewed With: patient  Progress: improving  Outcome Summary: OT eval completed. Pt alert and orientedx4. Pt performed bed mobility with SBA and verbal cues for safety. Donned/doffed B socks with SBA for safety while seated edge of bed unsupported. Demonstrated mild impairment with FTN coordination tasks with R UE. Minimal lag noted with R UE during JOHN. Pt performed sit to stand transfer with SBA, demonstrated mild impairment with static standing balance.  Pt ambulated less than household distance with HHA and CGA d/t significant dynamic standing balance impairment. OT will continue to follow during admission to address balance during ADL tasks, home modifications for safety, and safe transfer techniques d/t impaired balance. Recommendation to discharge to inpatient rehab to continue skilled therapy services.   Pulmonology

## 2024-05-01 ENCOUNTER — OFFICE VISIT (OUTPATIENT)
Dept: INTERNAL MEDICINE | Facility: CLINIC | Age: 69
End: 2024-05-01
Payer: MEDICARE

## 2024-05-01 VITALS
RESPIRATION RATE: 16 BRPM | DIASTOLIC BLOOD PRESSURE: 81 MMHG | HEART RATE: 64 BPM | WEIGHT: 187.4 LBS | HEIGHT: 65 IN | BODY MASS INDEX: 31.22 KG/M2 | SYSTOLIC BLOOD PRESSURE: 117 MMHG | OXYGEN SATURATION: 98 %

## 2024-05-01 DIAGNOSIS — E66.09 CLASS 1 OBESITY DUE TO EXCESS CALORIES WITH SERIOUS COMORBIDITY AND BODY MASS INDEX (BMI) OF 31.0 TO 31.9 IN ADULT: ICD-10-CM

## 2024-05-01 DIAGNOSIS — G89.29 CHRONIC LEFT-SIDED LOW BACK PAIN WITH LEFT-SIDED SCIATICA: ICD-10-CM

## 2024-05-01 DIAGNOSIS — I69.359 HEMIPARESIS AFFECTING NONDOMINANT SIDE AS LATE EFFECT OF CEREBROVASCULAR ACCIDENT (CVA): ICD-10-CM

## 2024-05-01 DIAGNOSIS — N18.31 STAGE 3A CHRONIC KIDNEY DISEASE: ICD-10-CM

## 2024-05-01 DIAGNOSIS — Z86.73 HISTORY OF STROKE: ICD-10-CM

## 2024-05-01 DIAGNOSIS — E78.2 MIXED HYPERLIPIDEMIA: ICD-10-CM

## 2024-05-01 DIAGNOSIS — M54.42 CHRONIC LEFT-SIDED LOW BACK PAIN WITH LEFT-SIDED SCIATICA: ICD-10-CM

## 2024-05-01 DIAGNOSIS — I10 PRIMARY HYPERTENSION: Primary | ICD-10-CM

## 2024-05-01 PROCEDURE — G2211 COMPLEX E/M VISIT ADD ON: HCPCS | Performed by: INTERNAL MEDICINE

## 2024-05-01 PROCEDURE — 99214 OFFICE O/P EST MOD 30 MIN: CPT | Performed by: INTERNAL MEDICINE

## 2024-05-01 PROCEDURE — 3074F SYST BP LT 130 MM HG: CPT | Performed by: INTERNAL MEDICINE

## 2024-05-01 PROCEDURE — 1160F RVW MEDS BY RX/DR IN RCRD: CPT | Performed by: INTERNAL MEDICINE

## 2024-05-01 PROCEDURE — 3079F DIAST BP 80-89 MM HG: CPT | Performed by: INTERNAL MEDICINE

## 2024-05-01 PROCEDURE — 1159F MED LIST DOCD IN RCRD: CPT | Performed by: INTERNAL MEDICINE

## 2024-05-01 RX ORDER — AMLODIPINE BESYLATE 10 MG/1
10 TABLET ORAL
Qty: 90 TABLET | Refills: 1 | Status: SHIPPED | OUTPATIENT
Start: 2024-05-01

## 2024-05-01 RX ORDER — METOPROLOL SUCCINATE 100 MG/1
100 TABLET, EXTENDED RELEASE ORAL DAILY
Qty: 90 TABLET | Refills: 1 | Status: SHIPPED | OUTPATIENT
Start: 2024-05-01

## 2024-05-01 RX ORDER — CLOPIDOGREL BISULFATE 75 MG/1
75 TABLET ORAL DAILY
Qty: 90 TABLET | Refills: 1 | Status: SHIPPED | OUTPATIENT
Start: 2024-05-01

## 2024-05-01 RX ORDER — LISINOPRIL 40 MG/1
40 TABLET ORAL DAILY
Qty: 90 TABLET | Refills: 1 | Status: SHIPPED | OUTPATIENT
Start: 2024-05-01

## 2024-05-01 RX ORDER — ATORVASTATIN CALCIUM 80 MG/1
80 TABLET, FILM COATED ORAL DAILY
Qty: 90 TABLET | Refills: 3 | Status: SHIPPED | OUTPATIENT
Start: 2024-05-01

## 2024-05-01 NOTE — PROGRESS NOTES
CC: Follow-up hypertension and stroke    History:  Dao Jhaveri is a 68 y.o. male   He notes he has been doing well without any acute illness.  His blood pressure has been well-controlled, but he has had some chest pain recently and is scheduled for stress imaging per his cardiologist.  He has been having left-sided back pain that is affecting his gluteal region and radiating down into his leg to the knee.      ROS:  Review of Systems   Constitutional:  Negative for chills and fever.   Respiratory:  Negative for cough and shortness of breath.    Cardiovascular:  Positive for chest pain. Negative for palpitations.   Gastrointestinal:  Negative for abdominal pain and constipation.   Musculoskeletal:  Positive for back pain. Negative for gait problem.   Neurological:  Positive for weakness.        reports that he quit smoking about 10 years ago. His smoking use included cigarettes. He started smoking about 45 years ago. He has a 52.5 pack-year smoking history. He has been exposed to tobacco smoke. He has never used smokeless tobacco. He reports that he does not drink alcohol and does not use drugs.      Current Outpatient Medications:     albuterol sulfate  (90 Base) MCG/ACT inhaler, Inhale 2 puffs Every 4 (Four) Hours As Needed for Wheezing or Shortness of Air., Disp: 6.7 g, Rfl: 0    amLODIPine (NORVASC) 10 MG tablet, Take 1 tablet by mouth every night at bedtime., Disp: 90 tablet, Rfl: 1    atorvastatin (LIPITOR) 80 MG tablet, Take 1 tablet by mouth Daily., Disp: 90 tablet, Rfl: 3    chlorthalidone (HYGROTON) 25 MG tablet, Take 1 tablet by mouth Daily., Disp: 90 tablet, Rfl: 1    clopidogrel (PLAVIX) 75 MG tablet, Take 1 tablet by mouth Daily., Disp: 90 tablet, Rfl: 1    hydrALAZINE (APRESOLINE) 50 MG tablet, Take 1 tablet by mouth 3 (Three) Times a Day., Disp: 90 tablet, Rfl: 5    lisinopril (PRINIVIL,ZESTRIL) 40 MG tablet, Take 1 tablet by mouth Daily., Disp: 90 tablet, Rfl: 1    metoprolol succinate XL  "(TOPROL-XL) 100 MG 24 hr tablet, Take 1 tablet by mouth Daily., Disp: 90 tablet, Rfl: 1    nitroglycerin (NITROSTAT) 0.4 MG SL tablet, Place 1 tablet under the tongue Every 5 (Five) Minutes As Needed for Chest Pain. Take no more than 3 doses in 15 minutes. (Patient not taking: Reported on 5/1/2024), Disp: 25 tablet, Rfl: 11    OBJECTIVE:  /81 (BP Location: Left arm, Patient Position: Sitting, Cuff Size: Adult)   Pulse 64   Resp 16   Ht 165.1 cm (65\")   Wt 85 kg (187 lb 6.4 oz)   SpO2 98%   BMI 31.18 kg/m²    Physical Exam  Constitutional:       General: He is not in acute distress.  Cardiovascular:      Rate and Rhythm: Normal rate and regular rhythm.      Heart sounds: Normal heart sounds. No murmur heard.  Pulmonary:      Effort: Pulmonary effort is normal. No respiratory distress.      Breath sounds: Normal breath sounds. No wheezing.   Neurological:      Mental Status: He is alert and oriented to person, place, and time.      Gait: Gait normal.   Psychiatric:         Mood and Affect: Mood normal.         Behavior: Behavior normal.         Assessment/Plan     Diagnoses and all orders for this visit:    1. Primary hypertension (Primary)  -     metoprolol succinate XL (TOPROL-XL) 100 MG 24 hr tablet; Take 1 tablet by mouth Daily.  Dispense: 90 tablet; Refill: 1  -     lisinopril (PRINIVIL,ZESTRIL) 40 MG tablet; Take 1 tablet by mouth Daily.  Dispense: 90 tablet; Refill: 1  -     amLODIPine (NORVASC) 10 MG tablet; Take 1 tablet by mouth every night at bedtime.  Dispense: 90 tablet; Refill:   Well controlled, BP goal for age is <140/90 per JNC 8 guidelines, and continue current medications      2. History of stroke  3. Chronic left-sided low back pain with left-sided sciatica  4. Hemiparesis affecting nondominant side as late effect of cerebrovascular accident (CVA)  -     clopidogrel (PLAVIX) 75 MG tablet; Take 1 tablet by mouth Daily.  Dispense: 90 tablet; Refill: 1  -     atorvastatin (LIPITOR) 80 MG " tablet; Take 1 tablet by mouth Daily.  Dispense: 90 tablet; Refill: 3  -     Ambulatory Referral to Physical Therapy  Continue secondary prevention with Plavix and high intensity statin.  He is referred to physical therapy to improve his weakness and new left-sided back pain with sciatica.    5. Mixed hyperlipidemia  -     atorvastatin (LIPITOR) 80 MG tablet; Take 1 tablet by mouth Daily.  Dispense: 90 tablet; Refill: 3  Stable on high intensity statin therapy per ACC/AHA guidelines.    6. Stage 3a chronic kidney disease  Stable on ACE-I. Last labs stable.     7. Class 1 obesity due to excess calories with serious comorbidity and body mass index (BMI) of 31.0 to 31.9 in adult  BMI is >= 30 and <35. (Class 1 Obesity). The following options were offered after discussion;: exercise counseling/recommendations and nutrition counseling/recommendations       An After Visit Summary was printed and given to the patient at discharge.  Return in about 6 months (around 11/1/2024) for Medicare Wellness.      Dallin Miller D.O. 5/1/2024   Electronically signed.

## 2024-05-13 ENCOUNTER — OFFICE VISIT (OUTPATIENT)
Dept: CARDIOLOGY | Facility: CLINIC | Age: 69
End: 2024-05-13
Payer: MEDICARE

## 2024-05-13 VITALS
DIASTOLIC BLOOD PRESSURE: 65 MMHG | HEART RATE: 72 BPM | OXYGEN SATURATION: 98 % | BODY MASS INDEX: 30.82 KG/M2 | SYSTOLIC BLOOD PRESSURE: 110 MMHG | HEIGHT: 65 IN | RESPIRATION RATE: 18 BRPM | WEIGHT: 185 LBS

## 2024-05-13 DIAGNOSIS — Z53.21 PATIENT LEFT WITHOUT BEING SEEN: Primary | ICD-10-CM

## 2024-05-13 NOTE — PROGRESS NOTES
The patient left the office before care was provided and did not complete the visit  due to having follow up testing next week .

## 2024-05-16 ENCOUNTER — HOSPITAL ENCOUNTER (OUTPATIENT)
Dept: CARDIOLOGY | Facility: HOSPITAL | Age: 69
Discharge: HOME | End: 2024-05-16
Payer: MEDICARE

## 2024-05-16 VITALS
HEART RATE: 50 BPM | BODY MASS INDEX: 30.82 KG/M2 | WEIGHT: 184.97 LBS | SYSTOLIC BLOOD PRESSURE: 129 MMHG | HEIGHT: 65 IN | DIASTOLIC BLOOD PRESSURE: 91 MMHG

## 2024-05-16 DIAGNOSIS — R07.9 CHEST PAIN, UNSPECIFIED TYPE: ICD-10-CM

## 2024-05-16 PROCEDURE — 25010000002 REGADENOSON 0.4 MG/5ML SOLUTION: Performed by: EMERGENCY MEDICINE

## 2024-05-16 PROCEDURE — 0 TECHNETIUM TETROFOSMIN KIT: Performed by: NURSE PRACTITIONER

## 2024-05-16 PROCEDURE — A9502 TC99M TETROFOSMIN: HCPCS | Performed by: NURSE PRACTITIONER

## 2024-05-16 PROCEDURE — 93017 CV STRESS TEST TRACING ONLY: CPT

## 2024-05-16 PROCEDURE — 78452 HT MUSCLE IMAGE SPECT MULT: CPT

## 2024-05-16 RX ORDER — REGADENOSON 0.08 MG/ML
0.4 INJECTION, SOLUTION INTRAVENOUS ONCE
Status: COMPLETED | OUTPATIENT
Start: 2024-05-16 | End: 2024-05-16

## 2024-05-16 RX ADMIN — REGADENOSON 0.4 MG: 0.08 INJECTION, SOLUTION INTRAVENOUS at 08:52

## 2024-05-16 RX ADMIN — TETROFOSMIN 1 DOSE: 1.38 INJECTION, POWDER, LYOPHILIZED, FOR SOLUTION INTRAVENOUS at 07:25

## 2024-05-16 RX ADMIN — TETROFOSMIN 1 DOSE: 1.38 INJECTION, POWDER, LYOPHILIZED, FOR SOLUTION INTRAVENOUS at 09:00

## 2024-05-19 LAB
BH CV NUCLEAR PRIOR STUDY: 3
BH CV REST NUCLEAR ISOTOPE DOSE: 10.5 MCI
BH CV STRESS BP STAGE 1: NORMAL
BH CV STRESS COMMENTS STAGE 1: NORMAL
BH CV STRESS DOSE REGADENOSON STAGE 1: 0.4
BH CV STRESS DURATION MIN STAGE 1: 0
BH CV STRESS DURATION SEC STAGE 1: 10
BH CV STRESS HR STAGE 1: 72
BH CV STRESS NUCLEAR ISOTOPE DOSE: 34.6 MCI
BH CV STRESS PROTOCOL 1: NORMAL
BH CV STRESS RECOVERY BP: NORMAL MMHG
BH CV STRESS RECOVERY HR: 60 BPM
BH CV STRESS STAGE 1: 1
MAXIMAL PREDICTED HEART RATE: 152 BPM
PERCENT MAX PREDICTED HR: 47.37 %
STRESS BASELINE BP: NORMAL MMHG
STRESS BASELINE HR: 50 BPM
STRESS PERCENT HR: 56 %
STRESS POST EXERCISE DUR MIN: 0 MIN
STRESS POST EXERCISE DUR SEC: 10 SEC
STRESS POST PEAK BP: NORMAL MMHG
STRESS POST PEAK HR: 72 BPM
STRESS TARGET HR: 129 BPM

## 2024-09-24 ENCOUNTER — TELEPHONE (OUTPATIENT)
Dept: INTERNAL MEDICINE | Facility: CLINIC | Age: 69
End: 2024-09-24
Payer: MEDICARE

## 2024-09-24 NOTE — TELEPHONE ENCOUNTER
PATIENT HAS BEEN CALLED, IT WAS EXPLAINED TO HIM THAT SCHEDULING HAS TRIED REACHING HIM X 3 AND SENT 3 MY CHART MESSAGES.   PATIENT WAS TRANSFERRED TO SCHEDULING.

## 2024-09-24 NOTE — TELEPHONE ENCOUNTER
Caller: Dao Jhaveri    Relationship: Self    Best call back number: [phone]     What is the best time to reach you: ANY    Who are you requesting to speak with (clinical staff, provider,  specific staff member): CLINICAL    Do you know the name of the person who called: SELF    What was the call regarding: NEEDS TO CHECK STATUS OF PHYSICAL THERAPY ORDER. PREFERS [facility]. PLEASE CALL    Is it okay if the provider responds through MyChart: CALL BACK

## 2024-10-01 DIAGNOSIS — I10 ESSENTIAL HYPERTENSION: ICD-10-CM

## 2024-10-01 RX ORDER — CHLORTHALIDONE 25 MG/1
25 TABLET ORAL DAILY
Qty: 90 TABLET | Refills: 1 | Status: SHIPPED | OUTPATIENT
Start: 2024-10-01

## 2024-10-01 NOTE — TELEPHONE ENCOUNTER
Caller: Brittany Vicente [facility] [place] - [address] - [phone] [facility] [phone] FX    Relationship: Pharmacy    Best call back number: [phone]     Requested Prescriptions:   Requested Prescriptions     Pending Prescriptions Disp Refills    chlorthalidone (HYGROTON) 25 MG tablet 90 tablet 1     Sig: Take 1 tablet by mouth Daily.        Pharmacy where request should be sent: [facility] [place] - [address] - [phone] [facility] [phone] FX     Last office visit with prescribing clinician: 5/1/2024   Last telemedicine visit with prescribing clinician: Visit date not found   Next office visit with prescribing clinician: 12/6/2024     Additional details provided by patient:     Does the patient have less than a 3 day supply:  [] Yes  [] No    Would you like a call back once the refill request has been completed: [] Yes [] No    If the office needs to give you a call back, can they leave a voicemail: [] Yes [] No    Ko Abraham Rep   10/01/24 11:03 CDT

## 2024-10-07 ENCOUNTER — TELEPHONE (OUTPATIENT)
Dept: INTERNAL MEDICINE | Facility: CLINIC | Age: 69
End: 2024-10-07
Payer: MEDICARE

## 2024-10-07 NOTE — TELEPHONE ENCOUNTER
PATIENT HAS BEEN CALLED, HE STATED THAT HIS LEFT HIP AND LEFT LEG ARE NEEDING THERAPY.  HE STATED THAT HE IS BARELY WALKING AND IS ASKING FOR PHYSICAL THERAPY

## 2024-10-07 NOTE — TELEPHONE ENCOUNTER
PATIENT STATED THAT A REFERRAL WAS SENT IN MAY AND HE STATED THAT HE HAD A DEATH IN THE FAMILY AND COULD GO TO PT.   HE STATED THAT HE CALLED [facility] AND THEY TOLD HIM THAT HE WOULD NEED TO DISCUSS IT WITH THE DR'S OFFICE.

## 2024-10-07 NOTE — TELEPHONE ENCOUNTER
Caller: Dao Jhaveri    Relationship: Self    Best call back number: [phone]     What orders are you requesting (i.e. lab or imaging): PHYSICAL THERAPY    In what timeframe would the patient need to come in: ASAP    Where will you receive your lab/imaging services: [facility]    Additional notes:

## 2024-10-24 ENCOUNTER — OFFICE VISIT (OUTPATIENT)
Dept: INTERNAL MEDICINE | Facility: CLINIC | Age: 69
End: 2024-10-24
Payer: MEDICARE

## 2024-10-24 VITALS
BODY MASS INDEX: 31.72 KG/M2 | HEIGHT: 65 IN | OXYGEN SATURATION: 97 % | HEART RATE: 58 BPM | SYSTOLIC BLOOD PRESSURE: 134 MMHG | DIASTOLIC BLOOD PRESSURE: 88 MMHG | RESPIRATION RATE: 16 BRPM | WEIGHT: 190.4 LBS

## 2024-10-24 DIAGNOSIS — Z86.73 HISTORY OF STROKE: ICD-10-CM

## 2024-10-24 DIAGNOSIS — I10 PRIMARY HYPERTENSION: ICD-10-CM

## 2024-10-24 DIAGNOSIS — I69.359 HEMIPARESIS AFFECTING NONDOMINANT SIDE AS LATE EFFECT OF CEREBROVASCULAR ACCIDENT (CVA): ICD-10-CM

## 2024-10-24 DIAGNOSIS — M54.32 LEFT SIDED SCIATICA: Primary | ICD-10-CM

## 2024-10-24 DIAGNOSIS — E78.2 MIXED HYPERLIPIDEMIA: ICD-10-CM

## 2024-10-24 DIAGNOSIS — N18.31 STAGE 3A CHRONIC KIDNEY DISEASE: ICD-10-CM

## 2024-10-24 DIAGNOSIS — R73.02 IMPAIRED GLUCOSE TOLERANCE: ICD-10-CM

## 2024-10-24 PROCEDURE — G2211 COMPLEX E/M VISIT ADD ON: HCPCS | Performed by: INTERNAL MEDICINE

## 2024-10-24 PROCEDURE — 99213 OFFICE O/P EST LOW 20 MIN: CPT | Performed by: INTERNAL MEDICINE

## 2024-10-24 PROCEDURE — 3079F DIAST BP 80-89 MM HG: CPT | Performed by: INTERNAL MEDICINE

## 2024-10-24 PROCEDURE — 1160F RVW MEDS BY RX/DR IN RCRD: CPT | Performed by: INTERNAL MEDICINE

## 2024-10-24 PROCEDURE — 1159F MED LIST DOCD IN RCRD: CPT | Performed by: INTERNAL MEDICINE

## 2024-10-24 PROCEDURE — 1126F AMNT PAIN NOTED NONE PRSNT: CPT | Performed by: INTERNAL MEDICINE

## 2024-10-24 PROCEDURE — 3075F SYST BP GE 130 - 139MM HG: CPT | Performed by: INTERNAL MEDICINE

## 2024-10-24 RX ORDER — CLOPIDOGREL BISULFATE 75 MG/1
75 TABLET ORAL DAILY
Qty: 90 TABLET | Refills: 3 | Status: SHIPPED | OUTPATIENT
Start: 2024-10-24

## 2024-10-24 RX ORDER — AMLODIPINE BESYLATE 10 MG/1
10 TABLET ORAL
Qty: 90 TABLET | Refills: 3 | Status: SHIPPED | OUTPATIENT
Start: 2024-10-24

## 2024-10-24 RX ORDER — LISINOPRIL 40 MG/1
40 TABLET ORAL DAILY
Qty: 90 TABLET | Refills: 3 | Status: SHIPPED | OUTPATIENT
Start: 2024-10-24

## 2024-10-24 RX ORDER — METOPROLOL SUCCINATE 100 MG/1
100 TABLET, EXTENDED RELEASE ORAL DAILY
Qty: 90 TABLET | Refills: 3 | Status: SHIPPED | OUTPATIENT
Start: 2024-10-24

## 2024-10-24 NOTE — PROGRESS NOTES
CC: left gluteal and leg pain    History:  Dao Jhaveri is a 69 y.o. male   History of Present Illness  The patient presents for evaluation of left leg pain.    He has been experiencing discomfort in his left leg, specifically on the outer and back side, for the past 3 months. The pain, which originates in the buttock muscles and radiates down the back of the leg, fluctuates in intensity. He denies any associated back pain. Despite not having any falls, he reports feeling unsteady while walking and relies on a cane for support. He was previously referred to physical therapy but did not attend.        ROS:  Review of Systems   Musculoskeletal:  Positive for gait problem and myalgias. Negative for back pain.   Neurological:  Positive for weakness. Negative for numbness.        reports that he quit smoking about 11 years ago. His smoking use included cigarettes. He started smoking about 46 years ago. He has a 52.5 pack-year smoking history. He has been exposed to tobacco smoke. He has never used smokeless tobacco. He reports that he does not drink alcohol and does not use drugs.      Current Outpatient Medications:     albuterol sulfate  (90 Base) MCG/ACT inhaler, Inhale 2 puffs Every 4 (Four) Hours As Needed for Wheezing or Shortness of Air., Disp: 6.7 g, Rfl: 0    amLODIPine (NORVASC) 10 MG tablet, Take 1 tablet by mouth every night at bedtime., Disp: 90 tablet, Rfl: 3    atorvastatin (LIPITOR) 80 MG tablet, Take 1 tablet by mouth Daily., Disp: 90 tablet, Rfl: 3    chlorthalidone (HYGROTON) 25 MG tablet, Take 1 tablet by mouth Daily., Disp: 90 tablet, Rfl: 1    clopidogrel (PLAVIX) 75 MG tablet, Take 1 tablet by mouth Daily., Disp: 90 tablet, Rfl: 3    hydrALAZINE (APRESOLINE) 50 MG tablet, Take 1 tablet by mouth 3 (Three) Times a Day., Disp: 90 tablet, Rfl: 5    lisinopril (PRINIVIL,ZESTRIL) 40 MG tablet, Take 1 tablet by mouth Daily., Disp: 90 tablet, Rfl: 3    metoprolol succinate XL (TOPROL-XL) 100 MG 24  "hr tablet, Take 1 tablet by mouth Daily., Disp: 90 tablet, Rfl: 3    nitroglycerin (NITROSTAT) 0.4 MG SL tablet, Place 1 tablet under the tongue Every 5 (Five) Minutes As Needed for Chest Pain. Take no more than 3 doses in 15 minutes. (Patient not taking: Reported on 10/24/2024), Disp: 25 tablet, Rfl: 11    OBJECTIVE:  /88 (BP Location: Left arm, Patient Position: Sitting, Cuff Size: Adult)   Pulse 58   Resp 16   Ht 165.1 cm (65\")   Wt 86.4 kg (190 lb 6.4 oz)   SpO2 97%   BMI 31.68 kg/m²    Physical Exam  Constitutional:       General: He is not in acute distress.  Pulmonary:      Effort: Pulmonary effort is normal. No respiratory distress.   Musculoskeletal:      Comments: 5/5 strength of the right lower extremity.  4/5 strength of the left lower extremity throughout.  He has no tenderness to palpation over the greater trochanter laterally.  He has a slow and antalgic gait on the left.   Neurological:      Mental Status: He is alert and oriented to person, place, and time.      Sensory: No sensory deficit.   Psychiatric:         Mood and Affect: Mood normal.         Behavior: Behavior normal.           Assessment/Plan     Diagnoses and all orders for this visit:    1. Left sided sciatica (Primary)  -     Ambulatory Referral to Physical Therapy for Evaluation & Treatment  We will again refer to physical therapy, especially given this current sciatic pain and also given his history of stroke with hemiparesis, which is worse on the left than the right.  He is given stretches with his AVS.  We should avoid NSAID given his kidney disease and vascular disease, but he may take Tylenol if things are worse.  He notes he is sleeping reasonably well, so we will forego a muscle relaxer given its potential side effects.    2. Primary hypertension  -     metoprolol succinate XL (TOPROL-XL) 100 MG 24 hr tablet; Take 1 tablet by mouth Daily.  Dispense: 90 tablet; Refill: 3  -     lisinopril (PRINIVIL,ZESTRIL) 40 MG " tablet; Take 1 tablet by mouth Daily.  Dispense: 90 tablet; Refill: 3  -     amLODIPine (NORVASC) 10 MG tablet; Take 1 tablet by mouth every night at bedtime.  Dispense: 90 tablet; Refill: 3  Well controlled, BP goal for age is <140/90 per JNC 8 guidelines, and continue current medications    3. History of stroke  -     clopidogrel (PLAVIX) 75 MG tablet; Take 1 tablet by mouth Daily.  Dispense: 90 tablet; Refill: 3  Continue Plavix and high intensity statin his LDL was 77 on last check.  We will plan to recheck.  This year with his upcoming visit.    4. Hemiparesis affecting nondominant side as late effect of cerebrovascular accident (CVA)  -     Ambulatory Referral to Physical Therapy for Evaluation & Treatment      An After Visit Summary was printed and given to the patient at discharge.  Return for Next scheduled follow up.      Patient or patient representative verbalized consent for the use of Ambient Listening during the visit with  Dallin Miller DO for chart documentation. 10/24/2024  08:37 CDT    Dallin Miller D.O. 10/24/2024   Electronically signed.

## 2024-11-20 ENCOUNTER — EXTERNAL PBMM DATA (OUTPATIENT)
Dept: PHARMACY | Facility: OTHER | Age: 69
End: 2024-11-20
Payer: MEDICARE

## 2024-12-06 ENCOUNTER — TELEPHONE (OUTPATIENT)
Dept: INTERNAL MEDICINE | Facility: CLINIC | Age: 69
End: 2024-12-06

## 2024-12-06 ENCOUNTER — LAB (OUTPATIENT)
Dept: LAB | Facility: HOSPITAL | Age: 69
End: 2024-12-06
Payer: MEDICARE

## 2024-12-06 ENCOUNTER — OFFICE VISIT (OUTPATIENT)
Dept: INTERNAL MEDICINE | Facility: CLINIC | Age: 69
End: 2024-12-06
Payer: MEDICARE

## 2024-12-06 ENCOUNTER — POP HEALTH PHARMACY (OUTPATIENT)
Dept: PHARMACY | Facility: OTHER | Age: 69
End: 2024-12-06
Payer: MEDICARE

## 2024-12-06 VITALS
SYSTOLIC BLOOD PRESSURE: 135 MMHG | BODY MASS INDEX: 32.24 KG/M2 | HEIGHT: 65 IN | RESPIRATION RATE: 16 BRPM | DIASTOLIC BLOOD PRESSURE: 82 MMHG | HEART RATE: 70 BPM | WEIGHT: 193.5 LBS | OXYGEN SATURATION: 98 %

## 2024-12-06 DIAGNOSIS — J44.9 CHRONIC OBSTRUCTIVE PULMONARY DISEASE, UNSPECIFIED COPD TYPE: ICD-10-CM

## 2024-12-06 DIAGNOSIS — I10 PRIMARY HYPERTENSION: ICD-10-CM

## 2024-12-06 DIAGNOSIS — R73.02 IMPAIRED GLUCOSE TOLERANCE: ICD-10-CM

## 2024-12-06 DIAGNOSIS — Z87.891 PERSONAL HISTORY OF NICOTINE DEPENDENCE: ICD-10-CM

## 2024-12-06 DIAGNOSIS — N18.31 STAGE 3A CHRONIC KIDNEY DISEASE: ICD-10-CM

## 2024-12-06 DIAGNOSIS — E78.2 MIXED HYPERLIPIDEMIA: ICD-10-CM

## 2024-12-06 DIAGNOSIS — G47.34 NOCTURNAL HYPOXIA: ICD-10-CM

## 2024-12-06 DIAGNOSIS — Z12.12 SCREENING FOR COLORECTAL CANCER: ICD-10-CM

## 2024-12-06 DIAGNOSIS — Z86.73 HISTORY OF STROKE: ICD-10-CM

## 2024-12-06 DIAGNOSIS — Z12.11 SCREENING FOR COLORECTAL CANCER: ICD-10-CM

## 2024-12-06 DIAGNOSIS — Z23 NEED FOR VACCINATION: Primary | ICD-10-CM

## 2024-12-06 LAB
ALBUMIN SERPL-MCNC: 4.1 G/DL (ref 3.5–5.2)
ALBUMIN/GLOB SERPL: 1.4 G/DL
ALP SERPL-CCNC: 84 U/L (ref 39–117)
ALT SERPL W P-5'-P-CCNC: 20 U/L (ref 1–41)
ANION GAP SERPL CALCULATED.3IONS-SCNC: 9 MMOL/L (ref 5–15)
AST SERPL-CCNC: 21 U/L (ref 1–40)
BACTERIA UR QL AUTO: ABNORMAL /HPF
BILIRUB SERPL-MCNC: 0.3 MG/DL (ref 0–1.2)
BILIRUB UR QL STRIP: NEGATIVE
BUN SERPL-MCNC: 16 MG/DL (ref 8–23)
BUN/CREAT SERPL: 12.3 (ref 7–25)
CALCIUM SPEC-SCNC: 9.3 MG/DL (ref 8.6–10.5)
CHLORIDE SERPL-SCNC: 107 MMOL/L (ref 98–107)
CHOLEST SERPL-MCNC: 79 MG/DL (ref 0–200)
CLARITY UR: CLEAR
CO2 SERPL-SCNC: 28 MMOL/L (ref 22–29)
COLOR UR: ABNORMAL
CREAT SERPL-MCNC: 1.3 MG/DL (ref 0.76–1.27)
DEPRECATED RDW RBC AUTO: 41.7 FL (ref 37–54)
EGFRCR SERPLBLD CKD-EPI 2021: 59.5 ML/MIN/1.73
ERYTHROCYTE [DISTWIDTH] IN BLOOD BY AUTOMATED COUNT: 14.5 % (ref 12.3–15.4)
GLOBULIN UR ELPH-MCNC: 2.9 GM/DL
GLUCOSE SERPL-MCNC: 91 MG/DL (ref 65–99)
GLUCOSE UR STRIP-MCNC: NEGATIVE MG/DL
HBA1C MFR BLD: 6.2 % (ref 4.8–5.6)
HCT VFR BLD AUTO: 42.8 % (ref 37.5–51)
HDLC SERPL-MCNC: 32 MG/DL (ref 40–60)
HGB BLD-MCNC: 13.6 G/DL (ref 13–17.7)
HGB UR QL STRIP.AUTO: NEGATIVE
HYALINE CASTS UR QL AUTO: ABNORMAL /LPF
KETONES UR QL STRIP: ABNORMAL
LDLC SERPL CALC-MCNC: 21 MG/DL (ref 0–100)
LDLC/HDLC SERPL: 0.53 {RATIO}
LEUKOCYTE ESTERASE UR QL STRIP.AUTO: ABNORMAL
MCH RBC QN AUTO: 25.4 PG (ref 26.6–33)
MCHC RBC AUTO-ENTMCNC: 31.8 G/DL (ref 31.5–35.7)
MCV RBC AUTO: 80 FL (ref 79–97)
NITRITE UR QL STRIP: NEGATIVE
PH UR STRIP.AUTO: 6.5 [PH] (ref 5–8)
PLATELET # BLD AUTO: 230 10*3/MM3 (ref 140–450)
PMV BLD AUTO: 10.5 FL (ref 6–12)
POTASSIUM SERPL-SCNC: 4.5 MMOL/L (ref 3.5–5.2)
PROT SERPL-MCNC: 7 G/DL (ref 6–8.5)
PROT UR QL STRIP: ABNORMAL
RBC # BLD AUTO: 5.35 10*6/MM3 (ref 4.14–5.8)
RBC # UR STRIP: ABNORMAL /HPF
REF LAB TEST METHOD: ABNORMAL
SODIUM SERPL-SCNC: 144 MMOL/L (ref 136–145)
SP GR UR STRIP: >1.03 (ref 1–1.03)
SQUAMOUS #/AREA URNS HPF: ABNORMAL /HPF
TRIGL SERPL-MCNC: 151 MG/DL (ref 0–150)
UROBILINOGEN UR QL STRIP: ABNORMAL
VLDLC SERPL-MCNC: 26 MG/DL (ref 5–40)
WBC # UR STRIP: ABNORMAL /HPF
WBC CLUMPS # UR AUTO: ABNORMAL /HPF
WBC NRBC COR # BLD AUTO: 6.16 10*3/MM3 (ref 3.4–10.8)

## 2024-12-06 PROCEDURE — 82570 ASSAY OF URINE CREATININE: CPT

## 2024-12-06 PROCEDURE — 91320 SARSCV2 VAC 30MCG TRS-SUC IM: CPT | Performed by: INTERNAL MEDICINE

## 2024-12-06 PROCEDURE — G0439 PPPS, SUBSEQ VISIT: HCPCS | Performed by: INTERNAL MEDICINE

## 2024-12-06 PROCEDURE — 83036 HEMOGLOBIN GLYCOSYLATED A1C: CPT

## 2024-12-06 PROCEDURE — 85027 COMPLETE CBC AUTOMATED: CPT

## 2024-12-06 PROCEDURE — 80061 LIPID PANEL: CPT

## 2024-12-06 PROCEDURE — 90480 ADMN SARSCOV2 VAC 1/ONLY CMP: CPT | Performed by: INTERNAL MEDICINE

## 2024-12-06 PROCEDURE — 81001 URINALYSIS AUTO W/SCOPE: CPT

## 2024-12-06 PROCEDURE — 84156 ASSAY OF PROTEIN URINE: CPT

## 2024-12-06 PROCEDURE — 99397 PER PM REEVAL EST PAT 65+ YR: CPT | Performed by: INTERNAL MEDICINE

## 2024-12-06 PROCEDURE — 80053 COMPREHEN METABOLIC PANEL: CPT

## 2024-12-06 PROCEDURE — 36415 COLL VENOUS BLD VENIPUNCTURE: CPT

## 2024-12-06 RX ORDER — ALBUTEROL SULFATE 90 UG/1
2 INHALANT RESPIRATORY (INHALATION) EVERY 4 HOURS PRN
Qty: 18 G | Refills: 3 | Status: SHIPPED | OUTPATIENT
Start: 2024-12-06

## 2024-12-06 NOTE — PATIENT INSTRUCTIONS
Medicare Wellness  Personal Prevention Plan of Service     Date of Office Visit:    Encounter Provider:  Dallin Miller DO  Place of Service:  [facility] INTERNAL MEDICINE  Patient Name: Dao Jhaveri  :  1955    As part of the Medicare Wellness portion of your visit today, we are providing you with this personalized preventive plan of services (PPPS). This plan is based upon recommendations of the [place] Preventive Services Task Force (USPSTF) and the Advisory Committee on Immunization Practices (ACIP).    This lists the preventive care services that should be considered, and provides dates of when you are due. Items listed as completed are up-to-date and do not require any further intervention.    Health Maintenance   Topic Date Due    TDAP/TD VACCINES (1 - Tdap) Never done    ZOSTER VACCINE (1 of 2) Never done    COVID-19 Vaccine (5 -  season) 2024    COLORECTAL CANCER SCREENING  2024    ANNUAL WELLNESS VISIT  2024    LIPID PANEL  2024    LUNG CANCER SCREENING  2025    BMI FOLLOWUP  10/24/2025    HEPATITIS C SCREENING  Completed    INFLUENZA VACCINE  Completed    Pneumococcal Vaccine 65+  Completed    AAA SCREEN (ONE-TIME)  Completed       Orders Placed This Encounter   Procedures    CT Chest Low Dose Wo     Standing Status:   Future     Standing Expiration Date:   2025     Order Specific Question:   The patient is age 50-80 (Medicare coverage 50-77)     Answer:   69     Order Specific Question:   The patient is a current smoker?     Answer:   No     Order Specific Question:   Is the patient a former smoker who has quit within the last 15 years? (If the answer to this is no they do not meet criteria for this exam)     Answer:   Yes     Order Specific Question:   The number of years since quitting smoking.     Answer:   11     Order Specific Question:   Does the patient have a smoking history of 20 pack-years or greater? (If the  answer to this is no they do not meet criteria for this exam)     Answer:   Yes     Order Specific Question:   Actual pack - year smoking history (number):     Answer:   53     Order Specific Question:   Does the patient have any clinical signs/symptoms of lung cancer?     Answer:   No     Order Specific Question:   The patient was engaged in shared decision-making for this test:     Answer:   N/A - Not Baseline     Order Specific Question:   Reason for Exam:     Answer:   screening     Order Specific Question:   Release to patient     Answer:   Routine Release [[id number]]    COVID-19 (Pfizer) 12yrs+ (COMIRNATY)    Ambulatory Referral For Screening Colonoscopy     Referral Priority:   Routine     Referral Type:   Diagnostic Medical     Referral Reason:   Specialty Services Required     Number of Visits Requested:   1       Return in about 6 months (around 6/6/2025) for Recheck.

## 2024-12-06 NOTE — PROGRESS NOTES
Subjective   The ABCs of the Annual Wellness Visit  Medicare Wellness Visit      Dao Jhaveri is a 69 y.o. patient who presents for a Medicare Wellness Visit.    The following portions of the patient's history were reviewed and   updated as appropriate: allergies, current medications, past family history, past medical history, past social history, past surgical history, and problem list.    Compared to one year ago, the patient's physical   health is better.  Compared to one year ago, the patient's mental   health is better.    Recent Hospitalizations:  He was not admitted to the hospital during the last year.     Current Medical Providers:  Patient Care Team:  Dallin Miller DO as PCP - General (Internal Medicine)  Beto Dover MD as Surgeon (Orthopedic Surgery)  Kodi Mcghee DO as Consulting Physician (Cardiology)    Outpatient Medications Prior to Visit   Medication Sig Dispense Refill    amLODIPine (NORVASC) 10 MG tablet Take 1 tablet by mouth every night at bedtime. 90 tablet 3    atorvastatin (LIPITOR) 80 MG tablet Take 1 tablet by mouth Daily. 90 tablet 3    chlorthalidone (HYGROTON) 25 MG tablet Take 1 tablet by mouth Daily. 90 tablet 1    clopidogrel (PLAVIX) 75 MG tablet Take 1 tablet by mouth Daily. 90 tablet 3    hydrALAZINE (APRESOLINE) 50 MG tablet Take 1 tablet by mouth 3 (Three) Times a Day. 90 tablet 5    lisinopril (PRINIVIL,ZESTRIL) 40 MG tablet Take 1 tablet by mouth Daily. 90 tablet 3    metoprolol succinate XL (TOPROL-XL) 100 MG 24 hr tablet Take 1 tablet by mouth Daily. 90 tablet 3    albuterol sulfate  (90 Base) MCG/ACT inhaler Inhale 2 puffs Every 4 (Four) Hours As Needed for Wheezing or Shortness of Air. 6.7 g 0    nitroglycerin (NITROSTAT) 0.4 MG SL tablet Place 1 tablet under the tongue Every 5 (Five) Minutes As Needed for Chest Pain. Take no more than 3 doses in 15 minutes. (Patient not taking: Reported on 12/6/2024) 25 tablet 11     No  "facility-administered medications prior to visit.     No opioid medication identified on active medication list. I have reviewed chart for other potential  high risk medication/s and harmful drug interactions in the elderly.      Aspirin is not on active medication list.   On Plavix monotherapy. .    Patient Active Problem List   Diagnosis    Primary hypertension    Mixed hyperlipidemia    History of stroke    Hemiparesis affecting nondominant side as late effect of cerebrovascular accident (CVA)    Nonexudative age-related macular degeneration    Microcytic anemia    Class 1 obesity due to excess calories with serious comorbidity and body mass index (BMI) of 31.0 to 31.9 in adult    Impaired glucose tolerance    Intractable vascular headache    Stage 3a chronic kidney disease    Nocturnal hypoxia     Advance Care Planning Advance Directive is not on file.  ACP discussion was held with the patient during this visit. Patient does not have an advance directive, information provided.            Objective   Vitals:    24 1359   BP: 135/82   BP Location: Left arm   Patient Position: Sitting   Cuff Size: Adult   Pulse: 70   Resp: 16   SpO2: 98%   Weight: 87.8 kg (193 lb 8 oz)   Height: 165.1 cm (65\")       Estimated body mass index is 32.2 kg/m² as calculated from the following:    Height as of this encounter: 165.1 cm (65\").    Weight as of this encounter: 87.8 kg (193 lb 8 oz).            Does the patient have evidence of cognitive impairment? No                                                                                                Health  Risk Assessment    Smoking Status:  Social History     Tobacco Use   Smoking Status Former    Current packs/day: 0.00    Average packs/day: 1.5 packs/day for 35.0 years (52.5 ttl pk-yrs)    Types: Cigarettes    Start date: 7/3/1978    Quit date: 7/3/2013    Years since quittin.4    Passive exposure: Past   Smokeless Tobacco Never     Alcohol Consumption:  Social " History     Substance and Sexual Activity   Alcohol Use No       Fall Risk Screen  STEADI Fall Risk Assessment was completed, and patient is at MODERATE risk for falls. Assessment completed on:2024    Depression Screening   Little interest or pleasure in doing things? Not at all   Feeling down, depressed, or hopeless? Not at all   PHQ-2 Total Score 0      Health Habits and Functional and Cognitive Screenin/6/2024     2:00 PM   Functional & Cognitive Status   Do you have difficulty preparing food and eating? No   Do you have difficulty bathing yourself, getting dressed or grooming yourself? No   Do you have difficulty using the toilet? No   Do you have difficulty moving around from place to place? No   Do you have trouble with steps or getting out of a bed or a chair? Yes   Current Diet Unhealthy Diet   Dental Exam Not up to date   Eye Exam Not up to date   Exercise (times per week) 0 times per week   Current Exercises Include No Regular Exercise   Do you need help using the phone?  No   Are you deaf or do you have serious difficulty hearing?  No   Do you need help to go to places out of walking distance? Yes   Do you need help shopping? No   Do you need help preparing meals?  No   Do you need help with housework?  No   Do you need help with laundry? No   Do you need help taking your medications? No   Do you need help managing money? No   Do you ever drive or ride in a car without wearing a seat belt? No   Have you felt unusual stress, anger or loneliness in the last month? No   Who do you live with? Spouse   If you need help, do you have trouble finding someone available to you? No   Have you been bothered in the last four weeks by sexual problems? No   Do you have difficulty concentrating, remembering or making decisions? No           Age-appropriate Screening Schedule:  Refer to the list below for future screening recommendations based on patient's age, sex and/or medical conditions. Orders for  these recommended tests are listed in the plan section. The patient has been provided with a written plan.    Health Maintenance List  Health Maintenance   Topic Date Due    TDAP/TD VACCINES (1 - Tdap) Never done    ZOSTER VACCINE (1 of 2) Never done    COVID-19 Vaccine (5 - 2024-25 season) 09/01/2024    COLORECTAL CANCER SCREENING  09/04/2024    LIPID PANEL  11/01/2024    LUNG CANCER SCREENING  02/05/2025    BMI FOLLOWUP  10/24/2025    ANNUAL WELLNESS VISIT  12/06/2025    HEPATITIS C SCREENING  Completed    INFLUENZA VACCINE  Completed    Pneumococcal Vaccine 65+  Completed    AAA SCREEN (ONE-TIME)  Completed                                                                                                                                                CMS Preventative Services Quick Reference  Risk Factors Identified During Encounter  Fall Risk-High or Moderate: Discussed Fall Prevention in the home  Immunizations Discussed/Encouraged: Tdap and Shingrix  Dental Screening Recommended  Vision Screening Recommended    The above risks/problems have been discussed with the patient.  Pertinent information has been shared with the patient in the After Visit Summary.  An After Visit Summary and PPPS were made available to the patient.    Follow Up:   Next Medicare Wellness visit to be scheduled in 1 year.         Additional E&M Note during same encounter follows:  Patient has additional, significant, and separately identifiable condition(s)/problem(s) that require work above and beyond the Medicare Wellness Visit     Chief Complaint  No chief complaint on file.    Yin Dc is also being seen today for an annual adult preventative physical exam.        The patient presents for a routine checkup.    He reports overall good health, with improvements in both physical and mental well-being compared to the previous year. His memory remains sharp, and he has regular bowel and bladder movements. He has noticed some  "weight gain, currently weighing 193 pounds, which is above his usual weight of 185 pounds. He continues to use oxygen therapy during the night.    IMMUNIZATIONS  He has received his influenza vaccine.  Review of Systems   Constitutional:  Negative for fatigue and fever.   HENT:  Negative for congestion and sinus pressure.    Eyes:  Negative for discharge and itching.   Respiratory:  Negative for cough and shortness of breath.    Cardiovascular:  Negative for chest pain and palpitations.   Gastrointestinal:  Negative for abdominal distention, abdominal pain and constipation.   Endocrine: Negative for cold intolerance and heat intolerance.   Genitourinary:  Negative for difficulty urinating and dysuria.   Musculoskeletal:  Negative for arthralgias and gait problem.   Skin:  Negative for color change and rash.   Neurological:  Positive for weakness. Negative for dizziness.   Psychiatric/Behavioral:  Negative for dysphoric mood. The patient is not nervous/anxious.           Objective   Vital Signs:  /82 (BP Location: Left arm, Patient Position: Sitting, Cuff Size: Adult)   Pulse 70   Resp 16   Ht 165.1 cm (65\")   Wt 87.8 kg (193 lb 8 oz)   SpO2 98%   BMI 32.20 kg/m²   Physical Exam  Constitutional:       General: He is not in acute distress.     Appearance: Normal appearance.   HENT:      Head: Normocephalic and atraumatic.      Right Ear: External ear normal.      Left Ear: External ear normal.      Nose: Nose normal. No rhinorrhea.   Eyes:      General: No scleral icterus.     Extraocular Movements: Extraocular movements intact.   Cardiovascular:      Rate and Rhythm: Normal rate and regular rhythm.      Heart sounds: Normal heart sounds. No murmur heard.  Pulmonary:      Effort: Pulmonary effort is normal. No respiratory distress.      Breath sounds: Normal breath sounds. No wheezing.   Abdominal:      General: There is no distension.      Palpations: Abdomen is soft.      Tenderness: There is no " abdominal tenderness.   Musculoskeletal:         General: Normal range of motion.      Cervical back: Normal range of motion and neck supple.      Right lower leg: No edema.      Left lower leg: No edema.   Skin:     General: Skin is warm and dry.   Neurological:      Mental Status: He is alert and oriented to person, place, and time.      Gait: Gait normal.   Psychiatric:         Mood and Affect: Mood normal.         Behavior: Behavior normal.                       Results                Assessment and Plan Additional age appropriate preventative wellness advice topics were discussed during today's preventative wellness exam(some topics already addressed during AWV portion of the note above):    Physical Activity: Advised cardiovascular activity 150 minutes per week as tolerated. (example brisk walk for 30 minutes, 5 days a week).     Nutrition: Discussed nutrition plan with patient. Information shared in after visit summary. Goal is for a well balanced diet to enhance overall health.     Healthy Weight: Discussed current and goal BMI with patient. Steps to attain this goal discussed. Information shared in after visit summary.  Diagnoses and all orders for this visit:    1. Need for vaccination (Primary)  -     COVID-19 (Pfizer) 12yrs+ (COMIRNATY)    2. Chronic obstructive pulmonary disease, unspecified COPD type  -     albuterol sulfate  (90 Base) MCG/ACT inhaler; Inhale 2 puffs Every 4 (Four) Hours As Needed for Wheezing or Shortness of Air.  Dispense: 18 g; Refill: 3    3. Primary hypertension    4. Impaired glucose tolerance    5. Mixed hyperlipidemia    6. Screening for colorectal cancer  -     Ambulatory Referral For Screening Colonoscopy    7. Personal history of nicotine dependence  -     CT Chest Low Dose Wo; Future    8. Nocturnal hypoxia           1. Routine checkup.  His blood pressure readings are within the normal range. Blood work will be conducted today. He is advised to continue wearing his  oxygen at night. A prescription for an albuterol inhaler has been provided and sent to [facility].    2. Health Maintenance.  A lung cancer screening is scheduled for 02/2025. A colonoscopy is also planned, and he will be contacted to schedule it. He is advised to receive his tetanus and shingles vaccines at a pharmacy such as [facility], or [facility]. A COVID-19 booster will be administered today.    Follow-up  Return in 6 months for follow up.            Follow Up   Return in about 6 months (around 6/6/2025) for Recheck.  Patient was given instructions and counseling regarding his condition or for health maintenance advice. Please see specific information pulled into the AVS if appropriate.  Patient or patient representative verbalized consent for the use of Ambient Listening during the visit with  Dallin Miller DO for chart documentation. 12/6/2024  14:34 CST

## 2024-12-07 LAB
CREAT UR-MCNC: 239.9 MG/DL
PROT ?TM UR-MCNC: 40.2 MG/DL
PROT/CREAT UR: 167.6 MG/G CREA (ref 0–200)
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
## 2025-02-06 ENCOUNTER — HOSPITAL ENCOUNTER (OUTPATIENT)
Dept: CT IMAGING | Facility: HOSPITAL | Age: 70
Discharge: HOME | End: 2025-02-06
Admitting: INTERNAL MEDICINE
Payer: MEDICARE

## 2025-02-06 ENCOUNTER — OFFICE VISIT (OUTPATIENT)
Dept: CARDIOLOGY | Facility: CLINIC | Age: 70
End: 2025-02-06
Payer: MEDICARE

## 2025-02-06 ENCOUNTER — OFFICE VISIT (OUTPATIENT)
Dept: GASTROENTEROLOGY | Facility: CLINIC | Age: 70
End: 2025-02-06
Payer: MEDICARE

## 2025-02-06 VITALS
BODY MASS INDEX: 31.82 KG/M2 | DIASTOLIC BLOOD PRESSURE: 75 MMHG | RESPIRATION RATE: 18 BRPM | OXYGEN SATURATION: 97 % | HEART RATE: 68 BPM | HEIGHT: 65 IN | WEIGHT: 191 LBS | SYSTOLIC BLOOD PRESSURE: 122 MMHG

## 2025-02-06 VITALS
HEIGHT: 66 IN | HEART RATE: 74 BPM | DIASTOLIC BLOOD PRESSURE: 84 MMHG | TEMPERATURE: 97 F | WEIGHT: 191 LBS | OXYGEN SATURATION: 99 % | SYSTOLIC BLOOD PRESSURE: 134 MMHG | BODY MASS INDEX: 30.7 KG/M2

## 2025-02-06 DIAGNOSIS — Z87.891 PERSONAL HISTORY OF NICOTINE DEPENDENCE: ICD-10-CM

## 2025-02-06 DIAGNOSIS — I69.359 HEMIPARESIS AFFECTING NONDOMINANT SIDE AS LATE EFFECT OF CEREBROVASCULAR ACCIDENT (CVA): ICD-10-CM

## 2025-02-06 DIAGNOSIS — Z12.11 ENCOUNTER FOR SCREENING FOR MALIGNANT NEOPLASM OF COLON: Primary | ICD-10-CM

## 2025-02-06 DIAGNOSIS — Z79.01 ANTICOAGULATED: ICD-10-CM

## 2025-02-06 DIAGNOSIS — I10 PRIMARY HYPERTENSION: Primary | ICD-10-CM

## 2025-02-06 DIAGNOSIS — E78.2 MIXED HYPERLIPIDEMIA: ICD-10-CM

## 2025-02-06 PROCEDURE — 71271 CT THORAX LUNG CANCER SCR C-: CPT

## 2025-02-06 PROCEDURE — 1160F RVW MEDS BY RX/DR IN RCRD: CPT | Performed by: NURSE PRACTITIONER

## 2025-02-06 PROCEDURE — 99214 OFFICE O/P EST MOD 30 MIN: CPT | Performed by: NURSE PRACTITIONER

## 2025-02-06 PROCEDURE — 1159F MED LIST DOCD IN RCRD: CPT | Performed by: NURSE PRACTITIONER

## 2025-02-06 PROCEDURE — 3074F SYST BP LT 130 MM HG: CPT | Performed by: NURSE PRACTITIONER

## 2025-02-06 PROCEDURE — 3078F DIAST BP <80 MM HG: CPT | Performed by: NURSE PRACTITIONER

## 2025-02-06 NOTE — LETTER
February 6, 2025           Dallin Saldivar, DO,     Dao Jhaveri 1955 is being considered for colonoscopy with Dr Brown.  According to Dao Jhaveri He is currently under your care and being treated with Plavix (Clopidogrel).  I am requesting He be off this medication for 5 days.   If Dao Jhaveri is on a daily Aspirin we also ask He be able to hold this medication 3 days prior to procedure to minimize the risk of developing a post polypectomy bleed.  If this patient will need Lovenox therapy while off regularly prescribed anticoagulation therapy we ask your office please arrange this with the patient.    Date of Procedure 03/26/2025    Lovenox Requirment  Yes/No    Other:          Approved By:    Date:            Thank you,         Dr Brown & OMAIRA Angel

## 2025-02-06 NOTE — PROGRESS NOTES
Subjective:     Encounter Date:02/06/2025      Patient ID: Dao Jhaveri is a 69 y.o. male     Chief Complaint:  History of Present Illness  Patient presents today for cardiology follow up. Patient at last OV in February was experiencing chest pain and had a low risk nuclear stress test. Patient established care in 2021 with Dr. Mcghee following a stoke and poorly controlled high blood pressure. Patient has hypertension, hyperlipidemia, stroke and peripheral vascular disease. He notes he has been doing well. He denies chest pain. He does walk with a cane. Blood pressure has been well controlled.     The following portions of the patient's history were reviewed and updated as appropriate: allergies, current medications, past medical history, past social history, past and problem list.    Allergies   Allergen Reactions    Codeine Anxiety and GI Intolerance       Current Outpatient Medications:     albuterol sulfate  (90 Base) MCG/ACT inhaler, Inhale 2 puffs Every 4 (Four) Hours As Needed for Wheezing or Shortness of Air., Disp: 18 g, Rfl: 3    amLODIPine (NORVASC) 10 MG tablet, Take 1 tablet by mouth every night at bedtime., Disp: 90 tablet, Rfl: 3    atorvastatin (LIPITOR) 80 MG tablet, Take 1 tablet by mouth Daily., Disp: 90 tablet, Rfl: 3    chlorthalidone (HYGROTON) 25 MG tablet, Take 1 tablet by mouth Daily., Disp: 90 tablet, Rfl: 1    clopidogrel (PLAVIX) 75 MG tablet, Take 1 tablet by mouth Daily., Disp: 90 tablet, Rfl: 3    hydrALAZINE (APRESOLINE) 50 MG tablet, Take 1 tablet by mouth 3 (Three) Times a Day., Disp: 90 tablet, Rfl: 5    lisinopril (PRINIVIL,ZESTRIL) 40 MG tablet, Take 1 tablet by mouth Daily., Disp: 90 tablet, Rfl: 3    metoprolol succinate XL (TOPROL-XL) 100 MG 24 hr tablet, Take 1 tablet by mouth Daily., Disp: 90 tablet, Rfl: 3    nitroglycerin (NITROSTAT) 0.4 MG SL tablet, Place 1 tablet under the tongue Every 5 (Five) Minutes As Needed for Chest Pain. Take no more than 3 doses in  15 minutes., Disp: 25 tablet, Rfl: 11    Social History     Socioeconomic History    Marital status:    Tobacco Use    Smoking status: Former     Current packs/day: 0.00     Average packs/day: 1.5 packs/day for 35.0 years (52.5 ttl pk-yrs)     Types: Cigarettes     Start date: 7/3/1978     Quit date: 7/3/2013     Years since quittin.6     Passive exposure: Past    Smokeless tobacco: Never   Vaping Use    Vaping status: Never Used   Substance and Sexual Activity    Alcohol use: No    Drug use: No    Sexual activity: Defer       Review of Systems   Constitutional: Negative for chills, decreased appetite, fever, malaise/fatigue, weight gain and weight loss.   HENT:  Negative for nosebleeds.    Eyes:  Negative for visual disturbance.   Cardiovascular:  Negative for chest pain, dyspnea on exertion, leg swelling, near-syncope, orthopnea, palpitations, paroxysmal nocturnal dyspnea and syncope.   Respiratory:  Negative for cough, hemoptysis, shortness of breath and snoring.    Endocrine: Negative for cold intolerance and heat intolerance.   Hematologic/Lymphatic: Negative for bleeding problem. Does not bruise/bleed easily.   Skin:  Negative for rash.   Musculoskeletal:  Negative for back pain and falls.   Gastrointestinal:  Negative for abdominal pain, constipation, diarrhea, heartburn, melena, nausea and vomiting.   Genitourinary:  Negative for hematuria.   Neurological:  Negative for dizziness, headaches and light-headedness.   Psychiatric/Behavioral:  Negative for altered mental status.    Allergic/Immunologic: Negative for persistent infections.              Objective:     Constitutional:       Appearance: Healthy appearance. Well-developed and not in distress.   Eyes:      Pupils: Pupils are equal, round, and reactive to light.   HENT:      Head: Normocephalic and atraumatic.   Neck:      Vascular: No carotid bruit or JVD.   Pulmonary:      Effort: Pulmonary effort is normal.      Breath sounds: Normal  "breath sounds.   Cardiovascular:      Normal rate. Regular rhythm.      Murmurs: There is no murmur.   Pulses:     Intact distal pulses.   Edema:     Peripheral edema absent.   Abdominal:      General: Bowel sounds are normal.      Palpations: Abdomen is soft.   Musculoskeletal: Normal range of motion.      Cervical back: Normal range of motion and neck supple. Skin:     General: Skin is warm and dry.   Neurological:      Mental Status: Alert and oriented to person, place, and time.      Deep Tendon Reflexes: Reflexes are normal and symmetric.   Psychiatric:         Behavior: Behavior normal.         Thought Content: Thought content normal.         Judgment: Judgment normal.           Procedures  /75 (BP Location: Right arm, Patient Position: Sitting, Cuff Size: Adult)   Pulse 68   Resp 18   Ht 165.1 cm (65\")   Wt 86.6 kg (191 lb)   SpO2 97%   BMI 31.78 kg/m²   Lab Review:   I have reviewed       Lab Results   Component Value Date    CHOL 79 12/06/2024    CHLPL 161 07/06/2016    TRIG 151 (H) 12/06/2024    HDL 32 (L) 12/06/2024    LDL 21 12/06/2024      Results for orders placed in visit on 03/11/24    Adult Stress Echo W/ Cont or Stress Agent if Necessary Per Protocol    Interpretation Summary    Overall, this is an indeterminate risk test for ischemia due to patient's inability to reach target heart rate.  Recommend repeat stress testing with Lexiscan/Myoview to further risk ratified the patient for ischemia.    No ECG evidence of myocardial ischemia. Negative clinical evidence of myocardial ischemia. Findings consistent with a normal ECG stress test.    Impressions are consistent with an indeterminant risk study for myocardial ischemia.    Left ventricular systolic function is normal. Left ventricular ejection fraction appears to be 61 - 65%.     Assessment:          Diagnosis Plan   1. Primary hypertension        2. Mixed hyperlipidemia        3. Hemiparesis affecting nondominant side as late effect " of cerebrovascular accident (CVA)               Plan:       Hypertension- blood pressure controlled. Continue Norvasc, Lisinopril, Hydralazine and Toprol.   Hyperlipidemia - well controlled. Continue Lipitor.   CVA - continue in Plavix and Lipitor.     Follow up in 1 year.

## 2025-02-06 NOTE — PROGRESS NOTES
He may hold ASA & Plavix as requested for procedure. May restart on hemostasis at Dr. Brown's discretion.

## 2025-02-11 ENCOUNTER — TELEPHONE (OUTPATIENT)
Dept: GASTROENTEROLOGY | Facility: CLINIC | Age: 70
End: 2025-02-11
Payer: MEDICARE

## 2025-02-11 NOTE — TELEPHONE ENCOUNTER
----- Message from Dallin Miller sent at 2/6/2025  3:02 PM CST -----      He may hold ASA & Plavix as requested for procedure. May restart on hemostasis at Dr. Brown's discretion.        ----- Message -----  From: Frederick Espinal MA  Sent: 2/6/2025   2:50 PM CST  To: Dallin Miller, DO

## 2025-02-18 DIAGNOSIS — Z86.73 HISTORY OF STROKE: ICD-10-CM

## 2025-02-18 DIAGNOSIS — I10 PRIMARY HYPERTENSION: ICD-10-CM

## 2025-02-20 NOTE — TELEPHONE ENCOUNTER
Rx Refill Note  Requested Prescriptions     Pending Prescriptions Disp Refills    metoprolol succinate XL (TOPROL-XL) 100 MG 24 hr tablet [Pharmacy Med Name: METOPROLOL ER SUC 100MG  Tablet] 30 tablet 10     Sig: Take 1 tablet by mouth Daily.    amLODIPine (NORVASC) 10 MG tablet [Pharmacy Med Name: AMLODIPINE 10MG TAB 10 Tablet] 30 tablet 10     Sig: TAKE 1 TABLET BY MOUTH EVERY NIGHT AT BEDTIME    lisinopril (PRINIVIL,ZESTRIL) 40 MG tablet [Pharmacy Med Name: LISINOPRIL 40 MG TABS 40 Tablet] 30 tablet 10     Sig: TAKE 1 TABLET BY MOUTH EVERY DAY    clopidogrel (PLAVIX) 75 MG tablet [Pharmacy Med Name: CLOPIDOGREL 75 MG TABLET 75 Tablet] 30 tablet 10     Sig: TAKE 1 TABLET BY MOUTH EVERY DAY      Last office visit with prescribing clinician: 12/6/2024   Last telemedicine visit with prescribing clinician: Visit date not found   Next office visit with prescribing clinician: Visit date not found                         Would you like a call back once the refill request has been completed: [] Yes [] No    If the office needs to give you a call back, can they leave a voicemail: [] Yes [] No    Gregor Daniel MA  02/20/25, 09:46 CST

## 2025-02-21 RX ORDER — LISINOPRIL 40 MG/1
40 TABLET ORAL DAILY
Qty: 90 TABLET | Refills: 3 | Status: SHIPPED | OUTPATIENT
Start: 2025-02-21

## 2025-02-21 RX ORDER — AMLODIPINE BESYLATE 10 MG/1
10 TABLET ORAL
Qty: 90 TABLET | Refills: 3 | Status: SHIPPED | OUTPATIENT
Start: 2025-02-21

## 2025-02-21 RX ORDER — CLOPIDOGREL BISULFATE 75 MG/1
75 TABLET ORAL DAILY
Qty: 90 TABLET | Refills: 3 | Status: SHIPPED | OUTPATIENT
Start: 2025-02-21

## 2025-02-21 RX ORDER — METOPROLOL SUCCINATE 100 MG/1
100 TABLET, EXTENDED RELEASE ORAL DAILY
Qty: 90 TABLET | Refills: 3 | Status: SHIPPED | OUTPATIENT
Start: 2025-02-21

## 2025-03-21 ENCOUNTER — TELEPHONE (OUTPATIENT)
Dept: GASTROENTEROLOGY | Facility: CLINIC | Age: 70
End: 2025-03-21

## 2025-03-21 NOTE — TELEPHONE ENCOUNTER
Provider: DR ALYSA SOTOMAYOR     Caller: ESE ASHLEY     Relationship to Patient: SELF    Phone Number: [phone]     Reason for Call: PT IS WONDERING WHAT MEDICATIONS HE NEEDS TO STOP TAKING FOR HIS COLONOSCOPY ON 3/26/25 PLEASE ADVISE AND CALL PT BACK PT STATED THAT YOU CAN LEAVE A VOICE MAIL

## 2025-03-26 ENCOUNTER — ANESTHESIA EVENT (OUTPATIENT)
Dept: GASTROENTEROLOGY | Facility: HOSPITAL | Age: 70
End: 2025-03-26
Payer: MEDICARE

## 2025-03-26 ENCOUNTER — HOSPITAL ENCOUNTER (OUTPATIENT)
Facility: HOSPITAL | Age: 70
Setting detail: HOSPITAL OUTPATIENT SURGERY
Discharge: HOME | End: 2025-03-26
Attending: INTERNAL MEDICINE | Admitting: INTERNAL MEDICINE
Payer: MEDICARE

## 2025-03-26 ENCOUNTER — TELEPHONE (OUTPATIENT)
Dept: GASTROENTEROLOGY | Facility: CLINIC | Age: 70
End: 2025-03-26
Payer: MEDICARE

## 2025-03-26 ENCOUNTER — ANESTHESIA (OUTPATIENT)
Dept: GASTROENTEROLOGY | Facility: HOSPITAL | Age: 70
End: 2025-03-26
Payer: MEDICARE

## 2025-03-26 VITALS
SYSTOLIC BLOOD PRESSURE: 136 MMHG | WEIGHT: 194 LBS | DIASTOLIC BLOOD PRESSURE: 88 MMHG | OXYGEN SATURATION: 97 % | HEART RATE: 57 BPM | BODY MASS INDEX: 30.45 KG/M2 | TEMPERATURE: 97.4 F | HEIGHT: 67 IN | RESPIRATION RATE: 18 BRPM

## 2025-03-26 DIAGNOSIS — Z12.11 ENCOUNTER FOR SCREENING FOR MALIGNANT NEOPLASM OF COLON: ICD-10-CM

## 2025-03-26 PROCEDURE — 25810000003 SODIUM CHLORIDE 0.9 % SOLUTION: Performed by: ANESTHESIOLOGY

## 2025-03-26 PROCEDURE — 25010000002 PROPOFOL 10 MG/ML EMULSION: Performed by: NURSE ANESTHETIST, CERTIFIED REGISTERED

## 2025-03-26 PROCEDURE — 25010000002 LIDOCAINE PF 2% 2 % SOLUTION: Performed by: NURSE ANESTHETIST, CERTIFIED REGISTERED

## 2025-03-26 PROCEDURE — G0104 CA SCREEN;FLEXI SIGMOIDSCOPE: HCPCS | Performed by: INTERNAL MEDICINE

## 2025-03-26 RX ORDER — SODIUM CHLORIDE 9 MG/ML
500 INJECTION, SOLUTION INTRAVENOUS CONTINUOUS PRN
Status: DISCONTINUED | OUTPATIENT
Start: 2025-03-26 | End: 2025-03-26 | Stop reason: HOSPADM

## 2025-03-26 RX ORDER — LIDOCAINE HYDROCHLORIDE 20 MG/ML
INJECTION, SOLUTION EPIDURAL; INFILTRATION; INTRACAUDAL; PERINEURAL AS NEEDED
Status: DISCONTINUED | OUTPATIENT
Start: 2025-03-26 | End: 2025-03-26 | Stop reason: SURG

## 2025-03-26 RX ORDER — SODIUM CHLORIDE 0.9 % (FLUSH) 0.9 %
10 SYRINGE (ML) INJECTION AS NEEDED
Status: DISCONTINUED | OUTPATIENT
Start: 2025-03-26 | End: 2025-03-26 | Stop reason: HOSPADM

## 2025-03-26 RX ORDER — PROPOFOL 10 MG/ML
VIAL (ML) INTRAVENOUS AS NEEDED
Status: DISCONTINUED | OUTPATIENT
Start: 2025-03-26 | End: 2025-03-26 | Stop reason: SURG

## 2025-03-26 RX ADMIN — SODIUM CHLORIDE 500 ML: 0.9 INJECTION, SOLUTION INTRAVENOUS at 10:39

## 2025-03-26 RX ADMIN — PROPOFOL 100 MG: 10 INJECTION, EMULSION INTRAVENOUS at 11:12

## 2025-03-26 RX ADMIN — LIDOCAINE HYDROCHLORIDE 50 MG: 20 INJECTION, SOLUTION EPIDURAL; INFILTRATION; INTRACAUDAL; PERINEURAL at 11:12
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
## 2025-03-26 NOTE — ANESTHESIA PREPROCEDURE EVALUATION
Anesthesia Evaluation     no history of anesthetic complications:   NPO Solid Status: > 8 hours  NPO Liquid Status: > 2 hours           Airway   Mallampati: III  TM distance: >3 FB  Possible difficult intubation  Dental    (+) partials    Pulmonary    (+) a smoker (quit 2013) Former,  Cardiovascular   Exercise tolerance: poor (<4 METS) (Walks with cane)    (+) hypertension, hyperlipidemia  (-) CAD      Neuro/Psych  (+) CVA (left sided weakness) residual symptoms  (-) seizures  GI/Hepatic/Renal/Endo    (+) obesity, renal disease- CRI  (-) liver disease, diabetes    Musculoskeletal     Abdominal    Substance History      OB/GYN          Other                    Anesthesia Plan    ASA 3     MAC     intravenous induction     Anesthetic plan, risks, benefits, and alternatives have been provided, discussed and informed consent has been obtained with: patient.    CODE STATUS:

## 2025-03-26 NOTE — ANESTHESIA POSTPROCEDURE EVALUATION
Patient: Dao Jhaveri    Procedure Summary       Date: 03/26/25 Room / Location: [facility] ENDOSCOPY 2 / [facility]    Anesthesia Start: 1109 Anesthesia Stop: 1118    Procedure: COLONOSCOPY WITH ANESTHESIA- (examination of colon) Diagnosis:       Encounter for screening for malignant neoplasm of colon      (Encounter for screening for malignant neoplasm of colon [Z12.11])    Surgeons: Riaz Brown DO Provider: Diallo Borges CRNA    Anesthesia Type: MAC ASA Status: 3            Anesthesia Type: MAC    Vitals  No vitals data found for the desired time range.          Post Anesthesia Care and Evaluation    Patient location during evaluation: PACU  Patient participation: complete - patient participated  Level of consciousness: awake and awake and alert  Pain score: 0  Pain management: adequate    Airway patency: patent  Anesthetic complications: No anesthetic complications    Cardiovascular status: acceptable and stable  Respiratory status: acceptable and unassisted  Hydration status: acceptable

## 2025-04-25 ENCOUNTER — EXTERNAL PBMM DATA (OUTPATIENT)
Dept: PHARMACY | Facility: OTHER | Age: 70
End: 2025-04-25
Payer: MEDICARE

## 2025-06-09 ENCOUNTER — OFFICE VISIT (OUTPATIENT)
Dept: INTERNAL MEDICINE | Facility: CLINIC | Age: 70
End: 2025-06-09
Payer: MEDICARE

## 2025-06-09 VITALS
HEIGHT: 67 IN | HEART RATE: 69 BPM | DIASTOLIC BLOOD PRESSURE: 82 MMHG | OXYGEN SATURATION: 96 % | SYSTOLIC BLOOD PRESSURE: 130 MMHG | RESPIRATION RATE: 16 BRPM | BODY MASS INDEX: 28.8 KG/M2 | WEIGHT: 183.5 LBS

## 2025-06-09 DIAGNOSIS — J44.9 CHRONIC OBSTRUCTIVE PULMONARY DISEASE, UNSPECIFIED COPD TYPE: ICD-10-CM

## 2025-06-09 DIAGNOSIS — R73.02 IMPAIRED GLUCOSE TOLERANCE: ICD-10-CM

## 2025-06-09 DIAGNOSIS — I10 PRIMARY HYPERTENSION: ICD-10-CM

## 2025-06-09 DIAGNOSIS — I10 ESSENTIAL HYPERTENSION: ICD-10-CM

## 2025-06-09 DIAGNOSIS — K02.9 DENTAL CARIES: ICD-10-CM

## 2025-06-09 DIAGNOSIS — N18.31 STAGE 3A CHRONIC KIDNEY DISEASE: Primary | ICD-10-CM

## 2025-06-09 DIAGNOSIS — J40 BRONCHITIS: ICD-10-CM

## 2025-06-09 DIAGNOSIS — I69.359 HEMIPARESIS AFFECTING NONDOMINANT SIDE AS LATE EFFECT OF CEREBROVASCULAR ACCIDENT (CVA): ICD-10-CM

## 2025-06-09 DIAGNOSIS — E78.2 MIXED HYPERLIPIDEMIA: ICD-10-CM

## 2025-06-09 DIAGNOSIS — Z86.73 HISTORY OF STROKE: ICD-10-CM

## 2025-06-09 PROBLEM — Z12.11 ENCOUNTER FOR SCREENING FOR MALIGNANT NEOPLASM OF COLON: Status: RESOLVED | Noted: 2025-02-06 | Resolved: 2025-06-09

## 2025-06-09 PROCEDURE — 99214 OFFICE O/P EST MOD 30 MIN: CPT | Performed by: INTERNAL MEDICINE

## 2025-06-09 PROCEDURE — G2211 COMPLEX E/M VISIT ADD ON: HCPCS | Performed by: INTERNAL MEDICINE

## 2025-06-09 PROCEDURE — 1159F MED LIST DOCD IN RCRD: CPT | Performed by: INTERNAL MEDICINE

## 2025-06-09 PROCEDURE — 1126F AMNT PAIN NOTED NONE PRSNT: CPT | Performed by: INTERNAL MEDICINE

## 2025-06-09 PROCEDURE — 3075F SYST BP GE 130 - 139MM HG: CPT | Performed by: INTERNAL MEDICINE

## 2025-06-09 PROCEDURE — 1160F RVW MEDS BY RX/DR IN RCRD: CPT | Performed by: INTERNAL MEDICINE

## 2025-06-09 PROCEDURE — 3079F DIAST BP 80-89 MM HG: CPT | Performed by: INTERNAL MEDICINE

## 2025-06-09 RX ORDER — AZITHROMYCIN 250 MG/1
TABLET, FILM COATED ORAL
Qty: 6 TABLET | Refills: 0 | Status: SHIPPED | OUTPATIENT
Start: 2025-06-09

## 2025-06-09 RX ORDER — ATORVASTATIN CALCIUM 80 MG/1
80 TABLET, FILM COATED ORAL DAILY
Qty: 90 TABLET | Refills: 3 | Status: SHIPPED | OUTPATIENT
Start: 2025-06-09

## 2025-06-09 RX ORDER — ALBUTEROL SULFATE 90 UG/1
2 INHALANT RESPIRATORY (INHALATION) EVERY 4 HOURS PRN
Qty: 18 G | Refills: 3 | Status: SHIPPED | OUTPATIENT
Start: 2025-06-09

## 2025-06-09 RX ORDER — CHLORTHALIDONE 25 MG/1
25 TABLET ORAL DAILY
Qty: 90 TABLET | Refills: 3 | Status: SHIPPED | OUTPATIENT
Start: 2025-06-09

## 2025-06-09 NOTE — PROGRESS NOTES
CC: f/u chest congestion    History:  Dao Jhaveri is a 69 y.o. male   History of Present Illness  The patient came in for a check-up on his chronic kidney disease, COPD, and a bad cold he recently had.    He has chronic kidney disease and mentioned that he often needs to run to the bathroom to pee. His GFR levels over the past two years have been 58.6, 59.8, and 59.5, so we need to keep a close eye on this.    He recently had a severe cold that lasted for 3 weeks, making it hard for him to breathe because of a lot of phlegm. He didn't use his inhaler but tried NyQuil and [place] Plus, which didn't help much. His condition is getting better every day, but he still has some coughing and chest congestion.    He is taking Plavix and cholesterol medication. He needs to get a colonoscopy done; he tried before but it wasn't completed because he didn't get cleaned out well enough.        ROS:  Review of Systems   Constitutional:  Negative for chills and fever.   Respiratory:  Negative for cough and shortness of breath.    Cardiovascular:  Negative for chest pain and palpitations.   Gastrointestinal:  Negative for abdominal pain and constipation.        reports that he quit smoking about 11 years ago. His smoking use included cigarettes. He started smoking about 46 years ago. He has a 52.5 pack-year smoking history. He has been exposed to tobacco smoke. He has never used smokeless tobacco. He reports that he does not drink alcohol and does not use drugs.      Current Outpatient Medications:     albuterol sulfate  (90 Base) MCG/ACT inhaler, Inhale 2 puffs Every 4 (Four) Hours As Needed for Wheezing or Shortness of Air., Disp: 18 g, Rfl: 3    amLODIPine (NORVASC) 10 MG tablet, TAKE 1 TABLET BY MOUTH EVERY NIGHT AT BEDTIME, Disp: 90 tablet, Rfl: 3    atorvastatin (LIPITOR) 80 MG tablet, Take 1 tablet by mouth Daily., Disp: 90 tablet, Rfl: 3    chlorthalidone (HYGROTON) 25 MG tablet, Take 1 tablet by mouth  "Daily., Disp: 90 tablet, Rfl: 3    clopidogrel (PLAVIX) 75 MG tablet, TAKE 1 TABLET BY MOUTH EVERY DAY, Disp: 90 tablet, Rfl: 3    hydrALAZINE (APRESOLINE) 50 MG tablet, Take 1 tablet by mouth 3 (Three) Times a Day., Disp: 90 tablet, Rfl: 5    lisinopril (PRINIVIL,ZESTRIL) 40 MG tablet, TAKE 1 TABLET BY MOUTH EVERY DAY, Disp: 90 tablet, Rfl: 3    metoprolol succinate XL (TOPROL-XL) 100 MG 24 hr tablet, Take 1 tablet by mouth Daily., Disp: 90 tablet, Rfl: 3    azithromycin (Zithromax Z-Diomedes) 250 MG tablet, Take 2 tablets by mouth on day 1, then 1 tablet daily on days 2-5, Disp: 6 tablet, Rfl: 0    empagliflozin (Jardiance) 10 MG tablet tablet, Take 1 tablet by mouth Daily., Disp: 90 tablet, Rfl: 3    nitroglycerin (NITROSTAT) 0.4 MG SL tablet, Place 1 tablet under the tongue Every 5 (Five) Minutes As Needed for Chest Pain. Take no more than 3 doses in 15 minutes. (Patient not taking: Reported on 6/9/2025), Disp: 25 tablet, Rfl: 11    OBJECTIVE:  /82 (BP Location: Left arm, Patient Position: Sitting, Cuff Size: Adult)   Pulse 69   Resp 16   Ht 170.2 cm (67\")   Wt 83.2 kg (183 lb 8 oz)   SpO2 96%   BMI 28.74 kg/m²    Physical Exam  Constitutional:       General: He is not in acute distress.  Pulmonary:      Effort: Pulmonary effort is normal. No respiratory distress.   Neurological:      Mental Status: He is alert and oriented to person, place, and time.   Psychiatric:         Mood and Affect: Mood normal.         Behavior: Behavior normal.           Assessment/Plan     Diagnoses and all orders for this visit:    1. Stage 3a chronic kidney disease (Primary)  -     Urinalysis With Microscopic If Indicated (No Culture) - Urine, Clean Catch; Future  -     Protein / Creatinine Ratio, Urine - Urine, Clean Catch; Future  -     CBC (No Diff); Future  -     Renal Function Panel; Future  -     empagliflozin (Jardiance) 10 MG tablet tablet; Take 1 tablet by mouth Daily.  Dispense: 90 tablet; Refill: 3  Labs for " evaluation. Continue RAAS blockade and add Jardiance.     3. History of stroke  4. Mixed hyperlipidemia  -     atorvastatin (LIPITOR) 80 MG tablet; Take 1 tablet by mouth Daily.  Dispense: 90 tablet; Refill: 3  Stable on high intensity statin therapy per ACC/AHA guidelines.    5. Primary hypertension  -     chlorthalidone (HYGROTON) 25 MG tablet; Take 1 tablet by mouth Daily.  Dispense: 90 tablet; Refill: 3  Well controlled, BP goal for age is <140/90 per JNC 8 guidelines, and continue current medications.    6. Hemiparesis affecting nondominant side as late effect of cerebrovascular accident (CVA)  No new symptoms.     7. Chronic obstructive pulmonary disease, unspecified COPD type  -     albuterol sulfate  (90 Base) MCG/ACT inhaler; Inhale 2 puffs Every 4 (Four) Hours As Needed for Wheezing or Shortness of Air.  Dispense: 18 g; Refill: 3    8. Impaired glucose tolerance  -     empagliflozin (Jardiance) 10 MG tablet tablet; Take 1 tablet by mouth Daily.  Dispense: 90 tablet; Refill: 3    9. Dental caries  Resources given for clinics that may take his insurance.     10. Bronchitis  -     azithromycin (Zithromax Z-Diomedes) 250 MG tablet; Take 2 tablets by mouth on day 1, then 1 tablet daily on days 2-5  Dispense: 6 tablet; Refill: 0  Zpak given chronicity of symptoms.     An After Visit Summary was printed and given to the patient at discharge.  Return in about 6 months (around 12/9/2025) for Medicare Wellness.      Patient or patient representative verbalized consent for the use of Ambient Listening during the visit with  Dallin Miller DO for chart documentation. 6/9/2025  14:01 CDT    Dallin Miller D.O. 6/9/2025   Electronically signed.

## 2025-06-17 ENCOUNTER — EXTERNAL PBMM DATA (OUTPATIENT)
Dept: PHARMACY | Facility: OTHER | Age: 70
End: 2025-06-17
Payer: MEDICARE

## 2025-06-27 ENCOUNTER — EXTERNAL PBMM DATA (OUTPATIENT)
Dept: PHARMACY | Facility: OTHER | Age: 70
End: 2025-06-27
Payer: MEDICARE

## 2025-07-07 DIAGNOSIS — E78.2 MIXED HYPERLIPIDEMIA: ICD-10-CM

## 2025-07-07 DIAGNOSIS — I10 PRIMARY HYPERTENSION: ICD-10-CM

## 2025-07-07 DIAGNOSIS — Z86.73 HISTORY OF STROKE: ICD-10-CM

## 2025-07-07 RX ORDER — CLOPIDOGREL BISULFATE 75 MG/1
75 TABLET ORAL DAILY
Qty: 90 TABLET | Refills: 3 | OUTPATIENT
Start: 2025-07-07

## 2025-07-07 RX ORDER — METOPROLOL SUCCINATE 100 MG/1
100 TABLET, EXTENDED RELEASE ORAL DAILY
Qty: 90 TABLET | Refills: 3 | OUTPATIENT
Start: 2025-07-07

## 2025-07-07 RX ORDER — ATORVASTATIN CALCIUM 80 MG/1
80 TABLET, FILM COATED ORAL DAILY
Qty: 90 TABLET | Refills: 3 | OUTPATIENT
Start: 2025-07-07

## 2025-07-07 RX ORDER — LISINOPRIL 40 MG/1
40 TABLET ORAL DAILY
Qty: 90 TABLET | Refills: 3 | Status: SHIPPED | OUTPATIENT
Start: 2025-07-07 | End: 2025-07-08 | Stop reason: SDUPTHER

## 2025-07-07 NOTE — TELEPHONE ENCOUNTER
Caller: Dao Jhaveri    Relationship: Self    Best call back number:   Telephone Information:   Mobile [phone]       Requested Prescriptions:   Requested Prescriptions     Pending Prescriptions Disp Refills    lisinopril (PRINIVIL,ZESTRIL) 40 MG tablet 90 tablet 3     Sig: Take 1 tablet by mouth Daily.    atorvastatin (LIPITOR) 80 MG tablet 90 tablet 3     Sig: Take 1 tablet by mouth Daily.    metoprolol succinate XL (TOPROL-XL) 100 MG 24 hr tablet 90 tablet 3     Sig: Take 1 tablet by mouth Daily.    clopidogrel (PLAVIX) 75 MG tablet 90 tablet 3     Sig: Take 1 tablet by mouth Daily.        Pharmacy where request should be sent: [id number] - [place] [id number]  AT [facility] [address]  & [facility] [phone] [facility] [phone] FX     Last office visit with prescribing clinician: 6/9/2025   Last telemedicine visit with prescribing clinician: Visit date not found   Next office visit with prescribing clinician: 12/12/2025     Additional details provided by patient: PATIENT IS REQUESTING A 2 DAY SUPPLY AS HE LEFT HIS MEDICATION AT HOME AND IS ON VACATION, PLEASE FOLLOW UP WITH PATIENT ONCE SUMBITTED    Does the patient have less than a 3 day supply:  [x] Yes  [] No    Would you like a call back once the refill request has been completed: [x] Yes [] No    If the office needs to give you a call back, can they leave a voicemail: [x] Yes [] No    Ko Mcginnis   07/07/25 09:58 CDT

## 2025-07-08 DIAGNOSIS — Z86.73 HISTORY OF STROKE: ICD-10-CM

## 2025-07-08 DIAGNOSIS — I10 PRIMARY HYPERTENSION: ICD-10-CM

## 2025-07-08 RX ORDER — CLOPIDOGREL BISULFATE 75 MG/1
75 TABLET ORAL DAILY
Qty: 3 TABLET | Refills: 0 | Status: SHIPPED | OUTPATIENT
Start: 2025-07-08

## 2025-07-08 RX ORDER — METOPROLOL SUCCINATE 100 MG/1
100 TABLET, EXTENDED RELEASE ORAL DAILY
Qty: 3 TABLET | Refills: 0 | Status: SHIPPED | OUTPATIENT
Start: 2025-07-08

## 2025-07-08 RX ORDER — LISINOPRIL 40 MG/1
40 TABLET ORAL DAILY
Qty: 3 TABLET | Refills: 0 | Status: SHIPPED | OUTPATIENT
Start: 2025-07-08

## 2025-07-08 NOTE — TELEPHONE ENCOUNTER
Rx Refill Note  Requested Prescriptions     Pending Prescriptions Disp Refills    metoprolol succinate XL (TOPROL-XL) 100 MG 24 hr tablet 90 tablet 3     Sig: Take 1 tablet by mouth Daily.    clopidogrel (PLAVIX) 75 MG tablet 90 tablet 3     Sig: Take 1 tablet by mouth Daily.    lisinopril (PRINIVIL,ZESTRIL) 40 MG tablet 90 tablet 3     Sig: Take 1 tablet by mouth Daily.      Last office visit with prescribing clinician: 6/9/2025   Last telemedicine visit with prescribing clinician: Visit date not found   Next office visit with prescribing clinician: 12/12/2025                         Would you like a call back once the refill request has been completed: [] Yes [] No    If the office needs to give you a call back, can they leave a voicemail: [] Yes [] No    Gregor Daniel MA  07/08/25, 10:39 CDT    PATIENT HAS CALLED, HE STATED THAT HE IS IN [place] AND IS NEEDING JUST A 3 DAYS SUPPLY SENT TO THE [facility] IN [place].  HE STATED THAT HE WILL PAY OUT OF POCKET FOR THEM.

## 2025-07-11 ENCOUNTER — EXTERNAL PBMM DATA (OUTPATIENT)
Dept: PHARMACY | Facility: OTHER | Age: 70
End: 2025-07-11
Payer: MEDICARE

## 2025-07-14 ENCOUNTER — LAB (OUTPATIENT)
Dept: LAB | Facility: HOSPITAL | Age: 70
End: 2025-07-14
Payer: MEDICARE

## 2025-07-14 DIAGNOSIS — N18.31 STAGE 3A CHRONIC KIDNEY DISEASE: ICD-10-CM

## 2025-07-14 LAB
ALBUMIN SERPL-MCNC: 4.3 G/DL (ref 3.5–5.2)
ANION GAP SERPL CALCULATED.3IONS-SCNC: 11 MMOL/L (ref 5–15)
BACTERIA UR QL AUTO: ABNORMAL /HPF
BILIRUB UR QL STRIP: NEGATIVE
BUN SERPL-MCNC: 19.5 MG/DL (ref 8–23)
BUN/CREAT SERPL: 16.7 (ref 7–25)
CALCIUM SPEC-SCNC: 9.7 MG/DL (ref 8.6–10.5)
CHLORIDE SERPL-SCNC: 108 MMOL/L (ref 98–107)
CLARITY UR: CLEAR
CO2 SERPL-SCNC: 24 MMOL/L (ref 22–29)
COLOR UR: YELLOW
CREAT SERPL-MCNC: 1.17 MG/DL (ref 0.76–1.27)
CREAT UR-MCNC: 126.9 MG/DL
DEPRECATED RDW RBC AUTO: 43.4 FL (ref 37–54)
EGFRCR SERPLBLD CKD-EPI 2021: 67.5 ML/MIN/1.73
ERYTHROCYTE [DISTWIDTH] IN BLOOD BY AUTOMATED COUNT: 14.9 % (ref 12.3–15.4)
GLUCOSE SERPL-MCNC: 117 MG/DL (ref 65–99)
GLUCOSE UR STRIP-MCNC: NEGATIVE MG/DL
HCT VFR BLD AUTO: 43 % (ref 37.5–51)
HGB BLD-MCNC: 13.7 G/DL (ref 13–17.7)
HGB UR QL STRIP.AUTO: NEGATIVE
HYALINE CASTS UR QL AUTO: ABNORMAL /LPF
KETONES UR QL STRIP: NEGATIVE
LEUKOCYTE ESTERASE UR QL STRIP.AUTO: ABNORMAL
MCH RBC QN AUTO: 25.8 PG (ref 26.6–33)
MCHC RBC AUTO-ENTMCNC: 31.9 G/DL (ref 31.5–35.7)
MCV RBC AUTO: 81.1 FL (ref 79–97)
NITRITE UR QL STRIP: NEGATIVE
PH UR STRIP.AUTO: 5.5 [PH] (ref 5–8)
PHOSPHATE SERPL-MCNC: 2.6 MG/DL (ref 2.5–4.5)
PLATELET # BLD AUTO: 180 10*3/MM3 (ref 140–450)
PMV BLD AUTO: 10.9 FL (ref 6–12)
POTASSIUM SERPL-SCNC: 3.7 MMOL/L (ref 3.5–5.2)
PROT ?TM UR-MCNC: 14.8 MG/DL
PROT UR QL STRIP: ABNORMAL
PROT/CREAT UR: 116.6 MG/G CREA (ref 0–200)
RBC # BLD AUTO: 5.3 10*6/MM3 (ref 4.14–5.8)
RBC # UR STRIP: ABNORMAL /HPF
REF LAB TEST METHOD: ABNORMAL
RENAL EPI CELLS #/AREA URNS HPF: ABNORMAL /HPF
SODIUM SERPL-SCNC: 143 MMOL/L (ref 136–145)
SP GR UR STRIP: 1.02 (ref 1–1.03)
SQUAMOUS #/AREA URNS HPF: ABNORMAL /HPF
TRICHOMONAS #/AREA URNS HPF: ABNORMAL /HPF
UROBILINOGEN UR QL STRIP: ABNORMAL
WBC # UR STRIP: ABNORMAL /HPF
WBC NRBC COR # BLD AUTO: 5.29 10*3/MM3 (ref 3.4–10.8)

## 2025-07-14 PROCEDURE — 84156 ASSAY OF PROTEIN URINE: CPT

## 2025-07-14 PROCEDURE — 85027 COMPLETE CBC AUTOMATED: CPT

## 2025-07-14 PROCEDURE — 82570 ASSAY OF URINE CREATININE: CPT

## 2025-07-14 PROCEDURE — 80069 RENAL FUNCTION PANEL: CPT

## 2025-07-14 PROCEDURE — 36415 COLL VENOUS BLD VENIPUNCTURE: CPT

## 2025-07-14 PROCEDURE — 81001 URINALYSIS AUTO W/SCOPE: CPT

## 2025-08-07 ENCOUNTER — EXTERNAL PBMM DATA (OUTPATIENT)
Dept: PHARMACY | Facility: OTHER | Age: 70
End: 2025-08-07
Payer: MEDICARE

## (undated) DEVICE — CANNULA THREADED FLEX 8.0 X 72MM: Brand: CLEAR-TRAC

## (undated) DEVICE — GLOVE SURG SZ 8 CRM LTX FREE POLYISOPRENE POLYMER BEAD ANTI

## (undated) DEVICE — MASK,OXYGEN,MED CONC,ADLT,7' TUB, UC: Brand: PENDING

## (undated) DEVICE — THE CHANNEL CLEANING BRUSH IS A NYLON FLEXI BRUSH ATTACHED TO A FLEXIBLE PLASTIC SHEATH DESIGNED TO SAFELY REMOVE DEBRIS FROM FLEXIBLE ENDOSCOPES.

## (undated) DEVICE — DEFENDO AIR WATER SUCTION AND BIOPSY VALVE KIT FOR  OLYMPUS: Brand: DEFENDO AIR/WATER/SUCTION AND BIOPSY VALVE

## (undated) DEVICE — DRAPE,SHOULDER,BEACH CHAIR,STERILE: Brand: MEDLINE

## (undated) DEVICE — 4.5 MM INCISOR PLUS STRAIGHT                                    BLADES, POWER/EP-1, VIOLET, PACKAGED                                    6 PER BOX, STERILE

## (undated) DEVICE — TUBING PMP IRRIG GOFLO

## (undated) DEVICE — PAD,ABDOMINAL,8"X10",ST,LF: Brand: MEDLINE

## (undated) DEVICE — SHOULDER CDS

## (undated) DEVICE — TUBE ET 8MM NSL ORAL BASIC CUF INTMED MURPHY EYE RADPQ MRK

## (undated) DEVICE — TUBING, SUCTION, 1/4" X 20', STRAIGHT: Brand: MEDLINE INDUSTRIES, INC.

## (undated) DEVICE — GLOVE SURG SZ 75 L12IN FNGR THK13MIL BRN LTX SYN POLYMER W

## (undated) DEVICE — Device

## (undated) DEVICE — 90-S MAX, SUCTION PROBE, NON-BENDABLE, MAX CUT LEVEL 11: Brand: SERFAS ENERGY

## (undated) DEVICE — CHLORAPREP 26ML ORANGE

## (undated) DEVICE — SENSR O2 OXIMAX FNGR A/ 18IN NONSTR

## (undated) DEVICE — IMMOBILIZER SHLDR M L15IN D8IN UNIV POLY COT W/ FOAM STRP

## (undated) DEVICE — 5.5 CM ACROMIOBLASTER STRAIGHT                                    BURRS, POWER/EP-1, BRICK RED, 8000                                    MAXIMUM RPM, PACKAGED 6 PER BOX, STERILE

## (undated) DEVICE — ARGYLE YANKAUER BULB TIP WITH VENT: Brand: ARGYLE

## (undated) DEVICE — T-MAX DISPOSABLE FACE MASK 8 PER BOX

## (undated) DEVICE — Z INACTIVE USE 2660664 SOLUTION IRRIG 3000ML 0.9% SOD CHL USP UROMATIC PLAS CONT